# Patient Record
Sex: FEMALE | Race: BLACK OR AFRICAN AMERICAN | NOT HISPANIC OR LATINO | Employment: FULL TIME | URBAN - METROPOLITAN AREA
[De-identification: names, ages, dates, MRNs, and addresses within clinical notes are randomized per-mention and may not be internally consistent; named-entity substitution may affect disease eponyms.]

---

## 2020-03-25 ENCOUNTER — HOSPITAL ENCOUNTER (INPATIENT)
Facility: HOSPITAL | Age: 55
LOS: 27 days | Discharge: NURSING, REHAB OR LONG-TERM CARE | DRG: 870 | End: 2020-04-21
Attending: EMERGENCY MEDICINE | Admitting: INTERNAL MEDICINE
Payer: COMMERCIAL

## 2020-03-25 DIAGNOSIS — U07.1 PNEUMONIA DUE TO COVID-19 VIRUS: ICD-10-CM

## 2020-03-25 DIAGNOSIS — J12.82 PNEUMONIA DUE TO COVID-19 VIRUS: ICD-10-CM

## 2020-03-25 DIAGNOSIS — R78.81 BACTEREMIA: ICD-10-CM

## 2020-03-25 DIAGNOSIS — G93.40 ACUTE ENCEPHALOPATHY: ICD-10-CM

## 2020-03-25 DIAGNOSIS — U07.1 COVID-19 VIRUS DETECTED: ICD-10-CM

## 2020-03-25 DIAGNOSIS — N17.9 ACUTE RENAL FAILURE, UNSPECIFIED ACUTE RENAL FAILURE TYPE: ICD-10-CM

## 2020-03-25 DIAGNOSIS — R57.9 SHOCK: ICD-10-CM

## 2020-03-25 DIAGNOSIS — U07.1 COVID-19 VIRUS INFECTION: Primary | ICD-10-CM

## 2020-03-25 DIAGNOSIS — Z78.9 INTUBATION OF AIRWAY PERFORMED WITHOUT DIFFICULTY: ICD-10-CM

## 2020-03-25 DIAGNOSIS — E66.01 MORBID OBESITY: ICD-10-CM

## 2020-03-25 DIAGNOSIS — B49 FUNGEMIA: ICD-10-CM

## 2020-03-25 DIAGNOSIS — J96.00 ACUTE RESPIRATORY FAILURE: ICD-10-CM

## 2020-03-25 DIAGNOSIS — J96.01 ACUTE RESPIRATORY FAILURE WITH HYPOXIA: ICD-10-CM

## 2020-03-25 DIAGNOSIS — J80 ARDS (ADULT RESPIRATORY DISTRESS SYNDROME): ICD-10-CM

## 2020-03-25 DIAGNOSIS — R00.0 TACHYCARDIA: ICD-10-CM

## 2020-03-25 DIAGNOSIS — Z03.89 RULED OUT FOR MYOCARDIAL INFARCTION: ICD-10-CM

## 2020-03-25 DIAGNOSIS — H10.9 CONJUNCTIVITIS OF RIGHT EYE, UNSPECIFIED CONJUNCTIVITIS TYPE: ICD-10-CM

## 2020-03-25 DIAGNOSIS — Z91.89 AT RISK FOR LONG QT SYNDROME: ICD-10-CM

## 2020-03-25 DIAGNOSIS — Z46.59 ENCOUNTER FOR NASOGASTRIC (NG) TUBE PLACEMENT: ICD-10-CM

## 2020-03-25 DIAGNOSIS — E03.9 ACQUIRED HYPOTHYROIDISM: ICD-10-CM

## 2020-03-25 DIAGNOSIS — B37.7 CANDIDEMIA: ICD-10-CM

## 2020-03-25 LAB
ALBUMIN SERPL BCP-MCNC: 3.3 G/DL (ref 3.5–5.2)
ALLENS TEST: ABNORMAL
ALP SERPL-CCNC: 82 U/L (ref 55–135)
ALT SERPL W/O P-5'-P-CCNC: 87 U/L (ref 10–44)
ANION GAP SERPL CALC-SCNC: 10 MMOL/L (ref 8–16)
AST SERPL-CCNC: 102 U/L (ref 10–40)
BASOPHILS NFR BLD: 0 % (ref 0–1.9)
BILIRUB SERPL-MCNC: 0.3 MG/DL (ref 0.1–1)
BILIRUB UR QL STRIP: NEGATIVE
BNP SERPL-MCNC: <10 PG/ML (ref 0–99)
BUN SERPL-MCNC: 8 MG/DL (ref 6–20)
CALCIUM SERPL-MCNC: 8.7 MG/DL (ref 8.7–10.5)
CHLORIDE SERPL-SCNC: 101 MMOL/L (ref 95–110)
CK SERPL-CCNC: 247 U/L (ref 20–180)
CLARITY UR: CLEAR
CO2 SERPL-SCNC: 28 MMOL/L (ref 23–29)
COLOR UR: YELLOW
CREAT SERPL-MCNC: 0.7 MG/DL (ref 0.5–1.4)
CRP SERPL-MCNC: 119 MG/L (ref 0–8.2)
D DIMER PPP IA.FEU-MCNC: 1.05 MG/L FEU
DELSYS: ABNORMAL
DIFFERENTIAL METHOD: ABNORMAL
EOSINOPHIL NFR BLD: 0 % (ref 0–8)
ERYTHROCYTE [DISTWIDTH] IN BLOOD BY AUTOMATED COUNT: 16.9 % (ref 11.5–14.5)
ERYTHROCYTE [SEDIMENTATION RATE] IN BLOOD BY WESTERGREN METHOD: 18 MM/H
ERYTHROCYTE [SEDIMENTATION RATE] IN BLOOD BY WESTERGREN METHOD: 78 MM/HR (ref 0–20)
EST. GFR  (AFRICAN AMERICAN): >60 ML/MIN/1.73 M^2
EST. GFR  (NON AFRICAN AMERICAN): >60 ML/MIN/1.73 M^2
FERRITIN SERPL-MCNC: 1752 NG/ML (ref 20–300)
FIO2: 60
GLUCOSE SERPL-MCNC: 94 MG/DL (ref 70–110)
GLUCOSE UR QL STRIP: NEGATIVE
HCO3 UR-SCNC: 26.7 MMOL/L (ref 24–28)
HCT VFR BLD AUTO: 38.8 % (ref 37–48.5)
HGB BLD-MCNC: 11.7 G/DL (ref 12–16)
HGB UR QL STRIP: ABNORMAL
HIV1+2 IGG SERPL QL IA.RAPID: NORMAL
IMM GRANULOCYTES # BLD AUTO: ABNORMAL K/UL
IMM GRANULOCYTES NFR BLD AUTO: ABNORMAL %
KETONES UR QL STRIP: ABNORMAL
LACTATE SERPL-SCNC: 0.8 MMOL/L (ref 0.5–2.2)
LACTATE SERPL-SCNC: 1.3 MMOL/L (ref 0.5–2.2)
LDH SERPL L TO P-CCNC: 571 U/L (ref 110–260)
LEUKOCYTE ESTERASE UR QL STRIP: NEGATIVE
LYMPHOCYTES NFR BLD: 16 % (ref 18–48)
MAGNESIUM SERPL-MCNC: 2.1 MG/DL (ref 1.6–2.6)
MCH RBC QN AUTO: 26.2 PG (ref 27–31)
MCHC RBC AUTO-ENTMCNC: 30.2 G/DL (ref 32–36)
MCV RBC AUTO: 87 FL (ref 82–98)
MIN VOL: 9.13
MODE: ABNORMAL
MONOCYTES NFR BLD: 1 % (ref 4–15)
NEUTROPHILS NFR BLD: 79 % (ref 38–73)
NEUTS BAND NFR BLD MANUAL: 4 %
NITRITE UR QL STRIP: NEGATIVE
NRBC BLD-RTO: 0 /100 WBC
PCO2 BLDA: 38.3 MMHG (ref 35–45)
PEEP: 8
PH SMN: 7.45 [PH] (ref 7.35–7.45)
PH UR STRIP: 7 [PH] (ref 5–8)
PIP: 37
PLATELET # BLD AUTO: 195 K/UL (ref 150–350)
PLATELET BLD QL SMEAR: ABNORMAL
PMV BLD AUTO: 10.6 FL (ref 9.2–12.9)
PO2 BLDA: 101 MMHG (ref 80–100)
POC BE: 3 MMOL/L
POC SATURATED O2: 98 % (ref 95–100)
POC TCO2: 28 MMOL/L (ref 23–27)
POTASSIUM SERPL-SCNC: 4 MMOL/L (ref 3.5–5.1)
PROCALCITONIN SERPL IA-MCNC: 0.12 NG/ML
PROT SERPL-MCNC: 8.1 G/DL (ref 6–8.4)
PROT UR QL STRIP: NEGATIVE
RBC # BLD AUTO: 4.46 M/UL (ref 4–5.4)
SAMPLE: ABNORMAL
SITE: ABNORMAL
SODIUM SERPL-SCNC: 139 MMOL/L (ref 136–145)
SP GR UR STRIP: 1.01 (ref 1–1.03)
SP02: 99
TROPONIN I SERPL DL<=0.01 NG/ML-MCNC: 0.02 NG/ML (ref 0–0.03)
TROPONIN I SERPL DL<=0.01 NG/ML-MCNC: <0.006 NG/ML (ref 0–0.03)
URN SPEC COLLECT METH UR: ABNORMAL
UROBILINOGEN UR STRIP-ACNC: NEGATIVE EU/DL
VT: 480
WBC # BLD AUTO: 4.46 K/UL (ref 3.9–12.7)

## 2020-03-25 PROCEDURE — 31500 INSERT EMERGENCY AIRWAY: CPT

## 2020-03-25 PROCEDURE — 63600175 PHARM REV CODE 636 W HCPCS: Performed by: EMERGENCY MEDICINE

## 2020-03-25 PROCEDURE — 25000003 PHARM REV CODE 250: Performed by: EMERGENCY MEDICINE

## 2020-03-25 PROCEDURE — 80074 ACUTE HEPATITIS PANEL: CPT

## 2020-03-25 PROCEDURE — 96361 HYDRATE IV INFUSION ADD-ON: CPT

## 2020-03-25 PROCEDURE — 84484 ASSAY OF TROPONIN QUANT: CPT | Mod: 91

## 2020-03-25 PROCEDURE — 82728 ASSAY OF FERRITIN: CPT

## 2020-03-25 PROCEDURE — 85027 COMPLETE CBC AUTOMATED: CPT

## 2020-03-25 PROCEDURE — 96360 HYDRATION IV INFUSION INIT: CPT

## 2020-03-25 PROCEDURE — 11000001 HC ACUTE MED/SURG PRIVATE ROOM

## 2020-03-25 PROCEDURE — 84484 ASSAY OF TROPONIN QUANT: CPT

## 2020-03-25 PROCEDURE — 36415 COLL VENOUS BLD VENIPUNCTURE: CPT

## 2020-03-25 PROCEDURE — 63600175 PHARM REV CODE 636 W HCPCS: Performed by: INTERNAL MEDICINE

## 2020-03-25 PROCEDURE — 86140 C-REACTIVE PROTEIN: CPT

## 2020-03-25 PROCEDURE — 85379 FIBRIN DEGRADATION QUANT: CPT

## 2020-03-25 PROCEDURE — 87070 CULTURE OTHR SPECIMN AEROBIC: CPT

## 2020-03-25 PROCEDURE — 25000003 PHARM REV CODE 250: Performed by: INTERNAL MEDICINE

## 2020-03-25 PROCEDURE — 36600 WITHDRAWAL OF ARTERIAL BLOOD: CPT

## 2020-03-25 PROCEDURE — 99900026 HC AIRWAY MAINTENANCE (STAT)

## 2020-03-25 PROCEDURE — 80053 COMPREHEN METABOLIC PANEL: CPT

## 2020-03-25 PROCEDURE — 25000003 PHARM REV CODE 250

## 2020-03-25 PROCEDURE — 86703 HIV-1/HIV-2 1 RESULT ANTBDY: CPT

## 2020-03-25 PROCEDURE — 94002 VENT MGMT INPAT INIT DAY: CPT

## 2020-03-25 PROCEDURE — 82960 TEST FOR G6PD ENZYME: CPT

## 2020-03-25 PROCEDURE — 85007 BL SMEAR W/DIFF WBC COUNT: CPT

## 2020-03-25 PROCEDURE — 85651 RBC SED RATE NONAUTOMATED: CPT

## 2020-03-25 PROCEDURE — 63600175 PHARM REV CODE 636 W HCPCS

## 2020-03-25 PROCEDURE — 99292 CRITICAL CARE ADDL 30 MIN: CPT

## 2020-03-25 PROCEDURE — 87040 BLOOD CULTURE FOR BACTERIA: CPT

## 2020-03-25 PROCEDURE — 84145 PROCALCITONIN (PCT): CPT

## 2020-03-25 PROCEDURE — 99291 CRITICAL CARE FIRST HOUR: CPT | Mod: 25

## 2020-03-25 PROCEDURE — 83615 LACTATE (LD) (LDH) ENZYME: CPT

## 2020-03-25 PROCEDURE — 83735 ASSAY OF MAGNESIUM: CPT

## 2020-03-25 PROCEDURE — 83880 ASSAY OF NATRIURETIC PEPTIDE: CPT

## 2020-03-25 PROCEDURE — 83605 ASSAY OF LACTIC ACID: CPT

## 2020-03-25 PROCEDURE — 20000000 HC ICU ROOM

## 2020-03-25 PROCEDURE — 87205 SMEAR GRAM STAIN: CPT

## 2020-03-25 PROCEDURE — 82550 ASSAY OF CK (CPK): CPT

## 2020-03-25 PROCEDURE — 99900035 HC TECH TIME PER 15 MIN (STAT)

## 2020-03-25 PROCEDURE — 82803 BLOOD GASES ANY COMBINATION: CPT

## 2020-03-25 PROCEDURE — 81003 URINALYSIS AUTO W/O SCOPE: CPT

## 2020-03-25 RX ORDER — ENOXAPARIN SODIUM 100 MG/ML
40 INJECTION SUBCUTANEOUS EVERY 12 HOURS
Status: DISCONTINUED | OUTPATIENT
Start: 2020-03-25 | End: 2020-04-07

## 2020-03-25 RX ORDER — SUCCINYLCHOLINE CHLORIDE 20 MG/ML
120 INJECTION INTRAMUSCULAR; INTRAVENOUS
Status: COMPLETED | OUTPATIENT
Start: 2020-03-25 | End: 2020-03-25

## 2020-03-25 RX ORDER — SODIUM CHLORIDE 9 MG/ML
INJECTION, SOLUTION INTRAVENOUS CONTINUOUS
Status: DISCONTINUED | OUTPATIENT
Start: 2020-03-25 | End: 2020-03-26

## 2020-03-25 RX ORDER — SODIUM CHLORIDE 0.9 % (FLUSH) 0.9 %
10 SYRINGE (ML) INJECTION
Status: DISCONTINUED | OUTPATIENT
Start: 2020-03-25 | End: 2020-04-12

## 2020-03-25 RX ORDER — PROPOFOL 10 MG/ML
20 INJECTION, EMULSION INTRAVENOUS
Status: COMPLETED | OUTPATIENT
Start: 2020-03-25 | End: 2020-03-25

## 2020-03-25 RX ORDER — PROPOFOL 10 MG/ML
INJECTION, EMULSION INTRAVENOUS
Status: COMPLETED
Start: 2020-03-25 | End: 2020-03-25

## 2020-03-25 RX ORDER — HYDROXYCHLOROQUINE SULFATE 200 MG/1
400 TABLET, FILM COATED ORAL 2 TIMES DAILY
Status: COMPLETED | OUTPATIENT
Start: 2020-03-25 | End: 2020-03-26

## 2020-03-25 RX ORDER — ACETAMINOPHEN 325 MG/1
650 TABLET ORAL
Status: COMPLETED | OUTPATIENT
Start: 2020-03-25 | End: 2020-03-25

## 2020-03-25 RX ORDER — HYDROXYCHLOROQUINE SULFATE 200 MG/1
400 TABLET, FILM COATED ORAL DAILY
Status: DISCONTINUED | OUTPATIENT
Start: 2020-03-27 | End: 2020-03-27

## 2020-03-25 RX ORDER — SODIUM CHLORIDE 9 MG/ML
1000 INJECTION, SOLUTION INTRAVENOUS
Status: COMPLETED | OUTPATIENT
Start: 2020-03-25 | End: 2020-03-25

## 2020-03-25 RX ORDER — ROCURONIUM BROMIDE 10 MG/ML
INJECTION, SOLUTION INTRAVENOUS
Status: COMPLETED
Start: 2020-03-25 | End: 2020-03-25

## 2020-03-25 RX ORDER — PROPOFOL 10 MG/ML
5 INJECTION, EMULSION INTRAVENOUS
Status: DISCONTINUED | OUTPATIENT
Start: 2020-03-26 | End: 2020-04-16

## 2020-03-25 RX ORDER — LEVOTHYROXINE SODIUM 100 UG/1
100 TABLET ORAL
Status: DISCONTINUED | OUTPATIENT
Start: 2020-03-26 | End: 2020-04-21 | Stop reason: HOSPADM

## 2020-03-25 RX ORDER — ETOMIDATE 2 MG/ML
15 INJECTION INTRAVENOUS
Status: COMPLETED | OUTPATIENT
Start: 2020-03-25 | End: 2020-03-25

## 2020-03-25 RX ORDER — ROCURONIUM BROMIDE 10 MG/ML
100 INJECTION, SOLUTION INTRAVENOUS ONCE
Status: DISCONTINUED | OUTPATIENT
Start: 2020-03-25 | End: 2020-04-09

## 2020-03-25 RX ORDER — PANTOPRAZOLE SODIUM 40 MG/1
40 TABLET, DELAYED RELEASE ORAL DAILY
Status: DISCONTINUED | OUTPATIENT
Start: 2020-03-26 | End: 2020-03-26

## 2020-03-25 RX ADMIN — ENOXAPARIN SODIUM 40 MG: 100 INJECTION SUBCUTANEOUS at 11:03

## 2020-03-25 RX ADMIN — AZITHROMYCIN MONOHYDRATE 500 MG: 500 INJECTION, POWDER, LYOPHILIZED, FOR SOLUTION INTRAVENOUS at 11:03

## 2020-03-25 RX ADMIN — SODIUM CHLORIDE: 0.9 INJECTION, SOLUTION INTRAVENOUS at 11:03

## 2020-03-25 RX ADMIN — SUCCINYLCHOLINE CHLORIDE 120 MG: 20 INJECTION, SOLUTION INTRAMUSCULAR; INTRAVENOUS at 08:03

## 2020-03-25 RX ADMIN — SODIUM CHLORIDE 1000 ML: 0.9 INJECTION, SOLUTION INTRAVENOUS at 06:03

## 2020-03-25 RX ADMIN — PROPOFOL 50 MCG/KG/MIN: 10 INJECTION, EMULSION INTRAVENOUS at 11:03

## 2020-03-25 RX ADMIN — HYDROXYCHLOROQUINE SULFATE 400 MG: 200 TABLET ORAL at 11:03

## 2020-03-25 RX ADMIN — SODIUM CHLORIDE 1000 ML: 0.9 INJECTION, SOLUTION INTRAVENOUS at 01:03

## 2020-03-25 RX ADMIN — CEFTRIAXONE 1 G: 1 INJECTION, SOLUTION INTRAVENOUS at 11:03

## 2020-03-25 RX ADMIN — ROCURONIUM BROMIDE 100 MG: 10 INJECTION, SOLUTION INTRAVENOUS at 09:03

## 2020-03-25 RX ADMIN — ACETAMINOPHEN 650 MG: 325 TABLET ORAL at 01:03

## 2020-03-25 RX ADMIN — PROPOFOL 20 MCG/KG/MIN: 10 INJECTION, EMULSION INTRAVENOUS at 08:03

## 2020-03-25 RX ADMIN — ETOMIDATE 15 MG: 2 INJECTION, SOLUTION INTRAVENOUS at 08:03

## 2020-03-25 NOTE — ASSESSMENT & PLAN NOTE
Supplemental O2 via nasal canula; titrate O2 saturation to >92%.   Pulmonary consultation.   Continue beta 2 agonist bronchodilator treatments.   Continue IV antibiotics - ceftriaxone and azithromycin.  Start Plaquenil 400 mg twice daily for 1 day followed by 400 mg daily for 5 days as per COVID protocol.  Check sputum GS and Cx.   Continue routine medications as before.   Follow airborne/droplet precautions.

## 2020-03-25 NOTE — H&P
[facility] Medicine  History & Physical    Patient Name: Maria Victoria Hernandez  MRN: [id number]  Admission Date: 3/25/2020  Attending Physician: Ferny Porter MD   Primary Care Provider: Candiec Deluna MD         Patient information was obtained from patient, daughter and ER records.     Subjective:     Principal Problem:<principal problem not specified>    Chief Complaint:   Chief Complaint   Patient presents with    Shortness of Breath     positive covid        HPI: Patient is a 54 years old  female with morbid obesity in past medical history significant for hypothyroidism is being admitted to intensive care unit under inpatient status from [facility] Emergency room with worsening shortness of breath.  Three days ago patient was tested positive for COVID - 19.  Patient works at [employer].  Patient has been experiencing subjective fever, generalized body aches and pains and nonproductive cough for few days.  Patient is getting increasingly short of breath especially for the past 2 days.  Upon arrival to the emergency room patient was noted to be hypoxic around 89%.  Up on 3 L patient's oxygen sats are around 96%.  Patient denies any chest pain, leg swelling or calf tenderness.      History reviewed. No pertinent past medical history.    Past Surgical History:   Procedure Laterality Date    TUBAL LIGATION         Review of patient's allergies indicates:  No Known Allergies    No current facility-administered medications on file prior to encounter.      Current Outpatient Medications on File Prior to Encounter   Medication Sig    clotrimazole-betamethasone 1-0.05% (LOTRISONE) cream Apply topically 2 (two) times daily.    ferrous sulfate 325 mg (65 mg iron) Tab tablet Take 1 tablet (325 mg total) by mouth daily with breakfast.    levocetirizine (XYZAL) 5 MG tablet Take 1 tablet (5 mg total) by mouth every evening.     levothyroxine (SYNTHROID) 100 MCG tablet Take 1 tablet (100 mcg total) by mouth once daily.     Family History     Problem Relation (Age of Onset)    Heart disease Mother, Maternal Grandmother, Maternal Grandfather    Mental illness Paternal Grandmother        Tobacco Use    Smoking status: Never Smoker    Smokeless tobacco: Never Used   Substance and Sexual Activity    Alcohol use: No    Drug use: Not on file    Sexual activity: Not on file     Review of Systems   Constitutional: Positive for fatigue and fever.   Respiratory: Positive for cough and shortness of breath.    Musculoskeletal: Positive for arthralgias and myalgias.   Neurological: Positive for weakness.   All other systems reviewed and are negative.    Objective:     Vital Signs (Most Recent):  Temp: (!) 100.5 °F (38.1 °C) (03/25/20 1239)  Pulse: 103 (03/25/20 1531)  Resp: (!) 40 (03/25/20 1239)  BP: (!) 152/75 (03/25/20 1531)  SpO2: 95 % (03/25/20 1531) Vital Signs (24h Range):  Temp:  [100.5 °F (38.1 °C)] 100.5 °F (38.1 °C)  Pulse:  [103-120] 103  Resp:  [40] 40  SpO2:  [89 %-97 %] 95 %  BP: (136-174)/(73-96) 152/75     Weight: 105.7 kg (233 lb)  Body mass index is 45.5 kg/m².    Physical Exam   Constitutional: She is oriented to person, place, and time. She appears well-developed.   Anxious and ill-appearing, morbidly obese   HENT:   Head: Normocephalic and atraumatic.   Eyes: Pupils are equal, round, and reactive to light. Conjunctivae are normal.   Neck: Neck supple. No JVD present. No thyromegaly present.   Cardiovascular: Normal rate and regular rhythm. Exam reveals no gallop and no friction rub.   No murmur heard.  Pulmonary/Chest: Breath sounds normal.   Increased respiratory rate, decreased breath sounds at lung bases.   Abdominal: Soft. Bowel sounds are normal. She exhibits no distension and no mass. There is no tenderness.   Musculoskeletal: Normal range of motion. She exhibits no edema.   Neurological: She is oriented to person, place,  and time. No cranial nerve deficit.   Skin: Skin is warm and dry.   Psychiatric: Her behavior is normal.         CRANIAL NERVES     CN III, IV, VI   Pupils are equal, round, and reactive to light.       Significant Labs:   CBC:   Recent Labs   Lab 03/25/20  1410   WBC 4.46   HGB 11.7*   HCT 38.8        CMP:   Recent Labs   Lab 03/25/20  1410      K 4.0      CO2 28   GLU 94   BUN 8   CREATININE 0.7   CALCIUM 8.7   PROT 8.1   ALBUMIN 3.3*   BILITOT 0.3   ALKPHOS 82   *   ALT 87*   ANIONGAP 10   EGFRNONAA >60   LDH: 571      Lactate: 0.8    HIV negative  Procalcitonin negative    Significant Imaging:   CXR: New bilateral infiltrates    Assessment/Plan:     Acute respiratory failure  Supplemental O2 via nasal canula; titrate O2 saturation to >92%.   Pulmonary consultation.   Continue beta 2 agonist bronchodilator treatments.   Continue IV antibiotics - ceftriaxone and azithromycin.  Start Plaquenil 400 mg twice daily for 1 day followed by 400 mg daily for 5 days as per COVID protocol.  Check sputum GS and Cx.   Continue routine medications as before.   Follow airborne/droplet precautions.            Covid-19 Virus Detected  Start Plaquenil 400 mg twice daily for 1 day followed by 400 mg daily for 5 days as per COVID protocol.  Check sputum GS and Cx.   Continue routine medications as before.   Follow airborne/droplet precautions.      Hypothyroid  Check TSH, Chronic problem. Will continue chronic medications and monitor for any changes, adjusting as needed.          Morbid obesity  Body mass index is 45.5 kg/m². Morbid obesity complicates all aspects of disease management from diagnostic modalities to treatment. Weight loss encouraged and health benefits explained to patient.         Discussed with patient's daughter who is a senior registered nurse in our facility, answered all questions.    DVT prophylaxis:  Use sequential compression stockings and [place] hose.  Lovenox 40 mg subcu  daily.    Ferny Porter MD  Department of Hospital Medicine   [facility]

## 2020-03-25 NOTE — ED NOTES
Pt sitting up in bed, resp effort has improved. RR 40. Nadn. O2 via NC @ 3L. Safety maintained. Will cont to brain.

## 2020-03-25 NOTE — ED PROVIDER NOTES
Encounter Date: 3/25/2020    SCRIBE #1 NOTE: I, Awa Pierre, am scribing for, and in the presence of, Sam Jones MD.       History     Chief Complaint   Patient presents with    Shortness of Breath     positive covid       Time seen by provider: 12:58 PM on 03/25/2020    Maria Victoria Hernandez is a 54 y.o. female with PMHx of thyroid disease who presents to the ED with an onset of SOB x 2 days and worsening this morning. Pt c/o fever, generalized body aches, nonproductive cough, as well as, N/V, unable to keep down food or liquid. She had a confirmed positive Covid-19 test 4 days ago. The patient denies diarrhea or any other symptoms at this time. No pertinent PMHx or PSHx.    The history is provided by the patient.     Review of patient's allergies indicates:  No Known Allergies  History reviewed. No pertinent past medical history.  Past Surgical History:   Procedure Laterality Date    TUBAL LIGATION       Family History   Problem Relation Age of Onset    Heart disease Mother     Heart disease Maternal Grandmother     Heart disease Maternal Grandfather     Mental illness Paternal Grandmother      Social History     Tobacco Use    Smoking status: Never Smoker    Smokeless tobacco: Never Used   Substance Use Topics    Alcohol use: No    Drug use: Not on file     Review of Systems   Constitutional: Positive for fever.   HENT: Negative for sore throat.    Respiratory: Positive for cough and shortness of breath.    Cardiovascular: Negative for chest pain.   Gastrointestinal: Positive for nausea and vomiting. Negative for diarrhea.   Genitourinary: Negative for dysuria.   Musculoskeletal: Positive for myalgias (generalized). Negative for back pain.   Skin: Negative for rash.   Neurological: Negative for weakness.   Hematological: Does not bruise/bleed easily.       Physical Exam     Initial Vitals [03/25/20 1239]   BP Pulse Resp Temp SpO2   136/87 109 (!) 40 (!) 100.5 °F (38.1 °C) (!) 89 %      MAP        --         Physical Exam    Nursing note and vitals reviewed.  Constitutional: She appears well-developed and well-nourished. She is not diaphoretic. No distress.   HENT:   Head: Normocephalic and atraumatic.   Mouth/Throat: Mucous membranes are dry.   Eyes: EOM are normal. Pupils are equal, round, and reactive to light.   Neck: Normal range of motion. Neck supple.   Cardiovascular: Regular rhythm, normal heart sounds and intact distal pulses. Tachycardia present.  Exam reveals no gallop and no friction rub.    No murmur heard.  Pulmonary/Chest: Tachypnea noted. No respiratory distress. She has decreased breath sounds. She has no wheezes. She has no rhonchi. She has no rales.   Diminished lung sounds bilaterally.   Abdominal: Soft. Bowel sounds are normal. There is no tenderness. There is no rebound and no guarding.   Musculoskeletal: Normal range of motion.   Neurological: She is alert and oriented to person, place, and time.   Skin: Skin is warm and dry.   Psychiatric: She has a normal mood and affect. Her behavior is normal. Judgment and thought content normal.         ED Course   Critical Care  Performed by: Sam Jones MD  Authorized by: Sam Jones MD   Direct patient critical care time: 18 minutes  Additional history critical care time: 13 minutes  Ordering / reviewing critical care time: 14 minutes  Documentation critical care time: 12 minutes  Consulting other physicians critical care time: 8 minutes  Consult with family critical care time: 10 minutes  Total critical care time (exclusive of procedural time) : 75 minutes  Critical care was time spent personally by me on the following activities: re-evaluation of patient's condition, ordering and review of radiographic studies, ordering and performing treatments and interventions, examination of patient, development of treatment plan with patient or surrogate, evaluation of patient's response to treatment, obtaining history from patient or  surrogate and ordering and review of laboratory studies.        Labs Reviewed   CBC W/ AUTO DIFFERENTIAL - Abnormal; Notable for the following components:       Result Value    Hemoglobin 11.7 (*)     Mean Corpuscular Hemoglobin 26.2 (*)     Mean Corpuscular Hemoglobin Conc 30.2 (*)     RDW 16.9 (*)     Gran% 79.0 (*)     Lymph% 16.0 (*)     Mono% 1.0 (*)     All other components within normal limits   COMPREHENSIVE METABOLIC PANEL - Abnormal; Notable for the following components:    Albumin 3.3 (*)      (*)     ALT 87 (*)     All other components within normal limits   URINALYSIS, REFLEX TO URINE CULTURE - Abnormal; Notable for the following components:    Ketones, UA 1+ (*)     Occult Blood UA Trace (*)     All other components within normal limits    Narrative:     Preferred Collection Type->Urine, Clean Catch   C-REACTIVE PROTEIN - Abnormal; Notable for the following components:    .0 (*)     All other components within normal limits   FERRITIN - Abnormal; Notable for the following components:    Ferritin 1,752 (*)     All other components within normal limits   LACTATE DEHYDROGENASE - Abnormal; Notable for the following components:     (*)     All other components within normal limits   CK - Abnormal; Notable for the following components:     (*)     All other components within normal limits   CULTURE, BLOOD   CULTURE, RESPIRATORY   LACTIC ACID, PLASMA   MAGNESIUM   PROCALCITONIN   TROPONIN I   B-TYPE NATRIURETIC PEPTIDE   RAPID HIV   TROPONIN I   LACTIC ACID, PLASMA   D DIMER, QUANTITATIVE   GLUCOSE 6 PHOSPHATE DEHYDROGENASE   SEDIMENTATION RATE   HEPATITIS PANEL, ACUTE          Imaging Results          X-Ray Chest AP Portable (Final result)  Result time 03/25/20 13:58:20    Final result by Aletha Arrington MD (03/25/20 13:58:20)                 Impression:      New bilateral infiltrates      Electronically signed by: Aletha Arrington MD  Date:    03/25/2020  Time:    13:58              Narrative:    EXAMINATION:  XR CHEST AP PORTABLE    CLINICAL HISTORY:  Sepsis;    TECHNIQUE:  Single frontal view of the chest was performed.    COMPARISON:  09/09/2010    FINDINGS:  The heart is not enlarged and the cardiomediastinal silhouette is stable    There is interval development of patchy bilateral consolidation perihilar and basilar more so basilar.    No pleural effusion.                                 Medical Decision Making:   History:   Old Medical Records: I decided to obtain old medical records.  Initial Assessment:   54-year-old female presented with shortness of breath.  Differential Diagnosis:   My differential diagnosis includes sepsis, PNA, and viral illness.  Clinical Tests:   Lab Tests: Ordered and Reviewed  Radiological Study: Ordered and Reviewed  ED Management:  The patient was emergently evaluated in the emergency department, her evaluation was significant for a middle-age female who is noted to be tachypneic and have increased respiratory effort on initial examination.  The patient's chest x-ray was concerning for bilateral infiltrates per Radiology.  The patient's labs were significant for mildly elevated LFTs as well as lymphopenia.  The patient was treated with p.o. Tylenol, IV fluids, and oxygen here in the emergency department.  The patient has significant improvement in her respiratory effort after treatment, and reports that she is no longer short of breath when she does not move around.  The patient's diagnosis is pneumonia secondary to her COVID infection.  I did discuss the possibility of intubation with both the patient and her daughter, who is a nurse at our facility.  The patient's O2 saturation did improve to 96-97% on 2.5 L of oxygen by nasal cannula.  I do not believe the patient needs emergent intubation at this time, however she will be admitted to the intensive care unit.  The case was discussed with the hospitalist on call, Dr. Porter.  He has accepted the patient  for admission.  Other:   I have discussed this case with another health care provider.            Scribe Attestation:   Scribe #1: I performed the above scribed service and the documentation accurately describes the services I performed. I attest to the accuracy of the note.    Attending Attestation:             Attending ED Notes:   2:20 PM  Spoke with Dr. Porter about patient. He will come and evaluate patient now.         I, Dr. Sam Jones, personally performed the services described in this documentation. All medical record entries made by the scribe were at my direction and in my presence.  I have reviewed the chart and agree that the record reflects my personal performance and is accurate and complete. Sam Jones MD.  5:08 PM 03/25/2020                  Clinical Impression:       ICD-10-CM ICD-9-CM   1. Covid-19 Virus Infection J22     B97.29    2. Acute respiratory failure J96.00 518.81         Disposition:   Disposition: Admitted     ED Disposition Condition    Admit                           Sam Jones MD  03/25/20 1707

## 2020-03-25 NOTE — ED NOTES
Patient placed on continuous cardiac monitor, automatic blood pressure cuff, and continuous pulse oximeter. Pt placed on 3L NC. MD aware. Sats @  95%.

## 2020-03-25 NOTE — ASSESSMENT & PLAN NOTE
Check TSH, Chronic problem. Will continue chronic medications and monitor for any changes, adjusting as needed.

## 2020-03-25 NOTE — ASSESSMENT & PLAN NOTE
Body mass index is 45.5 kg/m². Morbid obesity complicates all aspects of disease management from diagnostic modalities to treatment. Weight loss encouraged and health benefits explained to patient.

## 2020-03-25 NOTE — ASSESSMENT & PLAN NOTE
Start Plaquenil 400 mg twice daily for 1 day followed by 400 mg daily for 5 days as per COVID protocol.  Check sputum GS and Cx.   Continue routine medications as before.   Follow airborne/droplet precautions.

## 2020-03-25 NOTE — SUBJECTIVE & OBJECTIVE
History reviewed. No pertinent past medical history.    Past Surgical History:   Procedure Laterality Date    TUBAL LIGATION         Review of patient's allergies indicates:  No Known Allergies    No current facility-administered medications on file prior to encounter.      Current Outpatient Medications on File Prior to Encounter   Medication Sig    clotrimazole-betamethasone 1-0.05% (LOTRISONE) cream Apply topically 2 (two) times daily.    ferrous sulfate 325 mg (65 mg iron) Tab tablet Take 1 tablet (325 mg total) by mouth daily with breakfast.    levocetirizine (XYZAL) 5 MG tablet Take 1 tablet (5 mg total) by mouth every evening.    levothyroxine (SYNTHROID) 100 MCG tablet Take 1 tablet (100 mcg total) by mouth once daily.     Family History     Problem Relation (Age of Onset)    Heart disease Mother, Maternal Grandmother, Maternal Grandfather    Mental illness Paternal Grandmother        Tobacco Use    Smoking status: Never Smoker    Smokeless tobacco: Never Used   Substance and Sexual Activity    Alcohol use: No    Drug use: Not on file    Sexual activity: Not on file     Review of Systems   Constitutional: Positive for fatigue and fever.   Respiratory: Positive for cough and shortness of breath.    Musculoskeletal: Positive for arthralgias and myalgias.   Neurological: Positive for weakness.   All other systems reviewed and are negative.    Objective:     Vital Signs (Most Recent):  Temp: (!) 100.5 °F (38.1 °C) (03/25/20 1239)  Pulse: 103 (03/25/20 1531)  Resp: (!) 40 (03/25/20 1239)  BP: (!) 152/75 (03/25/20 1531)  SpO2: 95 % (03/25/20 1531) Vital Signs (24h Range):  Temp:  [100.5 °F (38.1 °C)] 100.5 °F (38.1 °C)  Pulse:  [103-120] 103  Resp:  [40] 40  SpO2:  [89 %-97 %] 95 %  BP: (136-174)/(73-96) 152/75     Weight: 105.7 kg (233 lb)  Body mass index is 45.5 kg/m².    Physical Exam   Constitutional: She is oriented to person, place, and time. She appears well-developed.   Anxious and  ill-appearing, morbidly obese   HENT:   Head: Normocephalic and atraumatic.   Eyes: Pupils are equal, round, and reactive to light. Conjunctivae are normal.   Neck: Neck supple. No JVD present. No thyromegaly present.   Cardiovascular: Normal rate and regular rhythm. Exam reveals no gallop and no friction rub.   No murmur heard.  Pulmonary/Chest: Breath sounds normal.   Increased respiratory rate, decreased breath sounds at lung bases.   Abdominal: Soft. Bowel sounds are normal. She exhibits no distension and no mass. There is no tenderness.   Musculoskeletal: Normal range of motion. She exhibits no edema.   Neurological: She is oriented to person, place, and time. No cranial nerve deficit.   Skin: Skin is warm and dry.   Psychiatric: Her behavior is normal.         CRANIAL NERVES     CN III, IV, VI   Pupils are equal, round, and reactive to light.       Significant Labs:   CBC:   Recent Labs   Lab 03/25/20  1410   WBC 4.46   HGB 11.7*   HCT 38.8        CMP:   Recent Labs   Lab 03/25/20  1410      K 4.0      CO2 28   GLU 94   BUN 8   CREATININE 0.7   CALCIUM 8.7   PROT 8.1   ALBUMIN 3.3*   BILITOT 0.3   ALKPHOS 82   *   ALT 87*   ANIONGAP 10   EGFRNONAA >60   LDH: 571      Lactate: 0.8    HIV negative  Procalcitonin negative    Significant Imaging:   CXR: New bilateral infiltrates

## 2020-03-25 NOTE — HPI
Patient is a 54 years old  female with morbid obesity in past medical history significant for hypothyroidism is being admitted to intensive care unit under inpatient status from [facility] Emergency room with worsening shortness of breath.  Three days ago patient was tested positive for COVID - 19.  Patient works at [employer].  Patient has been experiencing subjective fever, generalized body aches and pains and nonproductive cough for few days.  Patient is getting increasingly short of breath especially for the past 2 days.  Upon arrival to the emergency room patient was noted to be hypoxic around 89%.  Up on 3 L patient's oxygen sats are around 96%.  Patient denies any chest pain, leg swelling or calf tenderness.

## 2020-03-26 PROBLEM — J80 ARDS (ADULT RESPIRATORY DISTRESS SYNDROME): Status: ACTIVE | Noted: 2020-03-26

## 2020-03-26 PROBLEM — J12.82 PNEUMONIA DUE TO COVID-19 VIRUS: Status: ACTIVE | Noted: 2020-03-26

## 2020-03-26 PROBLEM — U07.1 COVID-19 VIRUS INFECTION: Status: ACTIVE | Noted: 2020-03-26

## 2020-03-26 LAB
ALBUMIN SERPL BCP-MCNC: 2.8 G/DL (ref 3.5–5.2)
ALLENS TEST: ABNORMAL
ALP SERPL-CCNC: 79 U/L (ref 55–135)
ALT SERPL W/O P-5'-P-CCNC: 81 U/L (ref 10–44)
ANION GAP SERPL CALC-SCNC: 11 MMOL/L (ref 8–16)
ANION GAP SERPL CALC-SCNC: 9 MMOL/L (ref 8–16)
AST SERPL-CCNC: 105 U/L (ref 10–40)
BASOPHILS # BLD AUTO: 0.01 K/UL (ref 0–0.2)
BASOPHILS NFR BLD: 0.2 % (ref 0–1.9)
BILIRUB SERPL-MCNC: 0.5 MG/DL (ref 0.1–1)
BUN SERPL-MCNC: 7 MG/DL (ref 6–20)
BUN SERPL-MCNC: 7 MG/DL (ref 6–20)
CALCIUM SERPL-MCNC: 7.8 MG/DL (ref 8.7–10.5)
CALCIUM SERPL-MCNC: 7.8 MG/DL (ref 8.7–10.5)
CHLORIDE SERPL-SCNC: 100 MMOL/L (ref 95–110)
CHLORIDE SERPL-SCNC: 101 MMOL/L (ref 95–110)
CO2 SERPL-SCNC: 27 MMOL/L (ref 23–29)
CO2 SERPL-SCNC: 27 MMOL/L (ref 23–29)
CREAT SERPL-MCNC: 1 MG/DL (ref 0.5–1.4)
CREAT SERPL-MCNC: 1 MG/DL (ref 0.5–1.4)
DELSYS: ABNORMAL
DIFFERENTIAL METHOD: ABNORMAL
EOSINOPHIL # BLD AUTO: 0 K/UL (ref 0–0.5)
EOSINOPHIL NFR BLD: 0 % (ref 0–8)
ERYTHROCYTE [DISTWIDTH] IN BLOOD BY AUTOMATED COUNT: 17.2 % (ref 11.5–14.5)
ERYTHROCYTE [SEDIMENTATION RATE] IN BLOOD BY WESTERGREN METHOD: 18 MM/H
ERYTHROCYTE [SEDIMENTATION RATE] IN BLOOD BY WESTERGREN METHOD: 30 MM/H
ERYTHROCYTE [SEDIMENTATION RATE] IN BLOOD BY WESTERGREN METHOD: 35 MM/H
EST. GFR  (AFRICAN AMERICAN): >60 ML/MIN/1.73 M^2
EST. GFR  (AFRICAN AMERICAN): >60 ML/MIN/1.73 M^2
EST. GFR  (NON AFRICAN AMERICAN): >60 ML/MIN/1.73 M^2
EST. GFR  (NON AFRICAN AMERICAN): >60 ML/MIN/1.73 M^2
ETCO2: 37
ETCO2: 45
ETCO2: 49
FIO2: 50
GLUCOSE SERPL-MCNC: 96 MG/DL (ref 70–110)
GLUCOSE SERPL-MCNC: 97 MG/DL (ref 70–110)
GLUCOSE-6-PHOSPHATE DEHYDROGENASE QUAL: NORMAL
HAV IGM SERPL QL IA: NEGATIVE
HBV CORE IGM SERPL QL IA: NEGATIVE
HBV SURFACE AG SERPL QL IA: NEGATIVE
HCO3 UR-SCNC: 27.8 MMOL/L (ref 24–28)
HCO3 UR-SCNC: 27.9 MMOL/L (ref 24–28)
HCO3 UR-SCNC: 28.1 MMOL/L (ref 24–28)
HCT VFR BLD AUTO: 35.6 % (ref 37–48.5)
HCV AB SERPL QL IA: NEGATIVE
HGB BLD-MCNC: 10.7 G/DL (ref 12–16)
IMM GRANULOCYTES # BLD AUTO: 0.08 K/UL (ref 0–0.04)
IMM GRANULOCYTES NFR BLD AUTO: 1.4 % (ref 0–0.5)
LYMPHOCYTES # BLD AUTO: 0.9 K/UL (ref 1–4.8)
LYMPHOCYTES NFR BLD: 16 % (ref 18–48)
MAGNESIUM SERPL-MCNC: 1.8 MG/DL (ref 1.6–2.6)
MCH RBC QN AUTO: 26.1 PG (ref 27–31)
MCHC RBC AUTO-ENTMCNC: 30.1 G/DL (ref 32–36)
MCV RBC AUTO: 87 FL (ref 82–98)
MIN VOL: 10.6
MIN VOL: 8.8
MIN VOL: 9.06
MODE: ABNORMAL
MONOCYTES # BLD AUTO: 0.3 K/UL (ref 0.3–1)
MONOCYTES NFR BLD: 4.4 % (ref 4–15)
NEUTROPHILS # BLD AUTO: 4.4 K/UL (ref 1.8–7.7)
NEUTROPHILS NFR BLD: 78 % (ref 38–73)
NRBC BLD-RTO: 0 /100 WBC
PCO2 BLDA: 34.4 MMHG (ref 35–45)
PCO2 BLDA: 44.1 MMHG (ref 35–45)
PCO2 BLDA: 52.5 MMHG (ref 35–45)
PEEP: 14
PEEP: 14
PEEP: 8
PH SMN: 7.33 [PH] (ref 7.35–7.45)
PH SMN: 7.41 [PH] (ref 7.35–7.45)
PH SMN: 7.52 [PH] (ref 7.35–7.45)
PHOSPHATE SERPL-MCNC: 2.1 MG/DL (ref 2.7–4.5)
PIP: 28
PIP: 33
PIP: 35
PLATELET # BLD AUTO: 202 K/UL (ref 150–350)
PMV BLD AUTO: 11.1 FL (ref 9.2–12.9)
PO2 BLDA: 69 MMHG (ref 80–100)
PO2 BLDA: 74 MMHG (ref 80–100)
PO2 BLDA: 78 MMHG (ref 80–100)
POC BE: 2 MMOL/L
POC BE: 3 MMOL/L
POC BE: 5 MMOL/L
POC SATURATED O2: 92 % (ref 95–100)
POC SATURATED O2: 95 % (ref 95–100)
POC SATURATED O2: 96 % (ref 95–100)
POC TCO2: 29 MMOL/L (ref 23–27)
POTASSIUM SERPL-SCNC: 3.6 MMOL/L (ref 3.5–5.1)
POTASSIUM SERPL-SCNC: 3.6 MMOL/L (ref 3.5–5.1)
PROT SERPL-MCNC: 7.3 G/DL (ref 6–8.4)
RBC # BLD AUTO: 4.1 M/UL (ref 4–5.4)
SAMPLE: ABNORMAL
SITE: ABNORMAL
SODIUM SERPL-SCNC: 137 MMOL/L (ref 136–145)
SODIUM SERPL-SCNC: 138 MMOL/L (ref 136–145)
SP02: 92
SP02: 94
SP02: 95
VT: 290
VT: 290
VT: 480
WBC # BLD AUTO: 5.62 K/UL (ref 3.9–12.7)

## 2020-03-26 PROCEDURE — 20000000 HC ICU ROOM

## 2020-03-26 PROCEDURE — 11000001 HC ACUTE MED/SURG PRIVATE ROOM

## 2020-03-26 PROCEDURE — 99900035 HC TECH TIME PER 15 MIN (STAT)

## 2020-03-26 PROCEDURE — 83735 ASSAY OF MAGNESIUM: CPT

## 2020-03-26 PROCEDURE — 87040 BLOOD CULTURE FOR BACTERIA: CPT

## 2020-03-26 PROCEDURE — 99291 CRITICAL CARE FIRST HOUR: CPT | Mod: ,,, | Performed by: INTERNAL MEDICINE

## 2020-03-26 PROCEDURE — 85025 COMPLETE CBC W/AUTO DIFF WBC: CPT

## 2020-03-26 PROCEDURE — 25000003 PHARM REV CODE 250: Performed by: NURSE PRACTITIONER

## 2020-03-26 PROCEDURE — 27000221 HC OXYGEN, UP TO 24 HOURS

## 2020-03-26 PROCEDURE — C9113 INJ PANTOPRAZOLE SODIUM, VIA: HCPCS | Performed by: INTERNAL MEDICINE

## 2020-03-26 PROCEDURE — 63600175 PHARM REV CODE 636 W HCPCS: Performed by: INTERNAL MEDICINE

## 2020-03-26 PROCEDURE — 25000003 PHARM REV CODE 250: Performed by: INTERNAL MEDICINE

## 2020-03-26 PROCEDURE — 82803 BLOOD GASES ANY COMBINATION: CPT

## 2020-03-26 PROCEDURE — C1751 CATH, INF, PER/CENT/MIDLINE: HCPCS

## 2020-03-26 PROCEDURE — 99291 PR CRITICAL CARE, E/M 30-74 MINUTES: ICD-10-PCS | Mod: ,,, | Performed by: INTERNAL MEDICINE

## 2020-03-26 PROCEDURE — 36556 INSERT NON-TUNNEL CV CATH: CPT

## 2020-03-26 PROCEDURE — 80048 BASIC METABOLIC PNL TOTAL CA: CPT

## 2020-03-26 PROCEDURE — 63600175 PHARM REV CODE 636 W HCPCS: Performed by: NURSE PRACTITIONER

## 2020-03-26 PROCEDURE — 84100 ASSAY OF PHOSPHORUS: CPT

## 2020-03-26 PROCEDURE — 36600 WITHDRAWAL OF ARTERIAL BLOOD: CPT

## 2020-03-26 PROCEDURE — 94003 VENT MGMT INPAT SUBQ DAY: CPT

## 2020-03-26 PROCEDURE — 36415 COLL VENOUS BLD VENIPUNCTURE: CPT

## 2020-03-26 PROCEDURE — 27200188 HC TRANSDUCER, ART ADULT/PEDS

## 2020-03-26 PROCEDURE — 94770 HC EXHALED C02 TEST: CPT

## 2020-03-26 PROCEDURE — 99900026 HC AIRWAY MAINTENANCE (STAT)

## 2020-03-26 PROCEDURE — 36620 INSERTION CATHETER ARTERY: CPT

## 2020-03-26 PROCEDURE — 80053 COMPREHEN METABOLIC PANEL: CPT

## 2020-03-26 PROCEDURE — 94761 N-INVAS EAR/PLS OXIMETRY MLT: CPT

## 2020-03-26 PROCEDURE — 80048 BASIC METABOLIC PNL TOTAL CA: CPT | Mod: 91

## 2020-03-26 RX ORDER — DEXMEDETOMIDINE HYDROCHLORIDE 4 UG/ML
INJECTION, SOLUTION INTRAVENOUS
Status: DISPENSED
Start: 2020-03-26 | End: 2020-03-26

## 2020-03-26 RX ORDER — NOREPINEPHRINE BITARTRATE/D5W 8 MG/250ML
0.02 PLASTIC BAG, INJECTION (ML) INTRAVENOUS CONTINUOUS
Status: DISCONTINUED | OUTPATIENT
Start: 2020-03-26 | End: 2020-03-29

## 2020-03-26 RX ORDER — ACETAMINOPHEN 325 MG/1
650 TABLET ORAL ONCE
Status: COMPLETED | OUTPATIENT
Start: 2020-03-26 | End: 2020-03-26

## 2020-03-26 RX ORDER — MELOXICAM 7.5 MG/1
7.5 TABLET ORAL DAILY
Status: ON HOLD | COMMUNITY
End: 2020-04-17 | Stop reason: HOSPADM

## 2020-03-26 RX ORDER — PANTOPRAZOLE SODIUM 40 MG/10ML
40 INJECTION, POWDER, LYOPHILIZED, FOR SOLUTION INTRAVENOUS DAILY
Status: DISCONTINUED | OUTPATIENT
Start: 2020-03-26 | End: 2020-04-05

## 2020-03-26 RX ORDER — ACETAMINOPHEN 325 MG/1
650 TABLET ORAL EVERY 6 HOURS PRN
Status: DISCONTINUED | OUTPATIENT
Start: 2020-03-26 | End: 2020-04-21 | Stop reason: HOSPADM

## 2020-03-26 RX ORDER — FLUTICASONE PROPIONATE 50 MCG
1 SPRAY, SUSPENSION (ML) NASAL DAILY
Status: ON HOLD | COMMUNITY
End: 2020-04-17 | Stop reason: HOSPADM

## 2020-03-26 RX ADMIN — FUROSEMIDE 2.5 MG/HR: 10 INJECTION, SOLUTION INTRAMUSCULAR; INTRAVENOUS at 07:03

## 2020-03-26 RX ADMIN — LEVOTHYROXINE SODIUM 100 MCG: 100 TABLET ORAL at 05:03

## 2020-03-26 RX ADMIN — PROPOFOL 50 MCG/KG/MIN: 10 INJECTION, EMULSION INTRAVENOUS at 09:03

## 2020-03-26 RX ADMIN — ENOXAPARIN SODIUM 40 MG: 100 INJECTION SUBCUTANEOUS at 08:03

## 2020-03-26 RX ADMIN — Medication 0.1 MCG/KG/MIN: at 05:03

## 2020-03-26 RX ADMIN — PROPOFOL 50 MCG/KG/MIN: 10 INJECTION, EMULSION INTRAVENOUS at 05:03

## 2020-03-26 RX ADMIN — Medication 0.02 MCG/KG/MIN: at 04:03

## 2020-03-26 RX ADMIN — ACETAMINOPHEN 650 MG: 325 TABLET ORAL at 12:03

## 2020-03-26 RX ADMIN — PROPOFOL 50 MCG/KG/MIN: 10 INJECTION, EMULSION INTRAVENOUS at 02:03

## 2020-03-26 RX ADMIN — ENOXAPARIN SODIUM 40 MG: 100 INJECTION SUBCUTANEOUS at 09:03

## 2020-03-26 RX ADMIN — FENTANYL CITRATE: 50 INJECTION INTRAVENOUS at 12:03

## 2020-03-26 RX ADMIN — FENTANYL CITRATE 25 MCG/HR: 50 INJECTION INTRAVENOUS at 12:03

## 2020-03-26 RX ADMIN — ACETAMINOPHEN 650 MG: 325 TABLET ORAL at 09:03

## 2020-03-26 RX ADMIN — PANTOPRAZOLE SODIUM 40 MG: 40 INJECTION, POWDER, LYOPHILIZED, FOR SOLUTION INTRAVENOUS at 09:03

## 2020-03-26 RX ADMIN — CEFTRIAXONE 1 G: 1 INJECTION, SOLUTION INTRAVENOUS at 11:03

## 2020-03-26 RX ADMIN — PROPOFOL 50 MCG/KG/MIN: 10 INJECTION, EMULSION INTRAVENOUS at 11:03

## 2020-03-26 RX ADMIN — ACETAMINOPHEN 650 MG: 325 TABLET ORAL at 04:03

## 2020-03-26 RX ADMIN — PROPOFOL 50 MCG/KG/MIN: 10 INJECTION, EMULSION INTRAVENOUS at 08:03

## 2020-03-26 RX ADMIN — HYDROXYCHLOROQUINE SULFATE 400 MG: 200 TABLET ORAL at 08:03

## 2020-03-26 NOTE — ED PROVIDER NOTES
[facility]  Endotracheal Intubation  Procedure Note     SUMMARY      Date of Procedure: 3/26/2020     Procedure:  Indirect video laryngoscopy for endotracheal intubation     Provider: Florentin Fam MD     Assisting Provider: Candido Anne RN     Indication: respiratory failure.     Pre-Procedure Diagnosis:  Acute respiratory failure with hypoxia     Post-Procedure Diagnosis:  Acute respiratory failure with hypoxia     Technical Procedures Used:  Video laryngoscopy     Description of the Findings of the Procedure:      Sedation: etomidate.  Paralytic: succinylcholine.  Lidocaine: no.  Atropine: no.  Equipment: Video S3 laryngoscope blade.  Cricoid Pressure: no.  Number of attempts: 1.  ETT location confirmed by by auscultation and by CXR.     Significant Surgical Tasks Conducted by the Assistant(s), if Applicable:  Pushing medications     Complications: Patient tolerated the procedure well but was difficult to sedate even with modest push doses/infusion of propofol and single dosing etomidate. Rocuronium administered prior to transfer to ICU.     Estimated Blood Loss (EBL): None           Implants:  None     Specimens:  None       Condition: Critical        Disposition: ICU - intubated and critically ill.     Attestation: I performed the procedure.     CENTRAL VENOUS LINE INSERTION:    Indications: vascular access and med administration    Antisepsis: sterile gloves, mask, gown, and drape.  Skin prepped with chlorhexidine.  Catheter: 12 Fr x 4 lumen x 15 cm via left internal jugular.  Insertion directed by ultrasound.  Heme positive aspiration all ports.  Secured with sutures at 12 cm at skin.  Dressing: dry sterile gauze and bio occlusive dressing.  Complications: none.        Florentin Fam MD  03/26/20 0207       Florentin Fam MD  03/26/20 0442

## 2020-03-26 NOTE — PLAN OF CARE
Urine output 175 and dark tea colored.  Reported to Dr Gomez.  States that he will start her on a lasix drip.  Will start after ordered.

## 2020-03-26 NOTE — RESPIRATORY THERAPY
Results for WOLF SINGER (MRN [id number]) as of 3/25/2020 22:33   Ref. Range 3/25/2020 22:18   POC PH Latest Ref Range: 7.35 - 7.45  7.452 (H)   POC PCO2 Latest Ref Range: 35 - 45 mmHg 38.3   POC PO2 Latest Ref Range: 80 - 100 mmHg 101 (H)   POC BE Latest Ref Range: -2 to 2 mmol/L 3   POC HCO3 Latest Ref Range: 24 - 28 mmol/L 26.7   POC SATURATED O2 Latest Ref Range: 95 - 100 % 98   POC TCO2 Latest Ref Range: 23 - 27 mmol/L 28 (H)   FiO2 Unknown 60   Vt Unknown 480   PiP Unknown 37   PEEP Unknown 8.0   Sample Unknown ARTERIAL   DelSys Unknown Adult Vent   Allens Test Unknown Pass   Site Unknown LR   Mode Unknown AC/PRVC   Rate Unknown 18   Min Vol Unknown 9.13   Sp02 Unknown 99

## 2020-03-26 NOTE — CARE UPDATE
03/26/20 0733   Preset Conventional Ventilator Settings   Vent Type    Ventilation Type VC   Vent Mode A/C   Humidity HME   Set Rate 14 BPM   Vt Set 480 mL   PEEP/CPAP 8 cmH20   Pressure Support 0 cmH20   Waveform RAMP   Peak Flow 60 L/min   Set Inspiratory Pressure 0 cmH20

## 2020-03-26 NOTE — CARE UPDATE
03/26/20 0455   PRE-TX-O2   Oxygen Concentration (%) 50   SpO2 97 %   Pulse 89   Resp 14   Vent Select   Charged w/in last 24h YES   Preset Conventional Ventilator Settings   Ventilation Type VC   Vent Mode A/C   Set Rate 14 BPM   Vt Set 480 mL   PEEP/CPAP 8 cmH20   Pressure Support 0 cmH20   Waveform RAMP   Peak Flow 60 L/min   Set Inspiratory Pressure 0 cmH20   Insp Time 0 Sec(s)   Plateau Set/Insp. Hold (sec) 0   Insp Rise Time  0 %   Trigger Sensitivity Flow/I-Trigger 3 L/min   P High 0 cm H2O   P Low 0 cm H2O   T High 0 sec   T Low 0 sec   Patient Ventilator Parameters   Resp Rate Total 14 br/min   Peak Airway Pressure 30 cmH2O   Mean Airway Pressure 13 cmH20   Plateau Pressure 25 cmH20   Exhaled Vt 493 mL   Total Ve 6.9 mL   Spont Ve 0 L   I:E Ratio Measured 1:3.90   Conventional Ventilator Alarms   Ve High Alarm 40 L/min   Resp Rate High Alarm 50 br/min   Press High Alarm 60 cmH2O   Apnea Rate 10   Apnea Volume (mL) 730 mL   Apnea Oxygen Concentration  100   Apnea Flow Rate (L/min) 87   T Apnea 20 sec(s)   Ready to Wean/Extubation Screen   FIO2<=50 (chart decimal) 0.5   MV<16L (chart vol.) 6.9   PEEP <=8 (chart #) 8   Ready to Wean Parameters   F/VT Ratio<105 (RSBI) (!) 28.4   Labs   $ Was an ABG obtained? Arterial Puncture;ISTAT - Blood gas   $ Labs Tech Time 15 min   Critical Value Communication   Date Result Received 03/26/20   Time Result Received 4837   Physician Directive rr decrease to 14

## 2020-03-26 NOTE — PLAN OF CARE
Cm completed the assessment with pt's daughter Danya. Pt was independent in care prior to admission.   PCP is Dr. Deluna. Insurance verified as tu.nr.  Daughter denies diabetes, dialysis and coumadin.  Disposition:  Pt will discharge to home with family.  PT/OT to evaluate.       03/26/20 1125   Discharge Assessment   Assessment Type Discharge Planning Assessment   Confirmed/corrected address and phone number on facesheet? Yes   Assessment information obtained from? Caregiver  (daughter- Cedrick)   Communicated expected length of stay with patient/caregiver no   Prior to hospitilization cognitive status: Alert/Oriented   Prior to hospitalization functional status: Independent   Current cognitive status: Coma/Sedated/Intubated   Current Functional Status: Completely Dependent   Facility Arrived From: home   Lives With spouse   Able to Return to Prior Arrangements yes   Is patient able to care for self after discharge? Yes  (assistance from family)   Patient's perception of discharge disposition home or selfcare   Readmission Within the Last 30 Days no previous admission in last 30 days   Patient currently being followed by outpatient case management? Yes   If yes, name of outpatient case management following: insurance company assigned oupatient case management   Patient currently receives any other outside agency services? No   Equipment Currently Used at Home none   Do you have any problems affording any of your prescribed medications? No   Is the patient taking medications as prescribed? yes   Does the patient have transportation home? Yes   Transportation Anticipated family or friend will provide   Does the patient receive services at the [facility]? No   Discharge Plan A Home with family;Home Health   DME Needed Upon Discharge  none   Patient/Family in Agreement with Plan yes

## 2020-03-26 NOTE — EICU
Bilateral soft wrist restrains ordered for patient's safety to prevent from pulling on lines/tubes. Pt was examined on video prior to the order placement.

## 2020-03-26 NOTE — EICU
Eicu brief admission review note.  Pt was examined on video, notes, images, labs  reviewed.   55 y/o with bilateral pneumonia, respiratory failure required intubation, COVID 19 positive.  Plan: Continue ARDS net high peep strategy,propofol for sedation, Ceftriaxone/Zithro/Plaquenil.   Conservative fluid management, d/c maintenance IVF.  Continue synthroid  Lovenox for DVT proph

## 2020-03-26 NOTE — CARE UPDATE
03/26/20 0945   Preset Conventional Ventilator Settings   Vent Type    Ventilation Type VC   Vent Mode A/C   Humidity HME   Set Rate 30 BPM   Vt Set 290 mL   PEEP/CPAP 10 cmH20   Pressure Support 0 cmH20   Waveform RAMP   Peak Flow 60 L/min   Set Inspiratory Pressure 0 cmH20   changes per dr. carpio

## 2020-03-26 NOTE — CONSULTS
"  03/26/2020      Admit Date: 3/25/2020  Maria Victoria Hernandez  New Patient Consult    Chief Complaint   Patient presents with    Shortness of Breath     positive covid       History of Present Illness:  Pt good health working at [employer]  in [facility] afflicted with covid. Pt developed fever, aches, np cough and was tested pos  covid  3 days pta, pt developed sob  And presented to er with sat 89%, initally did fine on 3lpm but worsened  Evening of presentation  Requited intubation.       PFSH:  Past Medical History:   Diagnosis Date    Colon polyp     Diverticulosis large intestine w/o perforation or abscess w/bleeding     Iron deficiency anemia     Prediabetes     Thyroid disease     Vitamin B 12 deficiency     Vitamin D deficiency      Past Surgical History:   Procedure Laterality Date    TUBAL LIGATION       Social History     Tobacco Use    Smoking status: Never Smoker    Smokeless tobacco: Never Used   Substance Use Topics    Alcohol use: No    Drug use: Not on file     Family History   Problem Relation Age of Onset    Heart disease Mother     Heart disease Maternal Grandmother     Heart disease Maternal Grandfather     Mental illness Paternal Grandmother      Review of patient's allergies indicates:  No Known Allergies    Performance Status:Performance Status:The patient's activity level is functions out of house.    Review of Systems:  due to neurologic status/impairments a Review of Systems could not be obtained       Exam:Comprehensive exam done. /60   Pulse 92   Temp (!) 102.5 °F (39.2 °C) (Axillary) Comment: reported to sheng Pace.  no meds ordered  Resp 14   Ht 5' 1" (1.549 m)   Wt 104.8 kg (231 lb 0.7 oz)   SpO2 96%   Breastfeeding? No   BMI 43.65 kg/m²   Exam included Vitals as listed, and patient's appearance and affect and alertness and mood, oral exam for yeast and hygiene and pharynx lesions and Mallapatti (M) score, neck with inspection for jvd and masses and " thyroid abnormalities and lymph nodes (supraclavicular and infraclavicular nodes also examined and noted if abn), chest exam included symmetry and effort and fremitus and percussion and auscultation, cardiac exam included rhythm and gallops and murmur and rubs and jvd and edema, abdominal exam for mass and hepatosplenomegaly and tenderness and hernias and bowel sounds, Musculoskeletal exam with muscle tone and posture and mobility/gait and  strenght, and skin for rashes and cyanosis and pallor and turgor, extremity for clubbing.  Findings were normal except as listed below:  Intubated, sedated,  Not arousing.  Min rhonchi,  chest is symmetric, no distress, normal percussion, normal fremitus and distent cor, no clubbing,        Radiographs reviewed: view by direct vision - mod ggo cxr 3/25    No results found for this or any previous visit.]    Labs     Recent Labs   Lab 03/25/20  1410 03/26/20  0433   WBC 4.46 5.62   HGB 11.7* 10.7*   HCT 38.8 35.6*    202   BAND 4.0  --      Recent Labs   Lab 03/25/20  1410 03/25/20  1411 03/25/20  1656 03/25/20  1657 03/26/20  0433     --   --   --  138  137   K 4.0  --   --   --  3.6  3.6     --   --   --  100  101   CO2 28  --   --   --  27  27   BUN 8  --   --   --  7  7   CREATININE 0.7  --   --   --  1.0  1.0   GLU 94  --   --   --  97  96   CALCIUM 8.7  --   --   --  7.8*  7.8*   MG 2.1  --   --   --  1.8   PHOS  --   --   --   --  2.1*   *  --   --   --  105*   ALT 87*  --   --   --  81*   ALKPHOS 82  --   --   --  79   BILITOT 0.3  --   --   --  0.5   PROT 8.1  --   --   --  7.3   ALBUMIN 3.3*  --   --   --  2.8*   CRP  --  119.0*  --   --   --    SEDRATE  --   --   --  78*  --    PROCAL 0.12  --   --   --   --    LACTATE 0.8  --  1.3  --   --    TROPONINI 0.017 <0.006  --   --   --    CPK  --  247*  --   --   --    BNP <10  --   --   --   --      Recent Labs   Lab 03/26/20  0444   PH 7.516*   PCO2 34.4*   PO2 74*   HCO3 27.8      Microbiology Results (last 7 days)     Procedure Component Value Units Date/Time    Blood culture [[id number]] Collected:  03/26/20 0432    Order Status:  Sent Specimen:  Blood Updated:  03/26/20 0943    Blood culture [[id number]] Collected:  03/26/20 0612    Order Status:  Sent Specimen:  Blood Updated:  03/26/20 0943    Culture, Respiratory with Gram Stain [[id number]] Collected:  03/25/20 2150    Order Status:  Completed Specimen:  Tracheal Aspirate Updated:  03/26/20 0215     Gram Stain (Respiratory) <10 epithelial cells per low power field.     Gram Stain (Respiratory) Rare WBC's     Gram Stain (Respiratory) Rare Gram positive cocci    Culture, Respiratory with Gram Stain [[id number]] Collected:  03/25/20 2152    Order Status:  Canceled Specimen:  Respiratory from Sputum     Blood culture x two cultures. Draw prior to antibiotics. [[id number]] Collected:  03/25/20 1411    Order Status:  Sent Specimen:  Blood from Antecubital, Right  Arm Updated:  03/25/20 2145    Influenza A & B by Molecular [[id number]]     Order Status:  Canceled Specimen:  Nasopharyngeal Swab     Blood culture x two cultures. Draw prior to antibiotics. [[id number]]     Order Status:  Canceled Specimen:  Blood           Impression:  Active Hospital Problems    Diagnosis  POA    Acute respiratory failure [J96.00]  Yes    Morbid obesity [E66.01]  Yes    Hypothyroid [E03.9]  Yes    Covid-19 Virus Detected [J22, B97.29]  Yes      Resolved Hospital Problems   No resolved problems to display.               Plan: 3/26- wbc 5.6,  plt  202,  D dimer up 1, ast/alt sl up 105/81, procal 0.12, covid pos.  Troponin neg.  Ratio 74/0.5 on peep 8-   390 is 6 cc/kg ;discussed with Danya, daughter.      The following were evaluated and adjusted as needed: mechanical ventilator settings and weaning status, intravenous fluids and nutritional status, sedation and neurologic status, antibiotics, hemodynamics, support tubes and access lines and invasive  monitoring, acid base balance and oxygenation needs and input and output and renal status       Critical Care  - THE PATIENT HAS A HIGH PROBABILITY OF IMMINENT OR LIFE THREATENING DETERIORATION.  Over 50%time of encounter was in direct care at bedside.  Time was 30 to 74 minutes required for patient care.  Time needed for all of the above totaled 34 minutes.

## 2020-03-26 NOTE — ED NOTES
At 1900 assessed pt. sats at 94% on 3 L via NC waiting ICU placement    At 1930 ICU bed changed pt to remain in ER until bed cleaned.    1945 noted sats 90% resp 40. Dr Altman was available. Made aware and advised to increase O2 to 6L via NC     2000 pt crying stating extreme HA. sats at 96% resp at 40    MD villela aware

## 2020-03-26 NOTE — PLAN OF CARE
Temperature 102.5 at this time.  Call placed to Dr Porter to report.  New orders received for tylenol elixer.  Dr Steinberg, pulmonology  also notified and wanted to hold tylenol.  Dr Steinberg spoke with Dr Gomez and he agrees with his Dr Steinberg, pulmonology.

## 2020-03-26 NOTE — PLAN OF CARE
Patient remains on the vent and tolerating well.  Continues to receive propofol, fentanyl, and levofed drips.  Patient continues to be febrile with tylenol ordered prn.  NG tube remains in place to left nares.  Left IJ quad central line remains in place.  Cuellar cath remains in place and draining dark tea colored urine.

## 2020-03-26 NOTE — PROGRESS NOTES
Copy of COVID-19 test positive test results dated 3/17/2020 obtained and has been scanned into the EMR.

## 2020-03-26 NOTE — ED NOTES
Dr mcbride, resp therapy, don and myself in room for intubation , code cart at room, pt on monitor     sats 98% on 6 L NC + resp distress     Etomidate in @ 2042  succ in @ 2043    100% on bag , 122bpm    7.5 Tube in @ 2044, sats 99% lungs sounds good, 24 at the teeth.     2048 propofol started.     Etomidate 15 mg given

## 2020-03-26 NOTE — CARE UPDATE
03/25/20 2200   Patient Assessment/Suction   Suction Method tracheal   $ Suction Charges Sputum Collection   Secretions Amount moderate   Secretions Color yellow   Secretions Characteristics thick   Sputum Collection sample obtained per suctioning   $ Swab or suction? Suction   Aspirate Toleration BREANA (no adverse reactions)   Sent to the lab? Yes

## 2020-03-26 NOTE — CARE UPDATE
03/26/20 1005   Preset Conventional Ventilator Settings   PEEP/CPAP 14 cmH20   vent changes per dr. Laird

## 2020-03-27 ENCOUNTER — OUTSIDE PLACE OF SERVICE (OUTPATIENT)
Dept: PULMONOLOGY | Facility: CLINIC | Age: 55
End: 2020-03-27

## 2020-03-27 DIAGNOSIS — J80 ARDS (ADULT RESPIRATORY DISTRESS SYNDROME): ICD-10-CM

## 2020-03-27 DIAGNOSIS — U07.1 COVID-19 VIRUS INFECTION: ICD-10-CM

## 2020-03-27 PROBLEM — N17.9 ACUTE RENAL FAILURE: Status: ACTIVE | Noted: 2020-03-27

## 2020-03-27 LAB
ALBUMIN SERPL BCP-MCNC: 2.6 G/DL (ref 3.5–5.2)
ALP SERPL-CCNC: 85 U/L (ref 55–135)
ALT SERPL W/O P-5'-P-CCNC: 68 U/L (ref 10–44)
ANION GAP SERPL CALC-SCNC: 14 MMOL/L (ref 8–16)
ANION GAP SERPL CALC-SCNC: 15 MMOL/L (ref 8–16)
AORTIC ROOT ANNULUS: 2.73 CM
AORTIC VALVE CUSP SEPERATION: 2.17 CM
ASCENDING AORTA: 2.83 CM
AST SERPL-CCNC: 82 U/L (ref 10–40)
AV INDEX (PROSTH): 0.71
AV MEAN GRADIENT: 3 MMHG
AV PEAK GRADIENT: 5 MMHG
AV VALVE AREA: 2.22 CM2
AV VELOCITY RATIO: 0.7
BASOPHILS # BLD AUTO: 0.02 K/UL (ref 0–0.2)
BASOPHILS NFR BLD: 0.2 % (ref 0–1.9)
BILIRUB SERPL-MCNC: 1.6 MG/DL (ref 0.1–1)
BNP SERPL-MCNC: <10 PG/ML (ref 0–99)
BSA FOR ECHO PROCEDURE: 2.15 M2
BUN SERPL-MCNC: 14 MG/DL (ref 6–20)
BUN SERPL-MCNC: 14 MG/DL (ref 6–20)
C3 SERPL-MCNC: 150 MG/DL (ref 50–180)
C4 SERPL-MCNC: 24 MG/DL (ref 11–44)
CALCIUM SERPL-MCNC: 7.8 MG/DL (ref 8.7–10.5)
CALCIUM SERPL-MCNC: 7.8 MG/DL (ref 8.7–10.5)
CHLORIDE SERPL-SCNC: 97 MMOL/L (ref 95–110)
CHLORIDE SERPL-SCNC: 97 MMOL/L (ref 95–110)
CO2 SERPL-SCNC: 22 MMOL/L (ref 23–29)
CO2 SERPL-SCNC: 23 MMOL/L (ref 23–29)
CREAT SERPL-MCNC: 2.6 MG/DL (ref 0.5–1.4)
CREAT SERPL-MCNC: 3 MG/DL (ref 0.5–1.4)
CV ECHO LV RWT: 0.39 CM
DIFFERENTIAL METHOD: ABNORMAL
DOP CALC AO PEAK VEL: 1.15 M/S
DOP CALC AO VTI: 24.73 CM
DOP CALC LVOT AREA: 3.1 CM2
DOP CALC LVOT DIAMETER: 2 CM
DOP CALC LVOT PEAK VEL: 0.81 M/S
DOP CALC LVOT STROKE VOLUME: 55.01 CM3
DOP CALCLVOT PEAK VEL VTI: 17.52 CM
E WAVE DECELERATION TIME: 190.38 MSEC
E/A RATIO: 1.11
E/E' RATIO: 5.08 M/S
ECHO LV POSTERIOR WALL: 0.99 CM (ref 0.6–1.1)
EOSINOPHIL # BLD AUTO: 0 K/UL (ref 0–0.5)
EOSINOPHIL NFR BLD: 0.1 % (ref 0–8)
ERYTHROCYTE [DISTWIDTH] IN BLOOD BY AUTOMATED COUNT: 17.2 % (ref 11.5–14.5)
EST. GFR  (AFRICAN AMERICAN): 20 ML/MIN/1.73 M^2
EST. GFR  (AFRICAN AMERICAN): 23 ML/MIN/1.73 M^2
EST. GFR  (NON AFRICAN AMERICAN): 17 ML/MIN/1.73 M^2
EST. GFR  (NON AFRICAN AMERICAN): 20 ML/MIN/1.73 M^2
FRACTIONAL SHORTENING: 19 % (ref 28–44)
GLUCOSE SERPL-MCNC: 125 MG/DL (ref 70–110)
GLUCOSE SERPL-MCNC: 134 MG/DL (ref 70–110)
HCT VFR BLD AUTO: 34.3 % (ref 37–48.5)
HGB BLD-MCNC: 10.3 G/DL (ref 12–16)
IMM GRANULOCYTES # BLD AUTO: 0.19 K/UL (ref 0–0.04)
IMM GRANULOCYTES NFR BLD AUTO: 1.9 % (ref 0–0.5)
INTERVENTRICULAR SEPTUM: 0.87 CM (ref 0.6–1.1)
IVRT: 137.01 MSEC
LA MAJOR: 5.01 CM
LA MINOR: 4.66 CM
LA WIDTH: 4.53 CM
LEFT ATRIUM SIZE: 3.68 CM
LEFT ATRIUM VOLUME INDEX: 33.7 ML/M2
LEFT ATRIUM VOLUME: 68.42 CM3
LEFT INTERNAL DIMENSION IN SYSTOLE: 4.05 CM (ref 2.1–4)
LEFT VENTRICLE DIASTOLIC VOLUME INDEX: 59.16 ML/M2
LEFT VENTRICLE DIASTOLIC VOLUME: 120.11 ML
LEFT VENTRICLE MASS INDEX: 82 G/M2
LEFT VENTRICLE SYSTOLIC VOLUME INDEX: 35.6 ML/M2
LEFT VENTRICLE SYSTOLIC VOLUME: 72.19 ML
LEFT VENTRICULAR INTERNAL DIMENSION IN DIASTOLE: 5.03 CM (ref 3.5–6)
LEFT VENTRICULAR MASS: 166.85 G
LV LATERAL E/E' RATIO: 4.36 M/S
LV SEPTAL E/E' RATIO: 6.1 M/S
LYMPHOCYTES # BLD AUTO: 1.2 K/UL (ref 1–4.8)
LYMPHOCYTES NFR BLD: 12.1 % (ref 18–48)
MAGNESIUM SERPL-MCNC: 1.8 MG/DL (ref 1.6–2.6)
MCH RBC QN AUTO: 26.8 PG (ref 27–31)
MCHC RBC AUTO-ENTMCNC: 30 G/DL (ref 32–36)
MCV RBC AUTO: 89 FL (ref 82–98)
MONOCYTES # BLD AUTO: 0.6 K/UL (ref 0.3–1)
MONOCYTES NFR BLD: 6.3 % (ref 4–15)
MV A" WAVE DURATION": 117 MSEC
MV MEAN GRADIENT: 1 MMHG
MV PEAK A VEL: 0.55 M/S
MV PEAK E VEL: 0.61 M/S
MV STENOSIS PRESSURE HALF TIME: 54 MS
MV VALVE AREA P 1/2 METHOD: 4.07 CM2
NEUTROPHILS # BLD AUTO: 8 K/UL (ref 1.8–7.7)
NEUTROPHILS NFR BLD: 79.4 % (ref 38–73)
NRBC BLD-RTO: 0 /100 WBC
PHOSPHATE SERPL-MCNC: 3.5 MG/DL (ref 2.7–4.5)
PISA TR MAX VEL: 2.21 M/S
PLATELET # BLD AUTO: 234 K/UL (ref 150–350)
PMV BLD AUTO: 11.3 FL (ref 9.2–12.9)
POTASSIUM SERPL-SCNC: 3.3 MMOL/L (ref 3.5–5.1)
POTASSIUM SERPL-SCNC: 3.5 MMOL/L (ref 3.5–5.1)
PROT SERPL-MCNC: 7.5 G/DL (ref 6–8.4)
PULM VEIN A" WAVE DURATION": 114 MSEC
PV PEAK VELOCITY: 0.76 CM/S
RA MAJOR: 3.99 CM
RA WIDTH: 2.92 CM
RBC # BLD AUTO: 3.84 M/UL (ref 4–5.4)
RIGHT VENTRICULAR END-DIASTOLIC DIMENSION: 3.41 CM
SODIUM SERPL-SCNC: 134 MMOL/L (ref 136–145)
SODIUM SERPL-SCNC: 134 MMOL/L (ref 136–145)
STJ: 2.61 CM
TDI LATERAL: 0.14 M/S
TDI SEPTAL: 0.1 M/S
TDI: 0.12 M/S
TR MAX PG: 20 MMHG
TRICUSPID ANNULAR PLANE SYSTOLIC EXCURSION: 2.13 CM
WBC # BLD AUTO: 10.09 K/UL (ref 3.9–12.7)

## 2020-03-27 PROCEDURE — 99900026 HC AIRWAY MAINTENANCE (STAT)

## 2020-03-27 PROCEDURE — 99291 PR CRITICAL CARE, E/M 30-74 MINUTES: ICD-10-PCS | Mod: ,,, | Performed by: INTERNAL MEDICINE

## 2020-03-27 PROCEDURE — 85025 COMPLETE CBC W/AUTO DIFF WBC: CPT

## 2020-03-27 PROCEDURE — 63600175 PHARM REV CODE 636 W HCPCS: Performed by: INTERNAL MEDICINE

## 2020-03-27 PROCEDURE — 25000003 PHARM REV CODE 250: Performed by: NURSE PRACTITIONER

## 2020-03-27 PROCEDURE — 36415 COLL VENOUS BLD VENIPUNCTURE: CPT

## 2020-03-27 PROCEDURE — 25000003 PHARM REV CODE 250: Performed by: INTERNAL MEDICINE

## 2020-03-27 PROCEDURE — 83880 ASSAY OF NATRIURETIC PEPTIDE: CPT

## 2020-03-27 PROCEDURE — C9113 INJ PANTOPRAZOLE SODIUM, VIA: HCPCS | Performed by: INTERNAL MEDICINE

## 2020-03-27 PROCEDURE — 86255 FLUORESCENT ANTIBODY SCREEN: CPT

## 2020-03-27 PROCEDURE — 63600175 PHARM REV CODE 636 W HCPCS

## 2020-03-27 PROCEDURE — 63600175 PHARM REV CODE 636 W HCPCS: Performed by: NURSE PRACTITIONER

## 2020-03-27 PROCEDURE — 99900035 HC TECH TIME PER 15 MIN (STAT)

## 2020-03-27 PROCEDURE — 36600 WITHDRAWAL OF ARTERIAL BLOOD: CPT

## 2020-03-27 PROCEDURE — 20000000 HC ICU ROOM

## 2020-03-27 PROCEDURE — 94003 VENT MGMT INPAT SUBQ DAY: CPT

## 2020-03-27 PROCEDURE — 99291 CRITICAL CARE FIRST HOUR: CPT | Mod: ,,, | Performed by: INTERNAL MEDICINE

## 2020-03-27 PROCEDURE — 86160 COMPLEMENT ANTIGEN: CPT

## 2020-03-27 PROCEDURE — 11000001 HC ACUTE MED/SURG PRIVATE ROOM

## 2020-03-27 PROCEDURE — 27000221 HC OXYGEN, UP TO 24 HOURS

## 2020-03-27 PROCEDURE — 83735 ASSAY OF MAGNESIUM: CPT

## 2020-03-27 PROCEDURE — 84100 ASSAY OF PHOSPHORUS: CPT

## 2020-03-27 PROCEDURE — 80053 COMPREHEN METABOLIC PANEL: CPT

## 2020-03-27 PROCEDURE — 86038 ANTINUCLEAR ANTIBODIES: CPT

## 2020-03-27 PROCEDURE — 83520 IMMUNOASSAY QUANT NOS NONAB: CPT

## 2020-03-27 PROCEDURE — 94761 N-INVAS EAR/PLS OXIMETRY MLT: CPT

## 2020-03-27 PROCEDURE — 82803 BLOOD GASES ANY COMBINATION: CPT

## 2020-03-27 PROCEDURE — 63700000 PHARM REV CODE 250 ALT 637 W/O HCPCS: Performed by: INTERNAL MEDICINE

## 2020-03-27 PROCEDURE — 86160 COMPLEMENT ANTIGEN: CPT | Mod: 59

## 2020-03-27 PROCEDURE — 94770 HC EXHALED C02 TEST: CPT

## 2020-03-27 RX ORDER — POTASSIUM CHLORIDE 7.45 MG/ML
10 INJECTION INTRAVENOUS ONCE
Status: COMPLETED | OUTPATIENT
Start: 2020-03-27 | End: 2020-03-27

## 2020-03-27 RX ORDER — SODIUM CHLORIDE 9 MG/ML
INJECTION, SOLUTION INTRAVENOUS CONTINUOUS
Status: DISCONTINUED | OUTPATIENT
Start: 2020-03-27 | End: 2020-03-30

## 2020-03-27 RX ORDER — HYDROXYCHLOROQUINE SULFATE 200 MG/1
400 TABLET, FILM COATED ORAL DAILY
Status: COMPLETED | OUTPATIENT
Start: 2020-03-28 | End: 2020-04-04

## 2020-03-27 RX ORDER — HEPARIN SODIUM 1000 [USP'U]/ML
INJECTION, SOLUTION INTRAVENOUS; SUBCUTANEOUS
Status: COMPLETED
Start: 2020-03-27 | End: 2020-03-27

## 2020-03-27 RX ORDER — AZITHROMYCIN 250 MG/1
250 TABLET, FILM COATED ORAL DAILY
Status: DISCONTINUED | OUTPATIENT
Start: 2020-03-27 | End: 2020-04-02

## 2020-03-27 RX ORDER — SODIUM CHLORIDE, SODIUM LACTATE, POTASSIUM CHLORIDE, CALCIUM CHLORIDE 600; 310; 30; 20 MG/100ML; MG/100ML; MG/100ML; MG/100ML
INJECTION, SOLUTION INTRAVENOUS CONTINUOUS
Status: DISCONTINUED | OUTPATIENT
Start: 2020-03-27 | End: 2020-03-27

## 2020-03-27 RX ADMIN — SODIUM CHLORIDE, SODIUM LACTATE, POTASSIUM CHLORIDE, AND CALCIUM CHLORIDE: .6; .31; .03; .02 INJECTION, SOLUTION INTRAVENOUS at 01:03

## 2020-03-27 RX ADMIN — PROPOFOL 50 MCG/KG/MIN: 10 INJECTION, EMULSION INTRAVENOUS at 03:03

## 2020-03-27 RX ADMIN — PROPOFOL 50 MCG/KG/MIN: 10 INJECTION, EMULSION INTRAVENOUS at 08:03

## 2020-03-27 RX ADMIN — SODIUM CHLORIDE: 0.9 INJECTION, SOLUTION INTRAVENOUS at 06:03

## 2020-03-27 RX ADMIN — LEVOTHYROXINE SODIUM 100 MCG: 100 TABLET ORAL at 05:03

## 2020-03-27 RX ADMIN — PROPOFOL 50 MCG/KG/MIN: 10 INJECTION, EMULSION INTRAVENOUS at 05:03

## 2020-03-27 RX ADMIN — PROPOFOL 50 MCG/KG/MIN: 10 INJECTION, EMULSION INTRAVENOUS at 10:03

## 2020-03-27 RX ADMIN — PROPOFOL 50 MCG/KG/MIN: 10 INJECTION, EMULSION INTRAVENOUS at 12:03

## 2020-03-27 RX ADMIN — FENTANYL CITRATE 200 MCG/HR: 50 INJECTION INTRAVENOUS at 12:03

## 2020-03-27 RX ADMIN — HEPARIN SODIUM 10000 UNITS: 1000 INJECTION, SOLUTION INTRAVENOUS; SUBCUTANEOUS at 04:03

## 2020-03-27 RX ADMIN — PROPOFOL 50 MCG/KG/MIN: 10 INJECTION, EMULSION INTRAVENOUS at 09:03

## 2020-03-27 RX ADMIN — ENOXAPARIN SODIUM 40 MG: 100 INJECTION SUBCUTANEOUS at 09:03

## 2020-03-27 RX ADMIN — HYDROXYCHLOROQUINE SULFATE 400 MG: 200 TABLET ORAL at 08:03

## 2020-03-27 RX ADMIN — PROPOFOL 50 MCG/KG/MIN: 10 INJECTION, EMULSION INTRAVENOUS at 06:03

## 2020-03-27 RX ADMIN — POTASSIUM CHLORIDE 10 MEQ: 10 INJECTION, SOLUTION INTRAVENOUS at 01:03

## 2020-03-27 RX ADMIN — PANTOPRAZOLE SODIUM 40 MG: 40 INJECTION, POWDER, LYOPHILIZED, FOR SOLUTION INTRAVENOUS at 09:03

## 2020-03-27 RX ADMIN — PROPOFOL 50 MCG/KG/MIN: 10 INJECTION, EMULSION INTRAVENOUS at 02:03

## 2020-03-27 RX ADMIN — Medication 0.1 MCG/KG/MIN: at 06:03

## 2020-03-27 RX ADMIN — PROPOFOL 50 MCG/KG/MIN: 10 INJECTION, EMULSION INTRAVENOUS at 01:03

## 2020-03-27 RX ADMIN — SODIUM CHLORIDE: 0.9 INJECTION, SOLUTION INTRAVENOUS at 11:03

## 2020-03-27 RX ADMIN — AZITHROMYCIN MONOHYDRATE 250 MG: 250 TABLET ORAL at 02:03

## 2020-03-27 RX ADMIN — ENOXAPARIN SODIUM 40 MG: 100 INJECTION SUBCUTANEOUS at 08:03

## 2020-03-27 NOTE — ASSESSMENT & PLAN NOTE
Patient with Hypoxic Respiratory failure which is Acute.  she is not on home oxygen. Supplemental ventilation was provided and noted- Vent Mode: A/C  Oxygen Concentration (%):  [50] 50  Resp Rate Total:  [35 br/min] 35 br/min  Vt Set:  [290 mL] 290 mL  PEEP/CPAP:  [14 cmH20] 14 cmH20  Pressure Support:  [0 cmH20] 0 cmH20  Mean Airway Pressure:  [20 slP01-47 cmH20] 20 cmH20 and oxygen saturations . Differential diagnosis includes - Pneumonia Viral Labs and images were reviewed. Patient Has recent ABG- No components found for: ABG. Will treat underlying causes and adjust management of respiratory failure as follows-     Temp:  [98.6 °F (37 °C)-98.7 °F (37.1 °C)]   Pulse:  [64-82]   Resp:  [35]   BP: ()/(54-73)   SpO2:  [92 %-100 %]      Pulmonary consultation.   Continue beta 2 agonist bronchodilator treatments.   Continue IV antibiotics - ceftriaxone and azithromycin.   D dimer up 1, ast/alt sl up 105/81, procal 0.12, covid pos.  Troponin neg.  Ratio 74/0.5 on peep 8-   390 is 6 cc/kg ;  Will continue Plaquenil 400 mg daily  as per COVID protocol.  Microbiology Results (last 7 days)     Procedure Component Value Units Date/Time    Culture, Respiratory with Gram Stain [[id number]] Collected:  03/25/20 2150    Order Status:  Completed Specimen:  Tracheal Aspirate Updated:  03/27/20 1032     Respiratory Culture Normal respiratory anamaria     Gram Stain (Respiratory) <10 epithelial cells per low power field.     Gram Stain (Respiratory) Rare WBC's     Gram Stain (Respiratory) Rare Gram positive cocci    Blood culture [[id number]] Collected:  03/26/20 0432    Order Status:  Completed Specimen:  Blood Updated:  03/27/20 1012     Blood Culture, Routine No Growth to date      No Growth to date    Blood culture [[id number]] Collected:  03/26/20 0612    Order Status:  Completed Specimen:  Blood Updated:  03/27/20 1012     Blood Culture, Routine No Growth to date      No Growth to date    Blood culture x two cultures. Draw  prior to antibiotics. [[id number]] Collected:  03/25/20 1411    Order Status:  Completed Specimen:  Blood from Antecubital, Right  Arm Updated:  03/26/20 2212     Blood Culture, Routine No Growth to date      No Growth to date    Narrative:       Aerobic and anaerobic    Culture, Respiratory with Gram Stain [[id number]] Collected:  03/25/20 2152    Order Status:  Canceled Specimen:  Respiratory from Sputum     Influenza A & B by Molecular [[id number]]     Order Status:  Canceled Specimen:  Nasopharyngeal Swab     Blood culture x two cultures. Draw prior to antibiotics. [[id number]]     Order Status:  Canceled Specimen:  Blood           Continue routine medications as before.   Follow airborne/droplet precautions.

## 2020-03-27 NOTE — ASSESSMENT & PLAN NOTE
Continue Plaquenil   Plaquenil 400 mg twice daily on Day 1 followed by 400 mg daily for 5 days as per COVID protocol.   Continue routine medications as before.   Follow airborne/droplet precautions.

## 2020-03-27 NOTE — CARE UPDATE
I spent 30 minutes of face to face discussion regarding advance directives and end of life planning with the daughter Danya. Patient/Family understands the seriousness of their condition and would like to make their end of life decisions known as follows- the family would like to keep the patient full code for now.  But did express that her mother did not be on a ventilator for a long time but was okay with having it for a short time if it helps improve her condition

## 2020-03-27 NOTE — ASSESSMENT & PLAN NOTE
--will cont ARDSnet Protocol.  --Target 6-8ml/kg Ideal Body Weight. Decrease TV as tolerated.  --Plateau pressure goal <30cm H2O.  --Oxygenation goal PaO2 55-80 or SpO2 88-95%.  --pH goal 7.30-7.45.  --Wean to PSV as tolerated.

## 2020-03-27 NOTE — PLAN OF CARE
Temp of 102.1.  Blood cultures done, but antibiotics had been started previously.  Intubated, Covid +  Maxed on propofol at 50.  Added fentanyl gtt as pt still awake.  Central line placed per ER physician for Norepinephrine gtt.  200ml urine output.  Updated pt's daughter on plan of care.

## 2020-03-27 NOTE — PLAN OF CARE
03/27/20 1844   Patient Assessment/Suction   Level of Consciousness (AVPU) responds to voice   Respiratory Effort Normal;Unlabored   Expansion/Accessory Muscles/Retractions no use of accessory muscles;no retractions   All Lung Fields Breath Sounds Anterior:;Lateral:   MICHAEL Breath Sounds coarse   LLL Breath Sounds diminished   RUL Breath Sounds coarse   RML Breath Sounds coarse   RLL Breath Sounds diminished   Rhythm/Pattern, Respiratory assisted mechanically   Cough Frequency with stimulation   $ Suction Charges Inline Suction Procedure Stat Charge   Secretions Amount scant   Secretions Color white   Secretions Characteristics thin   PRE-TX-O2   O2 Device (Oxygen Therapy) ventilator   $ Is the patient on Low Flow Oxygen? Yes   Oxygen Concentration (%) 50   SpO2 100 %   Pulse Oximetry Type Continuous   $ Pulse Oximetry - Multiple Charge Pulse Oximetry - Multiple   Pulse 67   Resp (!) 36   ETCO2   $ ETCO2 Charge Exhaled CO2 Monitoring   $ ETCO2 Usage Currently wearing   ETCO2 (mmHg) 41 mmHg   ETCO2 Device Type Portable Bedside Monitor   Wound Care   $ Wound Care Tech Time 15 min   Area of Concern Upper lip;Lower lip   Skin Color/Characteristics without discoloration   Skin Temperature warm   Was wound care notified? No        Airway - Non-Surgical 03/25/20 2145 Endotracheal Tube   Placement Date/Time: 03/25/20 2145   Present Prior to Hospital Arrival?: No  Inserted by: MD  Airway Device: Endotracheal Tube  Airway Device Size: 7.5  Style: Cuffed  Cuff Inflated With: Air  Placement Verified By: Auscultation;Chest X-ray;Colorimetr...   Secured at 24 cm   Measured At Lips   Secured Location Center   Secured by Commercial tube owusu   Bite Block none   Site Condition Cool;Dry   Status Intact;Secured;Patent   Site Assessment Clean;Dry   Cuff Pressure 26 cm H2O   Vent Select   Charged w/in last 24h YES   Preset Conventional Ventilator Settings   Vent Type    Ventilation Type VC   Vent Mode A/C   Humidity HME   Set  Rate 35 BPM   Vt Set 290 mL   PEEP/CPAP 14 cmH20   Pressure Support 0 cmH20   Waveform RAMP   Peak Flow 60 L/min   Set Inspiratory Pressure 0 cmH20   Insp Time 0 Sec(s)   Plateau Set/Insp. Hold (sec) 0   Insp Rise Time  0 %   Trigger Sensitivity Flow/I-Trigger 3 L/min   P High 0 cm H2O   P Low 0 cm H2O   T High 0 sec   T Low 0 sec   Patient Ventilator Parameters   Resp Rate Total 35 br/min   Peak Airway Pressure 30 cmH2O   Mean Airway Pressure 20 cmH20   Plateau Pressure 27 cmH20   Exhaled Vt 285 mL   Total Ve 10.7 mL   Spont Ve 0 L   I:E Ratio Measured 1:2.30   Conventional Ventilator Alarms   Alarms On Y   Ve High Alarm 40 L/min   Resp Rate High Alarm 50 br/min   Press High Alarm 60 cmH2O   Apnea Rate 10   Apnea Volume (mL) 450 mL   Apnea Oxygen Concentration  100   Apnea Flow Rate (L/min) 75   T Apnea 20 sec(s)   Ready to Wean/Extubation Screen   FIO2<=50 (chart decimal) 0.5   MV<16L (chart vol.) 10.7   PEEP <=8 (chart #) (!) 14   Ready to Wean Parameters   F/VT Ratio<105 (RSBI) 126.32   Airway Safety   Ambu bag with the patient? Yes, Adult Ambu   Is mask with the patient? Yes, Adult Mask

## 2020-03-27 NOTE — ASSESSMENT & PLAN NOTE
Will follow-up with the renal recommendations  Will continue to hydrate for now as needed monitor for response  Lasix is was started yesterday but stopped this morning  Urine output improved this morning.  But in the last 24 hr urine output was less than 100 cc

## 2020-03-27 NOTE — PROGRESS NOTES
Progress Note  PULMONARY    Admit Date: 3/25/2020   03/27/2020      SUBJECTIVE:     3/27- remains intubated, on vent. Was on Lasix drip overnight and now w/ IVF for UOP. On minimal Levophed       PFSH and Allergies reviewed.    OBJECTIVE:     Vitals (Most recent):  Vitals:    03/27/20 1000   BP: 123/64   Pulse: 74   Resp: (!) 35   Temp:        Vitals (24 hour range):  Temp:  [98.6 °F (37 °C)-102.5 °F (39.2 °C)]   Pulse:  []   Resp:  [30-36]   BP: ()/(50-72)   SpO2:  [91 %-100 %]       Intake/Output Summary (Last 24 hours) at 3/27/2020 1026  Last data filed at 3/27/2020 0600  Gross per 24 hour   Intake 1973.18 ml   Output 174 ml   Net 1799.18 ml          Physical Exam:  The patient's neuro status (alertness,orientation,cognitive function,motor skills,), pharyngeal exam (oral lesions, hygiene, abn dentition,), Neck (jvd,mass,thyroid,nodes in neck and above/below clavicle),RESPIRATORY(symmetry,effort,fremitus,percussion,auscultation),  Cor(rhythm,heart tones including gallops,perfusion,edema)ABD(distention,hepatic&splenomegaly,tenderness,masses), Skin(rash,cyanosis),Psyc(affect,judgement,).  Exam negative except for these pertinent findings:    Sedated, intubated  Lungs clear  Abdomen soft nondistended  Nonpitting edema of bilateral hands    Radiographs reviewed:  CXR 3/26- increased consolidation RLL    Labs     Recent Labs   Lab 03/27/20  0302   WBC 10.09   HGB 10.3*   HCT 34.3*        Recent Labs   Lab 03/27/20  0302   *   K 3.5   CL 97   CO2 22*   BUN 14   CREATININE 3.0*   *   CALCIUM 7.8*   MG 1.8   PHOS 3.5   AST 82*   ALT 68*   ALKPHOS 85   BILITOT 1.6*   PROT 7.5   ALBUMIN 2.6*     Recent Labs   Lab 03/26/20  2345   PH 7.412   PCO2 44.1   PO2 78*   HCO3 28.1*     Microbiology Results (last 7 days)     Procedure Component Value Units Date/Time    Blood culture [[id number]] Collected:  03/26/20 0432    Order Status:  Completed Specimen:  Blood Updated:  03/27/20 1012     Blood  Culture, Routine No Growth to date      No Growth to date    Blood culture [[id number]] Collected:  03/26/20 0612    Order Status:  Completed Specimen:  Blood Updated:  03/27/20 1012     Blood Culture, Routine No Growth to date      No Growth to date    Blood culture x two cultures. Draw prior to antibiotics. [[id number]] Collected:  03/25/20 1411    Order Status:  Completed Specimen:  Blood from Antecubital, Right  Arm Updated:  03/26/20 2212     Blood Culture, Routine No Growth to date      No Growth to date    Narrative:       Aerobic and anaerobic    Culture, Respiratory with Gram Stain [[id number]] Collected:  03/25/20 2150    Order Status:  Completed Specimen:  Tracheal Aspirate Updated:  03/26/20 0215     Gram Stain (Respiratory) <10 epithelial cells per low power field.     Gram Stain (Respiratory) Rare WBC's     Gram Stain (Respiratory) Rare Gram positive cocci    Culture, Respiratory with Gram Stain [[id number]] Collected:  03/25/20 2152    Order Status:  Canceled Specimen:  Respiratory from Sputum     Influenza A & B by Molecular [[id number]]     Order Status:  Canceled Specimen:  Nasopharyngeal Swab     Blood culture x two cultures. Draw prior to antibiotics. [[id number]]     Order Status:  Canceled Specimen:  Blood           Impression:  Active Hospital Problems    Diagnosis  POA    ARDS (adult respiratory distress syndrome) [J80]  Yes    Covid-19 Virus Infection [J22, B97.29]  Yes    Acute respiratory failure [J96.00]  Yes    Morbid obesity [E66.01]  Yes    Hypothyroid [E03.9]  Yes    Covid-19 Virus Detected [J22, B97.29]  Yes      Resolved Hospital Problems   No resolved problems to display.               Plan:   Acute hypoxemic respiratory failure, stable to improving O2 reqt  COVID-19 pneumonia  Acute renal failure, worsening  Shock   Morbid obesity    - continue current vent settings  - titrate peep and FiO2 by ARDS net protocol  - start tube feeds  - nephrology consult pending  - obtain echo  -  monitor renal function  - continue hydroxychloroquine 400 mg daily  - continue Rocephin and azithromycin  - continue Levophed to keep map greater than 65  - d/w hospitalist    The following were evaluated and adjusted as needed: mechanical ventilator settings and weaning status, vasopressors, intravenous fluids and nutritional status, antibiotics, acid base balance and oxygenation needs and input and output and renal status       Critical Care  - THE PATIENT HAS A HIGH PROBABILITY OF IMMINENT OR LIFE THREATENING DETERIORATION.  Over 50%time of encounter was in direct care at bedside.  Time was 30 to 74 minutes required for patient care.  Time needed for all of the above totaled 45 minutes.

## 2020-03-27 NOTE — PROGRESS NOTES
[facility] Hospital Medicine  Progress Note    Patient Name: Maria Victoria Hernandez  MRN: [id number]  Patient Class: IP- Inpatient   Admission Date: 3/25/2020  Length of Stay: 1 days  Attending Physician: Kady Gomez MD  Primary Care Provider: Candice Deluna MD        Subjective:     Principal Problem:<principal problem not specified>        HPI:  Patient is a 54 years old  female with morbid obesity in past medical history significant for hypothyroidism is being admitted to intensive care unit under inpatient status from [facility] Emergency room with worsening shortness of breath.  Three days ago patient was tested positive for COVID - 19.  Patient works at [employer].  Patient has been experiencing subjective fever, generalized body aches and pains and nonproductive cough for few days.  Patient is getting increasingly short of breath especially for the past 2 days.  Upon arrival to the emergency room patient was noted to be hypoxic around 89%.  Up on 3 L patient's oxygen sats are around 96%.  Patient denies any chest pain, leg swelling or calf tenderness.      Overview/Hospital Course:  No notes on file    Interval History: Patient was intubated last night. Chest xray appears much worse.     Review of Systems   Unable to perform ROS: Intubated     Objective:     Vital Signs (Most Recent):  Temp: (!) 101.7 °F (38.7 °C) (03/26/20 1923)  Pulse: 98 (03/26/20 2100)  Resp: (!) 35 (03/26/20 2100)  BP: 116/72 (03/26/20 2045)  SpO2: (!) 91 % (03/26/20 2100) Vital Signs (24h Range):  Temp:  [101.7 °F (38.7 °C)-102.5 °F (39.2 °C)] 101.7 °F (38.7 °C)  Pulse:  [] 98  Resp:  [14-35] 35  SpO2:  [91 %-100 %] 91 %  BP: ()/(48-83) 116/72     Weight: 104.8 kg (231 lb 0.7 oz)  Body mass index is 43.65 kg/m².    Intake/Output Summary (Last 24 hours) at 3/26/2020 2310  Last data filed at 3/26/2020 0808  Gross per 24 hour   Intake  360.07 ml   Output 225 ml   Net 135.07 ml      Physical Exam   Nursing note and vitals reviewed.  PATIENT WAS SEEN AS A VIDEO VISIT WITH NURSE IN PATIENT ROOM WITH PATIENT TO ASSIST DURING THE VISIT. PHYSICAL EXAM FINDINGS ARE AS VIEWED BY MYSELF VIA VIDEO OR AS REPORTED BY NURSE IF SPECIFIED AS SUCH, EXAM NOT DONE PERSONALLY BY MYSELF AT BEDSIDE.      Significant Labs:   BMP:   Recent Labs   Lab 03/26/20  0433   GLU 97  96     137   K 3.6  3.6     101   CO2 27  27   BUN 7  7   CREATININE 1.0  1.0   CALCIUM 7.8*  7.8*   MG 1.8     CBC:   Recent Labs   Lab 03/25/20  1410 03/26/20  0433   WBC 4.46 5.62   HGB 11.7* 10.7*   HCT 38.8 35.6*    202       Significant Imaging: I have reviewed all pertinent imaging results/findings within the past 24 hours.      Assessment/Plan:      Covid-19 Virus Infection  Will continue Plaquenil      ARDS (adult respiratory distress syndrome)    --will cont ARDSnet Protocol.  --Target 6-8ml/kg Ideal Body Weight. Decrease TV as tolerated.  --Plateau pressure goal <30cm H2O.  --Oxygenation goal PaO2 55-80 or SpO2 88-95%.  --pH goal 7.30-7.45.  --Wean to PSV as tolerated.        Covid-19 Virus Detected  Continue Plaquenil   Plaquenil 400 mg twice daily on Day 1 followed by 400 mg daily for 5 days as per COVID protocol.   Continue routine medications as before.   Follow airborne/droplet precautions.      Hypothyroid  Check TSH, Chronic problem. Will continue chronic medications and monitor for any changes, adjusting as needed.          Morbid obesity  Body mass index is 43.65 kg/m². Morbid obesity complicates all aspects of disease management from diagnostic modalities to treatment. Weight loss encouraged and health benefits explained to patient.          Acute respiratory failure  Patient with Hypoxic Respiratory failure which is Acute.  she is not on home oxygen. Supplemental ventilation was provided and noted- Vent Mode: A/C  Oxygen Concentration (%):  [50-60]  50  Resp Rate Total:  [14 br/min-41 br/min] 35 br/min  Vt Set:  [290 mL-480 mL] 290 mL  PEEP/CPAP:  [8 srD26-92 cmH20] 14 cmH20  Pressure Support:  [0 cmH20] 0 cmH20  Mean Airway Pressure:  [13 cnL09-36 cmH20] 21 cmH20 and oxygen saturations . Differential diagnosis includes - Pneumonia Viral Labs and images were reviewed. Patient Has recent ABG- No components found for: ABG. Will treat underlying causes and adjust management of respiratory failure as follows-     Temp:  [101.7 °F (38.7 °C)-102.5 °F (39.2 °C)]   Pulse:  []   Resp:  [35]   BP: ()/(51-72)   SpO2:  [91 %-95 %]      Pulmonary consultation.   Continue beta 2 agonist bronchodilator treatments.   Continue IV antibiotics - ceftriaxone and azithromycin.   D dimer up 1, ast/alt sl up 105/81, procal 0.12, covid pos.  Troponin neg.  Ratio 74/0.5 on peep 8-   390 is 6 cc/kg ;  Start Plaquenil 400 mg twice daily for 1 day followed by 400 mg daily for 5 days as per COVID protocol.  Microbiology Results (last 7 days)     Procedure Component Value Units Date/Time    Blood culture x two cultures. Draw prior to antibiotics. [[id number]] Collected:  03/25/20 1411    Order Status:  Completed Specimen:  Blood from Antecubital, Right  Arm Updated:  03/26/20 2212     Blood Culture, Routine No Growth to date      No Growth to date    Narrative:       Aerobic and anaerobic    Blood culture [[id number]] Collected:  03/26/20 0432    Order Status:  Completed Specimen:  Blood Updated:  03/26/20 1715     Blood Culture, Routine No Growth to date    Blood culture [[id number]] Collected:  03/26/20 0612    Order Status:  Completed Specimen:  Blood Updated:  03/26/20 1715     Blood Culture, Routine No Growth to date    Culture, Respiratory with Gram Stain [[id number]] Collected:  03/25/20 2150    Order Status:  Completed Specimen:  Tracheal Aspirate Updated:  03/26/20 0215     Gram Stain (Respiratory) <10 epithelial cells per low power field.     Gram Stain (Respiratory) Rare  WBC's     Gram Stain (Respiratory) Rare Gram positive cocci    Culture, Respiratory with Gram Stain [[id number]] Collected:  03/25/20 2152    Order Status:  Canceled Specimen:  Respiratory from Sputum     Influenza A & B by Molecular [[id number]]     Order Status:  Canceled Specimen:  Nasopharyngeal Swab     Blood culture x two cultures. Draw prior to antibiotics. [[id number]]     Order Status:  Canceled Specimen:  Blood           Continue routine medications as before.   Follow airborne/droplet precautions.              VTE Risk Mitigation (From admission, onward)         Ordered     enoxaparin injection 40 mg  Every 12 hours      03/25/20 2313     IP VTE HIGH RISK PATIENT  Once      03/25/20 2313                Critical care time spent on the evaluation and treatment of severe organ dysfunction, review of pertinent labs and imaging studies, discussions with consulting providers and discussions with patient/family: 60 minutes.      Kady Gomez MD  Department of Hospital Medicine   [facility]

## 2020-03-27 NOTE — ASSESSMENT & PLAN NOTE
Body mass index is 44.78 kg/m². Morbid obesity complicates all aspects of disease management from diagnostic modalities to treatment. Weight loss encouraged and health benefits explained to patient.

## 2020-03-27 NOTE — PROGRESS NOTES
[facility] Hospital Medicine  Progress Note    Patient Name: Maria Victoria Hernandez  MRN: [id number]  Patient Class: IP- Inpatient   Admission Date: 3/25/2020  Length of Stay: 2 days  Attending Physician: Kady Gomez MD  Primary Care Provider: Candice Deluna MD        Subjective:     Principal Problem:  Acute respiratory failure secondary to COVID    HPI:  Patient is a 54 years old  female with morbid obesity in past medical history significant for hypothyroidism is being admitted to intensive care unit under inpatient status from [facility] Emergency room with worsening shortness of breath.  Three days ago patient was tested positive for COVID - 19.  Patient works at [employer].  Patient has been experiencing subjective fever, generalized body aches and pains and nonproductive cough for few days.  Patient is getting increasingly short of breath especially for the past 2 days.  Upon arrival to the emergency room patient was noted to be hypoxic around 89%.  Up on 3 L patient's oxygen sats are around 96%.  Patient denies any chest pain, leg swelling or calf tenderness.      Overview/Hospital Course:  No notes on file    Interval History:  Patient continues to be on high PEEP and high oxygen saturation.  Chest x-ray appears to be similar to yesterday.  Currently on 1 pressor.   urine output improved   No other acute events overnight    Review of Systems   Unable to perform ROS: Intubated     Objective:     Vital Signs (Most Recent):  Temp: 98.7 °F (37.1 °C) (03/27/20 1200)  Pulse: 67 (03/27/20 1530)  Resp: (!) 35 (03/27/20 1530)  BP: 110/62 (03/27/20 1515)  SpO2: 100 % (03/27/20 1530) Vital Signs (24h Range):  Temp:  [98.6 °F (37 °C)-102.5 °F (39.2 °C)] 98.7 °F (37.1 °C)  Pulse:  [] 67  Resp:  [35-36] 35  SpO2:  [91 %-100 %] 100 %  BP: ()/(50-73) 110/62     Weight: 107.5 kg (237 lb)  Body mass index is 44.78  kg/m².    Intake/Output Summary (Last 24 hours) at 3/27/2020 1551  Last data filed at 3/27/2020 0800  Gross per 24 hour   Intake 1973.18 ml   Output 244 ml   Net 1729.18 ml      Physical Exam   Nursing note and vitals reviewed.  PATIENT WAS SEEN AS A VIDEO VISIT WITH NURSE IN PATIENT ROOM WITH PATIENT TO ASSIST DURING THE VISIT. PHYSICAL EXAM FINDINGS ARE AS VIEWED BY MYSELF VIA VIDEO OR AS REPORTED BY NURSE IF SPECIFIED AS SUCH, EXAM NOT DONE PERSONALLY BY MYSELF AT BEDSIDE.      Significant Labs:   BMP:   Recent Labs   Lab 03/27/20  0302   *   *   K 3.5   CL 97   CO2 22*   BUN 14   CREATININE 3.0*   CALCIUM 7.8*   MG 1.8     CBC:   Recent Labs   Lab 03/26/20  0433 03/27/20  0302   WBC 5.62 10.09   HGB 10.7* 10.3*   HCT 35.6* 34.3*    234       Significant Imaging: I have reviewed all pertinent imaging results/findings within the past 24 hours.      Assessment/Plan:      Acute renal failure  Will follow-up with the renal recommendations  Will continue to hydrate for now as needed monitor for response  Lasix is was started yesterday but stopped this morning  Urine output improved this morning.  But in the last 24 hr urine output was less than 100 cc        COVID-19 virus infection  Will continue Plaquenil      ARDS (adult respiratory distress syndrome)    --will cont ARDSnet Protocol.  --Target 6-8ml/kg Ideal Body Weight. Decrease TV as tolerated.  --Plateau pressure goal <30cm H2O.  --Oxygenation goal PaO2 55-80 or SpO2 88-95%.  --pH goal 7.30-7.45.  --Wean to PSV as tolerated.        COVID-19 virus detected  Continue Plaquenil   Plaquenil 400 mg twice daily on Day 1 followed by 400 mg daily for 5 days as per COVID protocol.   Continue routine medications as before.   Follow airborne/droplet precautions.      Hypothyroid  Check TSH, Chronic problem. Will continue chronic medications and monitor for any changes, adjusting as needed.          Morbid obesity  Body mass index is 44.78 kg/m². Morbid  obesity complicates all aspects of disease management from diagnostic modalities to treatment. Weight loss encouraged and health benefits explained to patient.          Acute respiratory failure  Patient with Hypoxic Respiratory failure which is Acute.  she is not on home oxygen. Supplemental ventilation was provided and noted- Vent Mode: A/C  Oxygen Concentration (%):  [50] 50  Resp Rate Total:  [35 br/min] 35 br/min  Vt Set:  [290 mL] 290 mL  PEEP/CPAP:  [14 cmH20] 14 cmH20  Pressure Support:  [0 cmH20] 0 cmH20  Mean Airway Pressure:  [20 nmA96-27 cmH20] 20 cmH20 and oxygen saturations . Differential diagnosis includes - Pneumonia Viral Labs and images were reviewed. Patient Has recent ABG- No components found for: ABG. Will treat underlying causes and adjust management of respiratory failure as follows-     Temp:  [98.6 °F (37 °C)-98.7 °F (37.1 °C)]   Pulse:  [64-82]   Resp:  [35]   BP: ()/(54-73)   SpO2:  [92 %-100 %]      Pulmonary consultation.   Continue beta 2 agonist bronchodilator treatments.   Continue IV antibiotics - ceftriaxone and azithromycin.   D dimer up 1, ast/alt sl up 105/81, procal 0.12, covid pos.  Troponin neg.  Ratio 74/0.5 on peep 8-   390 is 6 cc/kg ;  Will continue Plaquenil 400 mg daily  as per COVID protocol.  Microbiology Results (last 7 days)     Procedure Component Value Units Date/Time    Culture, Respiratory with Gram Stain [[id number]] Collected:  03/25/20 2150    Order Status:  Completed Specimen:  Tracheal Aspirate Updated:  03/27/20 1032     Respiratory Culture Normal respiratory anamaria     Gram Stain (Respiratory) <10 epithelial cells per low power field.     Gram Stain (Respiratory) Rare WBC's     Gram Stain (Respiratory) Rare Gram positive cocci    Blood culture [[id number]] Collected:  03/26/20 0432    Order Status:  Completed Specimen:  Blood Updated:  03/27/20 1012     Blood Culture, Routine No Growth to date      No Growth to date    Blood culture [[id number]]  Collected:  03/26/20 0612    Order Status:  Completed Specimen:  Blood Updated:  03/27/20 1012     Blood Culture, Routine No Growth to date      No Growth to date    Blood culture x two cultures. Draw prior to antibiotics. [[id number]] Collected:  03/25/20 1411    Order Status:  Completed Specimen:  Blood from Antecubital, Right  Arm Updated:  03/26/20 2212     Blood Culture, Routine No Growth to date      No Growth to date    Narrative:       Aerobic and anaerobic    Culture, Respiratory with Gram Stain [[id number]] Collected:  03/25/20 2152    Order Status:  Canceled Specimen:  Respiratory from Sputum     Influenza A & B by Molecular [[id number]]     Order Status:  Canceled Specimen:  Nasopharyngeal Swab     Blood culture x two cultures. Draw prior to antibiotics. [[id number]]     Order Status:  Canceled Specimen:  Blood           Continue routine medications as before.   Follow airborne/droplet precautions.              VTE Risk Mitigation (From admission, onward)         Ordered     enoxaparin injection 40 mg  Every 12 hours      03/25/20 2313     IP VTE HIGH RISK PATIENT  Once      03/25/20 2313                Critical care time spent on the evaluation and treatment of severe organ dysfunction, review of pertinent labs and imaging studies, discussions with consulting providers and discussions with patient/family: 60 minutes.      Kady Gomez MD  Department of Hospital Medicine   [facility]

## 2020-03-27 NOTE — SUBJECTIVE & OBJECTIVE
Interval History: Patient was intubated last night. Chest xray appears much worse.     Review of Systems   Unable to perform ROS: Intubated     Objective:     Vital Signs (Most Recent):  Temp: (!) 101.7 °F (38.7 °C) (03/26/20 1923)  Pulse: 98 (03/26/20 2100)  Resp: (!) 35 (03/26/20 2100)  BP: 116/72 (03/26/20 2045)  SpO2: (!) 91 % (03/26/20 2100) Vital Signs (24h Range):  Temp:  [101.7 °F (38.7 °C)-102.5 °F (39.2 °C)] 101.7 °F (38.7 °C)  Pulse:  [] 98  Resp:  [14-35] 35  SpO2:  [91 %-100 %] 91 %  BP: ()/(48-83) 116/72     Weight: 104.8 kg (231 lb 0.7 oz)  Body mass index is 43.65 kg/m².    Intake/Output Summary (Last 24 hours) at 3/26/2020 2310  Last data filed at 3/26/2020 2104  Gross per 24 hour   Intake 360.07 ml   Output 225 ml   Net 135.07 ml      Physical Exam   Nursing note and vitals reviewed.  PATIENT WAS SEEN AS A VIDEO VISIT WITH NURSE IN PATIENT ROOM WITH PATIENT TO ASSIST DURING THE VISIT. PHYSICAL EXAM FINDINGS ARE AS VIEWED BY MYSELF VIA VIDEO OR AS REPORTED BY NURSE IF SPECIFIED AS SUCH, EXAM NOT DONE PERSONALLY BY MYSELF AT BEDSIDE.      Significant Labs:   BMP:   Recent Labs   Lab 03/26/20  0433   GLU 97  96     137   K 3.6  3.6     101   CO2 27  27   BUN 7  7   CREATININE 1.0  1.0   CALCIUM 7.8*  7.8*   MG 1.8     CBC:   Recent Labs   Lab 03/25/20  1410 03/26/20  0433   WBC 4.46 5.62   HGB 11.7* 10.7*   HCT 38.8 35.6*    202       Significant Imaging: I have reviewed all pertinent imaging results/findings within the past 24 hours.

## 2020-03-27 NOTE — PLAN OF CARE
Patient remains on  on ac rate 35, vt 290, Peep +14 and Fi02 .50.  Patient intubated via 7.5 et tube and securedn at 23cm@ lips with 66pvn33 cuff psi. ET c02 44mmhg.  Ambu bag and mask at bedside with alarms on and functioning properly at this time.

## 2020-03-27 NOTE — SUBJECTIVE & OBJECTIVE
Interval History:  Patient continues to be on high PEEP and high oxygen saturation.  Chest x-ray appears to be similar to yesterday.  Currently on 1 pressor.   urine output improved   No other acute events overnight    Review of Systems   Unable to perform ROS: Intubated     Objective:     Vital Signs (Most Recent):  Temp: 98.7 °F (37.1 °C) (03/27/20 1200)  Pulse: 67 (03/27/20 1530)  Resp: (!) 35 (03/27/20 1530)  BP: 110/62 (03/27/20 1515)  SpO2: 100 % (03/27/20 1530) Vital Signs (24h Range):  Temp:  [98.6 °F (37 °C)-102.5 °F (39.2 °C)] 98.7 °F (37.1 °C)  Pulse:  [] 67  Resp:  [35-36] 35  SpO2:  [91 %-100 %] 100 %  BP: ()/(50-73) 110/62     Weight: 107.5 kg (237 lb)  Body mass index is 44.78 kg/m².    Intake/Output Summary (Last 24 hours) at 3/27/2020 1551  Last data filed at 3/27/2020 0800  Gross per 24 hour   Intake 1973.18 ml   Output 244 ml   Net 1729.18 ml      Physical Exam   Nursing note and vitals reviewed.  PATIENT WAS SEEN AS A VIDEO VISIT WITH NURSE IN PATIENT ROOM WITH PATIENT TO ASSIST DURING THE VISIT. PHYSICAL EXAM FINDINGS ARE AS VIEWED BY MYSELF VIA VIDEO OR AS REPORTED BY NURSE IF SPECIFIED AS SUCH, EXAM NOT DONE PERSONALLY BY MYSELF AT BEDSIDE.      Significant Labs:   BMP:   Recent Labs   Lab 03/27/20  0302   *   *   K 3.5   CL 97   CO2 22*   BUN 14   CREATININE 3.0*   CALCIUM 7.8*   MG 1.8     CBC:   Recent Labs   Lab 03/26/20  0433 03/27/20  0302   WBC 5.62 10.09   HGB 10.7* 10.3*   HCT 35.6* 34.3*    234       Significant Imaging: I have reviewed all pertinent imaging results/findings within the past 24 hours.

## 2020-03-27 NOTE — PROCEDURES
"Maria Victoria Hernandez is a 54 y.o. female patient.    Temp: 98.7 °F (37.1 °C) (03/27/20 1200)  Pulse: 69 (03/27/20 1615)  Resp: (!) 35 (03/27/20 1615)  BP: 110/62 (03/27/20 1515)  SpO2: 98 % (03/27/20 1615)  Weight: 107.5 kg (237 lb) (03/27/20 1530)  Height: 5' 1" (154.9 cm) (03/27/20 1530)       Central Line  Date/Time: 3/27/2020 4:33 PM  Location procedure was performed: [facility] PULMONARY MEDICINE  Performed by: Leonel Steinberg MD  Pre-operative Diagnosis: acute renal failure  Post-operative diagnosis: acute renal failure  Consent Done: Yes  Time out: Immediately prior to procedure a "time out" was called to verify the correct patient, procedure, equipment, support staff and site/side marked as required.  Indications: hemodialysis  Anesthesia: see MAR for details  Preparation: skin prepped with ChloraPrep  Skin prep agent dried: skin prep agent completely dried prior to procedure  Sterile barriers: all five maximum sterile barriers used - cap, mask, sterile gown, sterile gloves, and large sterile sheet  Hand hygiene: hand hygiene performed prior to central venous catheter insertion  Location details: right internal jugular  Catheter type: trialysis  Catheter Length: 16cm    Ultrasound guidance: yes  Vessel Caliber: patent, compressibility normal  Vascular Doppler: not done  Needle advanced into vessel with real time Ultrasound guidance.  Guidewire confirmed in vessel.  Sterile sheath used.  Manometry: No   Number of attempts: 1  Assessment: placement verified by x-ray,  no pneumothorax on x-ray,  tip termination and successful placement  Complications: none  Estimated blood loss (mL): 0  Specimens: No  Implants: No  Post-procedure: line sutured,  chlorhexidine patch,  sterile dressing applied and blood return through all ports  Complications: No        Leonel Steinberg  3/27/2020  "

## 2020-03-27 NOTE — EICU
Notified about decreased UO < 30 cc dark looking urine over last 4 hours. Pt is on Lasix qtt @ 2.5 mg/h which was started at 9 pm for decreased UO. Pt has no h/o CHF, BNP< 10, creatinine 1.0, no edema. BP stable on minimal Levophed dose. It is hard to estimate pts volume status, suspect she may be some intravascular volume depleted.  Check BMP, abg, continue same dose Lasix qtt for now.   D/w REZA

## 2020-03-27 NOTE — PLAN OF CARE
Urine output of only 30 ml by 11 pm last night despite lasix gtt.  BMP and ABG's done per EICU order.  IVF started at 75 ml/hr.  Temp max of 101.7 overnight.  Tylenol given.  Last temp 99.2  Potassium IV 10meq given overnight.  Propofol at 50, fentanyl at 200, levo at 0.1  Total urine output overnight of 74 ml.

## 2020-03-27 NOTE — ASSESSMENT & PLAN NOTE
Body mass index is 43.65 kg/m². Morbid obesity complicates all aspects of disease management from diagnostic modalities to treatment. Weight loss encouraged and health benefits explained to patient.

## 2020-03-27 NOTE — EICU
Rounding (Video Assessment):  No    Intervention Initiated From:  Bedside    Mando Communicated with Bedside Nurse regarding:  Other  Doctor Notified:  Yes    Comments: Nurse called regarding decreased urinary output despite furosemide gtt. Dr. Juares notified.

## 2020-03-27 NOTE — PROGRESS NOTES
Lasix gtt started this evening at 19:00.  Pt has had 30ml urine output only since start of gtt.  Last BUN and Creat are 7 and 1.0.  Last BNP <10.  Pt has had fever all day today.  Also concerning is a potassium level of 3.6 before start of lasix gtt.  There are no orders to replace potassium.  EICU called with concerns.

## 2020-03-27 NOTE — PLAN OF CARE
Plan of care reviewed. Pt remains intubated and sedated. Nephrology consulted.Trialysis catheter in place. Echo completed. Restraints in place. Safety maintained.

## 2020-03-27 NOTE — CONSULTS
INPATIENT NEPHROLOGY CONSULT   [facility] NEPHROLOGY    Patient Name: Maria Victoria Hernandez  Date: 03/27/2020    Reason for consultation: MAIKOL    Chief Complaint:   Chief Complaint   Patient presents with    Shortness of Breath     positive covid       History of Present Illness:  History obtained from chart due to MV.  Patient is a 54 years old  female with morbid obesity and hypothyroidism who presents to [facility] Emergency room with worsening shortness of breath.  Three days ago patient was tested positive for COVID - 19.  Patient works at [employer].  Patient has been experiencing subjective fever, generalized body aches and pains and nonproductive cough for few days.  Patient is getting increasingly short of breath especially for the past 2 days.  Upon arrival to the emergency room patient was noted to be febrile and hypoxic around 89%. CXR showed bilateral infiltrates. She was initially managed with O2 NC but during the evening of presentation, she developed HHRF requiring intubation. She was placed on levophed post extubation for septic shock with BP as low as 70/40s. During that evening, her UOP decreased. She was started on a lasix gtt. She reportedly had tea colored urine but remained oliguric. Critical care stopped lasix gtt early this AM and started NS 75cc/hr. Patient remained febrile the entire night. She also remained hypotensive. BNP < 10. Neg trop. UA + ketones, trace blood, no protein. Blood cx neg. Admit Cr 0.7. Went to Cr 1 --> 2.6 on 3/26 and now 3 on 3/27, prompting renal consult.    Plan of Care:    1. Septic shock 2/2 COVID PNA with HHRF requiring MV  - She is on levophed.  - I started NS 150cc/hr.  - She is on CFTX, azithromycin and plaquenil.  - She is intubated/sedated.    2. MAIKOL- suspect multifactorial ATN in setting of shock/hypotension/intravascular volume depletion + diuresis; trace hematuria noted  - I think she needs a  good fluid challenge.  - Keep off diuretics- don't think she is volume overloaded.  - Trace hematuria is noted- sent off acute GN workup.  - Hold off renal imaging right now.  - no nsaids or IV contrast (meloxicam noted on home med list)   - Continue mueller.  - Dose meds for CrCl < 20.  - I discussed the risks and benefits of hemodialysis with the patient's daughter.  All of her questions were answered.  Risks include low blood pressure, stroke, heart attack, seizure, infection, nausea, delirium, and death. The daughter agreed to dialysis if needed and signed the consent form. Will have better sense tomorrow of her trajectory. She has a resulted hepatitis panel already.    3. Hypovolemic hyponatremia  - Will trend serum Na with volume resuscitation.    4. Hypokalemia  - Ordered repletion.    5. HypoMg  - Ordered repletion.    6. Anemia  - Trend Hb.  - She is on PO iron at home.    Thank you for allowing us to participate in this patient's care. We will continue to follow.    Vital Signs:  Temp Readings from Last 3 Encounters:   03/27/20 98.6 °F (37 °C) (Axillary)       Pulse Readings from Last 3 Encounters:   03/27/20 71   01/15/15 84   12/17/13 80       BP Readings from Last 3 Encounters:   03/27/20 120/61   01/15/15 124/82   12/17/13 102/68       Weight:  Wt Readings from Last 3 Encounters:   03/27/20 107.7 kg (237 lb 7 oz)   01/15/15 105.8 kg (233 lb 4.8 oz)   12/17/13 101.2 kg (223 lb)       Past Medical & Surgical History:  Past Medical History:   Diagnosis Date    Colon polyp     Diverticulosis large intestine w/o perforation or abscess w/bleeding     Iron deficiency anemia     Prediabetes     Thyroid disease     Vitamin B 12 deficiency     Vitamin D deficiency        Past Surgical History:   Procedure Laterality Date    TUBAL LIGATION         Past Social History:  Social History     Socioeconomic History    Marital status:      Spouse name: Not on file    Number of children: Not on file     Years of education: Not on file    Highest education level: Not on file   Occupational History    Not on file   Social Needs    Financial resource strain: Not on file    Food insecurity:     Worry: Not on file     Inability: Not on file    Transportation needs:     Medical: Not on file     Non-medical: Not on file   Tobacco Use    Smoking status: Never Smoker    Smokeless tobacco: Never Used   Substance and Sexual Activity    Alcohol use: No    Drug use: Not on file    Sexual activity: Not on file   Lifestyle    Physical activity:     Days per week: Not on file     Minutes per session: Not on file    Stress: Not on file   Relationships    Social connections:     Talks on phone: Not on file     Gets together: Not on file     Attends Evangelical service: Not on file     Active member of club or organization: Not on file     Attends meetings of clubs or organizations: Not on file     Relationship status: Not on file   Other Topics Concern    Not on file   Social History Narrative    Not on file       Medications:  Scheduled Meds:   azithromycin  250 mg Per NG tube Daily    cefTRIAXone (ROCEPHIN) IVPB  1 g Intravenous Q24H    enoxparin  40 mg Subcutaneous Q12H    [START ON 3/28/2020] hydroxychloroquine  400 mg Oral Daily    levothyroxine  100 mcg Oral Before breakfast    pantoprazole  40 mg Intravenous Daily    rocuronium  100 mg Intravenous Once     Continuous Infusions:   sodium chloride 0.9% 150 mL/hr at 03/27/20 1132    fentanyl 200 mcg/hr (03/27/20 1213)    norepinephrine bitartrate-D5W 0.06 mcg/kg/min (03/27/20 1159)     PRN Meds:.acetaminophen, propofoL, sodium chloride 0.9%  No current facility-administered medications on file prior to encounter.      Current Outpatient Medications on File Prior to Encounter   Medication Sig Dispense Refill    ferrous sulfate 325 mg (65 mg iron) Tab tablet Take 1 tablet (325 mg total) by mouth daily with breakfast. (Patient taking differently: Take 325 mg  "by mouth daily with breakfast. Take 2 tablets daily) 90 tablet 3    fluticasone propionate (FLONASE) 50 mcg/actuation nasal spray 1 spray by Each Nostril route once daily.      levothyroxine (SYNTHROID) 100 MCG tablet Take 1 tablet (100 mcg total) by mouth once daily. (Patient taking differently: Take 150 mcg by mouth once daily. ) 90 tablet 3    meloxicam (MOBIC) 7.5 MG tablet Take 7.5 mg by mouth once daily.      clotrimazole-betamethasone 1-0.05% (LOTRISONE) cream Apply topically 2 (two) times daily. 45 g 1    levocetirizine (XYZAL) 5 MG tablet Take 1 tablet (5 mg total) by mouth every evening. 30 tablet 6       Allergies:  Patient has no known allergies.    Past Family History:  Reviewed; refer to Hospitalist Admission Note    Review of Systems:  Unable to obtain due to MV    Physical Exam:  /61   Pulse 71   Temp 98.6 °F (37 °C) (Axillary)   Resp (!) 35   Ht 5' 1" (1.549 m)   Wt 107.7 kg (237 lb 7 oz)   SpO2 99%   Breastfeeding? No   BMI 44.86 kg/m²     INS/OUTS:  I/O last 3 completed shifts:  In: 2333.3 [I.V.:2233.3; IV Piggyback:100]  Out: 374 [Urine:274; Drains:100]  I/O this shift:  In: -   Out: 70 [Urine:70]    General Appearance:    Intubated, sedated, acutely ill, appears stated age   Head:    Normocephalic, atraumatic   Eyes:    Eyes closed       Mouth:   Moist mucus membranes   Lungs:     Coarse   Heart:    Regular rate and rhythm, S1 and S2 normal, no murmur, rub   or gallop   Abdomen:     Soft, non-tender, non-distended, bowel sounds active all four   quadrants, no RT or guarding, no masses, no organomegaly   Extremities:   Warm and well perfused, distal pulses intact, no cyanosis or    peripheral edema   MSK:   No joint or muscle swelling, tenderness or deformity   Skin:   Skin color, texture, turgor normal, no rashes or lesions   Neurologic/Psychiatric:   Unable to assess     Results:  Lab Results   Component Value Date     (L) 03/27/2020    K 3.5 03/27/2020    CL 97 " 03/27/2020    CO2 22 (L) 03/27/2020    BUN 14 03/27/2020    CREATININE 3.0 (H) 03/27/2020    CALCIUM 7.8 (L) 03/27/2020    ANIONGAP 15 03/27/2020    ESTGFRAFRICA 20 (A) 03/27/2020    EGFRNONAA 17 (A) 03/27/2020       Lab Results   Component Value Date    CALCIUM 7.8 (L) 03/27/2020    PHOS 3.5 03/27/2020       Recent Labs   Lab 03/27/20  0302   WBC 10.09   RBC 3.84*   HGB 10.3*   HCT 34.3*      MCV 89   MCH 26.8*   MCHC 30.0*       I have personally reviewed pertinent radiological imaging and reports.    I have spent > 70 minutes providing care for this patient for the above diagnoses. These services have included chart/data/imaging review, evaluation, exam, formulation of plan, , note preparation, and discussions with staff involved in this patient's care.    Carolyn Moeller MD MPH  [facility] [address] [place] [address]  [phone] (p)  [phone] (f)

## 2020-03-27 NOTE — RESPIRATORY THERAPY
03/26/20 1923   Patient Assessment/Suction   Level of Consciousness (AVPU) responds to voice   Respiratory Effort Unlabored   Expansion/Accessory Muscles/Retractions expansion symmetric;no retractions;no use of accessory muscles   All Lung Fields Breath Sounds coarse   Suction Method oral;tracheal   $ Suction Charges Inline Suction Procedure Stat Charge   Secretions Amount moderate   Secretions Color red-streaked;tan   Secretions Characteristics thick   PRE-TX-O2   O2 Device (Oxygen Therapy) ventilator   Oxygen Concentration (%) 50   SpO2 (!) 92 %   Pulse Oximetry Type Continuous   Pulse 98   Resp (!) 35   Temp (!) 101.7 °F (38.7 °C)   Vent Select   Conventional Vent Y   Charged w/in last 24h YES   Preset Conventional Ventilator Settings   Vent Type    Ventilation Type VC   Vent Mode A/C   Humidity HME   Set Rate 35 BPM   Vt Set 290 mL   PEEP/CPAP 14 cmH20   Pressure Support 0 cmH20   Waveform RAMP   Peak Flow 60 L/min   Set Inspiratory Pressure 0 cmH20   Insp Time 0 Sec(s)   Plateau Set/Insp. Hold (sec) 0   Insp Rise Time  0 %   Trigger Sensitivity Flow/I-Trigger 3 L/min   P High 0 cm H2O   P Low 0 cm H2O   T High 0 sec   T Low 0 sec   Patient Ventilator Parameters   Resp Rate Total 35 br/min   Peak Airway Pressure 35 cmH2O   Mean Airway Pressure 21 cmH20   Plateau Pressure 24 cmH20   Exhaled Vt 303 mL   Total Ve 10.6 mL   Spont Ve 0 L   I:E Ratio Measured 1:2.30   Conventional Ventilator Alarms   Ve High Alarm 40 L/min   Resp Rate High Alarm 50 br/min   Press High Alarm 60 cmH2O   Apnea Rate 10   Apnea Volume (mL) 450 mL   Apnea Oxygen Concentration  100   Apnea Flow Rate (L/min) 75   T Apnea 20 sec(s)   Ready to Wean/Extubation Screen   FIO2<=50 (chart decimal) 0.5   MV<16L (chart vol.) 10.6   PEEP <=8 (chart #) (!) 14   Ready to Wean Parameters   F/VT Ratio<105 (RSBI) 115.51

## 2020-03-27 NOTE — RESPIRATORY THERAPY
Results for WOLF SINGER (MRN [id number]) as of 3/26/2020 23:59   Ref. Range 3/26/2020 23:45   POC PH Latest Ref Range: 7.35 - 7.45  7.412   POC PCO2 Latest Ref Range: 35 - 45 mmHg 44.1   POC PO2 Latest Ref Range: 80 - 100 mmHg 78 (L)   POC BE Latest Ref Range: -2 to 2 mmol/L 3   POC HCO3 Latest Ref Range: 24 - 28 mmol/L 28.1 (H)   POC SATURATED O2 Latest Ref Range: 95 - 100 % 95   POC TCO2 Latest Ref Range: 23 - 27 mmol/L 29 (H)   FiO2 Unknown 50   Vt Unknown 290   PiP Unknown 33   PEEP Unknown 14   Sample Unknown ARTERIAL   DelSys Unknown Adult Vent   Allens Test Unknown Pass   Site Unknown RR   Mode Unknown AC/PRVC   Rate Unknown 35

## 2020-03-27 NOTE — ASSESSMENT & PLAN NOTE
Patient with Hypoxic Respiratory failure which is Acute.  she is not on home oxygen. Supplemental ventilation was provided and noted- Vent Mode: A/C  Oxygen Concentration (%):  [50-60] 50  Resp Rate Total:  [14 br/min-41 br/min] 35 br/min  Vt Set:  [290 mL-480 mL] 290 mL  PEEP/CPAP:  [8 arW84-76 cmH20] 14 cmH20  Pressure Support:  [0 cmH20] 0 cmH20  Mean Airway Pressure:  [13 evL78-06 cmH20] 21 cmH20 and oxygen saturations . Differential diagnosis includes - Pneumonia Viral Labs and images were reviewed. Patient Has recent ABG- No components found for: ABG. Will treat underlying causes and adjust management of respiratory failure as follows-     Temp:  [101.7 °F (38.7 °C)-102.5 °F (39.2 °C)]   Pulse:  []   Resp:  [35]   BP: ()/(51-72)   SpO2:  [91 %-95 %]      Pulmonary consultation.   Continue beta 2 agonist bronchodilator treatments.   Continue IV antibiotics - ceftriaxone and azithromycin.   D dimer up 1, ast/alt sl up 105/81, procal 0.12, covid pos.  Troponin neg.  Ratio 74/0.5 on peep 8-   390 is 6 cc/kg ;  Start Plaquenil 400 mg twice daily for 1 day followed by 400 mg daily for 5 days as per COVID protocol.  Microbiology Results (last 7 days)     Procedure Component Value Units Date/Time    Blood culture x two cultures. Draw prior to antibiotics. [[id number]] Collected:  03/25/20 1411    Order Status:  Completed Specimen:  Blood from Antecubital, Right  Arm Updated:  03/26/20 2212     Blood Culture, Routine No Growth to date      No Growth to date    Narrative:       Aerobic and anaerobic    Blood culture [[id number]] Collected:  03/26/20 0432    Order Status:  Completed Specimen:  Blood Updated:  03/26/20 1715     Blood Culture, Routine No Growth to date    Blood culture [[id number]] Collected:  03/26/20 0612    Order Status:  Completed Specimen:  Blood Updated:  03/26/20 1715     Blood Culture, Routine No Growth to date    Culture, Respiratory with Gram Stain [[id number]] Collected:  03/25/20 2150     Order Status:  Completed Specimen:  Tracheal Aspirate Updated:  03/26/20 0215     Gram Stain (Respiratory) <10 epithelial cells per low power field.     Gram Stain (Respiratory) Rare WBC's     Gram Stain (Respiratory) Rare Gram positive cocci    Culture, Respiratory with Gram Stain [[id number]] Collected:  03/25/20 2152    Order Status:  Canceled Specimen:  Respiratory from Sputum     Influenza A & B by Molecular [[id number]]     Order Status:  Canceled Specimen:  Nasopharyngeal Swab     Blood culture x two cultures. Draw prior to antibiotics. [[id number]]     Order Status:  Canceled Specimen:  Blood           Continue routine medications as before.   Follow airborne/droplet precautions.

## 2020-03-28 LAB
ALBUMIN SERPL BCP-MCNC: 2.2 G/DL (ref 3.5–5.2)
ALLENS TEST: ABNORMAL
ALP SERPL-CCNC: 79 U/L (ref 55–135)
ALT SERPL W/O P-5'-P-CCNC: 73 U/L (ref 10–44)
ANION GAP SERPL CALC-SCNC: 16 MMOL/L (ref 8–16)
AST SERPL-CCNC: 92 U/L (ref 10–40)
BACTERIA SPEC AEROBE CULT: NORMAL
BACTERIA SPEC AEROBE CULT: NORMAL
BASOPHILS # BLD AUTO: 0.03 K/UL (ref 0–0.2)
BASOPHILS NFR BLD: 0.3 % (ref 0–1.9)
BILIRUB SERPL-MCNC: 1.2 MG/DL (ref 0.1–1)
BUN SERPL-MCNC: 17 MG/DL (ref 6–20)
CALCIUM SERPL-MCNC: 7.2 MG/DL (ref 8.7–10.5)
CHLORIDE SERPL-SCNC: 98 MMOL/L (ref 95–110)
CO2 SERPL-SCNC: 19 MMOL/L (ref 23–29)
CREAT SERPL-MCNC: 4.7 MG/DL (ref 0.5–1.4)
CRP SERPL-MCNC: 290 MG/L (ref 0–8.2)
DELSYS: ABNORMAL
DIFFERENTIAL METHOD: ABNORMAL
EOSINOPHIL # BLD AUTO: 0.1 K/UL (ref 0–0.5)
EOSINOPHIL NFR BLD: 0.6 % (ref 0–8)
ERYTHROCYTE [DISTWIDTH] IN BLOOD BY AUTOMATED COUNT: 17.3 % (ref 11.5–14.5)
ERYTHROCYTE [SEDIMENTATION RATE] IN BLOOD BY WESTERGREN METHOD: 35 MM/H
EST. GFR  (AFRICAN AMERICAN): 11 ML/MIN/1.73 M^2
EST. GFR  (NON AFRICAN AMERICAN): 10 ML/MIN/1.73 M^2
ETCO2: 41
FIO2: 50
GLUCOSE SERPL-MCNC: 105 MG/DL (ref 70–110)
GRAM STN SPEC: NORMAL
HCO3 UR-SCNC: 22.5 MMOL/L (ref 24–28)
HCT VFR BLD AUTO: 31.4 % (ref 37–48.5)
HGB BLD-MCNC: 9.6 G/DL (ref 12–16)
IMM GRANULOCYTES # BLD AUTO: 0.48 K/UL (ref 0–0.04)
IMM GRANULOCYTES NFR BLD AUTO: 5 % (ref 0–0.5)
LYMPHOCYTES # BLD AUTO: 0.9 K/UL (ref 1–4.8)
LYMPHOCYTES NFR BLD: 9.5 % (ref 18–48)
MAGNESIUM SERPL-MCNC: 1.7 MG/DL (ref 1.6–2.6)
MCH RBC QN AUTO: 26.9 PG (ref 27–31)
MCHC RBC AUTO-ENTMCNC: 30.6 G/DL (ref 32–36)
MCV RBC AUTO: 88 FL (ref 82–98)
MODE: ABNORMAL
MONOCYTES # BLD AUTO: 0.7 K/UL (ref 0.3–1)
MONOCYTES NFR BLD: 6.7 % (ref 4–15)
NEUTROPHILS # BLD AUTO: 7.5 K/UL (ref 1.8–7.7)
NEUTROPHILS NFR BLD: 77.9 % (ref 38–73)
NRBC BLD-RTO: 0 /100 WBC
PCO2 BLDA: 53.4 MMHG (ref 35–45)
PEEP: 14
PH SMN: 7.23 [PH] (ref 7.35–7.45)
PHOSPHATE SERPL-MCNC: 6.5 MG/DL (ref 2.7–4.5)
PLATELET # BLD AUTO: 201 K/UL (ref 150–350)
PMV BLD AUTO: 10.1 FL (ref 9.2–12.9)
PO2 BLDA: 153 MMHG (ref 80–100)
POC BE: -5 MMOL/L
POC SATURATED O2: 99 % (ref 95–100)
POC TCO2: 24 MMOL/L (ref 23–27)
POTASSIUM SERPL-SCNC: 3.7 MMOL/L (ref 3.5–5.1)
PROT SERPL-MCNC: 6.9 G/DL (ref 6–8.4)
RBC # BLD AUTO: 3.57 M/UL (ref 4–5.4)
SAMPLE: ABNORMAL
SITE: ABNORMAL
SODIUM SERPL-SCNC: 133 MMOL/L (ref 136–145)
SP02: 100
VT: 290
WBC # BLD AUTO: 9.68 K/UL (ref 3.9–12.7)

## 2020-03-28 PROCEDURE — 83735 ASSAY OF MAGNESIUM: CPT

## 2020-03-28 PROCEDURE — 63600175 PHARM REV CODE 636 W HCPCS: Performed by: INTERNAL MEDICINE

## 2020-03-28 PROCEDURE — 94761 N-INVAS EAR/PLS OXIMETRY MLT: CPT

## 2020-03-28 PROCEDURE — C9113 INJ PANTOPRAZOLE SODIUM, VIA: HCPCS | Performed by: INTERNAL MEDICINE

## 2020-03-28 PROCEDURE — 27000221 HC OXYGEN, UP TO 24 HOURS

## 2020-03-28 PROCEDURE — 25000003 PHARM REV CODE 250: Performed by: INTERNAL MEDICINE

## 2020-03-28 PROCEDURE — 36620 INSERTION CATHETER ARTERY: CPT

## 2020-03-28 PROCEDURE — 80053 COMPREHEN METABOLIC PANEL: CPT

## 2020-03-28 PROCEDURE — 25000003 PHARM REV CODE 250: Performed by: NURSE PRACTITIONER

## 2020-03-28 PROCEDURE — 94770 HC EXHALED C02 TEST: CPT

## 2020-03-28 PROCEDURE — 63700000 PHARM REV CODE 250 ALT 637 W/O HCPCS: Performed by: INTERNAL MEDICINE

## 2020-03-28 PROCEDURE — 20000000 HC ICU ROOM

## 2020-03-28 PROCEDURE — 99900026 HC AIRWAY MAINTENANCE (STAT)

## 2020-03-28 PROCEDURE — 86704 HEP B CORE ANTIBODY TOTAL: CPT

## 2020-03-28 PROCEDURE — 63600175 PHARM REV CODE 636 W HCPCS: Performed by: NURSE PRACTITIONER

## 2020-03-28 PROCEDURE — 36415 COLL VENOUS BLD VENIPUNCTURE: CPT

## 2020-03-28 PROCEDURE — 94003 VENT MGMT INPAT SUBQ DAY: CPT

## 2020-03-28 PROCEDURE — 11000001 HC ACUTE MED/SURG PRIVATE ROOM

## 2020-03-28 PROCEDURE — 99291 PR CRITICAL CARE, E/M 30-74 MINUTES: ICD-10-PCS | Mod: ,,, | Performed by: INTERNAL MEDICINE

## 2020-03-28 PROCEDURE — 99900035 HC TECH TIME PER 15 MIN (STAT)

## 2020-03-28 PROCEDURE — 99291 CRITICAL CARE FIRST HOUR: CPT | Mod: ,,, | Performed by: INTERNAL MEDICINE

## 2020-03-28 PROCEDURE — 84100 ASSAY OF PHOSPHORUS: CPT

## 2020-03-28 PROCEDURE — 82803 BLOOD GASES ANY COMBINATION: CPT

## 2020-03-28 PROCEDURE — 86140 C-REACTIVE PROTEIN: CPT

## 2020-03-28 PROCEDURE — 36600 WITHDRAWAL OF ARTERIAL BLOOD: CPT

## 2020-03-28 PROCEDURE — 85025 COMPLETE CBC W/AUTO DIFF WBC: CPT

## 2020-03-28 RX ADMIN — CEFTRIAXONE 1 G: 1 INJECTION, SOLUTION INTRAVENOUS at 12:03

## 2020-03-28 RX ADMIN — Medication 0.06 MCG/KG/MIN: at 02:03

## 2020-03-28 RX ADMIN — PROPOFOL 50 MCG/KG/MIN: 10 INJECTION, EMULSION INTRAVENOUS at 03:03

## 2020-03-28 RX ADMIN — AZITHROMYCIN MONOHYDRATE 250 MG: 250 TABLET ORAL at 09:03

## 2020-03-28 RX ADMIN — ENOXAPARIN SODIUM 40 MG: 100 INJECTION SUBCUTANEOUS at 09:03

## 2020-03-28 RX ADMIN — FENTANYL CITRATE: 50 INJECTION INTRAVENOUS at 02:03

## 2020-03-28 RX ADMIN — PROPOFOL 40 MCG/KG/MIN: 10 INJECTION, EMULSION INTRAVENOUS at 08:03

## 2020-03-28 RX ADMIN — PROPOFOL 40 MCG/KG/MIN: 10 INJECTION, EMULSION INTRAVENOUS at 09:03

## 2020-03-28 RX ADMIN — PROPOFOL 50 MCG/KG/MIN: 10 INJECTION, EMULSION INTRAVENOUS at 12:03

## 2020-03-28 RX ADMIN — PANTOPRAZOLE SODIUM 40 MG: 40 INJECTION, POWDER, LYOPHILIZED, FOR SOLUTION INTRAVENOUS at 09:03

## 2020-03-28 RX ADMIN — LEVOTHYROXINE SODIUM 100 MCG: 100 TABLET ORAL at 06:03

## 2020-03-28 RX ADMIN — HYDROXYCHLOROQUINE SULFATE 400 MG: 200 TABLET, FILM COATED ORAL at 09:03

## 2020-03-28 RX ADMIN — PROPOFOL 50 MCG/KG/MIN: 10 INJECTION, EMULSION INTRAVENOUS at 06:03

## 2020-03-28 RX ADMIN — PROPOFOL 40 MCG/KG/MIN: 10 INJECTION, EMULSION INTRAVENOUS at 05:03

## 2020-03-28 RX ADMIN — PROPOFOL 40 MCG/KG/MIN: 10 INJECTION, EMULSION INTRAVENOUS at 01:03

## 2020-03-28 NOTE — PLAN OF CARE
Recommendations    Recommendation:   1. As medically able, initiate enteral nutrition: Peptamen Intense VHP @10mL/hr increasing by 10mL/hr q6 to goal rate of 30mL/day + 4 packets beneprotein daily (with 665 kimmy propofol, provides 1485cal, 102g pro, 605mL/hr)  Flushes per MD/NP  2. RD to f/u  Goals: Meet >85% EEN/EPN by RD f/u  Nutrition Goal Status: new  Communication of RD Recs: (POC, sticky note)

## 2020-03-28 NOTE — CARE UPDATE
Advance Care Planning     [facility]  I engaged the family in a conversation about advance care planning and we specifically addressed what the goals of care would be moving forward, in light of the patient's change in clinical status, .  We did specifically address the patient's likely prognosis, which is poor.  We explored the patient's values and preferences for future care.  The family endorses that what is most important right now is to focus on extending life as long as possible, even it it means sacrificing quality    Accordingly, we have decided that the best plan to meet the patient's goals includes continuing with treatment    I did not explain the role for hospice care at this stage of the patient's illness, including its ability to help the patient live with the best quality of life possible.  We will not be making a hospice referral.    I spent a total of 60 minutes engaging the patient in this advance care planning discussion.

## 2020-03-28 NOTE — PLAN OF CARE
Unable to discuss goals with patient due to level of consciousness. Patient sedated and intact to vent, settings as ordered. Soft wrist restraints for safety maintained per policy. VSS, no needs assesssed

## 2020-03-28 NOTE — PROGRESS NOTES
INPATIENT NEPHROLOGY PROGRESS  [facility] NEPHROLOGY    Patient Name: Maria Victoria Hernandez  Date: 03/28/2020    Reason for consultation: MAIKOL    Chief Complaint:   Chief Complaint   Patient presents with    Shortness of Breath     positive covid       History of Present Illness:  History obtained from chart due to MV.  Patient is a 54 years old  female with morbid obesity and hypothyroidism who presents to [facility] Emergency room with worsening shortness of breath.  Three days ago patient was tested positive for COVID - 19.  Patient works [facility] [employer].  Patient has been experiencing subjective fever, generalized body aches and pains and nonproductive cough for few days.  Patient is getting increasingly short of breath especially for the past 2 days.  Upon arrival to the emergency room patient was noted to be febrile and hypoxic around 89%. CXR showed bilateral infiltrates. She was initially managed with O2 NC but during the evening of presentation, she developed HHRF requiring intubation. She was placed on levophed post extubation for septic shock with BP as low as 70/40s. During that evening, her UOP decreased. She was started on a lasix gtt. She reportedly had tea colored urine but remained oliguric. Critical care stopped lasix gtt early this AM and started NS 75cc/hr. Patient remained febrile the entire night. She also remained hypotensive. BNP < 10. Neg trop. UA + ketones, trace blood, no protein. Blood cx neg. Admit Cr 0.7. Went to Cr 1 --> 2.6 on 3/26 and now 3 on 3/27, prompting renal consult.     3/28  Scr worse today.  Only one shift recorded for output, 520cc.  Still hyponatremic, vent setting adjusted per pulmonology note for acidosis.  K+ at goal after repletion.  Has siri, may need HD initiated.    Plan of Care:    1. Septic shock 2/2 COVID PNA with HHRF requiring MV  - She is on levophed.  -on NS 150cc/hr.  - She is on CFTX,  azithromycin and plaquenil.  - She is intubated/sedated.    2. MAIKOL- suspect multifactorial ATN in setting of shock/hypotension/intravascular volume depletion + diuresis; trace hematuria noted  - I think she needs a good fluid challenge.  - Keep off diuretics- don't think she is volume overloaded.  - Trace hematuria is noted- sent off acute GN workup.  - Hold off renal imaging right now.  - no nsaids or IV contrast (meloxicam noted on home med list)   - Continue mueller.  - Dose meds for CrCl < 20.  - Dr Moeller discussed the risks and benefits of hemodialysis with the patient's daughter.  All of her questions were answered.  Risks include low blood pressure, stroke, heart attack, seizure, infection, nausea, delirium, and death. The daughter agreed to dialysis if needed and signed the consent form. Hold dialysis today and reassess for dialytic need in am    3. Hypovolemic hyponatremia  - Will trend serum Na with volume resuscitation.    4. Hypokalemia  - better    5. HypoMg  - better    6. Anemia  - Trend Hb.  - She is on PO iron at home.    Thank you for allowing us to participate in this patient's care. We will continue to follow.    Vital Signs:  Temp Readings from Last 3 Encounters:   03/28/20 97.6 °F (36.4 °C) (Oral)       Pulse Readings from Last 3 Encounters:   03/28/20 68   01/15/15 84   12/17/13 80       BP Readings from Last 3 Encounters:   03/28/20 117/66   01/15/15 124/82   12/17/13 102/68       Weight:  Wt Readings from Last 3 Encounters:   03/27/20 107.5 kg (237 lb)   01/15/15 105.8 kg (233 lb 4.8 oz)   12/17/13 101.2 kg (223 lb)       Past Medical & Surgical History:  Past Medical History:   Diagnosis Date    Colon polyp     Diverticulosis large intestine w/o perforation or abscess w/bleeding     Iron deficiency anemia     Prediabetes     Thyroid disease     Vitamin B 12 deficiency     Vitamin D deficiency        Past Surgical History:   Procedure Laterality Date    TUBAL LIGATION         Past  Social History:  Social History     Socioeconomic History    Marital status:      Spouse name: Not on file    Number of children: Not on file    Years of education: Not on file    Highest education level: Not on file   Occupational History    Not on file   Social Needs    Financial resource strain: Not on file    Food insecurity:     Worry: Not on file     Inability: Not on file    Transportation needs:     Medical: Not on file     Non-medical: Not on file   Tobacco Use    Smoking status: Never Smoker    Smokeless tobacco: Never Used   Substance and Sexual Activity    Alcohol use: No    Drug use: Not on file    Sexual activity: Not on file   Lifestyle    Physical activity:     Days per week: Not on file     Minutes per session: Not on file    Stress: Not on file   Relationships    Social connections:     Talks on phone: Not on file     Gets together: Not on file     Attends [employer] service: Not on file     Active member of club or organization: Not on file     Attends meetings of clubs or organizations: Not on file     Relationship status: Not on file   Other Topics Concern    Not on file   Social History Narrative    Not on file       Medications:  Scheduled Meds:   azithromycin  250 mg Per NG tube Daily    cefTRIAXone (ROCEPHIN) IVPB  1 g Intravenous Q24H    enoxparin  40 mg Subcutaneous Q12H    hydroxychloroquine  400 mg Per NG tube Daily    levothyroxine  100 mcg Oral Before breakfast    pantoprazole  40 mg Intravenous Daily    rocuronium  100 mg Intravenous Once     Continuous Infusions:   sodium chloride 0.9% 150 mL/hr at 03/27/20 1800    fentanyl 150 mcg/hr (03/27/20 1755)    norepinephrine bitartrate-D5W 0.06 mcg/kg/min (03/28/20 0253)     PRN Meds:.acetaminophen, propofoL, sodium chloride 0.9%  No current facility-administered medications on file prior to encounter.      Current Outpatient Medications on File Prior to Encounter   Medication Sig Dispense Refill     "ferrous sulfate 325 mg (65 mg iron) Tab tablet Take 1 tablet (325 mg total) by mouth daily with breakfast. (Patient taking differently: Take 325 mg by mouth daily with breakfast. Take 2 tablets daily) 90 tablet 3    fluticasone propionate (FLONASE) 50 mcg/actuation nasal spray 1 spray by Each Nostril route once daily.      levothyroxine (SYNTHROID) 100 MCG tablet Take 1 tablet (100 mcg total) by mouth once daily. (Patient taking differently: Take 150 mcg by mouth once daily. ) 90 tablet 3    meloxicam (MOBIC) 7.5 MG tablet Take 7.5 mg by mouth once daily.      clotrimazole-betamethasone 1-0.05% (LOTRISONE) cream Apply topically 2 (two) times daily. 45 g 1    levocetirizine (XYZAL) 5 MG tablet Take 1 tablet (5 mg total) by mouth every evening. 30 tablet 6       Allergies:  Patient has no known allergies.    Past Family History:  Reviewed; refer to Hospitalist Admission Note    Review of Systems:  Unable to obtain due to MV    Physical Exam:  /66   Pulse 68   Temp 97.6 °F (36.4 °C) (Oral)   Resp (!) 35   Ht 5' 1" (1.549 m)   Wt 107.5 kg (237 lb)   SpO2 100%   Breastfeeding? No   BMI 44.78 kg/m²     INS/OUTS:  I/O last 3 completed shifts:  In: 4038.2 [I.V.:3888.2; IV Piggyback:150]  Out: 694 [Urine:594; Drains:100]  No intake/output data recorded.    General Appearance:    Intubated, sedated, acutely ill, appears stated age   Head:    Normocephalic, atraumatic   Eyes:    Eyes closed       Mouth:   Moist mucus membranes   Lungs:     Coarse   Heart:    Regular rate and rhythm, S1 and S2 normal, no murmur, rub   or gallop   Abdomen:     Soft, non-tender, non-distended, bowel sounds active all four   quadrants, no RT or guarding, no masses, no organomegaly   Extremities:   Warm and well perfused, distal pulses intact, no cyanosis or    peripheral edema   MSK:   No joint or muscle swelling, tenderness or deformity   Skin:   Skin color, texture, turgor normal, no rashes or lesions   Neurologic/Psychiatric: "   Unable to assess     Results:  Lab Results   Component Value Date     (L) 03/28/2020    K 3.7 03/28/2020    CL 98 03/28/2020    CO2 19 (L) 03/28/2020    BUN 17 03/28/2020    CREATININE 4.7 (H) 03/28/2020    CALCIUM 7.2 (L) 03/28/2020    ANIONGAP 16 03/28/2020    ESTGFRAFRICA 11 (A) 03/28/2020    EGFRNONAA 10 (A) 03/28/2020       Lab Results   Component Value Date    CALCIUM 7.2 (L) 03/28/2020    PHOS 6.5 (H) 03/28/2020       Recent Labs   Lab 03/28/20  0356   WBC 9.68   RBC 3.57*   HGB 9.6*   HCT 31.4*      MCV 88   MCH 26.9*   MCHC 30.6*       I have personally reviewed pertinent radiological imaging and reports.     .    [facility] [place]  [facility] [address] [place] [id number]  [phone] (p)  [phone] (f)

## 2020-03-28 NOTE — CONSULTS
"  [facility]  Adult Nutrition  Consult Note    SUMMARY     Recommendations    Recommendation:   1. As medically able, initiate enteral nutrition: Peptamen Intense VHP @10mL/hr increasing by 10mL/hr q6 to goal rate of 30mL/day + 4 packets beneprotein daily (with 665 kimmy propofol, provides 1485cal, 102g pro, 605mL/hr)  Flushes per MD/NP  2. RD to f/u  Goals: Meet >85% EEN/EPN by RD f/u  Nutrition Goal Status: new  Communication of RD Recs: (POC, sticky note)    Reason for Assessment    Reason For Assessment: consult  Diagnosis: (COVID-19)  Relevant Medical History: HTN, thyroid disease, prediabetes  General Information Comments: Pt is intubated in ICU. NFPE not performed,patient is noted as being positive for COVID-19. Per epic records, no evidence of weight loss.  Nutrition Discharge Planning: pending medical course    Nutrition Risk Screen    Nutrition Risk Screen: no indicators present    Nutrition/Diet History    Spiritual, Cultural Beliefs, Yazidism Practices, Values that Affect Care: no    Anthropometrics    Temp: 97.7 °F (36.5 °C)  Height Method: Estimated  Height: 5' 1" (154.9 cm)  Height (inches): 61 in  Weight Method: Bed Scale  Weight: 107.5 kg (237 lb)  Weight (lb): 237 lb  Ideal Body Weight (IBW), Female: 105 lb  % Ideal Body Weight, Female (lb): 225.71 %  BMI (Calculated): 44.8  BMI Grade: greater than 40 - morbid obesity       Lab/Procedures/Meds    Pertinent Labs Reviewed: reviewed  BMP  Lab Results   Component Value Date     (L) 03/28/2020    K 3.7 03/28/2020    CL 98 03/28/2020    CO2 19 (L) 03/28/2020    BUN 17 03/28/2020    CREATININE 4.7 (H) 03/28/2020    CALCIUM 7.2 (L) 03/28/2020    ANIONGAP 16 03/28/2020    ESTGFRAFRICA 11 (A) 03/28/2020    EGFRNONAA 10 (A) 03/28/2020     Lab Results   Component Value Date    CALCIUM 7.2 (L) 03/28/2020    PHOS 6.5 (H) 03/28/2020     Lab Results   Component Value Date    ALBUMIN 2.2 (L) 03/28/2020     Pertinent Medications Reviewed: " reviewed    Estimated/Assessed Needs    Weight Used For Calorie Calculations: 107.5 kg (236 lb 15.9 oz)  Energy Calorie Requirements (kcal): 1182- 1505/day  Energy Need Method: Kcal/kg(11-14 actual weigh per obese ICU vent)  Protein Requirements: 119g(2.5g/kg IBW per ICU, BMI >40)/day  Weight Used For Protein Calculations: 47.6 kg (105 lb)(ideal body weight)        RDA Method (mL): 1182  CHO Requirement: 147g      Nutrition Prescription Ordered    Current Diet Order: No current diet order    Evaluation of Received Nutrient/Fluid Intake    Other Calories (kcal): 665(25.2mL/hr propofol)  % Intake of Estimated Energy Needs: 25 - 50 %  % Meal Intake: Other: No current diet order    Nutrition Risk    2x week    Assessment and Plan    Nutrition Problem  Inadequate energy intake    Related to (etiology):   No diet order    Signs and Symptoms (as evidenced by):   Intake of <85% EEN/EPN    Interventions/Recommendations (treatment strategy):  Collaboration with other providers  Enteral nutrition    Nutrition Diagnosis Status:   New     Monitor and Evaluation    Food and Nutrient Intake: energy intake  Food and Nutrient Adminstration: enteral and parenteral nutrition administration  Anthropometric Measurements: weight  Biochemical Data, Medical Tests and Procedures: electrolyte and renal panel, glucose/endocrine profile     Nutrition Follow-Up    RD Follow-up?: Yes

## 2020-03-28 NOTE — PROGRESS NOTES
Progress Note  PULMONARY    Admit Date: 3/25/2020   03/28/2020      SUBJECTIVE:     3/27- remains intubated, on vent. Was on Lasix drip overnight and now w/ IVF for UOP. On minimal Levophed   3/28- remains intubated, on PEEP 14/FiO2 50%. Levophed 0.06 mcg/kg/min      PFSH and Allergies reviewed.    OBJECTIVE:     Vitals (Most recent):  Vitals:    03/28/20 1315   BP:    Pulse: 72   Resp: (!) 35   Temp:        Vitals (24 hour range):  Temp:  [97.6 °F (36.4 °C)-99 °F (37.2 °C)]   Pulse:  [64-90]   Resp:  [34-36]   BP: (107-136)/(57-75)   SpO2:  [95 %-100 %]   Arterial Line BP: (104-282)/()       Intake/Output Summary (Last 24 hours) at 3/28/2020 1406  Last data filed at 3/28/2020 0600  Gross per 24 hour   Intake 2668.98 ml   Output 450 ml   Net 2218.98 ml          Physical Exam:  The patient's neuro status (alertness,orientation,cognitive function,motor skills,), pharyngeal exam (oral lesions, hygiene, abn dentition,), Neck (jvd,mass,thyroid,nodes in neck and above/below clavicle),RESPIRATORY(symmetry,effort,fremitus,percussion,auscultation),  Cor(rhythm,heart tones including gallops,perfusion,edema)ABD(distention,hepatic&splenomegaly,tenderness,masses), Skin(rash,cyanosis),Psyc(affect,judgement,).  Exam negative except for these pertinent findings:    Sedated, intubated  Lungs clear  Abdomen soft nondistended  Nonpitting edema of bilateral hands    Radiographs reviewed:  CXR 3/27- bilateral mid and lower lobe fluffy airspace disease  CXR 3/26- increased consolidation RLL    TTE 3/27  · Mild left ventricular enlargement.  · Mildly decreased left ventricular systolic function. The estimated ejection fraction is 45%.  · Grade I (mild) left ventricular diastolic dysfunction consistent with impaired relaxation.  · Normal right ventricular systolic function.  · Mild left atrial enlargement.  · Moderate mitral regurgitation.  · Mild tricuspid regurgitation.  · Mild pulmonary hypertension present. PASP 40 mm of  Hg.    Labs     Recent Labs   Lab 03/28/20  0356   WBC 9.68   HGB 9.6*   HCT 31.4*        Recent Labs   Lab 03/27/20  1433 03/28/20  0356   NA  --  133*   K  --  3.7   CL  --  98   CO2  --  19*   BUN  --  17   CREATININE  --  4.7*   GLU  --  105   CALCIUM  --  7.2*   MG  --  1.7   PHOS  --  6.5*   AST  --  92*   ALT  --  73*   ALKPHOS  --  79   BILITOT  --  1.2*   PROT  --  6.9   ALBUMIN  --  2.2*   CRP  --  290.0*   BNP <10  --      Recent Labs   Lab 03/28/20  0331   PH 7.232*   PCO2 53.4*   PO2 153*   HCO3 22.5*     Microbiology Results (last 7 days)     Procedure Component Value Units Date/Time    Blood culture [[id number]] Collected:  03/26/20 0432    Order Status:  Completed Specimen:  Blood Updated:  03/28/20 1012     Blood Culture, Routine No Growth to date      No Growth to date      No Growth to date    Blood culture [[id number]] Collected:  03/26/20 0612    Order Status:  Completed Specimen:  Blood Updated:  03/28/20 1012     Blood Culture, Routine No Growth to date      No Growth to date      No Growth to date    Culture, Respiratory with Gram Stain [[id number]] Collected:  03/25/20 2150    Order Status:  Completed Specimen:  Tracheal Aspirate Updated:  03/28/20 0927     Respiratory Culture Normal respiratory anamaria      No S aureus or Pseudomonas isolated.     Gram Stain (Respiratory) <10 epithelial cells per low power field.     Gram Stain (Respiratory) Rare WBC's     Gram Stain (Respiratory) Rare Gram positive cocci    Blood culture x two cultures. Draw prior to antibiotics. [[id number]] Collected:  03/25/20 1411    Order Status:  Completed Specimen:  Blood from Antecubital, Right  Arm Updated:  03/27/20 2212     Blood Culture, Routine No Growth to date      No Growth to date      No Growth to date    Narrative:       Aerobic and anaerobic    Culture, Respiratory with Gram Stain [[id number]] Collected:  03/25/20 2152    Order Status:  Canceled Specimen:  Respiratory from Sputum     Influenza A & B by  Molecular [[id number]]     Order Status:  Canceled Specimen:  Nasopharyngeal Swab     Blood culture x two cultures. Draw prior to antibiotics. [[id number]]     Order Status:  Canceled Specimen:  Blood           Impression:  Active Hospital Problems    Diagnosis  POA    Acute renal failure [N17.9]  No    ARDS (adult respiratory distress syndrome) [J80]  Yes    COVID-19 virus infection [J22, B97.29]  Yes    Acute respiratory failure [J96.00]  Yes    Morbid obesity [E66.01]  Yes    Hypothyroid [E03.9]  Yes    COVID-19 virus detected [J22, B97.29]  Yes      Resolved Hospital Problems   No resolved problems to display.               Plan:   Acute hypoxemic respiratory failure, stable to improving O2 reqt  COVID-19 pneumonia  Acute renal failure, worsening  Shock   Combined heart failure, EF 45%- myocardial involvement?  Morbid obesity    - change Vt to 350 for worsened acidosis  - titrate peep and FiO2 by ARDS net protocol  - appreciate Nephrology recommendations- may need dialysis tomorrow  - monitor renal function and urine output  - continue hydroxychloroquine 400 mg daily  - continue Rocephin and azithromycin  - continue Levophed to keep map greater than 65    The following were evaluated and adjusted as needed: mechanical ventilator settings and weaning status, vasopressors, intravenous fluids and nutritional status, antibiotics, acid base balance and oxygenation needs and input and output and renal status       Critical Care  - THE PATIENT HAS A HIGH PROBABILITY OF IMMINENT OR LIFE THREATENING DETERIORATION.  Over 50%time of encounter was in direct care at bedside.  Time was 30 to 74 minutes required for patient care.  Time needed for all of the above totaled 45 minutes.    Milvia Mcguire MD  Pulmonary & Critical Care Medicine

## 2020-03-29 LAB
ALBUMIN SERPL BCP-MCNC: 1.9 G/DL (ref 3.5–5.2)
ALLENS TEST: ABNORMAL
ALLENS TEST: ABNORMAL
ALP SERPL-CCNC: 81 U/L (ref 55–135)
ALT SERPL W/O P-5'-P-CCNC: 57 U/L (ref 10–44)
ANION GAP SERPL CALC-SCNC: 15 MMOL/L (ref 8–16)
AST SERPL-CCNC: 64 U/L (ref 10–40)
BASOPHILS # BLD AUTO: ABNORMAL K/UL (ref 0–0.2)
BASOPHILS NFR BLD: 0 % (ref 0–1.9)
BILIRUB SERPL-MCNC: 1.1 MG/DL (ref 0.1–1)
BUN SERPL-MCNC: 19 MG/DL (ref 6–20)
CA-I BLDV-SCNC: 0.8 MMOL/L (ref 1.06–1.42)
CALCIUM SERPL-MCNC: 6.7 MG/DL (ref 8.7–10.5)
CHLORIDE SERPL-SCNC: 103 MMOL/L (ref 95–110)
CO2 SERPL-SCNC: 15 MMOL/L (ref 23–29)
CREAT SERPL-MCNC: 5.1 MG/DL (ref 0.5–1.4)
DELSYS: ABNORMAL
DELSYS: ABNORMAL
DIFFERENTIAL METHOD: ABNORMAL
EOSINOPHIL # BLD AUTO: ABNORMAL K/UL (ref 0–0.5)
EOSINOPHIL NFR BLD: 1 % (ref 0–8)
ERYTHROCYTE [DISTWIDTH] IN BLOOD BY AUTOMATED COUNT: 17.4 % (ref 11.5–14.5)
ERYTHROCYTE [SEDIMENTATION RATE] IN BLOOD BY WESTERGREN METHOD: 35 MM/H
EST. GFR  (AFRICAN AMERICAN): 10 ML/MIN/1.73 M^2
EST. GFR  (NON AFRICAN AMERICAN): 9 ML/MIN/1.73 M^2
FIO2: 40
FIO2: 40
GLUCOSE SERPL-MCNC: 105 MG/DL (ref 70–110)
HCO3 UR-SCNC: 17.2 MMOL/L (ref 24–28)
HCO3 UR-SCNC: 17.5 MMOL/L (ref 24–28)
HCT VFR BLD AUTO: 28 % (ref 37–48.5)
HGB BLD-MCNC: 8.6 G/DL (ref 12–16)
IMM GRANULOCYTES # BLD AUTO: ABNORMAL K/UL (ref 0–0.04)
IMM GRANULOCYTES NFR BLD AUTO: ABNORMAL % (ref 0–0.5)
LYMPHOCYTES # BLD AUTO: ABNORMAL K/UL (ref 1–4.8)
LYMPHOCYTES NFR BLD: 6 % (ref 18–48)
MAGNESIUM SERPL-MCNC: 1.4 MG/DL (ref 1.6–2.6)
MCH RBC QN AUTO: 26.5 PG (ref 27–31)
MCHC RBC AUTO-ENTMCNC: 30.7 G/DL (ref 32–36)
MCV RBC AUTO: 86 FL (ref 82–98)
METAMYELOCYTES NFR BLD MANUAL: 2 %
MIN VOL: 12.7
MIN VOL: 8.9
MODE: ABNORMAL
MODE: ABNORMAL
MONOCYTES # BLD AUTO: ABNORMAL K/UL (ref 0.3–1)
MONOCYTES NFR BLD: 5 % (ref 4–15)
MYELOCYTES NFR BLD MANUAL: 1 %
NEUTROPHILS NFR BLD: 80 % (ref 38–73)
NEUTS BAND NFR BLD MANUAL: 5 %
NRBC BLD-RTO: 0 /100 WBC
PCO2 BLDA: 32.8 MMHG (ref 35–45)
PCO2 BLDA: 46.5 MMHG (ref 35–45)
PEEP: 12
PEEP: 8
PH SMN: 7.18 [PH] (ref 7.35–7.45)
PH SMN: 7.33 [PH] (ref 7.35–7.45)
PHOSPHATE SERPL-MCNC: 4.8 MG/DL (ref 2.7–4.5)
PIP: 35
PLATELET # BLD AUTO: 212 K/UL (ref 150–350)
PLATELET BLD QL SMEAR: ABNORMAL
PMV BLD AUTO: 10.6 FL (ref 9.2–12.9)
PO2 BLDA: 111 MMHG (ref 80–100)
PO2 BLDA: 88 MMHG (ref 80–100)
POC BE: -11 MMOL/L
POC BE: -9 MMOL/L
POC SATURATED O2: 94 % (ref 95–100)
POC SATURATED O2: 98 % (ref 95–100)
POC TCO2: 18 MMOL/L (ref 23–27)
POC TCO2: 19 MMOL/L (ref 23–27)
POCT GLUCOSE: 109 MG/DL (ref 70–110)
POTASSIUM SERPL-SCNC: 3.5 MMOL/L (ref 3.5–5.1)
PROT SERPL-MCNC: 6.2 G/DL (ref 6–8.4)
PS: 10
RBC # BLD AUTO: 3.25 M/UL (ref 4–5.4)
SAMPLE: ABNORMAL
SAMPLE: ABNORMAL
SITE: ABNORMAL
SITE: ABNORMAL
SODIUM SERPL-SCNC: 133 MMOL/L (ref 136–145)
SP02: 95
SP02: 99
SPONT RATE: 29
VT: 381
WBC # BLD AUTO: 8.8 K/UL (ref 3.9–12.7)

## 2020-03-29 PROCEDURE — 27000221 HC OXYGEN, UP TO 24 HOURS

## 2020-03-29 PROCEDURE — C9113 INJ PANTOPRAZOLE SODIUM, VIA: HCPCS | Performed by: INTERNAL MEDICINE

## 2020-03-29 PROCEDURE — 63600175 PHARM REV CODE 636 W HCPCS: Performed by: INTERNAL MEDICINE

## 2020-03-29 PROCEDURE — 36592 COLLECT BLOOD FROM PICC: CPT

## 2020-03-29 PROCEDURE — 36415 COLL VENOUS BLD VENIPUNCTURE: CPT

## 2020-03-29 PROCEDURE — 85027 COMPLETE CBC AUTOMATED: CPT

## 2020-03-29 PROCEDURE — 20000000 HC ICU ROOM

## 2020-03-29 PROCEDURE — 63600175 PHARM REV CODE 636 W HCPCS: Performed by: NURSE PRACTITIONER

## 2020-03-29 PROCEDURE — 11000001 HC ACUTE MED/SURG PRIVATE ROOM

## 2020-03-29 PROCEDURE — 84100 ASSAY OF PHOSPHORUS: CPT

## 2020-03-29 PROCEDURE — 25000003 PHARM REV CODE 250: Performed by: INTERNAL MEDICINE

## 2020-03-29 PROCEDURE — 82330 ASSAY OF CALCIUM: CPT

## 2020-03-29 PROCEDURE — 94003 VENT MGMT INPAT SUBQ DAY: CPT

## 2020-03-29 PROCEDURE — 87340 HEPATITIS B SURFACE AG IA: CPT

## 2020-03-29 PROCEDURE — 63700000 PHARM REV CODE 250 ALT 637 W/O HCPCS: Performed by: INTERNAL MEDICINE

## 2020-03-29 PROCEDURE — 82803 BLOOD GASES ANY COMBINATION: CPT

## 2020-03-29 PROCEDURE — 31720 CLEARANCE OF AIRWAYS: CPT

## 2020-03-29 PROCEDURE — 94761 N-INVAS EAR/PLS OXIMETRY MLT: CPT

## 2020-03-29 PROCEDURE — 99291 CRITICAL CARE FIRST HOUR: CPT | Mod: ,,, | Performed by: INTERNAL MEDICINE

## 2020-03-29 PROCEDURE — 94770 HC EXHALED C02 TEST: CPT

## 2020-03-29 PROCEDURE — 37799 UNLISTED PX VASCULAR SURGERY: CPT

## 2020-03-29 PROCEDURE — 86706 HEP B SURFACE ANTIBODY: CPT

## 2020-03-29 PROCEDURE — 80100014 HC HEMODIALYSIS 1:1

## 2020-03-29 PROCEDURE — 83735 ASSAY OF MAGNESIUM: CPT

## 2020-03-29 PROCEDURE — 99900026 HC AIRWAY MAINTENANCE (STAT)

## 2020-03-29 PROCEDURE — 99900035 HC TECH TIME PER 15 MIN (STAT)

## 2020-03-29 PROCEDURE — 80053 COMPREHEN METABOLIC PANEL: CPT

## 2020-03-29 PROCEDURE — 85007 BL SMEAR W/DIFF WBC COUNT: CPT

## 2020-03-29 PROCEDURE — 99291 PR CRITICAL CARE, E/M 30-74 MINUTES: ICD-10-PCS | Mod: ,,, | Performed by: INTERNAL MEDICINE

## 2020-03-29 RX ORDER — HEPARIN SODIUM 1000 [USP'U]/ML
4000 INJECTION, SOLUTION INTRAVENOUS; SUBCUTANEOUS
Status: DISCONTINUED | OUTPATIENT
Start: 2020-03-29 | End: 2020-04-21 | Stop reason: HOSPADM

## 2020-03-29 RX ORDER — SODIUM CHLORIDE 9 MG/ML
INJECTION, SOLUTION INTRAVENOUS
Status: CANCELLED | OUTPATIENT
Start: 2020-03-29

## 2020-03-29 RX ORDER — SODIUM CHLORIDE 9 MG/ML
INJECTION, SOLUTION INTRAVENOUS ONCE
Status: CANCELLED | OUTPATIENT
Start: 2020-03-29 | End: 2020-03-29

## 2020-03-29 RX ORDER — MUPIROCIN 20 MG/G
OINTMENT TOPICAL 2 TIMES DAILY
Status: COMPLETED | OUTPATIENT
Start: 2020-03-29 | End: 2020-04-03

## 2020-03-29 RX ORDER — HEPARIN SODIUM 5000 [USP'U]/ML
5000 INJECTION, SOLUTION INTRAVENOUS; SUBCUTANEOUS
Status: CANCELLED | OUTPATIENT
Start: 2020-03-29

## 2020-03-29 RX ADMIN — FENTANYL CITRATE 150 MCG/HR: 50 INJECTION INTRAVENOUS at 08:03

## 2020-03-29 RX ADMIN — LEVOTHYROXINE SODIUM 100 MCG: 100 TABLET ORAL at 06:03

## 2020-03-29 RX ADMIN — HYDROXYCHLOROQUINE SULFATE 400 MG: 200 TABLET, FILM COATED ORAL at 11:03

## 2020-03-29 RX ADMIN — PROPOFOL 25 MCG/KG/MIN: 10 INJECTION, EMULSION INTRAVENOUS at 03:03

## 2020-03-29 RX ADMIN — PROPOFOL 30 MCG/KG/MIN: 10 INJECTION, EMULSION INTRAVENOUS at 09:03

## 2020-03-29 RX ADMIN — PROPOFOL 50 MCG/KG/MIN: 10 INJECTION, EMULSION INTRAVENOUS at 05:03

## 2020-03-29 RX ADMIN — PANTOPRAZOLE SODIUM 40 MG: 40 INJECTION, POWDER, LYOPHILIZED, FOR SOLUTION INTRAVENOUS at 08:03

## 2020-03-29 RX ADMIN — ENOXAPARIN SODIUM 40 MG: 100 INJECTION SUBCUTANEOUS at 08:03

## 2020-03-29 RX ADMIN — MUPIROCIN: 20 OINTMENT TOPICAL at 08:03

## 2020-03-29 RX ADMIN — NOREPINEPHRINE BITARTRATE 0.04 MCG/KG/MIN: 1 INJECTION, SOLUTION, CONCENTRATE INTRAVENOUS at 08:03

## 2020-03-29 RX ADMIN — CEFTRIAXONE 1 G: 1 INJECTION, SOLUTION INTRAVENOUS at 12:03

## 2020-03-29 RX ADMIN — AZITHROMYCIN MONOHYDRATE 250 MG: 250 TABLET ORAL at 08:03

## 2020-03-29 RX ADMIN — PROPOFOL 40 MCG/KG/MIN: 10 INJECTION, EMULSION INTRAVENOUS at 08:03

## 2020-03-29 RX ADMIN — PROPOFOL 40 MCG/KG/MIN: 10 INJECTION, EMULSION INTRAVENOUS at 12:03

## 2020-03-29 NOTE — PROGRESS NOTES
[facility] Hospital Medicine  Progress Note    Patient Name: Maria Victoria Hernandez  MRN: [id number]  Patient Class: IP- Inpatient   Admission Date: 3/25/2020  Length of Stay: 4 days  Attending Physician: Kady Gomez MD  Primary Care Provider: Candice Deluna MD        Subjective:     Principal Problem:  Acute respiratory failure secondary to ARDS from COVID      HPI:  Patient is a 54 years old  female with morbid obesity in past medical history significant for hypothyroidism is being admitted to intensive care unit under inpatient status from [facility] Emergency room with worsening shortness of breath.  Three days ago patient was tested positive for COVID - 19.  Patient works at [employer].  Patient has been experiencing subjective fever, generalized body aches and pains and nonproductive cough for few days.  Patient is getting increasingly short of breath especially for the past 2 days.  Upon arrival to the emergency room patient was noted to be hypoxic around 89%.  Up on 3 L patient's oxygen sats are around 96%.  Patient denies any chest pain, leg swelling or calf tenderness.      Overview/Hospital Course:  No notes on file    Interval History:  Patient's oxygenation requirement has improved.  Kidney function worsening patient has worsening acidosis.  Patient is COVID positive    Review of Systems   Unable to perform ROS: Intubated     Objective:     Vital Signs (Most Recent):  Temp: 97.9 °F (36.6 °C) (03/29/20 0400)  Pulse: 101 (03/29/20 0600)  Resp: (!) 35 (03/29/20 0600)  BP: 133/72 (03/29/20 0600)  SpO2: (!) 93 % (03/29/20 0600) Vital Signs (24h Range):  Temp:  [97.9 °F (36.6 °C)-98.4 °F (36.9 °C)] 97.9 °F (36.6 °C)  Pulse:  [] 101  Resp:  [35] 35  SpO2:  [93 %-100 %] 93 %  BP: (108-136)/(57-75) 133/72  Arterial Line BP: ()/() 90/83     Weight: 107.5 kg (237 lb)  Body mass index is 44.78  kg/m².    Intake/Output Summary (Last 24 hours) at 3/29/2020 0811  Last data filed at 3/29/2020 0600  Gross per 24 hour   Intake 6697.35 ml   Output 1095 ml   Net 5602.35 ml      Physical Exam   Nursing note and vitals reviewed.  PATIENT WAS SEEN AS A VIDEO VISIT WITH NURSE IN PATIENT ROOM WITH PATIENT TO ASSIST DURING THE VISIT. PHYSICAL EXAM FINDINGS ARE AS VIEWED BY MYSELF VIA VIDEO OR AS REPORTED BY NURSE IF SPECIFIED AS SUCH, EXAM NOT DONE PERSONALLY BY MYSELF AT BEDSIDE.      Significant Labs:   BMP:   Recent Labs   Lab 03/28/20  0356 03/29/20  0417    105   * 133*   K 3.7 3.5   CL 98 103   CO2 19* 15*   BUN 17 19   CREATININE 4.7* 5.1*   CALCIUM 7.2* 6.7*   MG 1.7  --      CBC:   Recent Labs   Lab 03/28/20  0356 03/29/20 0417   WBC 9.68 8.80   HGB 9.6* 8.6*   HCT 31.4* 28.0*    212       Significant Imaging: I have reviewed all pertinent imaging results/findings within the past 24 hours.      Assessment/Plan:      Acute renal failure  Will follow-up with the renal recommendations  Will continue to hydrate for now as needed monitor for response  Lasix is was started yesterday but stopped this morning  Urine output improved this morning.  But in the last 24 hr urine output was less than 100 cc        COVID-19 virus infection  Will continue Plaquenil    Covid-19 Virus Infection  - Infection Control notified    - Isolation:   - Airborne and Droplet Precautions  - N95 masks must be fit tested, wear eye protection  - 20 second hand hygiene   - Limit visitors per hospital policy   - Consolidating lab draws, nursing care, and interventions    - Diagnostics: (rising CRP, persistent lymphopenia, hyponatremia, hyperferritinemia, elevated troponin, elevated d-dimer, age, and comorbidities are significant predictors of poor clinical outcome)   - CBC:   trend Q48hrs  - CMP:        trend Q48hrs  - Procalcitonin:  - D-dimer:  trend Q48hrs  - Ferritin:  repeat prior to discharge  - CRP:        trend  Q48hrs  - LDH:  - BNP:  - Troponin:    - ECG:   - rapid Flu:   - RIP only if BMT/solid transplant:   - Legionella antigen:   - Blood culture x2:   - Sputum culture:   - CXR:   - UA and culture:      - Management:   - Bundle care as able to minimize in/out of room   - Supplemental O2 to maintain SpO2 >92%,   if requiring 6L NC or higher, place on nonrebreather and discuss case with MICU   - Telemetry & continuous Pulse Ox   - albuterol INHALER PRN 4puff Q6hr approximates a nebulizer (avoid nebulization of secretions)   - apap PRN fever   - Avoiding NIPPV to prevent aerosolization   (including home CPAP/BiPAP unless on a case-by-case basis and only in negative pressure room)   - Cautious use of NSAIDS for fever per WHO recommendations (3/16/2020)   - No new ACEi/ARB start or discontinuation of chronic med unless hypotensive (Esler et al. Journal of Hypertension 2020, 38:000-000)   - Careful use of steroids in the absence of other indications   - unless septic shock due to increased viral replication   - Fluid sparing resuscitation   - Empiric antibiotics per likely source & patient allergies    - CAP: x 5 day course  Ceftriaxone 1g IV Q24hrs            Azithromycin 500mg IV day #1, then 250mg PO daily x4 days                 If MRSA risk factors, add Vancomycin IV (PharmD consult)   - If patient meets criteria per Hospital Protocol    - start statin (if CPK WNL)    - start HCQ 400mg PO BID x1 day, then 400mg PO daily x 4 days (check G6PD, ECG, and start Qshift POCT glucose)    Goals of care, counseling/discussion  - Reviewed the typical clinical course of COVID19 with the daughter Danya (patient name or relationship to patient), including the potential for acute decompensation requiring intubation and mechanical ventilation  - Discussed again as part of routine daily evaluation, patient/POA maintains code status of Full code    VTE High Risk Prophylaxis: enoxaparin 40mg sq QHS @ 2100 (bundled care) if GFR  >30    Patient's chronic/stable medical conditions noted in the problem list above will be managed with the patient's home medications as tolerated.           ARDS (adult respiratory distress syndrome)    --will cont ARDSnet Protocol.  --Target 6-8ml/kg Ideal Body Weight. Decrease TV as tolerated.  --Plateau pressure goal <30cm H2O.  --Oxygenation goal PaO2 55-80 or SpO2 88-95%.  --pH goal 7.30-7.45.  --Wean to PSV as tolerated.        COVID-19 virus detected  Continue Plaquenil   Plaquenil 400 mg twice daily on Day 1 followed by 400 mg daily for 5 days as per COVID protocol.   Continue routine medications as before.   Follow airborne/droplet precautions.      Hypothyroid  Check TSH, Chronic problem. Will continue chronic medications and monitor for any changes, adjusting as needed.          Morbid obesity  Body mass index is 44.78 kg/m². Morbid obesity complicates all aspects of disease management from diagnostic modalities to treatment. Weight loss encouraged and health benefits explained to patient.          Acute respiratory failure  Patient with Hypoxic Respiratory failure which is Acute.  she is not on home oxygen. Supplemental ventilation was provided and noted- Vent Mode: A/C  Oxygen Concentration (%):  [40] 40  Resp Rate Total:  [35 br/min] 35 br/min  Vt Set:  [290 mL-350 mL] 350 mL  PEEP/CPAP:  [12 cmH20] 12 cmH20  Pressure Support:  [0 cmH20] 0 cmH20  Mean Airway Pressure:  [18 lpL69-99 cmH20] 20 cmH20 and oxygen saturations . Differential diagnosis includes - Pneumonia Viral Labs and images were reviewed. Patient Has recent ABG- No components found for: ABG. Will treat underlying causes and adjust management of respiratory failure as follows-     Temp:  [97.9 °F (36.6 °C)]   Pulse:  []   Resp:  [35]   BP: (108-133)/(58-73)   SpO2:  [93 %-99 %]   Arterial Line BP: ()/()      Pulmonary consultation.   Continue beta 2 agonist bronchodilator treatments.   Continue IV antibiotics -  ceftriaxone and azithromycin.   D dimer up 1, ast/alt sl up 105/81, procal 0.12, covid pos.  Troponin neg.  Ratio 74/0.5 on peep 8-   390 is 6 cc/kg ;  Will continue Plaquenil 400 mg daily  as per COVID protocol.  Microbiology Results (last 7 days)     Procedure Component Value Units Date/Time    Blood culture x two cultures. Draw prior to antibiotics. [[id number]] Collected:  03/25/20 1411    Order Status:  Completed Specimen:  Blood from Antecubital, Right  Arm Updated:  03/28/20 2212     Blood Culture, Routine No Growth to date      No Growth to date      No Growth to date      No Growth to date    Narrative:       Aerobic and anaerobic    Blood culture [[id number]] Collected:  03/26/20 0432    Order Status:  Completed Specimen:  Blood Updated:  03/28/20 1012     Blood Culture, Routine No Growth to date      No Growth to date      No Growth to date    Blood culture [[id number]] Collected:  03/26/20 0612    Order Status:  Completed Specimen:  Blood Updated:  03/28/20 1012     Blood Culture, Routine No Growth to date      No Growth to date      No Growth to date    Culture, Respiratory with Gram Stain [[id number]] Collected:  03/25/20 2150    Order Status:  Completed Specimen:  Tracheal Aspirate Updated:  03/28/20 0927     Respiratory Culture Normal respiratory anamaria      No S aureus or Pseudomonas isolated.     Gram Stain (Respiratory) <10 epithelial cells per low power field.     Gram Stain (Respiratory) Rare WBC's     Gram Stain (Respiratory) Rare Gram positive cocci    Culture, Respiratory with Gram Stain [[id number]] Collected:  03/25/20 2152    Order Status:  Canceled Specimen:  Respiratory from Sputum     Influenza A & B by Molecular [[id number]]     Order Status:  Canceled Specimen:  Nasopharyngeal Swab     Blood culture x two cultures. Draw prior to antibiotics. [[id number]]     Order Status:  Canceled Specimen:  Blood           Continue routine medications as before.   Follow airborne/droplet  precautions.              VTE Risk Mitigation (From admission, onward)         Ordered     enoxaparin injection 40 mg  Every 12 hours      03/25/20 2313     IP VTE HIGH RISK PATIENT  Once      03/25/20 2313                Critical care time spent on the evaluation and treatment of severe organ dysfunction, review of pertinent labs and imaging studies, discussions with consulting providers and discussions with patient/family: 60 minutes.      Kady Gomez MD  Department of Hospital Medicine   [facility]

## 2020-03-29 NOTE — RESPIRATORY THERAPY
Results for WOLF SINGER (MRN [id number]) as of 3/29/2020 13:45   Ref. Range 3/29/2020 13:39   POC PH Latest Ref Range: 7.35 - 7.45  7.183 (LL)   POC PCO2 Latest Ref Range: 35 - 45 mmHg 46.5 (H)   POC PO2 Latest Ref Range: 80 - 100 mmHg 88   POC BE Latest Ref Range: -2 to 2 mmol/L -11   POC HCO3 Latest Ref Range: 24 - 28 mmol/L 17.5 (L)   POC SATURATED O2 Latest Ref Range: 95 - 100 % 94 (L)   POC TCO2 Latest Ref Range: 23 - 27 mmol/L 19 (L)   FiO2 Unknown 40   PEEP Unknown 8   Sample Unknown ARTERIAL   DelSys Unknown Adult Vent   Allens Test Unknown N/A   Site Unknown [place]/UAC   Mode Unknown CPAP   Reported to Dr. Mcguire

## 2020-03-29 NOTE — PLAN OF CARE
Shift goals discussed with patients' family with time given for questions and answers. Remains sedated, intubated with settings as ordered. Restraints as per policy. Creatinine not improving

## 2020-03-29 NOTE — SUBJECTIVE & OBJECTIVE
Interval History:  Patient's oxygenation requirement has improved.  Kidney function worsening patient has worsening acidosis.  Patient is COVID positive    Review of Systems   Unable to perform ROS: Intubated     Objective:     Vital Signs (Most Recent):  Temp: 97.9 °F (36.6 °C) (03/29/20 0400)  Pulse: 101 (03/29/20 0600)  Resp: (!) 35 (03/29/20 0600)  BP: 133/72 (03/29/20 0600)  SpO2: (!) 93 % (03/29/20 0600) Vital Signs (24h Range):  Temp:  [97.9 °F (36.6 °C)-98.4 °F (36.9 °C)] 97.9 °F (36.6 °C)  Pulse:  [] 101  Resp:  [35] 35  SpO2:  [93 %-100 %] 93 %  BP: (108-136)/(57-75) 133/72  Arterial Line BP: ()/() 90/83     Weight: 107.5 kg (237 lb)  Body mass index is 44.78 kg/m².    Intake/Output Summary (Last 24 hours) at 3/29/2020 0811  Last data filed at 3/29/2020 0600  Gross per 24 hour   Intake 6697.35 ml   Output 1095 ml   Net 5602.35 ml      Physical Exam   Nursing note and vitals reviewed.  PATIENT WAS SEEN AS A VIDEO VISIT WITH NURSE IN PATIENT ROOM WITH PATIENT TO ASSIST DURING THE VISIT. PHYSICAL EXAM FINDINGS ARE AS VIEWED BY MYSELF VIA VIDEO OR AS REPORTED BY NURSE IF SPECIFIED AS SUCH, EXAM NOT DONE PERSONALLY BY MYSELF AT BEDSIDE.      Significant Labs:   BMP:   Recent Labs   Lab 03/28/20  0356 03/29/20  0417    105   * 133*   K 3.7 3.5   CL 98 103   CO2 19* 15*   BUN 17 19   CREATININE 4.7* 5.1*   CALCIUM 7.2* 6.7*   MG 1.7  --      CBC:   Recent Labs   Lab 03/28/20  0356 03/29/20  0417   WBC 9.68 8.80   HGB 9.6* 8.6*   HCT 31.4* 28.0*    212       Significant Imaging: I have reviewed all pertinent imaging results/findings within the past 24 hours.

## 2020-03-29 NOTE — ASSESSMENT & PLAN NOTE
Patient with Hypoxic Respiratory failure which is Acute.  she is not on home oxygen. Supplemental ventilation was provided and noted- Vent Mode: A/C  Oxygen Concentration (%):  [40] 40  Resp Rate Total:  [35 br/min] 35 br/min  Vt Set:  [290 mL-350 mL] 350 mL  PEEP/CPAP:  [12 cmH20] 12 cmH20  Pressure Support:  [0 cmH20] 0 cmH20  Mean Airway Pressure:  [18 fzC07-32 cmH20] 20 cmH20 and oxygen saturations . Differential diagnosis includes - Pneumonia Viral Labs and images were reviewed. Patient Has recent ABG- No components found for: ABG. Will treat underlying causes and adjust management of respiratory failure as follows-     Temp:  [97.9 °F (36.6 °C)]   Pulse:  []   Resp:  [35]   BP: (108-133)/(58-73)   SpO2:  [93 %-99 %]   Arterial Line BP: ()/()      Pulmonary consultation.   Continue beta 2 agonist bronchodilator treatments.   Continue IV antibiotics - ceftriaxone and azithromycin.   D dimer up 1, ast/alt sl up 105/81, procal 0.12, covid pos.  Troponin neg.  Ratio 74/0.5 on peep 8-   390 is 6 cc/kg ;  Will continue Plaquenil 400 mg daily  as per COVID protocol.  Microbiology Results (last 7 days)     Procedure Component Value Units Date/Time    Blood culture x two cultures. Draw prior to antibiotics. [[id number]] Collected:  03/25/20 1411    Order Status:  Completed Specimen:  Blood from Antecubital, Right  Arm Updated:  03/28/20 2212     Blood Culture, Routine No Growth to date      No Growth to date      No Growth to date      No Growth to date    Narrative:       Aerobic and anaerobic    Blood culture [[id number]] Collected:  03/26/20 0432    Order Status:  Completed Specimen:  Blood Updated:  03/28/20 1012     Blood Culture, Routine No Growth to date      No Growth to date      No Growth to date    Blood culture [[id number]] Collected:  03/26/20 0612    Order Status:  Completed Specimen:  Blood Updated:  03/28/20 1012     Blood Culture, Routine No Growth to date      No Growth to date      No  Growth to date    Culture, Respiratory with Gram Stain [[id number]] Collected:  03/25/20 2150    Order Status:  Completed Specimen:  Tracheal Aspirate Updated:  03/28/20 0927     Respiratory Culture Normal respiratory anamaria      No S aureus or Pseudomonas isolated.     Gram Stain (Respiratory) <10 epithelial cells per low power field.     Gram Stain (Respiratory) Rare WBC's     Gram Stain (Respiratory) Rare Gram positive cocci    Culture, Respiratory with Gram Stain [[id number]] Collected:  03/25/20 2152    Order Status:  Canceled Specimen:  Respiratory from Sputum     Influenza A & B by Molecular [[id number]]     Order Status:  Canceled Specimen:  Nasopharyngeal Swab     Blood culture x two cultures. Draw prior to antibiotics. [[id number]]     Order Status:  Canceled Specimen:  Blood           Continue routine medications as before.   Follow airborne/droplet precautions.

## 2020-03-29 NOTE — PLAN OF CARE
Plan of care reviewed. Afebrile, vitals stable, able to decrease levophed. Started on tube feedings. Safety maintained

## 2020-03-29 NOTE — ASSESSMENT & PLAN NOTE
Will continue Plaquenil    Covid-19 Virus Infection  - Infection Control notified    - Isolation:   - Airborne and Droplet Precautions  - N95 masks must be fit tested, wear eye protection  - 20 second hand hygiene   - Limit visitors per hospital policy   - Consolidating lab draws, nursing care, and interventions    - Diagnostics: (rising CRP, persistent lymphopenia, hyponatremia, hyperferritinemia, elevated troponin, elevated d-dimer, age, and comorbidities are significant predictors of poor clinical outcome)   - CBC:   trend Q48hrs  - CMP:        trend Q48hrs  - Procalcitonin:  - D-dimer:  trend Q48hrs  - Ferritin:  repeat prior to discharge  - CRP:        trend Q48hrs  - LDH:  - BNP:  - Troponin:    - ECG:   - rapid Flu:   - RIP only if BMT/solid transplant:   - Legionella antigen:   - Blood culture x2:   - Sputum culture:   - CXR:   - UA and culture:      - Management:   - Bundle care as able to minimize in/out of room   - Supplemental O2 to maintain SpO2 >92%,   if requiring 6L NC or higher, place on nonrebreather and discuss case with MICU   - Telemetry & continuous Pulse Ox   - albuterol INHALER PRN 4puff Q6hr approximates a nebulizer (avoid nebulization of secretions)   - apap PRN fever   - Avoiding NIPPV to prevent aerosolization   (including home CPAP/BiPAP unless on a case-by-case basis and only in negative pressure room)   - Cautious use of NSAIDS for fever per WHO recommendations (3/16/2020)   - No new ACEi/ARB start or discontinuation of chronic med unless hypotensive (Esler et al. Journal of Hypertension 2020, 38:000-000)   - Careful use of steroids in the absence of other indications   - unless septic shock due to increased viral replication   - Fluid sparing resuscitation   - Empiric antibiotics per likely source & patient allergies    - CAP: x 5 day course  Ceftriaxone 1g IV Q24hrs            Azithromycin 500mg IV day #1, then 250mg PO daily x4 days                 If MRSA risk factors, add  Vancomycin IV (PharmD consult)   - If patient meets criteria per Hospital Protocol    - start statin (if CPK WNL)    - start HCQ 400mg PO BID x1 day, then 400mg PO daily x 4 days (check G6PD, ECG, and start Qshift POCT glucose)    Goals of care, counseling/discussion  - Reviewed the typical clinical course of COVID19 with the daughter Danya (patient name or relationship to patient), including the potential for acute decompensation requiring intubation and mechanical ventilation  - Discussed again as part of routine daily evaluation, patient/POA maintains code status of Full code    VTE High Risk Prophylaxis: enoxaparin 40mg sq QHS @ 2100 (bundled care) if GFR >30    Patient's chronic/stable medical conditions noted in the problem list above will be managed with the patient's home medications as tolerated.

## 2020-03-29 NOTE — PROGRESS NOTES
INPATIENT NEPHROLOGY PROGRESS  [facility] NEPHROLOGY    Patient Name: Maria Victoria Hernandez  Date: 03/29/2020    Reason for consultation: MAIKOL    Chief Complaint:   Chief Complaint   Patient presents with    Shortness of Breath     positive covid       History of Present Illness:  History obtained from chart due to MV.  Patient is a 54 years old  female with morbid obesity and hypothyroidism who presents to [facility] Emergency room with worsening shortness of breath.  Three days ago patient was tested positive for COVID - 19.  Patient works at [employer].  Patient has been experiencing subjective fever, generalized body aches and pains and nonproductive cough for few days.  Patient is getting increasingly short of breath especially for the past 2 days.  Upon arrival to the emergency room patient was noted to be febrile and hypoxic around 89%. CXR showed bilateral infiltrates. She was initially managed with O2 NC but during the evening of presentation, she developed HHRF requiring intubation. She was placed on levophed post extubation for septic shock with BP as low as 70/40s. During that evening, her UOP decreased. She was started on a lasix gtt. She reportedly had tea colored urine but remained oliguric. Critical care stopped lasix gtt early this AM and started NS 75cc/hr. Patient remained febrile the entire night. She also remained hypotensive. BNP < 10. Neg trop. UA + ketones, trace blood, no protein. Blood cx neg. Admit Cr 0.7. Went to Cr 1 --> 2.6 on 3/26 and now 3 on 3/27, prompting renal consult.     3/28  Scr worse today.  Only one shift recorded for output, 520cc.  Still hyponatremic, vent setting adjusted per pulmonology note for acidosis.  K+ at goal after repletion.  Has siri, may need HD initiated.  3/29  Non oliguric.  In no distress    Plan of Care:    1. Septic shock 2/2 COVID PNA with HHRF requiring MV  - She is off levophed.  -cut  fluid back to 75cc/hour  - She is on CFTX, azithromycin and plaquenil.  - She is intubated/sedated.    2. MAIKOL- suspect multifactorial ATN in setting of shock/hypotension/intravascular volume depletion + diuresis; trace hematuria noted     - Keep off diuretics- don't think she is volume overloaded.  - Trace hematuria is noted- sent off acute GN workup.  - Hold off renal imaging right now.  - no nsaids or IV contrast (meloxicam noted on home med list)   - Continue mueller.  - Dose meds for CrCl < 20.  - Dr Moeller discussed the risks and benefits of hemodialysis with the patient's daughter.  All of her questions were answered.  Risks include low blood pressure, stroke, heart attack, seizure, infection, nausea, delirium, and death. The daughter agreed to dialysis if needed and signed the consent form. Given significant metabolic acidosis I will initiate hemodialysis today      3. Hypovolemic hyponatremia  - Will trend serum Na with volume resuscitation.    4. Hypokalemia  - better    5. HypoMg  - better    6. Anemia  - Trend Hb.  - She is on PO iron at home.    Thank you for allowing us to participate in this patient's care. We will continue to follow.    Vital Signs:  Temp Readings from Last 3 Encounters:   03/29/20 97.9 °F (36.6 °C) (Skin)       Pulse Readings from Last 3 Encounters:   03/29/20 72   01/15/15 84   12/17/13 80       BP Readings from Last 3 Encounters:   03/29/20 133/72   01/15/15 124/82   12/17/13 102/68       Weight:  Wt Readings from Last 3 Encounters:   03/27/20 107.5 kg (237 lb)   01/15/15 105.8 kg (233 lb 4.8 oz)   12/17/13 101.2 kg (223 lb)       Past Medical & Surgical History:  Past Medical History:   Diagnosis Date    Colon polyp     Diverticulosis large intestine w/o perforation or abscess w/bleeding     Iron deficiency anemia     Prediabetes     Thyroid disease     Vitamin B 12 deficiency     Vitamin D deficiency        Past Surgical History:   Procedure Laterality Date    TUBAL LIGATION          Past Social History:  Social History     Socioeconomic History    Marital status:      Spouse name: Not on file    Number of children: Not on file    Years of education: Not on file    Highest education level: Not on file   Occupational History    Not on file   Social Needs    Financial resource strain: Not on file    Food insecurity:     Worry: Not on file     Inability: Not on file    Transportation needs:     Medical: Not on file     Non-medical: Not on file   Tobacco Use    Smoking status: Never Smoker    Smokeless tobacco: Never Used   Substance and Sexual Activity    Alcohol use: No    Drug use: Not on file    Sexual activity: Not on file   Lifestyle    Physical activity:     Days per week: Not on file     Minutes per session: Not on file    Stress: Not on file   Relationships    Social connections:     Talks on phone: Not on file     Gets together: Not on file     Attends Anabaptist service: Not on file     Active member of club or organization: Not on file     Attends meetings of clubs or organizations: Not on file     Relationship status: Not on file   Other Topics Concern    Not on file   Social History Narrative    Not on file       Medications:  Scheduled Meds:   azithromycin  250 mg Per NG tube Daily    cefTRIAXone (ROCEPHIN) IVPB  1 g Intravenous Q24H    enoxparin  40 mg Subcutaneous Q12H    hydroxychloroquine  400 mg Per NG tube Daily    levothyroxine  100 mcg Oral Before breakfast    pantoprazole  40 mg Intravenous Daily    rocuronium  100 mg Intravenous Once     Continuous Infusions:   sodium chloride 0.9% 150 mL/hr at 03/27/20 1800    fentanyl 150 mcg/hr (03/29/20 0853)    norepinephrine bitartrate-D5W Stopped (03/29/20 0918)     PRN Meds:.acetaminophen, propofoL, sodium chloride 0.9%  No current facility-administered medications on file prior to encounter.      Current Outpatient Medications on File Prior to Encounter   Medication Sig Dispense Refill     "ferrous sulfate 325 mg (65 mg iron) Tab tablet Take 1 tablet (325 mg total) by mouth daily with breakfast. (Patient taking differently: Take 325 mg by mouth daily with breakfast. Take 2 tablets daily) 90 tablet 3    fluticasone propionate (FLONASE) 50 mcg/actuation nasal spray 1 spray by Each Nostril route once daily.      levothyroxine (SYNTHROID) 100 MCG tablet Take 1 tablet (100 mcg total) by mouth once daily. (Patient taking differently: Take 150 mcg by mouth once daily. ) 90 tablet 3    meloxicam (MOBIC) 7.5 MG tablet Take 7.5 mg by mouth once daily.      clotrimazole-betamethasone 1-0.05% (LOTRISONE) cream Apply topically 2 (two) times daily. 45 g 1    levocetirizine (XYZAL) 5 MG tablet Take 1 tablet (5 mg total) by mouth every evening. 30 tablet 6       Allergies:  Patient has no known allergies.    Past Family History:  Reviewed; refer to Hospitalist Admission Note    Review of Systems:  Unable to obtain due to MV    Physical Exam:  /72   Pulse 72   Temp 97.9 °F (36.6 °C) (Skin)   Resp (!) 35   Ht 5' 1" (1.549 m)   Wt 107.5 kg (237 lb)   SpO2 99%   Breastfeeding? No   BMI 44.78 kg/m²     INS/OUTS:  I/O last 3 completed shifts:  In: 7387.6 [I.V.:7167.6; NG/GT:120; IV Piggyback:100]  Out: 1595 [Urine:1495; Drains:100]  I/O this shift:  In: -   Out: 150 [Urine:150]    General Appearance:    Intubated, sedated, acutely ill, appears stated age   Head:    Normocephalic, atraumatic   Eyes:    Eyes closed       Mouth:   Moist mucus membranes   Lungs:     Coarse   Heart:    Regular rate and rhythm, S1 and S2 normal, no murmur, rub   or gallop   Abdomen:     Soft, non-tender, non-distended, bowel sounds active all four   quadrants, no RT or guarding, no masses, no organomegaly   Extremities:   Warm and well perfused, distal pulses intact, no cyanosis or    peripheral edema   MSK:   No joint or muscle swelling, tenderness or deformity   Skin:   Skin color, texture, turgor normal, no rashes or lesions "   Neurologic/Psychiatric:   Unable to assess     Results:  Lab Results   Component Value Date     (L) 03/29/2020    K 3.5 03/29/2020     03/29/2020    CO2 15 (L) 03/29/2020    BUN 19 03/29/2020    CREATININE 5.1 (H) 03/29/2020    CALCIUM 6.7 (LL) 03/29/2020    ANIONGAP 15 03/29/2020    ESTGFRAFRICA 10 (A) 03/29/2020    EGFRNONAA 9 (A) 03/29/2020       Lab Results   Component Value Date    CALCIUM 6.7 (LL) 03/29/2020    PHOS 4.8 (H) 03/29/2020       Recent Labs   Lab 03/29/20  0417   WBC 8.80   RBC 3.25*   HGB 8.6*   HCT 28.0*      MCV 86   MCH 26.5*   MCHC 30.7*       I have personally reviewed pertinent radiological imaging and reports.     .    [facility]  [address] [place] [address]  MARGARET Evans [address]  [phone] (p)  [phone] (f)

## 2020-03-29 NOTE — RESPIRATORY THERAPY
03/29/20 0423   Patient Assessment/Suction   Level of Consciousness (AVPU) responds to voice   Respiratory Effort Normal;Unlabored   Expansion/Accessory Muscles/Retractions no use of accessory muscles;expansion symmetric   All Lung Fields Breath Sounds Anterior:;clear;diminished   Rhythm/Pattern, Respiratory assisted mechanically   Cough Frequency with stimulation   $ Suction Charges Inline Suction Procedure Stat Charge   Secretions Amount small   Secretions Color red-streaked;tan   Secretions Characteristics thick   PRE-TX-O2   O2 Device (Oxygen Therapy) ventilator   $ Is the patient on Low Flow Oxygen? Yes   Oxygen Concentration (%) 40   SpO2 99 %   Pulse Oximetry Type Continuous   Probe Placed On (Pulse Ox) Right:;finger   Oximetry Probe Site Intact   Pulse 67   Resp (!) 35   ETCO2   $ ETCO2 Charge Exhaled CO2 Monitoring   $ ETCO2 Usage Currently wearing   ETCO2 (mmHg) 28 mmHg   ETCO2 Device Type Artificial Airway;Ventilator        Airway - Non-Surgical 03/25/20 2145 Endotracheal Tube   Placement Date/Time: 03/25/20 2145   Present Prior to Hospital Arrival?: No  Inserted by: MD  Airway Device: Endotracheal Tube  Airway Device Size: 7.5  Style: Cuffed  Cuff Inflated With: Air  Placement Verified By: Auscultation;Chest X-ray;Colorimetr...   Secured at 24 cm   Measured At Lips   Secured Location Right   Secured by Commercial tube owusu   Bite Block none   Site Condition Cool;Dry   Status Intact;Secured;Patent   Site Assessment Clean;Dry   Cuff Pressure 26 cm H2O   Vent Select   Conventional Vent Y   $ Ventilator Subsequent 1   Charged w/in last 24h YES   IHI Ventilator Associated Pneumonia Bundle   Daily Awakening Trials Performed No   Daily Assessment of Readiness to Extubate Yes   Head of Bed Elevated  HOB 30   Oral Care Lip moisturizer applied;Mouth swabbed;Mouth moisturizer;Mouth suctioned   Additional VAP Prevention Documentation Clean equipment maintained   Preset Conventional Ventilator Settings   Vent  Type    Ventilation Type VC   Vent Mode A/C   Humidity HME   Set Rate 35 BPM   Vt Set 350 mL   PEEP/CPAP 12 cmH20   Pressure Support 0 cmH20   Waveform RAMP   Peak Flow 60 L/min   Set Inspiratory Pressure 0 cmH20   Insp Time 0 Sec(s)   Plateau Set/Insp. Hold (sec) 28   Insp Rise Time  0 %   Trigger Sensitivity Flow/I-Trigger 2 L/min   P High 0 cm H2O   P Low 0 cm H2O   T High 0 sec   T Low 0 sec   Patient Ventilator Parameters   Resp Rate Total 35 br/min   Peak Airway Pressure 34 cmH2O   Mean Airway Pressure 20 cmH20   Plateau Pressure 27 cmH20   Exhaled Vt 362 mL   Total Ve 12.5 mL   Spont Ve 0 L   I:E Ratio Measured 1:1.70   Tubing ID (mm) 7.5 mm   Tube Type ET   Conventional Ventilator Alarms   Alarms On Y   Ve High Alarm 40 L/min   Ve Low Alarm 3 L/min   Vt Low Alarm 200 mL   Resp Rate High Alarm 50 br/min   Press High Alarm 60 cmH2O   Apnea Rate 10   Apnea Volume (mL) 450 mL   Apnea Oxygen Concentration  100   Apnea Flow Rate (L/min) 75   T Apnea 20 sec(s)   Ready to Wean/Extubation Screen   FIO2<=50 (chart decimal) 0.4   MV<16L (chart vol.) 12.5   PEEP <=8 (chart #) (!) 12   Ready to Wean Parameters   F/VT Ratio<105 (RSBI) (!) 96.69   Labs   $ Was an ABG obtained? A Line;ISTAT - Blood gas   $ Labs Tech Time 15 min   Patient starting to open eyes, cough reflex has improved  Peep of 12 on 40% Fi02  AM blood gas:   PH 7.32  C02 32.8  HC03 17.2  P02 111  BE -9  Sat 98

## 2020-03-29 NOTE — RESPIRATORY THERAPY
03/29/20 0700   Patient Assessment/Suction   Level of Consciousness (AVPU) responds to voice   Respiratory Effort Normal   Expansion/Accessory Muscles/Retractions no use of accessory muscles   All Lung Fields Breath Sounds coarse   Rhythm/Pattern, Respiratory assisted mechanically   Cough Frequency with stimulation   Cough Type productive   Suction Method oral;tracheal   $ Suction Charges Inline Suction Procedure Stat Charge   Secretions Amount moderate   Secretions Color white;red-streaked   Secretions Characteristics thick   PRE-TX-O2   O2 Device (Oxygen Therapy) ventilator   $ Is the patient on Low Flow Oxygen? Yes   Oxygen Concentration (%) 40   SpO2 99 %   Pulse Oximetry Type Continuous   $ Pulse Oximetry - Multiple Charge Pulse Oximetry - Multiple   Pulse 72   Resp (!) 35   ETCO2   $ ETCO2 Charge Exhaled CO2 Monitoring   $ ETCO2 Usage Currently wearing   ETCO2 Device Type Ventilator;Artificial Airway   Vent Select   $ Ventilator Subsequent 1   Charged w/in last 24h YES   Preset Conventional Ventilator Settings   Vent Type    Ventilation Type VC   Vent Mode A/C   Humidity HME   Set Rate 35 BPM   Vt Set 350 mL   PEEP/CPAP 12 cmH20   Pressure Support 0 cmH20   Waveform RAMP   Peak Flow 60 L/min   Set Inspiratory Pressure 0 cmH20   Insp Time 0 Sec(s)   Plateau Set/Insp. Hold (sec) 0   Insp Rise Time  0 %   Trigger Sensitivity Flow/I-Trigger 2 L/min   P High 0 cm H2O   P Low 0 cm H2O   T High 0 sec   T Low 0 sec   Patient Ventilator Parameters   Resp Rate Total 35 br/min   Peak Airway Pressure 34 cmH2O   Mean Airway Pressure 20 cmH20   Plateau Pressure 28 cmH20   Exhaled Vt 398 mL   Total Ve 12.9 mL   Spont Ve 0 L   I:E Ratio Measured 1:1.70   Tubing ID (mm) 7.5 mm  (@ 24)   Tube Type ET   Conventional Ventilator Alarms   Alarms On Y   Ve High Alarm 40 L/min   Resp Rate High Alarm 50 br/min   Press High Alarm 60 cmH2O   Apnea Rate 10   Apnea Volume (mL) 450 mL   Apnea Oxygen Concentration  100   Apnea Flow  Rate (L/min) 75   T Apnea 20 sec(s)   Ready to Wean/Extubation Screen   FIO2<=50 (chart decimal) 0.4   MV<16L (chart vol.) 12.9   PEEP <=8 (chart #) (!) 12   Ready to Wean Parameters   F/VT Ratio<105 (RSBI) (!) 87.94   Remains on vent w/ no changes noted ETT 7.5 @ 24

## 2020-03-29 NOTE — PROGRESS NOTES
Progress Note  PULMONARY    Admit Date: 3/25/2020   03/29/2020      SUBJECTIVE:     3/27- remains intubated, on vent. Was on Lasix drip overnight and now w/ IVF for UOP. On minimal Levophed   3/28- remains intubated, on PEEP 14/FiO2 50%. Levophed 0.06 mcg/kg/min  3/29- remains intubated, PEEP 12, FiO2 40%. Levophed off    PFSH and Allergies reviewed.    OBJECTIVE:     Vitals (Most recent):  Vitals:    03/29/20 0600   BP: 133/72   Pulse: 101   Resp: (!) 35   Temp:        Vitals (24 hour range):  Temp:  [97.9 °F (36.6 °C)-98.4 °F (36.9 °C)]   Pulse:  []   Resp:  [35]   BP: (108-133)/(57-75)   SpO2:  [93 %-100 %]   Arterial Line BP: ()/()       Intake/Output Summary (Last 24 hours) at 3/29/2020 0942  Last data filed at 3/29/2020 0600  Gross per 24 hour   Intake 6697.35 ml   Output 1095 ml   Net 5602.35 ml          Physical Exam:  The patient's neuro status (alertness,orientation,cognitive function,motor skills,), pharyngeal exam (oral lesions, hygiene, abn dentition,), Neck (jvd,mass,thyroid,nodes in neck and above/below clavicle),RESPIRATORY(symmetry,effort,fremitus,percussion,auscultation),  Cor(rhythm,heart tones including gallops,perfusion,edema)ABD(distention,hepatic&splenomegaly,tenderness,masses), Skin(rash,cyanosis),Psyc(affect,judgement,).  Exam negative except for these pertinent findings:    Sedated, intubated  Lungs clear  Abdomen soft nondistended  Nonpitting edema of bilateral hands    Radiographs reviewed:  CXR 3/29- unchanged  CXR 3/27- bilateral mid and lower lobe fluffy airspace disease  CXR 3/26- increased consolidation RLL    TTE 3/27  · Mild left ventricular enlargement.  · Mildly decreased left ventricular systolic function. The estimated ejection fraction is 45%.  · Grade I (mild) left ventricular diastolic dysfunction consistent with impaired relaxation.  · Normal right ventricular systolic function.  · Mild left atrial enlargement.  · Moderate mitral regurgitation.  · Mild  tricuspid regurgitation.  · Mild pulmonary hypertension present. PASP 40 mm of Hg.    Labs     Recent Labs   Lab 03/29/20 0417   WBC 8.80   HGB 8.6*   HCT 28.0*      BAND 5.0   METAMYELOCYT 2.0   MYELOPCT 1.0     Recent Labs   Lab 03/29/20 0417   *   K 3.5      CO2 15*   BUN 19   CREATININE 5.1*      CALCIUM 6.7*   PHOS 4.8*   AST 64*   ALT 57*   ALKPHOS 81   BILITOT 1.1*   PROT 6.2   ALBUMIN 1.9*     Recent Labs   Lab 03/29/20 0419   PH 7.328*   PCO2 32.8*   PO2 111*   HCO3 17.2*     Microbiology Results (last 7 days)     Procedure Component Value Units Date/Time    Blood culture x two cultures. Draw prior to antibiotics. [[id number]] Collected:  03/25/20 1411    Order Status:  Completed Specimen:  Blood from Antecubital, Right  Arm Updated:  03/28/20 2212     Blood Culture, Routine No Growth to date      No Growth to date      No Growth to date      No Growth to date    Narrative:       Aerobic and anaerobic    Blood culture [[id number]] Collected:  03/26/20 0432    Order Status:  Completed Specimen:  Blood Updated:  03/28/20 1012     Blood Culture, Routine No Growth to date      No Growth to date      No Growth to date    Blood culture [[id number]] Collected:  03/26/20 0612    Order Status:  Completed Specimen:  Blood Updated:  03/28/20 1012     Blood Culture, Routine No Growth to date      No Growth to date      No Growth to date    Culture, Respiratory with Gram Stain [[id number]] Collected:  03/25/20 2150    Order Status:  Completed Specimen:  Tracheal Aspirate Updated:  03/28/20 0927     Respiratory Culture Normal respiratory anamaria      No S aureus or Pseudomonas isolated.     Gram Stain (Respiratory) <10 epithelial cells per low power field.     Gram Stain (Respiratory) Rare WBC's     Gram Stain (Respiratory) Rare Gram positive cocci    Culture, Respiratory with Gram Stain [[id number]] Collected:  03/25/20 2152    Order Status:  Canceled Specimen:  Respiratory from Sputum      Influenza A & B by Molecular [[id number]]     Order Status:  Canceled Specimen:  Nasopharyngeal Swab     Blood culture x two cultures. Draw prior to antibiotics. [[id number]]     Order Status:  Canceled Specimen:  Blood           Impression:  Active Hospital Problems    Diagnosis  POA    Acute renal failure [N17.9]  No    ARDS (adult respiratory distress syndrome) [J80]  Yes    COVID-19 virus infection [J22, B97.29]  Yes    Acute respiratory failure [J96.00]  Yes    Morbid obesity [E66.01]  Yes    Hypothyroid [E03.9]  Yes    COVID-19 virus detected [J22, B97.29]  Yes      Resolved Hospital Problems   No resolved problems to display.               Plan:   Acute hypoxemic respiratory failure, stable to improving O2 reqt  COVID-19 pneumonia  Acute renal failure, worsening  Metabolic acidosis due to renal failure  Shock   Combined heart failure, EF 45%- myocardial involvement?  Morbid obesity    - attempted SBT today and failed due to worsened acidemia  - titrate peep and FiO2 by ARDS net protocol  - appreciate Nephrology recommendations- since a care message to Dr. Collins about starting HD for acidosis?  - monitor renal function and urine output  - continue hydroxychloroquine 400 mg daily  - continue Rocephin and azithromycin  - continue Levophed to keep map greater than 65    The following were evaluated and adjusted as needed: mechanical ventilator settings and weaning status, vasopressors, intravenous fluids and nutritional status, antibiotics, acid base balance and oxygenation needs and input and output and renal status       Critical Care  - THE PATIENT HAS A HIGH PROBABILITY OF IMMINENT OR LIFE THREATENING DETERIORATION.  Over 50%time of encounter was in direct care at bedside.  Time was 30 to 74 minutes required for patient care.  Time needed for all of the above totaled 45 minutes.    Milvia Mcguire MD  Pulmonary & Critical Care Medicine

## 2020-03-29 NOTE — PLAN OF CARE
Plan of care reviewed, vital signs stable. Levophed weaned off.  Unable to tolerate weaning trial today. Dialysis today. Bilateral soft wrist restraints in place. Safety maintained this shift.

## 2020-03-30 PROBLEM — K63.5 COLON POLYP: Status: ACTIVE | Noted: 2018-07-03

## 2020-03-30 PROBLEM — E66.01 MORBID OBESITY WITH BMI OF 45.0-49.9, ADULT: Status: ACTIVE | Noted: 2017-02-08

## 2020-03-30 PROBLEM — R73.03 PREDIABETES: Status: ACTIVE | Noted: 2019-03-26

## 2020-03-30 PROBLEM — K57.30 DIVERTICULOSIS OF LARGE INTESTINE WITHOUT HEMORRHAGE: Status: ACTIVE | Noted: 2018-07-03

## 2020-03-30 LAB
ALBUMIN SERPL BCP-MCNC: 1.8 G/DL (ref 3.5–5.2)
ALLENS TEST: ABNORMAL
ALLENS TEST: ABNORMAL
ALP SERPL-CCNC: 92 U/L (ref 55–135)
ALT SERPL W/O P-5'-P-CCNC: 75 U/L (ref 10–44)
ANA SER QL IF: NORMAL
ANION GAP SERPL CALC-SCNC: 10 MMOL/L (ref 8–16)
AST SERPL-CCNC: 92 U/L (ref 10–40)
BACTERIA BLD CULT: NORMAL
BASOPHILS # BLD AUTO: ABNORMAL K/UL (ref 0–0.2)
BASOPHILS NFR BLD: 0 % (ref 0–1.9)
BILIRUB SERPL-MCNC: 0.9 MG/DL (ref 0.1–1)
BM IGG SER-ACNC: <0.2 U
BNP SERPL-MCNC: 167 PG/ML (ref 0–99)
BUN SERPL-MCNC: 13 MG/DL (ref 6–20)
CALCIUM SERPL-MCNC: 7.1 MG/DL (ref 8.7–10.5)
CHLORIDE SERPL-SCNC: 101 MMOL/L (ref 95–110)
CO2 SERPL-SCNC: 24 MMOL/L (ref 23–29)
CREAT SERPL-MCNC: 3.9 MG/DL (ref 0.5–1.4)
DELSYS: ABNORMAL
DELSYS: ABNORMAL
DIFFERENTIAL METHOD: ABNORMAL
EOSINOPHIL # BLD AUTO: ABNORMAL K/UL (ref 0–0.5)
EOSINOPHIL NFR BLD: 1 % (ref 0–8)
ERYTHROCYTE [DISTWIDTH] IN BLOOD BY AUTOMATED COUNT: 17.5 % (ref 11.5–14.5)
ERYTHROCYTE [SEDIMENTATION RATE] IN BLOOD BY WESTERGREN METHOD: 35 MM/H
EST. GFR  (AFRICAN AMERICAN): 14 ML/MIN/1.73 M^2
EST. GFR  (NON AFRICAN AMERICAN): 12 ML/MIN/1.73 M^2
ETCO2: 35
FIO2: 40
FIO2: 40
GLUCOSE SERPL-MCNC: 97 MG/DL (ref 70–110)
HBV CORE AB SERPL QL IA: NEGATIVE
HBV SURFACE AB SER-ACNC: POSITIVE M[IU]/ML
HBV SURFACE AG SERPL QL IA: NEGATIVE
HCO3 UR-SCNC: 20.8 MMOL/L (ref 24–28)
HCO3 UR-SCNC: 25.1 MMOL/L (ref 24–28)
HCT VFR BLD AUTO: 25.3 % (ref 37–48.5)
HGB BLD-MCNC: 8.2 G/DL (ref 12–16)
IMM GRANULOCYTES # BLD AUTO: ABNORMAL K/UL (ref 0–0.04)
IMM GRANULOCYTES NFR BLD AUTO: ABNORMAL % (ref 0–0.5)
LYMPHOCYTES # BLD AUTO: ABNORMAL K/UL (ref 1–4.8)
LYMPHOCYTES NFR BLD: 2 % (ref 18–48)
MCH RBC QN AUTO: 26.6 PG (ref 27–31)
MCHC RBC AUTO-ENTMCNC: 32.4 G/DL (ref 32–36)
MCV RBC AUTO: 82 FL (ref 82–98)
METAMYELOCYTES NFR BLD MANUAL: 2 %
MIN VOL: 10.7
MIN VOL: 12.8
MODE: ABNORMAL
MODE: ABNORMAL
MONOCYTES # BLD AUTO: ABNORMAL K/UL (ref 0.3–1)
MONOCYTES NFR BLD: 7 % (ref 4–15)
NEUTROPHILS NFR BLD: 86 % (ref 38–73)
NEUTS BAND NFR BLD MANUAL: 2 %
NRBC BLD-RTO: 0 /100 WBC
PCO2 BLDA: 43.5 MMHG (ref 35–45)
PCO2 BLDA: 43.6 MMHG (ref 35–45)
PEEP: 8
PEEP: 8
PH SMN: 7.29 [PH] (ref 7.35–7.45)
PH SMN: 7.37 [PH] (ref 7.35–7.45)
PIP: 28
PLATELET # BLD AUTO: 226 K/UL (ref 150–350)
PLATELET BLD QL SMEAR: ABNORMAL
PMV BLD AUTO: 10.4 FL (ref 9.2–12.9)
PO2 BLDA: 70 MMHG (ref 80–100)
PO2 BLDA: 72 MMHG (ref 80–100)
POC BE: -6 MMOL/L
POC BE: 0 MMOL/L
POC SATURATED O2: 92 % (ref 95–100)
POC SATURATED O2: 94 % (ref 95–100)
POC TCO2: 22 MMOL/L (ref 23–27)
POC TCO2: 26 MMOL/L (ref 23–27)
POTASSIUM SERPL-SCNC: 3.4 MMOL/L (ref 3.5–5.1)
PROT SERPL-MCNC: 6.1 G/DL (ref 6–8.4)
PS: 15
RBC # BLD AUTO: 3.08 M/UL (ref 4–5.4)
SAMPLE: ABNORMAL
SAMPLE: ABNORMAL
SITE: ABNORMAL
SITE: ABNORMAL
SODIUM SERPL-SCNC: 135 MMOL/L (ref 136–145)
SP02: 92
SP02: 96
SPONT RATE: 27
VT: 350
WBC # BLD AUTO: 8.39 K/UL (ref 3.9–12.7)

## 2020-03-30 PROCEDURE — 94761 N-INVAS EAR/PLS OXIMETRY MLT: CPT

## 2020-03-30 PROCEDURE — 99900026 HC AIRWAY MAINTENANCE (STAT)

## 2020-03-30 PROCEDURE — B4185 PARENTERAL SOL 10 GM LIPIDS: HCPCS | Performed by: INTERNAL MEDICINE

## 2020-03-30 PROCEDURE — 63700000 PHARM REV CODE 250 ALT 637 W/O HCPCS: Performed by: INTERNAL MEDICINE

## 2020-03-30 PROCEDURE — 63600175 PHARM REV CODE 636 W HCPCS: Performed by: INTERNAL MEDICINE

## 2020-03-30 PROCEDURE — 82803 BLOOD GASES ANY COMBINATION: CPT

## 2020-03-30 PROCEDURE — 99291 PR CRITICAL CARE, E/M 30-74 MINUTES: ICD-10-PCS | Mod: ,,, | Performed by: INTERNAL MEDICINE

## 2020-03-30 PROCEDURE — 25000003 PHARM REV CODE 250: Performed by: INTERNAL MEDICINE

## 2020-03-30 PROCEDURE — 37799 UNLISTED PX VASCULAR SURGERY: CPT

## 2020-03-30 PROCEDURE — 63600175 PHARM REV CODE 636 W HCPCS: Performed by: NURSE PRACTITIONER

## 2020-03-30 PROCEDURE — 94770 HC EXHALED C02 TEST: CPT

## 2020-03-30 PROCEDURE — C9113 INJ PANTOPRAZOLE SODIUM, VIA: HCPCS | Performed by: INTERNAL MEDICINE

## 2020-03-30 PROCEDURE — 80100014 HC HEMODIALYSIS 1:1

## 2020-03-30 PROCEDURE — 85007 BL SMEAR W/DIFF WBC COUNT: CPT

## 2020-03-30 PROCEDURE — 85027 COMPLETE CBC AUTOMATED: CPT

## 2020-03-30 PROCEDURE — 99291 CRITICAL CARE FIRST HOUR: CPT | Mod: ,,, | Performed by: INTERNAL MEDICINE

## 2020-03-30 PROCEDURE — 11000001 HC ACUTE MED/SURG PRIVATE ROOM

## 2020-03-30 PROCEDURE — 94003 VENT MGMT INPAT SUBQ DAY: CPT

## 2020-03-30 PROCEDURE — 99900035 HC TECH TIME PER 15 MIN (STAT)

## 2020-03-30 PROCEDURE — 80053 COMPREHEN METABOLIC PANEL: CPT

## 2020-03-30 PROCEDURE — 20000000 HC ICU ROOM

## 2020-03-30 PROCEDURE — 27000221 HC OXYGEN, UP TO 24 HOURS

## 2020-03-30 PROCEDURE — 83880 ASSAY OF NATRIURETIC PEPTIDE: CPT

## 2020-03-30 RX ORDER — PROPOFOL 10 MG/ML
5 INJECTION, EMULSION INTRAVENOUS CONTINUOUS
Status: DISCONTINUED | OUTPATIENT
Start: 2020-03-30 | End: 2020-04-03

## 2020-03-30 RX ADMIN — PROPOFOL 30 MCG/KG/MIN: 10 INJECTION, EMULSION INTRAVENOUS at 12:03

## 2020-03-30 RX ADMIN — NOREPINEPHRINE BITARTRATE 0.05 MCG/KG/MIN: 1 INJECTION, SOLUTION, CONCENTRATE INTRAVENOUS at 09:03

## 2020-03-30 RX ADMIN — CEFTRIAXONE 1 G: 1 INJECTION, SOLUTION INTRAVENOUS at 12:03

## 2020-03-30 RX ADMIN — PANTOPRAZOLE SODIUM 40 MG: 40 INJECTION, POWDER, LYOPHILIZED, FOR SOLUTION INTRAVENOUS at 09:03

## 2020-03-30 RX ADMIN — ENOXAPARIN SODIUM 40 MG: 100 INJECTION SUBCUTANEOUS at 09:03

## 2020-03-30 RX ADMIN — HEPARIN SODIUM 4000 UNITS: 1000 INJECTION, SOLUTION INTRAVENOUS; SUBCUTANEOUS at 11:03

## 2020-03-30 RX ADMIN — PROPOFOL 35 MCG/KG/MIN: 10 INJECTION, EMULSION INTRAVENOUS at 05:03

## 2020-03-30 RX ADMIN — I.V. FAT EMULSION 250 ML: 20 EMULSION INTRAVENOUS at 09:03

## 2020-03-30 RX ADMIN — CEFTRIAXONE 1 G: 1 INJECTION, SOLUTION INTRAVENOUS at 10:03

## 2020-03-30 RX ADMIN — LEUCINE, PHENYLALANINE, LYSINE, METHIONINE, ISOLEUCINE, VALINE, HISTIDINE, THREONINE, TRYPTOPHAN, ALANINE, GLYCINE, ARGININE, PROLINE, SERINE, TYROSINE, DEXTROSE: 311; 238; 247; 170; 255; 247; 204; 179; 77; 880; 438; 489; 289; 213; 17; 10 INJECTION INTRAVENOUS at 08:03

## 2020-03-30 RX ADMIN — MUPIROCIN: 20 OINTMENT TOPICAL at 10:03

## 2020-03-30 RX ADMIN — HYDROXYCHLOROQUINE SULFATE 400 MG: 200 TABLET, FILM COATED ORAL at 09:03

## 2020-03-30 RX ADMIN — PROPOFOL 35 MCG/KG/MIN: 10 INJECTION, EMULSION INTRAVENOUS at 11:03

## 2020-03-30 RX ADMIN — PROPOFOL 35 MCG/KG/MIN: 10 INJECTION, EMULSION INTRAVENOUS at 09:03

## 2020-03-30 RX ADMIN — PROPOFOL 5 MCG/KG/MIN: 10 INJECTION, EMULSION INTRAVENOUS at 09:03

## 2020-03-30 RX ADMIN — AZITHROMYCIN MONOHYDRATE 250 MG: 250 TABLET ORAL at 09:03

## 2020-03-30 RX ADMIN — LEVOTHYROXINE SODIUM 100 MCG: 100 TABLET ORAL at 06:03

## 2020-03-30 RX ADMIN — MUPIROCIN: 20 OINTMENT TOPICAL at 08:03

## 2020-03-30 RX ADMIN — FENTANYL CITRATE 150 MCG/HR: 50 INJECTION INTRAVENOUS at 03:03

## 2020-03-30 NOTE — ASSESSMENT & PLAN NOTE
Patient with Hypoxic Respiratory failure which is Acute.  she is not on home oxygen. Supplemental ventilation was provided and noted- Vent Mode: A/C  Oxygen Concentration (%):  [40] 40  Resp Rate Total:  [26 br/min-106 br/min] 35 br/min  Vt Set:  [350 mL] 350 mL  PEEP/CPAP:  [8 cmH20] 8 cmH20  Pressure Support:  [0 cmH20-10 cmH20] 0 cmH20  Mean Airway Pressure:  [12 bjQ53-93 cmH20] 16 cmH20 and oxygen saturations . Differential diagnosis includes - Pneumonia Viral Labs and images were reviewed. Patient Has recent ABG- No components found for: ABG. Will treat underlying causes and adjust management of respiratory failure as follows-     Temp:  [98.3 °F (36.8 °C)-98.9 °F (37.2 °C)]   Pulse:  [77-95]   Resp:  [35-38]   BP: ()/(50-63)   SpO2:  [94 %-99 %]   Arterial Line BP: (108-160)/(48-69)      Pulmonary consultation.   Continue beta 2 agonist bronchodilator treatments.   Continue IV antibiotics - ceftriaxone and azithromycin.   D dimer up 1, ast/alt sl up 105/81, procal 0.12, covid pos.  Troponin neg.  Ratio 74/0.5 on peep 8-   390 is 6 cc/kg ;  Will continue Plaquenil 400 mg daily  as per COVID protocol.  Microbiology Results (last 7 days)     Procedure Component Value Units Date/Time    Blood culture [[id number]] Collected:  03/26/20 0432    Order Status:  Completed Specimen:  Blood Updated:  03/30/20 1214     Blood Culture, Routine No Growth after 4 days.     Blood culture [[id number]] Collected:  03/26/20 0612    Order Status:  Completed Specimen:  Blood Updated:  03/30/20 1214     Blood Culture, Routine No Growth after 4 days.     Blood culture x two cultures. Draw prior to antibiotics. [[id number]] Collected:  03/25/20 1411    Order Status:  Completed Specimen:  Blood from Antecubital, Right  Arm Updated:  03/30/20 1212     Blood Culture, Routine No Growth after 4 days.     Narrative:       Aerobic and anaerobic    Culture, Respiratory with Gram Stain [[id number]] Collected:  03/25/20 2150    Order Status:   Completed Specimen:  Tracheal Aspirate Updated:  03/28/20 0927     Respiratory Culture Normal respiratory anamaria      No S aureus or Pseudomonas isolated.     Gram Stain (Respiratory) <10 epithelial cells per low power field.     Gram Stain (Respiratory) Rare WBC's     Gram Stain (Respiratory) Rare Gram positive cocci    Culture, Respiratory with Gram Stain [[id number]] Collected:  03/25/20 2152    Order Status:  Canceled Specimen:  Respiratory from Sputum     Influenza A & B by Molecular [[id number]]     Order Status:  Canceled Specimen:  Nasopharyngeal Swab     Blood culture x two cultures. Draw prior to antibiotics. [[id number]]     Order Status:  Canceled Specimen:  Blood           Continue routine medications as before.   Follow airborne/droplet precautions.

## 2020-03-30 NOTE — PROGRESS NOTES
Progress Note  PULMONARY    Admit Date: 3/25/2020   03/30/2020      SUBJECTIVE:     3/27- remains intubated, on vent. Was on Lasix drip overnight and now w/ IVF for UOP. On minimal Levophed   3/28- remains intubated, on PEEP 14/FiO2 50%. Levophed 0.06 mcg/kg/min  3/29- remains intubated, PEEP 12, FiO2 40%. Levophed off  3/30- remains intubated, PEEP 8, FiO2 40%. Levophed off. Started HD yesterday and having second session today. Daughter came to visit (works on 3rd floor), and updated this am    PFSH and Allergies reviewed.    OBJECTIVE:     Vitals (Most recent):  Vitals:    03/30/20 1100   BP: 95/61   Pulse: 86   Resp: (!) 35   Temp:        Vitals (24 hour range):  Temp:  [97.4 °F (36.3 °C)-98.9 °F (37.2 °C)]   Pulse:  [71-97]   Resp:  [23-39]   BP: ()/(49-78)   SpO2:  [89 %-100 %]   Arterial Line BP: ()/(47-73)       Intake/Output Summary (Last 24 hours) at 3/30/2020 1122  Last data filed at 3/30/2020 0543  Gross per 24 hour   Intake 3738.34 ml   Output 2900 ml   Net 838.34 ml          Physical Exam:  Given the COVID-19 pandemic I did not enter the room but performed limited physical exam by viewing patient through the window.    Sedated, intubated  OG in place w/ tube feeds going  Currently on HD  No acute distress    Radiographs reviewed:  CXR 3/29- unchanged  CXR 3/27- bilateral mid and lower lobe fluffy airspace disease  CXR 3/26- increased consolidation RLL    TTE 3/27  · Mild left ventricular enlargement.  · Mildly decreased left ventricular systolic function. The estimated ejection fraction is 45%.  · Grade I (mild) left ventricular diastolic dysfunction consistent with impaired relaxation.  · Normal right ventricular systolic function.  · Mild left atrial enlargement.  · Moderate mitral regurgitation.  · Mild tricuspid regurgitation.  · Mild pulmonary hypertension present. PASP 40 mm of Hg.    Labs     Recent Labs   Lab 03/30/20  0301   WBC 8.39   HGB 8.2*   HCT 25.3*      BAND 2.0    METAMYELOCYT 2.0     Recent Labs   Lab 03/30/20  0301   *   K 3.4*      CO2 24   BUN 13   CREATININE 3.9*   GLU 97   CALCIUM 7.1*   AST 92*   ALT 75*   ALKPHOS 92   BILITOT 0.9   PROT 6.1   ALBUMIN 1.8*   *     Recent Labs   Lab 03/30/20  0357   PH 7.368   PCO2 43.6   PO2 72*   HCO3 25.1     Microbiology Results (last 7 days)     Procedure Component Value Units Date/Time    Blood culture [[id number]] Collected:  03/26/20 0432    Order Status:  Completed Specimen:  Blood Updated:  03/30/20 1012     Blood Culture, Routine No Growth to date      No Growth to date      No Growth to date      No Growth to date      No Growth to date    Blood culture [[id number]] Collected:  03/26/20 0612    Order Status:  Completed Specimen:  Blood Updated:  03/30/20 1012     Blood Culture, Routine No Growth to date      No Growth to date      No Growth to date      No Growth to date      No Growth to date    Blood culture x two cultures. Draw prior to antibiotics. [[id number]] Collected:  03/25/20 1411    Order Status:  Completed Specimen:  Blood from Antecubital, Right  Arm Updated:  03/29/20 2212     Blood Culture, Routine No Growth to date      No Growth to date      No Growth to date      No Growth to date      No Growth to date    Narrative:       Aerobic and anaerobic    Culture, Respiratory with Gram Stain [[id number]] Collected:  03/25/20 2150    Order Status:  Completed Specimen:  Tracheal Aspirate Updated:  03/28/20 0927     Respiratory Culture Normal respiratory anamaria      No S aureus or Pseudomonas isolated.     Gram Stain (Respiratory) <10 epithelial cells per low power field.     Gram Stain (Respiratory) Rare WBC's     Gram Stain (Respiratory) Rare Gram positive cocci    Culture, Respiratory with Gram Stain [[id number]] Collected:  03/25/20 2152    Order Status:  Canceled Specimen:  Respiratory from Sputum     Influenza A & B by Molecular [[id number]]     Order Status:  Canceled Specimen:  Nasopharyngeal  Swab     Blood culture x two cultures. Draw prior to antibiotics. [[id number]]     Order Status:  Canceled Specimen:  Blood           Impression:  Active Hospital Problems    Diagnosis  POA    *Acute respiratory failure [J96.00]  Yes    Acute renal failure [N17.9]  No    ARDS (adult respiratory distress syndrome) [J80]  Yes    COVID-19 virus infection [J22, B97.29]  Yes    Morbid obesity [E66.01]  Yes    Hypothyroid [E03.9]  Yes    COVID-19 virus detected [J22, B97.29]  Yes      Resolved Hospital Problems   No resolved problems to display.               Plan:   Acute hypoxemic respiratory failure, stable to improving O2 reqt  COVID-19 pneumonia/ARDS  Acute renal failure, on HD  Metabolic acidosis due to renal failure  Shock, resolved   Combined heart failure, EF 45%- myocardial involvement?  Morbid obesity    - repeat SBT and try to extubate today after HD  - titrate peep and FiO2 by ARDS net protocol  - continue HD per nephro  - discontinue NS continuous fluid  - continue hydroxychloroquine 400 mg daily  - continue Rocephin and azithromycin  - daughter came to visit and was updated today    The following were evaluated and adjusted as needed: mechanical ventilator settings and weaning status, intravenous fluids and nutritional status, acid base balance and oxygenation needs and input and output and renal status       Critical Care  - THE PATIENT HAS A HIGH PROBABILITY OF IMMINENT OR LIFE THREATENING DETERIORATION.  Over 50%time of encounter was in direct care at bedside.  Time was 30 to 74 minutes required for patient care.  Time needed for all of the above totaled 35 minutes.    Milvia Mcguire MD  Pulmonary & Critical Care Medicine

## 2020-03-30 NOTE — PROGRESS NOTES
[facility] Hospital Medicine  Progress Note    Patient Name: Maria Victoria Hernandez  MRN: [id number]  Patient Class: IP- Inpatient   Admission Date: 3/25/2020  Length of Stay: 4 days  Attending Physician: Kady Gomez MD  Primary Care Provider: Candice Deluna MD        Subjective:     Principal Problem:Acute respiratory failure        HPI:  Patient is a 54 years old  female with morbid obesity in past medical history significant for hypothyroidism is being admitted to intensive care unit under inpatient status from [facility] Emergency room with worsening shortness of breath.  Three days ago patient was tested positive for COVID - 19.  Patient works at [employer].  Patient has been experiencing subjective fever, generalized body aches and pains and nonproductive cough for few days.  Patient is getting increasingly short of breath especially for the past 2 days.  Upon arrival to the emergency room patient was noted to be hypoxic around 89%.  Up on 3 L patient's oxygen sats are around 96%.  Patient denies any chest pain, leg swelling or calf tenderness.      Overview/Hospital Course:  No notes on file    Interval History:   Review of Systems   Unable to perform ROS: Intubated     Objective:     Vital Signs (Most Recent):  Temp: 98.6 °F (37 °C) (03/29/20 2110)  Pulse: 86 (03/29/20 2300)  Resp: (!) 35 (03/29/20 2300)  BP: (!) 93/55 (03/29/20 2300)  SpO2: 96 % (03/29/20 2300) Vital Signs (24h Range):  Temp:  [97.4 °F (36.3 °C)-98.6 °F (37 °C)] 98.6 °F (37 °C)  Pulse:  [] 86  Resp:  [23-40] 35  SpO2:  [93 %-100 %] 96 %  BP: ()/(49-78) 93/55  Arterial Line BP: ()/() 136/61     Weight: 107.5 kg (237 lb)  Body mass index is 44.78 kg/m².    Intake/Output Summary (Last 24 hours) at 3/29/2020 2355  Last data filed at 3/29/2020 2100  Gross per 24 hour   Intake 4863.11 ml   Output 3340 ml   Net 1523.11 ml      Physical  Exam   Nursing note and vitals reviewed.    PATIENT WAS SEEN AS A VIDEO VISIT WITH NURSE IN PATIENT ROOM WITH PATIENT TO ASSIST DURING THE VISIT. PHYSICAL EXAM FINDINGS ARE AS VIEWED BY MYSELF VIA VIDEO OR AS REPORTED BY NURSE IF SPECIFIED AS SUCH, EXAM NOT DONE PERSONALLY BY MYSELF AT BEDSIDE.    Significant Labs:   BMP:   Recent Labs   Lab 03/29/20  0417 03/29/20  0942     --    *  --    K 3.5  --      --    CO2 15*  --    BUN 19  --    CREATININE 5.1*  --    CALCIUM 6.7*  --    MG  --  1.4*     CBC:   Recent Labs   Lab 03/28/20  0356 03/29/20  0417   WBC 9.68 8.80   HGB 9.6* 8.6*   HCT 31.4* 28.0*    212       Significant Imaging: I have reviewed all pertinent imaging results/findings within the past 24 hours.      Assessment/Plan:      * Acute respiratory failure  Patient with Hypoxic Respiratory failure which is Acute.  she is not on home oxygen. Supplemental ventilation was provided and noted- Vent Mode: A/C  Oxygen Concentration (%):  [40] 40  Resp Rate Total:  [26 br/min-106 br/min] 35 br/min  Vt Set:  [350 mL] 350 mL  PEEP/CPAP:  [8 grG73-62 cmH20] 8 cmH20  Pressure Support:  [0 cmH20-10 cmH20] 0 cmH20  Mean Airway Pressure:  [12 oyJ35-86 cmH20] 18 cmH20 and oxygen saturations . Differential diagnosis includes - Pneumonia Viral Labs and images were reviewed. Patient Has recent ABG- No components found for: ABG. Will treat underlying causes and adjust management of respiratory failure as follows-     Temp:  [97.4 °F (36.3 °C)-98.6 °F (37 °C)]   Pulse:  [71-97]   Resp:  [23-39]   BP: ()/(49-78)   SpO2:  [93 %-100 %]   Arterial Line BP: ()/(51-73)      Pulmonary consultation.   Continue beta 2 agonist bronchodilator treatments.   Continue IV antibiotics - ceftriaxone and azithromycin.   D dimer up 1, ast/alt sl up 105/81, procal 0.12, covid pos.  Troponin neg.  Ratio 74/0.5 on peep 8-   390 is 6 cc/kg ;  Will continue Plaquenil 400 mg daily  as per COVID  protocol.  Microbiology Results (last 7 days)     Procedure Component Value Units Date/Time    Blood culture x two cultures. Draw prior to antibiotics. [[id number]] Collected:  03/25/20 1411    Order Status:  Completed Specimen:  Blood from Antecubital, Right  Arm Updated:  03/29/20 2212     Blood Culture, Routine No Growth to date      No Growth to date      No Growth to date      No Growth to date      No Growth to date    Narrative:       Aerobic and anaerobic    Blood culture [[id number]] Collected:  03/26/20 0612    Order Status:  Completed Specimen:  Blood Updated:  03/29/20 1012     Blood Culture, Routine No Growth to date      No Growth to date      No Growth to date      No Growth to date    Blood culture [[id number]] Collected:  03/26/20 0432    Order Status:  Completed Specimen:  Blood Updated:  03/29/20 1012     Blood Culture, Routine No Growth to date      No Growth to date      No Growth to date      No Growth to date    Culture, Respiratory with Gram Stain [[id number]] Collected:  03/25/20 2150    Order Status:  Completed Specimen:  Tracheal Aspirate Updated:  03/28/20 0927     Respiratory Culture Normal respiratory anamaria      No S aureus or Pseudomonas isolated.     Gram Stain (Respiratory) <10 epithelial cells per low power field.     Gram Stain (Respiratory) Rare WBC's     Gram Stain (Respiratory) Rare Gram positive cocci    Culture, Respiratory with Gram Stain [[id number]] Collected:  03/25/20 2152    Order Status:  Canceled Specimen:  Respiratory from Sputum     Influenza A & B by Molecular [[id number]]     Order Status:  Canceled Specimen:  Nasopharyngeal Swab     Blood culture x two cultures. Draw prior to antibiotics. [[id number]]     Order Status:  Canceled Specimen:  Blood           Continue routine medications as before.   Follow airborne/droplet precautions.            Acute renal failure  Will follow-up with the renal recommendations  Will continue to hydrate for now as needed monitor for  response  Lasix is was started yesterday but stopped this morning  Urine output improved this morning.  But in the last 24 hr urine output was less than 100 cc        COVID-19 virus infection  Will continue Plaquenil    Covid-19 Virus Infection  - Infection Control notified    - Isolation:   - Airborne and Droplet Precautions  - N95 masks must be fit tested, wear eye protection  - 20 second hand hygiene   - Limit visitors per hospital policy   - Consolidating lab draws, nursing care, and interventions    - Diagnostics: (rising CRP, persistent lymphopenia, hyponatremia, hyperferritinemia, elevated troponin, elevated d-dimer, age, and comorbidities are significant predictors of poor clinical outcome)   - CBC:   trend Q48hrs  - CMP:        trend Q48hrs  - Procalcitonin:  - D-dimer:  trend Q48hrs  - Ferritin:  repeat prior to discharge  - CRP:        trend Q48hrs  - LDH:  - BNP:  - Troponin:    - ECG:   - rapid Flu:   - RIP only if BMT/solid transplant:   - Legionella antigen:   - Blood culture x2:   - Sputum culture:   - CXR:   - UA and culture:      - Management:   - Bundle care as able to minimize in/out of room   - Supplemental O2 to maintain SpO2 >92%,   if requiring 6L NC or higher, place on nonrebreather and discuss case with MICU   - Telemetry & continuous Pulse Ox   - albuterol INHALER PRN 4puff Q6hr approximates a nebulizer (avoid nebulization of secretions)   - apap PRN fever   - Avoiding NIPPV to prevent aerosolization   (including home CPAP/BiPAP unless on a case-by-case basis and only in negative pressure room)   - Cautious use of NSAIDS for fever per WHO recommendations (3/16/2020)   - No new ACEi/ARB start or discontinuation of chronic med unless hypotensive (Esler et al. Journal of Hypertension 2020, 38:000-000)   - Careful use of steroids in the absence of other indications   - unless septic shock due to increased viral replication   - Fluid sparing resuscitation   - Empiric antibiotics per likely  source & patient allergies    - CAP: x 5 day course  Ceftriaxone 1g IV Q24hrs            Azithromycin 500mg IV day #1, then 250mg PO daily x4 days                 If MRSA risk factors, add Vancomycin IV (PharmD consult)   - If patient meets criteria per Hospital Protocol    - start statin (if CPK WNL)    - start HCQ 400mg PO BID x1 day, then 400mg PO daily x 4 days (check G6PD, ECG, and start Qshift POCT glucose)    Goals of care, counseling/discussion  - Reviewed the typical clinical course of COVID19 with the daughter Danya (patient name or relationship to patient), including the potential for acute decompensation requiring intubation and mechanical ventilation  - Discussed again as part of routine daily evaluation, patient/POA maintains code status of Full code    VTE High Risk Prophylaxis: enoxaparin 40mg sq QHS @ 2100 (bundled care) if GFR >30    Patient's chronic/stable medical conditions noted in the problem list above will be managed with the patient's home medications as tolerated.           ARDS (adult respiratory distress syndrome)    --will cont ARDSnet Protocol.  --Target 6-8ml/kg Ideal Body Weight. Decrease TV as tolerated.  --Plateau pressure goal <30cm H2O.  --Oxygenation goal PaO2 55-80 or SpO2 88-95%.  --pH goal 7.30-7.45.  --Wean to PSV as tolerated.        COVID-19 virus detected  Continue Plaquenil   Plaquenil 400 mg twice daily on Day 1 followed by 400 mg daily for 5 days as per COVID protocol.   Continue routine medications as before.   Follow airborne/droplet precautions.      Hypothyroid  Check TSH, Chronic problem. Will continue chronic medications and monitor for any changes, adjusting as needed.          Morbid obesity  Body mass index is 44.78 kg/m². Morbid obesity complicates all aspects of disease management from diagnostic modalities to treatment. Weight loss encouraged and health benefits explained to patient.            VTE Risk Mitigation (From admission, onward)         Ordered      heparin (porcine) injection 4,000 Units  As needed (PRN)      03/29/20 2001     enoxaparin injection 40 mg  Every 12 hours      03/25/20 2313     IP VTE HIGH RISK PATIENT  Once      03/25/20 2313                Critical care time spent on the evaluation and treatment of severe organ dysfunction, review of pertinent labs and imaging studies, discussions with consulting providers and discussions with patient/family: 60 minutes.      Kady Gomez MD  Department of Hospital Medicine   [facility]

## 2020-03-30 NOTE — ASSESSMENT & PLAN NOTE
Body mass index is 48.03 kg/m². Morbid obesity complicates all aspects of disease management from diagnostic modalities to treatment. Weight loss encouraged and health benefits explained to patient.

## 2020-03-30 NOTE — PROGRESS NOTES
INPATIENT NEPHROLOGY PROGRESS  [facility] NEPHROLOGY    Patient Name: Maria Victoria Hernandez  Date: 03/30/2020    Reason for consultation: MAIKOL    History of Present Illness:  54AAF nurse with morbid obesity, hypothyroidism presents PNA, intubated, positive for COVID19. Admit Cr 0.7. Went 5.1 and HD started on 3/29.     3/28  Scr worse today.  Only one shift recorded for output, 520cc.  Still hyponatremic, vent setting adjusted per pulmonology note for acidosis.  K+ at goal after repletion.  Has siri, may need HD initiated.  3/29  Non oliguric.  In no distress  3/30 VSS, seen and examined on HD, tolerating well. Plan another HD in AM, discussed with daughter who is a nurse here too.    Plan of Care:    1. Septic shock 2/2 COVID PNA with HHRF requiring MV  - On abx per ID/Pulm.  - She is intubated/sedated.    2. MAIKOL- suspect multifactorial ATN in setting of shock/hypotension/intravascular volume depletion + diuresis; trace hematuria noted  - no nsaids or IV contrast (meloxicam noted on home med list)   - Continue mueller. UO is not great but is encouraging.  - Dose meds for CrCl < 20.  - HD again tomorrow.    3. Hypovolemic hyponatremia  - Will trend serum Na with volume resuscitation.    4. Hypokalemia  - better    5. HypoMg  - better    6. Anemia  - Trend Hgb.  - She is on PO iron at home.    Thank you for allowing us to participate in this patient's care. We will continue to follow.    Vital Signs:  Temp Readings from Last 3 Encounters:   03/30/20 98.9 °F (37.2 °C) (Axillary)       Pulse Readings from Last 3 Encounters:   03/30/20 81   01/15/15 84   12/17/13 80       BP Readings from Last 3 Encounters:   03/30/20 (!) 93/56   01/15/15 124/82   12/17/13 102/68       Weight:  Wt Readings from Last 3 Encounters:   03/30/20 115.3 kg (254 lb 3.1 oz)   01/15/15 105.8 kg (233 lb 4.8 oz)   12/17/13 101.2 kg (223 lb)       Past Medical & Surgical History:  Past Medical History:   Diagnosis Date    Colon polyp      Diverticulosis large intestine w/o perforation or abscess w/bleeding     Iron deficiency anemia     Prediabetes     Thyroid disease     Vitamin B 12 deficiency     Vitamin D deficiency        Past Surgical History:   Procedure Laterality Date    TUBAL LIGATION         Past Social History:  Social History     Socioeconomic History    Marital status:      Spouse name: Not on file    Number of children: Not on file    Years of education: Not on file    Highest education level: Not on file   Occupational History    Not on file   Social Needs    Financial resource strain: Not on file    Food insecurity:     Worry: Not on file     Inability: Not on file    Transportation needs:     Medical: Not on file     Non-medical: Not on file   Tobacco Use    Smoking status: Never Smoker    Smokeless tobacco: Never Used   Substance and Sexual Activity    Alcohol use: No    Drug use: Not on file    Sexual activity: Not on file   Lifestyle    Physical activity:     Days per week: Not on file     Minutes per session: Not on file    Stress: Not on file   Relationships    Social connections:     Talks on phone: Not on file     Gets together: Not on file     Attends [employer] service: Not on file     Active member of club or organization: Not on file     Attends meetings of clubs or organizations: Not on file     Relationship status: Not on file   Other Topics Concern    Not on file   Social History Narrative    Not on file       Medications:  Scheduled Meds:   azithromycin  250 mg Per NG tube Daily    cefTRIAXone (ROCEPHIN) IVPB  1 g Intravenous Q24H    enoxparin  40 mg Subcutaneous Q12H    hydroxychloroquine  400 mg Per NG tube Daily    levothyroxine  100 mcg Oral Before breakfast    mupirocin   Nasal BID    pantoprazole  40 mg Intravenous Daily    rocuronium  100 mg Intravenous Once     Continuous Infusions:   sodium chloride 0.9% 75 mL/hr at 03/29/20 1155    fentanyl 150 mcg/hr (03/30/20 6719)  "   norepinephrine bitartrate-D5W Stopped (03/29/20 0918)     PRN Meds:.acetaminophen, heparin (porcine), propofoL, sodium chloride 0.9%  No current facility-administered medications on file prior to encounter.      Current Outpatient Medications on File Prior to Encounter   Medication Sig Dispense Refill    ferrous sulfate 325 mg (65 mg iron) Tab tablet Take 1 tablet (325 mg total) by mouth daily with breakfast. (Patient taking differently: Take 325 mg by mouth daily with breakfast. Take 2 tablets daily) 90 tablet 3    fluticasone propionate (FLONASE) 50 mcg/actuation nasal spray 1 spray by Each Nostril route once daily.      levothyroxine (SYNTHROID) 100 MCG tablet Take 1 tablet (100 mcg total) by mouth once daily. (Patient taking differently: Take 150 mcg by mouth once daily. ) 90 tablet 3    meloxicam (MOBIC) 7.5 MG tablet Take 7.5 mg by mouth once daily.      clotrimazole-betamethasone 1-0.05% (LOTRISONE) cream Apply topically 2 (two) times daily. 45 g 1    levocetirizine (XYZAL) 5 MG tablet Take 1 tablet (5 mg total) by mouth every evening. 30 tablet 6       Allergies:  Patient has no known allergies.    Past Family History:  Reviewed; refer to Hospitalist Admission Note    Review of Systems:  Unable to obtain due to MV    Physical Exam:  BP (!) 93/56   Pulse 81   Temp 98.9 °F (37.2 °C) (Axillary)   Resp (!) 35   Ht 5' 1" (1.549 m)   Wt 115.3 kg (254 lb 3.1 oz)   SpO2 97%   Breastfeeding? No   BMI 48.03 kg/m²     INS/OUTS:  I/O last 3 completed shifts:  In: 6213.4 [I.V.:5243.4; Other:500; NG/GT:420; IV Piggyback:50]  Out: 3495 [Urine:995; Other:2500]  No intake/output data recorded.    General Appearance:    Intubated, sedated, acutely ill, appears stated age   Head:    Normocephalic, atraumatic   Eyes:    Eyes closed       Mouth:   Moist mucus membranes   Lungs:     Coarse   Heart:    Regular rate and rhythm, S1 and S2 normal, no murmur, rub   or gallop   Abdomen:     Soft, non-tender, " non-distended, bowel sounds active all four   quadrants, no RT or guarding, no masses, no organomegaly   Extremities:   Warm and well perfused, distal pulses intact, no cyanosis or    peripheral edema   MSK:   No joint or muscle swelling, tenderness or deformity   Skin:   Skin color, texture, turgor normal, no rashes or lesions   Neurologic/Psychiatric:   Unable to assess     Results:  Lab Results   Component Value Date     (L) 03/30/2020    K 3.4 (L) 03/30/2020     03/30/2020    CO2 24 03/30/2020    BUN 13 03/30/2020    CREATININE 3.9 (H) 03/30/2020    CALCIUM 7.1 (L) 03/30/2020    ANIONGAP 10 03/30/2020    ESTGFRAFRICA 14 (A) 03/30/2020    EGFRNONAA 12 (A) 03/30/2020       Lab Results   Component Value Date    CALCIUM 7.1 (L) 03/30/2020    PHOS 4.8 (H) 03/29/2020       Recent Labs   Lab 03/30/20  0301   WBC 8.39   RBC 3.08*   HGB 8.2*   HCT 25.3*      MCV 82   MCH 26.6*   MCHC 32.4     I have personally reviewed pertinent radiological imaging and reports.    [facility] [place]  [address] [address]  [phone] (p)  [phone] (f)

## 2020-03-30 NOTE — RESPIRATORY THERAPY
03/29/20 8627   Patient Assessment/Suction   Level of Consciousness (AVPU) responds to voice   Respiratory Effort Normal;Unlabored   All Lung Fields Breath Sounds clear   Rhythm/Pattern, Respiratory assisted mechanically   PRE-TX-O2   O2 Device (Oxygen Therapy) ventilator   Oxygen Concentration (%) 40   SpO2 95 %   Pulse Oximetry Type Continuous   $ Pulse Oximetry - Multiple Charge Pulse Oximetry - Multiple   Pulse 81   Resp (!) 35   ETCO2   $ ETCO2 Charge Exhaled CO2 Monitoring   $ ETCO2 Usage Currently wearing   ETCO2 (mmHg) 36 mmHg   Vent Select   Charged w/in last 24h YES   Preset Conventional Ventilator Settings   Vent Type    Ventilation Type VC   Vent Mode A/C   Set Rate 35 BPM   Vt Set 350 mL   PEEP/CPAP 8 cmH20   Pressure Support 0 cmH20   Waveform RAMP   Peak Flow 60 L/min   Set Inspiratory Pressure 0 cmH20   Insp Time 0 Sec(s)   Plateau Set/Insp. Hold (sec) 0   Insp Rise Time  0 %   Trigger Sensitivity Flow/I-Trigger 2 L/min   P High 0 cm H2O   P Low 0 cm H2O   T High 0 sec   T Low 0 sec   Patient Ventilator Parameters   Resp Rate Total 31 br/min   Peak Airway Pressure 31 cmH2O   Mean Airway Pressure 18 cmH20   Plateau Pressure 26 cmH20   Exhaled Vt 354 mL   Total Ve 11.1 mL   Spont Ve 0 L   I:E Ratio Measured 1:1.70   Conventional Ventilator Alarms   Ve High Alarm 40 L/min   Resp Rate High Alarm 50 br/min   Press High Alarm 60 cmH2O   Apnea Rate 10   Apnea Volume (mL) 450 mL   Apnea Oxygen Concentration  100   Apnea Flow Rate (L/min) 75   T Apnea 20 sec(s)   Ready to Wean/Extubation Screen   FIO2<=50 (chart decimal) 0.4   MV<16L (chart vol.) 11.1   PEEP <=8 (chart #) 8   Ready to Wean Parameters   F/VT Ratio<105 (RSBI) (!) 98.87

## 2020-03-30 NOTE — ASSESSMENT & PLAN NOTE
Patient's anemia is currently controlled. Has not received any PRBCs to date.. Etiology likely d/t Anemia of chronic disease  Current CBC reviewed-   Lab Results   Component Value Date    HGB 8.2 (L) 03/30/2020    HCT 25.3 (L) 03/30/2020     Monitor serial CBC and transfuse if patient becomes hemodynamically unstable, symptomatic or H/H drops below 8/24

## 2020-03-30 NOTE — ASSESSMENT & PLAN NOTE
Patient with Hypoxic Respiratory failure which is Acute.  she is not on home oxygen. Supplemental ventilation was provided and noted- Vent Mode: A/C  Oxygen Concentration (%):  [40] 40  Resp Rate Total:  [26 br/min-106 br/min] 35 br/min  Vt Set:  [350 mL] 350 mL  PEEP/CPAP:  [8 lfO69-31 cmH20] 8 cmH20  Pressure Support:  [0 cmH20-10 cmH20] 0 cmH20  Mean Airway Pressure:  [12 mjS25-21 cmH20] 18 cmH20 and oxygen saturations . Differential diagnosis includes - Pneumonia Viral Labs and images were reviewed. Patient Has recent ABG- No components found for: ABG. Will treat underlying causes and adjust management of respiratory failure as follows-     Temp:  [97.4 °F (36.3 °C)-98.6 °F (37 °C)]   Pulse:  [71-97]   Resp:  [23-39]   BP: ()/(49-78)   SpO2:  [93 %-100 %]   Arterial Line BP: ()/(51-73)      Pulmonary consultation.   Continue beta 2 agonist bronchodilator treatments.   Continue IV antibiotics - ceftriaxone and azithromycin.   D dimer up 1, ast/alt sl up 105/81, procal 0.12, covid pos.  Troponin neg.  Ratio 74/0.5 on peep 8-   390 is 6 cc/kg ;  Will continue Plaquenil 400 mg daily  as per COVID protocol.  Microbiology Results (last 7 days)     Procedure Component Value Units Date/Time    Blood culture x two cultures. Draw prior to antibiotics. [[id number]] Collected:  03/25/20 1411    Order Status:  Completed Specimen:  Blood from Antecubital, Right  Arm Updated:  03/29/20 2212     Blood Culture, Routine No Growth to date      No Growth to date      No Growth to date      No Growth to date      No Growth to date    Narrative:       Aerobic and anaerobic    Blood culture [[id number]] Collected:  03/26/20 0612    Order Status:  Completed Specimen:  Blood Updated:  03/29/20 1012     Blood Culture, Routine No Growth to date      No Growth to date      No Growth to date      No Growth to date    Blood culture [[id number]] Collected:  03/26/20 0432    Order Status:  Completed Specimen:  Blood Updated:  03/29/20  1012     Blood Culture, Routine No Growth to date      No Growth to date      No Growth to date      No Growth to date    Culture, Respiratory with Gram Stain [[id number]] Collected:  03/25/20 2150    Order Status:  Completed Specimen:  Tracheal Aspirate Updated:  03/28/20 0927     Respiratory Culture Normal respiratory anamaria      No S aureus or Pseudomonas isolated.     Gram Stain (Respiratory) <10 epithelial cells per low power field.     Gram Stain (Respiratory) Rare WBC's     Gram Stain (Respiratory) Rare Gram positive cocci    Culture, Respiratory with Gram Stain [[id number]] Collected:  03/25/20 2152    Order Status:  Canceled Specimen:  Respiratory from Sputum     Influenza A & B by Molecular [[id number]]     Order Status:  Canceled Specimen:  Nasopharyngeal Swab     Blood culture x two cultures. Draw prior to antibiotics. [[id number]]     Order Status:  Canceled Specimen:  Blood           Continue routine medications as before.   Follow airborne/droplet precautions.

## 2020-03-30 NOTE — PLAN OF CARE
VSS. Dialysis completed today; 3L off.  Pt on spontaneous mode since 1345 today.  Pt vomited less than 30 minutes after sedation turned off.  Suctioned pt orally and in line, partial bath given, linen changed, and central line dressings changed.  MD notified; TORB to hold TF and will start TPN this evening.  ABG completed by RT. Dr. Mcguire messaged to inform her; awaiting further orders.  POC discussed with her daughter.  Safety maintained entire shift.

## 2020-03-30 NOTE — SUBJECTIVE & OBJECTIVE
Interval History: Patient's oxygen requirement improved. Patient off pressor. Blood pressure stable.     Review of Systems   Unable to perform ROS: Intubated     Objective:     Vital Signs (Most Recent):  Temp: 98.9 °F (37.2 °C) (03/30/20 1135)  Pulse: 88 (03/30/20 1135)  Resp: (!) 35 (03/30/20 1135)  BP: (!) 87/52 (03/30/20 1130)  SpO2: 97 % (03/30/20 1135) Vital Signs (24h Range):  Temp:  [97.4 °F (36.3 °C)-98.9 °F (37.2 °C)] 98.9 °F (37.2 °C)  Pulse:  [71-97] 88  Resp:  [23-39] 35  SpO2:  [89 %-100 %] 97 %  BP: ()/(49-78) 87/52  Arterial Line BP: ()/(47-73) 122/57     Weight: 115.3 kg (254 lb 3.1 oz)  Body mass index is 48.03 kg/m².    Intake/Output Summary (Last 24 hours) at 3/30/2020 1213  Last data filed at 3/30/2020 1130  Gross per 24 hour   Intake 4238.34 ml   Output 6400 ml   Net -2161.66 ml      Physical Exam   Nursing note and vitals reviewed.  PATIENT WAS SEEN AS A VIDEO VISIT WITH NURSE IN PATIENT ROOM WITH PATIENT TO ASSIST DURING THE VISIT. PHYSICAL EXAM FINDINGS ARE AS VIEWED BY MYSELF VIA VIDEO OR AS REPORTED BY NURSE IF SPECIFIED AS SUCH, EXAM NOT DONE PERSONALLY BY MYSELF AT BEDSIDE.    Significant Labs:   Blood Culture: No results for input(s): LABBLOO in the last 48 hours.  BMP:   Recent Labs   Lab 03/29/20  0942 03/30/20  0301   GLU  --  97   NA  --  135*   K  --  3.4*   CL  --  101   CO2  --  24   BUN  --  13   CREATININE  --  3.9*   CALCIUM  --  7.1*   MG 1.4*  --      CBC:   Recent Labs   Lab 03/29/20  0417 03/30/20  0301   WBC 8.80 8.39   HGB 8.6* 8.2*   HCT 28.0* 25.3*    226       Significant Imaging: I have reviewed all pertinent imaging results/findings within the past 24 hours.

## 2020-03-30 NOTE — SUBJECTIVE & OBJECTIVE
Interval History:   Review of Systems   Unable to perform ROS: Intubated     Objective:     Vital Signs (Most Recent):  Temp: 98.6 °F (37 °C) (03/29/20 2110)  Pulse: 86 (03/29/20 2300)  Resp: (!) 35 (03/29/20 2300)  BP: (!) 93/55 (03/29/20 2300)  SpO2: 96 % (03/29/20 2300) Vital Signs (24h Range):  Temp:  [97.4 °F (36.3 °C)-98.6 °F (37 °C)] 98.6 °F (37 °C)  Pulse:  [] 86  Resp:  [23-40] 35  SpO2:  [93 %-100 %] 96 %  BP: ()/(49-78) 93/55  Arterial Line BP: ()/() 136/61     Weight: 107.5 kg (237 lb)  Body mass index is 44.78 kg/m².    Intake/Output Summary (Last 24 hours) at 3/29/2020 2355  Last data filed at 3/29/2020 2100  Gross per 24 hour   Intake 4863.11 ml   Output 3340 ml   Net 1523.11 ml      Physical Exam   Nursing note and vitals reviewed.    PATIENT WAS SEEN AS A VIDEO VISIT WITH NURSE IN PATIENT ROOM WITH PATIENT TO ASSIST DURING THE VISIT. PHYSICAL EXAM FINDINGS ARE AS VIEWED BY MYSELF VIA VIDEO OR AS REPORTED BY NURSE IF SPECIFIED AS SUCH, EXAM NOT DONE PERSONALLY BY MYSELF AT BEDSIDE.    Significant Labs:   BMP:   Recent Labs   Lab 03/29/20 0417 03/29/20  0942     --    *  --    K 3.5  --      --    CO2 15*  --    BUN 19  --    CREATININE 5.1*  --    CALCIUM 6.7*  --    MG  --  1.4*     CBC:   Recent Labs   Lab 03/28/20  0356 03/29/20 0417   WBC 9.68 8.80   HGB 9.6* 8.6*   HCT 31.4* 28.0*    212       Significant Imaging: I have reviewed all pertinent imaging results/findings within the past 24 hours.

## 2020-03-30 NOTE — RESPIRATORY THERAPY
Results for WOLF SINGER (MRN [id number]) as of 3/30/2020 16:28   Ref. Range 3/30/2020 15:05   POC PH Latest Ref Range: 7.35 - 7.45  7.288 (LL)   POC PCO2 Latest Ref Range: 35 - 45 mmHg 43.5   POC PO2 Latest Ref Range: 80 - 100 mmHg 70 (L)   POC BE Latest Ref Range: -2 to 2 mmol/L -6   POC HCO3 Latest Ref Range: 24 - 28 mmol/L 20.8 (L)   POC SATURATED O2 Latest Ref Range: 95 - 100 % 92 (L)   POC TCO2 Latest Ref Range: 23 - 27 mmol/L 22 (L)   FiO2 Unknown 40   PEEP Unknown 8   Sample Unknown ARTERIAL   DelSys Unknown Adult Vent   Allens Test Unknown N/A   Site Unknown Chelly/UAC   Mode Unknown CPAP   Reported to Diana Hollingsworth, RN, PS 15

## 2020-03-30 NOTE — PLAN OF CARE
Pt remained free of falls or injuries this shift.  Pt maintained BP without need for Levophed gtt that was stopped on days.  Pt remained relaxed and comfortable through the night.  Bite block placed this AM while washing Pt and kept Pt from clamping down on ET tube.  No residual noted from NG but copious tan secretions noted from mouth this AM when oral care given.  UO was low, but since we are immediately post dialysis no interventions carried out at this time.  Pt resting comfortably at this time, family updated of status overnight.  Will continue to monitor.

## 2020-03-30 NOTE — PROGRESS NOTES
[facility] Hospital Medicine  Progress Note    Patient Name: Maria Victoria Hernandez  MRN: [id number]  Patient Class: IP- Inpatient   Admission Date: 3/25/2020  Length of Stay: 5 days  Attending Physician: Kady Gomez MD  Primary Care Provider: Candice Deluna MD        Subjective:     Principal Problem:Acute respiratory failure        HPI:  Patient is a 54 years old  female with morbid obesity in past medical history significant for hypothyroidism is being admitted to intensive care unit under inpatient status from [facility] Emergency room with worsening shortness of breath.  Three days ago patient was tested positive for COVID - 19.  Patient works at [employer].  Patient has been experiencing subjective fever, generalized body aches and pains and nonproductive cough for few days.  Patient is getting increasingly short of breath especially for the past 2 days.  Upon arrival to the emergency room patient was noted to be hypoxic around 89%.  Up on 3 L patient's oxygen sats are around 96%.  Patient denies any chest pain, leg swelling or calf tenderness.      Overview/Hospital Course:  No notes on file    Interval History: Patient's oxygen requirement improved. Patient off pressor. Blood pressure stable.     Review of Systems   Unable to perform ROS: Intubated     Objective:     Vital Signs (Most Recent):  Temp: 98.9 °F (37.2 °C) (03/30/20 1135)  Pulse: 88 (03/30/20 1135)  Resp: (!) 35 (03/30/20 1135)  BP: (!) 87/52 (03/30/20 1130)  SpO2: 97 % (03/30/20 1135) Vital Signs (24h Range):  Temp:  [97.4 °F (36.3 °C)-98.9 °F (37.2 °C)] 98.9 °F (37.2 °C)  Pulse:  [71-97] 88  Resp:  [23-39] 35  SpO2:  [89 %-100 %] 97 %  BP: ()/(49-78) 87/52  Arterial Line BP: ()/(47-73) 122/57     Weight: 115.3 kg (254 lb 3.1 oz)  Body mass index is 48.03 kg/m².    Intake/Output Summary (Last 24 hours) at 3/30/2020 1213  Last data filed at  3/30/2020 1130  Gross per 24 hour   Intake 4238.34 ml   Output 6400 ml   Net -2161.66 ml      Physical Exam   Nursing note and vitals reviewed.  PATIENT WAS SEEN AS A VIDEO VISIT WITH NURSE IN PATIENT ROOM WITH PATIENT TO ASSIST DURING THE VISIT. PHYSICAL EXAM FINDINGS ARE AS VIEWED BY MYSELF VIA VIDEO OR AS REPORTED BY NURSE IF SPECIFIED AS SUCH, EXAM NOT DONE PERSONALLY BY MYSELF AT BEDSIDE.    Significant Labs:   Blood Culture: No results for input(s): LABBLOO in the last 48 hours.  BMP:   Recent Labs   Lab 03/29/20  0942 03/30/20  0301   GLU  --  97   NA  --  135*   K  --  3.4*   CL  --  101   CO2  --  24   BUN  --  13   CREATININE  --  3.9*   CALCIUM  --  7.1*   MG 1.4*  --      CBC:   Recent Labs   Lab 03/29/20  0417 03/30/20  0301   WBC 8.80 8.39   HGB 8.6* 8.2*   HCT 28.0* 25.3*    226       Significant Imaging: I have reviewed all pertinent imaging results/findings within the past 24 hours.      Assessment/Plan:      * Acute respiratory failure  Patient with Hypoxic Respiratory failure which is Acute.  she is not on home oxygen. Supplemental ventilation was provided and noted- Vent Mode: A/C  Oxygen Concentration (%):  [40] 40  Resp Rate Total:  [26 br/min-106 br/min] 35 br/min  Vt Set:  [350 mL] 350 mL  PEEP/CPAP:  [8 cmH20] 8 cmH20  Pressure Support:  [0 cmH20-10 cmH20] 0 cmH20  Mean Airway Pressure:  [12 osA53-45 cmH20] 16 cmH20 and oxygen saturations . Differential diagnosis includes - Pneumonia Viral Labs and images were reviewed. Patient Has recent ABG- No components found for: ABG. Will treat underlying causes and adjust management of respiratory failure as follows-     Temp:  [98.3 °F (36.8 °C)-98.9 °F (37.2 °C)]   Pulse:  [77-95]   Resp:  [35-38]   BP: ()/(50-63)   SpO2:  [94 %-99 %]   Arterial Line BP: (108-160)/(48-69)      Pulmonary consultation.   Continue beta 2 agonist bronchodilator treatments.   Continue IV antibiotics - ceftriaxone and azithromycin.   D dimer up 1, ast/alt sl  up 105/81, procal 0.12, covid pos.  Troponin neg.  Ratio 74/0.5 on peep 8-   390 is 6 cc/kg ;  Will continue Plaquenil 400 mg daily  as per COVID protocol.  Microbiology Results (last 7 days)     Procedure Component Value Units Date/Time    Blood culture [[id number]] Collected:  03/26/20 0432    Order Status:  Completed Specimen:  Blood Updated:  03/30/20 1214     Blood Culture, Routine No Growth after 4 days.     Blood culture [[id number]] Collected:  03/26/20 0612    Order Status:  Completed Specimen:  Blood Updated:  03/30/20 1214     Blood Culture, Routine No Growth after 4 days.     Blood culture x two cultures. Draw prior to antibiotics. [[id number]] Collected:  03/25/20 1411    Order Status:  Completed Specimen:  Blood from Antecubital, Right  Arm Updated:  03/30/20 1212     Blood Culture, Routine No Growth after 4 days.     Narrative:       Aerobic and anaerobic    Culture, Respiratory with Gram Stain [[id number]] Collected:  03/25/20 2150    Order Status:  Completed Specimen:  Tracheal Aspirate Updated:  03/28/20 0927     Respiratory Culture Normal respiratory anamaria      No S aureus or Pseudomonas isolated.     Gram Stain (Respiratory) <10 epithelial cells per low power field.     Gram Stain (Respiratory) Rare WBC's     Gram Stain (Respiratory) Rare Gram positive cocci    Culture, Respiratory with Gram Stain [[id number]] Collected:  03/25/20 2152    Order Status:  Canceled Specimen:  Respiratory from Sputum     Influenza A & B by Molecular [[id number]]     Order Status:  Canceled Specimen:  Nasopharyngeal Swab     Blood culture x two cultures. Draw prior to antibiotics. [[id number]]     Order Status:  Canceled Specimen:  Blood           Continue routine medications as before.   Follow airborne/droplet precautions.            Iron deficiency anemia, unspecified  Patient's anemia is currently controlled. Has not received any PRBCs to date.. Etiology likely d/t Anemia of chronic disease  Current CBC reviewed-   Lab  Results   Component Value Date    HGB 8.2 (L) 03/30/2020    HCT 25.3 (L) 03/30/2020     Monitor serial CBC and transfuse if patient becomes hemodynamically unstable, symptomatic or H/H drops below 8/24        Diverticulosis of large intestine without hemorrhage  Will monitor of any blood in stools  HnH trending down      Acute renal failure  Will follow-up with the renal recommendations  Currently on HD   Receiving her second cycle of HD today  removed 3000uf net          COVID-19 virus infection  Will continue Plaquenil    Covid-19 Virus Infection  - Infection Control notified    - Isolation:   - Airborne and Droplet Precautions  - N95 masks must be fit tested, wear eye protection  - 20 second hand hygiene   - Limit visitors per hospital policy   - Consolidating lab draws, nursing care, and interventions    - Diagnostics: (rising CRP, persistent lymphopenia, hyponatremia, hyperferritinemia, elevated troponin, elevated d-dimer, age, and comorbidities are significant predictors of poor clinical outcome)   - CBC:   trend Q48hrs  - CMP:        trend Q48hrs  - Procalcitonin:  - D-dimer:  trend Q48hrs  - Ferritin:  repeat prior to discharge  - CRP:        trend Q48hrs  - LDH:  - BNP:  - Troponin:    - ECG:   - rapid Flu:   - RIP only if BMT/solid transplant:   - Legionella antigen:   - Blood culture x2:   - Sputum culture:   - CXR:   - UA and culture:      - Management:   - Bundle care as able to minimize in/out of room   - Supplemental O2 to maintain SpO2 >92%,   if requiring 6L NC or higher, place on nonrebreather and discuss case with MICU   - Telemetry & continuous Pulse Ox   - albuterol INHALER PRN 4puff Q6hr approximates a nebulizer (avoid nebulization of secretions)   - apap PRN fever   - Avoiding NIPPV to prevent aerosolization   (including home CPAP/BiPAP unless on a case-by-case basis and only in negative pressure room)   - Cautious use of NSAIDS for fever per WHO recommendations (3/16/2020)   - No new ACEi/ARB  start or discontinuation of chronic med unless hypotensive (Ed et al. Journal of Hypertension 2020, 38:000-000)   - Careful use of steroids in the absence of other indications   - unless septic shock due to increased viral replication   - Fluid sparing resuscitation   - Empiric antibiotics per likely source & patient allergies    - CAP: x 5 day course  Ceftriaxone 1g IV Q24hrs            Azithromycin 500mg IV day #1, then 250mg PO daily x4 days                 If MRSA risk factors, add Vancomycin IV (PharmD consult)   - If patient meets criteria per Hospital Protocol    - start statin (if CPK WNL)    - start HCQ 400mg PO BID x1 day, then 400mg PO daily x 4 days (check G6PD, ECG, and start Qshift POCT glucose)    Goals of care, counseling/discussion  - Reviewed the typical clinical course of COVID19 with the daughter Danya (patient name or relationship to patient), including the potential for acute decompensation requiring intubation and mechanical ventilation  - Discussed again as part of routine daily evaluation, patient/POA maintains code status of Full code    VTE High Risk Prophylaxis: enoxaparin 40mg sq QHS @ 2100 (bundled care) if GFR >30    Patient's chronic/stable medical conditions noted in the problem list above will be managed with the patient's home medications as tolerated.           ARDS (adult respiratory distress syndrome)    --will cont ARDSnet Protocol.  --Target 6-8ml/kg Ideal Body Weight. Decrease TV as tolerated.  --Plateau pressure goal <30cm H2O.  --Oxygenation goal PaO2 55-80 or SpO2 88-95%.  --pH goal 7.30-7.45.  --Wean to PSV as tolerated.        COVID-19 virus detected  Continue Plaquenil   Plaquenil 400 mg twice daily on Day 1 followed by 400 mg daily for 5 days as per COVID protocol.   Continue routine medications as before.   Follow airborne/droplet precautions.      Hypothyroid  Check TSH, Chronic problem. Will continue chronic medications and monitor for any changes, adjusting as  needed.          Morbid obesity  Body mass index is 48.03 kg/m². Morbid obesity complicates all aspects of disease management from diagnostic modalities to treatment. Weight loss encouraged and health benefits explained to patient.            VTE Risk Mitigation (From admission, onward)         Ordered     heparin (porcine) injection 4,000 Units  As needed (PRN)      03/29/20 2001     enoxaparin injection 40 mg  Every 12 hours      03/25/20 2313     IP VTE HIGH RISK PATIENT  Once      03/25/20 2313                Critical care time spent on the evaluation and treatment of severe organ dysfunction, review of pertinent labs and imaging studies, discussions with consulting providers and discussions with patient/family: 60 minutes.      Kady Gomez MD  Department of Hospital Medicine   [facility]

## 2020-03-30 NOTE — ASSESSMENT & PLAN NOTE
Will follow-up with the renal recommendations  Currently on HD   Receiving her second cycle of HD today  removed 3000uf net

## 2020-03-31 PROBLEM — G93.40 ACUTE ENCEPHALOPATHY: Status: ACTIVE | Noted: 2020-03-31

## 2020-03-31 LAB
ALBUMIN SERPL BCP-MCNC: 1.9 G/DL (ref 3.5–5.2)
ALLENS TEST: ABNORMAL
ALP SERPL-CCNC: 94 U/L (ref 55–135)
ALT SERPL W/O P-5'-P-CCNC: 70 U/L (ref 10–44)
AMMONIA PLAS-SCNC: 41 UMOL/L (ref 10–50)
AMMONIA PLAS-SCNC: 42 UMOL/L (ref 10–50)
ANION GAP SERPL CALC-SCNC: 11 MMOL/L (ref 8–16)
APTT BLDCRRT: 32.9 SEC (ref 21–32)
AST SERPL-CCNC: 64 U/L (ref 10–40)
BASOPHILS # BLD AUTO: ABNORMAL K/UL (ref 0–0.2)
BASOPHILS NFR BLD: 0 % (ref 0–1.9)
BILIRUB SERPL-MCNC: 0.6 MG/DL (ref 0.1–1)
BUN SERPL-MCNC: 17 MG/DL (ref 6–20)
CALCIUM SERPL-MCNC: 7.7 MG/DL (ref 8.7–10.5)
CHLORIDE SERPL-SCNC: 103 MMOL/L (ref 95–110)
CO2 SERPL-SCNC: 19 MMOL/L (ref 23–29)
CREAT SERPL-MCNC: 3.6 MG/DL (ref 0.5–1.4)
DELSYS: ABNORMAL
DIFFERENTIAL METHOD: ABNORMAL
EOSINOPHIL # BLD AUTO: ABNORMAL K/UL (ref 0–0.5)
EOSINOPHIL NFR BLD: 0 % (ref 0–8)
ERYTHROCYTE [DISTWIDTH] IN BLOOD BY AUTOMATED COUNT: 17.9 % (ref 11.5–14.5)
ERYTHROCYTE [SEDIMENTATION RATE] IN BLOOD BY WESTERGREN METHOD: 35 MM/H
EST. GFR  (AFRICAN AMERICAN): 16 ML/MIN/1.73 M^2
EST. GFR  (NON AFRICAN AMERICAN): 14 ML/MIN/1.73 M^2
ETCO2: 34
FERRITIN SERPL-MCNC: 1538 NG/ML (ref 20–300)
FIO2: 40
FOLATE SERPL-MCNC: 4.2 NG/ML (ref 4–24)
GLUCOSE SERPL-MCNC: 113 MG/DL (ref 70–110)
HCO3 UR-SCNC: 20.9 MMOL/L (ref 24–28)
HCT VFR BLD AUTO: 26 % (ref 37–48.5)
HGB BLD-MCNC: 8.2 G/DL (ref 12–16)
HYPOCHROMIA BLD QL SMEAR: ABNORMAL
IMM GRANULOCYTES # BLD AUTO: ABNORMAL K/UL (ref 0–0.04)
IMM GRANULOCYTES NFR BLD AUTO: ABNORMAL % (ref 0–0.5)
INR PPP: 1 (ref 0.8–1.2)
LYMPHOCYTES # BLD AUTO: ABNORMAL K/UL (ref 1–4.8)
LYMPHOCYTES NFR BLD: 4 % (ref 18–48)
MCH RBC QN AUTO: 26.8 PG (ref 27–31)
MCHC RBC AUTO-ENTMCNC: 31.5 G/DL (ref 32–36)
MCV RBC AUTO: 85 FL (ref 82–98)
METAMYELOCYTES NFR BLD MANUAL: 1 %
MIN VOL: 12.6
MODE: ABNORMAL
MONOCYTES # BLD AUTO: ABNORMAL K/UL (ref 0.3–1)
MONOCYTES NFR BLD: 5 % (ref 4–15)
MYELOCYTES NFR BLD MANUAL: 3 %
NEUTROPHILS NFR BLD: 81 % (ref 38–73)
NEUTS BAND NFR BLD MANUAL: 6 %
NRBC BLD-RTO: 0 /100 WBC
PCO2 BLDA: 37.1 MMHG (ref 35–45)
PEEP: 8
PH SMN: 7.36 [PH] (ref 7.35–7.45)
PIP: 28
PLATELET # BLD AUTO: 245 K/UL (ref 150–350)
PLATELET BLD QL SMEAR: ABNORMAL
PMV BLD AUTO: 10.8 FL (ref 9.2–12.9)
PO2 BLDA: 103 MMHG (ref 80–100)
POC BE: -5 MMOL/L
POC SATURATED O2: 98 % (ref 95–100)
POC TCO2: 22 MMOL/L (ref 23–27)
POTASSIUM SERPL-SCNC: 4 MMOL/L (ref 3.5–5.1)
PROT SERPL-MCNC: 7 G/DL (ref 6–8.4)
PROTHROMBIN TIME: 9.9 SEC (ref 9–12.5)
RBC # BLD AUTO: 3.06 M/UL (ref 4–5.4)
SAMPLE: ABNORMAL
SITE: ABNORMAL
SODIUM SERPL-SCNC: 133 MMOL/L (ref 136–145)
SP02: 97
TRIGL SERPL-MCNC: 351 MG/DL (ref 30–150)
TSH SERPL DL<=0.005 MIU/L-ACNC: 2.08 UIU/ML (ref 0.4–4)
VIT B12 SERPL-MCNC: >2000 PG/ML (ref 210–950)
VT: 350
WBC # BLD AUTO: 9.77 K/UL (ref 3.9–12.7)

## 2020-03-31 PROCEDURE — 80053 COMPREHEN METABOLIC PANEL: CPT

## 2020-03-31 PROCEDURE — 99291 CRITICAL CARE FIRST HOUR: CPT | Mod: ,,, | Performed by: INTERNAL MEDICINE

## 2020-03-31 PROCEDURE — 85730 THROMBOPLASTIN TIME PARTIAL: CPT

## 2020-03-31 PROCEDURE — 82140 ASSAY OF AMMONIA: CPT | Mod: 91

## 2020-03-31 PROCEDURE — 99900035 HC TECH TIME PER 15 MIN (STAT)

## 2020-03-31 PROCEDURE — 84478 ASSAY OF TRIGLYCERIDES: CPT

## 2020-03-31 PROCEDURE — 25000003 PHARM REV CODE 250: Performed by: INTERNAL MEDICINE

## 2020-03-31 PROCEDURE — 82140 ASSAY OF AMMONIA: CPT

## 2020-03-31 PROCEDURE — 85610 PROTHROMBIN TIME: CPT

## 2020-03-31 PROCEDURE — 99291 PR CRITICAL CARE, E/M 30-74 MINUTES: ICD-10-PCS | Mod: ,,, | Performed by: INTERNAL MEDICINE

## 2020-03-31 PROCEDURE — 82746 ASSAY OF FOLIC ACID SERUM: CPT

## 2020-03-31 PROCEDURE — 27000221 HC OXYGEN, UP TO 24 HOURS

## 2020-03-31 PROCEDURE — 86592 SYPHILIS TEST NON-TREP QUAL: CPT

## 2020-03-31 PROCEDURE — 95816 PR EEG,W/AWAKE & DROWSY RECORD: ICD-10-PCS | Mod: 26,,, | Performed by: PSYCHIATRY & NEUROLOGY

## 2020-03-31 PROCEDURE — 94770 HC EXHALED C02 TEST: CPT

## 2020-03-31 PROCEDURE — C9113 INJ PANTOPRAZOLE SODIUM, VIA: HCPCS | Performed by: INTERNAL MEDICINE

## 2020-03-31 PROCEDURE — 80100014 HC HEMODIALYSIS 1:1

## 2020-03-31 PROCEDURE — 82728 ASSAY OF FERRITIN: CPT

## 2020-03-31 PROCEDURE — 63600175 PHARM REV CODE 636 W HCPCS: Performed by: INTERNAL MEDICINE

## 2020-03-31 PROCEDURE — 20000000 HC ICU ROOM

## 2020-03-31 PROCEDURE — 94003 VENT MGMT INPAT SUBQ DAY: CPT

## 2020-03-31 PROCEDURE — 82607 VITAMIN B-12: CPT

## 2020-03-31 PROCEDURE — 95819 EEG AWAKE AND ASLEEP: CPT

## 2020-03-31 PROCEDURE — 85007 BL SMEAR W/DIFF WBC COUNT: CPT

## 2020-03-31 PROCEDURE — 99900026 HC AIRWAY MAINTENANCE (STAT)

## 2020-03-31 PROCEDURE — 37799 UNLISTED PX VASCULAR SURGERY: CPT

## 2020-03-31 PROCEDURE — 95816 EEG AWAKE AND DROWSY: CPT | Mod: 26,,, | Performed by: PSYCHIATRY & NEUROLOGY

## 2020-03-31 PROCEDURE — 84207 ASSAY OF VITAMIN B-6: CPT

## 2020-03-31 PROCEDURE — 11000001 HC ACUTE MED/SURG PRIVATE ROOM

## 2020-03-31 PROCEDURE — 63700000 PHARM REV CODE 250 ALT 637 W/O HCPCS: Performed by: INTERNAL MEDICINE

## 2020-03-31 PROCEDURE — 84443 ASSAY THYROID STIM HORMONE: CPT

## 2020-03-31 PROCEDURE — 82803 BLOOD GASES ANY COMBINATION: CPT

## 2020-03-31 PROCEDURE — 36415 COLL VENOUS BLD VENIPUNCTURE: CPT

## 2020-03-31 PROCEDURE — 85027 COMPLETE CBC AUTOMATED: CPT

## 2020-03-31 PROCEDURE — 94761 N-INVAS EAR/PLS OXIMETRY MLT: CPT

## 2020-03-31 RX ADMIN — HEPARIN SODIUM 4000 UNITS: 1000 INJECTION, SOLUTION INTRAVENOUS; SUBCUTANEOUS at 10:03

## 2020-03-31 RX ADMIN — ENOXAPARIN SODIUM 40 MG: 100 INJECTION SUBCUTANEOUS at 09:03

## 2020-03-31 RX ADMIN — MUPIROCIN: 20 OINTMENT TOPICAL at 09:03

## 2020-03-31 RX ADMIN — PROPOFOL 35 MCG/KG/MIN: 10 INJECTION, EMULSION INTRAVENOUS at 11:03

## 2020-03-31 RX ADMIN — AZITHROMYCIN MONOHYDRATE 250 MG: 250 TABLET ORAL at 09:03

## 2020-03-31 RX ADMIN — HYDROXYCHLOROQUINE SULFATE 400 MG: 200 TABLET, FILM COATED ORAL at 09:03

## 2020-03-31 RX ADMIN — LEVOTHYROXINE SODIUM 100 MCG: 100 TABLET ORAL at 05:03

## 2020-03-31 RX ADMIN — PANTOPRAZOLE SODIUM 40 MG: 40 INJECTION, POWDER, LYOPHILIZED, FOR SOLUTION INTRAVENOUS at 09:03

## 2020-03-31 RX ADMIN — FENTANYL CITRATE 100 MCG/HR: 50 INJECTION INTRAVENOUS at 04:03

## 2020-03-31 RX ADMIN — PROPOFOL 35 MCG/KG/MIN: 10 INJECTION, EMULSION INTRAVENOUS at 03:03

## 2020-03-31 RX ADMIN — PROPOFOL 35 MCG/KG/MIN: 10 INJECTION, EMULSION INTRAVENOUS at 07:03

## 2020-03-31 RX ADMIN — CEFTRIAXONE 1 G: 1 INJECTION, SOLUTION INTRAVENOUS at 10:03

## 2020-03-31 RX ADMIN — ALTEPLASE 2 MG: 2.2 INJECTION, POWDER, LYOPHILIZED, FOR SOLUTION INTRAVENOUS at 12:03

## 2020-03-31 RX ADMIN — PROPOFOL 35 MCG/KG/MIN: 10 INJECTION, EMULSION INTRAVENOUS at 02:03

## 2020-03-31 RX ADMIN — PROPOFOL 35 MCG/KG/MIN: 10 INJECTION, EMULSION INTRAVENOUS at 10:03

## 2020-03-31 NOTE — PROGRESS NOTES
INPATIENT NEPHROLOGY PROGRESS  [facility] NEPHROLOGY    Patient Name: Maria Victoria Hernandez  Date: 03/31/2020    Reason for consultation: MAIKOL    History of Present Illness:  54AAF nurse with morbid obesity, hypothyroidism presents PNA, intubated, positive for COVID19. Admit Cr 0.7. Went 5.1 and HD started on 3/29.     3/28  Scr worse today.  Only one shift recorded for output, 520cc.  Still hyponatremic, vent setting adjusted per pulmonology note for acidosis.  K+ at goal after repletion.  Has siri, may need HD initiated.  3/29  Non oliguric.  In no distress  3/30 VSS, seen and examined on HD, tolerating well. Plan another HD in AM, discussed with daughter who is a nurse here too.  3/31 VSS, intubated still. UO is not great but she is dialyzed daily. Seen and examined on HD, tolerating well. Plan another HD in AM.    Plan of Care:    1. Septic shock 2/2 COVID PNA with HHRF requiring MV  - On abx per ID/Pulm.  - She is intubated/sedated.    2. MAIKOL- suspect multifactorial ATN in setting of shock/hypotension/intravascular volume depletion + diuresis; trace hematuria noted  - No nsaids or IV contrast (meloxicam noted on home med list)   - Continue mueller. UO is not great but is encouraging.  - Dose meds for CrCl < 20.  - HD again tomorrow.    3. Hypovolemic hyponatremia  - Will trend serum Na with volume resuscitation.    4. Hypokalemia  - better    5. HypoMg  - better    6. Anemia  - Trend Hgb.  - She is on PO iron at home.    Thank you for allowing us to participate in this patient's care. We will continue to follow.    Vital Signs:  Temp Readings from Last 3 Encounters:   03/31/20 98.1 °F (36.7 °C) (Axillary)       Pulse Readings from Last 3 Encounters:   03/31/20 87   01/15/15 84   12/17/13 80       BP Readings from Last 3 Encounters:   03/31/20 (!) 91/57   01/15/15 124/82   12/17/13 102/68       Weight:  Wt Readings from Last 3 Encounters:   03/30/20 115.3 kg (254 lb 3.1 oz)   01/15/15 105.8 kg (233 lb 4.8  oz)   12/17/13 101.2 kg (223 lb)       Past Medical & Surgical History:  Past Medical History:   Diagnosis Date    Colon polyp     Diverticulosis large intestine w/o perforation or abscess w/bleeding     Iron deficiency anemia     Prediabetes     Thyroid disease     Vitamin B 12 deficiency     Vitamin D deficiency        Past Surgical History:   Procedure Laterality Date    TUBAL LIGATION         Past Social History:  Social History     Socioeconomic History    Marital status:      Spouse name: Not on file    Number of children: Not on file    Years of education: Not on file    Highest education level: Not on file   Occupational History    Not on file   Social Needs    Financial resource strain: Not on file    Food insecurity:     Worry: Not on file     Inability: Not on file    Transportation needs:     Medical: Not on file     Non-medical: Not on file   Tobacco Use    Smoking status: Never Smoker    Smokeless tobacco: Never Used   Substance and Sexual Activity    Alcohol use: No    Drug use: Not on file    Sexual activity: Not on file   Lifestyle    Physical activity:     Days per week: Not on file     Minutes per session: Not on file    Stress: Not on file   Relationships    Social connections:     Talks on phone: Not on file     Gets together: Not on file     Attends Jewish service: Not on file     Active member of club or organization: Not on file     Attends meetings of clubs or organizations: Not on file     Relationship status: Not on file   Other Topics Concern    Not on file   Social History Narrative    Not on file       Medications:  Scheduled Meds:   azithromycin  250 mg Per NG tube Daily    cefTRIAXone (ROCEPHIN) IVPB  1 g Intravenous Q24H    enoxparin  40 mg Subcutaneous Q12H    hydroxychloroquine  400 mg Per NG tube Daily    levothyroxine  100 mcg Oral Before breakfast    mupirocin   Nasal BID    pantoprazole  40 mg Intravenous Daily    rocuronium  100 mg  "Intravenous Once     Continuous Infusions:   Amino acid 4.25% - dextrose 10% (CLINIMIX) solution with additives (1L  provides 340 kcal/L dextrose, with 42.5 gm AA, 100 gm CHO, and no electrolytes except acetate 37 mEq/Cl- 17 mEq) 75 mL/hr at 03/30/20 2036    fentanyl 100 mcg/hr (03/31/20 0432)    norepinephrine bitartrate-D5W Stopped (03/31/20 0541)    propofoL 35 mcg/kg/min (03/31/20 0700)     PRN Meds:.acetaminophen, heparin (porcine), propofoL, sodium chloride 0.9%  No current facility-administered medications on file prior to encounter.      Current Outpatient Medications on File Prior to Encounter   Medication Sig Dispense Refill    ferrous sulfate 325 mg (65 mg iron) Tab tablet Take 1 tablet (325 mg total) by mouth daily with breakfast. (Patient taking differently: Take 325 mg by mouth daily with breakfast. Take 2 tablets daily) 90 tablet 3    fluticasone propionate (FLONASE) 50 mcg/actuation nasal spray 1 spray by Each Nostril route once daily.      levothyroxine (SYNTHROID) 100 MCG tablet Take 1 tablet (100 mcg total) by mouth once daily. (Patient taking differently: Take 150 mcg by mouth once daily. ) 90 tablet 3    meloxicam (MOBIC) 7.5 MG tablet Take 7.5 mg by mouth once daily.      clotrimazole-betamethasone 1-0.05% (LOTRISONE) cream Apply topically 2 (two) times daily. 45 g 1    levocetirizine (XYZAL) 5 MG tablet Take 1 tablet (5 mg total) by mouth every evening. 30 tablet 6       Allergies:  Patient has no known allergies.    Past Family History:  Reviewed; refer to Hospitalist Admission Note    Review of Systems:  Unable to obtain due to MV    Physical Exam:  BP (!) 91/57   Pulse 87   Temp 98.1 °F (36.7 °C) (Axillary)   Resp (!) 47   Ht 5' 1" (1.549 m)   Wt 115.3 kg (254 lb 3.1 oz)   SpO2 (!) 93%   Breastfeeding? No   BMI 48.03 kg/m²     INS/OUTS:  I/O last 3 completed shifts:  In: 3565.9 [I.V.:1579.3; Other:1000; NG/GT:120; IV Piggyback:50]  Out: 6440 [Urine:340; Drains:100; " Other:6000]  No intake/output data recorded.    General Appearance:    Intubated, sedated, acutely ill, appears stated age   Head:    Normocephalic, atraumatic   Eyes:    Eyes closed       Mouth:   Moist mucus membranes   Lungs:     Coarse   Heart:    Regular rate and rhythm, S1 and S2 normal, no murmur, rub   or gallop   Abdomen:     Soft, non-tender, non-distended, bowel sounds active all four   quadrants, no RT or guarding, no masses, no organomegaly   Extremities:   Warm and well perfused, distal pulses intact, no cyanosis or    peripheral edema   MSK:   No joint or muscle swelling, tenderness or deformity   Skin:   Skin color, texture, turgor normal, no rashes or lesions   Neurologic/Psychiatric:   Unable to assess     Results:  Lab Results   Component Value Date     (L) 03/31/2020    K 4.0 03/31/2020     03/31/2020    CO2 19 (L) 03/31/2020    BUN 17 03/31/2020    CREATININE 3.6 (H) 03/31/2020    CALCIUM 7.7 (L) 03/31/2020    ANIONGAP 11 03/31/2020    ESTGFRAFRICA 16 (A) 03/31/2020    EGFRNONAA 14 (A) 03/31/2020       Lab Results   Component Value Date    CALCIUM 7.7 (L) 03/31/2020    PHOS 4.8 (H) 03/29/2020       Recent Labs   Lab 03/31/20  0249   WBC 9.77   RBC 3.06*   HGB 8.2*   HCT 26.0*      MCV 85   MCH 26.8*   MCHC 31.5*     I have personally reviewed pertinent radiological imaging and reports.    [facility]  [address] [place] [address]  MARGARET Evans [address]  [phone] (p)  [phone] (f)

## 2020-03-31 NOTE — ASSESSMENT & PLAN NOTE
Patient with Hypoxic Respiratory failure which is Acute.  she is not on home oxygen. Supplemental ventilation was provided and noted- Vent Mode: Spont  Oxygen Concentration (%):  [40] 40  Resp Rate Total:  [24 br/min-41 br/min] 28 br/min  Vt Set:  [350 mL] 350 mL  PEEP/CPAP:  [6 cmH20-8 cmH20] 8 cmH20  Pressure Support:  [0 cmH20-15 cmH20] 15 cmH20  Mean Airway Pressure:  [12 pvU18-23 cmH20] 13 cmH20 and oxygen saturations . Differential diagnosis includes - Pneumonia Viral Labs and images were reviewed. Patient Has recent ABG- No components found for: ABG. Will treat underlying causes and adjust management of respiratory failure as follows-     Temp:  [98.1 °F (36.7 °C)-99.1 °F (37.3 °C)]   Pulse:  []   Resp:  [22-47]   BP: ()/(55-72)   SpO2:  [92 %-98 %]   Arterial Line BP: (104-154)/(48-65)      Pulmonary consultation.   Continue beta 2 agonist bronchodilator treatments.   Continue IV antibiotics - ceftriaxone and azithromycin.   D dimer up 1, ast/alt sl up 105/81, procal 0.12, covid pos.  Troponin neg.  Ratio 74/0.5 on peep 8-   390 is 6 cc/kg ;  Will continue Plaquenil 400 mg daily  as per COVID protocol.  Microbiology Results (last 7 days)     Procedure Component Value Units Date/Time    Blood culture [[id number]] Collected:  03/26/20 0432    Order Status:  Completed Specimen:  Blood Updated:  03/30/20 1214     Blood Culture, Routine No Growth after 4 days.     Blood culture [[id number]] Collected:  03/26/20 0612    Order Status:  Completed Specimen:  Blood Updated:  03/30/20 1214     Blood Culture, Routine No Growth after 4 days.     Blood culture x two cultures. Draw prior to antibiotics. [[id number]] Collected:  03/25/20 1411    Order Status:  Completed Specimen:  Blood from Antecubital, Right  Arm Updated:  03/30/20 1212     Blood Culture, Routine No Growth after 4 days.     Narrative:       Aerobic and anaerobic    Culture, Respiratory with Gram Stain [[id number]] Collected:  03/25/20 2150     Order Status:  Completed Specimen:  Tracheal Aspirate Updated:  03/28/20 0927     Respiratory Culture Normal respiratory anamaria      No S aureus or Pseudomonas isolated.     Gram Stain (Respiratory) <10 epithelial cells per low power field.     Gram Stain (Respiratory) Rare WBC's     Gram Stain (Respiratory) Rare Gram positive cocci    Culture, Respiratory with Gram Stain [[id number]] Collected:  03/25/20 2152    Order Status:  Canceled Specimen:  Respiratory from Sputum     Influenza A & B by Molecular [[id number]]     Order Status:  Canceled Specimen:  Nasopharyngeal Swab     Blood culture x two cultures. Draw prior to antibiotics. [[id number]]     Order Status:  Canceled Specimen:  Blood           Continue routine medications as before.   Follow airborne/droplet precautions.

## 2020-03-31 NOTE — CONSULTS
[facility]  Neurology  Consult Note    Patient Name: Maria Victoria Hernandez  MRN: [id number]  Admission Date: 3/25/2020  Hospital Length of Stay: 6 days  Code Status: Full Code   Attending Provider: Kady Gomez MD   Consulting NP: Debi Pink NP   Consulting Provider: Dr. Justice Monge MD  Primary Care Physician: Candice Deluna MD  Principal Problem:Acute respiratory failure    Inpatient consult to Neurology  Consult performed by: Debi Pink NP  Consult ordered by: Milvia Mcguire MD        Subjective:     Chief Complaint:    Chief Complaint   Patient presents with    Shortness of Breath     positive covid         HPI: Patient is a 54 years old  female with morbid obesity in past medical history significant for hypothyroidism is being admitted to intensive care unit under inpatient status from [facility] Emergency room with worsening shortness of breath.  Three days ago patient was tested positive for COVID - 19.  Patient works at [employer].  Patient has been experiencing subjective fever, generalized body aches and pains and nonproductive cough for few days.  Patient is getting increasingly short of breath especially for the past 2 days.  Upon arrival to the emergency room patient was noted to be hypoxic around 89%.  Up on 3 L patient's oxygen sats are around 96%.  Patient denies any chest pain, leg swelling or calf tenderness.        Overview/Hospital Course:  No notes on file     Interval History: Patient has been encephalopathic off sedation limited response to verbal or painful stimuli as per RN assessment. Off pressor. Not tolerating TF currently on TPN    Neurological Interval Note: Patient seen and limited exam noted. Discussed plan of care with Dr. Justice Monge. Patient is a 54 years old  female with morbid obesity PMHx: Hypothyroidism. The patient works at [employer] in [place] and  prior to admission she reportedly had experience a few days of  fever, generalized body aches and pains and nonproductive cough and increased shortness of breathe that worsened overtime. The patient noted to have tested positive for COVID-19. Limited exam noted. Patient unresponsive  When off sedation, not following commands. Will order CT of head w/o contrast, and EEG 30 min, and encephalopathy labs. Will continue to monitor.     Past Medical History:   Diagnosis Date    Colon polyp     Diverticulosis large intestine w/o perforation or abscess w/bleeding     Iron deficiency anemia     Prediabetes     Thyroid disease     Vitamin B 12 deficiency     Vitamin D deficiency        Past Surgical History:   Procedure Laterality Date    TUBAL LIGATION         Review of patient's allergies indicates:  No Known Allergies    Current Neurological Medications:   No current facility-administered medications on file prior to encounter.      Current Outpatient Medications on File Prior to Encounter   Medication Sig Dispense Refill    ferrous sulfate 325 mg (65 mg iron) Tab tablet Take 1 tablet (325 mg total) by mouth daily with breakfast. (Patient taking differently: Take 325 mg by mouth daily with breakfast. Take 2 tablets daily) 90 tablet 3    fluticasone propionate (FLONASE) 50 mcg/actuation nasal spray 1 spray by Each Nostril route once daily.      levothyroxine (SYNTHROID) 100 MCG tablet Take 1 tablet (100 mcg total) by mouth once daily. (Patient taking differently: Take 150 mcg by mouth once daily. ) 90 tablet 3    meloxicam (MOBIC) 7.5 MG tablet Take 7.5 mg by mouth once daily.      clotrimazole-betamethasone 1-0.05% (LOTRISONE) cream Apply topically 2 (two) times daily. 45 g 1    levocetirizine (XYZAL) 5 MG tablet Take 1 tablet (5 mg total) by mouth every evening. 30 tablet 6         No current facility-administered medications on file prior to encounter.      Current Outpatient Medications on File Prior to  Encounter   Medication Sig    ferrous sulfate 325 mg (65 mg iron) Tab tablet Take 1 tablet (325 mg total) by mouth daily with breakfast. (Patient taking differently: Take 325 mg by mouth daily with breakfast. Take 2 tablets daily)    fluticasone propionate (FLONASE) 50 mcg/actuation nasal spray 1 spray by Each Nostril route once daily.    levothyroxine (SYNTHROID) 100 MCG tablet Take 1 tablet (100 mcg total) by mouth once daily. (Patient taking differently: Take 150 mcg by mouth once daily. )    meloxicam (MOBIC) 7.5 MG tablet Take 7.5 mg by mouth once daily.    clotrimazole-betamethasone 1-0.05% (LOTRISONE) cream Apply topically 2 (two) times daily.    levocetirizine (XYZAL) 5 MG tablet Take 1 tablet (5 mg total) by mouth every evening.      Family History     Problem Relation (Age of Onset)    Heart disease Mother, Maternal Grandmother, Maternal Grandfather    Mental illness Paternal Grandmother        Tobacco Use    Smoking status: Never Smoker    Smokeless tobacco: Never Used   Substance and Sexual Activity    Alcohol use: No    Drug use: Not on file    Sexual activity: Not on file     Review of Systems   Unable to perform ROS: Intubated     Objective:     Vital Signs (Most Recent):  Temp: 98.5 °F (36.9 °C) (03/31/20 1200)  Pulse: 100 (03/31/20 1245)  Resp: (!) 22 (03/31/20 1245)  BP: 116/69 (03/31/20 1200)  SpO2: (!) 94 % (03/31/20 1245) Vital Signs (24h Range):  Temp:  [98.1 °F (36.7 °C)-99.1 °F (37.3 °C)] 98.5 °F (36.9 °C)  Pulse:  [] 100  Resp:  [14-47] 22  SpO2:  [91 %-100 %] 94 %  BP: ()/(55-83) 116/69  Arterial Line BP: ()/(48-82) 137/57     Weight: 115.3 kg (254 lb 3.1 oz)  Body mass index is 48.03 kg/m².    Physical Exam   Constitutional: She appears well-developed and well-nourished.   HENT:   Head: Normocephalic and atraumatic.   Eyes: Pupils are equal, round, and reactive to light.   Neck: Neck supple.   Cardiovascular: Normal rate.   Pulmonary/Chest: Effort normal.    Intubated and Ventilator assisted    Abdominal: Soft. Bowel sounds are normal.   Obese    Musculoskeletal:   JOSEFINA falls against gravity    Neurological: She is unresponsive. GCS eye subscore is 1. GCS verbal subscore is 1. GCS motor subscore is 1.   Unresponsive JOSEFINA    Skin: Skin is warm and dry. Capillary refill takes 2 to 3 seconds.   Psychiatric: She is withdrawn. Cognition and memory are impaired. She is noncommunicative.   Withdrawn  She is inattentive.       NEUROLOGICAL EXAMINATION:     MENTAL STATUS        JOSEFINA      CRANIAL NERVES     CN III, IV, VI   Pupils are equal, round, and reactive to light.    GAIT AND COORDINATION        JOSEFINA        Significant Labs:   BMP  Lab Results   Component Value Date     (L) 03/31/2020    K 4.0 03/31/2020     03/31/2020    CO2 19 (L) 03/31/2020    BUN 17 03/31/2020    CREATININE 3.6 (H) 03/31/2020    CALCIUM 7.7 (L) 03/31/2020    ANIONGAP 11 03/31/2020    ESTGFRAFRICA 16 (A) 03/31/2020    EGFRNONAA 14 (A) 03/31/2020     Lab Results   Component Value Date    TSH 2.082 03/31/2020         Significant Imaging:     CT of Head w/o contrast pending  EEG 30 min pending     Assessment and Plan:    54 year old female with impression    1. Acute Metabolic Encephalaopathy vs Septic Encephalopathy related to COVID-19 virus    -CT of Head w/o contrast  -EEG 30 min awake and drowsy  -Encephalopathy Labs ordered    3.COVID-19 virus infection  -Will continue Plaquenil  - Managed per IM    3. History of Hypothyroidism  IM to manage     Will continue to follow      Active Diagnoses:    Diagnosis Date Noted POA    PRINCIPAL PROBLEM:  Acute respiratory failure [J96.00] 03/25/2020 Yes    Acute renal failure [N17.9] 03/27/2020 No    ARDS (adult respiratory distress syndrome) [J80] 03/26/2020 Yes    COVID-19 virus infection [J22, B97.29] 03/26/2020 Yes    Morbid obesity [E66.01] 03/25/2020 Yes    Hypothyroid [E03.9] 03/25/2020 Yes    COVID-19 virus detected [J22, B97.29]  03/25/2020 Yes    Diverticulosis of large intestine without hemorrhage [K57.30] 07/03/2018 Yes    Iron deficiency anemia, unspecified [D50.9] 12/30/2015 Yes      Problems Resolved During this Admission:       VTE Risk Mitigation (From admission, onward)         Ordered     heparin (porcine) injection 4,000 Units  As needed (PRN)      03/29/20 2001     enoxaparin injection 40 mg  Every 12 hours      03/25/20 2313     IP VTE HIGH RISK PATIENT  Once      03/25/20 2313                Thank you for your consult. I will follow-up with patient. Please contact us if you have any additional questions.    Debi Pink, DIEGO  Neurology  [facility]    I, Dr. Justice Monge, have personally seen and examined the patient with my advanced provider and agree with above. I personally did a focused exam, and reviewed all necessary clinical information. I discussed my management plan with my NP and agree with above. EEG please.

## 2020-03-31 NOTE — PROGRESS NOTES
[facility] Hospital Medicine  Progress Note    Patient Name: Maria Victoria Hernandez  MRN: [id number]  Patient Class: IP- Inpatient   Admission Date: 3/25/2020  Length of Stay: 6 days  Attending Physician: Kady Gomez MD  Primary Care Provider: Candice Deluna MD        Subjective:     Principal Problem:Acute respiratory failure        HPI:  Patient is a 54 years old  female with morbid obesity in past medical history significant for hypothyroidism is being admitted to intensive care unit under inpatient status from [facility] Emergency room with worsening shortness of breath.  Three days ago patient was tested positive for COVID - 19.  Patient works at [employer].  Patient has been experiencing subjective fever, generalized body aches and pains and nonproductive cough for few days.  Patient is getting increasingly short of breath especially for the past 2 days.  Upon arrival to the emergency room patient was noted to be hypoxic around 89%.  Up on 3 L patient's oxygen sats are around 96%.  Patient denies any chest pain, leg swelling or calf tenderness.      Overview/Hospital Course:  No notes on file    Interval History: Patient has been encephalopathic off sedation limited response to verbal or painful stimuli as per RN assessment. Off pressor. Not tolerating TF currently on TPN    Review of Systems   Unable to perform ROS: Intubated     Objective:     Vital Signs (Most Recent):  Temp: 98.5 °F (36.9 °C) (03/31/20 1200)  Pulse: 100 (03/31/20 1245)  Resp: (!) 22 (03/31/20 1245)  BP: 116/69 (03/31/20 1200)  SpO2: (!) 94 % (03/31/20 1245) Vital Signs (24h Range):  Temp:  [98.1 °F (36.7 °C)-99.1 °F (37.3 °C)] 98.5 °F (36.9 °C)  Pulse:  [] 100  Resp:  [14-47] 22  SpO2:  [89 %-100 %] 94 %  BP: ()/(55-84) 116/69  Arterial Line BP: ()/(48-82) 137/57     Weight: 115.3 kg (254 lb 3.1 oz)  Body mass index is 48.03  kg/m².    Intake/Output Summary (Last 24 hours) at 3/31/2020 1420  Last data filed at 3/31/2020 1221  Gross per 24 hour   Intake 1715.67 ml   Output 2945 ml   Net -1229.33 ml      Physical Exam   Nursing note and vitals reviewed.  PATIENT WAS SEEN AS A VIDEO VISIT WITH NURSE IN PATIENT ROOM WITH PATIENT TO ASSIST DURING THE VISIT. PHYSICAL EXAM FINDINGS ARE AS VIEWED BY MYSELF VIA VIDEO OR AS REPORTED BY NURSE IF SPECIFIED AS SUCH, EXAM NOT DONE PERSONALLY BY MYSELF AT BEDSIDE.      Significant Labs:   BMP:   Recent Labs   Lab 03/31/20  0249   *   *   K 4.0      CO2 19*   BUN 17   CREATININE 3.6*   CALCIUM 7.7*     CBC:   Recent Labs   Lab 03/30/20  0301 03/31/20  0249   WBC 8.39 9.77   HGB 8.2* 8.2*   HCT 25.3* 26.0*    245       Significant Imaging: I have reviewed all pertinent imaging results/findings within the past 24 hours.      Assessment/Plan:      * Acute respiratory failure  Patient with Hypoxic Respiratory failure which is Acute.  she is not on home oxygen. Supplemental ventilation was provided and noted- Vent Mode: Spont  Oxygen Concentration (%):  [40] 40  Resp Rate Total:  [24 br/min-41 br/min] 28 br/min  Vt Set:  [350 mL] 350 mL  PEEP/CPAP:  [6 cmH20-8 cmH20] 8 cmH20  Pressure Support:  [0 cmH20-15 cmH20] 15 cmH20  Mean Airway Pressure:  [12 wgD20-72 cmH20] 13 cmH20 and oxygen saturations . Differential diagnosis includes - Pneumonia Viral Labs and images were reviewed. Patient Has recent ABG- No components found for: ABG. Will treat underlying causes and adjust management of respiratory failure as follows-     Temp:  [98.1 °F (36.7 °C)-99.1 °F (37.3 °C)]   Pulse:  []   Resp:  [22-47]   BP: ()/(55-72)   SpO2:  [92 %-98 %]   Arterial Line BP: (104-154)/(48-65)      Pulmonary consultation.   Continue beta 2 agonist bronchodilator treatments.   Continue IV antibiotics - ceftriaxone and azithromycin.   D dimer up 1, ast/alt sl up 105/81, procal 0.12, covid pos.   Troponin neg.  Ratio 74/0.5 on peep 8-   390 is 6 cc/kg ;  Will continue Plaquenil 400 mg daily  as per COVID protocol.  Microbiology Results (last 7 days)     Procedure Component Value Units Date/Time    Blood culture [[id number]] Collected:  03/26/20 0432    Order Status:  Completed Specimen:  Blood Updated:  03/30/20 1214     Blood Culture, Routine No Growth after 4 days.     Blood culture [[id number]] Collected:  03/26/20 0612    Order Status:  Completed Specimen:  Blood Updated:  03/30/20 1214     Blood Culture, Routine No Growth after 4 days.     Blood culture x two cultures. Draw prior to antibiotics. [[id number]] Collected:  03/25/20 1411    Order Status:  Completed Specimen:  Blood from Antecubital, Right  Arm Updated:  03/30/20 1212     Blood Culture, Routine No Growth after 4 days.     Narrative:       Aerobic and anaerobic    Culture, Respiratory with Gram Stain [[id number]] Collected:  03/25/20 2150    Order Status:  Completed Specimen:  Tracheal Aspirate Updated:  03/28/20 0927     Respiratory Culture Normal respiratory anamaria      No S aureus or Pseudomonas isolated.     Gram Stain (Respiratory) <10 epithelial cells per low power field.     Gram Stain (Respiratory) Rare WBC's     Gram Stain (Respiratory) Rare Gram positive cocci    Culture, Respiratory with Gram Stain [[id number]] Collected:  03/25/20 2152    Order Status:  Canceled Specimen:  Respiratory from Sputum     Influenza A & B by Molecular [[id number]]     Order Status:  Canceled Specimen:  Nasopharyngeal Swab     Blood culture x two cultures. Draw prior to antibiotics. [[id number]]     Order Status:  Canceled Specimen:  Blood           Continue routine medications as before.   Follow airborne/droplet precautions.            Iron deficiency anemia, unspecified  Patient's anemia is currently controlled. Has not received any PRBCs to date.. Etiology likely d/t Anemia of chronic disease  Current CBC reviewed-   Lab Results   Component Value Date    HGB  8.2 (L) 03/31/2020    HCT 26.0 (L) 03/31/2020     Monitor serial CBC and transfuse if patient becomes hemodynamically unstable, symptomatic or H/H drops below 8/24        Diverticulosis of large intestine without hemorrhage  Will monitor of any blood in stools  HnH trending down      Acute renal failure  Will follow-up with the renal recommendations  Currently on HD             COVID-19 virus infection  Will continue Plaquenil    Covid-19 Virus Infection  - Infection Control notified    - Isolation:   - Airborne and Droplet Precautions  - N95 masks must be fit tested, wear eye protection  - 20 second hand hygiene   - Limit visitors per hospital policy   - Consolidating lab draws, nursing care, and interventions    - Diagnostics: (rising CRP, persistent lymphopenia, hyponatremia, hyperferritinemia, elevated troponin, elevated d-dimer, age, and comorbidities are significant predictors of poor clinical outcome)   - CBC:   trend Q48hrs  - CMP:        trend Q48hrs  - Procalcitonin:  - D-dimer:  trend Q48hrs  - Ferritin:  repeat prior to discharge  - CRP:        trend Q48hrs  - LDH:  - BNP:  - Troponin:    - ECG:   - rapid Flu:   - RIP only if BMT/solid transplant:   - Legionella antigen:   - Blood culture x2:   - Sputum culture:   - CXR:   - UA and culture:      - Management:   - Bundle care as able to minimize in/out of room   - Supplemental O2 to maintain SpO2 >92%,   if requiring 6L NC or higher, place on nonrebreather and discuss case with MICU   - Telemetry & continuous Pulse Ox   - albuterol INHALER PRN 4puff Q6hr approximates a nebulizer (avoid nebulization of secretions)   - apap PRN fever   - Avoiding NIPPV to prevent aerosolization   (including home CPAP/BiPAP unless on a case-by-case basis and only in negative pressure room)   - Cautious use of NSAIDS for fever per WHO recommendations (3/16/2020)   - No new ACEi/ARB start or discontinuation of chronic med unless hypotensive (Ed et al. Journal of  Hypertension 2020, 38:000-000)   - Careful use of steroids in the absence of other indications   - unless septic shock due to increased viral replication   - Fluid sparing resuscitation   - Empiric antibiotics per likely source & patient allergies    - CAP: x 5 day course  Ceftriaxone 1g IV Q24hrs            Azithromycin 500mg IV day #1, then 250mg PO daily x4 days                 If MRSA risk factors, add Vancomycin IV (PharmD consult)   - If patient meets criteria per Hospital Protocol    - start statin (if CPK WNL)    - start HCQ 400mg PO BID x1 day, then 400mg PO daily x 4 days (check G6PD, ECG, and start Qshift POCT glucose)    Goals of care, counseling/discussion  - Reviewed the typical clinical course of COVID19 with the daughter Danya (patient name or relationship to patient), including the potential for acute decompensation requiring intubation and mechanical ventilation  - Discussed again as part of routine daily evaluation, patient/POA maintains code status of Full code    VTE High Risk Prophylaxis: enoxaparin 40mg sq QHS @ 2100 (bundled care) if GFR >30    Patient's chronic/stable medical conditions noted in the problem list above will be managed with the patient's home medications as tolerated.           ARDS (adult respiratory distress syndrome)    --will cont ARDSnet Protocol.  --Target 6-8ml/kg Ideal Body Weight. Decrease TV as tolerated.  --Plateau pressure goal <30cm H2O.  --Oxygenation goal PaO2 55-80 or SpO2 88-95%.  --pH goal 7.30-7.45.  --Wean to PSV as tolerated.        COVID-19 virus detected  Continue Plaquenil   Plaquenil 400 mg twice daily on Day 1 followed by 400 mg daily for 5 days as per COVID protocol.   Continue routine medications as before.   Follow airborne/droplet precautions.      Hypothyroid  Check TSH, Chronic problem. Will continue chronic medications and monitor for any changes, adjusting as needed.          Morbid obesity  Body mass index is 48.03 kg/m². Morbid obesity  complicates all aspects of disease management from diagnostic modalities to treatment. Weight loss encouraged and health benefits explained to patient.            VTE Risk Mitigation (From admission, onward)         Ordered     heparin (porcine) injection 4,000 Units  As needed (PRN)      03/29/20 2001     enoxaparin injection 40 mg  Every 12 hours      03/25/20 2313     IP VTE HIGH RISK PATIENT  Once      03/25/20 2313                Critical care time spent on the evaluation and treatment of severe organ dysfunction, review of pertinent labs and imaging studies, discussions with consulting providers and discussions with patient/family: 60 minutes.      Kady Gomez MD  Department of Hospital Medicine   [facility]

## 2020-03-31 NOTE — RESPIRATORY THERAPY
03/30/20 1900   Patient Assessment/Suction   Level of Consciousness (AVPU) responds to pain   Respiratory Effort Normal;Unlabored   Expansion/Accessory Muscles/Retractions no use of accessory muscles   All Lung Fields Breath Sounds coarse;rhonchi   Rhythm/Pattern, Respiratory assisted mechanically   Cough Frequency infrequent   Cough Type fair   Suction Method tracheal   $ Suction Charges Inline Suction Procedure Stat Charge   Secretions Amount moderate   Secretions Color tan   Secretions Characteristics thick   PRE-TX-O2   O2 Device (Oxygen Therapy) ventilator   Oxygen Concentration (%) 40   SpO2 (!) 94 %   Pulse Oximetry Type Continuous   $ Pulse Oximetry - Multiple Charge Pulse Oximetry - Multiple   Pulse 103   Resp (!) 28   /67   Vent Select   Conventional Vent Y   Charged w/in last 24h YES   Preset Conventional Ventilator Settings   Ventilation Type VC   Vent Mode Spont   Set Rate 0 BPM   Vt Set 350 mL   PEEP/CPAP 8 cmH20   Pressure Support 15 cmH20   Waveform RAMP   Peak Flow 60 L/min   Set Inspiratory Pressure 0 cmH20   Insp Time 0 Sec(s)   Plateau Set/Insp. Hold (sec) 0   Insp Rise Time  50 %   Trigger Sensitivity Flow/I-Trigger 1 L/min   P High 0 cm H2O   P Low 0 cm H2O   T High 0 sec   T Low 0 sec   Patient Ventilator Parameters   Resp Rate Total 29 br/min   Peak Airway Pressure 24 cmH2O   Mean Airway Pressure 13 cmH20   Plateau Pressure 26 cmH20   Exhaled Vt 400 mL   Total Ve 11.8 mL   Spont Ve 11.8 L   I:E Ratio Measured 1:1.90   Conventional Ventilator Alarms   Ve High Alarm 22 L/min   Resp Rate High Alarm 50 br/min   Press High Alarm 60 cmH2O   Apnea Rate 10   Apnea Volume (mL) 450 mL   Apnea Oxygen Concentration  100   Apnea Flow Rate (L/min) 75   T Apnea 20 sec(s)   Ready to Wean/Extubation Screen   FIO2<=50 (chart decimal) 0.4   MV<16L (chart vol.) 11.8   PEEP <=8 (chart #) 8   Ready to Wean Parameters   F/VT Ratio<105 (RSBI) (!) 70   Changed out HME

## 2020-03-31 NOTE — SUBJECTIVE & OBJECTIVE
Interval History: Patient has been encephalopathic off sedation limited response to verbal or painful stimuli as per RN assessment. Off pressor. Not tolerating TF currently on TPN    Review of Systems   Unable to perform ROS: Intubated     Objective:     Vital Signs (Most Recent):  Temp: 98.5 °F (36.9 °C) (03/31/20 1200)  Pulse: 100 (03/31/20 1245)  Resp: (!) 22 (03/31/20 1245)  BP: 116/69 (03/31/20 1200)  SpO2: (!) 94 % (03/31/20 1245) Vital Signs (24h Range):  Temp:  [98.1 °F (36.7 °C)-99.1 °F (37.3 °C)] 98.5 °F (36.9 °C)  Pulse:  [] 100  Resp:  [14-47] 22  SpO2:  [89 %-100 %] 94 %  BP: ()/(55-84) 116/69  Arterial Line BP: ()/(48-82) 137/57     Weight: 115.3 kg (254 lb 3.1 oz)  Body mass index is 48.03 kg/m².    Intake/Output Summary (Last 24 hours) at 3/31/2020 1420  Last data filed at 3/31/2020 1221  Gross per 24 hour   Intake 1715.67 ml   Output 2945 ml   Net -1229.33 ml      Physical Exam   Nursing note and vitals reviewed.  PATIENT WAS SEEN AS A VIDEO VISIT WITH NURSE IN PATIENT ROOM WITH PATIENT TO ASSIST DURING THE VISIT. PHYSICAL EXAM FINDINGS ARE AS VIEWED BY MYSELF VIA VIDEO OR AS REPORTED BY NURSE IF SPECIFIED AS SUCH, EXAM NOT DONE PERSONALLY BY MYSELF AT BEDSIDE.      Significant Labs:   BMP:   Recent Labs   Lab 03/31/20  0249   *   *   K 4.0      CO2 19*   BUN 17   CREATININE 3.6*   CALCIUM 7.7*     CBC:   Recent Labs   Lab 03/30/20  0301 03/31/20  0249   WBC 8.39 9.77   HGB 8.2* 8.2*   HCT 25.3* 26.0*    245       Significant Imaging: I have reviewed all pertinent imaging results/findings within the past 24 hours.

## 2020-03-31 NOTE — PROGRESS NOTES
[facility]  Adult Nutrition  Consult Note    SUMMARY   Intervention: enteral/parenteral nutrition therapy    Recommendations    Recommendation:   1. Discontinue 20% lipids daily due to elevated trigs and >600 kcals from propofol.   TPN D 15 AA 5 @ 85 ml/hr no lipids ( provides 1445 kcal + propofol ( 100% EEN), and 102 g protein ( 85%EPN)  2. Weigh pt s/p HD    3. When able to start enteral nutrition trickle feeds ( Peptamen Intense VHP back ordered) rec.   Nutren 1.5 @ 20 ml/hr advancing to goal of 45 ml/ hr + Promod 30 ml TID + 130 ml flush q 4 hr   ( provides 1620 kcal ( 79% EEN), 73 g protein + promod (86% EPN), and 820 ml free water)    Goals: 1.) Meet >85% EEN/EPN by RD f/u   Nutrition Goal Status: 2.) progressing toward goal   Communication of RD Recs: (POC, sticky note, second sign, reviewed with MD)    1. Covid-19 Virus Infection    2. Acute respiratory failure    3. Intubation of airway performed without difficulty    4. Encounter for nasogastric (NG) tube placement    5. Ruled out for myocardial infarction    6. At risk for long QT syndrome    7. Acute respiratory failure with hypoxia    8. ARDS (adult respiratory distress syndrome)    9. Morbid obesity    10. Pneumonia due to Covid-19 Virus    11. Shock      Past Medical History:   Diagnosis Date    Colon polyp     Diverticulosis large intestine w/o perforation or abscess w/bleeding     Iron deficiency anemia     Prediabetes     Thyroid disease     Vitamin B 12 deficiency     Vitamin D deficiency          Reason for Assessment    Reason For Assessment: RD follow up  General Information Comments: Pt is intubated in ICU. NFPE not performed,patient is noted as being positive for COVID-19. Per epic records, no evidence of weight loss.  3/31: Not tolerating TF per RN. ngt in place. Dialysis today.   Nutrition Discharge Planning: pending medical course    Nutrition Risk Screen    Nutrition Risk Screen: no indicators  "present    Nutrition/Diet History    Spiritual, Cultural Beliefs, Mosque Practices, Values that Affect Care: no    Anthropometrics    Temp: 98.1 °F (36.7 °C)  Height Method: Estimated  Height: 5' 1"  Height (inches): 61 in  Weight Method: Bed Scale  Weight: 115.3 kg (254 lb 3.1 oz)  Weight (lb): 254.19 lb  Ideal Body Weight (IBW), Female: 105 lb  % Ideal Body Weight, Female (lb): 225.71 %  BMI (Calculated): 48.1  BMI Grade: greater than 40 - morbid obesity       Lab/Procedures/Meds    Pertinent Labs Reviewed: reviewed  BMP  Lab Results   Component Value Date     (L) 03/31/2020    K 4.0 03/31/2020     03/31/2020    CO2 19 (L) 03/31/2020    BUN 17 03/31/2020    CREATININE 3.6 (H) 03/31/2020    CALCIUM 7.7 (L) 03/31/2020    ANIONGAP 11 03/31/2020    ESTGFRAFRICA 16 (A) 03/31/2020    EGFRNONAA 14 (A) 03/31/2020     Lab Results   Component Value Date    CALCIUM 7.7 (L) 03/31/2020    PHOS 4.8 (H) 03/29/2020     Lab Results   Component Value Date    ALBUMIN 1.9 (L) 03/31/2020     Lab Results   Component Value Date    CHOL 202 (H) 01/15/2015    CHOL 191 12/17/2013    CHOL 188 12/18/2012     Lab Results   Component Value Date    HDL 52 01/15/2015    HDL 44 12/17/2013    HDL 47 12/18/2012     Lab Results   Component Value Date    LDLCALC 135.8 01/15/2015    LDLCALC 135.2 12/17/2013    LDLCALC 122.0 12/18/2012     Lab Results   Component Value Date    TRIG 351 (H) 03/31/2020    TRIG 71 01/15/2015    TRIG 59 12/17/2013     Lab Results   Component Value Date    CHOLHDL 25.7 01/15/2015    CHOLHDL 23.0 12/17/2013    CHOLHDL 25.0 12/18/2012       Pertinent Medications Reviewed: reviewed  Scheduled Meds:   azithromycin  250 mg Per NG tube Daily    cefTRIAXone (ROCEPHIN) IVPB  1 g Intravenous Q24H    enoxparin  40 mg Subcutaneous Q12H    hydroxychloroquine  400 mg Per NG tube Daily    levothyroxine  100 mcg Oral Before breakfast    mupirocin   Nasal BID    pantoprazole  40 mg Intravenous Daily    rocuronium  " 100 mg Intravenous Once     Continuous Infusions:   Amino acid 4.25% - dextrose 10% (CLINIMIX) solution with additives (1L  provides 340 kcal/L dextrose, with 42.5 gm AA, 100 gm CHO, and no electrolytes except acetate 37 mEq/Cl- 17 mEq) 75 mL/hr at 03/30/20 2036    fentanyl 100 mcg/hr (03/31/20 0432)    norepinephrine bitartrate-D5W Stopped (03/31/20 0541)    propofoL 35 mcg/kg/min (03/31/20 0700)       Estimated/Assessed Needs    Weight Used For Calorie Calculations: 107.5 kg (236 lb 15.9 oz)  Energy Calorie Requirements (kcal): 1425 kcals/day  Energy Need Method: Gurwinder State: 2037 kcals/day (underfeeds would be 60-70% of target energy needs)   Protein Requirements: 119g(2.5g/kg IBW per ICU, BMI >40)/day  Weight Used For Protein Calculations: 47.6 kg (105 lb)(ideal body weight)  RDA: 3276-9011 ml per MD  CHO Requirement: 147g      Nutrition Prescription Ordered    Current Diet Order: NPO  Peptamen VHP @ 10 mls with goal of 30 mls/hr continuous. + 4 packets of beneprotein.     Clinimix 4.25%/D10 (renal starter bag, no electrolytes) @ 75 mls/hr. + 20% lipids daily (250 mls):   Provides: 1418 kcals/day, 77 g protein/day, and 1800 mls fluid/day.    Propofol: 24.2 mls/hr= 638 kcals/day    Evaluation of Received Nutrient/Fluid Intake    Other Calories (kcal): 665(25.2mL/hr propofol)  % Intake of Estimated Energy Needs: 75%-100%  % Meal Intake: npo    Nutrition Risk    2x week    Assessment and Plan    Nutrition Problem  Inadequate energy intake    Related to (etiology):   No diet order    Signs and Symptoms (as evidenced by):   Intake of <85% EEN/EPN    Interventions/Recommendations (treatment strategy):  Collaboration with other providers  Enteral nutrition    Nutrition Diagnosis Status:   Continues      Monitor and Evaluation    Food and Nutrient Intake: energy intake  Food and Nutrient Adminstration: enteral and parenteral nutrition administration  Anthropometric Measurements: weight  Biochemical Data, Medical  Tests and Procedures: electrolyte and renal panel, glucose/endocrine profile     Nutrition Follow-Up    RD Follow-up?: Yes

## 2020-03-31 NOTE — PLAN OF CARE
Pt placed back on rate last pm due to resp rate 40's heart rate 115-120's. Placed back on Fentanyl drip for sedation at max with Propofol added and titrated till pt sedated and calm. Temp max 99.2 ax. Did not tolerate NG feeds with NGT to LIWS maintained. Pt awakens to stimuli. Bilateral wrist restraints maintained for safety. Received dialysis yesterday. Bath and linen change done. Received call from daughter updated on pt condition. Safety measures in place.

## 2020-03-31 NOTE — PROGRESS NOTES
Progress Note  PULMONARY    Admit Date: 3/25/2020   03/31/2020      SUBJECTIVE:     3/27- remains intubated, on vent. Was on Lasix drip overnight and now w/ IVF for UOP. On minimal Levophed   3/28- remains intubated, on PEEP 14/FiO2 50%. Levophed 0.06 mcg/kg/min  3/29- remains intubated, PEEP 12, FiO2 40%. Levophed off  3/30- remains intubated, PEEP 8, FiO2 40%. Levophed off. Started HD yesterday and having second session today. Daughter came to visit (works on 3rd floor), and updated this am  3/31- remains intubated, PEEP 8/FiO2 40%. HD yesterday w/ removal of 3.5L. With SBT yesterday not following commands and vomited stomach contents    PFSH and Allergies reviewed.    OBJECTIVE:     Vitals (Most recent):  Vitals:    03/31/20 0915   BP:    Pulse: 85   Resp: (!) 28   Temp:        Vitals (24 hour range):  Temp:  [98.1 °F (36.7 °C)-99.1 °F (37.3 °C)]   Pulse:  []   Resp:  [14-44]   BP: ()/(52-84)   SpO2:  [89 %-100 %]   Arterial Line BP: ()/(48-82)       Intake/Output Summary (Last 24 hours) at 3/31/2020 0925  Last data filed at 3/31/2020 0514  Gross per 24 hour   Intake 1715.67 ml   Output 3840 ml   Net -2124.33 ml          Physical Exam:  Given the COVID-19 pandemic I did not enter the room but performed limited physical exam by viewing patient through the window.    Sedated, intubated  OG in place w/ tube feeds going  Currently on HD  No acute distress    Radiographs reviewed:  CXR 3/31- improving bibasilar opacities  CXR 3/29- unchanged  CXR 3/27- bilateral mid and lower lobe fluffy airspace disease  CXR 3/26- increased consolidation RLL    TTE 3/27  · Mild left ventricular enlargement.  · Mildly decreased left ventricular systolic function. The estimated ejection fraction is 45%.  · Grade I (mild) left ventricular diastolic dysfunction consistent with impaired relaxation.  · Normal right ventricular systolic function.  · Mild left atrial enlargement.  · Moderate mitral regurgitation.  · Mild  tricuspid regurgitation.  · Mild pulmonary hypertension present. PASP 40 mm of Hg.    Labs     Recent Labs   Lab 03/31/20  0249   WBC 9.77   HGB 8.2*   HCT 26.0*      BAND 6.0   METAMYELOCYT 1.0   MYELOPCT 3.0     Recent Labs   Lab 03/31/20  0249   *   K 4.0      CO2 19*   BUN 17   CREATININE 3.6*   *   CALCIUM 7.7*   AST 64*   ALT 70*   ALKPHOS 94   BILITOT 0.6   PROT 7.0   ALBUMIN 1.9*     Recent Labs   Lab 03/31/20  0442   PH 7.359   PCO2 37.1   PO2 103*   HCO3 20.9*     Microbiology Results (last 7 days)     Procedure Component Value Units Date/Time    Blood culture [[id number]] Collected:  03/26/20 0432    Order Status:  Completed Specimen:  Blood Updated:  03/30/20 1214     Blood Culture, Routine No Growth after 4 days.     Blood culture [[id number]] Collected:  03/26/20 0612    Order Status:  Completed Specimen:  Blood Updated:  03/30/20 1214     Blood Culture, Routine No Growth after 4 days.     Blood culture x two cultures. Draw prior to antibiotics. [[id number]] Collected:  03/25/20 1411    Order Status:  Completed Specimen:  Blood from Antecubital, Right  Arm Updated:  03/30/20 1212     Blood Culture, Routine No Growth after 4 days.     Narrative:       Aerobic and anaerobic    Culture, Respiratory with Gram Stain [[id number]] Collected:  03/25/20 2150    Order Status:  Completed Specimen:  Tracheal Aspirate Updated:  03/28/20 0927     Respiratory Culture Normal respiratory anamaria      No S aureus or Pseudomonas isolated.     Gram Stain (Respiratory) <10 epithelial cells per low power field.     Gram Stain (Respiratory) Rare WBC's     Gram Stain (Respiratory) Rare Gram positive cocci    Culture, Respiratory with Gram Stain [[id number]] Collected:  03/25/20 2152    Order Status:  Canceled Specimen:  Respiratory from Sputum     Influenza A & B by Molecular [[id number]]     Order Status:  Canceled Specimen:  Nasopharyngeal Swab     Blood culture x two cultures. Draw prior to antibiotics.  [[id number]]     Order Status:  Canceled Specimen:  Blood           Impression:  Active Hospital Problems    Diagnosis  POA    *Acute respiratory failure [J96.00]  Yes    Acute renal failure [N17.9]  No    ARDS (adult respiratory distress syndrome) [J80]  Yes    COVID-19 virus infection [J22, B97.29]  Yes    Morbid obesity [E66.01]  Yes    Hypothyroid [E03.9]  Yes    COVID-19 virus detected [J22, B97.29]  Yes    Diverticulosis of large intestine without hemorrhage [K57.30]  Yes    Iron deficiency anemia, unspecified [D50.9]  Yes      Resolved Hospital Problems   No resolved problems to display.               Plan:   Acute hypoxemic respiratory failure, stable to improving O2 reqt  COVID-19 pneumonia/ARDS  Acute renal failure, on HD  Metabolic acidosis due to renal failure  Shock, resolved   Combined heart failure, EF 45%- myocardial involvement?  Morbid obesity  Emesis, poor tolerance of tube feeds  Acute encephalopathy- delirium vs encephalitis or other structural cause?    - repeat SBT today-- failed w/ tachypnea and mental status  - titrate peep and FiO2 by ARDS net protocol  - continue HD per nephro  - CT head  - neuro consult  - consider EEG or LP? No clinical seizures  - check ammonia, coags. TSH was normal   - continue hydroxychloroquine 400 mg daily  - continue Rocephin and azithromycin  - KUB nonobstructive, attempt to advance tube feeds as tolerated  - on TPN  - discussed with hospitalist    The following were evaluated and adjusted as needed: mechanical ventilator settings and weaning status, intravenous fluids and nutritional status, acid base balance and oxygenation needs and input and output and renal status       Critical Care  - THE PATIENT HAS A HIGH PROBABILITY OF IMMINENT OR LIFE THREATENING DETERIORATION.  Over 50%time of encounter was in direct care directly on the unit discussing with nurses/RTs outside patient's room.  Time was 30 to 74 minutes required for patient care.  Time needed  for all of the above totaled 36 minutes.    Milvia Mcguire MD  Pulmonary & Critical Care Medicine

## 2020-03-31 NOTE — EICU
ELERT:  Asking for propofol sedation.   VS stable.  On fentanyl.  Covid 19+.  Restraints expiring at 11 AM    Discussed with bed side RN.    Plan:  - Propofol infusion/titrating ordered for RASS -1.  - follow Triglyceride level in AM, gets high in Covid patients.  - soft bilateral wrist non violent restraints renewed to prevent self injury and harm.

## 2020-03-31 NOTE — PLAN OF CARE
Intervention: enteral/parenteral nutrition therapy     Recommendations     Recommendation:   1. Discontinue 20% lipids daily due to elevated trigs and >600 kcals from propofol.   TPN D 15 AA 5 @ 85 ml/hr no lipids ( provides 1445 kcal + propofol ( 100% EEN), and 102 g protein ( 85%EPN)  2. Weigh pt s/p HD     3. When able to start enteral nutrition trickle feeds ( Peptamen Intense VHP back ordered) rec.   Nutren 1.5 @ 20 ml/hr advancing to goal of 45 ml/ hr + Promod 30 ml TID + 130 ml flush q 4 hr   ( provides 1620 kcal ( 79% EEN), 73 g protein + promod (86% EPN), and 820 ml free water)     Goals: 1.) Meet >85% EEN/EPN by RD f/u   Nutrition Goal Status: 2.) progressing toward goal   Communication of RD Recs: (POC, sticky note, second sign, reviewed with MD))

## 2020-03-31 NOTE — ASSESSMENT & PLAN NOTE
Patient's anemia is currently controlled. Has not received any PRBCs to date.. Etiology likely d/t Anemia of chronic disease  Current CBC reviewed-   Lab Results   Component Value Date    HGB 8.2 (L) 03/31/2020    HCT 26.0 (L) 03/31/2020     Monitor serial CBC and transfuse if patient becomes hemodynamically unstable, symptomatic or H/H drops below 8/24

## 2020-03-31 NOTE — PROGRESS NOTES
HD:  3 hour tx complete, lines reinfused, catheter capped and clamped, dressing CDI.  Pt tolerated tx well.    NET UF:  2000 mL

## 2020-04-01 LAB
ALBUMIN SERPL BCP-MCNC: 2.1 G/DL (ref 3.5–5.2)
ALLENS TEST: NORMAL
ALP SERPL-CCNC: 104 U/L (ref 55–135)
ALT SERPL W/O P-5'-P-CCNC: 54 U/L (ref 10–44)
ANCA AB TITR SER IF: NORMAL TITER
ANION GAP SERPL CALC-SCNC: 11 MMOL/L (ref 8–16)
AST SERPL-CCNC: 45 U/L (ref 10–40)
BASOPHILS # BLD AUTO: ABNORMAL K/UL (ref 0–0.2)
BASOPHILS NFR BLD: 0 % (ref 0–1.9)
BILIRUB SERPL-MCNC: 0.5 MG/DL (ref 0.1–1)
BUN SERPL-MCNC: 20 MG/DL (ref 6–20)
CALCIUM SERPL-MCNC: 8.5 MG/DL (ref 8.7–10.5)
CHLORIDE SERPL-SCNC: 99 MMOL/L (ref 95–110)
CO2 SERPL-SCNC: 22 MMOL/L (ref 23–29)
CREAT SERPL-MCNC: 3.2 MG/DL (ref 0.5–1.4)
DELSYS: NORMAL
DIFFERENTIAL METHOD: ABNORMAL
EOSINOPHIL # BLD AUTO: ABNORMAL K/UL (ref 0–0.5)
EOSINOPHIL NFR BLD: 2 % (ref 0–8)
ERYTHROCYTE [DISTWIDTH] IN BLOOD BY AUTOMATED COUNT: 17.8 % (ref 11.5–14.5)
ERYTHROCYTE [SEDIMENTATION RATE] IN BLOOD BY WESTERGREN METHOD: 35 MM/H
EST. GFR  (AFRICAN AMERICAN): 18 ML/MIN/1.73 M^2
EST. GFR  (NON AFRICAN AMERICAN): 16 ML/MIN/1.73 M^2
ETCO2: 38
FIO2: 40
GLUCOSE SERPL-MCNC: 73 MG/DL (ref 70–110)
HCO3 UR-SCNC: 24.4 MMOL/L (ref 24–28)
HCT VFR BLD AUTO: 25.8 % (ref 37–48.5)
HGB BLD-MCNC: 8.1 G/DL (ref 12–16)
HYPOCHROMIA BLD QL SMEAR: ABNORMAL
IMM GRANULOCYTES # BLD AUTO: ABNORMAL K/UL (ref 0–0.04)
IMM GRANULOCYTES NFR BLD AUTO: ABNORMAL % (ref 0–0.5)
LYMPHOCYTES # BLD AUTO: ABNORMAL K/UL (ref 1–4.8)
LYMPHOCYTES NFR BLD: 19 % (ref 18–48)
MCH RBC QN AUTO: 26.6 PG (ref 27–31)
MCHC RBC AUTO-ENTMCNC: 31.4 G/DL (ref 32–36)
MCV RBC AUTO: 85 FL (ref 82–98)
METAMYELOCYTES NFR BLD MANUAL: 5 %
MIN VOL: 11
MODE: NORMAL
MONOCYTES # BLD AUTO: ABNORMAL K/UL (ref 0.3–1)
MONOCYTES NFR BLD: 14 % (ref 4–15)
MYELOCYTES NFR BLD MANUAL: 2 %
NEUTROPHILS NFR BLD: 55 % (ref 38–73)
NEUTS BAND NFR BLD MANUAL: 3 %
NRBC BLD-RTO: 0 /100 WBC
P-ANCA TITR SER IF: NORMAL TITER
PCO2 BLDA: 42.3 MMHG (ref 35–45)
PEEP: 8
PH SMN: 7.37 [PH] (ref 7.35–7.45)
PIP: 37
PLATELET # BLD AUTO: 263 K/UL (ref 150–350)
PLATELET BLD QL SMEAR: ABNORMAL
PMV BLD AUTO: 10.5 FL (ref 9.2–12.9)
PO2 BLDA: 89 MMHG (ref 80–100)
POC BE: -1 MMOL/L
POC SATURATED O2: 97 % (ref 95–100)
POC TCO2: 26 MMOL/L (ref 23–27)
POTASSIUM SERPL-SCNC: 3.8 MMOL/L (ref 3.5–5.1)
PROT SERPL-MCNC: 7.3 G/DL (ref 6–8.4)
RBC # BLD AUTO: 3.04 M/UL (ref 4–5.4)
SAMPLE: NORMAL
SITE: NORMAL
SODIUM SERPL-SCNC: 132 MMOL/L (ref 136–145)
SP02: 94
VT: 350
WBC # BLD AUTO: 9.94 K/UL (ref 3.9–12.7)

## 2020-04-01 PROCEDURE — 99291 CRITICAL CARE FIRST HOUR: CPT | Mod: ,,, | Performed by: INTERNAL MEDICINE

## 2020-04-01 PROCEDURE — 63600175 PHARM REV CODE 636 W HCPCS: Performed by: INTERNAL MEDICINE

## 2020-04-01 PROCEDURE — 25000003 PHARM REV CODE 250: Performed by: INTERNAL MEDICINE

## 2020-04-01 PROCEDURE — 63700000 PHARM REV CODE 250 ALT 637 W/O HCPCS: Performed by: INTERNAL MEDICINE

## 2020-04-01 PROCEDURE — 20000000 HC ICU ROOM

## 2020-04-01 PROCEDURE — 37799 UNLISTED PX VASCULAR SURGERY: CPT

## 2020-04-01 PROCEDURE — 36415 COLL VENOUS BLD VENIPUNCTURE: CPT

## 2020-04-01 PROCEDURE — 99900026 HC AIRWAY MAINTENANCE (STAT)

## 2020-04-01 PROCEDURE — 94003 VENT MGMT INPAT SUBQ DAY: CPT

## 2020-04-01 PROCEDURE — 85007 BL SMEAR W/DIFF WBC COUNT: CPT

## 2020-04-01 PROCEDURE — 94761 N-INVAS EAR/PLS OXIMETRY MLT: CPT

## 2020-04-01 PROCEDURE — 94770 HC EXHALED C02 TEST: CPT

## 2020-04-01 PROCEDURE — 80100014 HC HEMODIALYSIS 1:1

## 2020-04-01 PROCEDURE — 27000221 HC OXYGEN, UP TO 24 HOURS

## 2020-04-01 PROCEDURE — 99291 PR CRITICAL CARE, E/M 30-74 MINUTES: ICD-10-PCS | Mod: ,,, | Performed by: INTERNAL MEDICINE

## 2020-04-01 PROCEDURE — C9113 INJ PANTOPRAZOLE SODIUM, VIA: HCPCS | Performed by: INTERNAL MEDICINE

## 2020-04-01 PROCEDURE — 82803 BLOOD GASES ANY COMBINATION: CPT

## 2020-04-01 PROCEDURE — 84425 ASSAY OF VITAMIN B-1: CPT

## 2020-04-01 PROCEDURE — 99900035 HC TECH TIME PER 15 MIN (STAT)

## 2020-04-01 PROCEDURE — 85027 COMPLETE CBC AUTOMATED: CPT

## 2020-04-01 PROCEDURE — 63600175 PHARM REV CODE 636 W HCPCS: Performed by: NURSE PRACTITIONER

## 2020-04-01 PROCEDURE — 11000001 HC ACUTE MED/SURG PRIVATE ROOM

## 2020-04-01 PROCEDURE — 80053 COMPREHEN METABOLIC PANEL: CPT

## 2020-04-01 RX ADMIN — CEFTRIAXONE 1 G: 1 INJECTION, SOLUTION INTRAVENOUS at 10:04

## 2020-04-01 RX ADMIN — ENOXAPARIN SODIUM 40 MG: 100 INJECTION SUBCUTANEOUS at 10:04

## 2020-04-01 RX ADMIN — LEVOTHYROXINE SODIUM 100 MCG: 100 TABLET ORAL at 05:04

## 2020-04-01 RX ADMIN — PROPOFOL 45 MCG/KG/MIN: 10 INJECTION, EMULSION INTRAVENOUS at 10:04

## 2020-04-01 RX ADMIN — PROPOFOL 45 MCG/KG/MIN: 10 INJECTION, EMULSION INTRAVENOUS at 07:04

## 2020-04-01 RX ADMIN — PROPOFOL 45 MCG/KG/MIN: 10 INJECTION, EMULSION INTRAVENOUS at 11:04

## 2020-04-01 RX ADMIN — MUPIROCIN: 20 OINTMENT TOPICAL at 08:04

## 2020-04-01 RX ADMIN — MUPIROCIN: 20 OINTMENT TOPICAL at 10:04

## 2020-04-01 RX ADMIN — AZITHROMYCIN MONOHYDRATE 250 MG: 250 TABLET ORAL at 08:04

## 2020-04-01 RX ADMIN — FENTANYL CITRATE 100 MCG/HR: 50 INJECTION INTRAVENOUS at 02:04

## 2020-04-01 RX ADMIN — PROPOFOL 45 MCG/KG/MIN: 10 INJECTION, EMULSION INTRAVENOUS at 05:04

## 2020-04-01 RX ADMIN — PROPOFOL 45 MCG/KG/MIN: 10 INJECTION, EMULSION INTRAVENOUS at 03:04

## 2020-04-01 RX ADMIN — PROPOFOL 40 MCG/KG/MIN: 10 INJECTION, EMULSION INTRAVENOUS at 10:04

## 2020-04-01 RX ADMIN — PROPOFOL 35 MCG/KG/MIN: 10 INJECTION, EMULSION INTRAVENOUS at 02:04

## 2020-04-01 RX ADMIN — HYDROXYCHLOROQUINE SULFATE 400 MG: 200 TABLET, FILM COATED ORAL at 08:04

## 2020-04-01 RX ADMIN — HEPARIN SODIUM 4000 UNITS: 1000 INJECTION, SOLUTION INTRAVENOUS; SUBCUTANEOUS at 10:04

## 2020-04-01 RX ADMIN — ENOXAPARIN SODIUM 40 MG: 100 INJECTION SUBCUTANEOUS at 08:04

## 2020-04-01 RX ADMIN — PANTOPRAZOLE SODIUM 40 MG: 40 INJECTION, POWDER, LYOPHILIZED, FOR SOLUTION INTRAVENOUS at 08:04

## 2020-04-01 NOTE — ASSESSMENT & PLAN NOTE
Patient's anemia is currently controlled. Has not received any PRBCs to date.. Etiology likely d/t Anemia of chronic disease  Current CBC reviewed-   Lab Results   Component Value Date    HGB 8.1 (L) 04/01/2020    HCT 25.8 (L) 04/01/2020     Monitor serial CBC and transfuse if patient becomes hemodynamically unstable, symptomatic or H/H drops below 8/24

## 2020-04-01 NOTE — PLAN OF CARE
Pt remains in ICU on ventilator. Unable to take pt to MRI today due to instability - plan to attempt tomorrow if pt can tolerate. Levophed titrated on and off throughout day. Propofol and fentanyl gtt per orders. Sedation turned off approximately an hour and a half today, pt's BP elevated and pt was heavily breathing, stacking breaths. Turned sedation back on for comfort and pt responded well. Cuellar remains in place. A-line with appropriate waveform. Isolation precautions remain in place. Safety maintained. Family updated this shift.

## 2020-04-01 NOTE — PROGRESS NOTES
Net uf 3000 mls     04/01/20 1225   Post-Hemodialysis Assessment   Rinseback Volume (mL) 250 mL   Blood Volume Processed (Liters) 39.2 L   Dialyzer Clearance Heavily streaked   Duration of Treatment (minutes) 175 minutes   Hemodialysis Intake (mL) 500 mL   Total UF (mL) 3500 mL   Net Fluid Removal 3000   Patient Response to Treatment tolerated well   Post-Treatment Weight 112.3 kg (247 lb 9.2 oz)   Treatment Weight Change -3   Post-Hemodialysis Comments treatment complete, pt stable. lines reinfused, catheter capped and clamped. dsg c/d/i

## 2020-04-01 NOTE — SUBJECTIVE & OBJECTIVE
Interval History: Patient continues to be encephalopathic off sedation. CT scan does not show any acute process. No acute events overnight    Review of Systems   Unable to perform ROS: Intubated     Objective:     Vital Signs (Most Recent):  Temp: 98.6 °F (37 °C) (04/01/20 1230)  Pulse: 87 (04/01/20 1345)  Resp: (!) 35 (04/01/20 1345)  BP: 122/69 (04/01/20 1330)  SpO2: 99 % (04/01/20 1345) Vital Signs (24h Range):  Temp:  [98.2 °F (36.8 °C)-99.1 °F (37.3 °C)] 98.6 °F (37 °C)  Pulse:  [] 87  Resp:  [30-42] 35  SpO2:  [93 %-100 %] 99 %  BP: ()/(50-80) 122/69  Arterial Line BP: ()/(48-91) 102/49     Weight: 115.3 kg (254 lb 3.1 oz)  Body mass index is 48.03 kg/m².    Intake/Output Summary (Last 24 hours) at 4/1/2020 1418  Last data filed at 4/1/2020 1225  Gross per 24 hour   Intake 1360 ml   Output 3630 ml   Net -2270 ml      Physical Exam   Nursing note and vitals reviewed.  PATIENT WAS SEEN AS A VIDEO VISIT WITH NURSE IN PATIENT ROOM WITH PATIENT TO ASSIST DURING THE VISIT. PHYSICAL EXAM FINDINGS ARE AS VIEWED BY MYSELF VIA VIDEO OR AS REPORTED BY NURSE IF SPECIFIED AS SUCH, EXAM NOT DONE PERSONALLY BY MYSELF AT BEDSIDE.      Significant Labs:   BMP:   Recent Labs   Lab 04/01/20  0253   GLU 73   *   K 3.8   CL 99   CO2 22*   BUN 20   CREATININE 3.2*   CALCIUM 8.5*     CBC:   Recent Labs   Lab 03/31/20  0249 04/01/20  0253   WBC 9.77 9.94   HGB 8.2* 8.1*   HCT 26.0* 25.8*    263       Significant Imaging: I have reviewed all pertinent imaging results/findings within the past 24 hours.

## 2020-04-01 NOTE — PROCEDURES
ROUTINE ELECTROENCEPHALOGRAM REPORT      Maria Victoria Hernandez  [id number]  1965    DATE OF SERVICE: 3/31/2020    REQUESTING PROVIDER: Kady Gomez MD    METHODOLOGY   Electroencephalographic (EEG) recording is with electrodes placed according to the International 10-20 placement system.  Thirty two (32) channels of digital signal (sampling rate of 512/sec) including T1 and T2 was simultaneously recorded from the scalp and may include  EKG, EMG, and/or eye monitors.  Recording band pass was 0.1 to 512 hz.  Digital video recording of the patient is simultaneously recorded with the EEG.  The patient is instructed report clinical symptoms which may occur during the recording session.  EEG and video recording is stored and archived in digital format. Activation procedures which include photic stimulation, hyperventilation and instructing patients to perform simple task are done in selected patients.    The EEG is displayed on a monitor screen and can be reviewed using different montages.  Computer assisted analysis is employed to detect spike and electrographic seizure activity.   The entire record is submitted for computer analysis.  The entire recording is visually reviewed and the times identified by computer analysis as being spikes or seizures are reviewed again.  Compresses spectral analysis (CSA) is also performed on the activity recorded from each individual channel.  This is displayed as a power display of frequencies from 0 to 30 Hz over time.   The CSA is reviewed looking for asymmetries in power between homologous areas of the scalp and then compared with the original EEG recording.     [employer] software was also utilized in the review of this study.  This software suite analyzes the EEG recording in multiple domains.  Coherence and rhythmicity is computed to identify EEG sections which may contain organized seizures.  Each channel undergoes analysis to detect presence of spike and sharp waves  which have special and morphological characteristic of epileptic activity.  The routine EEG recording is converted from spacial into frequency domain.  This is then displayed comparing homologous areas to identify areas of significant asymmetry.  Algorithm to identify non-cortically generated artifact is used to separate eye movement, EMG and other artifact from the EEG    EEG FINDINGS  Background activity:   The background rhythm was characterized by delta > theta (2-5 Hz) activity   No posterior dominant rhythm seen.   Symmetry and continuity: The background was continuous and symmetric    Sleep:   Not seen.    Activation procedures:   Photic stimulation was performed with no abnormalities seen  Hyperventilation was not performed     Abnormal activity:   No epileptiform discharges, periodic discharges, lateralized rhythmic delta activity or electrographic seizures were seen.    EKG:   Regular rhythm seen    IMPRESSION:   This routine EEG done under sedation (Propofol 35mKm) is abnormal due to the moderate to severe generalized slowing seen, suggestive of severe diffuse or multifocal cerebral dysfunction.  No epileptiform activity or electrographic seizures seen.    CLINICAL CORRELATION IS RECOMMENDED.    Alicia Padron MD, DALTON(), JONATHON, HELENA.  Neurology-Epilepsy.  [facility].

## 2020-04-01 NOTE — PROGRESS NOTES
INPATIENT NEPHROLOGY PROGRESS  [facility] NEPHROLOGY    Patient Name: Maria Victoria Hernandez  Date: 04/01/2020    Reason for consultation: MAIKOL    History of Present Illness:  54AAF nurse with morbid obesity, hypothyroidism presents PNA, intubated, positive for COVID19. Admit Cr 0.7. Went 5.1 and HD started on 3/29.     3/28  Scr worse today.  Only one shift recorded for output, 520cc.  Still hyponatremic, vent setting adjusted per pulmonology note for acidosis.  K+ at goal after repletion.  Has siri, may need HD initiated.  3/29  Non oliguric.  In no distress  3/30 VSS, seen and examined on HD, tolerating well. Plan another HD in AM, discussed with daughter who is a nurse here too.  3/31 VSS, intubated still. UO is not great but she is dialyzed daily. Seen and examined on HD, tolerating well. Plan another HD in AM.  4/1 VSS, intubated still. UO is not great but she is dialyzed daily. Seen and examined on HD, tolerating well. Plan another HD PRN.    Plan of Care:    1. Septic shock 2/2 COVID PNA with HHRF requiring MV  - On abx per ID/Pulm.  - She is intubated/sedated.    2. MAIKOL- suspect multifactorial ATN in setting of shock/hypotension/intravascular volume depletion + diuresis; trace hematuria noted  - No nsaids or IV contrast (meloxicam noted on home med list)   - Continue mueller. UO is not great but is encouraging.  - Dose meds for CrCl < 20.  - HD again only as needed.    3. Hypovolemic hyponatremia  - Will trend serum Na with volume resuscitation.    4. Hypokalemia  - better    5. HypoMg  - better    6. Anemia  - Trend Hgb.  - She is on PO iron at home.    Thank you for allowing us to participate in this patient's care. We will continue to follow.    Vital Signs:  Temp Readings from Last 3 Encounters:   04/01/20 98.4 °F (36.9 °C) (Axillary)       Pulse Readings from Last 3 Encounters:   04/01/20 100   01/15/15 84   12/17/13 80       BP Readings from Last 3 Encounters:   04/01/20 (!) 102/54   01/15/15  124/82   12/17/13 102/68       Weight:  Wt Readings from Last 3 Encounters:   03/30/20 115.3 kg (254 lb 3.1 oz)   01/15/15 105.8 kg (233 lb 4.8 oz)   12/17/13 101.2 kg (223 lb)       Past Medical & Surgical History:  Past Medical History:   Diagnosis Date    Colon polyp     Diverticulosis large intestine w/o perforation or abscess w/bleeding     Iron deficiency anemia     Prediabetes     Thyroid disease     Vitamin B 12 deficiency     Vitamin D deficiency        Past Surgical History:   Procedure Laterality Date    TUBAL LIGATION         Past Social History:  Social History     Socioeconomic History    Marital status:      Spouse name: Not on file    Number of children: Not on file    Years of education: Not on file    Highest education level: Not on file   Occupational History    Not on file   Social Needs    Financial resource strain: Not on file    Food insecurity:     Worry: Not on file     Inability: Not on file    Transportation needs:     Medical: Not on file     Non-medical: Not on file   Tobacco Use    Smoking status: Never Smoker    Smokeless tobacco: Never Used   Substance and Sexual Activity    Alcohol use: No    Drug use: Not on file    Sexual activity: Not on file   Lifestyle    Physical activity:     Days per week: Not on file     Minutes per session: Not on file    Stress: Not on file   Relationships    Social connections:     Talks on phone: Not on file     Gets together: Not on file     Attends Nondenominational service: Not on file     Active member of club or organization: Not on file     Attends meetings of clubs or organizations: Not on file     Relationship status: Not on file   Other Topics Concern    Not on file   Social History Narrative    Not on file       Medications:  Scheduled Meds:   azithromycin  250 mg Per NG tube Daily    cefTRIAXone (ROCEPHIN) IVPB  1 g Intravenous Q24H    enoxparin  40 mg Subcutaneous Q12H    hydroxychloroquine  400 mg Per NG tube  "Daily    levothyroxine  100 mcg Oral Before breakfast    mupirocin   Nasal BID    pantoprazole  40 mg Intravenous Daily    rocuronium  100 mg Intravenous Once     Continuous Infusions:   Amino acid 5% - dextrose 15% (CLINIMIX-E) solution with additives.  CENTRAL LINE ONLY (1395 mOsm/L). 1L provides 50 gm AA, 150 gm CHO (510 kcal/L dextrose), Na 35, K 30, Mg 5, Ca 4.5, Acetate 80, Cl 39, Phos 15.      fentanyl 100 mcg/hr (04/01/20 0206)    norepinephrine bitartrate-D5W Stopped (04/01/20 1000)    propofoL 45 mcg/kg/min (04/01/20 1030)     PRN Meds:.acetaminophen, heparin (porcine), propofoL, sodium chloride 0.9%  No current facility-administered medications on file prior to encounter.      Current Outpatient Medications on File Prior to Encounter   Medication Sig Dispense Refill    ferrous sulfate 325 mg (65 mg iron) Tab tablet Take 1 tablet (325 mg total) by mouth daily with breakfast. (Patient taking differently: Take 325 mg by mouth daily with breakfast. Take 2 tablets daily) 90 tablet 3    fluticasone propionate (FLONASE) 50 mcg/actuation nasal spray 1 spray by Each Nostril route once daily.      levothyroxine (SYNTHROID) 100 MCG tablet Take 1 tablet (100 mcg total) by mouth once daily. (Patient taking differently: Take 150 mcg by mouth once daily. ) 90 tablet 3    meloxicam (MOBIC) 7.5 MG tablet Take 7.5 mg by mouth once daily.      clotrimazole-betamethasone 1-0.05% (LOTRISONE) cream Apply topically 2 (two) times daily. 45 g 1    levocetirizine (XYZAL) 5 MG tablet Take 1 tablet (5 mg total) by mouth every evening. 30 tablet 6       Allergies:  Patient has no known allergies.    Past Family History:  Reviewed; refer to Hospitalist Admission Note    Review of Systems:  Unable to obtain due to MV    Physical Exam:  BP (!) 102/54   Pulse 100   Temp 98.4 °F (36.9 °C) (Axillary)   Resp (!) 35   Ht 5' 1" (1.549 m)   Wt 115.3 kg (254 lb 3.1 oz)   SpO2 (!) 93%   Breastfeeding? No   BMI 48.03 kg/m² "     INS/OUTS:  I/O last 3 completed shifts:  In: 2515.7 [I.V.:829; Other:500; NG/GT:270; IV Piggyback:100]  Out: 2840 [Urine:240; Drains:100; Other:2500]  I/O this shift:  In: 60 [NG/GT:60]  Out: 75 [Urine:75]    General Appearance:    Intubated, sedated, acutely ill, appears stated age   Head:    Normocephalic, atraumatic   Eyes:    Eyes closed       Mouth:   Moist mucus membranes   Lungs:     Coarse   Heart:    Regular rate and rhythm, S1 and S2 normal, no murmur, rub   or gallop   Abdomen:     Soft, non-tender, non-distended, bowel sounds active all four   quadrants, no RT or guarding, no masses, no organomegaly   Extremities:   Warm and well perfused, distal pulses intact, no cyanosis or    peripheral edema   MSK:   No joint or muscle swelling, tenderness or deformity   Skin:   Skin color, texture, turgor normal, no rashes or lesions   Neurologic/Psychiatric:   Unable to assess     Results:  Lab Results   Component Value Date     (L) 04/01/2020    K 3.8 04/01/2020    CL 99 04/01/2020    CO2 22 (L) 04/01/2020    BUN 20 04/01/2020    CREATININE 3.2 (H) 04/01/2020    CALCIUM 8.5 (L) 04/01/2020    ANIONGAP 11 04/01/2020    ESTGFRAFRICA 18 (A) 04/01/2020    EGFRNONAA 16 (A) 04/01/2020       Lab Results   Component Value Date    CALCIUM 8.5 (L) 04/01/2020    PHOS 4.8 (H) 03/29/2020       Recent Labs   Lab 04/01/20  0253   WBC 9.94   RBC 3.04*   HGB 8.1*   HCT 25.8*      MCV 85   MCH 26.6*   MCHC 31.4*     I have personally reviewed pertinent radiological imaging and reports.    [facility]  [address] [address]  [phone] (p)  [phone] (f)

## 2020-04-01 NOTE — NURSING
Verified with Dr. Gomez the need for clinimix, as patient is at goal on tube feeding and is tolerating without much residual. Per MD, no need for clinimix at this time as long as TF is at goal. Updated pharmacy and order discontinued.

## 2020-04-01 NOTE — ASSESSMENT & PLAN NOTE
Patient with Hypoxic Respiratory failure which is Acute.  she is not on home oxygen. Supplemental ventilation was provided and noted- Vent Mode: A/C  Oxygen Concentration (%):  [40-80] 40  Resp Rate Total:  [35 br/min-41 br/min] 35 br/min  Vt Set:  [350 mL] 350 mL  PEEP/CPAP:  [8 cmH20] 8 cmH20  Pressure Support:  [0 cmH20] 0 cmH20  Mean Airway Pressure:  [16 uzB02-67 cmH20] 16 cmH20 and oxygen saturations . Differential diagnosis includes - Pneumonia Viral Labs and images were reviewed. Patient Has recent ABG- No components found for: ABG. Will treat underlying causes and adjust management of respiratory failure as follows-     Temp:  [98.4 °F (36.9 °C)-98.7 °F (37.1 °C)]   Pulse:  []   Resp:  [35-40]   BP: ()/(50-80)   SpO2:  [93 %-100 %]   Arterial Line BP: ()/(49-91)      Pulmonary consultation.   Continue beta 2 agonist bronchodilator treatments.   Continue IV antibiotics - ceftriaxone and azithromycin.   D dimer up 1, ast/alt sl up 105/81, procal 0.12, covid pos.  Troponin neg.  Ratio 74/0.5 on peep 8-   390 is 6 cc/kg ;  Will continue Plaquenil 400 mg daily  as per COVID protocol.  Microbiology Results (last 7 days)     Procedure Component Value Units Date/Time    Blood culture [[id number]] Collected:  03/26/20 0432    Order Status:  Completed Specimen:  Blood Updated:  03/30/20 1214     Blood Culture, Routine No Growth after 4 days.     Blood culture [[id number]] Collected:  03/26/20 0612    Order Status:  Completed Specimen:  Blood Updated:  03/30/20 1214     Blood Culture, Routine No Growth after 4 days.     Blood culture x two cultures. Draw prior to antibiotics. [[id number]] Collected:  03/25/20 1411    Order Status:  Completed Specimen:  Blood from Antecubital, Right  Arm Updated:  03/30/20 1212     Blood Culture, Routine No Growth after 4 days.     Narrative:       Aerobic and anaerobic    Culture, Respiratory with Gram Stain [[id number]] Collected:  03/25/20 2150    Order Status:   Completed Specimen:  Tracheal Aspirate Updated:  03/28/20 0927     Respiratory Culture Normal respiratory anamaria      No S aureus or Pseudomonas isolated.     Gram Stain (Respiratory) <10 epithelial cells per low power field.     Gram Stain (Respiratory) Rare WBC's     Gram Stain (Respiratory) Rare Gram positive cocci    Culture, Respiratory with Gram Stain [[id number]] Collected:  03/25/20 2152    Order Status:  Canceled Specimen:  Respiratory from Sputum     Influenza A & B by Molecular [[id number]]     Order Status:  Canceled Specimen:  Nasopharyngeal Swab           Continue routine medications as before.   Follow airborne/droplet precautions.

## 2020-04-01 NOTE — ASSESSMENT & PLAN NOTE
AMS:: Hypoxia vs. Infection vs. TIA/stroke vs. Metabolic/Toxic.       Lab Results   Component Value Date    WBC 9.94 04/01/2020     will get MRI today to rule out stroke   focal findings on physical exam  No early signs of stroke on Head CT, no hemorrhage or acute findings.  Awaiting EEG results  Will follow up with neuro recs

## 2020-04-01 NOTE — PLAN OF CARE
03/31/20 1907   Patient Assessment/Suction   Respiratory Effort Unlabored   Expansion/Accessory Muscles/Retractions no use of accessory muscles   All Lung Fields Breath Sounds coarse;diminished   Rhythm/Pattern, Respiratory assisted mechanically;unlabored   Suction Method tracheal   $ Suction Charges Inline Suction Procedure Stat Charge   Secretions Amount scant   Secretions Color red-streaked;tan   Secretions Characteristics thick   PRE-TX-O2   O2 Device (Oxygen Therapy) ventilator   $ Is the patient on Low Flow Oxygen? Yes   Oxygen Concentration (%) 40   SpO2 98 %   Pulse Oximetry Type Continuous   Pulse 85   Resp (!) 34   /70   ETCO2   $ ETCO2 Charge Exhaled CO2 Monitoring   $ ETCO2 Usage Currently wearing   ETCO2 (mmHg) 35 mmHg   ETCO2 Device Type Portable Bedside Monitor;Artificial Airway        Airway - Non-Surgical 03/25/20 2145 Endotracheal Tube   Placement Date/Time: 03/25/20 2145   Present Prior to Hospital Arrival?: No  Inserted by: MD  Airway Device: Endotracheal Tube  Airway Device Size: 7.5  Style: Cuffed  Cuff Inflated With: Air  Placement Verified By: Auscultation;Chest X-ray;Colorimetr...   Secured at 24 cm   Measured At Lips   Secured Location Left   Secured by Commercial tube owusu   Bite Block left   Site Condition Dry   Status Intact;Secured;Patent   Site Assessment Dry   Vent Select   Conventional Vent Y   Charged w/in last 24h YES   Preset Conventional Ventilator Settings   Vent Type    Ventilation Type VC   Vent Mode A/C   Humidity HME   Set Rate 35 BPM   Vt Set 350 mL   PEEP/CPAP 8 cmH20   Pressure Support 0 cmH20   Waveform RAMP   Peak Flow 60 L/min   Set Inspiratory Pressure 0 cmH20   Insp Time 0 Sec(s)   Plateau Set/Insp. Hold (sec) 0   Insp Rise Time  0 %   Trigger Sensitivity Flow/I-Trigger 1 L/min   P High 0 cm H2O   P Low 0 cm H2O   T High 0 sec   T Low 0 sec   Patient Ventilator Parameters   Resp Rate Total 35 br/min   Peak Airway Pressure 28 cmH2O   Mean Airway  Pressure 16 cmH20   Plateau Pressure 0 cmH20   Exhaled Vt 345 mL   Total Ve 12.8 mL   Spont Ve 0 L   I:E Ratio Measured 1:1.70   Conventional Ventilator Alarms   Alarms On Y   Ve High Alarm 22 L/min   Resp Rate High Alarm 50 br/min   Press High Alarm 60 cmH2O   Apnea Rate 10   Apnea Volume (mL) 450 mL   Apnea Oxygen Concentration  100   Apnea Flow Rate (L/min) 75   T Apnea 20 sec(s)   Pt stable on current vent settings. Vent to red outlet with alarms on and functioning.

## 2020-04-01 NOTE — PROGRESS NOTES
[facility] Hospital Medicine  Progress Note    Patient Name: Maria Victoria Hernandez  MRN: [id number]  Patient Class: IP- Inpatient   Admission Date: 3/25/2020  Length of Stay: 7 days  Attending Physician: Kady Gomez MD  Primary Care Provider: Candice Deluna MD        Subjective:     Principal Problem:Acute respiratory failure        HPI:  Patient is a 54 years old  female with morbid obesity in past medical history significant for hypothyroidism is being admitted to intensive care unit under inpatient status from [facility] Emergency room with worsening shortness of breath.  Three days ago patient was tested positive for COVID - 19.  Patient works at [employer].  Patient has been experiencing subjective fever, generalized body aches and pains and nonproductive cough for few days.  Patient is getting increasingly short of breath especially for the past 2 days.  Upon arrival to the emergency room patient was noted to be hypoxic around 89%.  Up on 3 L patient's oxygen sats are around 96%.  Patient denies any chest pain, leg swelling or calf tenderness.      Overview/Hospital Course:  No notes on file    Interval History: Patient continues to be encephalopathic off sedation. CT scan does not show any acute process. No acute events overnight    Review of Systems   Unable to perform ROS: Intubated     Objective:     Vital Signs (Most Recent):  Temp: 98.6 °F (37 °C) (04/01/20 1230)  Pulse: 87 (04/01/20 1345)  Resp: (!) 35 (04/01/20 1345)  BP: 122/69 (04/01/20 1330)  SpO2: 99 % (04/01/20 1345) Vital Signs (24h Range):  Temp:  [98.2 °F (36.8 °C)-99.1 °F (37.3 °C)] 98.6 °F (37 °C)  Pulse:  [] 87  Resp:  [30-42] 35  SpO2:  [93 %-100 %] 99 %  BP: ()/(50-80) 122/69  Arterial Line BP: ()/(48-91) 102/49     Weight: 115.3 kg (254 lb 3.1 oz)  Body mass index is 48.03 kg/m².    Intake/Output Summary (Last 24 hours) at 4/1/2020  1418  Last data filed at 4/1/2020 1225  Gross per 24 hour   Intake 1360 ml   Output 3630 ml   Net -2270 ml      Physical Exam   Nursing note and vitals reviewed.  PATIENT WAS SEEN AS A VIDEO VISIT WITH NURSE IN PATIENT ROOM WITH PATIENT TO ASSIST DURING THE VISIT. PHYSICAL EXAM FINDINGS ARE AS VIEWED BY MYSELF VIA VIDEO OR AS REPORTED BY NURSE IF SPECIFIED AS SUCH, EXAM NOT DONE PERSONALLY BY MYSELF AT BEDSIDE.      Significant Labs:   BMP:   Recent Labs   Lab 04/01/20  0253   GLU 73   *   K 3.8   CL 99   CO2 22*   BUN 20   CREATININE 3.2*   CALCIUM 8.5*     CBC:   Recent Labs   Lab 03/31/20  0249 04/01/20  0253   WBC 9.77 9.94   HGB 8.2* 8.1*   HCT 26.0* 25.8*    263       Significant Imaging: I have reviewed all pertinent imaging results/findings within the past 24 hours.      Assessment/Plan:      * Acute respiratory failure  Patient with Hypoxic Respiratory failure which is Acute.  she is not on home oxygen. Supplemental ventilation was provided and noted- Vent Mode: A/C  Oxygen Concentration (%):  [40-80] 40  Resp Rate Total:  [35 br/min-41 br/min] 35 br/min  Vt Set:  [350 mL] 350 mL  PEEP/CPAP:  [8 cmH20] 8 cmH20  Pressure Support:  [0 cmH20] 0 cmH20  Mean Airway Pressure:  [16 zwS93-24 cmH20] 16 cmH20 and oxygen saturations . Differential diagnosis includes - Pneumonia Viral Labs and images were reviewed. Patient Has recent ABG- No components found for: ABG. Will treat underlying causes and adjust management of respiratory failure as follows-     Temp:  [98.4 °F (36.9 °C)-98.7 °F (37.1 °C)]   Pulse:  []   Resp:  [35-40]   BP: ()/(50-80)   SpO2:  [93 %-100 %]   Arterial Line BP: ()/(49-91)      Pulmonary consultation.   Continue beta 2 agonist bronchodilator treatments.   Continue IV antibiotics - ceftriaxone and azithromycin.   D dimer up 1, ast/alt sl up 105/81, procal 0.12, covid pos.  Troponin neg.  Ratio 74/0.5 on peep 8-   390 is 6 cc/kg ;  Will continue Plaquenil 400 mg  daily  as per COVID protocol.  Microbiology Results (last 7 days)     Procedure Component Value Units Date/Time    Blood culture [[id number]] Collected:  03/26/20 0432    Order Status:  Completed Specimen:  Blood Updated:  03/30/20 1214     Blood Culture, Routine No Growth after 4 days.     Blood culture [[id number]] Collected:  03/26/20 0612    Order Status:  Completed Specimen:  Blood Updated:  03/30/20 1214     Blood Culture, Routine No Growth after 4 days.     Blood culture x two cultures. Draw prior to antibiotics. [[id number]] Collected:  03/25/20 1411    Order Status:  Completed Specimen:  Blood from Antecubital, Right  Arm Updated:  03/30/20 1212     Blood Culture, Routine No Growth after 4 days.     Narrative:       Aerobic and anaerobic    Culture, Respiratory with Gram Stain [[id number]] Collected:  03/25/20 2150    Order Status:  Completed Specimen:  Tracheal Aspirate Updated:  03/28/20 0927     Respiratory Culture Normal respiratory anamaria      No S aureus or Pseudomonas isolated.     Gram Stain (Respiratory) <10 epithelial cells per low power field.     Gram Stain (Respiratory) Rare WBC's     Gram Stain (Respiratory) Rare Gram positive cocci    Culture, Respiratory with Gram Stain [[id number]] Collected:  03/25/20 2152    Order Status:  Canceled Specimen:  Respiratory from Sputum     Influenza A & B by Molecular [[id number]]     Order Status:  Canceled Specimen:  Nasopharyngeal Swab           Continue routine medications as before.   Follow airborne/droplet precautions.            Acute encephalopathy  AMS:: Hypoxia vs. Infection vs. TIA/stroke vs. Metabolic/Toxic.       Lab Results   Component Value Date    WBC 9.94 04/01/2020     will get MRI today to rule out stroke   focal findings on physical exam  No early signs of stroke on Head CT, no hemorrhage or acute findings.  Awaiting EEG results  Will follow up with neuro recs      Iron deficiency anemia, unspecified  Patient's anemia is currently controlled.  Has not received any PRBCs to date.. Etiology likely d/t Anemia of chronic disease  Current CBC reviewed-   Lab Results   Component Value Date    HGB 8.1 (L) 04/01/2020    HCT 25.8 (L) 04/01/2020     Monitor serial CBC and transfuse if patient becomes hemodynamically unstable, symptomatic or H/H drops below 8/24        Diverticulosis of large intestine without hemorrhage  Will monitor of any blood in stools  HnH trending down      Acute renal failure  Will follow-up with the renal recommendations  Currently on HD             COVID-19 virus infection  Will continue Plaquenil    Covid-19 Virus Infection  - Infection Control notified    - Isolation:   - Airborne and Droplet Precautions  - N95 masks must be fit tested, wear eye protection  - 20 second hand hygiene   - Limit visitors per hospital policy   - Consolidating lab draws, nursing care, and interventions    - Diagnostics: (rising CRP, persistent lymphopenia, hyponatremia, hyperferritinemia, elevated troponin, elevated d-dimer, age, and comorbidities are significant predictors of poor clinical outcome)   - CBC:   trend Q48hrs  - CMP:        trend Q48hrs  - Procalcitonin:  - D-dimer:  trend Q48hrs  - Ferritin:  repeat prior to discharge  - CRP:        trend Q48hrs  - LDH:  - BNP:  - Troponin:    - ECG:   - rapid Flu:   - RIP only if BMT/solid transplant:   - Legionella antigen:   - Blood culture x2:   - Sputum culture:   - CXR:   - UA and culture:      - Management:   - Bundle care as able to minimize in/out of room   - Supplemental O2 to maintain SpO2 >92%,   if requiring 6L NC or higher, place on nonrebreather and discuss case with MICU   - Telemetry & continuous Pulse Ox   - albuterol INHALER PRN 4puff Q6hr approximates a nebulizer (avoid nebulization of secretions)   - apap PRN fever   - Avoiding NIPPV to prevent aerosolization   (including home CPAP/BiPAP unless on a case-by-case basis and only in negative pressure room)   - Cautious use of NSAIDS for fever  per WHO recommendations (3/16/2020)   - No new ACEi/ARB start or discontinuation of chronic med unless hypotensive (Esler et al. Journal of Hypertension 2020, 38:000-000)   - Careful use of steroids in the absence of other indications   - unless septic shock due to increased viral replication   - Fluid sparing resuscitation   - Empiric antibiotics per likely source & patient allergies    - CAP: x 5 day course  Ceftriaxone 1g IV Q24hrs            Azithromycin 500mg IV day #1, then 250mg PO daily x4 days                 If MRSA risk factors, add Vancomycin IV (PharmD consult)   - If patient meets criteria per Hospital Protocol    - start statin (if CPK WNL)    - start HCQ 400mg PO BID x1 day, then 400mg PO daily x 4 days (check G6PD, ECG, and start Qshift POCT glucose)    Goals of care, counseling/discussion  - Reviewed the typical clinical course of COVID19 with the daughter Danya (patient name or relationship to patient), including the potential for acute decompensation requiring intubation and mechanical ventilation  - Discussed again as part of routine daily evaluation, patient/POA maintains code status of Full code    VTE High Risk Prophylaxis: enoxaparin 40mg sq QHS @ 2100 (bundled care) if GFR >30    Patient's chronic/stable medical conditions noted in the problem list above will be managed with the patient's home medications as tolerated.           ARDS (adult respiratory distress syndrome)    --will cont ARDSnet Protocol.  --Target 6-8ml/kg Ideal Body Weight. Decrease TV as tolerated.  --Plateau pressure goal <30cm H2O.  --Oxygenation goal PaO2 55-80 or SpO2 88-95%.  --pH goal 7.30-7.45.  --Wean to PSV as tolerated.        COVID-19 virus detected  Continue Plaquenil   Plaquenil 400 mg twice daily on Day 1 followed by 400 mg daily for 5 days as per COVID protocol.   Continue routine medications as before.   Follow airborne/droplet precautions.      Hypothyroid  Check TSH, Chronic problem. Will continue  chronic medications and monitor for any changes, adjusting as needed.          Morbid obesity  Body mass index is 48.03 kg/m². Morbid obesity complicates all aspects of disease management from diagnostic modalities to treatment. Weight loss encouraged and health benefits explained to patient.            VTE Risk Mitigation (From admission, onward)         Ordered     heparin (porcine) injection 4,000 Units  As needed (PRN)      03/29/20 2001     enoxaparin injection 40 mg  Every 12 hours      03/25/20 2313     IP VTE HIGH RISK PATIENT  Once      03/25/20 2313                Critical care time spent on the evaluation and treatment of severe organ dysfunction, review of pertinent labs and imaging studies, discussions with consulting providers and discussions with patient/family: 60 minutes.      Kady Gomez MD  Department of Hospital Medicine   [facility]

## 2020-04-01 NOTE — PLAN OF CARE
Remains intubated and sedated on vent. Titrating sedation for increase resp rate. With stimulation pt resp rate up to 40's and heart rate 120's temp max 99.2 ax. Tube feeds started last pm and increasing q 6 hours to goal. 150 cc urine output. Bath and linen change done. Safety  measures in place. COVID pos.

## 2020-04-01 NOTE — PROGRESS NOTES
[facility]  Neurology  Progress Note    Patient Name: Maria Victoria Hernandez  MRN: [id number]  Admission Date: 3/25/2020  Hospital Length of Stay: 7 days  Code Status: Full Code   Attending Provider: Kady Gomez MD  Primary Care Physician: Candice Deluna MD   Principal Problem:Acute respiratory failure    Subjective:         HPI: Patient is a 54 years old  female with morbid obesity in past medical history significant for hypothyroidism is being admitted to intensive care unit under inpatient status from [facility] Emergency room with worsening shortness of breath.  Three days ago patient was tested positive for COVID - 19.  Patient works at [employer].  Patient has been experiencing subjective fever, generalized body aches and pains and nonproductive cough for few days.  Patient is getting increasingly short of breath especially for the past 2 days.  Upon arrival to the emergency room patient was noted to be hypoxic around 89%.  Up on 3 L patient's oxygen sats are around 96%.  Patient denies any chest pain, leg swelling or calf tenderness.        Overview/Hospital Course:  No notes on file     Interval History: Patient has been encephalopathic off sedation limited response to verbal or painful stimuli as per RN assessment. Off pressor. Not tolerating TF currently on TPN     Neurological Interval Note: Patient seen and limited exam noted. Discussed plan of care with Dr. Justice Monge. Patient on ventilator sedation continues. Patient tested positive for COVID-19. Limited exam noted. Patient unresponsive  When off sedation, not following commands. CT of head w/o contrast negative for acute finding. EEG 30 min results pending and encephalopathy labs pending. Will continue to monitor.      Current Neurological Medications:   .  No current facility-administered medications on file prior to encounter.      Current Outpatient Medications  on File Prior to Encounter   Medication Sig Dispense Refill    ferrous sulfate 325 mg (65 mg iron) Tab tablet Take 1 tablet (325 mg total) by mouth daily with breakfast. (Patient taking differently: Take 325 mg by mouth daily with breakfast. Take 2 tablets daily) 90 tablet 3    fluticasone propionate (FLONASE) 50 mcg/actuation nasal spray 1 spray by Each Nostril route once daily.      levothyroxine (SYNTHROID) 100 MCG tablet Take 1 tablet (100 mcg total) by mouth once daily. (Patient taking differently: Take 150 mcg by mouth once daily. ) 90 tablet 3    meloxicam (MOBIC) 7.5 MG tablet Take 7.5 mg by mouth once daily.      clotrimazole-betamethasone 1-0.05% (LOTRISONE) cream Apply topically 2 (two) times daily. 45 g 1    levocetirizine (XYZAL) 5 MG tablet Take 1 tablet (5 mg total) by mouth every evening. 30 tablet 6         Current Facility-Administered Medications   Medication Dose Route Frequency Provider Last Rate Last Dose    acetaminophen tablet 650 mg  650 mg Oral Q6H PRN Kady Gomez MD   650 mg at 03/26/20 2100    Amino acid 5% - dextrose 15% (CLINIMIX-E) solution with additives (1L  provides 510 kcal/L dextrose, with 50 gm AA, 150 gm CHO, Na 35, K 30, Mg 5, Ca 4.5, Acetate 80, Cl 39, Phos 15)   Intravenous Continuous Kady Gomez MD        azithromycin tablet 250 mg  250 mg Per NG tube Daily Milvia Mcguire MD   250 mg at 03/31/20 0934    cefTRIAXone (ROCEPHIN) 1 g in dextrose 5 % 50 mL IVPB  1 g Intravenous Q24H Ferny Porter MD   1 g at 03/31/20 2220    enoxaparin injection 40 mg  40 mg Subcutaneous Q12H Ferny Porter MD   40 mg at 03/31/20 2146    fentaNYL (SUBLIMAZE) 2,500 mcg in dextrose 5 % 250 mL infusion   Intravenous Continuous Ferny Porter MD 10 mL/hr at 04/01/20 0206 100 mcg/hr at 04/01/20 0206    heparin (porcine) injection 4,000 Units  4,000 Units Intravenous PRN Don Collins MD   4,000 Units at 03/31/20 1053    hydroxychloroquine tablet 400 mg  400 mg Per NG tube  Daily Kady Gomez MD   400 mg at 03/31/20 0934    levothyroxine tablet 100 mcg  100 mcg Oral Before breakfast Ferny Porter MD   100 mcg at 04/01/20 0515    mupirocin 2 % ointment   Nasal BID Don Collins MD        norepinephrine 8 mg in dextrose 5 % 250 mL infusion  0.02 mcg/kg/min Intravenous Continuous Kady Gomez MD   Stopped at 03/31/20 0541    pantoprazole injection 40 mg  40 mg Intravenous Daily Ferny Porter MD   40 mg at 03/31/20 0934    propofol (DIPRIVAN) 10 mg/mL infusion  5 mcg/kg/min Intravenous PRN NEELIMA MastP   Stopped at 03/30/20 1346    propofol (DIPRIVAN) 10 mg/mL infusion  5 mcg/kg/min Intravenous Continuous Madi Ellis MD 31.1 mL/hr at 04/01/20 0546 45 mcg/kg/min at 04/01/20 0546    rocuronium injection 100 mg  100 mg Intravenous Once Florentin Fam MD        sodium chloride 0.9% flush 10 mL  10 mL Intravenous PRN Ferny Porter MD           Review of Systems   Unable to perform ROS: Intubated     Objective:     Vital Signs (Most Recent):  Temp: 98.7 °F (37.1 °C) (04/01/20 0310)  Pulse: 85 (04/01/20 0700)  Resp: (!) 35 (04/01/20 0700)  BP: 112/62 (04/01/20 0630)  SpO2: 99 % (04/01/20 0700) Vital Signs (24h Range):  Temp:  [98.1 °F (36.7 °C)-99.1 °F (37.3 °C)] 98.7 °F (37.1 °C)  Pulse:  [] 85  Resp:  [22-47] 35  SpO2:  [92 %-100 %] 99 %  BP: ()/(50-79) 112/62  Arterial Line BP: (103-209)/(48-91) 132/54     Weight: 115.3 kg (254 lb 3.1 oz)  Body mass index is 48.03 kg/m².    Physical Exam   Constitutional: She appears well-developed and well-nourished.   HENT:   Head: Normocephalic and atraumatic.   Eyes: Pupils are equal, round, and reactive to light.   Neck: Neck supple.   Cardiovascular: Normal rate.   Pulmonary/Chest: Effort normal.   Intubated and Ventilator assisted    Abdominal: Soft. Bowel sounds are normal.   Obese    Musculoskeletal:   JOSEFINA falls against gravity    Neurological: She is unresponsive. GCS eye subscore is 1. GCS verbal  subscore is 1. GCS motor subscore is 1.   Unresponsive JOSEFINA    Skin: Skin is warm and dry. Capillary refill takes 2 to 3 seconds.   Psychiatric: She is withdrawn. Cognition and memory are impaired. She is noncommunicative.   Withdrawn  She is inattentive.       NEUROLOGICAL EXAMINATION:     CRANIAL NERVES     CN III, IV, VI   Pupils are equal, round, and reactive to light.    BMP        Lab Results   Component Value Date      (L) 03/31/2020     K 4.0 03/31/2020      03/31/2020     CO2 19 (L) 03/31/2020     BUN 17 03/31/2020     CREATININE 3.6 (H) 03/31/2020     CALCIUM 7.7 (L) 03/31/2020     ANIONGAP 11 03/31/2020     ESTGFRAFRICA 16 (A) 03/31/2020     EGFRNONAA 14 (A) 03/31/2020            Lab Results   Component Value Date     TSH 2.082 03/31/2020         Lab Results   Component Value Date    YVABPQMK57 >2000 (H) 03/31/2020      Ref Range & Units 1d ago  (3/31/20) 1d ago  (3/31/20)   Ammonia 10 - 50 umol/L 42       RPR pending       Significant Imaging:      CT of Head w/o contrast     Impression       1.  There are slight limitations related to moderate patient tilted in the scanner and mild patient motion but there is no obvious acute abnormality.  There is no hemorrhage, mass, mass effect or obvious acute edema or ischemia.  It should be noted that MRI is more sensitive in the detection of subtle or acute nonhemorrhagic ischemic disease.    2.  Support tubing is seen in the left nares.  Bilateral mastoid and middle ear effusions are likely related to support tubing and/or intubation.  Correlate clinically.  The same is true for changes throughout the paranasal sinuses.         EEG 30 min pending      Assessment and Plan:     54 year old female with impression     1. Acute Metabolic Encephalaopathy vs Septic Encephalopathy related to COVID-19 virus     -CT of Head w/o contrast noted   -EEG 30 min awake and drowsy results pending   -Encephalopathy Labs pending      3.COVID-19 virus infection  -Will  continue Plaquenil  - Managed per IM     3. History of Hypothyroidism  IM to manage      Will continue to follow        Active Diagnoses:    Diagnosis Date Noted POA    PRINCIPAL PROBLEM:  Acute respiratory failure [J96.00] 03/25/2020 Yes    Acute encephalopathy [G93.40] 03/31/2020 No    Acute renal failure [N17.9] 03/27/2020 No    ARDS (adult respiratory distress syndrome) [J80] 03/26/2020 Yes    COVID-19 virus infection [U07.1] 03/26/2020 Yes    Morbid obesity [E66.01] 03/25/2020 Yes    Hypothyroid [E03.9] 03/25/2020 Yes    COVID-19 virus detected [U07.1] 03/25/2020 Yes    Diverticulosis of large intestine without hemorrhage [K57.30] 07/03/2018 Yes    Iron deficiency anemia, unspecified [D50.9] 12/30/2015 Yes      Problems Resolved During this Admission:       VTE Risk Mitigation (From admission, onward)         Ordered     heparin (porcine) injection 4,000 Units  As needed (PRN)      03/29/20 2001     enoxaparin injection 40 mg  Every 12 hours      03/25/20 2313     IP VTE HIGH RISK PATIENT  Once      03/25/20 2313                Debi Pink, DIEGO  Neurology  [facility]    I, Dr. Justice Monge, have personally seen and examined the patient with my advanced provider and agree with above. I personally did a focused exam, and reviewed all necessary clinical information. I discussed my management plan with my NP and agree with above. LSN few days ago. MRI and EEG when stable.

## 2020-04-01 NOTE — PROGRESS NOTES
"Progress Note  PULMONARY    Admit Date: 3/25/2020   04/01/2020      SUBJECTIVE:     3/27- remains intubated, on vent. Was on Lasix drip overnight and now w/ IVF for UOP. On minimal Levophed   3/28- remains intubated, on PEEP 14/FiO2 50%. Levophed 0.06 mcg/kg/min  3/29- remains intubated, PEEP 12, FiO2 40%. Levophed off  3/30- remains intubated, PEEP 8, FiO2 40%. Levophed off. Started HD yesterday and having second session today. Daughter came to visit (works on 3rd floor), and updated this am  3/31- remains intubated, PEEP 8/FiO2 40%. HD yesterday w/ removal of 3.5L. With SBT yesterday not following commands and vomited stomach contents  4/1- remains intubated, PEEP 8/FiO2 40%. Not waking up or following commands off sedation- even w/ daughter at bedside, failed SBT due to tachypnea. Levophed at 0.02 mcg/kg/min. Had HD yesterday w/ removal of 2.5L. Had head CT. EEG pending    [facility] and Allergies reviewed.    OBJECTIVE:     Vitals (Most recent):  Vitals:    04/01/20 0845   BP:    Pulse: 79   Resp: (!) 35   Temp:        Vitals (24 hour range):  Temp:  [98.1 °F (36.7 °C)-99.1 °F (37.3 °C)]   Pulse:  []   Resp:  [22-47]   BP: ()/(50-79)   SpO2:  [92 %-100 %]   Arterial Line BP: (103-209)/(48-91)       Intake/Output Summary (Last 24 hours) at 4/1/2020 0924  Last data filed at 4/1/2020 0800  Gross per 24 hour   Intake 1360 ml   Output 2735 ml   Net -1375 ml          Physical Exam:  Exam:Comprehensive exam done. BP (!) 97/54   Pulse 91   Temp 98.4 °F (36.9 °C) (Axillary)   Resp (!) 35   Ht 5' 1" (1.549 m)   Wt 115.3 kg (254 lb 3.1 oz)   SpO2 96%   Breastfeeding? No   BMI 48.03 kg/m²   Exam included Vitals as listed, and patient's appearance and affect and alertness and mood, oral exam for yeast and hygiene and pharynx lesions and Mallapatti (M) score, neck with inspection for jvd and masses and thyroid abnormalities and lymph nodes (supraclavicular and infraclavicular nodes and axillary also examined and " noted if abn), chest exam included symmetry and effort and fremitus and percussion and auscultation, cardiac exam included rhythm and gallops and murmur and rubs and jvd and edema, abdominal exam for mass and hepatosplenomegaly and tenderness and hernias and bowel sounds, Musculoskeletal exam with muscle tone and posture and mobility/gait and  strength, and skin for rashes and cyanosis and pallor and turgor, extremity for clubbing.  Findings were normal except for pertinent findings listed below:  Sedated, intubated  Opens eyes, moves R hand but does not follow commands  OG in place w/ tube feeds going  Currently on HD  Nonpitting edema of bilateral hands    Radiographs reviewed:  CT head 3/31  1.  There are slight limitations related to moderate patient tilted in the scanner and mild patient motion but there is no obvious acute abnormality.  There is no hemorrhage, mass, mass effect or obvious acute edema or ischemia.  It should be noted that MRI is more sensitive in the detection of subtle or acute nonhemorrhagic ischemic disease.    2.  Support tubing is seen in the left nares.  Bilateral mastoid and middle ear effusions are likely related to support tubing and/or intubation.  Correlate clinically.  The same is true for changes throughout the paranasal sinuses.    CXR 3/31- improving bibasilar opacities  CXR 3/29- unchanged  CXR 3/27- bilateral mid and lower lobe fluffy airspace disease  CXR 3/26- increased consolidation RLL    TTE 3/27  · Mild left ventricular enlargement.  · Mildly decreased left ventricular systolic function. The estimated ejection fraction is 45%.  · Grade I (mild) left ventricular diastolic dysfunction consistent with impaired relaxation.  · Normal right ventricular systolic function.  · Mild left atrial enlargement.  · Moderate mitral regurgitation.  · Mild tricuspid regurgitation.  · Mild pulmonary hypertension present. PASP 40 mm of Hg.    Labs     Recent Labs   Lab 04/01/20  9643    WBC 9.94   HGB 8.1*   HCT 25.8*      BAND 3.0   METAMYELOCYT 5.0   MYELOPCT 2.0     Recent Labs   Lab 03/31/20  1407 04/01/20  0253   NA  --  132*   K  --  3.8   CL  --  99   CO2  --  22*   BUN  --  20   CREATININE  --  3.2*   GLU  --  73   CALCIUM  --  8.5*   AST  --  45*   ALT  --  54*   ALKPHOS  --  104   BILITOT  --  0.5   PROT  --  7.3   ALBUMIN  --  2.1*   INR 1.0  --      Recent Labs   Lab 04/01/20  0339   PH 7.368   PCO2 42.3   PO2 89   HCO3 24.4     Microbiology Results (last 7 days)     Procedure Component Value Units Date/Time    Blood culture [[id number]] Collected:  03/26/20 0432    Order Status:  Completed Specimen:  Blood Updated:  03/30/20 1214     Blood Culture, Routine No Growth after 4 days.     Blood culture [[id number]] Collected:  03/26/20 0612    Order Status:  Completed Specimen:  Blood Updated:  03/30/20 1214     Blood Culture, Routine No Growth after 4 days.     Blood culture x two cultures. Draw prior to antibiotics. [[id number]] Collected:  03/25/20 1411    Order Status:  Completed Specimen:  Blood from Antecubital, Right  Arm Updated:  03/30/20 1212     Blood Culture, Routine No Growth after 4 days.     Narrative:       Aerobic and anaerobic    Culture, Respiratory with Gram Stain [[id number]] Collected:  03/25/20 2150    Order Status:  Completed Specimen:  Tracheal Aspirate Updated:  03/28/20 0927     Respiratory Culture Normal respiratory anamaria      No S aureus or Pseudomonas isolated.     Gram Stain (Respiratory) <10 epithelial cells per low power field.     Gram Stain (Respiratory) Rare WBC's     Gram Stain (Respiratory) Rare Gram positive cocci    Culture, Respiratory with Gram Stain [[id number]] Collected:  03/25/20 2152    Order Status:  Canceled Specimen:  Respiratory from Sputum     Influenza A & B by Molecular [[id number]]     Order Status:  Canceled Specimen:  Nasopharyngeal Swab     Blood culture x two cultures. Draw prior to antibiotics. [[id number]]     Order Status:   Canceled Specimen:  Blood         Results for WOLF SINGER (MRN [id number]) as of 4/1/2020 09:28   Ref. Range 4/1/2020 02:53   ALT Latest Ref Range: 10 - 44 U/L 54 (H)     Impression:  Active Hospital Problems    Diagnosis  POA    *Acute respiratory failure [J96.00]  Yes    Acute encephalopathy [G93.40]  No    Acute renal failure [N17.9]  No    ARDS (adult respiratory distress syndrome) [J80]  Yes    COVID-19 virus infection [U07.1]  Yes    Morbid obesity [E66.01]  Yes    Hypothyroid [E03.9]  Yes    COVID-19 virus detected [U07.1]  Yes    Diverticulosis of large intestine without hemorrhage [K57.30]  Yes    Iron deficiency anemia, unspecified [D50.9]  Yes      Resolved Hospital Problems   No resolved problems to display.               Plan:   Acute hypoxemic respiratory failure, stable to improving O2 reqt  COVID-19 pneumonia/ARDS  Acute renal failure, on HD  Metabolic acidosis due to renal failure  Shock, resolved   Combined heart failure, EF 45%- myocardial involvement?  Morbid obesity  Emesis, poor tolerance of tube feeds  Acute encephalopathy- delirium vs encephalitis or other structural cause?    - repeat SBT today after MRI and HD completed  - titrate peep and FiO2 by ARDS net protocol  - CXR in am  - continue HD per nephro  - EEG pending  - MRI pending  - neuro recommendations reviewed  - continue hydroxychloroquine 400 mg daily  - continue Rocephin and azithromycin  - KUB nonobstructive, attempt to advance tube feeds as tolerated  - on TPN  - discussed with hospitalist  - recommend DNR code status w/ continued full treatment    The following were evaluated and adjusted as needed: mechanical ventilator settings and weaning status, intravenous fluids and nutritional status, acid base balance and oxygenation needs and input and output and renal status       Critical Care  - THE PATIENT HAS A HIGH PROBABILITY OF IMMINENT OR LIFE THREATENING DETERIORATION.  Over 50%time of encounter was in  direct care directly on the unit discussing with nurses/RTs outside patient's room.  Time was 30 to 74 minutes required for patient care.  Time needed for all of the above totaled 40 minutes.    Milvia Mcguire MD  Pulmonary & Critical Care Medicine

## 2020-04-02 LAB
ABO + RH BLD: NORMAL
ALBUMIN SERPL BCP-MCNC: 2.1 G/DL (ref 3.5–5.2)
ALLENS TEST: ABNORMAL
ALP SERPL-CCNC: 102 U/L (ref 55–135)
ALT SERPL W/O P-5'-P-CCNC: 37 U/L (ref 10–44)
ANION GAP SERPL CALC-SCNC: 13 MMOL/L (ref 8–16)
AST SERPL-CCNC: 23 U/L (ref 10–40)
BASOPHILS # BLD AUTO: ABNORMAL K/UL (ref 0–0.2)
BASOPHILS NFR BLD: 0 % (ref 0–1.9)
BILIRUB SERPL-MCNC: 0.5 MG/DL (ref 0.1–1)
BLD GP AB SCN CELLS X3 SERPL QL: NORMAL
BUN SERPL-MCNC: 31 MG/DL (ref 6–20)
CALCIUM SERPL-MCNC: 8.9 MG/DL (ref 8.7–10.5)
CHLORIDE SERPL-SCNC: 99 MMOL/L (ref 95–110)
CO2 SERPL-SCNC: 20 MMOL/L (ref 23–29)
CREAT SERPL-MCNC: 3.6 MG/DL (ref 0.5–1.4)
DELSYS: ABNORMAL
DIFFERENTIAL METHOD: ABNORMAL
EOSINOPHIL # BLD AUTO: ABNORMAL K/UL (ref 0–0.5)
EOSINOPHIL NFR BLD: 2 % (ref 0–8)
ERYTHROCYTE [DISTWIDTH] IN BLOOD BY AUTOMATED COUNT: 17.8 % (ref 11.5–14.5)
ERYTHROCYTE [SEDIMENTATION RATE] IN BLOOD BY WESTERGREN METHOD: 35 MM/H
EST. GFR  (AFRICAN AMERICAN): 16 ML/MIN/1.73 M^2
EST. GFR  (NON AFRICAN AMERICAN): 14 ML/MIN/1.73 M^2
FIO2: 40
GLUCOSE SERPL-MCNC: 88 MG/DL (ref 70–110)
HCO3 UR-SCNC: 21.5 MMOL/L (ref 24–28)
HCT VFR BLD AUTO: 25.4 % (ref 37–48.5)
HGB BLD-MCNC: 7.9 G/DL (ref 12–16)
HYPOCHROMIA BLD QL SMEAR: ABNORMAL
IMM GRANULOCYTES # BLD AUTO: ABNORMAL K/UL (ref 0–0.04)
IMM GRANULOCYTES NFR BLD AUTO: ABNORMAL % (ref 0–0.5)
LYMPHOCYTES # BLD AUTO: ABNORMAL K/UL (ref 1–4.8)
LYMPHOCYTES NFR BLD: 12 % (ref 18–48)
MCH RBC QN AUTO: 26.6 PG (ref 27–31)
MCHC RBC AUTO-ENTMCNC: 31.1 G/DL (ref 32–36)
MCV RBC AUTO: 86 FL (ref 82–98)
METAMYELOCYTES NFR BLD MANUAL: 4 %
MODE: ABNORMAL
MONOCYTES # BLD AUTO: ABNORMAL K/UL (ref 0.3–1)
MONOCYTES NFR BLD: 12 % (ref 4–15)
NEUTROPHILS NFR BLD: 67 % (ref 38–73)
NEUTS BAND NFR BLD MANUAL: 3 %
NRBC BLD-RTO: 0 /100 WBC
PCO2 BLDA: 33 MMHG (ref 35–45)
PEEP: 6
PH SMN: 7.42 [PH] (ref 7.35–7.45)
PLATELET # BLD AUTO: 267 K/UL (ref 150–350)
PLATELET BLD QL SMEAR: ABNORMAL
PMV BLD AUTO: 10.8 FL (ref 9.2–12.9)
PO2 BLDA: 97 MMHG (ref 80–100)
POC BE: -3 MMOL/L
POC SATURATED O2: 98 % (ref 95–100)
POC TCO2: 22 MMOL/L (ref 23–27)
POTASSIUM SERPL-SCNC: 3.5 MMOL/L (ref 3.5–5.1)
PROT SERPL-MCNC: 7.3 G/DL (ref 6–8.4)
RBC # BLD AUTO: 2.97 M/UL (ref 4–5.4)
SAMPLE: ABNORMAL
SITE: ABNORMAL
SODIUM SERPL-SCNC: 132 MMOL/L (ref 136–145)
SP02: 100
VT: 350
WBC # BLD AUTO: 9.48 K/UL (ref 3.9–12.7)

## 2020-04-02 PROCEDURE — 37799 UNLISTED PX VASCULAR SURGERY: CPT

## 2020-04-02 PROCEDURE — 85027 COMPLETE CBC AUTOMATED: CPT

## 2020-04-02 PROCEDURE — 63600175 PHARM REV CODE 636 W HCPCS: Performed by: INTERNAL MEDICINE

## 2020-04-02 PROCEDURE — 94761 N-INVAS EAR/PLS OXIMETRY MLT: CPT

## 2020-04-02 PROCEDURE — 36415 COLL VENOUS BLD VENIPUNCTURE: CPT

## 2020-04-02 PROCEDURE — 25000003 PHARM REV CODE 250: Performed by: INTERNAL MEDICINE

## 2020-04-02 PROCEDURE — 63700000 PHARM REV CODE 250 ALT 637 W/O HCPCS: Performed by: INTERNAL MEDICINE

## 2020-04-02 PROCEDURE — 80053 COMPREHEN METABOLIC PANEL: CPT

## 2020-04-02 PROCEDURE — 20000000 HC ICU ROOM

## 2020-04-02 PROCEDURE — 99900026 HC AIRWAY MAINTENANCE (STAT)

## 2020-04-02 PROCEDURE — C9113 INJ PANTOPRAZOLE SODIUM, VIA: HCPCS | Performed by: INTERNAL MEDICINE

## 2020-04-02 PROCEDURE — 86920 COMPATIBILITY TEST SPIN: CPT

## 2020-04-02 PROCEDURE — 85007 BL SMEAR W/DIFF WBC COUNT: CPT

## 2020-04-02 PROCEDURE — 86850 RBC ANTIBODY SCREEN: CPT

## 2020-04-02 PROCEDURE — 99291 PR CRITICAL CARE, E/M 30-74 MINUTES: ICD-10-PCS | Mod: ,,, | Performed by: INTERNAL MEDICINE

## 2020-04-02 PROCEDURE — 94770 HC EXHALED C02 TEST: CPT

## 2020-04-02 PROCEDURE — 99291 CRITICAL CARE FIRST HOUR: CPT | Mod: ,,, | Performed by: INTERNAL MEDICINE

## 2020-04-02 PROCEDURE — 11000001 HC ACUTE MED/SURG PRIVATE ROOM

## 2020-04-02 PROCEDURE — 27000221 HC OXYGEN, UP TO 24 HOURS

## 2020-04-02 PROCEDURE — 94003 VENT MGMT INPAT SUBQ DAY: CPT

## 2020-04-02 PROCEDURE — 99900035 HC TECH TIME PER 15 MIN (STAT)

## 2020-04-02 PROCEDURE — 82803 BLOOD GASES ANY COMBINATION: CPT

## 2020-04-02 RX ORDER — HYDROCODONE BITARTRATE AND ACETAMINOPHEN 500; 5 MG/1; MG/1
TABLET ORAL
Status: DISCONTINUED | OUTPATIENT
Start: 2020-04-02 | End: 2020-04-12

## 2020-04-02 RX ORDER — POLYETHYLENE GLYCOL 3350 17 G/17G
17 POWDER, FOR SOLUTION ORAL DAILY
Status: DISCONTINUED | OUTPATIENT
Start: 2020-04-02 | End: 2020-04-09

## 2020-04-02 RX ORDER — GENTAMICIN SULFATE 3 MG/ML
1 SOLUTION/ DROPS OPHTHALMIC EVERY 4 HOURS
Status: DISCONTINUED | OUTPATIENT
Start: 2020-04-02 | End: 2020-04-18

## 2020-04-02 RX ADMIN — PROPOFOL 40 MCG/KG/MIN: 10 INJECTION, EMULSION INTRAVENOUS at 07:04

## 2020-04-02 RX ADMIN — PROPOFOL 40 MCG/KG/MIN: 10 INJECTION, EMULSION INTRAVENOUS at 11:04

## 2020-04-02 RX ADMIN — PANTOPRAZOLE SODIUM 40 MG: 40 INJECTION, POWDER, LYOPHILIZED, FOR SOLUTION INTRAVENOUS at 08:04

## 2020-04-02 RX ADMIN — PROPOFOL 40 MCG/KG/MIN: 10 INJECTION, EMULSION INTRAVENOUS at 10:04

## 2020-04-02 RX ADMIN — POLYETHYLENE GLYCOL 3350 17 G: 17 POWDER, FOR SOLUTION ORAL at 11:04

## 2020-04-02 RX ADMIN — FENTANYL CITRATE 10 MCG/HR: 50 INJECTION INTRAVENOUS at 01:04

## 2020-04-02 RX ADMIN — ENOXAPARIN SODIUM 40 MG: 100 INJECTION SUBCUTANEOUS at 08:04

## 2020-04-02 RX ADMIN — LEVOTHYROXINE SODIUM 100 MCG: 100 TABLET ORAL at 05:04

## 2020-04-02 RX ADMIN — AZITHROMYCIN MONOHYDRATE 250 MG: 250 TABLET ORAL at 08:04

## 2020-04-02 RX ADMIN — HYDROXYCHLOROQUINE SULFATE 400 MG: 200 TABLET, FILM COATED ORAL at 08:04

## 2020-04-02 RX ADMIN — PROPOFOL 40 MCG/KG/MIN: 10 INJECTION, EMULSION INTRAVENOUS at 03:04

## 2020-04-02 RX ADMIN — PROPOFOL 40 MCG/KG/MIN: 10 INJECTION, EMULSION INTRAVENOUS at 01:04

## 2020-04-02 RX ADMIN — MUPIROCIN: 20 OINTMENT TOPICAL at 09:04

## 2020-04-02 RX ADMIN — MUPIROCIN: 20 OINTMENT TOPICAL at 08:04

## 2020-04-02 RX ADMIN — PROPOFOL 40 MCG/KG/MIN: 10 INJECTION, EMULSION INTRAVENOUS at 04:04

## 2020-04-02 RX ADMIN — ENOXAPARIN SODIUM 40 MG: 100 INJECTION SUBCUTANEOUS at 09:04

## 2020-04-02 NOTE — ASSESSMENT & PLAN NOTE
Patient with Hypoxic Respiratory failure which is Acute.  she is not on home oxygen. Supplemental ventilation was provided and noted- Vent Mode: A/C  Oxygen Concentration (%):  [40] 40  Resp Rate Total:  [35 br/min-41 br/min] 35 br/min  Vt Set:  [350 mL] 350 mL  PEEP/CPAP:  [6 cmH20-8 cmH20] 6 cmH20  Pressure Support:  [0 cmH20] 0 cmH20  Mean Airway Pressure:  [14 qtF81-21 cmH20] 14 cmH20 and oxygen saturations . Differential diagnosis includes - Pneumonia Viral Labs and images were reviewed. Patient Has recent ABG- No components found for: ABG. Will treat underlying causes and adjust management of respiratory failure as follows-     Temp:  [98.5 °F (36.9 °C)]   Pulse:  []   Resp:  [34-36]   BP: ()/(54-94)   SpO2:  [96 %-100 %]   Arterial Line BP: ()/(44-83)      Pulmonary consultation.   Continue beta 2 agonist bronchodilator treatments.   Continue IV antibiotics - ceftriaxone and azithromycin.   D dimer up 1, ast/alt sl up 105/81, procal 0.12, covid pos.  Troponin neg.  Ratio 74/0.5 on peep 8-   390 is 6 cc/kg ;  Will continue Plaquenil 400 mg daily  as per COVID protocol.  Microbiology Results (last 7 days)     Procedure Component Value Units Date/Time    Blood culture [[id number]] Collected:  03/26/20 0432    Order Status:  Completed Specimen:  Blood Updated:  03/30/20 1214     Blood Culture, Routine No Growth after 4 days.     Blood culture [[id number]] Collected:  03/26/20 0612    Order Status:  Completed Specimen:  Blood Updated:  03/30/20 1214     Blood Culture, Routine No Growth after 4 days.     Blood culture x two cultures. Draw prior to antibiotics. [[id number]] Collected:  03/25/20 1411    Order Status:  Completed Specimen:  Blood from Antecubital, Right  Arm Updated:  03/30/20 1212     Blood Culture, Routine No Growth after 4 days.     Narrative:       Aerobic and anaerobic    Culture, Respiratory with Gram Stain [[id number]] Collected:  03/25/20 2150    Order Status:  Completed Specimen:   Tracheal Aspirate Updated:  03/28/20 0927     Respiratory Culture Normal respiratory anamaria      No S aureus or Pseudomonas isolated.     Gram Stain (Respiratory) <10 epithelial cells per low power field.     Gram Stain (Respiratory) Rare WBC's     Gram Stain (Respiratory) Rare Gram positive cocci          Continue routine medications as before.   Follow airborne/droplet precautions.

## 2020-04-02 NOTE — ASSESSMENT & PLAN NOTE
AMS:: Hypoxia vs. Infection vs. TIA/stroke vs. Metabolic/Toxic.   Much more responsive compared to yesterday    Lab Results   Component Value Date    WBC 9.48 04/02/2020     MRI pending   focal findings on physical exam  No early signs of stroke on Head CT, no hemorrhage or acute findings.  Awaiting EEG results  Will follow up with neuro recs

## 2020-04-02 NOTE — CARE UPDATE
Code Status  In light of the patients advanced and life limiting illness,I engaged the the family in a conversation about the patient's preferences for care  at the very end of life.   Along those lines, the patient  Like to keep him full code for now or other invasive treatments performed when his heart and/or breathing stops. The daughter has discussed about the patient's condition code status with the rest of the  Family and they want some more time to process the information and make a decision .  I spent a total of 60 minutes engaging the patient in this advance care planning discussion.

## 2020-04-02 NOTE — SUBJECTIVE & OBJECTIVE
Interval History: H/H trending down. Getting MRI today. Decreased sedation and more responsive.     Review of Systems   Unable to perform ROS: Intubated     Objective:     Vital Signs (Most Recent):  Temp: 98.5 °F (36.9 °C) (04/01/20 2345)  Pulse: 79 (04/02/20 0707)  Resp: (!) 35 (04/02/20 0707)  BP: (!) 96/54 (04/02/20 0707)  SpO2: 98 % (04/02/20 0707) Vital Signs (24h Range):  Temp:  [98.4 °F (36.9 °C)-99.1 °F (37.3 °C)] 98.5 °F (36.9 °C)  Pulse:  [] 79  Resp:  [34-37] 35  SpO2:  [93 %-100 %] 98 %  BP: ()/(50-94) 96/54  Arterial Line BP: ()/(44-83) 102/46     Weight: 115.3 kg (254 lb 3.1 oz)  Body mass index is 48.03 kg/m².    Intake/Output Summary (Last 24 hours) at 4/2/2020 0924  Last data filed at 4/2/2020 0800  Gross per 24 hour   Intake 2121.3 ml   Output 3875 ml   Net -1753.7 ml      Physical Exam   Nursing note and vitals reviewed.  PATIENT WAS SEEN AS A VIDEO VISIT WITH NURSE IN PATIENT ROOM WITH PATIENT TO ASSIST DURING THE VISIT. PHYSICAL EXAM FINDINGS ARE AS VIEWED BY MYSELF VIA VIDEO OR AS REPORTED BY NURSE IF SPECIFIED AS SUCH, EXAM NOT DONE PERSONALLY BY MYSELF AT BEDSIDE.      Significant Labs:   BMP:   Recent Labs   Lab 04/02/20  0327   GLU 88   *   K 3.5   CL 99   CO2 20*   BUN 31*   CREATININE 3.6*   CALCIUM 8.9     CBC:   Recent Labs   Lab 04/01/20  0253 04/02/20  0327   WBC 9.94 9.48   HGB 8.1* 7.9*   HCT 25.8* 25.4*    267       Significant Imaging: I have reviewed all pertinent imaging results/findings within the past 24 hours.

## 2020-04-02 NOTE — PROGRESS NOTES
[facility] Hospital Medicine  Progress Note    Patient Name: Maria Victoria Hernandez  MRN: [id number]  Patient Class: IP- Inpatient   Admission Date: 3/25/2020  Length of Stay: 8 days  Attending Physician: Kady Gomez MD  Primary Care Provider: Candice Deluna MD        Subjective:     Principal Problem:Acute respiratory failure        HPI:  Patient is a 54 years old  female with morbid obesity in past medical history significant for hypothyroidism is being admitted to intensive care unit under inpatient status from [facility] Emergency room with worsening shortness of breath.  Three days ago patient was tested positive for COVID - 19.  Patient works [employer].  Patient has been experiencing subjective fever, generalized body aches and pains and nonproductive cough for few days.  Patient is getting increasingly short of breath especially for the past 2 days.  Upon arrival to the emergency room patient was noted to be hypoxic around 89%.  Up on 3 L patient's oxygen sats are around 96%.  Patient denies any chest pain, leg swelling or calf tenderness.      Overview/Hospital Course:  No notes on file    Interval History: H/H trending down. Getting MRI today. Decreased sedation and more responsive.     Review of Systems   Unable to perform ROS: Intubated     Objective:     Vital Signs (Most Recent):  Temp: 98.5 °F (36.9 °C) (04/01/20 2345)  Pulse: 79 (04/02/20 0707)  Resp: (!) 35 (04/02/20 0707)  BP: (!) 96/54 (04/02/20 0707)  SpO2: 98 % (04/02/20 0707) Vital Signs (24h Range):  Temp:  [98.4 °F (36.9 °C)-99.1 °F (37.3 °C)] 98.5 °F (36.9 °C)  Pulse:  [] 79  Resp:  [34-37] 35  SpO2:  [93 %-100 %] 98 %  BP: ()/(50-94) 96/54  Arterial Line BP: ()/(44-83) 102/46     Weight: 115.3 kg (254 lb 3.1 oz)  Body mass index is 48.03 kg/m².    Intake/Output Summary (Last 24 hours) at 4/2/2020 0924  Last data filed at 4/2/2020  0800  Gross per 24 hour   Intake 2121.3 ml   Output 3875 ml   Net -1753.7 ml      Physical Exam   Nursing note and vitals reviewed.  PATIENT WAS SEEN AS A VIDEO VISIT WITH NURSE IN PATIENT ROOM WITH PATIENT TO ASSIST DURING THE VISIT. PHYSICAL EXAM FINDINGS ARE AS VIEWED BY MYSELF VIA VIDEO OR AS REPORTED BY NURSE IF SPECIFIED AS SUCH, EXAM NOT DONE PERSONALLY BY MYSELF AT BEDSIDE.      Significant Labs:   BMP:   Recent Labs   Lab 04/02/20  0327   GLU 88   *   K 3.5   CL 99   CO2 20*   BUN 31*   CREATININE 3.6*   CALCIUM 8.9     CBC:   Recent Labs   Lab 04/01/20  0253 04/02/20  0327   WBC 9.94 9.48   HGB 8.1* 7.9*   HCT 25.8* 25.4*    267       Significant Imaging: I have reviewed all pertinent imaging results/findings within the past 24 hours.      Assessment/Plan:      * Acute respiratory failure  Patient with Hypoxic Respiratory failure which is Acute.  she is not on home oxygen. Supplemental ventilation was provided and noted- Vent Mode: A/C  Oxygen Concentration (%):  [40] 40  Resp Rate Total:  [35 br/min-41 br/min] 35 br/min  Vt Set:  [350 mL] 350 mL  PEEP/CPAP:  [6 cmH20-8 cmH20] 6 cmH20  Pressure Support:  [0 cmH20] 0 cmH20  Mean Airway Pressure:  [14 ycH77-50 cmH20] 14 cmH20 and oxygen saturations . Differential diagnosis includes - Pneumonia Viral Labs and images were reviewed. Patient Has recent ABG- No components found for: ABG. Will treat underlying causes and adjust management of respiratory failure as follows-     Temp:  [98.5 °F (36.9 °C)]   Pulse:  []   Resp:  [34-36]   BP: ()/(54-94)   SpO2:  [96 %-100 %]   Arterial Line BP: ()/(44-83)      Pulmonary consultation.   Continue beta 2 agonist bronchodilator treatments.   Continue IV antibiotics - ceftriaxone and azithromycin.   D dimer up 1, ast/alt sl up 105/81, procal 0.12, covid pos.  Troponin neg.  Ratio 74/0.5 on peep 8-   390 is 6 cc/kg ;  Will continue Plaquenil 400 mg daily  as per COVID protocol.  Microbiology  Results (last 7 days)     Procedure Component Value Units Date/Time    Blood culture [[id number]] Collected:  03/26/20 0432    Order Status:  Completed Specimen:  Blood Updated:  03/30/20 1214     Blood Culture, Routine No Growth after 4 days.     Blood culture [[id number]] Collected:  03/26/20 0612    Order Status:  Completed Specimen:  Blood Updated:  03/30/20 1214     Blood Culture, Routine No Growth after 4 days.     Blood culture x two cultures. Draw prior to antibiotics. [[id number]] Collected:  03/25/20 1411    Order Status:  Completed Specimen:  Blood from Antecubital, Right  Arm Updated:  03/30/20 1212     Blood Culture, Routine No Growth after 4 days.     Narrative:       Aerobic and anaerobic    Culture, Respiratory with Gram Stain [[id number]] Collected:  03/25/20 2150    Order Status:  Completed Specimen:  Tracheal Aspirate Updated:  03/28/20 0927     Respiratory Culture Normal respiratory anamaria      No S aureus or Pseudomonas isolated.     Gram Stain (Respiratory) <10 epithelial cells per low power field.     Gram Stain (Respiratory) Rare WBC's     Gram Stain (Respiratory) Rare Gram positive cocci          Continue routine medications as before.   Follow airborne/droplet precautions.            Acute encephalopathy  AMS:: Hypoxia vs. Infection vs. TIA/stroke vs. Metabolic/Toxic.   Much more responsive compared to yesterday    Lab Results   Component Value Date    WBC 9.48 04/02/2020     MRI pending   focal findings on physical exam  No early signs of stroke on Head CT, no hemorrhage or acute findings.  Awaiting EEG results  Will follow up with neuro recs      Iron deficiency anemia, unspecified  Patient's anemia is currently uncontrolled. Trending down  Will send for stool [facility]    Has not received any PRBCs to date..   Will transfuse  Unit as pt is hypotensive and ESRD   Etiology likely d/t Anemia of chronic disease  Current CBC reviewed-   Lab Results   Component Value Date    HGB 7.9 (L) 04/02/2020     HCT 25.4 (L) 04/02/2020     Monitor serial CBC and transfuse if patient becomes hemodynamically unstable, symptomatic or H/H drops below 8/24        Diverticulosis of large intestine without hemorrhage  Will monitor of any blood in stools  HnH trending down      Acute renal failure  Will follow-up with the renal recommendations  Currently on HD             COVID-19 virus infection  Will continue Plaquenil    Covid-19 Virus Infection  - Infection Control notified    - Isolation:   - Airborne and Droplet Precautions  - N95 masks must be fit tested, wear eye protection  - 20 second hand hygiene   - Limit visitors per hospital policy   - Consolidating lab draws, nursing care, and interventions    - Diagnostics: (rising CRP, persistent lymphopenia, hyponatremia, hyperferritinemia, elevated troponin, elevated d-dimer, age, and comorbidities are significant predictors of poor clinical outcome)   - CBC:   trend Q48hrs  - CMP:        trend Q48hrs  - Procalcitonin:  - D-dimer:  trend Q48hrs  - Ferritin:  repeat prior to discharge  - CRP:        trend Q48hrs  - LDH:  - BNP:  - Troponin:    - ECG:   - rapid Flu:   - RIP only if BMT/solid transplant:   - Legionella antigen:   - Blood culture x2:   - Sputum culture:   - CXR:   - UA and culture:      - Management:   - Bundle care as able to minimize in/out of room   - Supplemental O2 to maintain SpO2 >92%,   if requiring 6L NC or higher, place on nonrebreather and discuss case with MICU   - Telemetry & continuous Pulse Ox   - albuterol INHALER PRN 4puff Q6hr approximates a nebulizer (avoid nebulization of secretions)   - apap PRN fever   - Avoiding NIPPV to prevent aerosolization   (including home CPAP/BiPAP unless on a case-by-case basis and only in negative pressure room)   - Cautious use of NSAIDS for fever per WHO recommendations (3/16/2020)   - No new ACEi/ARB start or discontinuation of chronic med unless hypotensive (Esler et al. Journal of Hypertension 2020,  38:000-000)   - Careful use of steroids in the absence of other indications   - unless septic shock due to increased viral replication   - Fluid sparing resuscitation   - Empiric antibiotics per likely source & patient allergies    - CAP: x 5 day course  Ceftriaxone 1g IV Q24hrs            Azithromycin 500mg IV day #1, then 250mg PO daily x4 days                 If MRSA risk factors, add Vancomycin IV (PharmD consult)   - If patient meets criteria per Hospital Protocol    - start statin (if CPK WNL)    - start HCQ 400mg PO BID x1 day, then 400mg PO daily x 4 days (check G6PD, ECG, and start Qshift POCT glucose)    Goals of care, counseling/discussion  - Reviewed the typical clinical course of COVID19 with the daughter Danya (patient name or relationship to patient), including the potential for acute decompensation requiring intubation and mechanical ventilation  - Discussed again as part of routine daily evaluation, patient/POA maintains code status of Full code    VTE High Risk Prophylaxis: enoxaparin 40mg sq QHS @ 2100 (bundled care) if GFR >30    Patient's chronic/stable medical conditions noted in the problem list above will be managed with the patient's home medications as tolerated.           ARDS (adult respiratory distress syndrome)    --will cont ARDSnet Protocol.  --Target 6-8ml/kg Ideal Body Weight. Decrease TV as tolerated.  --Plateau pressure goal <30cm H2O.  --Oxygenation goal PaO2 55-80 or SpO2 88-95%.  --pH goal 7.30-7.45.  --Wean to PSV as tolerated.        COVID-19 virus detected  Continue Plaquenil   Plaquenil 400 mg twice daily on Day 1 followed by 400 mg daily for 5 days as per COVID protocol.   Continue routine medications as before.   Follow airborne/droplet precautions.      Hypothyroid  Check TSH, Chronic problem. Will continue chronic medications and monitor for any changes, adjusting as needed.          Morbid obesity  Body mass index is 48.03 kg/m². Morbid obesity complicates all aspects  of disease management from diagnostic modalities to treatment. Weight loss encouraged and health benefits explained to patient.            VTE Risk Mitigation (From admission, onward)         Ordered     heparin (porcine) injection 4,000 Units  As needed (PRN)      03/29/20 2001     enoxaparin injection 40 mg  Every 12 hours      03/25/20 2313     IP VTE HIGH RISK PATIENT  Once      03/25/20 2313                Critical care time spent on the evaluation and treatment of severe organ dysfunction, review of pertinent labs and imaging studies, discussions with consulting providers and discussions with patient/family: 60 minutes.      Kady Gomez MD  Department of Hospital Medicine   [facility]

## 2020-04-02 NOTE — CARE UPDATE
04/02/20 0707   Preset Conventional Ventilator Settings   Vent Type    Ventilation Type VC   Vent Mode A/C   Humidity HME   Set Rate 35 BPM   Vt Set 350 mL   PEEP/CPAP 6 cmH20   Pressure Support 0 cmH20   Waveform RAMP   Peak Flow 60 L/min   Set Inspiratory Pressure 0 cmH20   FIO2 40%,alarms on and functioning

## 2020-04-02 NOTE — PLAN OF CARE
04/01/20 1924   Patient Assessment/Suction   Respiratory Effort Unlabored   Expansion/Accessory Muscles/Retractions no use of accessory muscles   All Lung Fields Breath Sounds diminished   Rhythm/Pattern, Respiratory assisted mechanically;pattern regular;unlabored   PRE-TX-O2   O2 Device (Oxygen Therapy) ventilator   $ Is the patient on Low Flow Oxygen? Yes   Oxygen Concentration (%) 40   SpO2 100 %   Pulse Oximetry Type Continuous   Pulse 71   Resp (!) 35   ETCO2   $ ETCO2 Charge Exhaled CO2 Monitoring   $ ETCO2 Usage Currently wearing   ETCO2 (mmHg) 30 mmHg   ETCO2 Device Type Portable Bedside Monitor;Artificial Airway   Vent Select   Conventional Vent Y   Charged w/in last 24h YES   Preset Conventional Ventilator Settings   Vent Type    Ventilation Type VC   Vent Mode A/C   Humidity HME   Set Rate 35 BPM   Vt Set 350 mL   PEEP/CPAP 8 cmH20   Pressure Support 0 cmH20   Waveform RAMP   Peak Flow 60 L/min   Set Inspiratory Pressure 0 cmH20   Insp Time 0 Sec(s)   Plateau Set/Insp. Hold (sec) 0   Insp Rise Time  0 %   Trigger Sensitivity Flow/I-Trigger 1 L/min   P High 0 cm H2O   P Low 0 cm H2O   T High 0 sec   T Low 0 sec   Patient Ventilator Parameters   Resp Rate Total 35 br/min   Peak Airway Pressure 25 cmH2O   Mean Airway Pressure 15 cmH20   Plateau Pressure 22 cmH20   Exhaled Vt 364 mL   Total Ve 12.7 mL   Spont Ve 0 L   I:E Ratio Measured 1:1.70   Conventional Ventilator Alarms   Alarms On Y   Ve High Alarm 22 L/min   Resp Rate High Alarm 50 br/min   Press High Alarm 60 cmH2O   Apnea Rate 10   Apnea Volume (mL) 450 mL   Apnea Oxygen Concentration  100   Apnea Flow Rate (L/min) 75   T Apnea 20 sec(s)   Pt stable on current vent settings. Vent to red outlet with alarms on and functioning.

## 2020-04-02 NOTE — PROGRESS NOTES
[facility]  Neurology  Progress Note    Patient Name: Maria Victoria Hernandez  MRN: [id number]  Admission Date: 3/25/2020  Hospital Length of Stay: 8 days  Code Status: Full Code   Attending Provider: Kady Gomez MD  Primary Care Physician: Candice Deluna MD   Principal Problem:Acute respiratory failure    Subjective:        HPI: Patient is a 54 years old  female with morbid obesity in past medical history significant for hypothyroidism is being admitted to intensive care unit under inpatient status from [facility] Emergency room with worsening shortness of breath.  Three days ago patient was tested positive for COVID - 19.  Patient works at [employer].  Patient has been experiencing subjective fever, generalized body aches and pains and nonproductive cough for few days.  Patient is getting increasingly short of breath especially for the past 2 days.  Upon arrival to the emergency room patient was noted to be hypoxic around 89%.  Up on 3 L patient's oxygen sats are around 96%.  Patient denies any chest pain, leg swelling or calf tenderness.        Overview/Hospital Course:  No notes on file     Interval History: Patient has been encephalopathic off sedation limited response to verbal or painful stimuli as per RN assessment. Off pressor. Not tolerating TF currently on TPN     Neurological Interval Note: Patient seen and limited exam noted. Discussed plan of care with Dr. Justice Monge. Patient on ventilator sedation continues. Patient tested positive for COVID-19. Limited exam noted. Patient unresponsive. No signifcant change noted.  CT of head w/o contrast negative for acute finding. EEG 30 min showed No epileptiform discharges, periodic discharges, lateralized  rhythmic delta activity or electrographic seizures were seen. and encephalopathy labs pending. Will continue to monitor.        Current Neurological Medications:     Current  Facility-Administered Medications   Medication Dose Route Frequency Provider Last Rate Last Dose    acetaminophen tablet 650 mg  650 mg Oral Q6H PRN Kady Gomez MD   650 mg at 03/26/20 2100    azithromycin tablet 250 mg  250 mg Per NG tube Daily Milvia Mcguire MD   250 mg at 04/02/20 0804    cefTRIAXone (ROCEPHIN) 1 g in dextrose 5 % 50 mL IVPB  1 g Intravenous Q24H Ferny Porter MD   1 g at 04/01/20 2222    enoxaparin injection 40 mg  40 mg Subcutaneous Q12H Ferny Porter MD   40 mg at 04/02/20 0806    fentaNYL (SUBLIMAZE) 2,500 mcg in dextrose 5 % 250 mL infusion   Intravenous Continuous Ferny Porter MD 1 mL/hr at 04/02/20 0114 10 mcg/hr at 04/02/20 0114    heparin (porcine) injection 4,000 Units  4,000 Units Intravenous PRN Don Collins MD   4,000 Units at 04/01/20 1046    hydroxychloroquine tablet 400 mg  400 mg Per NG tube Daily Kady Gomez MD   400 mg at 04/02/20 0804    levothyroxine tablet 100 mcg  100 mcg Oral Before breakfast Ferny Porter MD   100 mcg at 04/02/20 0501    mupirocin 2 % ointment   Nasal BID Don Collins MD        norepinephrine 8 mg in dextrose 5 % 250 mL infusion  0.02 mcg/kg/min Intravenous Continuous Kady Gomez MD   Stopped at 04/02/20 0047    pantoprazole injection 40 mg  40 mg Intravenous Daily Ferny Porter MD   40 mg at 04/02/20 0804    propofol (DIPRIVAN) 10 mg/mL infusion  5 mcg/kg/min Intravenous PRN CASTILLO Mast 28.3 mL/hr at 04/01/20 1534 45 mcg/kg/min at 04/01/20 1534    propofol (DIPRIVAN) 10 mg/mL infusion  5 mcg/kg/min Intravenous Continuous Madi Ellis MD 27.7 mL/hr at 04/02/20 0754 40 mcg/kg/min at 04/02/20 0754    rocuronium injection 100 mg  100 mg Intravenous Once Folrentin Fam MD        sodium chloride 0.9% flush 10 mL  10 mL Intravenous PRN Ferny Porter MD           Review of Systems   Unable to perform ROS: Intubated     Objective:     Vital Signs (Most Recent):  Temp: 98.5 °F (36.9 °C) (04/01/20  2345)  Pulse: 79 (04/02/20 0707)  Resp: (!) 35 (04/02/20 0707)  BP: (!) 96/54 (04/02/20 0707)  SpO2: 98 % (04/02/20 0707) Vital Signs (24h Range):  Temp:  [98.4 °F (36.9 °C)-99.1 °F (37.3 °C)] 98.5 °F (36.9 °C)  Pulse:  [] 79  Resp:  [34-37] 35  SpO2:  [93 %-100 %] 98 %  BP: ()/(50-94) 96/54  Arterial Line BP: ()/(44-83) 102/46     Weight: 115.3 kg (254 lb 3.1 oz)  Body mass index is 48.03 kg/m².    Physical Exam   Constitutional: She appears well-developed and well-nourished.   HENT:   Head: Normocephalic and atraumatic.   Eyes: Pupils are equal, round, and reactive to light.   Neck: Neck supple.   Cardiovascular: Normal rate.   Pulmonary/Chest: Effort normal.   Intubated and Ventilator assisted    Abdominal: Soft. Bowel sounds are normal.   Obese    Musculoskeletal:   JOSEFINA falls against gravity    Neurological: She is unresponsive. GCS eye subscore is 1. GCS verbal subscore is 1. GCS motor subscore is 1.   Unresponsive JOSEFINA    Skin: Skin is warm and dry. Capillary refill takes 2 to 3 seconds.   Psychiatric: She is withdrawn. Cognition and memory are impaired. She is noncommunicative.   Withdrawn  She is inattentive.       NEUROLOGICAL EXAMINATION:     CRANIAL NERVES     CN III, IV, VI   Pupils are equal, round, and reactive to light.      Significant Labs:  BMP  Lab Results   Component Value Date     (L) 04/02/2020    K 3.5 04/02/2020    CL 99 04/02/2020    CO2 20 (L) 04/02/2020    BUN 31 (H) 04/02/2020    CREATININE 3.6 (H) 04/02/2020    CALCIUM 8.9 04/02/2020    ANIONGAP 13 04/02/2020    ESTGFRAFRICA 16 (A) 04/02/2020    EGFRNONAA 14 (A) 04/02/2020     Lab Results   Component Value Date    TSH 2.082 03/31/2020     Lab Results   Component Value Date    PTJPHQMI73 >2000 (H) 03/31/2020           Lab Results   Component Value Date       Ref Range & Units 1d ago  (3/31/20) 1d ago  (3/31/20)   Ammonia 10 - 50 umol/L 42                Significant Imaging:      CT of Head w/o contrast            Impression         1.  There are slight limitations related to moderate patient tilted in the scanner and mild patient motion but there is no obvious acute abnormality.  There is no hemorrhage, mass, mass effect or obvious acute edema or ischemia.  It should be noted that MRI is more sensitive in the detection of subtle or acute nonhemorrhagic ischemic disease.    2.  Support tubing is seen in the left nares.  Bilateral mastoid and middle ear effusions are likely related to support tubing and/or intubation.  Correlate clinically.  The same is true for changes throughout the paranasal sinuses.            EEG 30 min    No epileptiform discharges, periodic discharges, lateralized   rhythmic delta activity or electrographic seizures were seen.  Assessment and Plan:     54 year old female with impression     1. Acute Metabolic Encephalaopathy vs Septic Encephalopathy related to COVID-19 virus     -CT of Head w/o contrast noted   -EEG 30 min awake and drowsy results noted no seizures   -Encephalopathy Labs pending      3.COVID-19 virus infection  -Will continue Plaquenil  - Managed per IM     3. History of Hypothyroidism  IM to manage      Will continue to follow         Active Diagnoses:    Diagnosis Date Noted POA    PRINCIPAL PROBLEM:  Acute respiratory failure [J96.00] 03/25/2020 Yes    Acute encephalopathy [G93.40] 03/31/2020 No    Acute renal failure [N17.9] 03/27/2020 No    ARDS (adult respiratory distress syndrome) [J80] 03/26/2020 Yes    COVID-19 virus infection [U07.1] 03/26/2020 Yes    Morbid obesity [E66.01] 03/25/2020 Yes    Hypothyroid [E03.9] 03/25/2020 Yes    COVID-19 virus detected [U07.1] 03/25/2020 Yes    Diverticulosis of large intestine without hemorrhage [K57.30] 07/03/2018 Yes    Iron deficiency anemia, unspecified [D50.9] 12/30/2015 Yes      Problems Resolved During this Admission:       VTE Risk Mitigation (From admission, onward)         Ordered     heparin (porcine) injection 4,000  Units  As needed (PRN)      03/29/20 2001     enoxaparin injection 40 mg  Every 12 hours      03/25/20 2313     IP VTE HIGH RISK PATIENT  Once      03/25/20 2313                Debi Pink NP  Neurology  [facility]    I, Dr. Justice Monge, have personally seen and examined the patient with my advanced provider and agree with above. I personally did a focused exam, and reviewed all necessary clinical information. I discussed my management plan with my NP and agree with above. MRI when stable.

## 2020-04-02 NOTE — PROGRESS NOTES
"Progress Note  PULMONARY    Admit Date: 3/25/2020   04/02/2020      SUBJECTIVE:     3/27- remains intubated, on vent. Was on Lasix drip overnight and now w/ IVF for UOP. On minimal Levophed   3/28- remains intubated, on PEEP 14/FiO2 50%. Levophed 0.06 mcg/kg/min  3/29- remains intubated, PEEP 12, FiO2 40%. Levophed off  3/30- remains intubated, PEEP 8, FiO2 40%. Levophed off. Started HD yesterday and having second session today. Daughter came to visit (works on 3rd floor), and updated this am  3/31- remains intubated, PEEP 8/FiO2 40%. HD yesterday w/ removal of 3.5L. With SBT yesterday not following commands and vomited stomach contents  4/1- remains intubated, PEEP 8/FiO2 40%. Not waking up or following commands off sedation- even w/ daughter at bedside, failed SBT due to tachypnea. Levophed at 0.02 mcg/kg/min. Had HD yesterday w/ removal of 2.5L. Had head CT. EEG pending  4/2- remains intubated, PEEP 6, FiO2 40%. w/ hemodynamic instability yesterday-- hypertensive then very hypotensive when tried to move her, which delayed weaning and MRI. Now off Levophed. HD yesterday w/ removal of 3.5L    PFSH and Allergies reviewed.    OBJECTIVE:     Vitals (Most recent):  Vitals:    04/02/20 0707   BP: (!) 96/54   Pulse: 79   Resp: (!) 35   Temp:        Vitals (24 hour range):  Temp:  [98.4 °F (36.9 °C)-99.1 °F (37.3 °C)]   Pulse:  []   Resp:  [34-37]   BP: ()/(50-94)   SpO2:  [93 %-100 %]   Arterial Line BP: ()/(44-83)       Intake/Output Summary (Last 24 hours) at 4/2/2020 0905  Last data filed at 4/2/2020 0800  Gross per 24 hour   Intake 2121.3 ml   Output 3875 ml   Net -1753.7 ml          Physical Exam:  Exam:  Given the COVID-19 pandemic I did not enter the room but performed limited physical exam by viewing patient, vitals, vent via telemedicine camera. I rounded with the nurse outside the room.     BP (!) 96/54   Pulse 79   Temp 98.5 °F (36.9 °C) (Oral)   Resp (!) 35   Ht 5' 1" (1.549 m)   Wt " 115.3 kg (254 lb 3.1 oz)   SpO2 98%   Breastfeeding? No   BMI 48.03 kg/m²     Intubated, sedated  NG in place w/ tube feeds going  VS stable on monitor, sat 99%  Work of breathing looks ok      Radiographs reviewed:  CXR 4/2- bibasilar fluffy opacities and pulmonary edema, similar appearance  CT head 3/31  1.  There are slight limitations related to moderate patient tilted in the scanner and mild patient motion but there is no obvious acute abnormality.  There is no hemorrhage, mass, mass effect or obvious acute edema or ischemia.  It should be noted that MRI is more sensitive in the detection of subtle or acute nonhemorrhagic ischemic disease.    2.  Support tubing is seen in the left nares.  Bilateral mastoid and middle ear effusions are likely related to support tubing and/or intubation.  Correlate clinically.  The same is true for changes throughout the paranasal sinuses.    CXR 3/31- improving bibasilar opacities  CXR 3/29- unchanged  CXR 3/27- bilateral mid and lower lobe fluffy airspace disease  CXR 3/26- increased consolidation RLL    TTE 3/27  · Mild left ventricular enlargement.  · Mildly decreased left ventricular systolic function. The estimated ejection fraction is 45%.  · Grade I (mild) left ventricular diastolic dysfunction consistent with impaired relaxation.  · Normal right ventricular systolic function.  · Mild left atrial enlargement.  · Moderate mitral regurgitation.  · Mild tricuspid regurgitation.  · Mild pulmonary hypertension present. PASP 40 mm of Hg.    Labs     Recent Labs   Lab 04/02/20  0327   WBC 9.48   HGB 7.9*   HCT 25.4*      BAND 3.0   METAMYELOCYT 4.0     Recent Labs   Lab 04/02/20  0327   *   K 3.5   CL 99   CO2 20*   BUN 31*   CREATININE 3.6*   GLU 88   CALCIUM 8.9   AST 23   ALT 37   ALKPHOS 102   BILITOT 0.5   PROT 7.3   ALBUMIN 2.1*     Recent Labs   Lab 04/02/20  0442   PH 7.421   PCO2 33.0*   PO2 97   HCO3 21.5*     Microbiology Results (last 7 days)      Procedure Component Value Units Date/Time    Blood culture [[id number]] Collected:  03/26/20 0432    Order Status:  Completed Specimen:  Blood Updated:  03/30/20 1214     Blood Culture, Routine No Growth after 4 days.     Blood culture [[id number]] Collected:  03/26/20 0612    Order Status:  Completed Specimen:  Blood Updated:  03/30/20 1214     Blood Culture, Routine No Growth after 4 days.     Blood culture x two cultures. Draw prior to antibiotics. [[id number]] Collected:  03/25/20 1411    Order Status:  Completed Specimen:  Blood from Antecubital, Right  Arm Updated:  03/30/20 1212     Blood Culture, Routine No Growth after 4 days.     Narrative:       Aerobic and anaerobic    Culture, Respiratory with Gram Stain [[id number]] Collected:  03/25/20 2150    Order Status:  Completed Specimen:  Tracheal Aspirate Updated:  03/28/20 0927     Respiratory Culture Normal respiratory anamaria      No S aureus or Pseudomonas isolated.     Gram Stain (Respiratory) <10 epithelial cells per low power field.     Gram Stain (Respiratory) Rare WBC's     Gram Stain (Respiratory) Rare Gram positive cocci        Results for WOLF SINGER (MRN [id number]) as of 4/1/2020 09:28   Ref. Range 4/1/2020 02:53   ALT Latest Ref Range: 10 - 44 U/L 54 (H)     Impression:  Active Hospital Problems    Diagnosis  POA    *Acute respiratory failure [J96.00]  Yes    Acute encephalopathy [G93.40]  No    Acute renal failure [N17.9]  No    ARDS (adult respiratory distress syndrome) [J80]  Yes    COVID-19 virus infection [U07.1]  Yes    Morbid obesity [E66.01]  Yes    Hypothyroid [E03.9]  Yes    COVID-19 virus detected [U07.1]  Yes    Diverticulosis of large intestine without hemorrhage [K57.30]  Yes    Iron deficiency anemia, unspecified [D50.9]  Yes      Resolved Hospital Problems   No resolved problems to display.               Plan:   Acute hypoxemic respiratory failure, stable to improving O2 reqt  COVID-19 pneumonia/ARDS  Acute renal  failure, on HD  Metabolic acidosis due to renal failure  Shock, resolved   Combined heart failure, EF 45%- myocardial involvement?  Morbid obesity  Emesis, poor tolerance of tube feeds  Acute encephalopathy- delirium vs encephalitis or other structural cause?    - repeat SBT today after MRI and HD completed  - since MRI only has 2 pumps will hold fentanyl so she can have propofol drip and Levophed running  - titrate peep and FiO2 by ARDS net protocol  - continue HD per nephro  - EEG pending  - MRI pending  - neuro recommendations reviewed  - continue hydroxychloroquine 400 mg daily-- consider dc if further episodes of emesis  - has had adequate course (7d) of azithromycin and ceftriaxone, dc now  - advance tube feeds as tolerated  - discussed with hospitalist  - recommend DNR code status w/ continued full treatment    The following were evaluated and adjusted as needed: mechanical ventilator settings and weaning status, intravenous fluids and nutritional status, acid base balance and oxygenation needs and input and output and renal status       Critical Care  - THE PATIENT HAS A HIGH PROBABILITY OF IMMINENT OR LIFE THREATENING DETERIORATION.  Over 50%time of encounter was in direct care on the unit discussing with nurses/RTs and reviewing vent settings and medications outside patient's room. Due to the COVID-19 pandemic I did not enter the room today. Time was 30 to 74 minutes required for patient care.  Time needed for all of the above totaled 45 minutes.    Milvia Mcguire MD  Pulmonary & Critical Care Medicine

## 2020-04-02 NOTE — NURSING
1000: fentanyl gtt paused in an attempt to get pt to MRI. (there are only two MRI compatible chambers on pump - pt currently on three gtts). Propofol continuing to run at this time.    1100: levophed restarted due to map being consistently below 60 - see MAR.    1135: restarted fentanyl. Pt furrowing brow; uncomfortable. Propofol remains on.    Will continue to monitor.

## 2020-04-02 NOTE — PROGRESS NOTES
INPATIENT NEPHROLOGY PROGRESS  [facility] NEPHROLOGY    Patient Name: Maria Victoria Hernandez  Date: 04/02/2020    Reason for consultation: MAIKOL    History of Present Illness:  54AAF nurse with morbid obesity, hypothyroidism presents PNA, intubated, positive for COVID19. Admit Cr 0.7 went 5.1 and HD started on 3/29.     3/28  Scr worse today.  Only one shift recorded for output, 520cc.  Still hyponatremic, vent setting adjusted per pulmonology note for acidosis.  K+ at goal after repletion.  Has siri, may need HD initiated.  3/29  Non oliguric.  In no distress  3/30 VSS, seen and examined on HD, tolerating well. Plan another HD in AM, discussed with daughter who is a nurse here too.  3/31 VSS, intubated still. UO is not great but she is dialyzed daily. Seen and examined on HD, tolerating well. Plan another HD in AM.  4/1 VSS, intubated still. UO is not great but she is dialyzed daily. Seen and examined on HD, tolerating well. Plan another HD PRN.  4/2 VSS, intubated still. UO is not great. Plan another HD in AM.    Plan of Care:    1. Septic shock 2/2 COVID PNA with HHRF requiring MV  - On abx per ID/Pulm.  - She is intubated/sedated.    2. MAIKOL, oligo/anuric - suspect multifactorial ATN in setting of shock/COVID/intravascular volume depletion  - No nsaids or IV contrast (meloxicam noted on home med list)   - Continue mueller. UO is not great.  - Dose meds for CrCl < 20.  - on HD since 3/29.  - HD MWF for now after 4-5 days back to back HD.    3. Hypervolemic hyponatremia  - Will trend serum Na and monitor.    6. Anemia  - Trend Hgb, transfuse per primary team.  - She is on PO iron at home.    Thank you for allowing us to participate in this patient's care. We will continue to follow.    Vital Signs:  Temp Readings from Last 3 Encounters:   04/02/20 99.4 °F (37.4 °C) (Axillary)       Pulse Readings from Last 3 Encounters:   04/02/20 68   01/15/15 84   12/17/13 80       BP Readings from Last 3 Encounters:   04/02/20  120/71   01/15/15 124/82   12/17/13 102/68       Weight:  Wt Readings from Last 3 Encounters:   03/30/20 115.3 kg (254 lb 3.1 oz)   01/15/15 105.8 kg (233 lb 4.8 oz)   12/17/13 101.2 kg (223 lb)       Past Medical & Surgical History:  Past Medical History:   Diagnosis Date    Colon polyp     Diverticulosis large intestine w/o perforation or abscess w/bleeding     Iron deficiency anemia     Prediabetes     Thyroid disease     Vitamin B 12 deficiency     Vitamin D deficiency        Past Surgical History:   Procedure Laterality Date    TUBAL LIGATION         Past Social History:  Social History     Socioeconomic History    Marital status:      Spouse name: Not on file    Number of children: Not on file    Years of education: Not on file    Highest education level: Not on file   Occupational History    Not on file   Social Needs    Financial resource strain: Not on file    Food insecurity:     Worry: Not on file     Inability: Not on file    Transportation needs:     Medical: Not on file     Non-medical: Not on file   Tobacco Use    Smoking status: Never Smoker    Smokeless tobacco: Never Used   Substance and Sexual Activity    Alcohol use: No    Drug use: Not on file    Sexual activity: Not on file   Lifestyle    Physical activity:     Days per week: Not on file     Minutes per session: Not on file    Stress: Not on file   Relationships    Social connections:     Talks on phone: Not on file     Gets together: Not on file     Attends Alevism service: Not on file     Active member of club or organization: Not on file     Attends meetings of clubs or organizations: Not on file     Relationship status: Not on file   Other Topics Concern    Not on file   Social History Narrative    Not on file       Medications:  Scheduled Meds:   enoxparin  40 mg Subcutaneous Q12H    hydroxychloroquine  400 mg Per NG tube Daily    levothyroxine  100 mcg Oral Before breakfast    mupirocin   Nasal BID  "   pantoprazole  40 mg Intravenous Daily    polyethylene glycol  17 g Oral Daily    rocuronium  100 mg Intravenous Once     Continuous Infusions:   fentanyl 0 mcg/hr (04/02/20 1000)    norepinephrine bitartrate-D5W 0.01 mcg/kg/min (04/02/20 1100)    propofoL 5 mcg/kg/min (04/02/20 1523)     PRN Meds:.sodium chloride, acetaminophen, heparin (porcine), propofoL, sodium chloride 0.9%  No current facility-administered medications on file prior to encounter.      Current Outpatient Medications on File Prior to Encounter   Medication Sig Dispense Refill    ferrous sulfate 325 mg (65 mg iron) Tab tablet Take 1 tablet (325 mg total) by mouth daily with breakfast. (Patient taking differently: Take 325 mg by mouth daily with breakfast. Take 2 tablets daily) 90 tablet 3    fluticasone propionate (FLONASE) 50 mcg/actuation nasal spray 1 spray by Each Nostril route once daily.      levothyroxine (SYNTHROID) 100 MCG tablet Take 1 tablet (100 mcg total) by mouth once daily. (Patient taking differently: Take 150 mcg by mouth once daily. ) 90 tablet 3    meloxicam (MOBIC) 7.5 MG tablet Take 7.5 mg by mouth once daily.      clotrimazole-betamethasone 1-0.05% (LOTRISONE) cream Apply topically 2 (two) times daily. 45 g 1    levocetirizine (XYZAL) 5 MG tablet Take 1 tablet (5 mg total) by mouth every evening. 30 tablet 6       Allergies:  Patient has no known allergies.    Past Family History:  Reviewed; refer to Hospitalist Admission Note    Review of Systems:  Unable to obtain due to MV    Physical Exam:  /71   Pulse 68   Temp 99.4 °F (37.4 °C) (Axillary)   Resp (!) 34   Ht 5' 1" (1.549 m)   Wt 115.3 kg (254 lb 3.1 oz)   SpO2 100%   Breastfeeding? No   BMI 48.03 kg/m²     INS/OUTS:  I/O last 3 completed shifts:  In: 2981.3 [I.V.:1451.3; Other:500; NG/GT:980; IV Piggyback:50]  Out: 4025 [Urine:525; Other:3500]  I/O this shift:  In: 60 [NG/GT:60]  Out: 75 [Urine:75]    General Appearance:    Intubated, sedated, " acutely ill, appears stated age   Head:    Normocephalic, atraumatic   Eyes:    Eyes closed       Mouth:   Moist mucus membranes   Lungs:     Coarse   Heart:    Regular rate and rhythm, S1 and S2 normal, no murmur, rub   or gallop   Abdomen:     Soft, non-tender, non-distended, bowel sounds active all four   quadrants, no RT or guarding, no masses, no organomegaly   Extremities:   Warm and well perfused, distal pulses intact, no cyanosis or    peripheral edema   MSK:   No joint or muscle swelling, tenderness or deformity   Skin:   Skin color, texture, turgor normal, no rashes or lesions   Neurologic/Psychiatric:   Unable to assess     Results:  Lab Results   Component Value Date     (L) 04/02/2020    K 3.5 04/02/2020    CL 99 04/02/2020    CO2 20 (L) 04/02/2020    BUN 31 (H) 04/02/2020    CREATININE 3.6 (H) 04/02/2020    CALCIUM 8.9 04/02/2020    ANIONGAP 13 04/02/2020    ESTGFRAFRICA 16 (A) 04/02/2020    EGFRNONAA 14 (A) 04/02/2020       Lab Results   Component Value Date    CALCIUM 8.9 04/02/2020    PHOS 4.8 (H) 03/29/2020       Recent Labs   Lab 04/02/20  0327   WBC 9.48   RBC 2.97*   HGB 7.9*   HCT 25.4*      MCV 86   MCH 26.6*   MCHC 31.1*     I have personally reviewed pertinent radiological imaging and reports.    [facility]  [address] [place] [address]  MARGARET Evans [address]  [phone] (p)  [phone] (f)

## 2020-04-02 NOTE — PLAN OF CARE
Patient remains in ICU on ventilator sedated - see MAR. Isolation precautions maintained. Unable to obtain MRI patient too unstable. Safety maintained. Daughter updated. Lateral rotation. Plans for dialysis tomorrow.

## 2020-04-02 NOTE — ASSESSMENT & PLAN NOTE
Patient's anemia is currently uncontrolled. Trending down  Will send for stool guaic    Has not received any PRBCs to date..   Will transfuse  Unit as pt is hypotensive and ESRD   Etiology likely d/t Anemia of chronic disease  Current CBC reviewed-   Lab Results   Component Value Date    HGB 7.9 (L) 04/02/2020    HCT 25.4 (L) 04/02/2020     Monitor serial CBC and transfuse if patient becomes hemodynamically unstable, symptomatic or H/H drops below 8/24

## 2020-04-03 LAB
25(OH)D3+25(OH)D2 SERPL-MCNC: 20 NG/ML (ref 30–96)
ALBUMIN SERPL BCP-MCNC: 2.2 G/DL (ref 3.5–5.2)
ALLENS TEST: ABNORMAL
ALP SERPL-CCNC: 117 U/L (ref 55–135)
ALT SERPL W/O P-5'-P-CCNC: 29 U/L (ref 10–44)
ANION GAP SERPL CALC-SCNC: 15 MMOL/L (ref 8–16)
ANISOCYTOSIS BLD QL SMEAR: SLIGHT
AST SERPL-CCNC: 21 U/L (ref 10–40)
BASOPHILS NFR BLD: 0 % (ref 0–1.9)
BILIRUB SERPL-MCNC: 0.6 MG/DL (ref 0.1–1)
BLD PROD TYP BPU: NORMAL
BLOOD UNIT EXPIRATION DATE: NORMAL
BLOOD UNIT TYPE CODE: 8400
BLOOD UNIT TYPE: NORMAL
BUN SERPL-MCNC: 47 MG/DL (ref 6–20)
CALCIUM SERPL-MCNC: 9.2 MG/DL (ref 8.7–10.5)
CHLORIDE SERPL-SCNC: 98 MMOL/L (ref 95–110)
CO2 SERPL-SCNC: 17 MMOL/L (ref 23–29)
CODING SYSTEM: NORMAL
CREAT SERPL-MCNC: 4.6 MG/DL (ref 0.5–1.4)
DELSYS: ABNORMAL
DIFFERENTIAL METHOD: ABNORMAL
DISPENSE STATUS: NORMAL
EOSINOPHIL NFR BLD: 0 % (ref 0–8)
ERYTHROCYTE [DISTWIDTH] IN BLOOD BY AUTOMATED COUNT: 17.6 % (ref 11.5–14.5)
ERYTHROCYTE [SEDIMENTATION RATE] IN BLOOD BY WESTERGREN METHOD: 35 MM/H
EST. GFR  (AFRICAN AMERICAN): 12 ML/MIN/1.73 M^2
EST. GFR  (NON AFRICAN AMERICAN): 10 ML/MIN/1.73 M^2
FIO2: 35
GLUCOSE SERPL-MCNC: 83 MG/DL (ref 70–110)
HCO3 UR-SCNC: 19.3 MMOL/L (ref 24–28)
HCT VFR BLD AUTO: 24.3 % (ref 37–48.5)
HGB BLD-MCNC: 7.7 G/DL (ref 12–16)
HYPOCHROMIA BLD QL SMEAR: ABNORMAL
IMM GRANULOCYTES # BLD AUTO: ABNORMAL K/UL
IMM GRANULOCYTES NFR BLD AUTO: ABNORMAL %
LYMPHOCYTES NFR BLD: 20 % (ref 18–48)
MCH RBC QN AUTO: 26.8 PG (ref 27–31)
MCHC RBC AUTO-ENTMCNC: 31.7 G/DL (ref 32–36)
MCV RBC AUTO: 85 FL (ref 82–98)
MODE: ABNORMAL
MONOCYTES NFR BLD: 10 % (ref 4–15)
NEUTROPHILS NFR BLD: 65 % (ref 38–73)
NEUTS BAND NFR BLD MANUAL: 1 %
NRBC BLD-RTO: 0 /100 WBC
NUM UNITS TRANS PACKED RBC: NORMAL
PCO2 BLDA: 32.7 MMHG (ref 35–45)
PEEP: 5
PH SMN: 7.38 [PH] (ref 7.35–7.45)
PLATELET # BLD AUTO: 281 K/UL (ref 150–350)
PLATELET BLD QL SMEAR: ABNORMAL
PMV BLD AUTO: 10.5 FL (ref 9.2–12.9)
PO2 BLDA: 106 MMHG (ref 80–100)
POC BE: -6 MMOL/L
POC SATURATED O2: 98 % (ref 95–100)
POC TCO2: 20 MMOL/L (ref 23–27)
POTASSIUM SERPL-SCNC: 3.7 MMOL/L (ref 3.5–5.1)
PROT SERPL-MCNC: 7.4 G/DL (ref 6–8.4)
RBC # BLD AUTO: 2.87 M/UL (ref 4–5.4)
RETICS/RBC NFR AUTO: 1.7 % (ref 0.5–2.5)
RPR SER QL: NORMAL
SAMPLE: ABNORMAL
SITE: ABNORMAL
SODIUM SERPL-SCNC: 130 MMOL/L (ref 136–145)
SP02: 99
VIT B1 BLD-MCNC: 39 UG/L (ref 38–122)
VT: 350
WBC # BLD AUTO: 8.71 K/UL (ref 3.9–12.7)

## 2020-04-03 PROCEDURE — 99900035 HC TECH TIME PER 15 MIN (STAT)

## 2020-04-03 PROCEDURE — 94761 N-INVAS EAR/PLS OXIMETRY MLT: CPT

## 2020-04-03 PROCEDURE — 25000003 PHARM REV CODE 250: Performed by: NURSE PRACTITIONER

## 2020-04-03 PROCEDURE — 25000003 PHARM REV CODE 250: Performed by: INTERNAL MEDICINE

## 2020-04-03 PROCEDURE — 36415 COLL VENOUS BLD VENIPUNCTURE: CPT

## 2020-04-03 PROCEDURE — 85045 AUTOMATED RETICULOCYTE COUNT: CPT

## 2020-04-03 PROCEDURE — 99291 CRITICAL CARE FIRST HOUR: CPT | Mod: ,,, | Performed by: INTERNAL MEDICINE

## 2020-04-03 PROCEDURE — 63600175 PHARM REV CODE 636 W HCPCS: Performed by: INTERNAL MEDICINE

## 2020-04-03 PROCEDURE — 20000000 HC ICU ROOM

## 2020-04-03 PROCEDURE — P9016 RBC LEUKOCYTES REDUCED: HCPCS

## 2020-04-03 PROCEDURE — 82306 VITAMIN D 25 HYDROXY: CPT

## 2020-04-03 PROCEDURE — 27000221 HC OXYGEN, UP TO 24 HOURS

## 2020-04-03 PROCEDURE — C9113 INJ PANTOPRAZOLE SODIUM, VIA: HCPCS | Performed by: INTERNAL MEDICINE

## 2020-04-03 PROCEDURE — 99900026 HC AIRWAY MAINTENANCE (STAT)

## 2020-04-03 PROCEDURE — 85007 BL SMEAR W/DIFF WBC COUNT: CPT

## 2020-04-03 PROCEDURE — 80053 COMPREHEN METABOLIC PANEL: CPT

## 2020-04-03 PROCEDURE — 80100014 HC HEMODIALYSIS 1:1

## 2020-04-03 PROCEDURE — 94003 VENT MGMT INPAT SUBQ DAY: CPT

## 2020-04-03 PROCEDURE — 94770 HC EXHALED C02 TEST: CPT

## 2020-04-03 PROCEDURE — 11000001 HC ACUTE MED/SURG PRIVATE ROOM

## 2020-04-03 PROCEDURE — 99291 PR CRITICAL CARE, E/M 30-74 MINUTES: ICD-10-PCS | Mod: ,,, | Performed by: INTERNAL MEDICINE

## 2020-04-03 PROCEDURE — 97803 MED NUTRITION INDIV SUBSEQ: CPT

## 2020-04-03 PROCEDURE — 36430 TRANSFUSION BLD/BLD COMPNT: CPT

## 2020-04-03 PROCEDURE — 82803 BLOOD GASES ANY COMBINATION: CPT

## 2020-04-03 PROCEDURE — 37799 UNLISTED PX VASCULAR SURGERY: CPT

## 2020-04-03 PROCEDURE — 85027 COMPLETE CBC AUTOMATED: CPT

## 2020-04-03 RX ORDER — NICARDIPINE HYDROCHLORIDE 0.2 MG/ML
1 INJECTION INTRAVENOUS CONTINUOUS
Status: DISCONTINUED | OUTPATIENT
Start: 2020-04-03 | End: 2020-04-10

## 2020-04-03 RX ORDER — CALCITRIOL 0.25 UG/1
0.25 CAPSULE ORAL DAILY
Status: DISCONTINUED | OUTPATIENT
Start: 2020-04-03 | End: 2020-04-21 | Stop reason: HOSPADM

## 2020-04-03 RX ORDER — ZINC SULFATE 50(220)MG
220 CAPSULE ORAL DAILY
Status: DISCONTINUED | OUTPATIENT
Start: 2020-04-03 | End: 2020-04-21 | Stop reason: HOSPADM

## 2020-04-03 RX ORDER — METOPROLOL TARTRATE 1 MG/ML
5 INJECTION, SOLUTION INTRAVENOUS
Status: DISCONTINUED | OUTPATIENT
Start: 2020-04-03 | End: 2020-04-10

## 2020-04-03 RX ORDER — ASCORBIC ACID 500 MG
500 TABLET ORAL 4 TIMES DAILY
Status: DISCONTINUED | OUTPATIENT
Start: 2020-04-03 | End: 2020-04-09

## 2020-04-03 RX ORDER — PROPOFOL 10 MG/ML
5 INJECTION, EMULSION INTRAVENOUS CONTINUOUS
Status: DISCONTINUED | OUTPATIENT
Start: 2020-04-04 | End: 2020-04-09

## 2020-04-03 RX ADMIN — ENOXAPARIN SODIUM 40 MG: 100 INJECTION SUBCUTANEOUS at 09:04

## 2020-04-03 RX ADMIN — PROPOFOL 40 MCG/KG/MIN: 10 INJECTION, EMULSION INTRAVENOUS at 02:04

## 2020-04-03 RX ADMIN — GENTAMICIN SULFATE 1 DROP: 3 SOLUTION OPHTHALMIC at 05:04

## 2020-04-03 RX ADMIN — ENOXAPARIN SODIUM 40 MG: 100 INJECTION SUBCUTANEOUS at 08:04

## 2020-04-03 RX ADMIN — GENTAMICIN SULFATE 1 DROP: 3 SOLUTION OPHTHALMIC at 09:04

## 2020-04-03 RX ADMIN — GENTAMICIN SULFATE 1 DROP: 3 SOLUTION OPHTHALMIC at 02:04

## 2020-04-03 RX ADMIN — FENTANYL CITRATE 100 MCG/HR: 50 INJECTION INTRAVENOUS at 03:04

## 2020-04-03 RX ADMIN — OXYCODONE HYDROCHLORIDE AND ACETAMINOPHEN 500 MG: 500 TABLET ORAL at 05:04

## 2020-04-03 RX ADMIN — PANTOPRAZOLE SODIUM 40 MG: 40 INJECTION, POWDER, LYOPHILIZED, FOR SOLUTION INTRAVENOUS at 08:04

## 2020-04-03 RX ADMIN — GENTAMICIN SULFATE 1 DROP: 3 SOLUTION OPHTHALMIC at 12:04

## 2020-04-03 RX ADMIN — PROPOFOL 40 MCG/KG/MIN: 10 INJECTION, EMULSION INTRAVENOUS at 10:04

## 2020-04-03 RX ADMIN — OXYCODONE HYDROCHLORIDE AND ACETAMINOPHEN 500 MG: 500 TABLET ORAL at 09:04

## 2020-04-03 RX ADMIN — GENTAMICIN SULFATE 1 DROP: 3 SOLUTION OPHTHALMIC at 10:04

## 2020-04-03 RX ADMIN — HYDROXYCHLOROQUINE SULFATE 400 MG: 200 TABLET, FILM COATED ORAL at 08:04

## 2020-04-03 RX ADMIN — ZINC SULFATE 220 MG (50 MG) CAPSULE 220 MG: CAPSULE at 05:04

## 2020-04-03 RX ADMIN — PROPOFOL 30 MCG/KG/MIN: 10 INJECTION, EMULSION INTRAVENOUS at 11:04

## 2020-04-03 RX ADMIN — GENTAMICIN SULFATE 1 DROP: 3 SOLUTION OPHTHALMIC at 06:04

## 2020-04-03 RX ADMIN — PROPOFOL 40 MCG/KG/MIN: 10 INJECTION, EMULSION INTRAVENOUS at 05:04

## 2020-04-03 RX ADMIN — LEVOTHYROXINE SODIUM 100 MCG: 100 TABLET ORAL at 05:04

## 2020-04-03 RX ADMIN — MUPIROCIN: 20 OINTMENT TOPICAL at 08:04

## 2020-04-03 RX ADMIN — POLYETHYLENE GLYCOL 3350 17 G: 17 POWDER, FOR SOLUTION ORAL at 08:04

## 2020-04-03 NOTE — PROGRESS NOTES
[facility] Hospital Medicine  Progress Note    Patient Name: Maria Victoria Hernandez  MRN: [id number]  Patient Class: IP- Inpatient   Admission Date: 3/25/2020  Length of Stay: 9 days  Attending Physician: Kady Gomez MD  Primary Care Provider: Candice Deluna MD        Subjective:     Principal Problem:Acute respiratory failure        HPI:  Patient is a 54 years old  female with morbid obesity in past medical history significant for hypothyroidism is being admitted to intensive care unit under inpatient status from [facility] Emergency room with worsening shortness of breath.  Three days ago patient was tested positive for COVID - 19.  Patient works at [employer].  Patient has been experiencing subjective fever, generalized body aches and pains and nonproductive cough for few days.  Patient is getting increasingly short of breath especially for the past 2 days.  Upon arrival to the emergency room patient was noted to be hypoxic around 89%.  Up on 3 L patient's oxygen sats are around 96%.  Patient denies any chest pain, leg swelling or calf tenderness.      Overview/Hospital Course:  No notes on file    Interval History: Currently oxygenation improved . Currently on A/C 35/350/35%/6. Cuellar to gravity intact. NG tube to left nixon. Feedings to NG tube. Peptomin Intense with goal of 35 ml/hr. More responsive than yesterday    Review of Systems   Unable to perform ROS: Intubated     Objective:     Vital Signs (Most Recent):  Temp: 98.4 °F (36.9 °C) (04/03/20 0330)  Pulse: 74 (04/03/20 0640)  Resp: (!) 35 (04/03/20 0640)  BP: 127/67 (04/03/20 0630)  SpO2: 98 % (04/03/20 0640) Vital Signs (24h Range):  Temp:  [98.4 °F (36.9 °C)-99.4 °F (37.4 °C)] 98.4 °F (36.9 °C)  Pulse:  [] 74  Resp:  [34-36] 35  SpO2:  [97 %-100 %] 98 %  BP: ()/() 127/67  Arterial Line BP: (125-204)/(52-80) 153/62     Weight: 115.3 kg (254 lb 3.1  oz)  Body mass index is 48.03 kg/m².    Intake/Output Summary (Last 24 hours) at 4/3/2020 0844  Last data filed at 4/3/2020 0626  Gross per 24 hour   Intake 1884.11 ml   Output 650 ml   Net 1234.11 ml      Physical Exam   Nursing note and vitals reviewed.  PATIENT WAS SEEN AS A VIDEO VISIT WITH NURSE IN PATIENT ROOM WITH PATIENT TO ASSIST DURING THE VISIT. PHYSICAL EXAM FINDINGS ARE AS VIEWED BY MYSELF VIA VIDEO OR AS REPORTED BY NURSE IF SPECIFIED AS SUCH, EXAM NOT DONE PERSONALLY BY MYSELF AT BEDSIDE.    Significant Labs:   Blood Culture: No results for input(s): LABBLOO in the last 48 hours.  BMP:   Recent Labs   Lab 04/03/20  0338   GLU 83   *   K 3.7   CL 98   CO2 17*   BUN 47*   CREATININE 4.6*   CALCIUM 9.2     CBC:   Recent Labs   Lab 04/02/20  0327 04/03/20  0338   WBC 9.48 8.71   HGB 7.9* 7.7*   HCT 25.4* 24.3*    281     Lactic Acid: No results for input(s): LACTATE in the last 48 hours.    Significant Imaging: I have reviewed all pertinent imaging results/findings within the past 24 hours.      Assessment/Plan:      * Acute respiratory failure  Patient with Hypoxic Respiratory failure which is Acute.  she is not on home oxygen. Supplemental ventilation was provided and noted- Vent Mode: A/C  Oxygen Concentration (%):  [35-40] 35  Resp Rate Total:  [35 br/min-39 br/min] 35 br/min  Vt Set:  [350 mL] 350 mL  PEEP/CPAP:  [5 cmH20-6 cmH20] 5 cmH20  Pressure Support:  [0 cmH20] 0 cmH20  Mean Airway Pressure:  [13 cmH20-15 cmH20] 14 cmH20 and oxygen saturations . Differential diagnosis includes - Pneumonia Viral Labs and images were reviewed. Patient Has recent ABG- No components found for: ABG. Will treat underlying causes and adjust management of respiratory failure as follows-     Temp:  [98.4 °F (36.9 °C)-98.8 °F (37.1 °C)]   Pulse:  []   Resp:  [35-36]   BP: (112-168)/()   SpO2:  [97 %-100 %]   Arterial Line BP: (128-204)/(52-80)      Pulmonary consultation.   Continue beta 2  agonist bronchodilator treatments.   Continue IV antibiotics - ceftriaxone and azithromycin.   D dimer up 1, ast/alt sl up 105/81, procal 0.12, covid pos.  Troponin neg.  Ratio 74/0.5 on peep 8-   390 is 6 cc/kg ;  Will continue Plaquenil 400 mg daily  as per COVID protocol.  Microbiology Results (last 7 days)     Procedure Component Value Units Date/Time    Blood culture [[id number]] Collected:  03/26/20 0432    Order Status:  Completed Specimen:  Blood Updated:  03/30/20 1214     Blood Culture, Routine No Growth after 4 days.     Blood culture [[id number]] Collected:  03/26/20 0612    Order Status:  Completed Specimen:  Blood Updated:  03/30/20 1214     Blood Culture, Routine No Growth after 4 days.     Blood culture x two cultures. Draw prior to antibiotics. [[id number]] Collected:  03/25/20 1411    Order Status:  Completed Specimen:  Blood from Antecubital, Right  Arm Updated:  03/30/20 1212     Blood Culture, Routine No Growth after 4 days.     Narrative:       Aerobic and anaerobic    Culture, Respiratory with Gram Stain [[id number]] Collected:  03/25/20 2150    Order Status:  Completed Specimen:  Tracheal Aspirate Updated:  03/28/20 0927     Respiratory Culture Normal respiratory anamaria      No S aureus or Pseudomonas isolated.     Gram Stain (Respiratory) <10 epithelial cells per low power field.     Gram Stain (Respiratory) Rare WBC's     Gram Stain (Respiratory) Rare Gram positive cocci          Continue routine medications as before.   Follow airborne/droplet precautions.            Acute encephalopathy  AMS:: Hypoxia vs. Infection vs. TIA/stroke vs. Metabolic/Toxic.   Much more responsive compared to yesterday    Lab Results   Component Value Date    WBC 8.71 04/03/2020     MRI pending   focal findings on physical exam  No early signs of stroke on Head CT, no hemorrhage or acute findings.  Awaiting EEG results  Will follow up with neuro recs      Iron deficiency anemia, unspecified  Patient's anemia is  currently uncontrolled. Trending down  Will send for stool guaic    Has not received any PRBCs to date..   Will transfuse  Unit as pt is hypotensive and ESRD   Etiology likely d/t Anemia of chronic disease  Current CBC reviewed-   Lab Results   Component Value Date    HGB 7.7 (L) 04/03/2020    HCT 24.3 (L) 04/03/2020     Monitor serial CBC and transfuse if patient becomes hemodynamically unstable, symptomatic or H/H drops below 8/24        Diverticulosis of large intestine without hemorrhage  Will monitor of any blood in stools  HnH trending down      Acute renal failure  Will follow-up with the renal recommendations  Currently on HD             COVID-19 virus infection  Will continue Plaquenil    Covid-19 Virus Infection  - Infection Control notified    - Isolation:   - Airborne and Droplet Precautions  - N95 masks must be fit tested, wear eye protection  - 20 second hand hygiene   - Limit visitors per hospital policy   - Consolidating lab draws, nursing care, and interventions    - Diagnostics: (rising CRP, persistent lymphopenia, hyponatremia, hyperferritinemia, elevated troponin, elevated d-dimer, age, and comorbidities are significant predictors of poor clinical outcome)   - CBC:   trend Q48hrs  - CMP:        trend Q48hrs  - Procalcitonin:  - D-dimer:  trend Q48hrs  - Ferritin:  repeat prior to discharge  - CRP:        trend Q48hrs  - LDH:  - BNP:  - Troponin:    - ECG:   - rapid Flu:   - RIP only if BMT/solid transplant:   - Legionella antigen:   - Blood culture x2:   - Sputum culture:   - CXR:   - UA and culture:      - Management:   - Bundle care as able to minimize in/out of room   - Supplemental O2 to maintain SpO2 >92%,   if requiring 6L NC or higher, place on nonrebreather and discuss case with MICU   - Telemetry & continuous Pulse Ox   - albuterol INHALER PRN 4puff Q6hr approximates a nebulizer (avoid nebulization of secretions)   - apap PRN fever   - Avoiding NIPPV to prevent aerosolization    (including home CPAP/BiPAP unless on a case-by-case basis and only in negative pressure room)   - Cautious use of NSAIDS for fever per WHO recommendations (3/16/2020)   - No new ACEi/ARB start or discontinuation of chronic med unless hypotensive (Esler et al. Journal of Hypertension 2020, 38:000-000)   - Careful use of steroids in the absence of other indications   - unless septic shock due to increased viral replication   - Fluid sparing resuscitation   - Empiric antibiotics per likely source & patient allergies    - CAP: x 5 day course  Ceftriaxone 1g IV Q24hrs            Azithromycin 500mg IV day #1, then 250mg PO daily x4 days                 If MRSA risk factors, add Vancomycin IV (PharmD consult)   - If patient meets criteria per Hospital Protocol    - start statin (if CPK WNL)    - start HCQ 400mg PO BID x1 day, then 400mg PO daily x 4 days (check G6PD, ECG, and start Qshift POCT glucose)    Goals of care, counseling/discussion  - Reviewed the typical clinical course of COVID19 with the daughter Danya (patient name or relationship to patient), including the potential for acute decompensation requiring intubation and mechanical ventilation  - Discussed again as part of routine daily evaluation, patient/POA maintains code status of Full code    VTE High Risk Prophylaxis: enoxaparin 40mg sq QHS @ 2100 (bundled care) if GFR >30    Patient's chronic/stable medical conditions noted in the problem list above will be managed with the patient's home medications as tolerated.           ARDS (adult respiratory distress syndrome)    --will cont ARDSnet Protocol.  --Target 6-8ml/kg Ideal Body Weight. Decrease TV as tolerated.  --Plateau pressure goal <30cm H2O.  --Oxygenation goal PaO2 55-80 or SpO2 88-95%.  --pH goal 7.30-7.45.  --Wean to PSV as tolerated.        COVID-19 virus detected  Continue Plaquenil   Plaquenil 400 mg twice daily on Day 1 followed by 400 mg daily for 5 days as per COVID protocol.   Continue  routine medications as before.   Follow airborne/droplet precautions.      Hypothyroid  Chronic problem.  Lab Results   Component Value Date    TSH 2.082 03/31/2020     Not on any medication            Morbid obesity  Body mass index is 48.03 kg/m². Morbid obesity complicates all aspects of disease management from diagnostic modalities to treatment. Weight loss encouraged and health benefits explained to patient.            VTE Risk Mitigation (From admission, onward)         Ordered     heparin (porcine) injection 4,000 Units  As needed (PRN)      03/29/20 2001     enoxaparin injection 40 mg  Every 12 hours      03/25/20 2313     IP VTE HIGH RISK PATIENT  Once      03/25/20 2313                Critical care time spent on the evaluation and treatment of severe organ dysfunction, review of pertinent labs and imaging studies, discussions with consulting providers and discussions with patient/family: 60 minutes.      Kady Gomez MD  Department of Hospital Medicine   [facility]

## 2020-04-03 NOTE — RESPIRATORY THERAPY
04/03/20 0640   Patient Assessment/Suction   Level of Consciousness (AVPU) responds to pain   All Lung Fields Breath Sounds coarse;diminished   Suction Method oral;tracheal   Secretions Amount moderate   Secretions Color tan   Secretions Characteristics thick   PRE-TX-O2   O2 Device (Oxygen Therapy) ventilator   $ Is the patient on Low Flow Oxygen? Yes   Oxygen Concentration (%) 35   SpO2 98 %   Pulse Oximetry Type Continuous   $ Pulse Oximetry - Multiple Charge Pulse Oximetry - Multiple   Pulse 74   Resp (!) 35   ETCO2   $ ETCO2 Charge Exhaled CO2 Monitoring   $ ETCO2 Usage Currently wearing   Wound Care   $ Wound Care Tech Time 15 min   Area of Concern Upper lip;Lower lip;Corner lip   Skin Color/Characteristics without discoloration   Skin Temperature warm        Airway - Non-Surgical 03/25/20 2145 Endotracheal Tube   Placement Date/Time: 03/25/20 2145   Present Prior to Hospital Arrival?: No  Inserted by: MD  Airway Device: Endotracheal Tube  Airway Device Size: 7.5  Style: Cuffed  Cuff Inflated With: Air  Placement Verified By: Auscultation;Chest X-ray;Colorimetr...   Measured At Lips   Secured Location Right   Secured by Commercial tube owusu   Bite Block right   Site Condition Dry   Status Intact;Secured;Patent   Site Assessment Clean;Dry   Vent Select   Conventional Vent Y   Ventilator Initiated No   $ Ventilator Subsequent 1   Charged w/in last 24h YES   IHI Ventilator Associated Pneumonia Bundle   Head of Bed Elevated  HOB 30   Preset Conventional Ventilator Settings   Vent Type    Ventilation Type VC   Vent Mode A/C   Humidity HME   Set Rate 35 BPM   Vt Set 350 mL   PEEP/CPAP 5 cmH20   Pressure Support 0 cmH20   Waveform RAMP   Peak Flow 60 L/min   Set Inspiratory Pressure 0 cmH20   Insp Time 0 Sec(s)   Plateau Set/Insp. Hold (sec) 0   Insp Rise Time  0 %   Trigger Sensitivity Flow/I-Trigger 1 L/min   P High 0 cm H2O   P Low 0 cm H2O   T High 0 sec   T Low 0 sec   Patient Ventilator Parameters    Resp Rate Total 35 br/min   Peak Airway Pressure 30 cmH2O   Mean Airway Pressure 14 cmH20   Plateau Pressure 24 cmH20   Exhaled Vt 337 mL   Total Ve 12.7 mL   Spont Ve 0 L   I:E Ratio Measured 1:1.70   Conventional Ventilator Alarms   Ve High Alarm 22 L/min   Resp Rate High Alarm 50 br/min   Press High Alarm 60 cmH2O   Apnea Rate 10   Apnea Volume (mL) 450 mL   Apnea Oxygen Concentration  100   Apnea Flow Rate (L/min) 75   T Apnea 20 sec(s)   Ready to Wean/Extubation Screen   FIO2<=50 (chart decimal) 0.35   MV<16L (chart vol.) 12.7   PEEP <=8 (chart #) 5   Ready to Wean Parameters   F/VT Ratio<105 (RSBI) (!) 103.86

## 2020-04-03 NOTE — SUBJECTIVE & OBJECTIVE
Interval History: Currently oxygenation improved . Currently on A/C 35/350/35%/6. Cuellar to gravity intact. NG tube to left nixon. Feedings to NG tube. Peptomin Intense with goal of 35 ml/hr. More responsive than yesterday    Review of Systems   Unable to perform ROS: Intubated     Objective:     Vital Signs (Most Recent):  Temp: 98.4 °F (36.9 °C) (04/03/20 0330)  Pulse: 74 (04/03/20 0640)  Resp: (!) 35 (04/03/20 0640)  BP: 127/67 (04/03/20 0630)  SpO2: 98 % (04/03/20 0640) Vital Signs (24h Range):  Temp:  [98.4 °F (36.9 °C)-99.4 °F (37.4 °C)] 98.4 °F (36.9 °C)  Pulse:  [] 74  Resp:  [34-36] 35  SpO2:  [97 %-100 %] 98 %  BP: ()/() 127/67  Arterial Line BP: (125-204)/(52-80) 153/62     Weight: 115.3 kg (254 lb 3.1 oz)  Body mass index is 48.03 kg/m².    Intake/Output Summary (Last 24 hours) at 4/3/2020 0844  Last data filed at 4/3/2020 0626  Gross per 24 hour   Intake 1884.11 ml   Output 650 ml   Net 1234.11 ml      Physical Exam   Nursing note and vitals reviewed.  PATIENT WAS SEEN AS A VIDEO VISIT WITH NURSE IN PATIENT ROOM WITH PATIENT TO ASSIST DURING THE VISIT. PHYSICAL EXAM FINDINGS ARE AS VIEWED BY MYSELF VIA VIDEO OR AS REPORTED BY NURSE IF SPECIFIED AS SUCH, EXAM NOT DONE PERSONALLY BY MYSELF AT BEDSIDE.    Significant Labs:   Blood Culture: No results for input(s): LABBLOO in the last 48 hours.  BMP:   Recent Labs   Lab 04/03/20  0338   GLU 83   *   K 3.7   CL 98   CO2 17*   BUN 47*   CREATININE 4.6*   CALCIUM 9.2     CBC:   Recent Labs   Lab 04/02/20  0327 04/03/20  0338   WBC 9.48 8.71   HGB 7.9* 7.7*   HCT 25.4* 24.3*    281     Lactic Acid: No results for input(s): LACTATE in the last 48 hours.    Significant Imaging: I have reviewed all pertinent imaging results/findings within the past 24 hours.

## 2020-04-03 NOTE — ASSESSMENT & PLAN NOTE
Patient with Hypoxic Respiratory failure which is Acute.  she is not on home oxygen. Supplemental ventilation was provided and noted- Vent Mode: A/C  Oxygen Concentration (%):  [35-40] 35  Resp Rate Total:  [35 br/min-39 br/min] 35 br/min  Vt Set:  [350 mL] 350 mL  PEEP/CPAP:  [5 cmH20-6 cmH20] 5 cmH20  Pressure Support:  [0 cmH20] 0 cmH20  Mean Airway Pressure:  [13 cmH20-15 cmH20] 14 cmH20 and oxygen saturations . Differential diagnosis includes - Pneumonia Viral Labs and images were reviewed. Patient Has recent ABG- No components found for: ABG. Will treat underlying causes and adjust management of respiratory failure as follows-     Temp:  [98.4 °F (36.9 °C)-98.8 °F (37.1 °C)]   Pulse:  []   Resp:  [35-36]   BP: (112-168)/()   SpO2:  [97 %-100 %]   Arterial Line BP: (128-204)/(52-80)      Pulmonary consultation.   Continue beta 2 agonist bronchodilator treatments.   Continue IV antibiotics - ceftriaxone and azithromycin.   D dimer up 1, ast/alt sl up 105/81, procal 0.12, covid pos.  Troponin neg.  Ratio 74/0.5 on peep 8-   390 is 6 cc/kg ;  Will continue Plaquenil 400 mg daily  as per COVID protocol.  Microbiology Results (last 7 days)     Procedure Component Value Units Date/Time    Blood culture [[id number]] Collected:  03/26/20 0432    Order Status:  Completed Specimen:  Blood Updated:  03/30/20 1214     Blood Culture, Routine No Growth after 4 days.     Blood culture [[id number]] Collected:  03/26/20 0612    Order Status:  Completed Specimen:  Blood Updated:  03/30/20 1214     Blood Culture, Routine No Growth after 4 days.     Blood culture x two cultures. Draw prior to antibiotics. [[id number]] Collected:  03/25/20 1411    Order Status:  Completed Specimen:  Blood from Antecubital, Right  Arm Updated:  03/30/20 1212     Blood Culture, Routine No Growth after 4 days.     Narrative:       Aerobic and anaerobic    Culture, Respiratory with Gram Stain [[id number]] Collected:  03/25/20 2150    Order  Status:  Completed Specimen:  Tracheal Aspirate Updated:  03/28/20 0927     Respiratory Culture Normal respiratory anamaria      No S aureus or Pseudomonas isolated.     Gram Stain (Respiratory) <10 epithelial cells per low power field.     Gram Stain (Respiratory) Rare WBC's     Gram Stain (Respiratory) Rare Gram positive cocci          Continue routine medications as before.   Follow airborne/droplet precautions.

## 2020-04-03 NOTE — ASSESSMENT & PLAN NOTE
Patient's anemia is currently uncontrolled. Trending down  Will send for stool guaic    Has not received any PRBCs to date..   Will transfuse  Unit as pt is hypotensive and ESRD   Etiology likely d/t Anemia of chronic disease  Current CBC reviewed-   Lab Results   Component Value Date    HGB 7.7 (L) 04/03/2020    HCT 24.3 (L) 04/03/2020     Monitor serial CBC and transfuse if patient becomes hemodynamically unstable, symptomatic or H/H drops below 8/24

## 2020-04-03 NOTE — PLAN OF CARE
Pt remains in ICU on ventilator and critical gtts - see MAR. Attempting to wean sedation and fentanyl per pulmonology. Pt too unstable to obtain MRI today - neurology aware. Dialysis today with 2L off. Lateral rotation. Safety maintained. Bilateral soft wrist restraints remain in place for safety.

## 2020-04-03 NOTE — PROGRESS NOTES
[facility]  Adult Nutrition  Progress Note    SUMMARY     Intervention: enteral nutrition therapy   current intake:  Nutren 1.5 @ 35 ml/hr ( advance when propofol weaning to 45 ml/ hr) + Promod 30 ml TID + 130 ml flush q 4 hr  (provides 1260+ 731 kcal propofol ( 97% EEN), 87 g protein ( 73%EPN), and 638 ml free water)    Recommendations     1. d/c TPN: ( Peptamen Intense VHP back ordered) rec.   Nutren 1.5 @ 35 ml/hr ( advance when propofol weaning to 45 ml/ hr) + Promod 30 ml TID + 130 ml flush q 4 hr   ( provides 1620 kcal ( 79% EEN), 73 g protein + promod (86% EPN), and 820 ml free water, Phos 1296 mg)  -If necessary can switch to Novasource renal @ 30 ml/hr + promod 30 ml TID    2. Weigh pt s/p HD  3. Once extubated advance PO diet to goal of cardiac     Goals: 1.) Meet >85% EEN/EPN by RD f/u   Nutrition Goal Status: 2.) meeting-continues  Communication of RD Recs: (POC, sticky note, second sign, reviewed with MD)     1. Covid-19 Virus Infection    2. Acute respiratory failure    3. Intubation of airway performed without difficulty    4. Encounter for nasogastric (NG) tube placement    5. Ruled out for myocardial infarction    6. At risk for long QT syndrome    7. Acute respiratory failure with hypoxia    8. ARDS (adult respiratory distress syndrome)    9. Morbid obesity    10. Pneumonia due to Covid-19 Virus    11. Shock            Past Medical History:   Diagnosis Date    Colon polyp      Diverticulosis large intestine w/o perforation or abscess w/bleeding      Iron deficiency anemia      Prediabetes      Thyroid disease      Vitamin B 12 deficiency      Vitamin D deficiency           Reason for Assessment     Reason For Assessment: RD follow up  Rounds: did not attend( did not enter pt room)    General Information Comments: Pt is intubated in ICU. NFPE not performed,patient is noted as being positive for COVID-19. Per epic records, no evidence of weight loss.  3/31: Not tolerating  "TF per RN. ngt in place. Dialysis today.   4/3/20 Pt was tolerating TF at goal, propofol high, decreased rate to 35 ml/hr yesterday. Tolerating today. Getting HD. + vent.     Nutrition Discharge Planning: pending medical course- cardiac     Nutrition Risk Screen     Nutrition Risk Screen: no indicators present     Nutrition/Diet History     Spiritual, Cultural Beliefs, Nondenominational Practices, Values that Affect Care: no   factors affecting PO intakes: NPO, vent    Anthropometrics  Height Method: Estimated  Height: 5' 1"  Height (inches): 61 in  Weight Method: Bed Scale  Weight: 115.3 kg (3/30/20)  Weight (lb): 254.19 lb  Ideal Body Weight (IBW), Female: 105 lb  % Ideal Body Weight, Female (lb): 225.71 %  BMI (Calculated): 48.1 admission  BMI Grade: greater than 40 - morbid obesity     Lab/Procedures/Meds     Pertinent Labs Reviewed: reviewed  BMP  Lab Results   Component Value Date     (L) 04/03/2020    K 3.7 04/03/2020    CL 98 04/03/2020    CO2 17 (L) 04/03/2020    BUN 47 (H) 04/03/2020    CREATININE 4.6 (H) 04/03/2020    CALCIUM 9.2 04/03/2020    ANIONGAP 15 04/03/2020    ESTGFRAFRICA 12 (A) 04/03/2020    EGFRNONAA 10 (A) 04/03/2020     Lab Results   Component Value Date    CALCIUM 9.2 04/03/2020    PHOS 4.8 (H) 03/29/2020     Lab Results   Component Value Date    ALBUMIN 2.2 (L) 04/03/2020       Pertinent Medications Reviewed: reviewed  Propofol 27.7 ml/hr, vitamin C, calcitriol, zinc, polyethylene glycol    Estimated/Assessed Needs   modified  Weight Used For Calorie Calculations: 107.5 kg (236 lb 15.9 oz)  Energy Need Method: [place]: 2037 kcals/day (underfeeds would be 60-70% of target energy needs)   Protein Requirements: 95 g ( 2.0 g protein/kg HD vs BMI > 48)  Weight Used For Protein Calculations: 47.6 kg (105 lb)(ideal body weight)  RDA: 4730-0534 ml per MD  CHO Requirement: 147g        Nutrition Prescription Ordered  NPO + TF above     Evaluation of Received Nutrient/Fluid Intake   energy need: " meeting  Protein needs: not meeting- improving  Fluid needs: meeting  % Intake of Estimated Energy Needs: 75%-100%  % Meal Intake: npo + TF     Nutrition Risk     2x week     Assessment and Plan     Nutrition Problem  Inadequate energy intake     Related to (etiology):   No diet order     Signs and Symptoms (as evidenced by):   Intake of <85% EEN/EPN     Interventions/Recommendations (treatment strategy):  Collaboration with other providers  Enteral nutrition     Nutrition Diagnosis Status:   Continues       Monitor and Evaluation     Food and Nutrient Intake: energy intake  Food and Nutrient Adminstration: enteral and parenteral nutrition administration  Anthropometric Measurements: weight  Biochemical Data, Medical Tests and Procedures: electrolyte and renal panel, glucose/endocrine profile      Nutrition Follow-Up     RD Follow-up?: Yes      Revision History

## 2020-04-03 NOTE — ASSESSMENT & PLAN NOTE
Chronic problem.  Lab Results   Component Value Date    TSH 2.082 03/31/2020     Not on any medication

## 2020-04-03 NOTE — CARE UPDATE
Code Status  In light of the patients advanced and life limiting illness,I engaged the the family in a conversation about the patient's preferences for care  at the very end of life.   Along those lines, the patient  Like to keep him full code for now or other invasive treatments performed when his heart and/or breathing stops. The daughter has discussed about the patient's condition code status with the rest of the  Family and they want full code for her.  I spent a total of 60 minutes engaging the patient in this advance care planning discussion.

## 2020-04-03 NOTE — PLAN OF CARE
Continues in ICU. Intubated A/C 35/350/35%/6. Cuellar to gravity intact. NG tube to left nixon. Feedings to NG tube. Peptomin Intense with goal of 35 ml/hr. Increasing per patient tolerance. To receive HD today. Blood with HD ordered. Precautions maintained. Cuellar intact to gravity. SR to monitor. Eyedrops provided. Noted redness and swelling to patient right eye.

## 2020-04-03 NOTE — PROGRESS NOTES
[facility]  Neurology  Progress Note    Patient Name: Maria Victoria Hernandez  MRN: [id number]  Admission Date: 3/25/2020  Hospital Length of Stay: 9 days  Code Status: Full Code   Attending Provider: Kady Gomez MD  Primary Care Physician: Candice Deluna MD   Principal Problem:Acute respiratory failure    Subjective:        HPI: Patient is a 54 years old  female with morbid obesity in past medical history significant for hypothyroidism is being admitted to intensive care unit under inpatient status from [facility] Emergency room with worsening shortness of breath.  Three days ago patient was tested positive for COVID - 19.  Patient works [employer].  Patient has been experiencing subjective fever, generalized body aches and pains and nonproductive cough for few days.  Patient is getting increasingly short of breath especially for the past 2 days.  Upon arrival to the emergency room patient was noted to be hypoxic around 89%.  Up on 3 L patient's oxygen sats are around 96%.  Patient denies any chest pain, leg swelling or calf tenderness.        Overview/Hospital Course:  No notes on file     Interval History: Patient has been encephalopathic off sedation limited response to verbal or painful stimuli as per RN assessment. Off pressor. Not tolerating TF currently on TPN     Neurological Interval Note: Patient seen and limited exam noted. Discussed plan of care with Dr. Justice Monge. Patient on ventilator sedation continues. Patient tested positive for COVID-19. Limited exam noted. Patient moves head, and eyes track. However patient doesn't follow commands. Will obtain MRI of brain without contrast when patient is safe to obtain. Will continue to monitor.        Current Neurological Medications:     Current Facility-Administered Medications   Medication Dose Route Frequency Provider Last Rate Last Dose    0.9%  NaCl infusion (for  blood administration)   Intravenous Q24H PRN Kady Gomez MD        acetaminophen tablet 650 mg  650 mg Oral Q6H PRN Kady Gomez MD   650 mg at 03/26/20 2100    enoxaparin injection 40 mg  40 mg Subcutaneous Q12H Ferny Porter MD   40 mg at 04/02/20 2103    fentaNYL (SUBLIMAZE) 2,500 mcg in dextrose 5 % 250 mL infusion   Intravenous Continuous Ferny Porter MD 10 mL/hr at 04/03/20 0343 100 mcg/hr at 04/03/20 0343    gentamicin 0.3 % ophthalmic solution 1 drop  1 drop Right Eye Q4H Ayaan Rose NP   1 drop at 04/03/20 0538    heparin (porcine) injection 4,000 Units  4,000 Units Intravenous PRN Don Collins MD   4,000 Units at 04/01/20 1046    hydroxychloroquine tablet 400 mg  400 mg Per NG tube Daily Kady Gomez MD   400 mg at 04/02/20 0804    levothyroxine tablet 100 mcg  100 mcg Oral Before breakfast Ferny Porter MD   100 mcg at 04/03/20 0536    mupirocin 2 % ointment   Nasal BID Don Collins MD        norepinephrine 8 mg in dextrose 5 % 250 mL infusion  0.02 mcg/kg/min Intravenous Continuous Kady Gomez MD   Stopped at 04/03/20 0731    pantoprazole injection 40 mg  40 mg Intravenous Daily Ferny Porter MD   40 mg at 04/02/20 0804    polyethylene glycol packet 17 g  17 g Oral Daily Kady Gomez MD   17 g at 04/02/20 1157    propofol (DIPRIVAN) 10 mg/mL infusion  5 mcg/kg/min Intravenous PRN CASTILLO Mast 28.3 mL/hr at 04/01/20 1534 45 mcg/kg/min at 04/01/20 1534    propofol (DIPRIVAN) 10 mg/mL infusion  5 mcg/kg/min Intravenous Continuous Madi Ellis MD 27.7 mL/hr at 04/03/20 0538 40 mcg/kg/min at 04/03/20 0538    rocuronium injection 100 mg  100 mg Intravenous Once Florentin Fam MD        sodium chloride 0.9% flush 10 mL  10 mL Intravenous PRN Ferny Porter MD           Review of Systems   Unable to perform ROS: Intubated     Objective:     Vital Signs (Most Recent):  Temp: 98.4 °F (36.9 °C) (04/03/20 0330)  Pulse: 74 (04/03/20  0640)  Resp: (!) 35 (04/03/20 0640)  BP: 127/67 (04/03/20 0630)  SpO2: 98 % (04/03/20 0640) Vital Signs (24h Range):  Temp:  [98.4 °F (36.9 °C)-99.4 °F (37.4 °C)] 98.4 °F (36.9 °C)  Pulse:  [] 74  Resp:  [34-36] 35  SpO2:  [97 %-100 %] 98 %  BP: ()/() 127/67  Arterial Line BP: (125-204)/(52-80) 153/62     Weight: 115.3 kg (254 lb 3.1 oz)  Body mass index is 48.03 kg/m².    Physical Exam   Constitutional: She appears well-developed and well-nourished.   HENT:   Head: Normocephalic and atraumatic.   Eyes: Pupils are equal, round, and reactive to light.   Neck: Neck supple.   Cardiovascular: Normal rate.   Pulmonary/Chest: Effort normal.   Intubated and Ventilator assisted    Abdominal: Soft. Bowel sounds are normal.   Obese    Musculoskeletal:   JOSEFINA , Patient moves neck freely, does not follow commands    Neurological: She is unresponsive. GCS eye subscore is 1. GCS verbal subscore is 1. GCS motor subscore is 1.   Unresponsive JOSEFINA    Skin: Skin is warm and dry. Capillary refill takes 2 to 3 seconds.   Psychiatric: She is withdrawn. Cognition and memory are impaired. She is noncommunicative.   Withdrawn  She is inattentive.       NEUROLOGICAL EXAMINATION:     CRANIAL NERVES     CN III, IV, VI   Pupils are equal, round, and reactive to light.      Significant Labs:  BMP  Lab Results   Component Value Date     (L) 04/03/2020    K 3.7 04/03/2020    CL 98 04/03/2020    CO2 17 (L) 04/03/2020    BUN 47 (H) 04/03/2020    CREATININE 4.6 (H) 04/03/2020    CALCIUM 9.2 04/03/2020    ANIONGAP 15 04/03/2020    ESTGFRAFRICA 12 (A) 04/03/2020    EGFRNONAA 10 (A) 04/03/2020     Lab Results   Component Value Date    TSH 2.082 03/31/2020     Lab Results   Component Value Date    CWBWUCUK81 >2000 (H) 03/31/2020           Lab Results   Component Value Date       Ref Range & Units 1d ago  (3/31/20) 1d ago  (3/31/20)   Ammonia 10 - 50 umol/L 42                Significant Imaging:      CT of Head w/o contrast            Impression         1.  There are slight limitations related to moderate patient tilted in the scanner and mild patient motion but there is no obvious acute abnormality.  There is no hemorrhage, mass, mass effect or obvious acute edema or ischemia.  It should be noted that MRI is more sensitive in the detection of subtle or acute nonhemorrhagic ischemic disease.    2.  Support tubing is seen in the left nares.  Bilateral mastoid and middle ear effusions are likely related to support tubing and/or intubation.  Correlate clinically.  The same is true for changes throughout the paranasal sinuses.            EEG 30 min    No epileptiform discharges, periodic discharges, lateralized   rhythmic delta activity or electrographic seizures were seen.  Assessment and Plan:     54 year old female with impression     1. Acute Metabolic Encephalaopathy vs Septic Encephalopathy related to COVID-19 virus?     -CT of Head w/o contrast noted   -EEG 30 min awake and drowsy results noted no seizures   -Encephalopathy Labs pending      3.COVID-19 virus infection  -Will continue Plaquenil  - Managed per IM     3. History of Hypothyroidism  IM to manage      Will continue to follow         Active Diagnoses:    Diagnosis Date Noted POA    PRINCIPAL PROBLEM:  Acute respiratory failure [J96.00] 03/25/2020 Yes    Acute encephalopathy [G93.40] 03/31/2020 No    Acute renal failure [N17.9] 03/27/2020 No    ARDS (adult respiratory distress syndrome) [J80] 03/26/2020 Yes    COVID-19 virus infection [U07.1] 03/26/2020 Yes    Morbid obesity [E66.01] 03/25/2020 Yes    Hypothyroid [E03.9] 03/25/2020 Yes    COVID-19 virus detected [U07.1] 03/25/2020 Yes    Diverticulosis of large intestine without hemorrhage [K57.30] 07/03/2018 Yes    Iron deficiency anemia, unspecified [D50.9] 12/30/2015 Yes      Problems Resolved During this Admission:       VTE Risk Mitigation (From admission, onward)         Ordered     heparin (porcine) injection 4,000  Units  As needed (PRN)      03/29/20 2001     enoxaparin injection 40 mg  Every 12 hours      03/25/20 2313     IP VTE HIGH RISK PATIENT  Once      03/25/20 2313                Debi Pink NP  Neurology  [facility]    I, Dr. Justice Monge, discussed care with my advanced practitioner and agree with above. I have reviewed patient clinical presentation, work up, impression and plan. F/u MRI.

## 2020-04-03 NOTE — PROGRESS NOTES
Progress Note  PULMONARY    Admit Date: 3/25/2020   04/03/2020      SUBJECTIVE:     3/27- remains intubated, on vent. Was on Lasix drip overnight and now w/ IVF for UOP. On minimal Levophed   3/28- remains intubated, on PEEP 14/FiO2 50%. Levophed 0.06 mcg/kg/min  3/29- remains intubated, PEEP 12, FiO2 40%. Levophed off  3/30- remains intubated, PEEP 8, FiO2 40%. Levophed off. Started HD yesterday and having second session today. Daughter came to visit (works on 3rd floor), and updated this am  3/31- remains intubated, PEEP 8/FiO2 40%. HD yesterday w/ removal of 3.5L. With SBT yesterday not following commands and vomited stomach contents  4/1- remains intubated, PEEP 8/FiO2 40%. Not waking up or following commands off sedation- even w/ daughter at bedside, failed SBT due to tachypnea. Levophed at 0.02 mcg/kg/min. Had HD yesterday w/ removal of 2.5L. Had head CT. EEG pending  4/2- remains intubated, PEEP 6, FiO2 40%. w/ hemodynamic instability yesterday-- hypertensive then very hypotensive when tried to move her, which delayed weaning and MRI. Now off Levophed. HD yesterday w/ removal of 3.5L  4/3not arousing,        PFSH and Allergies reviewed.    OBJECTIVE:     Vitals (Most recent):  Vitals:    04/03/20 0630   BP: 127/67   Pulse: 75   Resp: (!) 35   Temp:        Vitals (24 hour range):  Temp:  [98.4 °F (36.9 °C)-99.4 °F (37.4 °C)]   Pulse:  []   Resp:  [34-36]   BP: ()/()   SpO2:  [97 %-100 %]   Arterial Line BP: (125-204)/(52-80)       Intake/Output Summary (Last 24 hours) at 4/3/2020 0711  Last data filed at 4/3/2020 0626  Gross per 24 hour   Intake 1944.11 ml   Output 725 ml   Net 1219.11 ml        Vitals (24 hour range):  Temp:  [98.4 °F (36.9 °C)-99.4 °F (37.4 °C)]   Pulse:  []   Resp:  [34-36]   BP: ()/()   SpO2:  [96 %-100 %]   Arterial Line BP: (125-204)/(52-80)       Intake/Output Summary (Last 24 hours) at 4/3/2020 1048  Last data filed at 4/3/2020 1015  Gross per 24  hour   Intake 2354.11 ml   Output 775 ml   Net 1579.11 ml          Physical Exam:  The patient's neuro status (alertness,orientation,cognitive function,motor skills,), pharyngeal exam (oral lesions, hygiene, abn dentition,), Neck (jvd,mass,thyroid,nodes in neck and above/below clavicle),RESPIRATORY(symmetry,effort,fremitus,percussion,auscultation),  Cor(rhythm,heart tones including gallops,perfusion,edema)ABD(distention,hepatic&splenomegaly,tenderness,masses), Skin(rash,cyanosis),Psyc(affect,judgement,).  Exam negative except for these pertinent findings:    Not arousing ,pupils wall eyed, chest is symmetric, no distress, normal percussion, normal fremitus and good   breath sounds       Radiographs reviewed:  CXR 4/2- bibasilar fluffy opacities and pulmonary edema, similar appearance  CT head 3/31  1.  There are slight limitations related to moderate patient tilted in the scanner and mild patient motion but there is no obvious acute abnormality.  There is no hemorrhage, mass, mass effect or obvious acute edema or ischemia.  It should be noted that MRI is more sensitive in the detection of subtle or acute nonhemorrhagic ischemic disease.    2.  Support tubing is seen in the left nares.  Bilateral mastoid and middle ear effusions are likely related to support tubing and/or intubation.  Correlate clinically.  The same is true for changes throughout the paranasal sinuses.    CXR 3/31- improving bibasilar opacities  CXR 3/29- unchanged  CXR 3/27- bilateral mid and lower lobe fluffy airspace disease  CXR 3/26- increased consolidation RLL    TTE 3/27  · Mild left ventricular enlargement.  · Mildly decreased left ventricular systolic function. The estimated ejection fraction is 45%.  · Grade I (mild) left ventricular diastolic dysfunction consistent with impaired relaxation.  · Normal right ventricular systolic function.  · Mild left atrial enlargement.  · Moderate mitral regurgitation.  · Mild tricuspid  regurgitation.  · Mild pulmonary hypertension present. PASP 40 mm of Hg.    Labs     Recent Labs   Lab 04/03/20 0338   WBC 8.71   HGB 7.7*   HCT 24.3*      BAND 1.0     Recent Labs   Lab 04/03/20 0338   *   K 3.7   CL 98   CO2 17*   BUN 47*   CREATININE 4.6*   GLU 83   CALCIUM 9.2   AST 21   ALT 29   ALKPHOS 117   BILITOT 0.6   PROT 7.4   ALBUMIN 2.2*     Recent Labs   Lab 04/03/20 0328   PH 7.380   PCO2 32.7*   PO2 106*   HCO3 19.3*     Microbiology Results (last 7 days)     Procedure Component Value Units Date/Time    Blood culture [[id number]] Collected:  03/26/20 0432    Order Status:  Completed Specimen:  Blood Updated:  03/30/20 1214     Blood Culture, Routine No Growth after 4 days.     Blood culture [[id number]] Collected:  03/26/20 0612    Order Status:  Completed Specimen:  Blood Updated:  03/30/20 1214     Blood Culture, Routine No Growth after 4 days.     Blood culture x two cultures. Draw prior to antibiotics. [[id number]] Collected:  03/25/20 1411    Order Status:  Completed Specimen:  Blood from Antecubital, Right  Arm Updated:  03/30/20 1212     Blood Culture, Routine No Growth after 4 days.     Narrative:       Aerobic and anaerobic    Culture, Respiratory with Gram Stain [[id number]] Collected:  03/25/20 2150    Order Status:  Completed Specimen:  Tracheal Aspirate Updated:  03/28/20 0927     Respiratory Culture Normal respiratory anamaria      No S aureus or Pseudomonas isolated.     Gram Stain (Respiratory) <10 epithelial cells per low power field.     Gram Stain (Respiratory) Rare WBC's     Gram Stain (Respiratory) Rare Gram positive cocci        Results for WOLF SINGER (MRN [id number]) as of 4/1/2020 09:28   Ref. Range 4/1/2020 02:53   ALT Latest Ref Range: 10 - 44 U/L 54 (H)     Impression:  Active Hospital Problems    Diagnosis  POA    *Acute respiratory failure [J96.00]  Yes    Acute encephalopathy [G93.40]  No    Acute renal failure [N17.9]  No    ARDS (adult  respiratory distress syndrome) [J80]  Yes    COVID-19 virus infection [U07.1]  Yes    Morbid obesity [E66.01]  Yes    Hypothyroid [E03.9]  Yes    COVID-19 virus detected [U07.1]  Yes    Diverticulosis of large intestine without hemorrhage [K57.30]  Yes    Iron deficiency anemia, unspecified [D50.9]  Yes      Resolved Hospital Problems   No resolved problems to display.               Plan:   Acute hypoxemic respiratory failure, stable to improving O2 reqt  COVID-19 pneumonia/ARDS  Acute renal failure, on HD  Metabolic acidosis due to renal failure  Shock, resolved   Combined heart failure, EF 45%- myocardial involvement?  Morbid obesity  Emesis, poor tolerance of tube feeds  Acute encephalopathy- delirium vs encephalitis or other structural cause?    - repeat SBT today after MRI and HD completed  - since MRI only has 2 pumps will hold fentanyl so she can have propofol drip and Levophed running  - titrate peep and FiO2 by ARDS net protocol  - continue HD per nephro  - EEG pending  - MRI pending  - neuro recommendations reviewed  - continue hydroxychloroquine 400 mg daily-- consider dc if further episodes of emesis  - has had adequate course (7d) of azithromycin and ceftriaxone, dc now  - advance tube feeds as tolerated  - discussed with hospitalist  - recommend DNR code status w/ continued full treatment  Above from Dr Mcguire and below from Dr Laird:  4/3 peep 5 106/0.35,  Be -6, creat 4.6,   I/o 1944/725- tube feeds, fentanyl 100, norepi 0.01, propofol 40, hydroxychloroquine,  cxr mid and lower lung ards.  Off sedation bp up- will order cardene and lopressor-  Wean sedation.      The following were evaluated and adjusted as needed: mechanical ventilator settings and weaning status, intravenous fluids and nutritional status, sedation and neurologic status, antibiotics, hemodynamics, support tubes and access lines and invasive monitoring, acid base balance and oxygenation needs, input and output and renal status  and CODE STATUS/OUTLOOK DISCUSSED WITH AVAILABLE NEXT OF  KIN       Critical Care  - THE PATIENT HAS A HIGH PROBABILITY OF IMMINENT OR LIFE THREATENING DETERIORATION.  Over 50%time of encounter was in direct care at bedside.  Time was 30 to 74 minutes required for patient care.  Time needed for all of the above totaled 31 minutes.

## 2020-04-03 NOTE — PROGRESS NOTES
INPATIENT NEPHROLOGY PROGRESS  [facility] NEPHROLOGY    Patient Name: Maria Victoria Hernandez  Date: 04/03/2020    Reason for consultation: MAIKOL    History of Present Illness:  54AAF nurse with morbid obesity, hypothyroidism presents PNA, intubated, positive for COVID19. Admit Cr 0.7 went 5.1 and HD started on 3/29.     3/28  Scr worse today.  Only one shift recorded for output, 520cc.  Still hyponatremic, vent setting adjusted per pulmonology note for acidosis.  K+ at goal after repletion.  Has siri, may need HD initiated.  3/29  Non oliguric.  In no distress  3/30 VSS, seen and examined on HD, tolerating well. Plan another HD in AM, discussed with daughter who is a nurse here too.  3/31 VSS, intubated still. UO is not great but she is dialyzed daily. Seen and examined on HD, tolerating well. Plan another HD in AM.  4/1 VSS, intubated still. UO is not great but she is dialyzed daily. Seen and examined on HD, tolerating well. Plan another HD PRN.  4/2 VSS, intubated still. UO is not great. Plan another HD in AM.  4/3 VSS, intubated still. UO is not great but she is dialyzed daily recently. Seen and examined on HD, tolerating well. Plan another HD on Mon or PRN.    Plan of Care:    1. Septic shock 2/2 COVID PNA with HHRF requiring MV  - On abx per ID/Pulm.  - She is intubated/sedated.    2. MAIKOL, oligo/anuric - suspect multifactorial ATN in setting of shock/COVID/intravascular volume depletion  - No nsaids or IV contrast (meloxicam noted on home med list)   - Continue mueller. UO is not great.  - Dose meds for CrCl < 20.  - on HD since 3/29.  - HD MWF for now after 4-5 days back to back HD.    3. Hypervolemic hyponatremia  - Will trend serum Na and monitor.    6. Anemia  - Trend Hgb, transfuse per primary team.  - She is on PO iron at home.    Thank you for allowing us to participate in this patient's care. We will continue to follow.    Vital Signs:  Temp Readings from Last 3 Encounters:   04/03/20 98.8 °F (37.1  °C) (Axillary)       Pulse Readings from Last 3 Encounters:   04/03/20 90   01/15/15 84   12/17/13 80       BP Readings from Last 3 Encounters:   04/03/20 114/68   01/15/15 124/82   12/17/13 102/68       Weight:  Wt Readings from Last 3 Encounters:   03/30/20 115.3 kg (254 lb 3.1 oz)   01/15/15 105.8 kg (233 lb 4.8 oz)   12/17/13 101.2 kg (223 lb)       Past Medical & Surgical History:  Past Medical History:   Diagnosis Date    Colon polyp     Diverticulosis large intestine w/o perforation or abscess w/bleeding     Iron deficiency anemia     Prediabetes     Thyroid disease     Vitamin B 12 deficiency     Vitamin D deficiency        Past Surgical History:   Procedure Laterality Date    TUBAL LIGATION         Past Social History:  Social History     Socioeconomic History    Marital status:      Spouse name: Not on file    Number of children: Not on file    Years of education: Not on file    Highest education level: Not on file   Occupational History    Not on file   Social Needs    Financial resource strain: Not on file    Food insecurity:     Worry: Not on file     Inability: Not on file    Transportation needs:     Medical: Not on file     Non-medical: Not on file   Tobacco Use    Smoking status: Never Smoker    Smokeless tobacco: Never Used   Substance and Sexual Activity    Alcohol use: No    Drug use: Not on file    Sexual activity: Not on file   Lifestyle    Physical activity:     Days per week: Not on file     Minutes per session: Not on file    Stress: Not on file   Relationships    Social connections:     Talks on phone: Not on file     Gets together: Not on file     Attends [employer] service: Not on file     Active member of club or organization: Not on file     Attends meetings of clubs or organizations: Not on file     Relationship status: Not on file   Other Topics Concern    Not on file   Social History Narrative    Not on file       Medications:  Scheduled Meds:    "ascorbic acid (vitamin C)  500 mg Oral QID    calcitRIOL  0.25 mcg Oral Daily    enoxparin  40 mg Subcutaneous Q12H    gentamicin  1 drop Right Eye Q4H    hydroxychloroquine  400 mg Per NG tube Daily    levothyroxine  100 mcg Oral Before breakfast    pantoprazole  40 mg Intravenous Daily    polyethylene glycol  17 g Oral Daily    rocuronium  100 mg Intravenous Once    zinc sulfate  220 mg Oral Daily     Continuous Infusions:   fentanyl 100 mcg/hr (04/03/20 0343)    niCARdipine Stopped (04/03/20 1245)    norepinephrine bitartrate-D5W Stopped (04/03/20 0731)    propofoL 40 mcg/kg/min (04/03/20 1018)     PRN Meds:.sodium chloride, acetaminophen, heparin (porcine), metoprolol, propofoL, sodium chloride 0.9%  No current facility-administered medications on file prior to encounter.      Current Outpatient Medications on File Prior to Encounter   Medication Sig Dispense Refill    ferrous sulfate 325 mg (65 mg iron) Tab tablet Take 1 tablet (325 mg total) by mouth daily with breakfast. (Patient taking differently: Take 325 mg by mouth daily with breakfast. Take 2 tablets daily) 90 tablet 3    fluticasone propionate (FLONASE) 50 mcg/actuation nasal spray 1 spray by Each Nostril route once daily.      levothyroxine (SYNTHROID) 100 MCG tablet Take 1 tablet (100 mcg total) by mouth once daily. (Patient taking differently: Take 150 mcg by mouth once daily. ) 90 tablet 3    meloxicam (MOBIC) 7.5 MG tablet Take 7.5 mg by mouth once daily.      clotrimazole-betamethasone 1-0.05% (LOTRISONE) cream Apply topically 2 (two) times daily. 45 g 1    levocetirizine (XYZAL) 5 MG tablet Take 1 tablet (5 mg total) by mouth every evening. 30 tablet 6       Allergies:  Patient has no known allergies.    Past Family History:  Reviewed; refer to Hospitalist Admission Note    Review of Systems:  Unable to obtain due to MV    Physical Exam:  /68   Pulse 90   Temp 98.8 °F (37.1 °C) (Axillary)   Resp (!) 35   Ht 5' 1" " (1.549 m)   Wt 115.3 kg (254 lb 3.1 oz)   SpO2 98%   Breastfeeding? No   BMI 48.03 kg/m²     INS/OUTS:  I/O last 3 completed shifts:  In: 2891.3 [I.V.:1563.3; NG/GT:1328]  Out: 825 [Urine:825]  I/O this shift:  In: 470 [Blood:350; NG/GT:120]  Out: 2725 [Urine:125; Other:2600]    General Appearance:    Intubated, sedated, acutely ill, appears stated age   Head:    Normocephalic, atraumatic   Eyes:    Eyes closed       Mouth:   Moist mucus membranes   Lungs:     Coarse   Heart:    Regular rate and rhythm, S1 and S2 normal, no murmur, rub   or gallop   Abdomen:     Soft, non-tender, non-distended, bowel sounds active all four   quadrants, no RT or guarding, no masses, no organomegaly   Extremities:   Warm and well perfused, distal pulses intact, no cyanosis or    peripheral edema   MSK:   No joint or muscle swelling, tenderness or deformity   Skin:   Skin color, texture, turgor normal, no rashes or lesions   Neurologic/Psychiatric:   Unable to assess     Results:  Lab Results   Component Value Date     (L) 04/03/2020    K 3.7 04/03/2020    CL 98 04/03/2020    CO2 17 (L) 04/03/2020    BUN 47 (H) 04/03/2020    CREATININE 4.6 (H) 04/03/2020    CALCIUM 9.2 04/03/2020    ANIONGAP 15 04/03/2020    ESTGFRAFRICA 12 (A) 04/03/2020    EGFRNONAA 10 (A) 04/03/2020       Lab Results   Component Value Date    CALCIUM 9.2 04/03/2020    PHOS 4.8 (H) 03/29/2020       Recent Labs   Lab 04/03/20  0338   WBC 8.71   RBC 2.87*   HGB 7.7*   HCT 24.3*      MCV 85   MCH 26.8*   MCHC 31.7*     I have personally reviewed pertinent radiological imaging and reports.    [facility]  [address] [address]  [phone] (p)  [phone] (f)

## 2020-04-03 NOTE — ASSESSMENT & PLAN NOTE
AMS:: Hypoxia vs. Infection vs. TIA/stroke vs. Metabolic/Toxic.   Much more responsive compared to yesterday    Lab Results   Component Value Date    WBC 8.71 04/03/2020     MRI pending   focal findings on physical exam  No early signs of stroke on Head CT, no hemorrhage or acute findings.  Awaiting EEG results  Will follow up with neuro recs

## 2020-04-04 LAB
ALBUMIN SERPL BCP-MCNC: 2.4 G/DL (ref 3.5–5.2)
ALLENS TEST: ABNORMAL
ALP SERPL-CCNC: 160 U/L (ref 55–135)
ALT SERPL W/O P-5'-P-CCNC: 28 U/L (ref 10–44)
ANION GAP SERPL CALC-SCNC: 13 MMOL/L (ref 8–16)
AST SERPL-CCNC: 25 U/L (ref 10–40)
BACTERIA #/AREA URNS HPF: ABNORMAL /HPF
BASOPHILS NFR BLD: 0 % (ref 0–1.9)
BILIRUB SERPL-MCNC: 0.7 MG/DL (ref 0.1–1)
BILIRUB UR QL STRIP: NEGATIVE
BUN SERPL-MCNC: 34 MG/DL (ref 6–20)
CALCIUM SERPL-MCNC: 9.2 MG/DL (ref 8.7–10.5)
CHLORIDE SERPL-SCNC: 98 MMOL/L (ref 95–110)
CLARITY UR: CLEAR
CO2 SERPL-SCNC: 21 MMOL/L (ref 23–29)
COLOR UR: YELLOW
CREAT SERPL-MCNC: 3.9 MG/DL (ref 0.5–1.4)
DELSYS: ABNORMAL
DIFFERENTIAL METHOD: ABNORMAL
EOSINOPHIL NFR BLD: 3 % (ref 0–8)
ERYTHROCYTE [DISTWIDTH] IN BLOOD BY AUTOMATED COUNT: 17.2 % (ref 11.5–14.5)
ERYTHROCYTE [SEDIMENTATION RATE] IN BLOOD BY WESTERGREN METHOD: 35 MM/H
EST. GFR  (AFRICAN AMERICAN): 14 ML/MIN/1.73 M^2
EST. GFR  (NON AFRICAN AMERICAN): 12 ML/MIN/1.73 M^2
ETCO2: 34
FIO2: 35
GLUCOSE SERPL-MCNC: 102 MG/DL (ref 70–110)
GLUCOSE UR QL STRIP: NEGATIVE
HCO3 UR-SCNC: 22.5 MMOL/L (ref 24–28)
HCT VFR BLD AUTO: 28.9 % (ref 37–48.5)
HGB BLD-MCNC: 9.1 G/DL (ref 12–16)
HGB UR QL STRIP: ABNORMAL
IMM GRANULOCYTES # BLD AUTO: ABNORMAL K/UL
IMM GRANULOCYTES NFR BLD AUTO: ABNORMAL %
KETONES UR QL STRIP: NEGATIVE
LEUKOCYTE ESTERASE UR QL STRIP: ABNORMAL
LYMPHOCYTES NFR BLD: 9 % (ref 18–48)
MCH RBC QN AUTO: 25.9 PG (ref 27–31)
MCHC RBC AUTO-ENTMCNC: 31.5 G/DL (ref 32–36)
MCV RBC AUTO: 82 FL (ref 82–98)
METAMYELOCYTES NFR BLD MANUAL: 3 %
MICROSCOPIC COMMENT: ABNORMAL
MIN VOL: 13.8
MODE: ABNORMAL
MONOCYTES NFR BLD: 5 % (ref 4–15)
NEUTROPHILS NFR BLD: 80 % (ref 38–73)
NITRITE UR QL STRIP: NEGATIVE
NRBC BLD-RTO: 0 /100 WBC
PCO2 BLDA: 36.9 MMHG (ref 35–45)
PEEP: 5
PH SMN: 7.39 [PH] (ref 7.35–7.45)
PH UR STRIP: 6 [PH] (ref 5–8)
PIP: 22
PLATELET # BLD AUTO: 309 K/UL (ref 150–350)
PLATELET BLD QL SMEAR: ABNORMAL
PMV BLD AUTO: 10.9 FL (ref 9.2–12.9)
PO2 BLDA: 82 MMHG (ref 80–100)
POC BE: -2 MMOL/L
POC SATURATED O2: 96 % (ref 95–100)
POC TCO2: 24 MMOL/L (ref 23–27)
POTASSIUM SERPL-SCNC: 4 MMOL/L (ref 3.5–5.1)
PROT SERPL-MCNC: 8 G/DL (ref 6–8.4)
PROT UR QL STRIP: ABNORMAL
RBC # BLD AUTO: 3.52 M/UL (ref 4–5.4)
RBC #/AREA URNS HPF: 35 /HPF (ref 0–4)
SAMPLE: ABNORMAL
SITE: ABNORMAL
SODIUM SERPL-SCNC: 132 MMOL/L (ref 136–145)
SP GR UR STRIP: 1.01 (ref 1–1.03)
SP02: 99
URN SPEC COLLECT METH UR: ABNORMAL
UROBILINOGEN UR STRIP-ACNC: NEGATIVE EU/DL
VT: 350
WBC # BLD AUTO: 9.74 K/UL (ref 3.9–12.7)
WBC #/AREA URNS HPF: 5 /HPF (ref 0–5)

## 2020-04-04 PROCEDURE — 63600175 PHARM REV CODE 636 W HCPCS: Performed by: NURSE PRACTITIONER

## 2020-04-04 PROCEDURE — 99900035 HC TECH TIME PER 15 MIN (STAT)

## 2020-04-04 PROCEDURE — 63600175 PHARM REV CODE 636 W HCPCS: Performed by: CLINIC/CENTER

## 2020-04-04 PROCEDURE — 81000 URINALYSIS NONAUTO W/SCOPE: CPT

## 2020-04-04 PROCEDURE — 25000003 PHARM REV CODE 250: Performed by: CLINIC/CENTER

## 2020-04-04 PROCEDURE — C9113 INJ PANTOPRAZOLE SODIUM, VIA: HCPCS | Performed by: INTERNAL MEDICINE

## 2020-04-04 PROCEDURE — 25000003 PHARM REV CODE 250: Performed by: INTERNAL MEDICINE

## 2020-04-04 PROCEDURE — 99291 CRITICAL CARE FIRST HOUR: CPT | Mod: ,,, | Performed by: INTERNAL MEDICINE

## 2020-04-04 PROCEDURE — 99291 PR CRITICAL CARE, E/M 30-74 MINUTES: ICD-10-PCS | Mod: ,,, | Performed by: INTERNAL MEDICINE

## 2020-04-04 PROCEDURE — 94761 N-INVAS EAR/PLS OXIMETRY MLT: CPT

## 2020-04-04 PROCEDURE — 37799 UNLISTED PX VASCULAR SURGERY: CPT

## 2020-04-04 PROCEDURE — 85027 COMPLETE CBC AUTOMATED: CPT

## 2020-04-04 PROCEDURE — 63600175 PHARM REV CODE 636 W HCPCS: Performed by: INTERNAL MEDICINE

## 2020-04-04 PROCEDURE — 85347 COAGULATION TIME ACTIVATED: CPT

## 2020-04-04 PROCEDURE — 94770 HC EXHALED C02 TEST: CPT

## 2020-04-04 PROCEDURE — 27000221 HC OXYGEN, UP TO 24 HOURS

## 2020-04-04 PROCEDURE — 82803 BLOOD GASES ANY COMBINATION: CPT

## 2020-04-04 PROCEDURE — 11000001 HC ACUTE MED/SURG PRIVATE ROOM

## 2020-04-04 PROCEDURE — 87106 FUNGI IDENTIFICATION YEAST: CPT

## 2020-04-04 PROCEDURE — 36415 COLL VENOUS BLD VENIPUNCTURE: CPT

## 2020-04-04 PROCEDURE — 80053 COMPREHEN METABOLIC PANEL: CPT

## 2020-04-04 PROCEDURE — 94003 VENT MGMT INPAT SUBQ DAY: CPT

## 2020-04-04 PROCEDURE — 87040 BLOOD CULTURE FOR BACTERIA: CPT

## 2020-04-04 PROCEDURE — 20000000 HC ICU ROOM

## 2020-04-04 PROCEDURE — 99900026 HC AIRWAY MAINTENANCE (STAT)

## 2020-04-04 PROCEDURE — 85007 BL SMEAR W/DIFF WBC COUNT: CPT

## 2020-04-04 RX ADMIN — POLYETHYLENE GLYCOL 3350 17 G: 17 POWDER, FOR SOLUTION ORAL at 09:04

## 2020-04-04 RX ADMIN — ENOXAPARIN SODIUM 40 MG: 100 INJECTION SUBCUTANEOUS at 08:04

## 2020-04-04 RX ADMIN — GENTAMICIN SULFATE 1 DROP: 3 SOLUTION OPHTHALMIC at 06:04

## 2020-04-04 RX ADMIN — LEVOTHYROXINE SODIUM 100 MCG: 100 TABLET ORAL at 05:04

## 2020-04-04 RX ADMIN — PROPOFOL 25 MCG/KG/MIN: 10 INJECTION, EMULSION INTRAVENOUS at 10:04

## 2020-04-04 RX ADMIN — OXYCODONE HYDROCHLORIDE AND ACETAMINOPHEN 500 MG: 500 TABLET ORAL at 06:04

## 2020-04-04 RX ADMIN — GENTAMICIN SULFATE 1 DROP: 3 SOLUTION OPHTHALMIC at 09:04

## 2020-04-04 RX ADMIN — PROPOFOL 25 MCG/KG/MIN: 10 INJECTION, EMULSION INTRAVENOUS at 06:04

## 2020-04-04 RX ADMIN — GENTAMICIN SULFATE 1 DROP: 3 SOLUTION OPHTHALMIC at 02:04

## 2020-04-04 RX ADMIN — LEUCINE, PHENYLALANINE, LYSINE, METHIONINE, ISOLEUCINE, VALINE, HISTIDINE, THREONINE, TRYPTOPHAN, ALANINE, GLYCINE, ARGININE, PROLINE, SERINE, TYROSINE, DEXTROSE: 311; 238; 247; 170; 255; 247; 204; 179; 77; 880; 438; 489; 289; 213; 17; 10 INJECTION INTRAVENOUS at 01:04

## 2020-04-04 RX ADMIN — HYDROXYCHLOROQUINE SULFATE 400 MG: 200 TABLET, FILM COATED ORAL at 09:04

## 2020-04-04 RX ADMIN — PROPOFOL 30 MCG/KG/MIN: 10 INJECTION, EMULSION INTRAVENOUS at 12:04

## 2020-04-04 RX ADMIN — OXYCODONE HYDROCHLORIDE AND ACETAMINOPHEN 500 MG: 500 TABLET ORAL at 09:04

## 2020-04-04 RX ADMIN — ZINC SULFATE 220 MG (50 MG) CAPSULE 220 MG: CAPSULE at 09:04

## 2020-04-04 RX ADMIN — ACETAMINOPHEN 650 MG: 325 TABLET ORAL at 09:04

## 2020-04-04 RX ADMIN — Medication 125 MCG/HR: at 05:04

## 2020-04-04 RX ADMIN — PROPOFOL 15 MCG/KG/MIN: 10 INJECTION, EMULSION INTRAVENOUS at 05:04

## 2020-04-04 RX ADMIN — ENOXAPARIN SODIUM 40 MG: 100 INJECTION SUBCUTANEOUS at 09:04

## 2020-04-04 RX ADMIN — PANTOPRAZOLE SODIUM 40 MG: 40 INJECTION, POWDER, LYOPHILIZED, FOR SOLUTION INTRAVENOUS at 09:04

## 2020-04-04 RX ADMIN — MIDAZOLAM HYDROCHLORIDE 1 MG/HR: 5 INJECTION, SOLUTION INTRAMUSCULAR; INTRAVENOUS at 02:04

## 2020-04-04 RX ADMIN — OXYCODONE HYDROCHLORIDE AND ACETAMINOPHEN 500 MG: 500 TABLET ORAL at 08:04

## 2020-04-04 RX ADMIN — CALCITRIOL CAPSULES 0.25 MCG 0.25 MCG: 0.25 CAPSULE ORAL at 09:04

## 2020-04-04 RX ADMIN — Medication 175 MCG/HR: at 10:04

## 2020-04-04 RX ADMIN — Medication 100 MCG/HR: at 12:04

## 2020-04-04 NOTE — PLAN OF CARE
Pt remains in icu.  Eyes open spontaneously and to sound of voice.  Right eye still red and with scleraedema.  Low grade temp thru out the shift.  Vomited again this morning. Tube feedings stopped.  TPN started.  Versed started.  Urine and blood cultures sent.  Restless at times and HR increases to 120s, BP rises.  Still with excess saliva.  Daughter Danya updated on status and plan of care.  Safety maintained.

## 2020-04-04 NOTE — PROGRESS NOTES
Progress Note  PULMONARY    Admit Date: 3/25/2020   04/04/2020      SUBJECTIVE:     3/27- remains intubated, on vent. Was on Lasix drip overnight and now w/ IVF for UOP. On minimal Levophed   3/28- remains intubated, on PEEP 14/FiO2 50%. Levophed 0.06 mcg/kg/min  3/29- remains intubated, PEEP 12, FiO2 40%. Levophed off  3/30- remains intubated, PEEP 8, FiO2 40%. Levophed off. Started HD yesterday and having second session today. Daughter came to visit (works on 3rd floor), and updated this am  3/31- remains intubated, PEEP 8/FiO2 40%. HD yesterday w/ removal of 3.5L. With SBT yesterday not following commands and vomited stomach contents  4/1- remains intubated, PEEP 8/FiO2 40%. Not waking up or following commands off sedation- even w/ daughter at bedside, failed SBT due to tachypnea. Levophed at 0.02 mcg/kg/min. Had HD yesterday w/ removal of 2.5L. Had head CT. EEG pending  4/2- remains intubated, PEEP 6, FiO2 40%. w/ hemodynamic instability yesterday-- hypertensive then very hypotensive when tried to move her, which delayed weaning and MRI. Now off Levophed. HD yesterday w/ removal of 3.5L  4/3not arousing,  4/4 arousing somewhat- looks air hungry,      PFSH and Allergies reviewed.    OBJECTIVE:     Vitals (Most recent):  Vitals:    04/04/20 0700   BP: (!) 153/76   Pulse: (!) 121   Resp: (!) 43   Temp:        Vitals (24 hour range):  Temp:  [98.1 °F (36.7 °C)-100.3 °F (37.9 °C)]   Pulse:  []   Resp:  [34-52]   BP: ()/(55-99)   SpO2:  [94 %-100 %]   Arterial Line BP: (126-200)/(59-93)       Intake/Output Summary (Last 24 hours) at 4/4/2020 0774  Last data filed at 4/4/2020 0611  Gross per 24 hour   Intake 2064.98 ml   Output 3500 ml   Net -1435.02 ml        Vitals (24 hour range):  Temp:  [98.4 °F (36.9 °C)-99.4 °F (37.4 °C)]   Pulse:  []   Resp:  [34-36]   BP: ()/()   SpO2:  [96 %-100 %]   Arterial Line BP: (125-204)/(52-80)       Intake/Output Summary (Last 24 hours) at 4/3/2020  1048  Last data filed at 4/3/2020 1015  Gross per 24 hour   Intake 2354.11 ml   Output 775 ml   Net 1579.11 ml          Physical Exam:  The patient's neuro status (alertness,orientation,cognitive function,motor skills,), pharyngeal exam (oral lesions, hygiene, abn dentition,), Neck (jvd,mass,thyroid,nodes in neck and above/below clavicle),RESPIRATORY(symmetry,effort,fremitus,percussion,auscultation),  Cor(rhythm,heart tones including gallops,perfusion,edema)ABD(distention,hepatic&splenomegaly,tenderness,masses), Skin(rash,cyanosis),Psyc(affect,judgement,).  Exam negative except for these pertinent findings:    Sl arousing ,not interacting, chest is symmetric, min distress, normal percussion, normal fremitus and good   breath sounds       Radiographs reviewed:  CXR 4/2- bibasilar fluffy opacities and pulmonary edema, similar appearance  CT head 3/31  1.  There are slight limitations related to moderate patient tilted in the scanner and mild patient motion but there is no obvious acute abnormality.  There is no hemorrhage, mass, mass effect or obvious acute edema or ischemia.  It should be noted that MRI is more sensitive in the detection of subtle or acute nonhemorrhagic ischemic disease.    2.  Support tubing is seen in the left nares.  Bilateral mastoid and middle ear effusions are likely related to support tubing and/or intubation.  Correlate clinically.  The same is true for changes throughout the paranasal sinuses.    CXR 3/31- improving bibasilar opacities  CXR 3/29- unchanged  CXR 3/27- bilateral mid and lower lobe fluffy airspace disease  CXR 3/26- increased consolidation RLL    TTE 3/27  · Mild left ventricular enlargement.  · Mildly decreased left ventricular systolic function. The estimated ejection fraction is 45%.  · Grade I (mild) left ventricular diastolic dysfunction consistent with impaired relaxation.  · Normal right ventricular systolic function.  · Mild left atrial enlargement.  · Moderate mitral  regurgitation.  · Mild tricuspid regurgitation.  · Mild pulmonary hypertension present. PASP 40 mm of Hg.    Labs     Recent Labs   Lab 04/04/20  0339   WBC 9.74   HGB 9.1*   HCT 28.9*      METAMYELOCYT 3.0     Recent Labs   Lab 04/04/20  0339   *   K 4.0   CL 98   CO2 21*   BUN 34*   CREATININE 3.9*      CALCIUM 9.2   AST 25   ALT 28   ALKPHOS 160*   BILITOT 0.7   PROT 8.0   ALBUMIN 2.4*     No results for input(s): PH, PCO2, PO2, HCO3 in the last 24 hours.  Microbiology Results (last 7 days)     Procedure Component Value Units Date/Time    Blood culture [[id number]] Collected:  03/26/20 0432    Order Status:  Completed Specimen:  Blood Updated:  03/30/20 1214     Blood Culture, Routine No Growth after 4 days.     Blood culture [[id number]] Collected:  03/26/20 0612    Order Status:  Completed Specimen:  Blood Updated:  03/30/20 1214     Blood Culture, Routine No Growth after 4 days.     Blood culture x two cultures. Draw prior to antibiotics. [[id number]] Collected:  03/25/20 1411    Order Status:  Completed Specimen:  Blood from Antecubital, Right  Arm Updated:  03/30/20 1212     Blood Culture, Routine No Growth after 4 days.     Narrative:       Aerobic and anaerobic    Culture, Respiratory with Gram Stain [[id number]] Collected:  03/25/20 2150    Order Status:  Completed Specimen:  Tracheal Aspirate Updated:  03/28/20 0927     Respiratory Culture Normal respiratory anamaria      No S aureus or Pseudomonas isolated.     Gram Stain (Respiratory) <10 epithelial cells per low power field.     Gram Stain (Respiratory) Rare WBC's     Gram Stain (Respiratory) Rare Gram positive cocci        Results for WOLF SINGER (MRN [id number]) as of 4/1/2020 09:28   Ref. Range 4/1/2020 02:53   ALT Latest Ref Range: 10 - 44 U/L 54 (H)     Impression:  Active Hospital Problems    Diagnosis  POA    *Acute respiratory failure [J96.00]  Yes    Acute encephalopathy [G93.40]  No    Acute renal failure [N17.9]   No    ARDS (adult respiratory distress syndrome) [J80]  Yes    COVID-19 virus infection [U07.1]  Yes    Morbid obesity [E66.01]  Yes    Hypothyroid [E03.9]  Yes    COVID-19 virus detected [U07.1]  Yes    Diverticulosis of large intestine without hemorrhage [K57.30]  Yes    Iron deficiency anemia, unspecified [D50.9]  Yes      Resolved Hospital Problems   No resolved problems to display.               Plan:   Acute hypoxemic respiratory failure, stable to improving O2 reqt  COVID-19 pneumonia/ARDS  Acute renal failure, on HD  Metabolic acidosis due to renal failure  Shock, resolved   Combined heart failure, EF 45%- myocardial involvement?  Morbid obesity  Emesis, poor tolerance of tube feeds  Acute encephalopathy- delirium vs encephalitis or other structural cause?    - repeat SBT today after MRI and HD completed  - since MRI only has 2 pumps will hold fentanyl so she can have propofol drip and Levophed running  - titrate peep and FiO2 by ARDS net protocol  - continue HD per nephro  - EEG pending  - MRI pending  - neuro recommendations reviewed  - continue hydroxychloroquine 400 mg daily-- consider dc if further episodes of emesis  - has had adequate course (7d) of azithromycin and ceftriaxone, dc now  - advance tube feeds as tolerated  - discussed with hospitalist  - recommend DNR code status w/ continued full treatment  Above from Dr Mcguire and below from Dr Laird:  4/3 peep 5 106/0.35,  Be -6, creat 4.6,   I/o 1944/725- tube feeds, fentanyl 100, norepi 0.01, propofol 40, hydroxychloroquine,  cxr mid and lower lung ards 4/2.  Off sedation bp up- will order cardene and lopressor-  Wean sedation.      4/4 - abg na, c02 lytes 21 from17, cpap trials reasonable- ordered 4/5.     I/o 2065/3500 - creat 3.9 from4.6  Fentanyl 125, propofol 15- no cardene nor lopresor, hydroxychloroquine  Tpn to start - has had emesis with tube feeds.  Air hunger on 35% with peep 5 and sat 95% - decreased with rr dropping from 45 to  36 on 50% with sat 98%.      With emesis consider weaning off fentanyl and using versed.  Not weanable with apparent air hunger. cxr 4/2 mod to severe ards - f/u am cxr.    Temp this am 100.3 - will eval urine , sputum and blood cultures- off abx now.  F/u cxr am.  Promethazine ordered.      The following were evaluated and adjusted as needed: mechanical ventilator settings and weaning status, intravenous fluids and nutritional status, sedation and neurologic status, antibiotics, hemodynamics, support tubes and access lines and invasive monitoring, acid base balance and oxygenation needs and input and output and renal status       Critical Care  - THE PATIENT HAS A HIGH PROBABILITY OF IMMINENT OR LIFE THREATENING DETERIORATION.  Over 50%time of encounter was in direct care at bedside.  Time was 30 to 74 minutes required for patient care.  Time needed for all of the above totaled 34 minutes.

## 2020-04-04 NOTE — ASSESSMENT & PLAN NOTE
Patient's anemia is currently controlled. Has recieved 1 units of PRBCs on 4/3.   Will need to Monitor for GI bleed  Lab Results   Component Value Date    HGB 9.1 (L) 04/04/2020    HCT 28.9 (L) 04/04/2020     Monitor serial CBC and transfuse if patient becomes hemodynamically unstable, symptomatic or H/H drops below 7/21.

## 2020-04-04 NOTE — PROGRESS NOTES
INPATIENT NEPHROLOGY PROGRESS NOTE  [facility] NEPHROLOGY    Patient Name: Maria Victoria Hernandez  Date: 04/04/2020    Reason for consultation: MAIKOL    History of Present Illness:  54AAF nurse with morbid obesity, hypothyroidism presents PNA, intubated, positive for COVID19. Admit Cr 0.7 went 5.1 and HD started on 3/29.     3/28  Scr worse today.  Only one shift recorded for output, 520cc.  Still hyponatremic, vent setting adjusted per pulmonology note for acidosis.  K+ at goal after repletion.  Has siri, may need HD initiated.  3/29  Non oliguric.  In no distress  3/30 VSS, seen and examined on HD, tolerating well. Plan another HD in AM, discussed with daughter who is a nurse here too.  3/31 VSS, intubated still. UO is not great but she is dialyzed daily. Seen and examined on HD, tolerating well. Plan another HD in AM.  4/1 VSS, intubated still. UO is not great but she is dialyzed daily. Seen and examined on HD, tolerating well. Plan another HD PRN.  4/2 VSS, intubated still. UO is not great. Plan another HD in AM.  4/3 VSS, intubated still. UO is not great but she is dialyzed daily recently. Seen and examined on HD, tolerating well. Plan another HD on Mon or PRN.  4/4 febrile, BP stable, FIO2 50%, intubated, sedated, on levophed, UOP 900cc, transfused    Plan of Care:    1. Septic shock 2/2 COVID PNA with HHRF requiring MV  - getting another infx workup today for fever  - on plaquenil  - remains intubated/sedated    2. MAIKOL - suspect multifactorial ATN in setting of shock/COVID/intravascular volume depletion- initiated HD 3/29  - she has no acute RRT needs this weekend  - UOP is picking up- may be sign of recovery  - no nsaids or IV contrast    3. Hypervolemic hyponatremia  - stable    4. Acidosis  - ABG reviewed, acceptable    5. Anemia  - transfused- Hb is better    Thank you for allowing us to participate in this patient's care. We will continue to follow.    Vital Signs:  Temp Readings from Last 3  Encounters:   04/04/20 100.1 °F (37.8 °C) (Axillary)       Pulse Readings from Last 3 Encounters:   04/04/20 105   01/15/15 84   12/17/13 80       BP Readings from Last 3 Encounters:   04/04/20 122/61   01/15/15 124/82   12/17/13 102/68       Weight:  Wt Readings from Last 3 Encounters:   03/30/20 115.3 kg (254 lb 3.1 oz)   01/15/15 105.8 kg (233 lb 4.8 oz)   12/17/13 101.2 kg (223 lb)     Medications:  Scheduled Meds:   ascorbic acid (vitamin C)  500 mg Oral QID    calcitRIOL  0.25 mcg Oral Daily    enoxparin  40 mg Subcutaneous Q12H    gentamicin  1 drop Right Eye Q4H    levothyroxine  100 mcg Oral Before breakfast    pantoprazole  40 mg Intravenous Daily    polyethylene glycol  17 g Oral Daily    rocuronium  100 mg Intravenous Once    zinc sulfate  220 mg Oral Daily     Continuous Infusions:   Amino acid 4.25% - dextrose 10% (CLINIMIX) solution with additives (1L  provides 340 kcal/L dextrose, with 42.5 gm AA, 100 gm CHO, and no electrolytes except acetate 37 mEq/Cl- 17 mEq) 50 mL/hr at 04/04/20 1357    fentanyl 150 mcg/hr (04/04/20 0948)    midazolam (VERSED) infusion (titrating)      niCARdipine Stopped (04/03/20 1245)    norepinephrine bitartrate-D5W Stopped (04/03/20 0731)    propofoL 15 mcg/kg/min (04/04/20 0658)     PRN Meds:.sodium chloride, acetaminophen, heparin (porcine), metoprolol, promethazine (PHENERGAN) IVPB, propofoL, sodium chloride 0.9%  No current facility-administered medications on file prior to encounter.      Current Outpatient Medications on File Prior to Encounter   Medication Sig Dispense Refill    ferrous sulfate 325 mg (65 mg iron) Tab tablet Take 1 tablet (325 mg total) by mouth daily with breakfast. (Patient taking differently: Take 325 mg by mouth daily with breakfast. Take 2 tablets daily) 90 tablet 3    fluticasone propionate (FLONASE) 50 mcg/actuation nasal spray 1 spray by Each Nostril route once daily.      levothyroxine (SYNTHROID) 100 MCG tablet Take 1  "tablet (100 mcg total) by mouth once daily. (Patient taking differently: Take 150 mcg by mouth once daily. ) 90 tablet 3    meloxicam (MOBIC) 7.5 MG tablet Take 7.5 mg by mouth once daily.      clotrimazole-betamethasone 1-0.05% (LOTRISONE) cream Apply topically 2 (two) times daily. 45 g 1    levocetirizine (XYZAL) 5 MG tablet Take 1 tablet (5 mg total) by mouth every evening. 30 tablet 6     Review of Systems:  Unable to obtain due to MV    Physical Exam:  /61   Pulse 105   Temp 100.1 °F (37.8 °C) (Axillary)   Resp (!) 35   Ht 5' 1" (1.549 m)   Wt 115.3 kg (254 lb 3.1 oz)   SpO2 98%   Breastfeeding? No   BMI 48.03 kg/m²     INS/OUTS:  I/O last 3 completed shifts:  In: 3349.8 [I.V.:1811.8; Blood:850; NG/GT:688]  Out: 3800 [Urine:1200; Other:2600]  I/O this shift:  In: -   Out: 160 [Urine:160]    Did exam via video monitoring  Did not go in room    Physical Exam:  Constitutional: acutely ill, appears stated age,   HEENT: Eyes closed, MMM  Heart: RRR on monitor, no edema  Lungs: intubated, no lb  Abdomen: nd  Skin: no rash  MSK: no deformity  CNS: sedated      Results:  Lab Results   Component Value Date     (L) 04/04/2020    K 4.0 04/04/2020    CL 98 04/04/2020    CO2 21 (L) 04/04/2020    BUN 34 (H) 04/04/2020    CREATININE 3.9 (H) 04/04/2020    CALCIUM 9.2 04/04/2020    ANIONGAP 13 04/04/2020    ESTGFRAFRICA 14 (A) 04/04/2020    EGFRNONAA 12 (A) 04/04/2020       Lab Results   Component Value Date    CALCIUM 9.2 04/04/2020    PHOS 4.8 (H) 03/29/2020       Recent Labs   Lab 04/04/20  0339   WBC 9.74   RBC 3.52*   HGB 9.1*   HCT 28.9*      MCV 82   MCH 25.9*   MCHC 31.5*     I have personally reviewed pertinent radiological imaging and reports.    Carolyn Moeller MD MPH  [facility]  [address] [place] [address]  [phone] (p)  [phone] (f)    "

## 2020-04-04 NOTE — SUBJECTIVE & OBJECTIVE
Interval History: Titrated fentanyl up and propofol down  Patient tolerating at this time. Cuellar intact. Patient awakens to voice. Does not follow commands or withdraw from pain at this time. Does not track or make eye contact at this time    Review of Systems   Unable to perform ROS: Intubated     Objective:     Vital Signs (Most Recent):  Temp: 100.3 °F (37.9 °C) (04/04/20 0305)  Pulse: (!) 121 (04/04/20 0700)  Resp: (!) 43 (04/04/20 0700)  BP: (!) 153/76 (04/04/20 0700)  SpO2: 96 % (04/04/20 0700) Vital Signs (24h Range):  Temp:  [98.1 °F (36.7 °C)-100.3 °F (37.9 °C)] 100.3 °F (37.9 °C)  Pulse:  [] 121  Resp:  [34-52] 43  SpO2:  [94 %-100 %] 96 %  BP: ()/(55-99) 153/76  Arterial Line BP: (126-200)/(59-93) 179/81     Weight: 115.3 kg (254 lb 3.1 oz)  Body mass index is 48.03 kg/m².    Intake/Output Summary (Last 24 hours) at 4/4/2020 0857  Last data filed at 4/4/2020 0611  Gross per 24 hour   Intake 1944.98 ml   Output 3375 ml   Net -1430.02 ml      Physical Exam   Nursing note and vitals reviewed.  PATIENT WAS SEEN AS A VIDEO VISIT WITH NURSE IN PATIENT ROOM WITH PATIENT TO ASSIST DURING THE VISIT. PHYSICAL EXAM FINDINGS ARE AS VIEWED BY MYSELF VIA VIDEO OR AS REPORTED BY NURSE IF SPECIFIED AS SUCH, EXAM NOT DONE PERSONALLY BY MYSELF AT BEDSIDE.      Significant Labs:   BMP:   Recent Labs   Lab 04/04/20 0339      *   K 4.0   CL 98   CO2 21*   BUN 34*   CREATININE 3.9*   CALCIUM 9.2     CBC:   Recent Labs   Lab 04/03/20 0338 04/04/20 0339   WBC 8.71 9.74   HGB 7.7* 9.1*   HCT 24.3* 28.9*    309       Significant Imaging: I have reviewed all pertinent imaging results/findings within the past 24 hours.

## 2020-04-04 NOTE — ASSESSMENT & PLAN NOTE
Patient's anemia is currently controlled. Trending down  Will send for stool [facility]    Has recieved 1 units of PRBCs on 4/3.   Will transfuse  Unit as pt is hypotensive and ESRD   Etiology likely d/t Anemia of chronic disease  Current CBC reviewed-   Lab Results   Component Value Date    HGB 9.1 (L) 04/04/2020    HCT 28.9 (L) 04/04/2020     Monitor serial CBC and transfuse if patient becomes hemodynamically unstable, symptomatic or H/H drops below 8/24

## 2020-04-04 NOTE — PROGRESS NOTES
[facility]  Neurology  Progress Note    Patient Name: Maria Victoria Hernandez  MRN: [id number]  Admission Date: 3/25/2020  Hospital Length of Stay: 10 days  Code Status: Full Code   Consulting Provider: Dr. Justice Monge  Consulting NP: Darian Pink NP   Attending Provider: Kady Gomez MD  Primary Care Physician: Candice Deluna MD   Principal Problem:Acute respiratory failure    Subjective:        HPI: Patient is a 54 years old  female with morbid obesity in past medical history significant for hypothyroidism is being admitted to intensive care unit under inpatient status from [facility] Emergency room with worsening shortness of breath.  Three days ago patient was tested positive for COVID - 19.  Patient works at [employer].  Patient has been experiencing subjective fever, generalized body aches and pains and nonproductive cough for few days.  Patient is getting increasingly short of breath especially for the past 2 days.  Upon arrival to the emergency room patient was noted to be hypoxic around 89%.  Up on 3 L patient's oxygen sats are around 96%.  Patient denies any chest pain, leg swelling or calf tenderness.        Overview/Hospital Course:  No notes on file     Interval History: Patient has been encephalopathic off sedation limited response to verbal or painful stimuli as per RN assessment. Off pressor. Not tolerating TF currently on TPN     Neurological Interval Note: Patient seen and limited exam noted. Discussed plan of care with Dr. Justice Monge. Patient on ventilator sedation continues and being tiratrated. Patient daughter at bedside, combing patients hair. Patient moves head. However patient doesn't follow commands. Will obtain MRI of brain without contrast when patient is safe to obtain r/t for COVID-19 positive status. Will continue to monitor.        Current Neurological Medications:     Current Facility-Administered  Medications   Medication Dose Route Frequency Provider Last Rate Last Dose    0.9%  NaCl infusion (for blood administration)   Intravenous Q24H PRN Kady Gomez MD        acetaminophen tablet 650 mg  650 mg Oral Q6H PRN Kady Gomez MD   650 mg at 03/26/20 2100    ascorbic acid (vitamin C) tablet 500 mg  500 mg Oral QID Kady Gomez MD   500 mg at 04/03/20 2118    calcitRIOL capsule 0.25 mcg  0.25 mcg Oral Daily Kady Gomez MD        enoxaparin injection 40 mg  40 mg Subcutaneous Q12H Ferny Porter MD   40 mg at 04/03/20 2118    fentaNYL (SUBLIMAZE) 2,500 mcg in dextrose 5 % 250 mL infusion   Intravenous Continuous Rylan Spicer MD 12.5 mL/hr at 04/04/20 0559 125 mcg/hr at 04/04/20 0559    gentamicin 0.3 % ophthalmic solution 1 drop  1 drop Right Eye Q4H Ayaan Rose NP   1 drop at 04/04/20 0606    heparin (porcine) injection 4,000 Units  4,000 Units Intravenous PRN Don Collins MD   4,000 Units at 04/01/20 1046    hydroxychloroquine tablet 400 mg  400 mg Per NG tube Daily Kady Gomez MD   400 mg at 04/03/20 0805    levothyroxine tablet 100 mcg  100 mcg Oral Before breakfast Ferny Porter MD   100 mcg at 04/04/20 0557    metoprolol injection 5 mg  5 mg Intravenous Q2H PRN David Laird MD        niCARdipine 40 mg/200 mL infusion  1 mg/hr Intravenous Continuous David Laird MD   Stopped at 04/03/20 1245    norepinephrine 8 mg in dextrose 5 % 250 mL infusion  0.02 mcg/kg/min Intravenous Continuous Kady Gomez MD   Stopped at 04/03/20 0731    pantoprazole injection 40 mg  40 mg Intravenous Daily Ferny Porter MD   40 mg at 04/03/20 0805    polyethylene glycol packet 17 g  17 g Oral Daily Kady Gomez MD   17 g at 04/03/20 0805    propofol (DIPRIVAN) 10 mg/mL infusion  5 mcg/kg/min Intravenous PRN CASTILLO Mast 28.3 mL/hr at 04/01/20 1534 45 mcg/kg/min at 04/01/20 1534    propofol (DIPRIVAN) 10 mg/mL infusion  5 mcg/kg/min  Intravenous Continuous Rylan Spicer MD 10.4 mL/hr at 04/04/20 0658 15 mcg/kg/min at 04/04/20 0658    rocuronium injection 100 mg  100 mg Intravenous Once Florentin Fam MD        sodium chloride 0.9% flush 10 mL  10 mL Intravenous PRN Ferny Porter MD        zinc sulfate capsule 220 mg  220 mg Oral Daily Kady Gomez MD   220 mg at 04/03/20 1755       Review of Systems   Unable to perform ROS: Intubated     Objective:     Vital Signs (Most Recent):  Temp: 100.3 °F (37.9 °C) (04/04/20 0305)  Pulse: (!) 121 (04/04/20 0700)  Resp: (!) 43 (04/04/20 0700)  BP: (!) 153/76 (04/04/20 0700)  SpO2: 96 % (04/04/20 0700) Vital Signs (24h Range):  Temp:  [98.1 °F (36.7 °C)-100.3 °F (37.9 °C)] 100.3 °F (37.9 °C)  Pulse:  [] 121  Resp:  [34-52] 43  SpO2:  [94 %-100 %] 96 %  BP: ()/(55-99) 153/76  Arterial Line BP: (126-200)/(59-93) 179/81     Weight: 115.3 kg (254 lb 3.1 oz)  Body mass index is 48.03 kg/m².    Physical Exam   Constitutional: She appears well-developed and well-nourished.   HENT:   Head: Normocephalic and atraumatic.   Eyes: Pupils are equal, round, and reactive to light.   Neck: Neck supple.   Cardiovascular: Normal rate.   Pulmonary/Chest: Effort normal.   Intubated and Ventilator assisted    Abdominal: Soft. Bowel sounds are normal.   Obese    Musculoskeletal:   JOSEFINA , Patient moves neck freely, does not follow commands    Neurological: She is unresponsive. GCS eye subscore is 1. GCS verbal subscore is 1. GCS motor subscore is 1.   Unresponsive JOSEFINA    Skin: Skin is warm and dry. Capillary refill takes 2 to 3 seconds.   Psychiatric: She is withdrawn. Cognition and memory are impaired. She is noncommunicative.   Withdrawn  She is inattentive.       NEUROLOGICAL EXAMINATION:     CRANIAL NERVES     CN III, IV, VI   Pupils are equal, round, and reactive to light.      Significant Labs:  BMP  Lab Results   Component Value Date     (L) 04/04/2020    K 4.0 04/04/2020    CL 98  04/04/2020    CO2 21 (L) 04/04/2020    BUN 34 (H) 04/04/2020    CREATININE 3.9 (H) 04/04/2020    CALCIUM 9.2 04/04/2020    ANIONGAP 13 04/04/2020    ESTGFRAFRICA 14 (A) 04/04/2020    EGFRNONAA 12 (A) 04/04/2020     Lab Results   Component Value Date    TSH 2.082 03/31/2020     Lab Results   Component Value Date    VBZONLBG74 >2000 (H) 03/31/2020           Lab Results   Component Value Date       Ref Range & Units 1d ago  (3/31/20) 1d ago  (3/31/20)   Ammonia 10 - 50 umol/L 42                Significant Imaging:      CT of Head w/o contrast           Impression         1.  There are slight limitations related to moderate patient tilted in the scanner and mild patient motion but there is no obvious acute abnormality.  There is no hemorrhage, mass, mass effect or obvious acute edema or ischemia.  It should be noted that MRI is more sensitive in the detection of subtle or acute nonhemorrhagic ischemic disease.    2.  Support tubing is seen in the left nares.  Bilateral mastoid and middle ear effusions are likely related to support tubing and/or intubation.  Correlate clinically.  The same is true for changes throughout the paranasal sinuses.            EEG 30 min    No epileptiform discharges, periodic discharges, lateralized   rhythmic delta activity or electrographic seizures were seen.  Assessment and Plan:     54 year old female with impression     1. Acute Metabolic Encephalaopathy vs Septic Encephalopathy related to COVID-19 virus     -CT of Head w/o contrast noted   -EEG 30 min awake and drowsy results noted no seizures   -Encephalopathy Labs pending      3.COVID-19 virus infection  -Will continue Plaquenil  - Managed per IM     3. History of Hypothyroidism  IM to manage      Will continue to follow         Active Diagnoses:    Diagnosis Date Noted POA    PRINCIPAL PROBLEM:  Acute respiratory failure [J96.00] 03/25/2020 Yes    Acute encephalopathy [G93.40] 03/31/2020 No    Acute renal failure [N17.9]  03/27/2020 No    ARDS (adult respiratory distress syndrome) [J80] 03/26/2020 Yes    COVID-19 virus infection [U07.1] 03/26/2020 Yes    Morbid obesity [E66.01] 03/25/2020 Yes    Hypothyroid [E03.9] 03/25/2020 Yes    COVID-19 virus detected [U07.1] 03/25/2020 Yes    Diverticulosis of large intestine without hemorrhage [K57.30] 07/03/2018 Yes    Iron deficiency anemia, unspecified [D50.9] 12/30/2015 Yes      Problems Resolved During this Admission:       VTE Risk Mitigation (From admission, onward)         Ordered     heparin (porcine) injection 4,000 Units  As needed (PRN)      03/29/20 2001     enoxaparin injection 40 mg  Every 12 hours      03/25/20 2313     IP VTE HIGH RISK PATIENT  Once      03/25/20 2313                Debi Pink NP  Neurology  [facility]    I, Dr. Justice Monge, have personally seen and examined the patient with my advanced provider and agree with above. I personally did a focused exam, and reviewed all necessary clinical information. I discussed my management plan with my NP and agree with above. Patient too unstable for further imaging as per ICU team. ID consult.

## 2020-04-04 NOTE — ASSESSMENT & PLAN NOTE
Completed Plaquenil    Covid-19 Virus Infection  - Infection Control notified    - Isolation:   - Airborne and Droplet Precautions  - N95 masks must be fit tested, wear eye protection  - 20 second hand hygiene   - Limit visitors per hospital policy   - Consolidating lab draws, nursing care, and interventions    - Diagnostics: (rising CRP, persistent lymphopenia, hyponatremia, hyperferritinemia, elevated troponin, elevated d-dimer, age, and comorbidities are significant predictors of poor clinical outcome)   - CBC:   trend Q48hrs  - CMP:        trend Q48hrs  - Procalcitonin:  - D-dimer:  trend Q48hrs  - Ferritin:  repeat prior to discharge  - CRP:        trend Q48hrs  - LDH:  - BNP:  - Troponin:    - ECG:   - rapid Flu:   - RIP only if BMT/solid transplant:   - Legionella antigen:   - Blood culture x2:   - Sputum culture:   - CXR:   - UA and culture:      - Management:   - Bundle care as able to minimize in/out of room   - Supplemental O2 to maintain SpO2 >92%,   if requiring 6L NC or higher, place on nonrebreather and discuss case with MICU   - Telemetry & continuous Pulse Ox   - albuterol INHALER PRN 4puff Q6hr approximates a nebulizer (avoid nebulization of secretions)   - apap PRN fever   - Avoiding NIPPV to prevent aerosolization   (including home CPAP/BiPAP unless on a case-by-case basis and only in negative pressure room)   - Cautious use of NSAIDS for fever per WHO recommendations (3/16/2020)   - No new ACEi/ARB start or discontinuation of chronic med unless hypotensive (Esler et al. Journal of Hypertension 2020, 38:000-000)   - Careful use of steroids in the absence of other indications   - unless septic shock due to increased viral replication   - Fluid sparing resuscitation   - Empiric antibiotics per likely source & patient allergies    - CAP: x 5 day course  Ceftriaxone 1g IV Q24hrs            Azithromycin 500mg IV day #1, then 250mg PO daily x4 days                 If MRSA risk factors, add Vancomycin  IV (PharmD consult)   - If patient meets criteria per Hospital Protocol    - start statin (if CPK WNL)    - start HCQ 400mg PO BID x1 day, then 400mg PO daily x 4 days (check G6PD, ECG, and start Qshift POCT glucose)    Goals of care, counseling/discussion  - Reviewed the typical clinical course of [id number] with the daughter Danya (patient name or relationship to patient), including the potential for acute decompensation requiring intubation and mechanical ventilation  - Discussed again as part of routine daily evaluation, patient/POA maintains code status of Full code    VTE High Risk Prophylaxis: enoxaparin 40mg sq QHS @ 2100 (bundled care) if GFR >30    Patient's chronic/stable medical conditions noted in the problem list above will be managed with the patient's home medications as tolerated.

## 2020-04-04 NOTE — ASSESSMENT & PLAN NOTE
AMS:: Hypoxia vs. Infection vs. TIA/stroke vs. Metabolic/Toxic.   Much more responsive compared to yesterday    Lab Results   Component Value Date    WBC 9.74 04/04/2020     MRI pending   focal findings on physical exam  No early signs of stroke on Head CT, no hemorrhage or acute findings.  EEG: EEG 30 min awake and drowsy results noted no seizures  Will follow up with neuro recs

## 2020-04-04 NOTE — ASSESSMENT & PLAN NOTE
Patient with Hypoxic Respiratory failure which is Acute.  she is not on home oxygen. Supplemental ventilation was provided and noted- Vent Mode: A/C  Oxygen Concentration (%):  [35] 35  Resp Rate Total:  [35 br/min-52 br/min] 42 br/min  Vt Set:  [350 mL] 350 mL  PEEP/CPAP:  [5 cmH20] 5 cmH20  Pressure Support:  [0 cmH20] 0 cmH20  Mean Airway Pressure:  [13 cmH20-15 cmH20] 13 cmH20 and oxygen saturations . Differential diagnosis includes - Pneumonia Viral Labs and images were reviewed. Patient Has recent ABG- No components found for: ABG. Will treat underlying causes and adjust management of respiratory failure as follows-     Temp:  [98.9 °F (37.2 °C)-100.3 °F (37.9 °C)]   Pulse:  [101-133]   Resp:  [34-52]   BP: (112-174)/(55-99)   SpO2:  [94 %-100 %]   Arterial Line BP: (128-200)/(60-93)      Pulmonary consultation.   Continue beta 2 agonist bronchodilator treatments.   Continue IV antibiotics - ceftriaxone and azithromycin.   D dimer up 1, ast/alt sl up 105/81, procal 0.12, covid pos.  Troponin neg.  Ratio 74/0.5 on peep 8-   390 is 6 cc/kg ;  Will continue Plaquenil 400 mg daily  as per COVID protocol.  Microbiology Results (last 7 days)     Procedure Component Value Units Date/Time    Culture, Respiratory with Gram Stain [[id number]]     Order Status:  No result Specimen:  Respiratory     Blood culture [[id number]]     Order Status:  Sent Specimen:  Blood     Blood culture [[id number]] Collected:  03/26/20 0432    Order Status:  Completed Specimen:  Blood Updated:  03/30/20 1214     Blood Culture, Routine No Growth after 4 days.     Blood culture [[id number]] Collected:  03/26/20 0612    Order Status:  Completed Specimen:  Blood Updated:  03/30/20 1214     Blood Culture, Routine No Growth after 4 days.     Blood culture x two cultures. Draw prior to antibiotics. [[id number]] Collected:  03/25/20 1411    Order Status:  Completed Specimen:  Blood from Antecubital, Right  Arm Updated:  03/30/20 1212     Blood Culture,  Routine No Growth after 4 days.     Narrative:       Aerobic and anaerobic    Culture, Respiratory with Gram Stain [[id number]] Collected:  03/25/20 2150    Order Status:  Completed Specimen:  Tracheal Aspirate Updated:  03/28/20 0927     Respiratory Culture Normal respiratory anamaria      No S aureus or Pseudomonas isolated.     Gram Stain (Respiratory) <10 epithelial cells per low power field.     Gram Stain (Respiratory) Rare WBC's     Gram Stain (Respiratory) Rare Gram positive cocci          Continue routine medications as before.   Follow airborne/droplet precautions.

## 2020-04-04 NOTE — PROGRESS NOTES
[facility] Hospital Medicine  Progress Note    Patient Name: Maria Victoria Hernandez  MRN: [id number]  Patient Class: IP- Inpatient   Admission Date: 3/25/2020  Length of Stay: 10 days  Attending Physician: Kady Gomez MD  Primary Care Provider: Candice Deluna MD        Subjective:     Principal Problem:Acute respiratory failure        HPI:  Patient is a 54 years old  female with morbid obesity in past medical history significant for hypothyroidism is being admitted to intensive care unit under inpatient status from [facility] Emergency room with worsening shortness of breath.  Three days ago patient was tested positive for COVID - 19.  Patient works at [employer].  Patient has been experiencing subjective fever, generalized body aches and pains and nonproductive cough for few days.  Patient is getting increasingly short of breath especially for the past 2 days.  Upon arrival to the emergency room patient was noted to be hypoxic around 89%.  Up on 3 L patient's oxygen sats are around 96%.  Patient denies any chest pain, leg swelling or calf tenderness.      Overview/Hospital Course:  No notes on file    Interval History: Titrated fentanyl up and propofol down  Patient tolerating at this time. Cuellar intact. Patient awakens to voice. Does not follow commands or withdraw from pain at this time. Does not track or make eye contact at this time    Review of Systems   Unable to perform ROS: Intubated     Objective:     Vital Signs (Most Recent):  Temp: 100.3 °F (37.9 °C) (04/04/20 0305)  Pulse: (!) 121 (04/04/20 0700)  Resp: (!) 43 (04/04/20 0700)  BP: (!) 153/76 (04/04/20 0700)  SpO2: 96 % (04/04/20 0700) Vital Signs (24h Range):  Temp:  [98.1 °F (36.7 °C)-100.3 °F (37.9 °C)] 100.3 °F (37.9 °C)  Pulse:  [] 121  Resp:  [34-52] 43  SpO2:  [94 %-100 %] 96 %  BP: ()/(55-99) 153/76  Arterial Line BP: (126-200)/(59-93) 179/81      Weight: 115.3 kg (254 lb 3.1 oz)  Body mass index is 48.03 kg/m².    Intake/Output Summary (Last 24 hours) at 4/4/2020 0857  Last data filed at 4/4/2020 0611  Gross per 24 hour   Intake 1944.98 ml   Output 3375 ml   Net -1430.02 ml      Physical Exam   Nursing note and vitals reviewed.  PATIENT WAS SEEN AS A VIDEO VISIT WITH NURSE IN PATIENT ROOM WITH PATIENT TO ASSIST DURING THE VISIT. PHYSICAL EXAM FINDINGS ARE AS VIEWED BY MYSELF VIA VIDEO OR AS REPORTED BY NURSE IF SPECIFIED AS SUCH, EXAM NOT DONE PERSONALLY BY MYSELF AT BEDSIDE.      Significant Labs:   BMP:   Recent Labs   Lab 04/04/20  0339      *   K 4.0   CL 98   CO2 21*   BUN 34*   CREATININE 3.9*   CALCIUM 9.2     CBC:   Recent Labs   Lab 04/03/20 0338 04/04/20 0339   WBC 8.71 9.74   HGB 7.7* 9.1*   HCT 24.3* 28.9*    309       Significant Imaging: I have reviewed all pertinent imaging results/findings within the past 24 hours.      Assessment/Plan:      * Acute respiratory failure  Patient with Hypoxic Respiratory failure which is Acute.  she is not on home oxygen. Supplemental ventilation was provided and noted- Vent Mode: A/C  Oxygen Concentration (%):  [35] 35  Resp Rate Total:  [35 br/min-52 br/min] 42 br/min  Vt Set:  [350 mL] 350 mL  PEEP/CPAP:  [5 cmH20] 5 cmH20  Pressure Support:  [0 cmH20] 0 cmH20  Mean Airway Pressure:  [13 cmH20-15 cmH20] 13 cmH20 and oxygen saturations . Differential diagnosis includes - Pneumonia Viral Labs and images were reviewed. Patient Has recent ABG- No components found for: ABG. Will treat underlying causes and adjust management of respiratory failure as follows-     Temp:  [98.9 °F (37.2 °C)-100.3 °F (37.9 °C)]   Pulse:  [101-133]   Resp:  [34-52]   BP: (112-174)/(55-99)   SpO2:  [94 %-100 %]   Arterial Line BP: (128-200)/(60-93)      Pulmonary consultation.   Continue beta 2 agonist bronchodilator treatments.   Continue IV antibiotics - ceftriaxone and azithromycin.   D dimer up 1, ast/alt  sl up 105/81, procal 0.12, covid pos.  Troponin neg.  Ratio 74/0.5 on peep 8-   390 is 6 cc/kg ;  Will continue Plaquenil 400 mg daily  as per COVID protocol.  Microbiology Results (last 7 days)     Procedure Component Value Units Date/Time    Culture, Respiratory with Gram Stain [[id number]]     Order Status:  No result Specimen:  Respiratory     Blood culture [[id number]]     Order Status:  Sent Specimen:  Blood     Blood culture [[id number]] Collected:  03/26/20 0432    Order Status:  Completed Specimen:  Blood Updated:  03/30/20 1214     Blood Culture, Routine No Growth after 4 days.     Blood culture [[id number]] Collected:  03/26/20 0612    Order Status:  Completed Specimen:  Blood Updated:  03/30/20 1214     Blood Culture, Routine No Growth after 4 days.     Blood culture x two cultures. Draw prior to antibiotics. [[id number]] Collected:  03/25/20 1411    Order Status:  Completed Specimen:  Blood from Antecubital, Right  Arm Updated:  03/30/20 1212     Blood Culture, Routine No Growth after 4 days.     Narrative:       Aerobic and anaerobic    Culture, Respiratory with Gram Stain [[id number]] Collected:  03/25/20 2150    Order Status:  Completed Specimen:  Tracheal Aspirate Updated:  03/28/20 0927     Respiratory Culture Normal respiratory anamaria      No S aureus or Pseudomonas isolated.     Gram Stain (Respiratory) <10 epithelial cells per low power field.     Gram Stain (Respiratory) Rare WBC's     Gram Stain (Respiratory) Rare Gram positive cocci          Continue routine medications as before.   Follow airborne/droplet precautions.            Acute encephalopathy  AMS:: Hypoxia vs. Infection vs. TIA/stroke vs. Metabolic/Toxic.   Much more responsive compared to yesterday    Lab Results   Component Value Date    WBC 9.74 04/04/2020     MRI pending   focal findings on physical exam  No early signs of stroke on Head CT, no hemorrhage or acute findings.  EEG: EEG 30 min awake and drowsy results noted no  seizures  Will follow up with neuro recs      Iron deficiency anemia, unspecified  Patient's anemia is currently controlled. Trending down  Will send for stool guaic    Has recieved 1 units of PRBCs on 4/3.   Will transfuse  Unit as pt is hypotensive and ESRD   Etiology likely d/t Anemia of chronic disease  Current CBC reviewed-   Lab Results   Component Value Date    HGB 9.1 (L) 04/04/2020    HCT 28.9 (L) 04/04/2020     Monitor serial CBC and transfuse if patient becomes hemodynamically unstable, symptomatic or H/H drops below 8/24        Diverticulosis of large intestine without hemorrhage  Patient's anemia is currently controlled. Has recieved 1 units of PRBCs on 4/3.   Will need to Monitor for GI bleed  Lab Results   Component Value Date    HGB 9.1 (L) 04/04/2020    HCT 28.9 (L) 04/04/2020     Monitor serial CBC and transfuse if patient becomes hemodynamically unstable, symptomatic or H/H drops below 7/21.         Acute renal failure  Will follow-up with the renal recommendations  Currently on HD             COVID-19 virus infection  Completed Plaquenil    Covid-19 Virus Infection  - Infection Control notified    - Isolation:   - Airborne and Droplet Precautions  - N95 masks must be fit tested, wear eye protection  - 20 second hand hygiene   - Limit visitors per hospital policy   - Consolidating lab draws, nursing care, and interventions    - Diagnostics: (rising CRP, persistent lymphopenia, hyponatremia, hyperferritinemia, elevated troponin, elevated d-dimer, age, and comorbidities are significant predictors of poor clinical outcome)   - CBC:   trend Q48hrs  - CMP:        trend Q48hrs  - Procalcitonin:  - D-dimer:  trend Q48hrs  - Ferritin:  repeat prior to discharge  - CRP:        trend Q48hrs  - LDH:  - BNP:  - Troponin:    - ECG:   - rapid Flu:   - RIP only if BMT/solid transplant:   - Legionella antigen:   - Blood culture x2:   - Sputum culture:   - CXR:   - UA and culture:      - Management:   - Bundle  care as able to minimize in/out of room   - Supplemental O2 to maintain SpO2 >92%,   if requiring 6L NC or higher, place on nonrebreather and discuss case with MICU   - Telemetry & continuous Pulse Ox   - albuterol INHALER PRN 4puff Q6hr approximates a nebulizer (avoid nebulization of secretions)   - apap PRN fever   - Avoiding NIPPV to prevent aerosolization   (including home CPAP/BiPAP unless on a case-by-case basis and only in negative pressure room)   - Cautious use of NSAIDS for fever per WHO recommendations (3/16/2020)   - No new ACEi/ARB start or discontinuation of chronic med unless hypotensive (Esler et al. Journal of Hypertension 2020, 38:000-000)   - Careful use of steroids in the absence of other indications   - unless septic shock due to increased viral replication   - Fluid sparing resuscitation   - Empiric antibiotics per likely source & patient allergies    - CAP: x 5 day course  Ceftriaxone 1g IV Q24hrs            Azithromycin 500mg IV day #1, then 250mg PO daily x4 days                 If MRSA risk factors, add Vancomycin IV (PharmD consult)   - If patient meets criteria per Hospital Protocol    - start statin (if CPK WNL)    - start HCQ 400mg PO BID x1 day, then 400mg PO daily x 4 days (check G6PD, ECG, and start Qshift POCT glucose)    Goals of care, counseling/discussion  - Reviewed the typical clinical course of [id number] with the daughter Danya (patient name or relationship to patient), including the potential for acute decompensation requiring intubation and mechanical ventilation  - Discussed again as part of routine daily evaluation, patient/POA maintains code status of Full code    VTE High Risk Prophylaxis: enoxaparin 40mg sq QHS @ 2100 (bundled care) if GFR >30    Patient's chronic/stable medical conditions noted in the problem list above will be managed with the patient's home medications as tolerated.           ARDS (adult respiratory distress syndrome)    --will cont ARDSnet  Protocol.  --Target 6-8ml/kg Ideal Body Weight. Decrease TV as tolerated.  --Plateau pressure goal <30cm H2O.  --Oxygenation goal PaO2 55-80 or SpO2 88-95%.  --pH goal 7.30-7.45.  --Wean to PSV as tolerated.        COVID-19 virus detected   Plaquenil course completed  Continue routine medications as before.   Follow airborne/droplet precautions.      Hypothyroid  Chronic problem.  Lab Results   Component Value Date    TSH 2.082 03/31/2020     Not on any medication            Morbid obesity  Body mass index is 48.03 kg/m². Morbid obesity complicates all aspects of disease management from diagnostic modalities to treatment. Weight loss encouraged and health benefits explained to patient.            VTE Risk Mitigation (From admission, onward)         Ordered     heparin (porcine) injection 4,000 Units  As needed (PRN)      03/29/20 2001     enoxaparin injection 40 mg  Every 12 hours      03/25/20 2313     IP VTE HIGH RISK PATIENT  Once      03/25/20 2313                Critical care time spent on the evaluation and treatment of severe organ dysfunction, review of pertinent labs and imaging studies, discussions with consulting providers and discussions with patient/family: 60 minutes.      Kady Gomez MD  Department of Hospital Medicine   [facility]

## 2020-04-04 NOTE — RESPIRATORY THERAPY
04/04/20 0656   Patient Assessment/Suction   Level of Consciousness (AVPU) responds to pain   All Lung Fields Breath Sounds coarse;diminished   Suction Method oral;tracheal   Secretions Amount moderate   Secretions Color tan   Secretions Characteristics thick   PRE-TX-O2   O2 Device (Oxygen Therapy) ventilator   $ Is the patient on Low Flow Oxygen? Yes   Oxygen Concentration (%) 35   SpO2 96 %   Pulse Oximetry Type Continuous   $ Pulse Oximetry - Multiple Charge Pulse Oximetry - Multiple   Pulse (!) 129   Resp (!) 52   Wound Care   $ Wound Care Tech Time 15 min   Area of Concern Upper lip;Lower lip;Corner lip   Skin Color/Characteristics without discoloration   Skin Temperature warm        Airway - Non-Surgical 03/25/20 2145 Endotracheal Tube   Placement Date/Time: 03/25/20 2145   Present Prior to Hospital Arrival?: No  Inserted by: MD  Airway Device: Endotracheal Tube  Airway Device Size: 7.5  Style: Cuffed  Cuff Inflated With: Air  Placement Verified By: Auscultation;Chest X-ray;Colorimetr...   Secured at 24 cm   Measured At Lips   Secured Location Right   Secured by Commercial tube owusu   Bite Block right   Site Condition Dry   Status Intact;Secured;Patent   Site Assessment Clean;Dry   Vent Select   Conventional Vent Y   Ventilator Initiated No   $ Ventilator Subsequent 1   Charged w/in last 24h YES   IHI Ventilator Associated Pneumonia Bundle   Head of Bed Elevated  HOB 30   Preset Conventional Ventilator Settings   Vent Type    Ventilation Type VC   Vent Mode A/C   Humidity HME   Set Rate 35 BPM   Vt Set 350 mL   PEEP/CPAP 5 cmH20   Pressure Support 0 cmH20   Waveform RAMP   Peak Flow 60 L/min   Set Inspiratory Pressure 0 cmH20   Insp Time 0 Sec(s)   Plateau Set/Insp. Hold (sec) 0   Insp Rise Time  0 %   Trigger Sensitivity Flow/I-Trigger 1 L/min   P High 0 cm H2O   P Low 0 cm H2O   T High 0 sec   T Low 0 sec   Patient Ventilator Parameters   Resp Rate Total 52 br/min   Peak Airway Pressure 31 cmH2O    Mean Airway Pressure 15 cmH20   Plateau Pressure 21 cmH20   Exhaled Vt 298 mL   Total Ve 12.7 mL   Spont Ve 0 L   I:E Ratio Measured 1:1.20   Conventional Ventilator Alarms   Ve High Alarm 22 L/min   Resp Rate High Alarm 55 br/min   Press High Alarm 60 cmH2O   Apnea Rate 10   Apnea Volume (mL) 450 mL   Apnea Oxygen Concentration  100   Apnea Flow Rate (L/min) 75   T Apnea 20 sec(s)   Ready to Wean/Extubation Screen   FIO2<=50 (chart decimal) 0.35   MV<16L (chart vol.) 12.7   PEEP <=8 (chart #) 5   Ready to Wean Parameters   F/VT Ratio<105 (RSBI) 174.5

## 2020-04-04 NOTE — NURSING
Tube feeding noted to be off upon start of shift. Residuals 0 ml up to this time. Note patient restless, eyes open but not responding to verbal command at this time. Does not withdraw from pain to extremities X4. Moving head repeatedly from left to right. No eye contact made nor tracking nurse at this time. Note patient gag reflex intact and pupils reactive to light. Note patient vomit X2 large amount of thick/tan secretions. Patient , Respirations 44, BP per cuff 153/89 (115), Per A line 180/86 (123).     1147 Contacted EICU in regards to patient elevated HR of 131. Remains restless at this time. Note to be at ordered max dose of Propofol 30 mcg/kg/min, Fentanyl 100 mcg/hr. Patient remains restless at this time. Moving head from left to right. Eyes open, Does not withdraw from pain at this time. , Resp: 45, BP per cuff 161/85 (114), Arterial line pressure 185/87 (123).

## 2020-04-04 NOTE — PLAN OF CARE
Continues in ICU. Titrated fentanyl up and propofol down for patient tolerance. Patient tolerating at this time. Cuellar intact. Patient awakens to voice. Does not follow commands or withdraw from pain at this time. Does not track or make eye contact at this time. Feedings restarted and increased at slow rate per patient tolerance. Patient at times become restless but then settles in bed and rests. Safety intact.

## 2020-04-05 PROBLEM — J95.851: Status: ACTIVE | Noted: 2020-04-05

## 2020-04-05 LAB
ALBUMIN SERPL BCP-MCNC: 2.3 G/DL (ref 3.5–5.2)
ALLENS TEST: ABNORMAL
ALP SERPL-CCNC: 188 U/L (ref 55–135)
ALT SERPL W/O P-5'-P-CCNC: 30 U/L (ref 10–44)
ANION GAP SERPL CALC-SCNC: 14 MMOL/L (ref 8–16)
ANISOCYTOSIS BLD QL SMEAR: SLIGHT
AST SERPL-CCNC: 28 U/L (ref 10–40)
BASOPHILS NFR BLD: 0 % (ref 0–1.9)
BASOPHILS NFR BLD: 0 % (ref 0–1.9)
BILIRUB SERPL-MCNC: 1 MG/DL (ref 0.1–1)
BUN SERPL-MCNC: 56 MG/DL (ref 6–20)
CALCIUM SERPL-MCNC: 8.8 MG/DL (ref 8.7–10.5)
CHLORIDE SERPL-SCNC: 98 MMOL/L (ref 95–110)
CO2 SERPL-SCNC: 18 MMOL/L (ref 23–29)
CREAT SERPL-MCNC: 4.8 MG/DL (ref 0.5–1.4)
DACRYOCYTES BLD QL SMEAR: ABNORMAL
DELSYS: ABNORMAL
DIFFERENTIAL METHOD: ABNORMAL
DIFFERENTIAL METHOD: ABNORMAL
EOSINOPHIL NFR BLD: 0 % (ref 0–8)
EOSINOPHIL NFR BLD: 0 % (ref 0–8)
ERYTHROCYTE [DISTWIDTH] IN BLOOD BY AUTOMATED COUNT: 17.6 % (ref 11.5–14.5)
ERYTHROCYTE [DISTWIDTH] IN BLOOD BY AUTOMATED COUNT: 18.1 % (ref 11.5–14.5)
ERYTHROCYTE [SEDIMENTATION RATE] IN BLOOD BY WESTERGREN METHOD: 35 MM/H
EST. GFR  (AFRICAN AMERICAN): 11 ML/MIN/1.73 M^2
EST. GFR  (NON AFRICAN AMERICAN): 10 ML/MIN/1.73 M^2
ETCO2: 37
FIO2: 50
FIO2: 65
FIO2: 99
GLUCOSE SERPL-MCNC: 107 MG/DL (ref 70–110)
HCO3 UR-SCNC: 19.2 MMOL/L (ref 24–28)
HCO3 UR-SCNC: 19.4 MMOL/L (ref 24–28)
HCO3 UR-SCNC: 21 MMOL/L (ref 24–28)
HCT VFR BLD AUTO: 27.6 % (ref 37–48.5)
HCT VFR BLD AUTO: 31.6 % (ref 37–48.5)
HGB BLD-MCNC: 8.9 G/DL (ref 12–16)
HGB BLD-MCNC: 9.9 G/DL (ref 12–16)
IMM GRANULOCYTES # BLD AUTO: ABNORMAL K/UL
IMM GRANULOCYTES # BLD AUTO: ABNORMAL K/UL
IMM GRANULOCYTES NFR BLD AUTO: ABNORMAL %
IMM GRANULOCYTES NFR BLD AUTO: ABNORMAL %
IP: 20
IP: 20
IT: 0.7
IT: 0.7
LYMPHOCYTES NFR BLD: 5 % (ref 18–48)
LYMPHOCYTES NFR BLD: 8 % (ref 18–48)
MCH RBC QN AUTO: 26.5 PG (ref 27–31)
MCH RBC QN AUTO: 26.7 PG (ref 27–31)
MCHC RBC AUTO-ENTMCNC: 31.3 G/DL (ref 32–36)
MCHC RBC AUTO-ENTMCNC: 32.2 G/DL (ref 32–36)
MCV RBC AUTO: 83 FL (ref 82–98)
MCV RBC AUTO: 85 FL (ref 82–98)
MIN VOL: 14.4
MODE: ABNORMAL
MONOCYTES NFR BLD: 1 % (ref 4–15)
MONOCYTES NFR BLD: 2 % (ref 4–15)
NEUTROPHILS NFR BLD: 64 % (ref 38–73)
NEUTROPHILS NFR BLD: 93 % (ref 38–73)
NEUTS BAND NFR BLD MANUAL: 1 %
NEUTS BAND NFR BLD MANUAL: 26 %
NRBC BLD-RTO: 0 /100 WBC
NRBC BLD-RTO: 0 /100 WBC
PCO2 BLDA: 33.5 MMHG (ref 35–45)
PCO2 BLDA: 36.1 MMHG (ref 35–45)
PCO2 BLDA: 41.1 MMHG (ref 35–45)
PEEP: 5
PH SMN: 7.32 [PH] (ref 7.35–7.45)
PH SMN: 7.34 [PH] (ref 7.35–7.45)
PH SMN: 7.37 [PH] (ref 7.35–7.45)
PIP: 29
PLATELET # BLD AUTO: 307 K/UL (ref 150–350)
PLATELET # BLD AUTO: 336 K/UL (ref 150–350)
PLATELET BLD QL SMEAR: ABNORMAL
PLATELET BLD QL SMEAR: ABNORMAL
PMV BLD AUTO: 11.1 FL (ref 9.2–12.9)
PMV BLD AUTO: 11.3 FL (ref 9.2–12.9)
PO2 BLDA: 177 MMHG (ref 80–100)
PO2 BLDA: 277 MMHG (ref 80–100)
PO2 BLDA: 86 MMHG (ref 80–100)
POC BE: -5 MMOL/L
POC BE: -6 MMOL/L
POC BE: -6 MMOL/L
POC SATURATED O2: 100 % (ref 95–100)
POC SATURATED O2: 100 % (ref 95–100)
POC SATURATED O2: 96 % (ref 95–100)
POC TCO2: 20 MMOL/L (ref 23–27)
POC TCO2: 21 MMOL/L (ref 23–27)
POC TCO2: 22 MMOL/L (ref 23–27)
POIKILOCYTOSIS BLD QL SMEAR: SLIGHT
POTASSIUM SERPL-SCNC: 4 MMOL/L (ref 3.5–5.1)
PROCALCITONIN SERPL IA-MCNC: 3.67 NG/ML
PROT SERPL-MCNC: 7.7 G/DL (ref 6–8.4)
RBC # BLD AUTO: 3.33 M/UL (ref 4–5.4)
RBC # BLD AUTO: 3.73 M/UL (ref 4–5.4)
SAMPLE: ABNORMAL
SITE: ABNORMAL
SODIUM SERPL-SCNC: 130 MMOL/L (ref 136–145)
SP02: 100
VT: 350
WBC # BLD AUTO: 12.02 K/UL (ref 3.9–12.7)
WBC # BLD AUTO: 17.89 K/UL (ref 3.9–12.7)

## 2020-04-05 PROCEDURE — 99900035 HC TECH TIME PER 15 MIN (STAT)

## 2020-04-05 PROCEDURE — 25000003 PHARM REV CODE 250: Performed by: HOSPITALIST

## 2020-04-05 PROCEDURE — 63600175 PHARM REV CODE 636 W HCPCS: Performed by: CLINIC/CENTER

## 2020-04-05 PROCEDURE — 27000221 HC OXYGEN, UP TO 24 HOURS

## 2020-04-05 PROCEDURE — 11000001 HC ACUTE MED/SURG PRIVATE ROOM

## 2020-04-05 PROCEDURE — 99900026 HC AIRWAY MAINTENANCE (STAT)

## 2020-04-05 PROCEDURE — 87186 SC STD MICRODIL/AGAR DIL: CPT | Mod: 59

## 2020-04-05 PROCEDURE — 36415 COLL VENOUS BLD VENIPUNCTURE: CPT

## 2020-04-05 PROCEDURE — 84145 PROCALCITONIN (PCT): CPT

## 2020-04-05 PROCEDURE — C9113 INJ PANTOPRAZOLE SODIUM, VIA: HCPCS | Performed by: INTERNAL MEDICINE

## 2020-04-05 PROCEDURE — 63600175 PHARM REV CODE 636 W HCPCS: Performed by: INTERNAL MEDICINE

## 2020-04-05 PROCEDURE — 80053 COMPREHEN METABOLIC PANEL: CPT

## 2020-04-05 PROCEDURE — 94770 HC EXHALED C02 TEST: CPT

## 2020-04-05 PROCEDURE — 99291 CRITICAL CARE FIRST HOUR: CPT | Mod: ,,, | Performed by: INTERNAL MEDICINE

## 2020-04-05 PROCEDURE — 87040 BLOOD CULTURE FOR BACTERIA: CPT | Mod: 59

## 2020-04-05 PROCEDURE — 94761 N-INVAS EAR/PLS OXIMETRY MLT: CPT

## 2020-04-05 PROCEDURE — 25000003 PHARM REV CODE 250: Performed by: INTERNAL MEDICINE

## 2020-04-05 PROCEDURE — 27200966 HC CLOSED SUCTION SYSTEM

## 2020-04-05 PROCEDURE — 94003 VENT MGMT INPAT SUBQ DAY: CPT

## 2020-04-05 PROCEDURE — 87077 CULTURE AEROBIC IDENTIFY: CPT | Mod: 59

## 2020-04-05 PROCEDURE — 87070 CULTURE OTHR SPECIMN AEROBIC: CPT

## 2020-04-05 PROCEDURE — 99291 PR CRITICAL CARE, E/M 30-74 MINUTES: ICD-10-PCS | Mod: ,,, | Performed by: INTERNAL MEDICINE

## 2020-04-05 PROCEDURE — 87106 FUNGI IDENTIFICATION YEAST: CPT

## 2020-04-05 PROCEDURE — 20000000 HC ICU ROOM

## 2020-04-05 PROCEDURE — 63600175 PHARM REV CODE 636 W HCPCS: Performed by: HOSPITALIST

## 2020-04-05 PROCEDURE — 85027 COMPLETE CBC AUTOMATED: CPT | Mod: 91

## 2020-04-05 PROCEDURE — 87205 SMEAR GRAM STAIN: CPT

## 2020-04-05 PROCEDURE — 85007 BL SMEAR W/DIFF WBC COUNT: CPT

## 2020-04-05 PROCEDURE — 87186 SC STD MICRODIL/AGAR DIL: CPT | Mod: 91

## 2020-04-05 PROCEDURE — 82803 BLOOD GASES ANY COMBINATION: CPT

## 2020-04-05 PROCEDURE — 37799 UNLISTED PX VASCULAR SURGERY: CPT

## 2020-04-05 RX ORDER — ACETAMINOPHEN 10 MG/ML
1000 INJECTION, SOLUTION INTRAVENOUS ONCE
Status: ACTIVE | OUTPATIENT
Start: 2020-04-05 | End: 2020-04-06

## 2020-04-05 RX ORDER — LABETALOL HYDROCHLORIDE 5 MG/ML
20 INJECTION, SOLUTION INTRAVENOUS ONCE
Status: DISCONTINUED | OUTPATIENT
Start: 2020-04-05 | End: 2020-04-05

## 2020-04-05 RX ORDER — PANTOPRAZOLE SODIUM 40 MG/10ML
40 INJECTION, POWDER, LYOPHILIZED, FOR SOLUTION INTRAVENOUS 2 TIMES DAILY
Status: DISCONTINUED | OUTPATIENT
Start: 2020-04-05 | End: 2020-04-09

## 2020-04-05 RX ORDER — MIDAZOLAM HYDROCHLORIDE 1 MG/ML
2 INJECTION INTRAMUSCULAR; INTRAVENOUS ONCE
Status: DISCONTINUED | OUTPATIENT
Start: 2020-04-05 | End: 2020-04-07

## 2020-04-05 RX ADMIN — GENTAMICIN SULFATE 1 DROP: 3 SOLUTION OPHTHALMIC at 05:04

## 2020-04-05 RX ADMIN — PANTOPRAZOLE SODIUM 40 MG: 40 INJECTION, POWDER, LYOPHILIZED, FOR SOLUTION INTRAVENOUS at 09:04

## 2020-04-05 RX ADMIN — POLYETHYLENE GLYCOL 3350 17 G: 17 POWDER, FOR SOLUTION ORAL at 09:04

## 2020-04-05 RX ADMIN — PIPERACILLIN AND TAZOBACTAM 4.5 G: 4; .5 INJECTION, POWDER, LYOPHILIZED, FOR SOLUTION INTRAVENOUS; PARENTERAL at 09:04

## 2020-04-05 RX ADMIN — PANTOPRAZOLE SODIUM 40 MG: 40 INJECTION, POWDER, LYOPHILIZED, FOR SOLUTION INTRAVENOUS at 08:04

## 2020-04-05 RX ADMIN — GENTAMICIN SULFATE 1 DROP: 3 SOLUTION OPHTHALMIC at 09:04

## 2020-04-05 RX ADMIN — OXYCODONE HYDROCHLORIDE AND ACETAMINOPHEN 500 MG: 500 TABLET ORAL at 09:04

## 2020-04-05 RX ADMIN — METOPROLOL TARTRATE 5 MG: 5 INJECTION, SOLUTION INTRAVENOUS at 08:04

## 2020-04-05 RX ADMIN — PROPOFOL 40 MCG/KG/MIN: 10 INJECTION, EMULSION INTRAVENOUS at 07:04

## 2020-04-05 RX ADMIN — CALCITRIOL CAPSULES 0.25 MCG 0.25 MCG: 0.25 CAPSULE ORAL at 09:04

## 2020-04-05 RX ADMIN — PROPOFOL 40 MCG/KG/MIN: 10 INJECTION, EMULSION INTRAVENOUS at 04:04

## 2020-04-05 RX ADMIN — LEUCINE, PHENYLALANINE, LYSINE, METHIONINE, ISOLEUCINE, VALINE, HISTIDINE, THREONINE, TRYPTOPHAN, ALANINE, GLYCINE, ARGININE, PROLINE, SERINE, TYROSINE, DEXTROSE: 311; 238; 247; 170; 255; 247; 204; 179; 77; 880; 438; 489; 289; 213; 17; 10 INJECTION INTRAVENOUS at 03:04

## 2020-04-05 RX ADMIN — PIPERACILLIN AND TAZOBACTAM 4.5 G: 4; .5 INJECTION, POWDER, LYOPHILIZED, FOR SOLUTION INTRAVENOUS; PARENTERAL at 08:04

## 2020-04-05 RX ADMIN — PROPOFOL 25 MCG/KG/MIN: 10 INJECTION, EMULSION INTRAVENOUS at 05:04

## 2020-04-05 RX ADMIN — GENTAMICIN SULFATE 1 DROP: 3 SOLUTION OPHTHALMIC at 01:04

## 2020-04-05 RX ADMIN — ACETAMINOPHEN 650 MG: 325 TABLET ORAL at 03:04

## 2020-04-05 RX ADMIN — PROPOFOL 50 MCG/KG/MIN: 10 INJECTION, EMULSION INTRAVENOUS at 10:04

## 2020-04-05 RX ADMIN — ZINC SULFATE 220 MG (50 MG) CAPSULE 220 MG: CAPSULE at 09:04

## 2020-04-05 RX ADMIN — ALTEPLASE 2 MG: 2.2 INJECTION, POWDER, LYOPHILIZED, FOR SOLUTION INTRAVENOUS at 10:04

## 2020-04-05 RX ADMIN — GENTAMICIN SULFATE 1 DROP: 3 SOLUTION OPHTHALMIC at 02:04

## 2020-04-05 RX ADMIN — PANTOPRAZOLE SODIUM 40 MG: 40 INJECTION, POWDER, LYOPHILIZED, FOR SOLUTION INTRAVENOUS at 12:04

## 2020-04-05 RX ADMIN — OXYCODONE HYDROCHLORIDE AND ACETAMINOPHEN 500 MG: 500 TABLET ORAL at 01:04

## 2020-04-05 RX ADMIN — PROPOFOL 30 MCG/KG/MIN: 10 INJECTION, EMULSION INTRAVENOUS at 11:04

## 2020-04-05 RX ADMIN — Medication 250 MCG/HR: at 12:04

## 2020-04-05 RX ADMIN — OXYCODONE HYDROCHLORIDE AND ACETAMINOPHEN 500 MG: 500 TABLET ORAL at 04:04

## 2020-04-05 RX ADMIN — MIDAZOLAM HYDROCHLORIDE 3 MG/HR: 5 INJECTION, SOLUTION INTRAMUSCULAR; INTRAVENOUS at 07:04

## 2020-04-05 RX ADMIN — LEVOTHYROXINE SODIUM 100 MCG: 100 TABLET ORAL at 05:04

## 2020-04-05 RX ADMIN — VANCOMYCIN HYDROCHLORIDE 1750 MG: 1 INJECTION, POWDER, LYOPHILIZED, FOR SOLUTION INTRAVENOUS at 12:04

## 2020-04-05 RX ADMIN — GENTAMICIN SULFATE 1 DROP: 3 SOLUTION OPHTHALMIC at 06:04

## 2020-04-05 RX ADMIN — METOPROLOL TARTRATE 5 MG: 5 INJECTION, SOLUTION INTRAVENOUS at 03:04

## 2020-04-05 RX ADMIN — OXYCODONE HYDROCHLORIDE AND ACETAMINOPHEN 500 MG: 500 TABLET ORAL at 08:04

## 2020-04-05 NOTE — ASSESSMENT & PLAN NOTE
Plaquenil course completed  Continue routine medications as before.   Follow airborne/droplet precautions.

## 2020-04-05 NOTE — PROGRESS NOTES
"Patient is on Zosyn 4.5 g IV Q6h over 30 minutes (intermittent infusion). Due to the patient's current diagnosis, zosyn via extended infusion provides better clinical outcomes regarding decreased mortality and decreased length of stay in comparison to intermittent infusion. Zosyn dose will be adjusted to Zosyn 4.5 g IV Q12h over 4 hours. Per pharmacy protocol, Zosyn can be adjusted automatically. Providers can override adjustment by re-ordering intermittent infusion with "dispense as written" in the administration instructions.     Sami Baugh  [phone]    "

## 2020-04-05 NOTE — ASSESSMENT & PLAN NOTE
Patient with Hypoxic Respiratory failure which is Acute.  she is not on home oxygen. Supplemental ventilation was provided and noted- Vent Mode: A/C  Oxygen Concentration (%):  [50-99] 99  Resp Rate Total:  [35 br/min-45 br/min] 35 br/min  Vt Set:  [0 mL-350 mL] 0 mL  PEEP/CPAP:  [5 cmH20] 5 cmH20  Pressure Support:  [0 cmH20] 0 cmH20  Mean Airway Pressure:  [12 npM81-09 cmH20] 13 cmH20 and oxygen saturations . Differential diagnosis includes - Pneumonia Viral Labs and images were reviewed. Patient Has recent ABG- No components found for: ABG. Will treat underlying causes and adjust management of respiratory failure as follows-     Temp:  [99.8 °F (37.7 °C)-103 °F (39.4 °C)]   Pulse:  [101-124]   Resp:  [25-44]   BP: (106-177)/(55-80)   SpO2:  [95 %-100 %]   Arterial Line BP: (120-224)/(53-90)      Pulmonary consultation.   Continue beta 2 agonist bronchodilator treatments.   Continue IV antibiotics - ceftriaxone and azithromycin.   D dimer up 1, ast/alt sl up 105/81, procal 0.12, covid pos.  Troponin neg.  Ratio 74/0.5 on peep 8-   390 is 6 cc/kg ;  Will continue Plaquenil 400 mg daily  as per COVID protocol.  Microbiology Results (last 7 days)     Procedure Component Value Units Date/Time    Culture, Respiratory with Gram Stain [[id number]] Collected:  04/05/20 0050    Order Status:  Sent Specimen:  Respiratory from Endotracheal Aspirate Updated:  04/05/20 1047    Blood culture [[id number]] Collected:  04/05/20 0813    Order Status:  Sent Specimen:  Blood from Line, Central Left  Neck Updated:  04/05/20 1045    Blood culture [[id number]] Collected:  04/05/20 0813    Order Status:  Sent Specimen:  Blood from Peripheral, Left  Hand Updated:  04/05/20 1045    Urine Culture High Risk [[id number]]     Order Status:  No result Specimen:  Urine, Catheterized     Culture, Respiratory with Gram Stain [[id number]]     Order Status:  No result Specimen:  Respiratory from Tracheal Aspirate     Blood culture [[id number]] Collected:   04/04/20 0912    Order Status:  Completed Specimen:  Blood from Line, Central Updated:  04/05/20 0745     Blood Culture, Routine No Growth to date    Blood culture [[id number]] Collected:  03/26/20 0432    Order Status:  Completed Specimen:  Blood Updated:  03/30/20 1214     Blood Culture, Routine No Growth after 4 days.     Blood culture [[id number]] Collected:  03/26/20 0612    Order Status:  Completed Specimen:  Blood Updated:  03/30/20 1214     Blood Culture, Routine No Growth after 4 days.     Blood culture x two cultures. Draw prior to antibiotics. [[id number]] Collected:  03/25/20 1411    Order Status:  Completed Specimen:  Blood from Antecubital, Right  Arm Updated:  03/30/20 1212     Blood Culture, Routine No Growth after 4 days.     Narrative:       Aerobic and anaerobic          Continue routine medications as before.   Follow airborne/droplet precautions.

## 2020-04-05 NOTE — ASSESSMENT & PLAN NOTE
Patient's anemia is currently controlled. Trending down  Will send for stool [facility]    Has recieved 1 units of PRBCs on 4/3.   Will transfuse  Unit as pt is hypotensive and ESRD   Etiology likely d/t Anemia of chronic disease  Current CBC reviewed-   Lab Results   Component Value Date    HGB 9.9 (L) 04/05/2020    HCT 31.6 (L) 04/05/2020     Monitor serial CBC and transfuse if patient becomes hemodynamically unstable, symptomatic or H/H drops below 8/24

## 2020-04-05 NOTE — PROGRESS NOTES
[facility] Hospital Medicine  Progress Note    Patient Name: Maria Victoria Hernandez  MRN: [id number]  Patient Class: IP- Inpatient   Admission Date: 3/25/2020  Length of Stay: 11 days  Attending Physician: Kady Gomez MD  Primary Care Provider: Candice Deluna MD        Subjective:     Principal Problem:Acute respiratory failure        HPI:  Patient is a 54 years old  female with morbid obesity in past medical history significant for hypothyroidism is being admitted to intensive care unit under inpatient status from [facility] Emergency room with worsening shortness of breath.  Three days ago patient was tested positive for COVID - 19.  Patient works at [employer].  Patient has been experiencing subjective fever, generalized body aches and pains and nonproductive cough for few days.  Patient is getting increasingly short of breath especially for the past 2 days.  Upon arrival to the emergency room patient was noted to be hypoxic around 89%.  Up on 3 L patient's oxygen sats are around 96%.  Patient denies any chest pain, leg swelling or calf tenderness.      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 Developed shock, was on Levophed for sometime for sometime. Also had Emesis, poor tolerance of tube feeds  On exam has been having Acute encephalopathy- delirium vs encephalitis or other structural cause. MRI could not be performed as she was very unstable.  At the time of admission Cr 0.7 worsened to 5.1, Renal was consulted. and HD started on 3/29. In addition had, Metabolic acidosis due to renal failure.   cxr on 4/5 severe ards pattern. Et tube looks to be in place. I/o 2070/935, tpn, fentanyl 175, versed 1, propofol 25, norepi off, tm 103-  wbc 12 form 9.7, Had temp spikes last night off abx,  Vanc/zosyn added 4/5 ,   this am pulse 120+ and bp very high. pcv used with much better synchrony-Switched to pressure control.  Had blood in NG tube      Interval History:  cxr 4/5 severe ards pattern. Et tube looks to be in place. I/o 2070/935, tpn, fentanyl 175, versed 1, propofol 25, norepi off, tm 103- wbc 12 form 9.7, Had temp spikes last night off abx,  Vanc/zosyn added 4/5 ,   this am pulse 120+ and bp very high. pcv used with much better synchrony-Switched to pressure control.          Review of Systems   Unable to perform ROS: Intubated     Objective:     Vital Signs (Most Recent):  Temp: 99.8 °F (37.7 °C) (04/05/20 0800)  Pulse: (!) 119 (04/05/20 1020)  Resp: (!) 36 (04/05/20 1020)  BP: (!) 143/65 (04/05/20 1020)  SpO2: 96 % (04/05/20 1020) Vital Signs (24h Range):  Temp:  [99.8 °F (37.7 °C)-103 °F (39.4 °C)] 99.8 °F (37.7 °C)  Pulse:  [101-128] 119  Resp:  [25-44] 36  SpO2:  [95 %-100 %] 96 %  BP: (106-177)/(55-80) 143/65  Arterial Line BP: (120-224)/(53-90) 195/74     Weight: 115.3 kg (254 lb 3.1 oz)  Body mass index is 48.03 kg/m².    Intake/Output Summary (Last 24 hours) at 4/5/2020 1120  Last data filed at 4/5/2020 0600  Gross per 24 hour   Intake 2070.55 ml   Output 775 ml   Net 1295.55 ml      Physical Exam   Nursing note and vitals reviewed.  PATIENT WAS SEEN AS A VIDEO VISIT WITH NURSE IN PATIENT ROOM WITH PATIENT TO ASSIST DURING THE VISIT. PHYSICAL EXAM FINDINGS ARE AS VIEWED BY MYSELF VIA VIDEO OR AS REPORTED BY NURSE IF SPECIFIED AS SUCH, EXAM NOT DONE PERSONALLY BY MYSELF AT BEDSIDE.      Significant Labs:   BMP:   Recent Labs   Lab 04/05/20 0249      *   K 4.0   CL 98   CO2 18*   BUN 56*   CREATININE 4.8*   CALCIUM 8.8     CBC:   Recent Labs   Lab 04/04/20  0339 04/05/20 0249 04/05/20  0951   WBC 9.74 12.02 17.89*   HGB 9.1* 8.9* 9.9*   HCT 28.9* 27.6* 31.6*    307 336       Significant Imaging: I have reviewed all  pertinent imaging results/findings within the past 24 hours.      Assessment/Plan:      * Acute respiratory failure  Patient with Hypoxic Respiratory failure which is Acute.  she is not on home oxygen. Supplemental ventilation was provided and noted- Vent Mode: A/C  Oxygen Concentration (%):  [50-99] 99  Resp Rate Total:  [35 br/min-45 br/min] 35 br/min  Vt Set:  [0 mL-350 mL] 0 mL  PEEP/CPAP:  [5 cmH20] 5 cmH20  Pressure Support:  [0 cmH20] 0 cmH20  Mean Airway Pressure:  [12 odC93-46 cmH20] 13 cmH20 and oxygen saturations . Differential diagnosis includes - Pneumonia Viral Labs and images were reviewed. Patient Has recent ABG- No components found for: ABG. Will treat underlying causes and adjust management of respiratory failure as follows-     Temp:  [99.8 °F (37.7 °C)-103 °F (39.4 °C)]   Pulse:  [101-124]   Resp:  [25-44]   BP: (106-177)/(55-80)   SpO2:  [95 %-100 %]   Arterial Line BP: (120-224)/(53-90)      Pulmonary consultation.   Continue beta 2 agonist bronchodilator treatments.   Continue IV antibiotics - ceftriaxone and azithromycin.   D dimer up 1, ast/alt sl up 105/81, procal 0.12, covid pos.  Troponin neg.  Ratio 74/0.5 on peep 8-   390 is 6 cc/kg ;  Will continue Plaquenil 400 mg daily  as per COVID protocol.  Microbiology Results (last 7 days)     Procedure Component Value Units Date/Time    Culture, Respiratory with Gram Stain [[id number]] Collected:  04/05/20 0050    Order Status:  Sent Specimen:  Respiratory from Endotracheal Aspirate Updated:  04/05/20 1047    Blood culture [[id number]] Collected:  04/05/20 0813    Order Status:  Sent Specimen:  Blood from Line, Central Left  Neck Updated:  04/05/20 1045    Blood culture [[id number]] Collected:  04/05/20 0813    Order Status:  Sent Specimen:  Blood from Peripheral, Left  Hand Updated:  04/05/20 1045    Urine Culture High Risk [[id number]]     Order Status:  No result Specimen:  Urine, Catheterized     Culture, Respiratory with Gram Stain [[id number]]      Order Status:  No result Specimen:  Respiratory from Tracheal Aspirate     Blood culture [[id number]] Collected:  04/04/20 0912    Order Status:  Completed Specimen:  Blood from Line, Central Updated:  04/05/20 0745     Blood Culture, Routine No Growth to date    Blood culture [[id number]] Collected:  03/26/20 0432    Order Status:  Completed Specimen:  Blood Updated:  03/30/20 1214     Blood Culture, Routine No Growth after 4 days.     Blood culture [[id number]] Collected:  03/26/20 0612    Order Status:  Completed Specimen:  Blood Updated:  03/30/20 1214     Blood Culture, Routine No Growth after 4 days.     Blood culture x two cultures. Draw prior to antibiotics. [[id number]] Collected:  03/25/20 1411    Order Status:  Completed Specimen:  Blood from Antecubital, Right  Arm Updated:  03/30/20 1212     Blood Culture, Routine No Growth after 4 days.     Narrative:       Aerobic and anaerobic          Continue routine medications as before.   Follow airborne/droplet precautions.            Acute encephalopathy  AMS:: Hypoxia vs. Infection vs. TIA/stroke vs. Metabolic/Toxic.   Much more responsive compared to yesterday  Possibly septic added vac zosyn    Lab Results   Component Value Date    WBC 17.89 (H) 04/05/2020     MRI pending   focal findings on physical exam  No early signs of stroke on Head CT, no hemorrhage or acute findings.  EEG: EEG 30 min awake and drowsy results noted no seizures  Will follow up with neuro recs      COVID-19 virus detected   Plaquenil course completed  Continue routine medications as before.   Follow airborne/droplet precautions.      Pneumonia, ventilator associated  Had temp spike last night  Added Vanc zosyn  Cultures sent    Iron deficiency anemia, unspecified  Patient's anemia is currently controlled. Trending down  Will send for stool angelica    Has recieved 1 units of PRBCs on 4/3.   Will transfuse  Unit as pt is hypotensive and ESRD   Etiology likely d/t Anemia of chronic  disease  Current CBC reviewed-   Lab Results   Component Value Date    HGB 9.9 (L) 04/05/2020    HCT 31.6 (L) 04/05/2020     Monitor serial CBC and transfuse if patient becomes hemodynamically unstable, symptomatic or H/H drops below 8/24        Diverticulosis of large intestine without hemorrhage  Patient's anemia is currently controlled. Has recieved 1 units of PRBCs on 4/3.   Will need to Monitor for GI bleed  Lab Results   Component Value Date    HGB 9.9 (L) 04/05/2020    HCT 31.6 (L) 04/05/2020     Monitor serial CBC and transfuse if patient becomes hemodynamically unstable, symptomatic or H/H drops below 7/21.   Will continue to monitor        Acute renal failure  Will follow-up with the renal recommendations  Currently on HD  As per renal, no acute RRT needs this weekend             COVID-19 virus infection  Completed Plaquenil    Covid-19 Virus Infection  - Infection Control notified    - Isolation:   - Airborne and Droplet Precautions  - N95 masks must be fit tested, wear eye protection  - 20 second hand hygiene   - Limit visitors per hospital policy   - Consolidating lab draws, nursing care, and interventions    - Diagnostics: (rising CRP, persistent lymphopenia, hyponatremia, hyperferritinemia, elevated troponin, elevated d-dimer, age, and comorbidities are significant predictors of poor clinical outcome)   - CBC:   trend Q48hrs  - CMP:        trend Q48hrs  - Procalcitonin:  - D-dimer:  trend Q48hrs  - Ferritin:  repeat prior to discharge  - CRP:        trend Q48hrs  - LDH:  - BNP:  - Troponin:    - ECG:   - rapid Flu:   - RIP only if BMT/solid transplant:   - Legionella antigen:   - Blood culture x2:   - Sputum culture:   - CXR:   - UA and culture:      - Management:   - Bundle care as able to minimize in/out of room   - Supplemental O2 to maintain SpO2 >92%,   if requiring 6L NC or higher, place on nonrebreather and discuss case with MICU   - Telemetry & continuous Pulse Ox   - albuterol INHALER PRN  4puff Q6hr approximates a nebulizer (avoid nebulization of secretions)   - apap PRN fever   - Avoiding NIPPV to prevent aerosolization   (including home CPAP/BiPAP unless on a case-by-case basis and only in negative pressure room)   - Cautious use of NSAIDS for fever per WHO recommendations (3/16/2020)   - No new ACEi/ARB start or discontinuation of chronic med unless hypotensive (Esler et al. Journal of Hypertension 2020, 38:000-000)   - Careful use of steroids in the absence of other indications   - unless septic shock due to increased viral replication   - Fluid sparing resuscitation   - Empiric antibiotics per likely source & patient allergies    - CAP: x 5 day course  Ceftriaxone 1g IV Q24hrs            Azithromycin 500mg IV day #1, then 250mg PO daily x4 days                 If MRSA risk factors, add Vancomycin IV (PharmD consult)   - If patient meets criteria per Hospital Protocol    - start statin (if CPK WNL)    - start HCQ 400mg PO BID x1 day, then 400mg PO daily x 4 days (check G6PD, ECG, and start Qshift POCT glucose)    Goals of care, counseling/discussion  - Reviewed the typical clinical course of [id number] with the daughter Danya (patient name or relationship to patient), including the potential for acute decompensation requiring intubation and mechanical ventilation  - Discussed again as part of routine daily evaluation, patient/POA maintains code status of Full code    VTE High Risk Prophylaxis: enoxaparin 40mg sq QHS @ 2100 (bundled care) if GFR >30    Patient's chronic/stable medical conditions noted in the problem list above will be managed with the patient's home medications as tolerated.           ARDS (adult respiratory distress syndrome)    --will cont ARDSnet Protocol.  --Target 6-8ml/kg Ideal Body Weight. Decrease TV as tolerated.  --Plateau pressure goal <30cm H2O.  --Oxygenation goal PaO2 55-80 or SpO2 88-95%.  --pH goal 7.30-7.45.  --Wean to PSV as tolerated.        Hypothyroid  Chronic  problem.  Lab Results   Component Value Date    TSH 2.082 03/31/2020     Not on any medication            Morbid obesity  Body mass index is 48.03 kg/m². Morbid obesity complicates all aspects of disease management from diagnostic modalities to treatment. Weight loss encouraged and health benefits explained to patient.            VTE Risk Mitigation (From admission, onward)         Ordered     heparin (porcine) injection 4,000 Units  As needed (PRN)      03/29/20 2001     enoxaparin injection 40 mg  Every 12 hours      03/25/20 2313     IP VTE HIGH RISK PATIENT  Once      03/25/20 2313                Critical care time spent on the evaluation and treatment of severe organ dysfunction, review of pertinent labs and imaging studies, discussions with consulting providers and discussions with patient/family: 60 minutes.      Kady Gomez MD  Department of Hospital Medicine   [facility]

## 2020-04-05 NOTE — ASSESSMENT & PLAN NOTE
Patient's anemia is currently controlled. Has recieved 1 units of PRBCs on 4/3.   Will need to Monitor for GI bleed  Lab Results   Component Value Date    HGB 9.9 (L) 04/05/2020    HCT 31.6 (L) 04/05/2020     Monitor serial CBC and transfuse if patient becomes hemodynamically unstable, symptomatic or H/H drops below 7/21.   Will continue to monitor

## 2020-04-05 NOTE — PROGRESS NOTES
Progress Note  PULMONARY    Admit Date: 3/25/2020   04/05/2020      SUBJECTIVE:     3/27- remains intubated, on vent. Was on Lasix drip overnight and now w/ IVF for UOP. On minimal Levophed   3/28- remains intubated, on PEEP 14/FiO2 50%. Levophed 0.06 mcg/kg/min  3/29- remains intubated, PEEP 12, FiO2 40%. Levophed off  3/30- remains intubated, PEEP 8, FiO2 40%. Levophed off. Started HD yesterday and having second session today. Daughter came to visit (works on 3rd floor), and updated this am  3/31- remains intubated, PEEP 8/FiO2 40%. HD yesterday w/ removal of 3.5L. With SBT yesterday not following commands and vomited stomach contents  4/1- remains intubated, PEEP 8/FiO2 40%. Not waking up or following commands off sedation- even w/ daughter at bedside, failed SBT due to tachypnea. Levophed at 0.02 mcg/kg/min. Had HD yesterday w/ removal of 2.5L. Had head CT. EEG pending  4/2- remains intubated, PEEP 6, FiO2 40%. w/ hemodynamic instability yesterday-- hypertensive then very hypotensive when tried to move her, which delayed weaning and MRI. Now off Levophed. HD yesterday w/ removal of 3.5L  4/3not arousing,  4/4 arousing somewhat- looks air hungry,  4/5- no c/o,sedated    PFSH and Allergies reviewed.    OBJECTIVE:     Vitals (Most recent):  Vitals:    04/05/20 0715   BP: (!) 169/73   Pulse: (!) 116   Resp: (!) 40   Temp:        Vitals (24 hour range):  Temp:  [100.1 °F (37.8 °C)-103 °F (39.4 °C)]   Pulse:  []   Resp:  [25-45]   BP: (106-177)/(55-82)   SpO2:  [95 %-100 %]   Arterial Line BP: (120-224)/(53-90)       Intake/Output Summary (Last 24 hours) at 4/5/2020 0750  Last data filed at 4/5/2020 0600  Gross per 24 hour   Intake 2070.55 ml   Output 935 ml   Net 1135.55 ml        Vitals (24 hour range):  Temp:  [98.4 °F (36.9 °C)-99.4 °F (37.4 °C)]   Pulse:  []   Resp:  [34-36]   BP: ()/()   SpO2:  [96 %-100 %]   Arterial Line BP: (125-204)/(52-80)       Intake/Output Summary (Last 24 hours)  at 4/3/2020 1048  Last data filed at 4/3/2020 1015  Gross per 24 hour   Intake 2354.11 ml   Output 775 ml   Net 1579.11 ml          Physical Exam:  The patient's neuro status (alertness,orientation,cognitive function,motor skills,), pharyngeal exam (oral lesions, hygiene, abn dentition,), Neck (jvd,mass,thyroid,nodes in neck and above/below clavicle),RESPIRATORY(symmetry,effort,fremitus,percussion,auscultation),  Cor(rhythm,heart tones including gallops,perfusion,edema)ABD(distention,hepatic&splenomegaly,tenderness,masses), Skin(rash,cyanosis),Psyc(affect,judgement,).  Exam negative except for these pertinent findings:    Sl arousing ,not interacting, chest is symmetric, min distress, normal percussion, normal fremitus and good   breath sounds       Radiographs reviewed:  cxr 4/5 severe ards pattern. Et tube ok  CXR 4/2- bibasilar fluffy opacities and pulmonary edema, similar appearance  CT head 3/31  1.  There are slight limitations related to moderate patient tilted in the scanner and mild patient motion but there is no obvious acute abnormality.  There is no hemorrhage, mass, mass effect or obvious acute edema or ischemia.  It should be noted that MRI is more sensitive in the detection of subtle or acute nonhemorrhagic ischemic disease.    2.  Support tubing is seen in the left nares.  Bilateral mastoid and middle ear effusions are likely related to support tubing and/or intubation.  Correlate clinically.  The same is true for changes throughout the paranasal sinuses.    CXR 3/31- improving bibasilar opacities  CXR 3/29- unchanged  CXR 3/27- bilateral mid and lower lobe fluffy airspace disease  CXR 3/26- increased consolidation RLL    TTE 3/27  · Mild left ventricular enlargement.  · Mildly decreased left ventricular systolic function. The estimated ejection fraction is 45%.  · Grade I (mild) left ventricular diastolic dysfunction consistent with impaired relaxation.  · Normal right ventricular systolic  function.  · Mild left atrial enlargement.  · Moderate mitral regurgitation.  · Mild tricuspid regurgitation.  · Mild pulmonary hypertension present. PASP 40 mm of Hg.    Labs     Recent Labs   Lab 04/05/20 0249   WBC 12.02   HGB 8.9*   HCT 27.6*      BAND 1.0     Recent Labs   Lab 04/05/20 0249   *   K 4.0   CL 98   CO2 18*   BUN 56*   CREATININE 4.8*      CALCIUM 8.8   AST 28   ALT 30   ALKPHOS 188*   BILITOT 1.0   PROT 7.7   ALBUMIN 2.3*     Recent Labs   Lab 04/05/20  0440   PH 7.367   PCO2 33.5*   PO2 86   HCO3 19.2*     Microbiology Results (last 7 days)     Procedure Component Value Units Date/Time    Urine Culture High Risk [[id number]]     Order Status:  No result Specimen:  Urine, Catheterized     Culture, Respiratory with Gram Stain [[id number]]     Order Status:  No result Specimen:  Respiratory from Tracheal Aspirate     Blood culture [[id number]]     Order Status:  Sent Specimen:  Blood     Blood culture [[id number]]     Order Status:  Sent Specimen:  Blood     Blood culture [[id number]] Collected:  04/04/20 0912    Order Status:  Completed Specimen:  Blood from Line, Central Updated:  04/05/20 0745     Blood Culture, Routine No Growth to date    Culture, Respiratory with Gram Stain [[id number]] Collected:  04/05/20 0050    Order Status:  Sent Specimen:  Respiratory from Endotracheal Aspirate Updated:  04/05/20 0138    Blood culture [[id number]] Collected:  03/26/20 0432    Order Status:  Completed Specimen:  Blood Updated:  03/30/20 1214     Blood Culture, Routine No Growth after 4 days.     Blood culture [[id number]] Collected:  03/26/20 0612    Order Status:  Completed Specimen:  Blood Updated:  03/30/20 1214     Blood Culture, Routine No Growth after 4 days.     Blood culture x two cultures. Draw prior to antibiotics. [[id number]] Collected:  03/25/20 1411    Order Status:  Completed Specimen:  Blood from Antecubital, Right  Arm Updated:  03/30/20 1212     Blood Culture, Routine No  Growth after 4 days.     Narrative:       Aerobic and anaerobic        Results for WOLF SINGER (MRN [id number]) as of 4/1/2020 09:28   Ref. Range 4/1/2020 02:53   ALT Latest Ref Range: 10 - 44 U/L 54 (H)     Impression:  Active Hospital Problems    Diagnosis  POA    *Acute respiratory failure [J96.00]  Yes    Pneumonia, ventilator associated [J95.851]  No    Acute encephalopathy [G93.40]  No    Acute renal failure [N17.9]  No    ARDS (adult respiratory distress syndrome) [J80]  Yes    COVID-19 virus infection [U07.1]  Yes    Morbid obesity [E66.01]  Yes    Hypothyroid [E03.9]  Yes    COVID-19 virus detected [U07.1]  Yes    Diverticulosis of large intestine without hemorrhage [K57.30]  Yes    Iron deficiency anemia, unspecified [D50.9]  Yes      Resolved Hospital Problems   No resolved problems to display.               Plan:   Acute hypoxemic respiratory failure, stable to improving O2 reqt  COVID-19 pneumonia/ARDS  Acute renal failure, on HD  Metabolic acidosis due to renal failure  Shock, resolved   Combined heart failure, EF 45%- myocardial involvement?  Morbid obesity  Emesis, poor tolerance of tube feeds  Acute encephalopathy- delirium vs encephalitis or other structural cause?    - repeat SBT today after MRI and HD completed  - since MRI only has 2 pumps will hold fentanyl so she can have propofol drip and Levophed running  - titrate peep and FiO2 by ARDS net protocol  - continue HD per nephro  - EEG pending  - MRI pending  - neuro recommendations reviewed  - continue hydroxychloroquine 400 mg daily-- consider dc if further episodes of emesis  - has had adequate course (7d) of azithromycin and ceftriaxone, dc now  - advance tube feeds as tolerated  - discussed with hospitalist  - recommend DNR code status w/ continued full treatment  Above from Dr Mcguire and below from Dr Laird:  4/3 peep 5 106/0.35,  Be -6, creat 4.6,   I/o 1944/725- tube feeds, fentanyl 100, norepi 0.01, propofol 40,  hydroxychloroquine,  cxr mid and lower lung ards 4/2.  Off sedation bp up- will order cardene and lopressor-  Wean sedation.      4/4 - abg na, c02 lytes 21 from17, cpap trials reasonable- ordered 4/5.     I/o 2065/3500 - creat 3.9 from4.6  Fentanyl 125, propofol 15- no cardene nor lopresor, hydroxychloroquine  Tpn to start - has had emesis with tube feeds.  Air hunger on 35% with peep 5 and sat 95% - decreased with rr dropping from 45 to 36 on 50% with sat 98%.      With emesis consider weaning off fentanyl and using versed.  Not weanable with apparent air hunger. cxr 4/2 mod to severe ards - f/u am cxr.    Temp this am 100.3 - will eval urine , sputum and blood cultures- off abx now.  F/u cxr am.  Promethazine ordered.        4/5 peep 5, 86/0.5--- 4/4 peep 5 82/0.35, cxr 4/5 severe ards pattern. Et tube ok  Creat 4.8 I/o 2070/935, tpn, fentanyl 175, versed 1, propofol 25, norepi off, tm 103- wbc 12 form 9.7, will check procal- off abx, check blood and urine and sputum - pt had been febrile 3/26, afebrile 7 days with increased fever yesterday- dose vanc/zosyn ,    Pt out of sync with airway pressures negative through cycle - pulse 120+ and bp very high. pcv used with much better synchrony- will try to support with pcv.     The following were evaluated and adjusted as needed: mechanical ventilator settings and weaning status, intravenous fluids and nutritional status, sedation and neurologic status, antibiotics, hemodynamics, support tubes and access lines and invasive monitoring, acid base balance and oxygenation needs and input and output and renal status       Critical Care  - THE PATIENT HAS A HIGH PROBABILITY OF IMMINENT OR LIFE THREATENING DETERIORATION.  Over 50%time of encounter was in direct care at bedside.  Time was 30 to 74 minutes required for patient care.  Time needed for all of the above totaled 38 minutes.

## 2020-04-05 NOTE — ASSESSMENT & PLAN NOTE
Will follow-up with the renal recommendations  Currently on HD  As per renal, no acute RRT needs this weekend

## 2020-04-05 NOTE — CARE UPDATE
I talked to the daughter over the phone and let her know that  has been spiking temperature and possibly has a bacterial infection possible Vent associated pneumonia.  She was informed about the possibility of having tracheostomy if she continues to be dependent on the ventilator or her respiratory status worsens.   Bedside nurse notified. Continuing full medical treatment for now. Full code for now  Critical care time spent on the discussions with patient/family 30 minutes

## 2020-04-05 NOTE — SUBJECTIVE & OBJECTIVE
Interval History:  cxr 4/5 severe ards pattern. Et tube looks to be in place. I/o 2070/935, tpn, fentanyl 175, versed 1, propofol 25, norepi off, tm 103- wbc 12 form 9.7, Had temp spikes last night off abx,  Vanc/zosyn added 4/5 ,   this am pulse 120+ and bp very high. pcv used with much better synchrony-Switched to pressure control.          Review of Systems   Unable to perform ROS: Intubated     Objective:     Vital Signs (Most Recent):  Temp: 99.8 °F (37.7 °C) (04/05/20 0800)  Pulse: (!) 119 (04/05/20 1020)  Resp: (!) 36 (04/05/20 1020)  BP: (!) 143/65 (04/05/20 1020)  SpO2: 96 % (04/05/20 1020) Vital Signs (24h Range):  Temp:  [99.8 °F (37.7 °C)-103 °F (39.4 °C)] 99.8 °F (37.7 °C)  Pulse:  [101-128] 119  Resp:  [25-44] 36  SpO2:  [95 %-100 %] 96 %  BP: (106-177)/(55-80) 143/65  Arterial Line BP: (120-224)/(53-90) 195/74     Weight: 115.3 kg (254 lb 3.1 oz)  Body mass index is 48.03 kg/m².    Intake/Output Summary (Last 24 hours) at 4/5/2020 1120  Last data filed at 4/5/2020 0600  Gross per 24 hour   Intake 2070.55 ml   Output 775 ml   Net 1295.55 ml      Physical Exam   Nursing note and vitals reviewed.  PATIENT WAS SEEN AS A VIDEO VISIT WITH NURSE IN PATIENT ROOM WITH PATIENT TO ASSIST DURING THE VISIT. PHYSICAL EXAM FINDINGS ARE AS VIEWED BY MYSELF VIA VIDEO OR AS REPORTED BY NURSE IF SPECIFIED AS SUCH, EXAM NOT DONE PERSONALLY BY MYSELF AT BEDSIDE.      Significant Labs:   BMP:   Recent Labs   Lab 04/05/20  0249      *   K 4.0   CL 98   CO2 18*   BUN 56*   CREATININE 4.8*   CALCIUM 8.8     CBC:   Recent Labs   Lab 04/04/20  0339 04/05/20  0249 04/05/20  0951   WBC 9.74 12.02 17.89*   HGB 9.1* 8.9* 9.9*   HCT 28.9* 27.6* 31.6*    307 336       Significant Imaging: I have reviewed all pertinent imaging results/findings within the past 24 hours.

## 2020-04-05 NOTE — PROGRESS NOTES
[facility]  Neurology  Progress Note    Patient Name: Maria Victoria Hernandez  MRN: [id number]  Admission Date: 3/25/2020  Hospital Length of Stay: 11 days  Code Status: Full Code   Consulting Provider: Dr. Justice Monge  Consulting NP: Darian Pink NP   Attending Provider: Kady Gomez MD  Primary Care Physician: Candice Deluna MD   Principal Problem:Acute respiratory failure    Subjective:        HPI: Patient is a 54 years old  female with morbid obesity in past medical history significant for hypothyroidism is being admitted to intensive care unit under inpatient status from [facility] Emergency room with worsening shortness of breath.  Three days ago patient was tested positive for COVID - 19.  Patient works at [employer].  Patient has been experiencing subjective fever, generalized body aches and pains and nonproductive cough for few days.  Patient is getting increasingly short of breath especially for the past 2 days.  Upon arrival to the emergency room patient was noted to be hypoxic around 89%.  Up on 3 L patient's oxygen sats are around 96%.  Patient denies any chest pain, leg swelling or calf tenderness.        Overview/Hospital Course:  No notes on file     Interval History: Patient has been encephalopathic off sedation limited response to verbal or painful stimuli as per RN assessment. Off pressor. Not tolerating TF currently on TPN     Neurological Interval Note: Patient seen and limited exam noted. Discussed plan of care with Dr. Justice Monge. Patient on ventilator sedation continues and being tiratrated. Patient daughter at bedside, combing patients hair. Patient moves head. However patient doesn't follow commands. Will obtain MRI of brain without contrast when patient is safe to obtain r/t for COVID-19 positive status. 4/5 No neurological improvement, MRI brain pending. Pt seen with Dr. Justice Monge.        Current  Neurological Medications:     Current Facility-Administered Medications   Medication Dose Route Frequency Provider Last Rate Last Dose    0.9%  NaCl infusion (for blood administration)   Intravenous Q24H PRN Kady Gomez MD        acetaminophen (10 mg/mL) injection 1,000 mg  1,000 mg Intravenous Once Kady Gomez MD        acetaminophen tablet 650 mg  650 mg Oral Q6H PRN Kady Gomez MD   650 mg at 04/05/20 0325    amino acid 4.25% - dex 10% (CLINIMIX) soln **Use as a renal starter bag, central infusion only (930 mOsm/L). 1L  provides 340 kcal/L dextrose, with 42.5 gm AA, 100 gm CHO, and no electrolytes except acetate 37 mEq/Cl- 17 mEq.    Intravenous Continuous Kady Gomez MD 50 mL/hr at 04/05/20 1537      ascorbic acid (vitamin C) tablet 500 mg  500 mg Oral QID Kady Gomez MD   500 mg at 04/05/20 1605    calcitRIOL capsule 0.25 mcg  0.25 mcg Oral Daily Kady Gomez MD   0.25 mcg at 04/05/20 0911    enoxaparin injection 40 mg  40 mg Subcutaneous Q12H Ferny Porter MD   Stopped at 04/05/20 0900    fentaNYL (SUBLIMAZE) 2,500 mcg in dextrose 5 % 250 mL infusion   Intravenous Continuous Kady Gomez MD 25 mL/hr at 04/05/20 1247 250 mcg/hr at 04/05/20 1247    gentamicin 0.3 % ophthalmic solution 1 drop  1 drop Right Eye Q4H Ayaan Rose NP   1 drop at 04/05/20 1400    heparin (porcine) injection 4,000 Units  4,000 Units Intravenous PRN Don Collins MD   4,000 Units at 04/01/20 1046    levothyroxine tablet 100 mcg  100 mcg Oral Before breakfast Ferny Porter MD   100 mcg at 04/05/20 0541    metoprolol injection 5 mg  5 mg Intravenous Q2H PRN David Laird MD   5 mg at 04/05/20 0859    midazolam (VERSED) 1 mg/mL in dextrose 5 % 100 mL infusion (titrating)  1 mg/hr Intravenous Continuous David Laird MD 1 mL/hr at 04/04/20 1401 1 mg/hr at 04/04/20 1401    midazolam (VERSED) 1 mg/mL injection 2 mg  2 mg Intravenous Once Kady Gomez MD         niCARdipine 40 mg/200 mL infusion  1 mg/hr Intravenous Continuous David Laird MD   Stopped at 04/03/20 1245    norepinephrine 8 mg in dextrose 5 % 250 mL infusion  0.02 mcg/kg/min Intravenous Continuous Kady Gomez MD   Stopped at 04/03/20 0731    pantoprazole injection 40 mg  40 mg Intravenous BID Kady Gomez MD   40 mg at 04/05/20 1201    piperacillin-tazobactam 4.5 g in dextrose 5 % 100 mL IVPB (ready to mix system)  4.5 g Intravenous Q12H David Laird MD 25 mL/hr at 04/05/20 0914 4.5 g at 04/05/20 0914    polyethylene glycol packet 17 g  17 g Oral Daily Kady Gomez MD   17 g at 04/05/20 0911    promethazine (PHENERGAN) 12.5 mg in dextrose 5 % 50 mL IVPB  12.5 mg Intravenous Q6H PRN David Laird MD        propofol (DIPRIVAN) 10 mg/mL infusion  5 mcg/kg/min Intravenous PRN NEELIMA MastP 15.7 mL/hr at 04/04/20 1808 25 mcg/kg/min at 04/04/20 1808    propofol (DIPRIVAN) 10 mg/mL infusion  5 mcg/kg/min Intravenous Continuous Rylan Spicer MD 34.6 mL/hr at 04/05/20 1044 50 mcg/kg/min at 04/05/20 1044    rocuronium injection 100 mg  100 mg Intravenous Once Florentin Fam MD        sodium chloride 0.9% flush 10 mL  10 mL Intravenous PRN Ferny Porter MD        vancomycin - pharmacy to dose   Intravenous pharmacy to manage frequency David Laird MD        zinc sulfate capsule 220 mg  220 mg Oral Daily Kady Gomez MD   220 mg at 04/05/20 0910       Review of Systems   Unable to perform ROS: Intubated     Objective:     Vital Signs (Most Recent):  Temp: 99.6 °F (37.6 °C) (04/05/20 1400)  Pulse: 97 (04/05/20 1659)  Resp: (!) 35 (04/05/20 1659)  BP: (!) 87/50 (04/05/20 1500)  SpO2: 100 % (04/05/20 1659) Vital Signs (24h Range):  Temp:  [97.9 °F (36.6 °C)-103 °F (39.4 °C)] 99.6 °F (37.6 °C)  Pulse:  [] 97  Resp:  [25-62] 35  SpO2:  [89 %-100 %] 100 %  BP: ()/(46-89) 87/50  Arterial Line BP: (105-236)/(43-90) 113/43     Weight: 115.3 kg (254  lb 3.1 oz)  Body mass index is 48.03 kg/m².    Physical Exam   Constitutional: She appears well-developed and well-nourished.   Limited exam due to COVID 19 status. Reviewed exam   with bedside nurse   HENT:   Head: Normocephalic and atraumatic.   Eyes: Pupils are equal, round, and reactive to light.   Neck: Neck supple.   Cardiovascular: Normal rate.   Pulmonary/Chest: Effort normal.   Intubated and Ventilator assisted    Abdominal: Soft. Bowel sounds are normal.   Obese    Musculoskeletal:   JOSEFINA , Patient moves neck freely, does not follow commands    Neurological: She is unresponsive. GCS eye subscore is 1. GCS verbal subscore is 1. GCS motor subscore is 1.   Unresponsive JOSEFINA    Skin: Skin is warm and dry. Capillary refill takes 2 to 3 seconds.   Psychiatric: She is withdrawn. Cognition and memory are impaired. She is noncommunicative.   Withdrawn  She is inattentive.       NEUROLOGICAL EXAMINATION:     CRANIAL NERVES     CN III, IV, VI   Pupils are equal, round, and reactive to light.      Significant Labs:  BMP  Lab Results   Component Value Date     (L) 04/05/2020    K 4.0 04/05/2020    CL 98 04/05/2020    CO2 18 (L) 04/05/2020    BUN 56 (H) 04/05/2020    CREATININE 4.8 (H) 04/05/2020    CALCIUM 8.8 04/05/2020    ANIONGAP 14 04/05/2020    ESTGFRAFRICA 11 (A) 04/05/2020    EGFRNONAA 10 (A) 04/05/2020     Lab Results   Component Value Date    TSH 2.082 03/31/2020     Lab Results   Component Value Date    SKPNFXKR76 >2000 (H) 03/31/2020           Lab Results   Component Value Date       Ref Range & Units 1d ago  (3/31/20) 1d ago  (3/31/20)   Ammonia 10 - 50 umol/L 42           ..  Lab Results   Component Value Date    WBC 17.89 (H) 04/05/2020        Significant Imaging:   MRI brain pending     CT of Head w/o contrast           Impression         1.  There are slight limitations related to moderate patient tilted in the scanner and mild patient motion but there is no obvious acute abnormality.  There is no  hemorrhage, mass, mass effect or obvious acute edema or ischemia.  It should be noted that MRI is more sensitive in the detection of subtle or acute nonhemorrhagic ischemic disease.    2.  Support tubing is seen in the left nares.  Bilateral mastoid and middle ear effusions are likely related to support tubing and/or intubation.  Correlate clinically.  The same is true for changes throughout the paranasal sinuses.            EEG 30 min    No epileptiform discharges, periodic discharges, lateralized   rhythmic delta activity or electrographic seizures were seen.  Assessment and Plan:     54 year old female with impression     1. Acute Metabolic Encephalaopathy vs Septic Encephalopathy related to COVID-19 virus     -CT of Head w/o contrast noted   -EEG 30 min awake and drowsy results noted no seizures   -Encephalopathy Labs pending      3.COVID-19 virus infection  -Will continue Plaquenil  - Managed per IM     3. History of Hypothyroidism  IM to manage      Will continue to follow         Active Diagnoses:    Diagnosis Date Noted POA    PRINCIPAL PROBLEM:  Acute respiratory failure [J96.00] 03/25/2020 Yes    Pneumonia, ventilator associated [J95.851] 04/05/2020 No    Acute encephalopathy [G93.40] 03/31/2020 No    Acute renal failure [N17.9] 03/27/2020 No    ARDS (adult respiratory distress syndrome) [J80] 03/26/2020 Yes    COVID-19 virus infection [U07.1] 03/26/2020 Yes    Morbid obesity [E66.01] 03/25/2020 Yes    Hypothyroid [E03.9] 03/25/2020 Yes    COVID-19 virus detected [U07.1] 03/25/2020 Yes    Diverticulosis of large intestine without hemorrhage [K57.30] 07/03/2018 Yes    Iron deficiency anemia, unspecified [D50.9] 12/30/2015 Yes      Problems Resolved During this Admission:       VTE Risk Mitigation (From admission, onward)         Ordered     heparin (porcine) injection 4,000 Units  As needed (PRN)      03/29/20 2001     enoxaparin injection 40 mg  Every 12 hours      03/25/20 2313     IP VTE HIGH  RISK PATIENT  Once      03/25/20 3101                Jeanne Bailey, DIEGO  Neurology  [facility]    I, Dr. Justice Monge, have personally seen and examined the patient with my advanced provider and agree with above. I personally did a focused exam, and reviewed all necessary clinical information. I discussed my management plan with my NP and agree with above. Consult ID.

## 2020-04-05 NOTE — PLAN OF CARE
Plan of care reviewed with daughter. Increased BP, tachycardia, tachypnea, and agitation with decreased sedation. Effective sedation achieved and bp and hr returned to normal limits. Afebrile. Cooling blanket in place. SR-ST. Tolerating vent well. +gag, +cough. Withdrawals from pain. TPN. MRI pending when stable. Safety maintained.

## 2020-04-05 NOTE — ASSESSMENT & PLAN NOTE
AMS:: Hypoxia vs. Infection vs. TIA/stroke vs. Metabolic/Toxic.   Much more responsive compared to yesterday  Possibly septic added vac zosyn    Lab Results   Component Value Date    WBC 17.89 (H) 04/05/2020     MRI pending   focal findings on physical exam  No early signs of stroke on Head CT, no hemorrhage or acute findings.  EEG: EEG 30 min awake and drowsy results noted no seizures  Will follow up with neuro recs

## 2020-04-05 NOTE — RESPIRATORY THERAPY
04/05/20 1020   PRE-TX-O2   O2 Device (Oxygen Therapy) ventilator   Oxygen Concentration (%) 99   SpO2 96 %   Pulse (!) 119   Resp (!) 36   BP (!) 143/65   Vent Select   Conventional Vent Y   Charged w/in last 24h YES   Preset Conventional Ventilator Settings   Vent Type    Ventilation Type PC   Vent Mode A/C   Humidity HME   Set Rate 35 BPM   Vt Set 0 mL   PEEP/CPAP 5 cmH20   Pressure Support 0 cmH20   Peak Flow 0 L/min   Set Inspiratory Pressure 20 cmH20   Insp Time 0.64 Sec(s)   Plateau Set/Insp. Hold (sec) 0   Insp Rise Time  50 %   Trigger Sensitivity Flow/I-Trigger 1 L/min   P High 0 cm H2O   P Low 0 cm H2O   T High 0 sec   T Low 0 sec   Patient Ventilator Parameters   Resp Rate Total 35 br/min   Peak Airway Pressure 25 cmH2O   Mean Airway Pressure 13 cmH20   Plateau Pressure 15 cmH20   Exhaled Vt 391 mL   Total Ve 14.3 mL   Spont Ve 0 L   I:E Ratio Measured 1:1.70   Conventional Ventilator Alarms   Ve High Alarm 22 L/min   Resp Rate High Alarm 60 br/min   Press High Alarm 60 cmH2O   Apnea Rate 10   Apnea Volume (mL) 450 mL   Apnea Oxygen Concentration  100   Apnea Flow Rate (L/min) 75   T Apnea 20 sec(s)   Ready to Wean/Extubation Screen   FIO2<=50 (chart decimal) (!) 0.99   MV<16L (chart vol.) 14.3   PEEP <=8 (chart #) 5   Ready to Wean Parameters   F/VT Ratio<105 (RSBI) (!) 92.07     Vent changes done per Dr. Laird

## 2020-04-05 NOTE — PROGRESS NOTES
INPATIENT NEPHROLOGY PROGRESS NOTE  [facility] NEPHROLOGY    Patient Name: Maria Victoria Hernandez  Date: 04/05/2020    Reason for consultation: MAIKOL    History of Present Illness:  54AAF nurse with morbid obesity, hypothyroidism presents PNA, intubated, positive for COVID19. Admit Cr 0.7 went 5.1 and HD started on 3/29.     3/28  Scr worse today.  Only one shift recorded for output, 520cc.  Still hyponatremic, vent setting adjusted per pulmonology note for acidosis.  K+ at goal after repletion.  Has siri, may need HD initiated.  3/29  Non oliguric.  In no distress  3/30 VSS, seen and examined on HD, tolerating well. Plan another HD in AM, discussed with daughter who is a nurse here too.  3/31 VSS, intubated still. UO is not great but she is dialyzed daily. Seen and examined on HD, tolerating well. Plan another HD in AM.  4/1 VSS, intubated still. UO is not great but she is dialyzed daily. Seen and examined on HD, tolerating well. Plan another HD PRN.  4/2 VSS, intubated still. UO is not great. Plan another HD in AM.  4/3 VSS, intubated still. UO is not great but she is dialyzed daily recently. Seen and examined on HD, tolerating well. Plan another HD on Mon or PRN.  4/4 febrile, BP stable, FIO2 50%, intubated, sedated, on levophed, UOP 900cc, transfused  4/5 febrile, tachy, SBP 100s, FIO2 65%, intubated, sedated, off levophed, UOP 935cc    Plan of Care:    1. Septic shock 2/2 COVID PNA with HHRF requiring MV  - WBC is up to 18K  - repeat cx pending  - CXR: no significant interval change in the bilateral airspace disease  - on plaquenil  - remains intubated/sedated    2. MAIKOL - suspect multifactorial ATN in setting of shock/COVID/intravascular volume depletion- initiated HD 3/29  - she is now nonoliguric but clearance parameters are worse  - ordered HD for Monday AM  - no nsaids or IV contrast    3. Hypervolemic hyponatremia  - ordered 140 Na bath, 2-3L UF tomorrow    4. Acidosis  - ABG reviewed- stable    5.  Anemia  - transfused last week- Hb is better    Thank you for allowing us to participate in this patient's care. We will continue to follow.    Vital Signs:  Temp Readings from Last 3 Encounters:   04/05/20 97.9 °F (36.6 °C) (Core Rectal)       Pulse Readings from Last 3 Encounters:   04/05/20 102   01/15/15 84   12/17/13 80       BP Readings from Last 3 Encounters:   04/05/20 (!) 106/53   01/15/15 124/82   12/17/13 102/68       Weight:  Wt Readings from Last 3 Encounters:   03/30/20 115.3 kg (254 lb 3.1 oz)   01/15/15 105.8 kg (233 lb 4.8 oz)   12/17/13 101.2 kg (223 lb)     Medications:  Scheduled Meds:   acetaminophen  1,000 mg Intravenous Once    ascorbic acid (vitamin C)  500 mg Oral QID    calcitRIOL  0.25 mcg Oral Daily    enoxparin  40 mg Subcutaneous Q12H    gentamicin  1 drop Right Eye Q4H    levothyroxine  100 mcg Oral Before breakfast    midazolam  2 mg Intravenous Once    pantoprazole  40 mg Intravenous BID    piperacillin-tazobactam (ZOSYN) IVPB  4.5 g Intravenous Q12H    polyethylene glycol  17 g Oral Daily    rocuronium  100 mg Intravenous Once    vancomycin (VANCOCIN) IVPB  15 mg/kg Intravenous Once    zinc sulfate  220 mg Oral Daily     Continuous Infusions:   Amino acid 4.25% - dextrose 10% (CLINIMIX) solution with additives (1L  provides 340 kcal/L dextrose, with 42.5 gm AA, 100 gm CHO, and no electrolytes except acetate 37 mEq/Cl- 17 mEq) 50 mL/hr at 04/04/20 1357    Amino acid 4.25% - dextrose 10% (CLINIMIX) solution with additives (1L  provides 340 kcal/L dextrose, with 42.5 gm AA, 100 gm CHO, and no electrolytes except acetate 37 mEq/Cl- 17 mEq)      fentanyl 250 mcg/hr (04/05/20 1247)    midazolam (VERSED) infusion (titrating) 1 mg/hr (04/04/20 1401)    niCARdipine Stopped (04/03/20 1245)    norepinephrine bitartrate-D5W Stopped (04/03/20 0731)    propofoL 50 mcg/kg/min (04/05/20 1044)     PRN Meds:.sodium chloride, acetaminophen, heparin (porcine), metoprolol,  "promethazine (PHENERGAN) IVPB, propofoL, sodium chloride 0.9%, Pharmacy to dose Vancomycin consult **AND** vancomycin - pharmacy to dose  No current facility-administered medications on file prior to encounter.      Current Outpatient Medications on File Prior to Encounter   Medication Sig Dispense Refill    ferrous sulfate 325 mg (65 mg iron) Tab tablet Take 1 tablet (325 mg total) by mouth daily with breakfast. (Patient taking differently: Take 325 mg by mouth daily with breakfast. Take 2 tablets daily) 90 tablet 3    fluticasone propionate (FLONASE) 50 mcg/actuation nasal spray 1 spray by Each Nostril route once daily.      levothyroxine (SYNTHROID) 100 MCG tablet Take 1 tablet (100 mcg total) by mouth once daily. (Patient taking differently: Take 150 mcg by mouth once daily. ) 90 tablet 3    meloxicam (MOBIC) 7.5 MG tablet Take 7.5 mg by mouth once daily.      clotrimazole-betamethasone 1-0.05% (LOTRISONE) cream Apply topically 2 (two) times daily. 45 g 1    levocetirizine (XYZAL) 5 MG tablet Take 1 tablet (5 mg total) by mouth every evening. 30 tablet 6     Review of Systems:  Unable to obtain due to MV    Physical Exam:  BP (!) 106/53   Pulse 102   Temp 97.9 °F (36.6 °C) (Core Rectal)   Resp (!) 36   Ht 5' 1" (1.549 m)   Wt 115.3 kg (254 lb 3.1 oz)   SpO2 98%   Breastfeeding? No   BMI 48.03 kg/m²     INS/OUTS:  I/O last 3 completed shifts:  In: 2700.4 [I.V.:1897.9]  Out: 1435 [Urine:1435]  No intake/output data recorded.    Did exam via video monitoring  Did not go in room    Physical Exam:  Constitutional: acutely ill, appears stated age,   HEENT: Eyes closed, MMM  Heart: RRR on monitor, no edema  Lungs: intubated, no lb  Abdomen: nd  Skin: no rash  MSK: no deformity  CNS: sedated      Results:  Lab Results   Component Value Date     (L) 04/05/2020    K 4.0 04/05/2020    CL 98 04/05/2020    CO2 18 (L) 04/05/2020    BUN 56 (H) 04/05/2020    CREATININE 4.8 (H) 04/05/2020    CALCIUM 8.8 " 04/05/2020    ANIONGAP 14 04/05/2020    ESTGFRAFRICA 11 (A) 04/05/2020    EGFRNONAA 10 (A) 04/05/2020       Lab Results   Component Value Date    CALCIUM 8.8 04/05/2020    PHOS 4.8 (H) 03/29/2020       Recent Labs   Lab 04/05/20  0951   WBC 17.89*   RBC 3.73*   HGB 9.9*   HCT 31.6*      MCV 85   MCH 26.5*   MCHC 31.3*     I have personally reviewed pertinent radiological imaging and reports.    Carolyn Moeller MD MPH  [facility]  [address] [place] [address] [place] [address]  [phone] (p)  [phone] (f)

## 2020-04-05 NOTE — PLAN OF CARE
04/04/20 1910   Patient Assessment/Suction   Respiratory Effort Unlabored   Expansion/Accessory Muscles/Retractions no use of accessory muscles   All Lung Fields Breath Sounds coarse;diminished   Rhythm/Pattern, Respiratory assisted mechanically;pattern regular;unlabored   $ Suction Charges Inline Suction Procedure Stat Charge   Secretions Amount small   Secretions Color tan   Secretions Characteristics thick   PRE-TX-O2   O2 Device (Oxygen Therapy) ventilator   $ Is the patient on Low Flow Oxygen? Yes   Oxygen Concentration (%) 50   SpO2 100 %   Pulse Oximetry Type Continuous   Pulse (!) 114   Resp (!) 35   ETCO2   $ ETCO2 Charge Exhaled CO2 Monitoring   $ ETCO2 Usage Currently wearing   ETCO2 (mmHg) 42 mmHg   ETCO2 Device Type Portable Bedside Monitor;Artificial Airway   Vent Select   Conventional Vent Y   Charged w/in last 24h YES   Preset Conventional Ventilator Settings   Vent Type    Ventilation Type VC   Vent Mode A/C   Humidity HME   Set Rate 35 BPM   Vt Set 350 mL   PEEP/CPAP 5 cmH20   Pressure Support 0 cmH20   Waveform RAMP   Peak Flow 60 L/min   Set Inspiratory Pressure 0 cmH20   Insp Time 0 Sec(s)   Plateau Set/Insp. Hold (sec) 0   Insp Rise Time  0 %   Trigger Sensitivity Flow/I-Trigger 1 L/min   P High 0 cm H2O   P Low 0 cm H2O   T High 0 sec   T Low 0 sec   Patient Ventilator Parameters   Resp Rate Total 35 br/min   Peak Airway Pressure 26 cmH2O   Mean Airway Pressure 14 cmH20   Plateau Pressure 21 cmH20   Exhaled Vt 372 mL   Total Ve 13.1 mL   Spont Ve 0 L   I:E Ratio Measured 1:1.70   Conventional Ventilator Alarms   Alarms On Y   Ve High Alarm 22 L/min   Resp Rate High Alarm 55 br/min   Press High Alarm 60 cmH2O   Apnea Rate 10   Apnea Volume (mL) 450 mL   Apnea Oxygen Concentration  100   Apnea Flow Rate (L/min) 75   T Apnea 20 sec(s)   Pt stable on current vent settings. Vent to red outlet with alarms on and functioning.

## 2020-04-05 NOTE — CONSULTS
03/26/18        Julieta Rodriguez [id number]      Dear Gabriel Callaway,    [facility] records indicate that you have outstanding lab work and or testing that was ordered for you and has not yet been completed:          Phosphorus [E]      [place] Pharmacokinetic Initial Assessment: IV Vancomycin    Assessment/Plan:    Initiate intravenous vancomycin with dose of 1750 mg once with subsequent doses when random concentrations are less than 20 mcg/mL  Desired empiric serum trough concentration is 15 to 20 mcg/mL  Draw vancomycin random level on 04/06/2020 with AM labs.  Pharmacy will continue to follow and monitor vancomycin.      Please contact pharmacy at extension [phone] with any questions regarding this assessment.     Thank you for the consult,   Sami Salcedouyen       Patient brief summary:  Maria Victoria Hernandez is a 54 y.o. female initiated on antimicrobial therapy with IV Vancomycin for treatment of suspected lower respiratory infection    Drug Allergies:   Review of patient's allergies indicates:  No Known Allergies    Actual Body Weight:   115.3kg    Renal Function:   Estimated Creatinine Clearance: 16 mL/min (A) (based on SCr of 4.8 mg/dL (H)).,     Dialysis Method (if applicable):  intermittent HD PRN    CBC (last 72 hours):  Recent Labs   Lab Result Units 04/03/20 0338 04/04/20 0339 04/05/20  0249   WBC K/uL 8.71 9.74 12.02   Hemoglobin g/dL 7.7* 9.1* 8.9*   Hematocrit % 24.3* 28.9* 27.6*   Platelets K/uL 281 309 307   Gran% % 65.0 80.0* 93.0*   Lymph% % 20.0 9.0* 5.0*   Mono% % 10.0 5.0 1.0*   Eosinophil% % 0.0 3.0 0.0   Basophil% % 0.0 0.0 0.0   Differential Method  Manual Manual Manual       Metabolic Panel (last 72 hours):  Recent Labs   Lab Result Units 04/03/20 0338 04/04/20 0339 04/04/20  0902 04/05/20  0249   Sodium mmol/L 130* 132*  --  130*   Potassium mmol/L 3.7 4.0  --  4.0   Chloride mmol/L 98 98  --  98   CO2 mmol/L 17* 21*  --  18*   Glucose mg/dL 83 102  --  107   Glucose, UA   --   --  Negative  --    BUN, Bld mg/dL 47* 34*  --  56*   Creatinine mg/dL 4.6* 3.9*  --  4.8*   Albumin g/dL 2.2* 2.4*  --  2.3*   Total Bilirubin mg/dL 0.6 0.7  --  1.0   Alkaline Phosphatase U/L 117 160*  --  188*   AST U/L 21 25  --  28   ALT U/L 29  28  --  30       Drug levels (last 3 results):  No results for input(s): VANCOMYCINRA, VANCOMYCINPE, VANCOMYCINTR in the last 72 hours.    Microbiologic Results:  Microbiology Results (last 7 days)       Procedure Component Value Units Date/Time    Urine Culture High Risk [[id number]]     Order Status:  No result Specimen:  Urine, Catheterized     Culture, Respiratory with Gram Stain [[id number]]     Order Status:  No result Specimen:  Respiratory from Tracheal Aspirate     Blood culture [[id number]]     Order Status:  Sent Specimen:  Blood     Blood culture [[id number]]     Order Status:  Sent Specimen:  Blood     Blood culture [[id number]] Collected:  04/04/20 0912    Order Status:  Completed Specimen:  Blood from Line, Central Updated:  04/05/20 0745     Blood Culture, Routine No Growth to date    Culture, Respiratory with Gram Stain [[id number]] Collected:  04/05/20 0050    Order Status:  Sent Specimen:  Respiratory from Endotracheal Aspirate Updated:  04/05/20 0138    Blood culture [[id number]] Collected:  03/26/20 0432    Order Status:  Completed Specimen:  Blood Updated:  03/30/20 1214     Blood Culture, Routine No Growth after 4 days.     Blood culture [[id number]] Collected:  03/26/20 0612    Order Status:  Completed Specimen:  Blood Updated:  03/30/20 1214     Blood Culture, Routine No Growth after 4 days.     Blood culture x two cultures. Draw prior to antibiotics. [[id number]] Collected:  03/25/20 1411    Order Status:  Completed Specimen:  Blood from Antecubital, Right  Arm Updated:  03/30/20 1212     Blood Culture, Routine No Growth after 4 days.     Narrative:       Aerobic and anaerobic

## 2020-04-05 NOTE — RESPIRATORY THERAPY
04/05/20 0648   Patient Assessment/Suction   Level of Consciousness (AVPU) responds to pain   All Lung Fields Breath Sounds coarse;diminished   Suction Method oral;tracheal   Secretions Amount small   Secretions Color tan   Secretions Characteristics thick   PRE-TX-O2   O2 Device (Oxygen Therapy) ventilator   $ Is the patient on Low Flow Oxygen? Yes   Oxygen Concentration (%) 50   SpO2 99 %   Pulse Oximetry Type Continuous   $ Pulse Oximetry - Multiple Charge Pulse Oximetry - Multiple   Pulse 102   Resp (!) 33   Wound Care   $ Wound Care Tech Time 15 min   Area of Concern Upper lip;Lower lip;Corner lip   Skin Color/Characteristics without discoloration   Skin Temperature warm        Airway - Non-Surgical 03/25/20 2145 Endotracheal Tube   Placement Date/Time: 03/25/20 2145   Present Prior to Hospital Arrival?: No  Inserted by: MD  Airway Device: Endotracheal Tube  Airway Device Size: 7.5  Style: Cuffed  Cuff Inflated With: Air  Placement Verified By: Auscultation;Chest X-ray;Colorimetr...   Secured at 24 cm   Measured At Lips   Secured Location Left   Secured by Commercial tube owusu   Bite Block left   Site Condition Dry   Status Intact;Secured;Patent   Site Assessment Clean;Dry   Vent Select   Conventional Vent Y   Ventilator Initiated No   Charged w/in last 24h YES   IHI Ventilator Associated Pneumonia Bundle   Head of Bed Elevated  HOB 30   Preset Conventional Ventilator Settings   Vent Type    Ventilation Type VC   Vent Mode A/C   Humidity HME   Set Rate 35 BPM   Vt Set 350 mL   PEEP/CPAP 5 cmH20   Pressure Support 0 cmH20   Waveform RAMP   Peak Flow 60 L/min   Set Inspiratory Pressure 0 cmH20   Insp Time 0 Sec(s)   Plateau Set/Insp. Hold (sec) 0   Insp Rise Time  0 %   Trigger Sensitivity Flow/I-Trigger 1 L/min   P High 0 cm H2O   P Low 0 cm H2O   T High 0 sec   T Low 0 sec   Patient Ventilator Parameters   Resp Rate Total 36 br/min   Peak Airway Pressure 28 cmH2O   Mean Airway Pressure 14 cmH20    Plateau Pressure 15 cmH20   Exhaled Vt 329 mL   Total Ve 11.9 mL   Spont Ve 0 L   I:E Ratio Measured 1:1.70   Conventional Ventilator Alarms   Ve High Alarm 22 L/min   Resp Rate High Alarm 55 br/min   Press High Alarm 60 cmH2O   Apnea Rate 10   Apnea Volume (mL) 450 mL   Apnea Oxygen Concentration  100   Apnea Flow Rate (L/min) 75   T Apnea 20 sec(s)   Ready to Wean/Extubation Screen   FIO2<=50 (chart decimal) 0.5   MV<16L (chart vol.) 11.9   PEEP <=8 (chart #) 5   Ready to Wean Parameters   F/VT Ratio<105 (RSBI) (!) 100.3

## 2020-04-05 NOTE — PLAN OF CARE
Pt. Continues in ICU. Fever tonight highest 103 axillary. T. [place] NP notified. No new orders at this time. No signs or symptoms of pain or discomfort at his time. Cuellar to gravity. Right eye remains red with swelling noted. Eyedrops applied per MD order. Continues on TPN. Continues not responsive to verbal command nor withdraws from pain. Cooling blanket to patient to manage temperature. Safety intact.

## 2020-04-05 NOTE — HOSPITAL COURSE
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 And treated per protocol for Covid and ARDS with ceftriaxone/zithromax and HC x 5 days. And ARDS protocol on vent. Pulmonary consulted and followed. WEaned from vent slowly -to nasal cannula by 4/10 and remained stable on oxygen but very weak. PT/OT consulted.   Tachycardica /hypertension developed -cardene gtt adjusted to labetolol then transitioned to po metoprolol Echo -noted mild systolic/Gr 1 diastolic dsyfunction. cxr on 4/5 severe ards pattern.-Vancomycin/zosyn added. 4/6 blood cultures pos for yeast so ID consulted and micafungin added. HD  Catheter and  TLC line removed and cultured and cultures negative. Sputum + yeast also  .-followed recs from ID; echo neg -await repeat echo. Repeat Blood cultures  were negative for yeast. 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 Acute encephalopathy- delirium vs encephalitis or other structural cause. MRI could not be performed as she was very unstable.   At the time of admission Cr 0.7 worsened to 5.1, Renal was consulted. and HD started on 3/29. In addition had, Metabolic acidosis due to renal failure. Feedings given via ngtube with diabetisource and accucks/ISS given with close monitoring and good control. On 04/09/2020 Patient was extubated. Continued to need Cardene drip for BP. HAd loose stool-- flexiseal was placed. On 04/11/2020 mental status improved was  more awake and slighty stronger. She was tachycardic-- Cardene was switched to labetalol   04/13/2020 patient was tolerating vancomycin and micafungin. She continued to have diarrhea.  C diff antigen was positive.  On 04/15/2020.  Patient's voice was louder, she is quite interactive and was moving her arms.  on 04/16/2020  patient was doing well.  She was afebrile.  Her oxygen requirement improved. Was titrated down to 2 L nasal cannula. Patient  transferred out of ICU to regular Med surge room. crp improved.  ferr 1200. Nephrology continued to follow patient. Her MAIKOL was suspected to be multifactorial ATN in setting of shock/COVID/intravascular volume depletion- initiated on  HD 3/29.  her Cr trending down  improving- she is nonoliguric-plan to do HD Friday and then nephrology wants to hold HD on the weekend to assess for recovery. Stable for transfer to LTAC

## 2020-04-06 ENCOUNTER — OUTSIDE PLACE OF SERVICE (OUTPATIENT)
Dept: INFECTIOUS DISEASES | Facility: CLINIC | Age: 55
End: 2020-04-06
Payer: COMMERCIAL

## 2020-04-06 PROBLEM — B49 FUNGEMIA: Status: ACTIVE | Noted: 2020-04-06

## 2020-04-06 PROBLEM — R78.81 BACTEREMIA: Status: ACTIVE | Noted: 2020-04-06

## 2020-04-06 LAB
ABO + RH BLD: NORMAL
ALBUMIN SERPL BCP-MCNC: 2.1 G/DL (ref 3.5–5.2)
ALLENS TEST: ABNORMAL
ALLENS TEST: NORMAL
ALP SERPL-CCNC: 197 U/L (ref 55–135)
ALT SERPL W/O P-5'-P-CCNC: 33 U/L (ref 10–44)
ANION GAP SERPL CALC-SCNC: 17 MMOL/L (ref 8–16)
AST SERPL-CCNC: 35 U/L (ref 10–40)
BASOPHILS NFR BLD: 0 % (ref 0–1.9)
BILIRUB SERPL-MCNC: 2.2 MG/DL (ref 0.1–1)
BLD GP AB SCN CELLS X3 SERPL QL: NORMAL
BUN SERPL-MCNC: 68 MG/DL (ref 6–20)
CALCIUM SERPL-MCNC: 8.3 MG/DL (ref 8.7–10.5)
CHLORIDE SERPL-SCNC: 94 MMOL/L (ref 95–110)
CO2 SERPL-SCNC: 15 MMOL/L (ref 23–29)
CREAT SERPL-MCNC: 5.2 MG/DL (ref 0.5–1.4)
DELSYS: ABNORMAL
DELSYS: NORMAL
DIFFERENTIAL METHOD: ABNORMAL
EOSINOPHIL NFR BLD: 0 % (ref 0–8)
ERYTHROCYTE [DISTWIDTH] IN BLOOD BY AUTOMATED COUNT: 17.6 % (ref 11.5–14.5)
EST. GFR  (AFRICAN AMERICAN): 10 ML/MIN/1.73 M^2
EST. GFR  (NON AFRICAN AMERICAN): 9 ML/MIN/1.73 M^2
FIO2: 50
FIO2: 50
GLUCOSE SERPL-MCNC: 98 MG/DL (ref 70–110)
HCO3 UR-SCNC: 18 MMOL/L (ref 24–28)
HCO3 UR-SCNC: 25.5 MMOL/L (ref 24–28)
HCT VFR BLD AUTO: 26.1 % (ref 37–48.5)
HGB BLD-MCNC: 8.3 G/DL (ref 12–16)
IMM GRANULOCYTES # BLD AUTO: ABNORMAL K/UL
IMM GRANULOCYTES NFR BLD AUTO: ABNORMAL %
LYMPHOCYTES NFR BLD: 5 % (ref 18–48)
MCH RBC QN AUTO: 26.6 PG (ref 27–31)
MCHC RBC AUTO-ENTMCNC: 31.8 G/DL (ref 32–36)
MCV RBC AUTO: 84 FL (ref 82–98)
MIN VOL: 10.5
MIN VOL: 12.1
MODE: ABNORMAL
MODE: NORMAL
MONOCYTES NFR BLD: 4 % (ref 4–15)
NEUTROPHILS NFR BLD: 87 % (ref 38–73)
NEUTS BAND NFR BLD MANUAL: 4 %
NRBC BLD-RTO: 0 /100 WBC
PCO2 BLDA: 36.2 MMHG (ref 35–45)
PCO2 BLDA: 40.6 MMHG (ref 35–45)
PEEP: 5
PEEP: 5
PH SMN: 7.3 [PH] (ref 7.35–7.45)
PH SMN: 7.41 [PH] (ref 7.35–7.45)
PLATELET # BLD AUTO: 270 K/UL (ref 150–350)
PLATELET BLD QL SMEAR: ABNORMAL
PMV BLD AUTO: 11.9 FL (ref 9.2–12.9)
PO2 BLDA: 82 MMHG (ref 80–100)
PO2 BLDA: 90 MMHG (ref 80–100)
POC BE: -8 MMOL/L
POC BE: 1 MMOL/L
POC SATURATED O2: 95 % (ref 95–100)
POC SATURATED O2: 97 % (ref 95–100)
POC TCO2: 19 MMOL/L (ref 23–27)
POC TCO2: 27 MMOL/L (ref 23–27)
POTASSIUM SERPL-SCNC: 4.1 MMOL/L (ref 3.5–5.1)
PROT SERPL-MCNC: 7.4 G/DL (ref 6–8.4)
PS: 10
PS: 5
RBC # BLD AUTO: 3.12 M/UL (ref 4–5.4)
SAMPLE: ABNORMAL
SAMPLE: NORMAL
SITE: ABNORMAL
SITE: NORMAL
SODIUM SERPL-SCNC: 126 MMOL/L (ref 136–145)
SP02: 100
SP02: 100
SPONT RATE: 25
SPONT RATE: 27
VANCOMYCIN SERPL-MCNC: 30.2 UG/ML
WBC # BLD AUTO: 12.75 K/UL (ref 3.9–12.7)

## 2020-04-06 PROCEDURE — 63600175 PHARM REV CODE 636 W HCPCS: Performed by: INTERNAL MEDICINE

## 2020-04-06 PROCEDURE — 20000000 HC ICU ROOM

## 2020-04-06 PROCEDURE — 85007 BL SMEAR W/DIFF WBC COUNT: CPT

## 2020-04-06 PROCEDURE — 27000221 HC OXYGEN, UP TO 24 HOURS

## 2020-04-06 PROCEDURE — 80100014 HC HEMODIALYSIS 1:1

## 2020-04-06 PROCEDURE — 99900035 HC TECH TIME PER 15 MIN (STAT)

## 2020-04-06 PROCEDURE — 25000003 PHARM REV CODE 250: Performed by: INTERNAL MEDICINE

## 2020-04-06 PROCEDURE — 94003 VENT MGMT INPAT SUBQ DAY: CPT

## 2020-04-06 PROCEDURE — 80202 ASSAY OF VANCOMYCIN: CPT

## 2020-04-06 PROCEDURE — C1752 CATH,HEMODIALYSIS,SHORT-TERM: HCPCS

## 2020-04-06 PROCEDURE — 94770 HC EXHALED C02 TEST: CPT

## 2020-04-06 PROCEDURE — 85027 COMPLETE CBC AUTOMATED: CPT

## 2020-04-06 PROCEDURE — 99291 PR CRITICAL CARE, E/M 30-74 MINUTES: ICD-10-PCS | Mod: ,,, | Performed by: INTERNAL MEDICINE

## 2020-04-06 PROCEDURE — 25000003 PHARM REV CODE 250: Performed by: HOSPITALIST

## 2020-04-06 PROCEDURE — C9113 INJ PANTOPRAZOLE SODIUM, VIA: HCPCS | Performed by: INTERNAL MEDICINE

## 2020-04-06 PROCEDURE — 86920 COMPATIBILITY TEST SPIN: CPT

## 2020-04-06 PROCEDURE — 87040 BLOOD CULTURE FOR BACTERIA: CPT | Mod: 59

## 2020-04-06 PROCEDURE — 99291 CRITICAL CARE FIRST HOUR: CPT | Mod: ,,, | Performed by: INTERNAL MEDICINE

## 2020-04-06 PROCEDURE — 11000001 HC ACUTE MED/SURG PRIVATE ROOM

## 2020-04-06 PROCEDURE — 36556 INSERT NON-TUNNEL CV CATH: CPT

## 2020-04-06 PROCEDURE — 87070 CULTURE OTHR SPECIMN AEROBIC: CPT

## 2020-04-06 PROCEDURE — 82803 BLOOD GASES ANY COMBINATION: CPT

## 2020-04-06 PROCEDURE — 99291 CRITICAL CARE FIRST HOUR: CPT | Mod: S$GLB,,, | Performed by: INTERNAL MEDICINE

## 2020-04-06 PROCEDURE — 99900026 HC AIRWAY MAINTENANCE (STAT)

## 2020-04-06 PROCEDURE — C1751 CATH, INF, PER/CENT/MIDLINE: HCPCS

## 2020-04-06 PROCEDURE — 99291 PR CRITICAL CARE, E/M 30-74 MINUTES: ICD-10-PCS | Mod: S$GLB,,, | Performed by: INTERNAL MEDICINE

## 2020-04-06 PROCEDURE — 37799 UNLISTED PX VASCULAR SURGERY: CPT

## 2020-04-06 PROCEDURE — 36415 COLL VENOUS BLD VENIPUNCTURE: CPT

## 2020-04-06 PROCEDURE — 94761 N-INVAS EAR/PLS OXIMETRY MLT: CPT

## 2020-04-06 PROCEDURE — 87106 FUNGI IDENTIFICATION YEAST: CPT

## 2020-04-06 PROCEDURE — 87077 CULTURE AEROBIC IDENTIFY: CPT

## 2020-04-06 PROCEDURE — 86850 RBC ANTIBODY SCREEN: CPT

## 2020-04-06 PROCEDURE — 80053 COMPREHEN METABOLIC PANEL: CPT

## 2020-04-06 PROCEDURE — 63600175 PHARM REV CODE 636 W HCPCS: Performed by: HOSPITALIST

## 2020-04-06 RX ORDER — HYDROCODONE BITARTRATE AND ACETAMINOPHEN 500; 5 MG/1; MG/1
TABLET ORAL
Status: DISCONTINUED | OUTPATIENT
Start: 2020-04-06 | End: 2020-04-12

## 2020-04-06 RX ORDER — SODIUM CHLORIDE 0.9 % (FLUSH) 0.9 %
10 SYRINGE (ML) INJECTION
Status: DISCONTINUED | OUTPATIENT
Start: 2020-04-06 | End: 2020-04-12

## 2020-04-06 RX ORDER — HEPARIN SODIUM 1000 [USP'U]/ML
INJECTION, SOLUTION INTRAVENOUS; SUBCUTANEOUS
Status: DISPENSED
Start: 2020-04-06 | End: 2020-04-06

## 2020-04-06 RX ADMIN — ZINC SULFATE 220 MG (50 MG) CAPSULE 220 MG: CAPSULE at 08:04

## 2020-04-06 RX ADMIN — HEPARIN SODIUM 4000 UNITS: 1000 INJECTION, SOLUTION INTRAVENOUS; SUBCUTANEOUS at 11:04

## 2020-04-06 RX ADMIN — CALCITRIOL CAPSULES 0.25 MCG 0.25 MCG: 0.25 CAPSULE ORAL at 08:04

## 2020-04-06 RX ADMIN — OXYCODONE HYDROCHLORIDE AND ACETAMINOPHEN 500 MG: 500 TABLET ORAL at 09:04

## 2020-04-06 RX ADMIN — OXYCODONE HYDROCHLORIDE AND ACETAMINOPHEN 500 MG: 500 TABLET ORAL at 12:04

## 2020-04-06 RX ADMIN — PIPERACILLIN AND TAZOBACTAM 4.5 G: 4; .5 INJECTION, POWDER, LYOPHILIZED, FOR SOLUTION INTRAVENOUS; PARENTERAL at 08:04

## 2020-04-06 RX ADMIN — MINERAL OIL AND PETROLATUM: 150; 830 OINTMENT OPHTHALMIC at 05:04

## 2020-04-06 RX ADMIN — GENTAMICIN SULFATE 1 DROP: 3 SOLUTION OPHTHALMIC at 06:04

## 2020-04-06 RX ADMIN — GENTAMICIN SULFATE 1 DROP: 3 SOLUTION OPHTHALMIC at 09:04

## 2020-04-06 RX ADMIN — EPOETIN ALFA-EPBX 10000 UNITS: 10000 INJECTION, SOLUTION INTRAVENOUS; SUBCUTANEOUS at 11:04

## 2020-04-06 RX ADMIN — NICARDIPINE HYDROCHLORIDE 5 MG/HR: 0.2 INJECTION, SOLUTION INTRAVENOUS at 09:04

## 2020-04-06 RX ADMIN — PANTOPRAZOLE SODIUM 40 MG: 40 INJECTION, POWDER, LYOPHILIZED, FOR SOLUTION INTRAVENOUS at 09:04

## 2020-04-06 RX ADMIN — GENTAMICIN SULFATE 1 DROP: 3 SOLUTION OPHTHALMIC at 05:04

## 2020-04-06 RX ADMIN — Medication 175 MCG/HR: at 09:04

## 2020-04-06 RX ADMIN — NICARDIPINE HYDROCHLORIDE 5 MG/HR: 0.2 INJECTION, SOLUTION INTRAVENOUS at 01:04

## 2020-04-06 RX ADMIN — LEVOTHYROXINE SODIUM 100 MCG: 100 TABLET ORAL at 06:04

## 2020-04-06 RX ADMIN — POLYETHYLENE GLYCOL 3350 17 G: 17 POWDER, FOR SOLUTION ORAL at 08:04

## 2020-04-06 RX ADMIN — GENTAMICIN SULFATE 1 DROP: 3 SOLUTION OPHTHALMIC at 08:04

## 2020-04-06 RX ADMIN — GENTAMICIN SULFATE 1 DROP: 3 SOLUTION OPHTHALMIC at 04:04

## 2020-04-06 RX ADMIN — GENTAMICIN SULFATE 1 DROP: 3 SOLUTION OPHTHALMIC at 01:04

## 2020-04-06 RX ADMIN — PANTOPRAZOLE SODIUM 40 MG: 40 INJECTION, POWDER, LYOPHILIZED, FOR SOLUTION INTRAVENOUS at 08:04

## 2020-04-06 RX ADMIN — GENTAMICIN SULFATE 1 DROP: 3 SOLUTION OPHTHALMIC at 11:04

## 2020-04-06 RX ADMIN — NICARDIPINE HYDROCHLORIDE 1 MG/HR: 0.2 INJECTION, SOLUTION INTRAVENOUS at 04:04

## 2020-04-06 RX ADMIN — DAPTOMYCIN 1000 MG: 350 INJECTION, POWDER, LYOPHILIZED, FOR SOLUTION INTRAVENOUS at 12:04

## 2020-04-06 RX ADMIN — Medication 200 MCG/HR: at 12:04

## 2020-04-06 RX ADMIN — MINERAL OIL AND PETROLATUM: 150; 830 OINTMENT OPHTHALMIC at 09:04

## 2020-04-06 RX ADMIN — MICAFUNGIN SODIUM 100 MG: 20 INJECTION, POWDER, LYOPHILIZED, FOR SOLUTION INTRAVENOUS at 11:04

## 2020-04-06 RX ADMIN — GENTAMICIN SULFATE 1 DROP: 3 SOLUTION OPHTHALMIC at 02:04

## 2020-04-06 RX ADMIN — PIPERACILLIN AND TAZOBACTAM 4.5 G: 4; .5 INJECTION, POWDER, LYOPHILIZED, FOR SOLUTION INTRAVENOUS; PARENTERAL at 09:04

## 2020-04-06 RX ADMIN — OXYCODONE HYDROCHLORIDE AND ACETAMINOPHEN 500 MG: 500 TABLET ORAL at 08:04

## 2020-04-06 RX ADMIN — OXYCODONE HYDROCHLORIDE AND ACETAMINOPHEN 500 MG: 500 TABLET ORAL at 04:04

## 2020-04-06 NOTE — RESPIRATORY THERAPY
04/06/20 1740   Patient Assessment/Suction   Level of Consciousness (AVPU) responds to pain   Secretions Amount moderate   Secretions Color pale;yellow   Secretions Characteristics thick   PRE-TX-O2   O2 Device (Oxygen Therapy) ventilator   Oxygen Concentration (%) 50   SpO2 100 %   Pulse Oximetry Type Continuous   Pulse (!) 132   Resp (!) 42   Vent Select   Conventional Vent Y   Charged w/in last 24h YES   IHI Ventilator Associated Pneumonia Bundle   Head of Bed Elevated  HOB 30   Preset Conventional Ventilator Settings   Vent Type    Ventilation Type PC   Vent Mode A/C   Humidity HME   Set Rate 35 BPM   Vt Set 0 mL   PEEP/CPAP 5 cmH20   Pressure Support 0 cmH20   Peak Flow 0 L/min   Set Inspiratory Pressure 25 cmH20   Insp Time 0.7 Sec(s)   Plateau Set/Insp. Hold (sec) 0   Insp Rise Time  50 %   Trigger Sensitivity Flow/I-Trigger 1 L/min   P High 0 cm H2O   P Low 0 cm H2O   T High 0 sec   T Low 0 sec   Patient Ventilator Parameters   Resp Rate Total 42 br/min   Peak Airway Pressure 31 cmH2O   Mean Airway Pressure 16 cmH20   Plateau Pressure 26 cmH20   Exhaled Vt 551 mL   Total Ve 20.7 mL   Spont Ve 0 L   I:E Ratio Measured 1:1.30   Conventional Ventilator Alarms   Ve High Alarm 25 L/min   Resp Rate High Alarm 60 br/min   Press High Alarm 60 cmH2O   Apnea Rate 10   Apnea Volume (mL) 450 mL   Apnea Oxygen Concentration  100   Apnea Flow Rate (L/min) 75   T Apnea 20 sec(s)   Ready to Wean/Extubation Screen   FIO2<=50 (chart decimal) 0.5   MV<16L (chart vol.) (!) 20.7   PEEP <=8 (chart #) 5   Ready to Wean Parameters   F/VT Ratio<105 (RSBI) (!) 76.23     Upon entering room to extubate pt RR 42 with increased HR. Pt will not be extubated @ this time. Pt placed back on previous settings

## 2020-04-06 NOTE — PROGRESS NOTES
04/06/20 1145   Vital Signs   Temp 98.6 °F (37 °C)   Pulse (!) 118   Resp (!) 29   SpO2 100 %   Oxygen Concentration (%) 50   Art Line   Arterial Line /63   Arterial Line MAP (mmHg) 90 mmHg   Post-Hemodialysis Assessment   Rinseback Volume (mL) 250 mL   Blood Volume Processed (Liters) 50 L   Dialyzer Clearance Moderately streaked   Duration of Treatment (minutes) 180 minutes   Hemodialysis Intake (mL) 500 mL   Total UF (mL) 3000 mL   Net Fluid Removal 3500   Patient Response to Treatment tolerated well   Post-Treatment Weight 112.5 kg (248 lb 0.3 oz)   Treatment Weight Change -2.8   Post-Hemodialysis Comments cath flushed, capped and clamped

## 2020-04-06 NOTE — RESPIRATORY THERAPY
04/06/20 1118   Patient Assessment/Suction   Level of Consciousness (AVPU) responds to pain   Suction Method tracheal   Secretions Amount small   Secretions Color tan   Secretions Characteristics thick   PRE-TX-O2   O2 Device (Oxygen Therapy) ventilator   Oxygen Concentration (%) 50   SpO2 100 %   Pulse Oximetry Type Continuous   Pulse (!) 115   Resp (!) 25   Temp 98.2 °F (36.8 °C)   Vent Select   Conventional Vent Y   Charged w/in last 24h NO   IHI Ventilator Associated Pneumonia Bundle   Head of Bed Elevated  HOB 30   Preset Conventional Ventilator Settings   Vent Type    Ventilation Type PC   Vent Mode Spont   Humidity HME   Set Rate 0 BPM   Vt Set 0 mL   PEEP/CPAP 5 cmH20   Pressure Support 5 cmH20   Peak Flow 0 L/min   Set Inspiratory Pressure 25 cmH20   Insp Time 0 Sec(s)   Plateau Set/Insp. Hold (sec) 0   Insp Rise Time  50 %   Trigger Sensitivity Flow/I-Trigger 1 L/min   P High 0 cm H2O   P Low 0 cm H2O   T High 0 sec   T Low 0 sec   Patient Ventilator Parameters   Resp Rate Total 23 br/min   Peak Airway Pressure 11 cmH2O   Mean Airway Pressure 7.3 cmH20   Plateau Pressure 26 cmH20   Exhaled Vt 486 mL   Total Ve 11.3 mL   Spont Ve 11.3 L   I:E Ratio Measured 1:2.10   Conventional Ventilator Alarms   Ve High Alarm 22 L/min   Resp Rate High Alarm 60 br/min   Press High Alarm 60 cmH2O   Apnea Rate 10   Apnea Volume (mL) 450 mL   Apnea Oxygen Concentration  100   Apnea Flow Rate (L/min) 75   T Apnea 20 sec(s)   Ready to Wean/Extubation Screen   FIO2<=50 (chart decimal) 0.5   MV<16L (chart vol.) 11.3   PEEP <=8 (chart #) 5   Ready to Wean Parameters   F/VT Ratio<105 (RSBI) (!) 51.44     Vent change done per Dr. Mcguire

## 2020-04-06 NOTE — SIGNIFICANT EVENT
Results for WOLF SINGER (MRN [id number]) as of 4/6/2020 05:14   Ref. Range 4/6/2020 05:09   POC PH Latest Ref Range: 7.35 - 7.45  7.305 (L)   POC PCO2 Latest Ref Range: 35 - 45 mmHg 36.2   POC PO2 Latest Ref Range: 80 - 100 mmHg 82   POC BE Latest Ref Range: -2 to 2 mmol/L -8   POC HCO3 Latest Ref Range: 24 - 28 mmol/L 18.0 (L)   POC SATURATED O2 Latest Ref Range: 95 - 100 % 95   POC TCO2 Latest Ref Range: 23 - 27 mmol/L 19 (L)   FiO2 Unknown 50   PEEP Unknown 5   Sample Unknown ARTERIAL   DelSys Unknown Adult Vent   Allens Test Unknown N/A   Site Unknown Chelly/UAC   Mode Unknown CPAP     Pt remains on CPAP

## 2020-04-06 NOTE — PLAN OF CARE
Continues in ICU. Propofol and versed titrated then stopped. Patient tolerating at this time. Fentanyl titrated per patient tolerance. Restarted related to patient became restless moving head repeatedly from left to right. Afebrile at this time. Cooling blanket off 0345. Continues on TPN. Continues to not follow verbal commands. Lovenox held related to NG tube content. Continues eye drops to patient right eye. Budding yeast to pediatric bottle reported to TIFF Rose NP this shift. No new orders received at this time. Safety intact.

## 2020-04-06 NOTE — NURSING
Rounded with Dr Burris. MD made aware of +bc. Dr Newman consulted and has already rounded on patient. Anesthesia and surgery consulted for replacement of trialysis and quad lumen central line. Current receiving dialysis.

## 2020-04-06 NOTE — RESPIRATORY THERAPY
04/06/20 0802   Patient Assessment/Suction   Level of Consciousness (AVPU) responds to pain   All Lung Fields Breath Sounds crackles;diminished   PRE-TX-O2   O2 Device (Oxygen Therapy) ventilator   $ Is the patient on Low Flow Oxygen? Yes   Oxygen Concentration (%) 50   SpO2 100 %   Pulse Oximetry Type Continuous   $ Pulse Oximetry - Multiple Charge Pulse Oximetry - Multiple   Pulse 99   Resp 19   Wound Care   $ Wound Care Tech Time 15 min   Area of Concern Upper lip;Lower lip;Corner lip   Skin Color/Characteristics without discoloration   Skin Temperature warm        Airway - Non-Surgical 03/25/20 2145 Endotracheal Tube   Placement Date/Time: 03/25/20 2145   Present Prior to Hospital Arrival?: No  Inserted by: MD  Airway Device: Endotracheal Tube  Airway Device Size: 7.5  Style: Cuffed  Cuff Inflated With: Air  Placement Verified By: Auscultation;Chest X-ray;Colorimetr...   Secured at 24 cm   Measured At Lips   Secured Location Left   Secured by Commercial tube owusu   Bite Block left   Site Condition Dry   Status Intact;Secured;Patent   Site Assessment Clean;Dry   Vent Select   Conventional Vent Y   Ventilator Initiated No   $ Ventilator Subsequent 1   Charged w/in last 24h YES   IHI Ventilator Associated Pneumonia Bundle   Head of Bed Elevated  HOB 30   Preset Conventional Ventilator Settings   Vent Type    Ventilation Type PC   Vent Mode Spont   Humidity HME   Set Rate 0 BPM   Vt Set 0 mL   PEEP/CPAP 5 cmH20   Pressure Support 10 cmH20   Peak Flow 0 L/min   Set Inspiratory Pressure 25 cmH20   Insp Time 0 Sec(s)   Plateau Set/Insp. Hold (sec) 0   Insp Rise Time  50 %   Trigger Sensitivity Flow/I-Trigger 1 L/min   P High 0 cm H2O   P Low 0 cm H2O   T High 0 sec   T Low 0 sec   Patient Ventilator Parameters   Resp Rate Total 17 br/min   Peak Airway Pressure 16 cmH2O   Mean Airway Pressure 8 cmH20   Plateau Pressure 26 cmH20   Exhaled Vt 500 mL   Total Ve 8.51 mL   Spont Ve 8.51 L   I:E Ratio Measured  1:3.00   Conventional Ventilator Alarms   Ve High Alarm 22 L/min   Resp Rate High Alarm 60 br/min   Press High Alarm 60 cmH2O   Apnea Rate 10   Apnea Volume (mL) 450 mL   Apnea Oxygen Concentration  100   Apnea Flow Rate (L/min) 75   T Apnea 20 sec(s)   Ready to Wean/Extubation Screen   FIO2<=50 (chart decimal) 0.5   MV<16L (chart vol.) 8.51   PEEP <=8 (chart #) 5   Ready to Wean Parameters   F/VT Ratio<105 (RSBI) (!) 38

## 2020-04-06 NOTE — PROGRESS NOTES
INPATIENT NEPHROLOGY PROGRESS NOTE  [facility] NEPHROLOGY    Patient Name: Maria Victoria Hernandez  Date: 04/06/2020    Reason for consultation: MAIKOL    History of Present Illness:  54AAF nurse with morbid obesity, hypothyroidism presents PNA, intubated, positive for COVID19. Admit Cr 0.7 went 5.1 and HD started on 3/29.     3/28  Scr worse today.  Only one shift recorded for output, 520cc.  Still hyponatremic, vent setting adjusted per pulmonology note for acidosis.  K+ at goal after repletion.  Has siri, may need HD initiated.  3/29  Non oliguric.  In no distress  3/30 VSS, seen and examined on HD, tolerating well. Plan another HD in AM, discussed with daughter who is a nurse here too.  3/31 VSS, intubated still. UO is not great but she is dialyzed daily. Seen and examined on HD, tolerating well. Plan another HD in AM.  4/1 VSS, intubated still. UO is not great but she is dialyzed daily. Seen and examined on HD, tolerating well. Plan another HD PRN.  4/2 VSS, intubated still. UO is not great. Plan another HD in AM.  4/3 VSS, intubated still. UO is not great but she is dialyzed daily recently. Seen and examined on HD, tolerating well. Plan another HD on Mon or PRN.  4/4 febrile, BP stable, FIO2 50%, intubated, sedated, on levophed, UOP 900cc, transfused  4/5 febrile, tachy, SBP 100s, FIO2 65%, intubated, sedated, off levophed, UOP 935cc  4/6 intubated,. sedated    Plan of Care:    1. Septic shock 2/2 COVID PNA with HHRF requiring MV  - WBC down to 13k. Still spiking fevers     - CXR: no significant interval change in the bilateral airspace disease  - on plaquenil  - remains intubated/sedated    2. MAIKOL - suspect multifactorial ATN in setting of shock/COVID/intravascular volume depletion- initiated HD 3/29  - she is now nonoliguric but clearance parameters are worse  - dialysis today  - no nsaids or IV contrast    3. Hypervolemic hyponatremia  - ordered 140 Na bath, 2-3L UF tomorrow    4. Acidosis  -  35 bicarb  bath    5. Anemia  - transfused last week- Epogen with hd    Thank you for allowing us to participate in this patient's care. We will continue to follow.    Vital Signs:  Temp Readings from Last 3 Encounters:   04/06/20 98 °F (36.7 °C) (Core Rectal)       Pulse Readings from Last 3 Encounters:   04/06/20 99   01/15/15 84   12/17/13 80       BP Readings from Last 3 Encounters:   04/06/20 132/61   01/15/15 124/82   12/17/13 102/68       Weight:  Wt Readings from Last 3 Encounters:   03/30/20 115.3 kg (254 lb 3.1 oz)   01/15/15 105.8 kg (233 lb 4.8 oz)   12/17/13 101.2 kg (223 lb)     Medications:  Scheduled Meds:   acetaminophen  1,000 mg Intravenous Once    ascorbic acid (vitamin C)  500 mg Oral QID    calcitRIOL  0.25 mcg Oral Daily    enoxparin  40 mg Subcutaneous Q12H    gentamicin  1 drop Right Eye Q4H    levothyroxine  100 mcg Oral Before breakfast    midazolam  2 mg Intravenous Once    pantoprazole  40 mg Intravenous BID    piperacillin-tazobactam (ZOSYN) IVPB  4.5 g Intravenous Q12H    polyethylene glycol  17 g Oral Daily    rocuronium  100 mg Intravenous Once    zinc sulfate  220 mg Oral Daily     Continuous Infusions:   Amino acid 4.25% - dextrose 10% (CLINIMIX) solution with additives (1L  provides 340 kcal/L dextrose, with 42.5 gm AA, 100 gm CHO, and no electrolytes except acetate 37 mEq/Cl- 17 mEq) 50 mL/hr at 04/05/20 1537    fentanyl 175 mcg/hr (04/06/20 0643)    midazolam (VERSED) infusion (titrating) Stopped (04/06/20 0348)    niCARdipine 1 mg/hr (04/06/20 0707)    norepinephrine bitartrate-D5W Stopped (04/03/20 0731)    propofoL Stopped (04/06/20 0348)     PRN Meds:.sodium chloride, acetaminophen, heparin (porcine), metoprolol, promethazine (PHENERGAN) IVPB, propofoL, sodium chloride 0.9%, Pharmacy to dose Vancomycin consult **AND** vancomycin - pharmacy to dose  No current facility-administered medications on file prior to encounter.      Current Outpatient Medications on File  "Prior to Encounter   Medication Sig Dispense Refill    ferrous sulfate 325 mg (65 mg iron) Tab tablet Take 1 tablet (325 mg total) by mouth daily with breakfast. (Patient taking differently: Take 325 mg by mouth daily with breakfast. Take 2 tablets daily) 90 tablet 3    fluticasone propionate (FLONASE) 50 mcg/actuation nasal spray 1 spray by Each Nostril route once daily.      levothyroxine (SYNTHROID) 100 MCG tablet Take 1 tablet (100 mcg total) by mouth once daily. (Patient taking differently: Take 150 mcg by mouth once daily. ) 90 tablet 3    meloxicam (MOBIC) 7.5 MG tablet Take 7.5 mg by mouth once daily.      clotrimazole-betamethasone 1-0.05% (LOTRISONE) cream Apply topically 2 (two) times daily. 45 g 1    levocetirizine (XYZAL) 5 MG tablet Take 1 tablet (5 mg total) by mouth every evening. 30 tablet 6     Review of Systems:  Unable to obtain due to MV    Physical Exam:  /61   Pulse 99   Temp 98 °F (36.7 °C) (Core Rectal)   Resp 20   Ht 5' 1" (1.549 m)   Wt 115.3 kg (254 lb 3.1 oz)   SpO2 100%   Breastfeeding? No   BMI 48.03 kg/m²     INS/OUTS:  I/O last 3 completed shifts:  In: 1884.3 [I.V.:1084.3; IV Piggyback:100]  Out: 1250 [Urine:1250]  No intake/output data recorded.    Did exam via video monitoring  Did not go in room    Physical Exam:  Constitutional: acutely ill, appears stated age,   HEENT: Eyes closed, MMM  Heart: RRR on monitor, no edema  Lungs: intubated, no lb  Abdomen: nd  Skin: no rash  MSK: no deformity  CNS: sedated      Results:  Lab Results   Component Value Date     (L) 04/06/2020    K 4.1 04/06/2020    CL 94 (L) 04/06/2020    CO2 15 (L) 04/06/2020    BUN 68 (H) 04/06/2020    CREATININE 5.2 (H) 04/06/2020    CALCIUM 8.3 (L) 04/06/2020    ANIONGAP 17 (H) 04/06/2020    ESTGFRAFRICA 10 (A) 04/06/2020    EGFRNONAA 9 (A) 04/06/2020       Lab Results   Component Value Date    CALCIUM 8.3 (L) 04/06/2020    PHOS 4.8 (H) 03/29/2020       Recent Labs   Lab 04/06/20  0244 "   WBC 12.75*   RBC 3.12*   HGB 8.3*   HCT 26.1*      MCV 84   MCH 26.6*   MCHC 31.8*     I have personally reviewed pertinent radiological imaging and reports.    Don Collins MD  Nephrology  [facility]  [phone]

## 2020-04-06 NOTE — PLAN OF CARE
Patient remains  on  on PC 25, rate 35, peep +5 and Fi02 .5.  Hr 111, ETC02 37mmhg and 02 saturation 100%.  Patient intubated via 7.5 et tube and secured at 24cm@ lip with [id number] cuff psi. With bite block in place.  Ambu bag and mask at bedside with alarms on and functioning properly at this time.

## 2020-04-06 NOTE — RESPIRATORY THERAPY
04/05/20 1912   Patient Assessment/Suction   Suction Method oral;tracheal   $ Suction Charges Inline Suction Procedure Stat Charge   Secretions Amount small   Secretions Color tan   PRE-TX-O2   O2 Device (Oxygen Therapy) ventilator   Oxygen Concentration (%) 50   SpO2 99 %   Pulse Oximetry Type Continuous   Pulse 108   Resp (!) 35   Vent Select   Conventional Vent Y   Charged w/in last 24h YES   Preset Conventional Ventilator Settings   Vent Type    Ventilation Type PC   Vent Mode A/C   Humidity HME   Set Rate 35 BPM   Vt Set 0 mL   PEEP/CPAP 5 cmH20   Pressure Support 0 cmH20   Peak Flow 0 L/min   Set Inspiratory Pressure 25 cmH20   Insp Time 0.7 Sec(s)   Plateau Set/Insp. Hold (sec) 0   Insp Rise Time  50 %   Trigger Sensitivity Flow/I-Trigger 1 L/min   P High 0 cm H2O   P Low 0 cm H2O   T High 0 sec   T Low 0 sec   Patient Ventilator Parameters   Resp Rate Total 35 br/min   Peak Airway Pressure 30 cmH2O   Mean Airway Pressure 15 cmH20   Plateau Pressure 26 cmH20   Exhaled Vt 427 mL   Total Ve 14.7 mL   Spont Ve 0 L   I:E Ratio Measured 1:1.50   Conventional Ventilator Alarms   Ve High Alarm 22 L/min   Resp Rate High Alarm 60 br/min   Press High Alarm 60 cmH2O   Apnea Rate 10   Apnea Volume (mL) 450 mL   Apnea Oxygen Concentration  100   Apnea Flow Rate (L/min) 75   T Apnea 20 sec(s)   Ready to Wean/Extubation Screen   FIO2<=50 (chart decimal) 0.5   MV<16L (chart vol.) 14.7   PEEP <=8 (chart #) 5   Ready to Wean Parameters   F/VT Ratio<105 (RSBI) (!) 81.97

## 2020-04-06 NOTE — PROGRESS NOTES
Pharmacokinetic Assessment Follow Up: IV Vancomycin    Vancomycin serum concentration assessment(s):    The random level was drawn correctly and can be used to guide therapy at this time. The measurement is above the desired definitive target range of 15 to 20 mcg/mL.    Vancomycin Regimen Plan:    1 - Patient is receiving HD today. No dose will be ordered postHD today.   2 - Re-dose when the random level is less than 20 mcg/mL, next level to be drawn on 04/07/20 with AM Labs.    Drug levels (last 3 results):  Recent Labs   Lab Result Units 04/06/20  0244   Vancomycin, Random ug/mL 30.2       Pharmacy will continue to follow and monitor vancomycin.    Please contact pharmacy at extension [phone] for questions regarding this assessment.    Thank you for the consult,   Jose R Francis       Patient brief summary:  Maria Victoria Hernandez is a 54 y.o. female initiated on antimicrobial therapy with IV Vancomycin for treatment of lower respiratory infection.    The patient is being dosed by level.     Drug Allergies:   Review of patient's allergies indicates:  No Known Allergies    Actual Body Weight:   115.3 kg (254 lb 3.1 oz)    Renal Function:   Estimated Creatinine Clearance: 14.8 mL/min (A) (based on SCr of 5.2 mg/dL (H)).,     Dialysis Method (if applicable):  intermittent HD    CBC (last 72 hours):  Recent Labs   Lab Result Units 04/04/20  0339 04/05/20 0249 04/05/20  0951 04/06/20  0244   WBC K/uL 9.74 12.02 17.89* 12.75*   Hemoglobin g/dL 9.1* 8.9* 9.9* 8.3*   Hematocrit % 28.9* 27.6* 31.6* 26.1*   Platelets K/uL 309 307 336 270   Gran% % 80.0* 93.0* 64.0 87.0*   Lymph% % 9.0* 5.0* 8.0* 5.0*   Mono% % 5.0 1.0* 2.0* 4.0   Eosinophil% % 3.0 0.0 0.0 0.0   Basophil% % 0.0 0.0 0.0 0.0   Differential Method  Manual Manual Manual Manual       Metabolic Panel (last 72 hours):  Recent Labs   Lab Result Units 04/04/20  0339 04/04/20  0902 04/05/20  0249 04/06/20  0244   Sodium mmol/L 132*  --  130* 126*   Potassium  mmol/L 4.0  --  4.0 4.1   Chloride mmol/L 98  --  98 94*   CO2 mmol/L 21*  --  18* 15*   Glucose mg/dL 102  --  107 98   Glucose, UA   --  Negative  --   --    BUN, Bld mg/dL 34*  --  56* 68*   Creatinine mg/dL 3.9*  --  4.8* 5.2*   Albumin g/dL 2.4*  --  2.3* 2.1*   Total Bilirubin mg/dL 0.7  --  1.0 2.2*   Alkaline Phosphatase U/L 160*  --  188* 197*   AST U/L 25  --  28 35   ALT U/L 28  --  30 33       Vancomycin Administrations:  vancomycin given in the last 96 hours                     vancomycin (VANCOCIN) 1,750 mg in dextrose 5 % 500 mL IVPB (mg) 1,750 mg New Bag 04/05/20 1200                      Microbiologic Results:  Microbiology Results (last 7 days)       Procedure Component Value Units Date/Time    Culture, Respiratory with Gram Stain [[id number]] Collected:  04/05/20 0050    Order Status:  Completed Specimen:  Respiratory from Endotracheal Aspirate Updated:  04/06/20 0809     Respiratory Culture Insufficient incubation, culture in progress     Gram Stain (Respiratory) <10 epithelial cells per low power field.     Gram Stain (Respiratory) Many WBC's     Gram Stain (Respiratory) Rare Gram positive cocci     Gram Stain (Respiratory) Rare yeast    Blood culture [[id number]] Collected:  04/06/20 0806    Order Status:  Sent Specimen:  Blood Updated:  04/06/20 0807    Blood culture [[id number]] Collected:  04/04/20 0912    Order Status:  Completed Specimen:  Blood from Line, Central Updated:  04/06/20 0304     Blood Culture, Routine Gram stain peds bottle: budding yeast      Results called to and read back by: Carri Pryor RN  04/06/2020  03:04    Blood culture [[id number]] Collected:  04/05/20 0813    Order Status:  Completed Specimen:  Blood from Line, Central Left  Neck Updated:  04/05/20 1745     Blood Culture, Routine No Growth to date    Blood culture [[id number]] Collected:  04/05/20 0813    Order Status:  Completed Specimen:  Blood from Peripheral, Left  Hand Updated:  04/05/20 1745     Blood Culture,  Routine No Growth to date    Blood culture [[id number]]     Order Status:  Canceled Specimen:  Blood     Urine Culture High Risk [[id number]]     Order Status:  Canceled Specimen:  Urine, Catheterized     Culture, Respiratory with Gram Stain [[id number]]     Order Status:  Canceled Specimen:  Respiratory from Tracheal Aspirate     Blood culture [[id number]] Collected:  03/26/20 0432    Order Status:  Completed Specimen:  Blood Updated:  03/30/20 1214     Blood Culture, Routine No Growth after 4 days.     Blood culture [[id number]] Collected:  03/26/20 0612    Order Status:  Completed Specimen:  Blood Updated:  03/30/20 1214     Blood Culture, Routine No Growth after 4 days.     Blood culture x two cultures. Draw prior to antibiotics. [[id number]] Collected:  03/25/20 1411    Order Status:  Completed Specimen:  Blood from Antecubital, Right  Arm Updated:  03/30/20 1212     Blood Culture, Routine No Growth after 4 days.     Narrative:       Aerobic and anaerobic

## 2020-04-06 NOTE — PROGRESS NOTES
Progress Note  PULMONARY    Admit Date: 3/25/2020   04/06/2020      SUBJECTIVE:     3/27- remains intubated, on vent. Was on Lasix drip overnight and now w/ IVF for UOP. On minimal Levophed   3/28- remains intubated, on PEEP 14/FiO2 50%. Levophed 0.06 mcg/kg/min  3/29- remains intubated, PEEP 12, FiO2 40%. Levophed off  3/30- remains intubated, PEEP 8, FiO2 40%. Levophed off. Started HD yesterday and having second session today. Daughter came to visit (works on 3rd floor), and updated this am  3/31- remains intubated, PEEP 8/FiO2 40%. HD yesterday w/ removal of 3.5L. With SBT yesterday not following commands and vomited stomach contents  4/1- remains intubated, PEEP 8/FiO2 40%. Not waking up or following commands off sedation- even w/ daughter at bedside, failed SBT due to tachypnea. Levophed at 0.02 mcg/kg/min. Had HD yesterday w/ removal of 2.5L. Had head CT. EEG pending  4/2- remains intubated, PEEP 6, FiO2 40%. w/ hemodynamic instability yesterday-- hypertensive then very hypotensive when tried to move her, which delayed weaning and MRI. Now off Levophed. HD yesterday w/ removal of 3.5L  4/3not arousing,  4/4 arousing somewhat- looks air hungry,  4/5- no c/o,sedated  4/6-  Remains intubated,  PEEP 5 FiO2 50%. On Nicardipine for HTN. HD this am. On CPAP trial since 4am w/ good gas exchange. Opens eyes and looks around but not following commands    PFSH and Allergies reviewed.    OBJECTIVE:     Vitals (Most recent):  Vitals:    04/06/20 0930   BP:    Pulse: 90   Resp: 17   Temp: 97.2 °F (36.2 °C)       Vitals (24 hour range):  Temp:  [97 °F (36.1 °C)-100.7 °F (38.2 °C)]   Pulse:  []   Resp:  [16-36]   BP: ()/(45-72)   SpO2:  [93 %-100 %]   Arterial Line BP: ()/(42-70)       Intake/Output Summary (Last 24 hours) at 4/6/2020 1013  Last data filed at 4/6/2020 0821  Gross per 24 hour   Intake 825.17 ml   Output 1025 ml   Net -199.83 ml        Vitals (24 hour range):  Temp:  [98.4 °F (36.9 °C)-99.4 °F  (37.4 °C)]   Pulse:  []   Resp:  [34-36]   BP: ()/()   SpO2:  [96 %-100 %]   Arterial Line BP: (125-204)/(52-80)       Intake/Output Summary (Last 24 hours) at 4/3/2020 1048  Last data filed at 4/3/2020 1015  Gross per 24 hour   Intake 2354.11 ml   Output 775 ml   Net 1579.11 ml          Physical Exam:  The patient's neuro status (alertness,orientation,cognitive function,motor skills,), pharyngeal exam (oral lesions, hygiene, abn dentition,), Neck (jvd,mass,thyroid,nodes in neck and above/below clavicle),RESPIRATORY(symmetry,effort,fremitus,percussion,auscultation),  Cor(rhythm,heart tones including gallops,perfusion,edema)ABD(distention,hepatic&splenomegaly,tenderness,masses), Skin(rash,cyanosis),Psyc(affect,judgement,).  Exam negative except for these pertinent findings:    Awake, opens eyes and tracks  R eye w/ injected sclera and edematous, red conjunctiva sticking out. Pupil 4mm right and 3mm left  Does not follow commands  Examined on HD  Lungs clear  RR in low 20s, work of breathing looks acceptable  Edematous bilateral upper ext and legs      Radiographs reviewed:  cxr 4/5 ARDS + pulmonary edema  CXR 4/2- bibasilar fluffy opacities and pulmonary edema, similar appearance  CT head 3/31  1.  There are slight limitations related to moderate patient tilted in the scanner and mild patient motion but there is no obvious acute abnormality.  There is no hemorrhage, mass, mass effect or obvious acute edema or ischemia.  It should be noted that MRI is more sensitive in the detection of subtle or acute nonhemorrhagic ischemic disease.    2.  Support tubing is seen in the left nares.  Bilateral mastoid and middle ear effusions are likely related to support tubing and/or intubation.  Correlate clinically.  The same is true for changes throughout the paranasal sinuses.    CXR 3/31- improving bibasilar opacities  CXR 3/29- unchanged  CXR 3/27- bilateral mid and lower lobe fluffy airspace disease  CXR  3/26- increased consolidation RLL    TTE 3/27  · Mild left ventricular enlargement.  · Mildly decreased left ventricular systolic function. The estimated ejection fraction is 45%.  · Grade I (mild) left ventricular diastolic dysfunction consistent with impaired relaxation.  · Normal right ventricular systolic function.  · Mild left atrial enlargement.  · Moderate mitral regurgitation.  · Mild tricuspid regurgitation.  · Mild pulmonary hypertension present. PASP 40 mm of Hg.    Labs     Recent Labs   Lab 04/06/20  0244   WBC 12.75*   HGB 8.3*   HCT 26.1*      BAND 4.0     Recent Labs   Lab 04/06/20  0244   *   K 4.1   CL 94*   CO2 15*   BUN 68*   CREATININE 5.2*   GLU 98   CALCIUM 8.3*   AST 35   ALT 33   ALKPHOS 197*   BILITOT 2.2*   PROT 7.4   ALBUMIN 2.1*     Recent Labs   Lab 04/06/20  0509   PH 7.305*   PCO2 36.2   PO2 82   HCO3 18.0*     Microbiology Results (last 7 days)     Procedure Component Value Units Date/Time    Blood culture [[id number]] Collected:  04/05/20 0813    Order Status:  Completed Specimen:  Blood from Peripheral, Left  Hand Updated:  04/06/20 0959     Blood Culture, Routine Gram stain peds bottle: Gram positive cocci in clusters resembling Staph       Results called to and read back by: Lynn Sawyer RN 04/06/2020  09:57    Blood culture [[id number]] Collected:  04/05/20 0813    Order Status:  Completed Specimen:  Blood from Line, Central Left  Neck Updated:  04/06/20 0957     Blood Culture, Routine Gram stain tank bottle: Gram positive cocci in clusters resembling Staph       Results called to and read back by: Lynn Sawyer RN 04/06/2020  09:57    Culture, Respiratory with Gram Stain [[id number]] Collected:  04/05/20 0050    Order Status:  Completed Specimen:  Respiratory from Endotracheal Aspirate Updated:  04/06/20 0809     Respiratory Culture Insufficient incubation, culture in progress     Gram Stain (Respiratory) <10 epithelial cells per low power field.     Gram Stain  (Respiratory) Many WBC's     Gram Stain (Respiratory) Rare Gram positive cocci     Gram Stain (Respiratory) Rare yeast    Blood culture [[id number]] Collected:  04/06/20 0806    Order Status:  Sent Specimen:  Blood Updated:  04/06/20 0807    Blood culture [[id number]] Collected:  04/04/20 0912    Order Status:  Completed Specimen:  Blood from Line, Central Updated:  04/06/20 0304     Blood Culture, Routine Gram stain peds bottle: budding yeast      Results called to and read back by: Carri Pryor RN  04/06/2020  03:04    Blood culture [[id number]]     Order Status:  Canceled Specimen:  Blood     Urine Culture High Risk [[id number]]     Order Status:  Canceled Specimen:  Urine, Catheterized     Culture, Respiratory with Gram Stain [[id number]]     Order Status:  Canceled Specimen:  Respiratory from Tracheal Aspirate     Blood culture [[id number]] Collected:  03/26/20 0432    Order Status:  Completed Specimen:  Blood Updated:  03/30/20 1214     Blood Culture, Routine No Growth after 4 days.     Blood culture [[id number]] Collected:  03/26/20 0612    Order Status:  Completed Specimen:  Blood Updated:  03/30/20 1214     Blood Culture, Routine No Growth after 4 days.     Blood culture x two cultures. Draw prior to antibiotics. [[id number]] Collected:  03/25/20 1411    Order Status:  Completed Specimen:  Blood from Antecubital, Right  Arm Updated:  03/30/20 1212     Blood Culture, Routine No Growth after 4 days.     Narrative:       Aerobic and anaerobic        Results for WOLF SINGER (MRN [id number]) as of 4/1/2020 09:28   Ref. Range 4/1/2020 02:53   ALT Latest Ref Range: 10 - 44 U/L 54 (H)     Impression:  Active Hospital Problems    Diagnosis  POA    *Acute respiratory failure [J96.00]  Yes    Pneumonia, ventilator associated [J95.851]  No    Acute encephalopathy [G93.40]  No    Acute renal failure [N17.9]  No    ARDS (adult respiratory distress syndrome) [J80]  Yes    COVID-19 virus infection [U07.1]  Yes     Morbid obesity [E66.01]  Yes    Hypothyroid [E03.9]  Yes    COVID-19 virus detected [U07.1]  Yes    Diverticulosis of large intestine without hemorrhage [K57.30]  Yes    Iron deficiency anemia, unspecified [D50.9]  Yes      Resolved Hospital Problems   No resolved problems to display.               Plan:   Acute hypoxemic respiratory failure, stable to improving O2 reqt  COVID-19 pneumonia/ARDS  Acute renal failure, on HD  Metabolic acidosis due to renal failure  Shock, resolved   HTN  Combined heart failure, EF 45%  Morbid obesity  Emesis, poor tolerance of tube feeds  Acute encephalopathy  Right conjunctivitis  Bacteremia, possible line infection      - O2 requirements improved today and good gas exchange after SBT-- extubate  - mental status tenuous but opening eyes and tracking  - continue Nicardipine drip to control BP  - continue HD per nephro  - neuro recommendations reviewed  - has had adequate course (7d) of azithromycin, ceftriaxone, hydroxychloroquine  - ID recommendations reviewed  - on Daptomycin, Zosyn, micafungin  - central line being changed  - discussed with hospitalist      The following were evaluated and adjusted as needed: mechanical ventilator settings and weaning status, sedation and neurologic status, antibiotics, hemodynamics, support tubes and access lines and invasive monitoring, acid base balance and oxygenation needs and input and output and renal status       Critical Care  - THE PATIENT HAS A HIGH PROBABILITY OF IMMINENT OR LIFE THREATENING DETERIORATION.  Over 50%time of encounter was in direct care at bedside.  Time was 30 to 74 minutes required for patient care.  Time needed for all of the above totaled 35 minutes.      Milvia Mcguire MD  Pulmonary & Critical Care Medicine

## 2020-04-06 NOTE — PLAN OF CARE
Plan of care is progressing. Changed out both right quad and trialysis catheters and tips sent to lab. Tmax 100 - remains on cooling blanket. Cpap with PS throughout shift. Fentanyl weaned off with success. To extubate this shift. BP managed with Cardene. -130s. Will look towards face when name is called but will not follow commands. Daughter updated.

## 2020-04-06 NOTE — CONSULTS
Consult Note  Infectious Disease    Reason for Consult:      HPI: Maria Victoria Hernandez is a   54 y.o. female employee of [employer].  with past medical history of morbid obesity, BMI of 48, diabetes, diet controlled, anemia, B12 deficiency, vitamin-D deficiency, hypothyroidism, was admitted on 03/25/2020 due to shortness of breath, diagnosed with COVID 19 is positive.  Patient has been intubated.  She went into renal failure and has been receiving HD by nephrologist.  Intensivist has been managing the vent.    Patient has completed 10 days of hydroxychloroquine and about 8 days of Zithromax and ceftriaxone.  She started spiking fever of  103 on 04/04.  White count jumped to 17.8.  She was started on vancomycin and Zosyn and blood cultures were sent.    ID consult called for g positives and yeast in blood cultures.  Patient is afebrile at the time.  WBC has improved.  Discussed with nurse.  Will discontinue lines.  Continue vancomycin, Daptomycin x1.  Add micafungin daily.  Antibiotics (From admission, onward)    Start     Stop Route Frequency Ordered    04/06/20 1115  DAPTOmycin (CUBICIN) 1,000 mg in sodium chloride 0.9% IVPB      -- IV Once 04/06/20 0959    04/05/20 0900  piperacillin-tazobactam 4.5 g in dextrose 5 % 100 mL IVPB (ready to mix system)      -- IV Every 12 hours (non-standard times) 04/05/20 0748    04/05/20 0848  vancomycin - pharmacy to dose  (vancomycin IVPB)      -- IV pharmacy to manage frequency 04/05/20 0748    04/02/20 2315  gentamicin 0.3 % ophthalmic solution 1 drop      -- RIGHT EYE Every 4 hours 04/02/20 2310        Antifungals (From admission, onward)    Start     Stop Route Frequency Ordered    04/06/20 1100  micafungin 100 mg in sodium chloride 0.9 % 100 mL IVPB (ready to mix system)      -- IV Every 24 hours (non-standard times) 04/06/20 0959        Antivirals (From admission, onward)    None          Review of patient's allergies indicates:  No Known  Allergies  Past Medical History:   Diagnosis Date    Colon polyp     Diverticulosis large intestine w/o perforation or abscess w/bleeding     Iron deficiency anemia     Prediabetes     Thyroid disease     Vitamin B 12 deficiency     Vitamin D deficiency      Past Surgical History:   Procedure Laterality Date    TUBAL LIGATION       Social History     Socioeconomic History    Marital status:      Spouse name: Not on file    Number of children: Not on file    Years of education: Not on file    Highest education level: Not on file   Occupational History    Not on file   Social Needs    Financial resource strain: Not on file    Food insecurity:     Worry: Not on file     Inability: Not on file    Transportation needs:     Medical: Not on file     Non-medical: Not on file   Tobacco Use    Smoking status: Never Smoker    Smokeless tobacco: Never Used   Substance and Sexual Activity    Alcohol use: No    Drug use: Not on file    Sexual activity: Not on file   Lifestyle    Physical activity:     Days per week: Not on file     Minutes per session: Not on file    Stress: Not on file   Relationships    Social connections:     Talks on phone: Not on file     Gets together: Not on file     Attends Caodaism service: Not on file     Active member of club or organization: Not on file     Attends meetings of clubs or organizations: Not on file     Relationship status: Not on file   Other Topics Concern    Not on file   Social History Narrative    Not on file     Family History   Problem Relation Age of Onset    Heart disease Mother     Heart disease Maternal Grandmother     Heart disease Maternal Grandfather     Mental illness Paternal Grandmother        Pertinent medications noted:     Review of Systems:na      EXAM & DIAGNOSTICS REVIEWED:   Vitals:     Temp:  [97 °F (36.1 °C)-100.7 °F (38.2 °C)]   Temp: 98.4 °F (36.9 °C) (04/06/20 1130)  Pulse: (!) 115 (04/06/20 1130)  Resp: (!) 23 (04/06/20  1130)  BP: (!) 114/56 (04/06/20 1100)  SpO2: 100 % (04/06/20 1130)    Intake/Output Summary (Last 24 hours) at 4/6/2020 1253  Last data filed at 4/6/2020 0821  Gross per 24 hour   Intake 825.17 ml   Output 1025 ml   Net -199.83 ml       General:  In NAD.  I feel she close the it open eyes to follow my instructions.   Eyes:  Anicteric, right eye thickened edematous conjunctiva  ENT:  No ulcers, exudates, thrush, nares patent, OT tube in place  Neck:  supple, no masses or adenopathy appreciated  Lungs: Clear, no consolidation, rales, wheezes, rub  Heart:  RRR, no gallop/murmur/rub noted  Abd:  Soft, NT, ND, normal BS, no masses or organomegaly appreciated.  :  Cuellar  Musc:  Joints without effusion, swelling, erythema, synovitis, muscle wasting.   Skin:  No rashes. No palmar or plantar lesions. No subungual petechiae  Wound:   Neuro: Cannot move her arms and legs cannot move her toes and fingers when asked her to do so.  I felt she closed her eyes at my instruction  Psych:  Calm  Lymphatic:     No cervical, supraclavicular, axillary, or inguinal nodes  Extrem: No edema, erythema, phlebitis, cellulitis, warm and well perfused  VAD:       Isolation:      Lines/Tubes/Drains:  Trialysis catheter 03/27/2020.      General Labs reviewed:  Recent Labs   Lab 04/05/20  0249 04/05/20  0951 04/06/20  0244   WBC 12.02 17.89* 12.75*   HGB 8.9* 9.9* 8.3*   HCT 27.6* 31.6* 26.1*    336 270       Recent Labs   Lab 04/04/20  0339 04/05/20  0249 04/06/20  0244   * 130* 126*   K 4.0 4.0 4.1   CL 98 98 94*   CO2 21* 18* 15*   BUN 34* 56* 68*   CREATININE 3.9* 4.8* 5.2*   CALCIUM 9.2 8.8 8.3*   PROT 8.0 7.7 7.4   BILITOT 0.7 1.0 2.2*   ALKPHOS 160* 188* 197*   ALT 28 30 33   AST 25 28 35           Micro:  Microbiology Results (last 7 days)     Procedure Component Value Units Date/Time    Blood culture [[id number]] Collected:  04/06/20 0806    Order Status:  Sent Specimen:  Blood from Antecubital, Left Updated:  04/06/20 1200     IV catheter culture [[id number]]     Order Status:  No result Specimen:  Catheter Tip, Peripheral     IV catheter culture [[id number]]     Order Status:  No result Specimen:  Catheter Tip     Blood culture [[id number]] Collected:  04/05/20 0813    Order Status:  Completed Specimen:  Blood from Peripheral, Left  Hand Updated:  04/06/20 0959     Blood Culture, Routine Gram stain peds bottle: Gram positive cocci in clusters resembling Staph       Results called to and read back by: Lynn Sawyer RN 04/06/2020  09:57    Blood culture [[id number]] Collected:  04/05/20 0813    Order Status:  Completed Specimen:  Blood from Line, Central Left  Neck Updated:  04/06/20 0957     Blood Culture, Routine Gram stain tank bottle: Gram positive cocci in clusters resembling Staph       Results called to and read back by: Lynn Sawyer RN 04/06/2020  09:57    Culture, Respiratory with Gram Stain [[id number]] Collected:  04/05/20 0050    Order Status:  Completed Specimen:  Respiratory from Endotracheal Aspirate Updated:  04/06/20 0809     Respiratory Culture Insufficient incubation, culture in progress     Gram Stain (Respiratory) <10 epithelial cells per low power field.     Gram Stain (Respiratory) Many WBC's     Gram Stain (Respiratory) Rare Gram positive cocci     Gram Stain (Respiratory) Rare yeast    Blood culture [[id number]] Collected:  04/04/20 0912    Order Status:  Completed Specimen:  Blood from Line, Central Updated:  04/06/20 0304     Blood Culture, Routine Gram stain peds bottle: budding yeast      Results called to and read back by: Carri Pryor RN  04/06/2020  03:04    Blood culture [[id number]]     Order Status:  Canceled Specimen:  Blood     Urine Culture High Risk [[id number]]     Order Status:  Canceled Specimen:  Urine, Catheterized     Culture, Respiratory with Gram Stain [[id number]]     Order Status:  Canceled Specimen:  Respiratory from Tracheal Aspirate         Imaging Reviewed:   CXR 04/05/2020 No significant  interval change in the bilateral airspace disease.  Support devices in stable position.    Cardiology:  Sinus rhythm    IMPRESSION & PLAN     Fever on 04/04  Gram-positive bacteremia  Yeast in blood cultures    Respiratory failure, ARDS, COVID  This patient is high risk for life-threatening deterioration and death secondary to above comorbidities and need for IV treatment Critical care 35 min    Recommendations:   Will discontinue lines---Stent catheter tips for culture  Continue vancomycin,   Daptomycin x1.   Add micafungin daily.

## 2020-04-06 NOTE — NURSING
Call received from [facility] to report a positive blood culture budding yeast from the pediatric bottle collected 4/4/2020 at 0912. Result reported to TIFF Rose NP.

## 2020-04-06 NOTE — NURSING
Cleaned right eye with sterile water. Gentamicin eye drop placed. Lacri-lube with telfa and paper tape placed over eye to keep moist. Spoke with Dr Mcguire regarding tachycardia, tachypnia and agitation. Will place back on a rate and attempt extubation in a.m.

## 2020-04-07 PROBLEM — J96.01 ACUTE RESPIRATORY FAILURE WITH HYPOXIA: Status: ACTIVE | Noted: 2020-03-25

## 2020-04-07 LAB
ALBUMIN SERPL BCP-MCNC: 2 G/DL (ref 3.5–5.2)
ALLENS TEST: ABNORMAL
ALLENS TEST: ABNORMAL
ALP SERPL-CCNC: 222 U/L (ref 55–135)
ALT SERPL W/O P-5'-P-CCNC: 30 U/L (ref 10–44)
ANION GAP SERPL CALC-SCNC: 16 MMOL/L (ref 8–16)
AST SERPL-CCNC: 36 U/L (ref 10–40)
BASOPHILS # BLD AUTO: 0.04 K/UL (ref 0–0.2)
BASOPHILS NFR BLD: 0.4 % (ref 0–1.9)
BILIRUB SERPL-MCNC: 2.6 MG/DL (ref 0.1–1)
BUN SERPL-MCNC: 49 MG/DL (ref 6–20)
CALCIUM SERPL-MCNC: 8.1 MG/DL (ref 8.7–10.5)
CHLORIDE SERPL-SCNC: 93 MMOL/L (ref 95–110)
CO2 SERPL-SCNC: 19 MMOL/L (ref 23–29)
CREAT SERPL-MCNC: 4.3 MG/DL (ref 0.5–1.4)
DELSYS: ABNORMAL
DELSYS: ABNORMAL
DIFFERENTIAL METHOD: ABNORMAL
EOSINOPHIL # BLD AUTO: 0 K/UL (ref 0–0.5)
EOSINOPHIL NFR BLD: 0.2 % (ref 0–8)
ERYTHROCYTE [DISTWIDTH] IN BLOOD BY AUTOMATED COUNT: 17.2 % (ref 11.5–14.5)
ERYTHROCYTE [SEDIMENTATION RATE] IN BLOOD BY WESTERGREN METHOD: 28 MM/H
ERYTHROCYTE [SEDIMENTATION RATE] IN BLOOD BY WESTERGREN METHOD: 35 MM/H
EST. GFR  (AFRICAN AMERICAN): 13 ML/MIN/1.73 M^2
EST. GFR  (NON AFRICAN AMERICAN): 11 ML/MIN/1.73 M^2
FERRITIN SERPL-MCNC: 1897 NG/ML (ref 20–300)
FIO2: 35
FIO2: 50
GLUCOSE SERPL-MCNC: 103 MG/DL (ref 70–110)
HCO3 UR-SCNC: 17.2 MMOL/L (ref 24–28)
HCO3 UR-SCNC: 23.2 MMOL/L (ref 24–28)
HCT VFR BLD AUTO: 24 % (ref 37–48.5)
HGB BLD-MCNC: 8.1 G/DL (ref 12–16)
IMM GRANULOCYTES # BLD AUTO: 0.21 K/UL (ref 0–0.04)
IMM GRANULOCYTES NFR BLD AUTO: 2 % (ref 0–0.5)
LYMPHOCYTES # BLD AUTO: 0.7 K/UL (ref 1–4.8)
LYMPHOCYTES NFR BLD: 6.7 % (ref 18–48)
MCH RBC QN AUTO: 27 PG (ref 27–31)
MCHC RBC AUTO-ENTMCNC: 33.8 G/DL (ref 32–36)
MCV RBC AUTO: 80 FL (ref 82–98)
MODE: ABNORMAL
MODE: ABNORMAL
MONOCYTES # BLD AUTO: 0.7 K/UL (ref 0.3–1)
MONOCYTES NFR BLD: 6.4 % (ref 4–15)
NEUTROPHILS # BLD AUTO: 9 K/UL (ref 1.8–7.7)
NEUTROPHILS NFR BLD: 84.3 % (ref 38–73)
NRBC BLD-RTO: 0 /100 WBC
PCO2 BLDA: 23.5 MMHG (ref 35–45)
PCO2 BLDA: 35.5 MMHG (ref 35–45)
PEEP: 5
PEEP: 5
PH SMN: 7.42 [PH] (ref 7.35–7.45)
PH SMN: 7.47 [PH] (ref 7.35–7.45)
PLATELET # BLD AUTO: 289 K/UL (ref 150–350)
PMV BLD AUTO: 11.7 FL (ref 9.2–12.9)
PO2 BLDA: 102 MMHG (ref 80–100)
PO2 BLDA: 117 MMHG (ref 80–100)
POC BE: -1 MMOL/L
POC BE: -6 MMOL/L
POC SATURATED O2: 98 % (ref 95–100)
POC SATURATED O2: 99 % (ref 95–100)
POC TCO2: 18 MMOL/L (ref 23–27)
POC TCO2: 24 MMOL/L (ref 23–27)
POTASSIUM SERPL-SCNC: 3.4 MMOL/L (ref 3.5–5.1)
PROT SERPL-MCNC: 7.5 G/DL (ref 6–8.4)
PYRIDOXAL SERPL-MCNC: <2 UG/L (ref 5–50)
RBC # BLD AUTO: 3 M/UL (ref 4–5.4)
SAMPLE: ABNORMAL
SAMPLE: ABNORMAL
SITE: ABNORMAL
SITE: ABNORMAL
SODIUM SERPL-SCNC: 128 MMOL/L (ref 136–145)
VANCOMYCIN SERPL-MCNC: 17.4 UG/ML
VT: 350
WBC # BLD AUTO: 10.64 K/UL (ref 3.9–12.7)

## 2020-04-07 PROCEDURE — 85025 COMPLETE CBC W/AUTO DIFF WBC: CPT

## 2020-04-07 PROCEDURE — 99900026 HC AIRWAY MAINTENANCE (STAT)

## 2020-04-07 PROCEDURE — 25000003 PHARM REV CODE 250: Performed by: INTERNAL MEDICINE

## 2020-04-07 PROCEDURE — 80202 ASSAY OF VANCOMYCIN: CPT

## 2020-04-07 PROCEDURE — 94770 HC EXHALED C02 TEST: CPT

## 2020-04-07 PROCEDURE — 20000000 HC ICU ROOM

## 2020-04-07 PROCEDURE — 82728 ASSAY OF FERRITIN: CPT

## 2020-04-07 PROCEDURE — 37799 UNLISTED PX VASCULAR SURGERY: CPT

## 2020-04-07 PROCEDURE — 99291 PR CRITICAL CARE, E/M 30-74 MINUTES: ICD-10-PCS | Mod: S$GLB,,, | Performed by: INTERNAL MEDICINE

## 2020-04-07 PROCEDURE — 63600175 PHARM REV CODE 636 W HCPCS: Performed by: INTERNAL MEDICINE

## 2020-04-07 PROCEDURE — 80053 COMPREHEN METABOLIC PANEL: CPT

## 2020-04-07 PROCEDURE — 94003 VENT MGMT INPAT SUBQ DAY: CPT

## 2020-04-07 PROCEDURE — 27000221 HC OXYGEN, UP TO 24 HOURS

## 2020-04-07 PROCEDURE — 63600175 PHARM REV CODE 636 W HCPCS: Performed by: HOSPITALIST

## 2020-04-07 PROCEDURE — 99291 CRITICAL CARE FIRST HOUR: CPT | Mod: ,,, | Performed by: INTERNAL MEDICINE

## 2020-04-07 PROCEDURE — C9113 INJ PANTOPRAZOLE SODIUM, VIA: HCPCS | Performed by: INTERNAL MEDICINE

## 2020-04-07 PROCEDURE — 11000001 HC ACUTE MED/SURG PRIVATE ROOM

## 2020-04-07 PROCEDURE — 99900035 HC TECH TIME PER 15 MIN (STAT)

## 2020-04-07 PROCEDURE — 94761 N-INVAS EAR/PLS OXIMETRY MLT: CPT

## 2020-04-07 PROCEDURE — 82803 BLOOD GASES ANY COMBINATION: CPT

## 2020-04-07 PROCEDURE — 99291 CRITICAL CARE FIRST HOUR: CPT | Mod: S$GLB,,, | Performed by: INTERNAL MEDICINE

## 2020-04-07 PROCEDURE — 99291 PR CRITICAL CARE, E/M 30-74 MINUTES: ICD-10-PCS | Mod: ,,, | Performed by: INTERNAL MEDICINE

## 2020-04-07 PROCEDURE — 25000003 PHARM REV CODE 250: Performed by: HOSPITALIST

## 2020-04-07 PROCEDURE — 36415 COLL VENOUS BLD VENIPUNCTURE: CPT

## 2020-04-07 PROCEDURE — 87040 BLOOD CULTURE FOR BACTERIA: CPT | Mod: 59

## 2020-04-07 RX ORDER — MUPIROCIN 20 MG/G
OINTMENT TOPICAL 2 TIMES DAILY
Status: DISCONTINUED | OUTPATIENT
Start: 2020-04-07 | End: 2020-04-12

## 2020-04-07 RX ORDER — DEXMEDETOMIDINE HYDROCHLORIDE 4 UG/ML
0.2 INJECTION, SOLUTION INTRAVENOUS CONTINUOUS
Status: DISCONTINUED | OUTPATIENT
Start: 2020-04-07 | End: 2020-04-13

## 2020-04-07 RX ORDER — HEPARIN SODIUM 5000 [USP'U]/ML
5000 INJECTION, SOLUTION INTRAVENOUS; SUBCUTANEOUS EVERY 12 HOURS
Status: DISCONTINUED | OUTPATIENT
Start: 2020-04-07 | End: 2020-04-21 | Stop reason: HOSPADM

## 2020-04-07 RX ORDER — NOREPINEPHRINE BITARTRATE 0.03 MG/ML
0.02 INJECTION, SOLUTION INTRAVENOUS CONTINUOUS
Status: DISCONTINUED | OUTPATIENT
Start: 2020-04-07 | End: 2020-04-07

## 2020-04-07 RX ORDER — SODIUM CHLORIDE 9 MG/ML
INJECTION, SOLUTION INTRAVENOUS ONCE
Status: CANCELLED | OUTPATIENT
Start: 2020-04-07 | End: 2020-04-07

## 2020-04-07 RX ORDER — HEPARIN SODIUM 5000 [USP'U]/ML
5000 INJECTION, SOLUTION INTRAVENOUS; SUBCUTANEOUS
Status: CANCELLED | OUTPATIENT
Start: 2020-04-07

## 2020-04-07 RX ORDER — NOREPINEPHRINE BITARTRATE 0.03 MG/ML
0.02 INJECTION, SOLUTION INTRAVENOUS CONTINUOUS
Status: DISCONTINUED | OUTPATIENT
Start: 2020-04-07 | End: 2020-04-13

## 2020-04-07 RX ORDER — SODIUM CHLORIDE 9 MG/ML
INJECTION, SOLUTION INTRAVENOUS
Status: CANCELLED | OUTPATIENT
Start: 2020-04-07

## 2020-04-07 RX ADMIN — ACETAMINOPHEN 650 MG: 325 TABLET ORAL at 03:04

## 2020-04-07 RX ADMIN — GENTAMICIN SULFATE 1 DROP: 3 SOLUTION OPHTHALMIC at 05:04

## 2020-04-07 RX ADMIN — OXYCODONE HYDROCHLORIDE AND ACETAMINOPHEN 500 MG: 500 TABLET ORAL at 09:04

## 2020-04-07 RX ADMIN — OXYCODONE HYDROCHLORIDE AND ACETAMINOPHEN 500 MG: 500 TABLET ORAL at 04:04

## 2020-04-07 RX ADMIN — MINERAL OIL AND PETROLATUM: 150; 830 OINTMENT OPHTHALMIC at 08:04

## 2020-04-07 RX ADMIN — ZINC SULFATE 220 MG (50 MG) CAPSULE 220 MG: CAPSULE at 09:04

## 2020-04-07 RX ADMIN — DEXMEDETOMIDINE HYDROCHLORIDE 0.2 MCG/KG/HR: 100 INJECTION, SOLUTION, CONCENTRATE INTRAVENOUS at 01:04

## 2020-04-07 RX ADMIN — DEXMEDETOMIDINE HYDROCHLORIDE 0.5 MCG/KG/HR: 100 INJECTION, SOLUTION, CONCENTRATE INTRAVENOUS at 05:04

## 2020-04-07 RX ADMIN — GENTAMICIN SULFATE 1 DROP: 3 SOLUTION OPHTHALMIC at 01:04

## 2020-04-07 RX ADMIN — GENTAMICIN SULFATE 1 DROP: 3 SOLUTION OPHTHALMIC at 07:04

## 2020-04-07 RX ADMIN — SODIUM CHLORIDE 0.02 MCG/KG/MIN: 0.9 INJECTION, SOLUTION INTRAVENOUS at 08:04

## 2020-04-07 RX ADMIN — GENTAMICIN SULFATE 1 DROP: 3 SOLUTION OPHTHALMIC at 02:04

## 2020-04-07 RX ADMIN — PANTOPRAZOLE SODIUM 40 MG: 40 INJECTION, POWDER, LYOPHILIZED, FOR SOLUTION INTRAVENOUS at 08:04

## 2020-04-07 RX ADMIN — GENTAMICIN SULFATE 1 DROP: 3 SOLUTION OPHTHALMIC at 09:04

## 2020-04-07 RX ADMIN — PIPERACILLIN AND TAZOBACTAM 4.5 G: 4; .5 INJECTION, POWDER, LYOPHILIZED, FOR SOLUTION INTRAVENOUS; PARENTERAL at 09:04

## 2020-04-07 RX ADMIN — PIPERACILLIN AND TAZOBACTAM 4.5 G: 4; .5 INJECTION, POWDER, LYOPHILIZED, FOR SOLUTION INTRAVENOUS; PARENTERAL at 08:04

## 2020-04-07 RX ADMIN — POLYETHYLENE GLYCOL 3350 17 G: 17 POWDER, FOR SOLUTION ORAL at 09:04

## 2020-04-07 RX ADMIN — LEVOTHYROXINE SODIUM 100 MCG: 100 TABLET ORAL at 07:04

## 2020-04-07 RX ADMIN — CALCITRIOL CAPSULES 0.25 MCG 0.25 MCG: 0.25 CAPSULE ORAL at 09:04

## 2020-04-07 RX ADMIN — PANTOPRAZOLE SODIUM 40 MG: 40 INJECTION, POWDER, LYOPHILIZED, FOR SOLUTION INTRAVENOUS at 09:04

## 2020-04-07 RX ADMIN — MUPIROCIN: 20 OINTMENT TOPICAL at 11:04

## 2020-04-07 RX ADMIN — NICARDIPINE HYDROCHLORIDE 1 MG/HR: 0.2 INJECTION, SOLUTION INTRAVENOUS at 11:04

## 2020-04-07 RX ADMIN — HEPARIN SODIUM 5000 UNITS: 5000 INJECTION, SOLUTION INTRAVENOUS; SUBCUTANEOUS at 08:04

## 2020-04-07 RX ADMIN — OXYCODONE HYDROCHLORIDE AND ACETAMINOPHEN 500 MG: 500 TABLET ORAL at 02:04

## 2020-04-07 RX ADMIN — OXYCODONE HYDROCHLORIDE AND ACETAMINOPHEN 500 MG: 500 TABLET ORAL at 08:04

## 2020-04-07 RX ADMIN — MUPIROCIN: 20 OINTMENT TOPICAL at 08:04

## 2020-04-07 RX ADMIN — MICAFUNGIN SODIUM 100 MG: 20 INJECTION, POWDER, LYOPHILIZED, FOR SOLUTION INTRAVENOUS at 11:04

## 2020-04-07 NOTE — PLAN OF CARE
Pt remains intubated/sedated. Max temp 101.8, tylenol given, responding well, core temp now 99.8. Pt will open eyes and respond to pain. CPAP trial attempted this am, pt did not tolerate well, back on rate. Sinus to sinus tach on monitor. A-line with good wave form noted, good distal circulation noted, correlates well with BP cuff. Cardene titrated off. Cuellar patent to gravity, urine dark alvarez to light alvarez with strands of blood. Safety maintained.

## 2020-04-07 NOTE — SUBJECTIVE & OBJECTIVE
Interval History:   Remains encephalopathic per nursing/pulm-  Temp to 101 this am early and tachycardia   WBC improved   Did not tolerate wean trial per pulm -avoiding versed     Review of Systems   Unable to perform ROS: Intubated     Objective:     Vital Signs (Most Recent):  Temp: 99.7 °F (37.6 °C) (04/07/20 1128)  Pulse: 105 (04/07/20 1128)  Resp: (!) 43 (04/07/20 1128)  BP: 113/67 (04/07/20 1015)  SpO2: 100 % (04/07/20 1128) Vital Signs (24h Range):  Temp:  [97.2 °F (36.2 °C)-101.7 °F (38.7 °C)] 99.7 °F (37.6 °C)  Pulse:  [] 105  Resp:  [20-52] 43  SpO2:  [82 %-100 %] 100 %  BP: ()/(37-68) 113/67  Arterial Line BP: ()/(44-67) 141/57     Weight: 113.4 kg (250 lb)  Body mass index is 47.24 kg/m².    Intake/Output Summary (Last 24 hours) at 4/7/2020 1343  Last data filed at 4/7/2020 0904  Gross per 24 hour   Intake 1557.25 ml   Output 95 ml   Net 1462.25 ml      Physical Exam   Constitutional:   Intubated   HENT:   ET tube in place   Nursing note and vitals reviewed.  PATIENT WAS SEEN AS A VIDEO VISIT WITH NURSE ASSIST DURING THE VISIT. PHYSICAL EXAM FINDINGS ARE AS VIEWED BY MYSELF VIA VIDEO OR AS REPORTED BY NURSE IF SPECIFIED AS SUCH, EXAM NOT DONE PERSONALLY BY MYSELF AT BEDSIDE.  Not moving much per pulmonary report   Eye red/swollen -await photo       Significant Labs: All pertinent labs within the past 24 hours have been reviewed.    Significant Imaging: I have reviewed and interpreted all pertinent imaging results/findings within the past 24 hours.

## 2020-04-07 NOTE — PROGRESS NOTES
Progress Note  PULMONARY    Admit Date: 3/25/2020   04/07/2020      SUBJECTIVE:     3/27- remains intubated, on vent. Was on Lasix drip overnight and now w/ IVF for UOP. On minimal Levophed   3/28- remains intubated, on PEEP 14/FiO2 50%. Levophed 0.06 mcg/kg/min  3/29- remains intubated, PEEP 12, FiO2 40%. Levophed off  3/30- remains intubated, PEEP 8, FiO2 40%. Levophed off. Started HD yesterday and having second session today. Daughter came to visit (works on 3rd floor), and updated this am  3/31- remains intubated, PEEP 8/FiO2 40%. HD yesterday w/ removal of 3.5L. With SBT yesterday not following commands and vomited stomach contents  4/1- remains intubated, PEEP 8/FiO2 40%. Not waking up or following commands off sedation- even w/ daughter at bedside, failed SBT due to tachypnea. Levophed at 0.02 mcg/kg/min. Had HD yesterday w/ removal of 2.5L. Had head CT. EEG pending  4/2- remains intubated, PEEP 6, FiO2 40%. w/ hemodynamic instability yesterday-- hypertensive then very hypotensive when tried to move her, which delayed weaning and MRI. Now off Levophed. HD yesterday w/ removal of 3.5L  4/3not arousing,  4/4 arousing somewhat- looks air hungry,  4/5- no c/o,sedated  4/6-  Remains intubated,  PEEP 5 FiO2 50%. On Nicardipine for HTN. HD this am. On CPAP trial since 4am w/ good gas exchange. Opens eyes and looks around but not following commands  4/7- remains intubated, had central lines replaced yesterday. Became tachypneic and put back on vent later in afternoon but tolerated PS trial all day. Today tachypneic w/ increased work of breathing with weaning trial    PFSH and Allergies reviewed.    OBJECTIVE:     Vitals (Most recent):  Vitals:    04/07/20 0615   BP: (!) 92/48   Pulse: 97   Resp: (!) 35   Temp: 99.9 °F (37.7 °C)       Vitals (24 hour range):  Temp:  [97.2 °F (36.2 °C)-101.7 °F (38.7 °C)]   Pulse:  []   Resp:  [17-42]   BP: ()/(37-65)   SpO2:  [82 %-100 %]   Arterial Line BP:  ()/(44-68)       Intake/Output Summary (Last 24 hours) at 4/7/2020 0932  Last data filed at 4/7/2020 0600  Gross per 24 hour   Intake 1997.25 ml   Output 3095 ml   Net -1097.75 ml        Vitals (24 hour range):  Temp:  [98.4 °F (36.9 °C)-99.4 °F (37.4 °C)]   Pulse:  []   Resp:  [34-36]   BP: ()/()   SpO2:  [96 %-100 %]   Arterial Line BP: (125-204)/(52-80)       Intake/Output Summary (Last 24 hours) at 4/3/2020 1048  Last data filed at 4/3/2020 1015  Gross per 24 hour   Intake 2354.11 ml   Output 775 ml   Net 1579.11 ml          Physical Exam:  The patient's neuro status (alertness,orientation,cognitive function,motor skills,), pharyngeal exam (oral lesions, hygiene, abn dentition,), Neck (jvd,mass,thyroid,nodes in neck and above/below clavicle),RESPIRATORY(symmetry,effort,fremitus,percussion,auscultation),  Cor(rhythm,heart tones including gallops,perfusion,edema)ABD(distention,hepatic&splenomegaly,tenderness,masses), Skin(rash,cyanosis),Psyc(affect,judgement,).  Exam negative except for these pertinent findings:    Sedated, intubated, opens eyes and resists exam  R eye w/ injected sclera and edematous, erythematous conjunctiva- appears improved, covered w/ gauze & ointment  Does not follow commands  Lungs clear  Edematous bilateral upper ext and legs      Radiographs reviewed:  CXR 4/6- similar  cxr 4/5 ARDS + pulmonary edema  CXR 4/2- bibasilar fluffy opacities and pulmonary edema, similar appearance  CT head 3/31  1.  There are slight limitations related to moderate patient tilted in the scanner and mild patient motion but there is no obvious acute abnormality.  There is no hemorrhage, mass, mass effect or obvious acute edema or ischemia.  It should be noted that MRI is more sensitive in the detection of subtle or acute nonhemorrhagic ischemic disease.    2.  Support tubing is seen in the left nares.  Bilateral mastoid and middle ear effusions are likely related to support tubing and/or  intubation.  Correlate clinically.  The same is true for changes throughout the paranasal sinuses.    CXR 3/31- improving bibasilar opacities  CXR 3/29- unchanged  CXR 3/27- bilateral mid and lower lobe fluffy airspace disease  CXR 3/26- increased consolidation RLL    TTE 3/27  · Mild left ventricular enlargement.  · Mildly decreased left ventricular systolic function. The estimated ejection fraction is 45%.  · Grade I (mild) left ventricular diastolic dysfunction consistent with impaired relaxation.  · Normal right ventricular systolic function.  · Mild left atrial enlargement.  · Moderate mitral regurgitation.  · Mild tricuspid regurgitation.  · Mild pulmonary hypertension present. PASP 40 mm of Hg.    Labs     Recent Labs   Lab 04/07/20  0253   WBC 10.64   HGB 8.1*   HCT 24.0*        Recent Labs   Lab 04/07/20  0253   *   K 3.4*   CL 93*   CO2 19*   BUN 49*   CREATININE 4.3*      CALCIUM 8.1*   AST 36   ALT 30   ALKPHOS 222*   BILITOT 2.6*   PROT 7.5   ALBUMIN 2.0*     Recent Labs   Lab 04/07/20  0432   PH 7.473*   PCO2 23.5*   PO2 117*   HCO3 17.2*     Microbiology Results (last 7 days)     Procedure Component Value Units Date/Time    Blood culture [[id number]] Collected:  04/07/20 0907    Order Status:  Sent Specimen:  Blood Updated:  04/07/20 0907    Culture, Respiratory with Gram Stain [[id number]]  (Abnormal) Collected:  04/05/20 0050    Order Status:  Completed Specimen:  Respiratory from Endotracheal Aspirate Updated:  04/07/20 0828     Respiratory Culture YEAST   Few  Identification pending  Normal respiratory anamaria also present       Gram Stain (Respiratory) <10 epithelial cells per low power field.     Gram Stain (Respiratory) Many WBC's     Gram Stain (Respiratory) Rare Gram positive cocci     Gram Stain (Respiratory) Rare yeast    Blood culture [[id number]] Collected:  04/06/20 1802    Order Status:  Completed Specimen:  Blood from Line, Central Updated:  04/07/20 0345     Blood  Culture, Routine No Growth to date    IV catheter culture [[id number]] Collected:  04/06/20 1455    Order Status:  Sent Specimen:  Catheter Tip, Intrajugular Updated:  04/06/20 2029    IV catheter culture [[id number]] Collected:  04/06/20 1615    Order Status:  Sent Specimen:  Catheter Tip, Dialysis Updated:  04/06/20 2029    Blood culture [[id number]] Collected:  04/06/20 0806    Order Status:  Completed Specimen:  Blood from Antecubital, Left Updated:  04/06/20 1915     Blood Culture, Routine No Growth to date    Blood culture [[id number]] Collected:  04/05/20 0813    Order Status:  Completed Specimen:  Blood from Line, Central Left  Neck Updated:  04/06/20 1552     Blood Culture, Routine Gram stain tank bottle: Gram positive cocci in clusters resembling Staph       Results called to and read back by: Lynn Sawyer RN 04/06/2020  09:57      Gram stain aer bottle: Gram positive cocci in clusters resembling Staph       Positive results previously called 04/06/2020  15:52    Blood culture [[id number]] Collected:  04/05/20 0813    Order Status:  Completed Specimen:  Blood from Peripheral, Left  Hand Updated:  04/06/20 0959     Blood Culture, Routine Gram stain peds bottle: Gram positive cocci in clusters resembling Staph       Results called to and read back by: Lynn Sawyer RN 04/06/2020  09:57    Blood culture [[id number]] Collected:  04/04/20 0912    Order Status:  Completed Specimen:  Blood from Line, Central Updated:  04/06/20 0304     Blood Culture, Routine Gram stain peds bottle: budding yeast      Results called to and read back by: Carri Pryor RN  04/06/2020  03:04    Blood culture [[id number]]     Order Status:  Canceled Specimen:  Blood     Urine Culture High Risk [[id number]]     Order Status:  Canceled Specimen:  Urine, Catheterized     Culture, Respiratory with Gram Stain [[id number]]     Order Status:  Canceled Specimen:  Respiratory from Tracheal Aspirate         Results for WLOF SINGER (MRN  [id number]) as of 4/1/2020 09:28   Ref. Range 4/1/2020 02:53   ALT Latest Ref Range: 10 - 44 U/L 54 (H)     Impression:  Active Hospital Problems    Diagnosis  POA    *Acute respiratory failure [J96.00]  Yes    Fungemia [B49]  No    Bacteremia [R78.81]  No    Pneumonia, ventilator associated [J95.851]  No    Acute encephalopathy [G93.40]  No    Acute renal failure [N17.9]  No    ARDS (adult respiratory distress syndrome) [J80]  Yes    COVID-19 virus infection [U07.1]  Yes    Morbid obesity [E66.01]  Yes    Hypothyroid [E03.9]  Yes    COVID-19 virus detected [U07.1]  Yes    Diverticulosis of large intestine without hemorrhage [K57.30]  Yes    Iron deficiency anemia, unspecified [D50.9]  Yes      Resolved Hospital Problems   No resolved problems to display.               Plan:   Acute hypoxemic respiratory failure, stable to improving O2 reqt  COVID-19 pneumonia/ARDS  Acute renal failure, on HD  Metabolic acidosis due to renal failure  Shock, resolved   HTN  Combined heart failure, EF 45%  Morbid obesity  Emesis, poor tolerance of tube feeds  Acute encephalopathy  Right conjunctivitis  Bacteremia, possible line infection      - continue daily SAT/SBT-- failed today w/ tachypnea, having difficulty keeping up w/ metabolic acidosis. Mental status also not optimal  - decrease RR to 28  - dc Versed drip  - start precedex  - continue Nicardipine drip to control BP  - continue HD per nephro  - consider adding enteral bicarb?  - has had adequate course (7d) of azithromycin, ceftriaxone, hydroxychloroquine  - ID recommendations reviewed  - on Daptomycin, Zosyn, micafungin  - discussed with hospitalist      The following were evaluated and adjusted as needed: mechanical ventilator settings and weaning status, sedation and neurologic status, antibiotics, hemodynamics, support tubes and access lines and invasive monitoring, acid base balance and oxygenation needs and input and output and renal status       Critical Care   - THE PATIENT HAS A HIGH PROBABILITY OF IMMINENT OR LIFE THREATENING DETERIORATION.  Over 50%time of encounter was in direct care at bedside.  Time was 30 to 74 minutes required for patient care.  Time needed for all of the above totaled 38 minutes.      Milvia Mcguire MD  Pulmonary & Critical Care Medicine

## 2020-04-07 NOTE — ASSESSMENT & PLAN NOTE
Acute-ID consulted-cultures from 4/4   micafungin per ID  HD and TLC removed and cultured  Repeat blood cultures -q 12 hours per pulm

## 2020-04-07 NOTE — ASSESSMENT & PLAN NOTE
Tolerating Wean of oxygen and peep -see resp failure  --will cont ARDSnet Protocol.  --Target 6-8ml/kg Ideal Body Weight. Decrease TV as tolerated.  --Plateau pressure goal <30cm H2O.  --Oxygenation goal PaO2 55-80 or SpO2 88-95%.  --pH goal 7.30-7.45.  --Wean to PSV as tolerated.

## 2020-04-07 NOTE — PROGRESS NOTES
[facility] Medicine  Progress Note    Patient Name: Maria Victoria Hernandez  MRN: [id number]  Patient Class: IP- Inpatient   Admission Date: 3/25/2020  Length of Stay: 13 days  Attending Physician: Sharonda Burris MD  Primary Care Provider: Candice Deluna MD        Subjective:     Principal Problem:Acute respiratory failure with hypoxia        HPI:  Patient is a 54 years old  female with morbid obesity in past medical history significant for hypothyroidism is being admitted to intensive care unit under inpatient status from [facility] Emergency room with worsening shortness of breath.  Three days ago patient was tested positive for COVID - 19.  Patient works at [employer].  Patient has been experiencing subjective fever, generalized body aches and pains and nonproductive cough for few days.  Patient is getting increasingly short of breath especially for the past 2 days.  Upon arrival to the emergency room patient was noted to be hypoxic around 89%.  Up on 3 L patient's oxygen sats are around 96%.  Patient denies any chest pain, leg swelling or calf tenderness.      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 And treated per protocol for Covid and ARDS with ceftriaxone/zithromax and HC x 5 days. And ARDS protocol on vent. Pulmonary consulted and followed.   cxr on 4/5 severe ards pattern.-Vancomycin/zosyn added. 4/6 blood cultures pos for yeast so ID consulted and micafungin added. HD  Catheter and  TLC line removed and cultured.   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 Acute encephalopathy- delirium vs encephalitis or other structural cause. MRI could not be performed as she was very unstable.  At the time of admission Cr 0.7 worsened to 5.1, Renal was consulted. and HD started  on 3/29. In addition had, Metabolic acidosis due to renal failure.   Ngtube       Interval History:   Remains encephalopathic per nursing/pulm-  Temp to 101 this am early and tachycardia   WBC improved   Did not tolerate wean trial per pulm -avoiding versed     Review of Systems   Unable to perform ROS: Intubated     Objective:     Vital Signs (Most Recent):  Temp: 99.7 °F (37.6 °C) (04/07/20 1128)  Pulse: 105 (04/07/20 1128)  Resp: (!) 43 (04/07/20 1128)  BP: 113/67 (04/07/20 1015)  SpO2: 100 % (04/07/20 1128) Vital Signs (24h Range):  Temp:  [97.2 °F (36.2 °C)-101.7 °F (38.7 °C)] 99.7 °F (37.6 °C)  Pulse:  [] 105  Resp:  [20-52] 43  SpO2:  [82 %-100 %] 100 %  BP: ()/(37-68) 113/67  Arterial Line BP: ()/(44-67) 141/57     Weight: 113.4 kg (250 lb)  Body mass index is 47.24 kg/m².    Intake/Output Summary (Last 24 hours) at 4/7/2020 1343  Last data filed at 4/7/2020 0904  Gross per 24 hour   Intake 1557.25 ml   Output 95 ml   Net 1462.25 ml      Physical Exam   Constitutional:   Intubated   HENT:   ET tube in place   Nursing note and vitals reviewed.  PATIENT WAS SEEN AS A VIDEO VISIT WITH NURSE ASSIST DURING THE VISIT. PHYSICAL EXAM FINDINGS ARE AS VIEWED BY MYSELF VIA VIDEO OR AS REPORTED BY NURSE IF SPECIFIED AS SUCH, EXAM NOT DONE PERSONALLY BY MYSELF AT BEDSIDE.  Not moving much per pulmonary report   Eye red/swollen -await photo       Significant Labs: All pertinent labs within the past 24 hours have been reviewed.    Significant Imaging: I have reviewed and interpreted all pertinent imaging results/findings within the past 24 hours.      Assessment/Plan:      * Acute respiratory failure with hypoxia  Patient with Hypoxic Respiratory failure which is Acute.  she is not on home oxygen. Supplemental ventilation was provided and noted- Vent Mode: A/C  Oxygen Concentration (%):  [40-50] 40  Resp Rate Total:  [22 br/min-42 br/min] 35 br/min  Vt Set:  [0 mL] 0 mL  PEEP/CPAP:  [5 cmH20] 5  cmH20  Pressure Support:  [0 cmH20-15 cmH20] 0 cmH20  Mean Airway Pressure:  [8.9 gbP55-84 cmH20] 15 cmH20 and oxygen saturations . Differential diagnosis includes - Pneumonia Viral Labs and images were reviewed. Patient Has recent ABG- . Treated  underlying causes and adjust management of respiratory failure as follows-        Pulmonary consultation.   Continue beta 2 agonist bronchodilator treatments.    IV antibiotics - ceftriaxone and azithromycin. And Plaquenil 400 mg daily x 5  Vanc/zosyn initiated 4/5   micafungin for yeast +blood/sputum 4/6 /ID consulted     Microbiology Results (last 7 days)     Procedure Component Value Units Date/Time    Culture, Respiratory with Gram Stain [[id number]]  (Abnormal) Collected:  04/05/20 0050    Order Status:  Completed Specimen:  Respiratory from Endotracheal Aspirate Updated:  04/07/20 1136     Respiratory Culture AJ ALBICANS  Few  Normal respiratory anamaria also present       Gram Stain (Respiratory) <10 epithelial cells per low power field.     Gram Stain (Respiratory) Many WBC's     Gram Stain (Respiratory) Rare Gram positive cocci     Gram Stain (Respiratory) Rare yeast    Blood culture [[id number]]  (Abnormal) Collected:  04/04/20 0912    Order Status:  Completed Specimen:  Blood from Line, Central Updated:  04/07/20 1010     Blood Culture, Routine Gram stain peds bottle: budding yeast      Results called to and read back by: Carri Pryor RN  04/06/2020  03:04      YEAST   Identification pending  ID consult required at [facility],[place] and [facility] locations.      Blood culture [[id number]] Collected:  04/05/20 0813    Order Status:  Completed Specimen:  Blood from Peripheral, Left  Hand Updated:  04/07/20 1003     Blood Culture, Routine Gram stain peds bottle: Gram positive cocci in clusters resembling Staph       Results called to and read back by: Lynn Sawyer RN 04/06/2020  09:57    Blood culture [[id number]] Collected:  04/05/20 0813    Order Status:   Completed Specimen:  Blood from Line, Central Left  Neck Updated:  04/07/20 1001     Blood Culture, Routine Gram stain tank bottle: Gram positive cocci in clusters resembling Staph       Results called to and read back by: Lynn Sawyer RN 04/06/2020  09:57      Gram stain aer bottle: Gram positive cocci in clusters resembling Staph       Positive results previously called 04/06/2020  15:52    Blood culture [[id number]] Collected:  04/07/20 0907    Order Status:  Sent Specimen:  Blood Updated:  04/07/20 0907    Blood culture [[id number]] Collected:  04/06/20 1802    Order Status:  Completed Specimen:  Blood from Line, Central Updated:  04/07/20 0345     Blood Culture, Routine No Growth to date    IV catheter culture [[id number]] Collected:  04/06/20 1455    Order Status:  Sent Specimen:  Catheter Tip, Intrajugular Updated:  04/06/20 2029    IV catheter culture [[id number]] Collected:  04/06/20 1615    Order Status:  Sent Specimen:  Catheter Tip, Dialysis Updated:  04/06/20 2029    Blood culture [[id number]] Collected:  04/06/20 0806    Order Status:  Completed Specimen:  Blood from Antecubital, Left Updated:  04/06/20 1915     Blood Culture, Routine No Growth to date    Blood culture [[id number]]     Order Status:  Canceled Specimen:  Blood     Urine Culture High Risk [[id number]]     Order Status:  Canceled Specimen:  Urine, Catheterized     Culture, Respiratory with Gram Stain [[id number]]     Order Status:  Canceled Specimen:  Respiratory from Tracheal Aspirate           Continue routine medications as before.   Follow airborne/droplet precautions.            Bacteremia  Blood cultures from 4/5 -gm +cocci -follow up final   Vancomyin 4/4 -  Consulted ID  4/4 blood culture yeast   HD/TLC lines removed   4/5 blood culture staph   Blood culture neg 4/6 4/7 blood culture   4/6 -HD catheter tip   4/6 IV catheter tip  4/5 resp culture candida albicans   Urine culture cancelled         Fungemia  Acute-ID consulted-cultures  from 4/4   micafungin per ID  HD and TLC removed and cultured  Repeat blood cultures -q 12 hours per pulm         Pneumonia, ventilator associated  Acute -post viral -possible MRSA -  Added Vanc zosyn  Cultures -pend. Few yeast   micafungin added for + yeast in blood culture     Acute encephalopathy  AMS:: Hypoxia vs. Infection vs. TIA/stroke vs. Metabolic/Toxic.  -prolonged paralytic?   Continue supportive care   Likely multifactorial -unable to get  /MRI until stable   Ct head neg    Lab Results   Component Value Date    WBC 10.64 04/07/2020     MRI not done    focal findings on physical exam  No early signs of stroke on Head CT, no hemorrhage or acute findings.  EEG: EEG 30 min awake and drowsy results noted no seizures  Continue supportive care       Iron deficiency anemia, unspecified  Patient's anemia is currently controlled. Trending down  Will send for stool guc    Has recieved 1 units of PRBCs on 4/3.   Will transfuse  Unit as pt is hypotensive and ESRD   Etiology likely d/t Anemia of chronic disease  Current CBC reviewed-   Lab Results   Component Value Date    HGB 9.9 (L) 04/05/2020    HCT 31.6 (L) 04/05/2020     Monitor serial CBC and transfuse if patient becomes hemodynamically unstable, symptomatic or H/H drops below 8/24        Diverticulosis of large intestine without hemorrhage  Patient's anemia is currently controlled. Has recieved 1 units of PRBCs on 4/3.   Will need to Monitor for GI bleed  Lab Results   Component Value Date    HGB 8.3 (L) 04/06/2020    HCT 26.1 (L) 04/06/2020     Monitor serial CBC and transfuse if patient becomes hemodynamically unstable, symptomatic or H/H drops below 7/21.   Will continue to monitor        Acute renal failure  Acute, required HD for acute renal failure  ? Element ATN 2/2 hypovolemia/sepsis requiring pressor  Nephrology consulted/follwed.   Bicarb po and on HD for Metabolic acidosis             COVID-19 virus infection  Completed Plaquenil    Covid-19 Virus  Infection  - Infection Control notified    - Isolation:   - Airborne and Droplet Precautions  - N95 masks must be fit tested, wear eye protection  - 20 second hand hygiene   - Limit visitors per hospital policy   - Consolidating lab draws, nursing care, and interventions    - Diagnostics: (rising CRP, persistent lymphopenia, hyponatremia, hyperferritinemia, elevated troponin, elevated d-dimer, age, and comorbidities are significant predictors of poor clinical outcome)   - CBC:   trend Q48hrs  - CMP:        trend Q48hrs  - Procalcitonin:  - D-dimer:  trend Q48hrs  - Ferritin:  repeat prior to discharge  - CRP:        trend Q48hrs  - LDH:  - BNP:  - Troponin:    - ECG:   - rapid Flu:   - RIP only if BMT/solid transplant:   - Legionella antigen:   - Blood culture x2:   - Sputum culture:   - CXR:   - UA and culture:      - Management:   - Bundle care as able to minimize in/out of room   - Supplemental O2 to maintain SpO2 >92%,   if requiring 6L NC or higher, place on nonrebreather and discuss case with MICU   - Telemetry & continuous Pulse Ox   - albuterol INHALER PRN 4puff Q6hr approximates a nebulizer (avoid nebulization of secretions)   - apap PRN fever   - Avoiding NIPPV to prevent aerosolization   (including home CPAP/BiPAP unless on a case-by-case basis and only in negative pressure room)   - Cautious use of NSAIDS for fever per WHO recommendations (3/16/2020)   - No new ACEi/ARB start or discontinuation of chronic med unless hypotensive (Esler et al. Journal of Hypertension 2020, 38:000-000)   - Careful use of steroids in the absence of other indications   - unless septic shock due to increased viral replication   - Fluid sparing resuscitation   - Empiric antibiotics per likely source & patient allergies    - CAP: x 5 day course  Ceftriaxone 1g IV Q24hrs            Azithromycin 500mg IV day #1, then 250mg PO daily x4 days                 If MRSA risk factors, add Vancomycin IV (PharmD consult)   - If patient  meets criteria per Hospital Protocol    - start statin (if CPK WNL)    - start HCQ 400mg PO BID x1 day, then 400mg PO daily x 4 days (check G6PD, ECG, and start Qshift POCT glucose)    Goals of care, counseling/discussion  - Reviewed the typical clinical course of COVIDLeona with the daughter Danya (patient name or relationship to patient), including the potential for acute decompensation requiring intubation and mechanical ventilation  - Discussed again as part of routine daily evaluation, patient/POA maintains code status of Full code    VTE High Risk Prophylaxis: enoxaparin 40mg sq QHS @ 2100 (bundled care) if GFR >30    Patient's chronic/stable medical conditions noted in the problem list above will be managed with the patient's home medications as tolerated.           ARDS (adult respiratory distress syndrome)  Tolerating Wean of oxygen and peep -see resp failure  --will cont ARDSnet Protocol.  --Target 6-8ml/kg Ideal Body Weight. Decrease TV as tolerated.  --Plateau pressure goal <30cm H2O.  --Oxygenation goal PaO2 55-80 or SpO2 88-95%.  --pH goal 7.30-7.45.  --Wean to PSV as tolerated.        COVID-19 virus detected   Plaquenil course completed  Continue routine medications as before.   Follow airborne/droplet precautions.      Hypothyroid  Chronic problem.  Lab Results   Component Value Date    TSH 2.082 03/31/2020     Not on any medication            Morbid obesity  Body mass index is 48.03 kg/m². Morbid obesity complicates all aspects of disease management from diagnostic modalities to treatment. Weight loss encouraged and health benefits explained to patient.            VTE Risk Mitigation (From admission, onward)         Ordered     heparin (porcine) injection 5,000 Units  Every 12 hours      04/07/20 1349     heparin (porcine) 1,000 unit/mL injection      04/06/20 1138     heparin (porcine) injection 4,000 Units  As needed (PRN)      03/29/20 2001     IP VTE HIGH RISK PATIENT  Once      03/25/20 3818                 Critical care time spent on the evaluation and treatment of severe organ dysfunction, review of pertinent labs and imaging studies, discussions with consulting providers and discussions with patient/family: 48minutes.  Avoid long acting sedative -versed /changed to precedex  Hold tpn 2/2 candida -trickle feeds only -further discuss with dietician  Discussed plan of care and progress with Danya -daughter     Sharonda Burris MD  Department of Hospital Medicine   [facility]

## 2020-04-07 NOTE — ASSESSMENT & PLAN NOTE
Blood cultures from 4/5 -gm +cocci -follow up final   Vancomyin 4/4 -  Consulted ID  4/4 blood culture yeast   HD/TLC lines removed   4/5 blood culture staph   Blood culture neg 4/6 4/7 blood culture   4/6 -HD catheter tip   4/6 IV catheter tip  4/5 resp culture candida albicans   Urine culture cancelled

## 2020-04-07 NOTE — PROGRESS NOTES
Pharmacokinetic Assessment Follow Up: IV Vancomycin    Vancomycin serum concentration assessment(s):    The random level was drawn correctly and can be used to guide therapy at this time. The measurement is within the desired definitive target range of 15 to 20 mcg/mL.    Vancomycin Regimen Plan:    1 - Patient will not receive HD today. No dose will be ordered today.   2 - Re-dose when the random level is less than 20 mcg/mL and scheduled to received HD, next level to be drawn on 04/08/20 with AM Labs.    Drug levels (last 3 results):  Recent Labs   Lab Result Units 04/06/20 0244 04/07/20 0253   Vancomycin, Random ug/mL 30.2 17.4       Pharmacy will continue to follow and monitor vancomycin.    Please contact pharmacy at extension [phone] for questions regarding this assessment.    Thank you for the consult,   Sami Baugh       Patient brief summary:  Maria Victoria Hernandez is a 54 y.o. female initiated on antimicrobial therapy with IV Vancomycin for treatment of lower respiratory infection    The patient's current regimen is pulse dosing    Drug Allergies:   Review of patient's allergies indicates:  No Known Allergies    Actual Body Weight:   113.4kg    Renal Function:   Estimated Creatinine Clearance: 17.7 mL/min (A) (based on SCr of 4.3 mg/dL (H)).,     Dialysis Method (if applicable):  intermittent HD PRN    CBC (last 72 hours):  Recent Labs   Lab Result Units 04/05/20 0249 04/05/20  0951 04/06/20 0244 04/07/20 0253   WBC K/uL 12.02 17.89* 12.75* 10.64   Hemoglobin g/dL 8.9* 9.9* 8.3* 8.1*   Hematocrit % 27.6* 31.6* 26.1* 24.0*   Platelets K/uL 307 336 270 289   Gran% % 93.0* 64.0 87.0* 84.3*   Lymph% % 5.0* 8.0* 5.0* 6.7*   Mono% % 1.0* 2.0* 4.0 6.4   Eosinophil% % 0.0 0.0 0.0 0.2   Basophil% % 0.0 0.0 0.0 0.4   Differential Method  Manual Manual Manual Automated       Metabolic Panel (last 72 hours):  Recent Labs   Lab Result Units 04/05/20  0249 04/06/20 0244 04/07/20  0253   Sodium mmol/L 130* 126*  128*   Potassium mmol/L 4.0 4.1 3.4*   Chloride mmol/L 98 94* 93*   CO2 mmol/L 18* 15* 19*   Glucose mg/dL 107 98 103   BUN, Bld mg/dL 56* 68* 49*   Creatinine mg/dL 4.8* 5.2* 4.3*   Albumin g/dL 2.3* 2.1* 2.0*   Total Bilirubin mg/dL 1.0 2.2* 2.6*   Alkaline Phosphatase U/L 188* 197* 222*   AST U/L 28 35 36   ALT U/L 30 33 30       Vancomycin Administrations:  vancomycin given in the last 96 hours                     vancomycin (VANCOCIN) 1,750 mg in dextrose 5 % 500 mL IVPB (mg) 1,750 mg New Bag 04/05/20 1200                      Microbiologic Results:  Microbiology Results (last 7 days)       Procedure Component Value Units Date/Time    Blood culture [[id number]] Collected:  04/07/20 0907    Order Status:  Sent Specimen:  Blood Updated:  04/07/20 0907    Culture, Respiratory with Gram Stain [[id number]]  (Abnormal) Collected:  04/05/20 0050    Order Status:  Completed Specimen:  Respiratory from Endotracheal Aspirate Updated:  04/07/20 0828     Respiratory Culture YEAST   Few  Identification pending  Normal respiratory anamaria also present       Gram Stain (Respiratory) <10 epithelial cells per low power field.     Gram Stain (Respiratory) Many WBC's     Gram Stain (Respiratory) Rare Gram positive cocci     Gram Stain (Respiratory) Rare yeast    Blood culture [[id number]] Collected:  04/06/20 1802    Order Status:  Completed Specimen:  Blood from Line, Central Updated:  04/07/20 0345     Blood Culture, Routine No Growth to date    IV catheter culture [[id number]] Collected:  04/06/20 1455    Order Status:  Sent Specimen:  Catheter Tip, Intrajugular Updated:  04/06/20 2029    IV catheter culture [[id number]] Collected:  04/06/20 1615    Order Status:  Sent Specimen:  Catheter Tip, Dialysis Updated:  04/06/20 2029    Blood culture [[id number]] Collected:  04/06/20 0806    Order Status:  Completed Specimen:  Blood from Antecubital, Left Updated:  04/06/20 1915     Blood Culture, Routine No Growth to date    Blood culture  [[id number]] Collected:  04/05/20 0813    Order Status:  Completed Specimen:  Blood from Line, Central Left  Neck Updated:  04/06/20 1552     Blood Culture, Routine Gram stain tank bottle: Gram positive cocci in clusters resembling Staph       Results called to and read back by: Lynn Sawyer RN 04/06/2020  09:57      Gram stain aer bottle: Gram positive cocci in clusters resembling Staph       Positive results previously called 04/06/2020  15:52    Blood culture [[id number]] Collected:  04/05/20 0813    Order Status:  Completed Specimen:  Blood from Peripheral, Left  Hand Updated:  04/06/20 0959     Blood Culture, Routine Gram stain peds bottle: Gram positive cocci in clusters resembling Staph       Results called to and read back by: Lynn Sawyer RN 04/06/2020  09:57    Blood culture [[id number]] Collected:  04/04/20 0912    Order Status:  Completed Specimen:  Blood from Line, Central Updated:  04/06/20 0304     Blood Culture, Routine Gram stain peds bottle: budding yeast      Results called to and read back by: Carri Pryor RN  04/06/2020  03:04    Blood culture [[id number]]     Order Status:  Canceled Specimen:  Blood     Urine Culture High Risk [[id number]]     Order Status:  Canceled Specimen:  Urine, Catheterized     Culture, Respiratory with Gram Stain [[id number]]     Order Status:  Canceled Specimen:  Respiratory from Tracheal Aspirate

## 2020-04-07 NOTE — NURSING
0505: Pt's blood pressure at 80/39; turned off Cardene, Versed and Fentanyl. Placed pt in trendelenburg.   Called eICU RN, updated on ABG results, H&H, creatinine and electrolytes, prn order for norepinephrine.   Notified norepi ordered from Rx.

## 2020-04-07 NOTE — ASSESSMENT & PLAN NOTE
Patient with Hypoxic Respiratory failure which is Acute.  she is not on home oxygen. Supplemental ventilation was provided and noted- Vent Mode: A/C  Oxygen Concentration (%):  [40-50] 40  Resp Rate Total:  [22 br/min-42 br/min] 35 br/min  Vt Set:  [0 mL] 0 mL  PEEP/CPAP:  [5 cmH20] 5 cmH20  Pressure Support:  [0 cmH20-15 cmH20] 0 cmH20  Mean Airway Pressure:  [8.9 bbH13-85 cmH20] 15 cmH20 and oxygen saturations . Differential diagnosis includes - Pneumonia Viral Labs and images were reviewed. Patient Has recent ABG- . Treated  underlying causes and adjust management of respiratory failure as follows-        Pulmonary consultation.   Continue beta 2 agonist bronchodilator treatments.    IV antibiotics - ceftriaxone and azithromycin. And Plaquenil 400 mg daily x 5  Vanc/zosyn initiated 4/5   micafungin for yeast +blood/sputum 4/6 /ID consulted     Microbiology Results (last 7 days)     Procedure Component Value Units Date/Time    Culture, Respiratory with Gram Stain [[id number]]  (Abnormal) Collected:  04/05/20 0050    Order Status:  Completed Specimen:  Respiratory from Endotracheal Aspirate Updated:  04/07/20 1136     Respiratory Culture AJ ALBICANS  Few  Normal respiratory anamaria also present       Gram Stain (Respiratory) <10 epithelial cells per low power field.     Gram Stain (Respiratory) Many WBC's     Gram Stain (Respiratory) Rare Gram positive cocci     Gram Stain (Respiratory) Rare yeast    Blood culture [[id number]]  (Abnormal) Collected:  04/04/20 0912    Order Status:  Completed Specimen:  Blood from Line, Central Updated:  04/07/20 1010     Blood Culture, Routine Gram stain peds bottle: budding yeast      Results called to and read back by: Carri Pryor RN  04/06/2020  03:04      YEAST   Identification pending  [place] consult required [place] [facility] [place] locations.      Blood culture [[id number]] Collected:  04/05/20 0813    Order Status:  Completed Specimen:  Blood from Peripheral, Left   Hand Updated:  04/07/20 1003     Blood Culture, Routine Gram stain peds bottle: Gram positive cocci in clusters resembling Staph       Results called to and read back by: Lynn Sawyer RN 04/06/2020  09:57    Blood culture [[id number]] Collected:  04/05/20 0813    Order Status:  Completed Specimen:  Blood from Line, Central Left  Neck Updated:  04/07/20 1001     Blood Culture, Routine Gram stain tank bottle: Gram positive cocci in clusters resembling Staph       Results called to and read back by: Lynn Sawyer RN 04/06/2020  09:57      Gram stain aer bottle: Gram positive cocci in clusters resembling Staph       Positive results previously called 04/06/2020  15:52    Blood culture [[id number]] Collected:  04/07/20 0907    Order Status:  Sent Specimen:  Blood Updated:  04/07/20 0907    Blood culture [[id number]] Collected:  04/06/20 1802    Order Status:  Completed Specimen:  Blood from Line, Central Updated:  04/07/20 0345     Blood Culture, Routine No Growth to date    IV catheter culture [[id number]] Collected:  04/06/20 1455    Order Status:  Sent Specimen:  Catheter Tip, Intrajugular Updated:  04/06/20 2029    IV catheter culture [[id number]] Collected:  04/06/20 1615    Order Status:  Sent Specimen:  Catheter Tip, Dialysis Updated:  04/06/20 2029    Blood culture [[id number]] Collected:  04/06/20 0806    Order Status:  Completed Specimen:  Blood from Antecubital, Left Updated:  04/06/20 1915     Blood Culture, Routine No Growth to date    Blood culture [[id number]]     Order Status:  Canceled Specimen:  Blood     Urine Culture High Risk [[id number]]     Order Status:  Canceled Specimen:  Urine, Catheterized     Culture, Respiratory with Gram Stain [[id number]]     Order Status:  Canceled Specimen:  Respiratory from Tracheal Aspirate           Continue routine medications as before.   Follow airborne/droplet precautions.

## 2020-04-07 NOTE — ASSESSMENT & PLAN NOTE
Patient's anemia is currently controlled. Has recieved 1 units of PRBCs on 4/3.   Will need to Monitor for GI bleed  Lab Results   Component Value Date    HGB 8.3 (L) 04/06/2020    HCT 26.1 (L) 04/06/2020     Monitor serial CBC and transfuse if patient becomes hemodynamically unstable, symptomatic or H/H drops below 7/21.   Will continue to monitor

## 2020-04-07 NOTE — ASSESSMENT & PLAN NOTE
Acute, required HD for acute renal failure  ? Element ATN 2/2 hypovolemia/sepsis requiring pressor  Nephrology consulted/follwed.   Bicarb po and on HD for Metabolic acidosis

## 2020-04-07 NOTE — PLAN OF CARE
Plan of care is progressing. Precedex for sedation. Tolerated vent well. No vent changes this shift. Restarted TF. TPN discontinued. Cardene on and off throughout shift. Lateral rotation. Safety maintained.

## 2020-04-07 NOTE — SUBJECTIVE & OBJECTIVE
Interval History:   Intubated /sedated. -visit via telemed today  Tolerated cpap overnight -unable to extubate today -on peed 5/50%   +blood cultures yeast   For HD today   Neuro-not tracking per nursing  Eye is red and swollen       Review of Systems   Unable to perform ROS: Intubated     Objective:     Vital Signs (Most Recent):  Temp: 97.2 °F (36.2 °C) (04/06/20 1841)  Pulse: (!) 112 (04/06/20 1841)  Resp: (!) 36 (04/06/20 1841)  BP: 138/65 (04/06/20 1700)  SpO2: 100 % (04/06/20 1841) Vital Signs (24h Range):  Temp:  [97 °F (36.1 °C)-100 °F (37.8 °C)] 97.2 °F (36.2 °C)  Pulse:  [] 112  Resp:  [16-42] 36  SpO2:  [98 %-100 %] 100 %  BP: ()/(50-72) 138/65  Arterial Line BP: ()/(44-70) 166/66     Weight: 115.3 kg (254 lb 3.1 oz)  Body mass index is 48.03 kg/m².    Intake/Output Summary (Last 24 hours) at 4/6/2020 1917  Last data filed at 4/6/2020 1800  Gross per 24 hour   Intake 1957.25 ml   Output 3615 ml   Net -1657.75 ml      Physical Exam   Constitutional:   Intubated   HENT:   ET tube in place   Nursing note and vitals reviewed.  PATIENT WAS SEEN AS A VIDEO VISIT WITH NURSE ASSIST DURING THE VISIT. PHYSICAL EXAM FINDINGS ARE AS VIEWED BY MYSELF VIA VIDEO OR AS REPORTED BY NURSE IF SPECIFIED AS SUCH, EXAM NOT DONE PERSONALLY BY MYSELF AT BEDSIDE.    Significant Labs:   BMP:   Recent Labs   Lab 04/06/20  0244   GLU 98   *   K 4.1   CL 94*   CO2 15*   BUN 68*   CREATININE 5.2*   CALCIUM 8.3*     CBC:   Recent Labs   Lab 04/05/20  0249 04/05/20  0951 04/06/20  0244   WBC 12.02 17.89* 12.75*   HGB 8.9* 9.9* 8.3*   HCT 27.6* 31.6* 26.1*    336 270       Significant Imaging: I have reviewed and interpreted all pertinent imaging results/findings within the past 24 hours.   Impression       Support devices as above.  No pneumothorax.  Moderate pulmonary airspace disease without significant change.      Electronically signed by: Ayaan Terrazas MD  Date: 04/06/2020  Time: 16:38

## 2020-04-07 NOTE — ASSESSMENT & PLAN NOTE
AMS:: Hypoxia vs. Infection vs. TIA/stroke vs. Metabolic/Toxic.   Continue supportive care   Likely multifactorial -unable to get CT /MRI until stable     Lab Results   Component Value Date    WBC 12.75 (H) 04/06/2020     MRI not done    focal findings on physical exam  No early signs of stroke on Head CT, no hemorrhage or acute findings.  EEG: EEG 30 min awake and drowsy results noted no seizures  Continue supportive care

## 2020-04-07 NOTE — ASSESSMENT & PLAN NOTE
Acute -post viral -possible MRSA -  Added Vanc zosyn  Cultures -pend. Few yeast   micafungin added for + yeast in blood culture

## 2020-04-07 NOTE — RESPIRATORY THERAPY
04/07/20 0723   Patient Assessment/Suction   Level of Consciousness (AVPU) responds to pain   All Lung Fields Breath Sounds crackles;diminished   Suction Method oral;tracheal   Secretions Amount moderate   Secretions Color red-streaked;white   Secretions Characteristics thick   PRE-TX-O2   O2 Device (Oxygen Therapy) ventilator   $ Is the patient on Low Flow Oxygen? Yes   Pulse Oximetry Type Continuous   $ Pulse Oximetry - Multiple Charge Pulse Oximetry - Multiple   Wound Care   $ Wound Care Tech Time 15 min   Area of Concern Upper lip;Lower lip;Corner lip   Skin Color/Characteristics without discoloration   Skin Temperature warm        Airway - Non-Surgical 03/25/20 2145 Endotracheal Tube   Placement Date/Time: 03/25/20 2145   Present Prior to Hospital Arrival?: No  Inserted by: MD  Airway Device: Endotracheal Tube  Airway Device Size: 7.5  Style: Cuffed  Cuff Inflated With: Air  Placement Verified By: Auscultation;Chest X-ray;Colorimetr...   Secured at 24 cm   Measured At Lips   Secured Location Left   Secured by Commercial tube owusu   Bite Block left   Site Condition Dry   Status Intact;Secured;Patent   Site Assessment Clean;Dry   Vent Select   Conventional Vent Y   Ventilator Initiated No   $ Ventilator Subsequent 1   Charged w/in last 24h YES   IHI Ventilator Associated Pneumonia Bundle   Head of Bed Elevated  HOB 30   Preset Conventional Ventilator Settings   Vent Type    Humidity HME

## 2020-04-07 NOTE — PLAN OF CARE
Patient placed on CPAP +5 and Pressure support +10 and patient respiratory rate in creased to 37bpm.  In creased Pressure support to 15 and patient RR 28bpm.  Patient placed back on previous setting.

## 2020-04-07 NOTE — ASSESSMENT & PLAN NOTE
Blood cultures from 4/5 -gm +cocci -follow up final   Vancomyin 4/4 -  Consulted ID  HD/TLC lines removed

## 2020-04-07 NOTE — PROGRESS NOTES
Consult Note  Infectious Disease    Reason for Consult:      HPI: Maria Victoria Hernandez is a   54 y.o. female employee of [employer].  with past medical history of morbid obesity, BMI of 48, diabetes, diet controlled, anemia, B12 deficiency, vitamin-D deficiency, hypothyroidism, was admitted on 03/25/2020 due to shortness of breath, diagnosed with COVID 19 is positive.  Patient has been intubated.  She went into renal failure and has been receiving HD by nephrologist.  Intensivist has been managing the vent.    Patient has completed 10 days of hydroxychloroquine and about 8 days of Zithromax and ceftriaxone.  She started spiking fever of  103 on 04/04.  White count jumped to 17.8.  She was started on vancomycin and Zosyn and blood cultures were sent.    ID consult called for g positives and yeast in blood cultures.  Patient is afebrile at the time.  WBC has improved.  Discussed with nurse.  Will discontinue lines.  Continue vancomycin, Daptomycin x1.  Add micafungin daily.    04/07/2020  WBC improved 17--12--10  Ferritin 1752--1538--1897    04/08/2020.  Intubated Sedated.  FiO2 of 35 people 5.  T-max 101.7°  Dialysis catheter changed yesterday  Leukocytosis improved 12/17/2010    Antibiotics (From admission, onward)    Start     Stop Route Frequency Ordered    04/07/20 1045  mupirocin 2 % ointment      04/12 0859 Nasl 2 times daily 04/07/20 0942    04/05/20 0900  piperacillin-tazobactam 4.5 g in dextrose 5 % 100 mL IVPB (ready to mix system)      -- IV Every 12 hours (non-standard times) 04/05/20 0748    04/05/20 0848  vancomycin - pharmacy to dose  (vancomycin IVPB)      -- IV pharmacy to manage frequency 04/05/20 0748    04/02/20 2315  gentamicin 0.3 % ophthalmic solution 1 drop      -- RIGHT EYE Every 4 hours 04/02/20 2310        Antifungals (From admission, onward)    Start     Stop Route Frequency Ordered    04/06/20 1100  micafungin 100 mg in sodium chloride 0.9 % 100 mL IVPB (ready to  mix system)      -- IV Every 24 hours (non-standard times) 04/06/20 0959        Antivirals (From admission, onward)    None          EXAM & DIAGNOSTICS REVIEWED:   Vitals:     Temp:  [97.2 °F (36.2 °C)-101.7 °F (38.7 °C)]   Temp: 99.7 °F (37.6 °C) (04/07/20 1128)  Pulse: 94 (04/07/20 1358)  Resp: (!) 28 (04/07/20 1358)  BP: (!) 100/54 (04/07/20 1358)  SpO2: 96 % (04/07/20 1358)    Intake/Output Summary (Last 24 hours) at 4/7/2020 1439  Last data filed at 4/7/2020 0904  Gross per 24 hour   Intake 1557.25 ml   Output 95 ml   Net 1462.25 ml     Vent Mode: A/C  Oxygen Concentration (%):  [35-50] 35  Resp Rate Total:  [22 br/min-42 br/min] 28 br/min  Vt Set:  [0 mL-350 mL] 350 mL  PEEP/CPAP:  [5 cmH20] 5 cmH20  Pressure Support:  [0 cmH20-15 cmH20] 0 cmH20  Mean Airway Pressure:  [8.9 orZ82-97 cmH20] 12 cmH20    General:  In NAD   Eyes:  Anicteric, right eye thickened edematous conjunctiva, improved, covered well by nurse  ENT:  No ulcers, exudates, thrush, nares patent, OT tube in place  Neck:  supple, no masses or adenopathy appreciated  Lungs: Clear, no consolidation, rales, wheezes, rub  Heart:  RRR, no gallop/murmur/rub noted  Abd:  Soft, NT, ND, normal BS, no masses or organomegaly appreciated.  :  Cuellar  Musc:  Joints without effusion, swelling, erythema, synovitis, muscle wasting.   Skin:  No rashes. No palmar or plantar lesions. No subungual petechiae  Wound:   Neuro: Sedated at this time  Psych:  Calm  Lymphatic:     No cervical, supraclavicular, axillary, or inguinal nodes  Extrem: No edema, erythema, phlebitis, cellulitis, warm and well perfused  VAD:       Isolation:      Lines/Tubes/Drains:  Trialysis catheter 03/27/2020.Dialysis catheter changed yesterday  [place] 03/25  OT 03/25    General Labs reviewed:  Recent Labs   Lab 04/05/20  0951 04/06/20  0244 04/07/20  0253   WBC 17.89* 12.75* 10.64   HGB 9.9* 8.3* 8.1*   HCT 31.6* 26.1* 24.0*    270 289       Recent Labs   Lab 04/05/20  0249  04/06/20  0244 04/07/20  0253   * 126* 128*   K 4.0 4.1 3.4*   CL 98 94* 93*   CO2 18* 15* 19*   BUN 56* 68* 49*   CREATININE 4.8* 5.2* 4.3*   CALCIUM 8.8 8.3* 8.1*   PROT 7.7 7.4 7.5   BILITOT 1.0 2.2* 2.6*   ALKPHOS 188* 197* 222*   ALT 30 33 30   AST 28 35 36     Micro:  Microbiology Results (last 7 days)     Procedure Component Value Units Date/Time    Blood culture [[id number]] Collected:  04/06/20 0806    Order Status:  Completed Specimen:  Blood from Antecubital, Left Updated:  04/07/20 1412     Blood Culture, Routine No Growth to date      No Growth to date    Culture, Respiratory with Gram Stain [[id number]]  (Abnormal) Collected:  04/05/20 0050    Order Status:  Completed Specimen:  Respiratory from Endotracheal Aspirate Updated:  04/07/20 1136     Respiratory Culture AJ ALBICANS  Few  Normal respiratory anamaria also present       Gram Stain (Respiratory) <10 epithelial cells per low power field.     Gram Stain (Respiratory) Many WBC's     Gram Stain (Respiratory) Rare Gram positive cocci     Gram Stain (Respiratory) Rare yeast    Blood culture [[id number]]  (Abnormal) Collected:  04/04/20 0912    Order Status:  Completed Specimen:  Blood from Line, Central Updated:  04/07/20 1010     Blood Culture, Routine Gram stain peds bottle: budding yeast      Results called to and read back by: Carri Pryor RN  04/06/2020  03:04      YEAST   Identification pending  ID consult required at [facility].[place] and [place].      Blood culture [[id number]] Collected:  04/05/20 0813    Order Status:  Completed Specimen:  Blood from Peripheral, Left  Hand Updated:  04/07/20 1003     Blood Culture, Routine Gram stain peds bottle: Gram positive cocci in clusters resembling Staph       Results called to and read back by: Lynn Sawyer RN 04/06/2020  09:57    Blood culture [[id number]] Collected:  04/05/20 0813    Order Status:  Completed Specimen:  Blood from Line, Central Left  Neck Updated:  04/07/20 1001      Blood Culture, Routine Gram stain tank bottle: Gram positive cocci in clusters resembling Staph       Results called to and read back by: Lynn Sawyer RN 04/06/2020  09:57      Gram stain aer bottle: Gram positive cocci in clusters resembling Staph       Positive results previously called 04/06/2020  15:52    Blood culture [[id number]] Collected:  04/07/20 0907    Order Status:  Sent Specimen:  Blood Updated:  04/07/20 0907    Blood culture [[id number]] Collected:  04/06/20 1802    Order Status:  Completed Specimen:  Blood from Line, Central Updated:  04/07/20 0345     Blood Culture, Routine No Growth to date    IV catheter culture [[id number]] Collected:  04/06/20 1455    Order Status:  Sent Specimen:  Catheter Tip, Intrajugular Updated:  04/06/20 2029    IV catheter culture [[id number]] Collected:  04/06/20 1615    Order Status:  Sent Specimen:  Catheter Tip, Dialysis Updated:  04/06/20 2029    Blood culture [[id number]]     Order Status:  Canceled Specimen:  Blood     Urine Culture High Risk [[id number]]     Order Status:  Canceled Specimen:  Urine, Catheterized     Culture, Respiratory with Gram Stain [[id number]]     Order Status:  Canceled Specimen:  Respiratory from Tracheal Aspirate         Imaging Reviewed:  CXR 04/06/2020Support devices as above.  No pneumothorax.  Moderate pulmonary airspace disease without significant change.  CXR 04/05/2020 No significant interval change in the bilateral airspace disease.  Support devices in stable position.    Cardiology:  Sinus rhythm    IMPRESSION & PLAN     Fever on 04/04  Leukocytosis improved 12/17/2010  Gram-positive bacteremia 04/05  Yeast in blood cultures 04/04    Respiratory failure, ARDS, COVID19  HTN  This patient is high risk for life-threatening deterioration and death secondary to above comorbidities and need for IV treatment Critical care 35 min    Ferritin 1752--1538--1897.    Procalcitonin 3.67     Recommendations:  Change  lines---Sent catheter tips  for culture  Continue vancomycin, Zosyn  Daptomycin x1 04/06  Added micafungin 100mg  daily 04/06

## 2020-04-07 NOTE — ASSESSMENT & PLAN NOTE
Patient with Hypoxic Respiratory failure which is Acute.  she is not on home oxygen. Supplemental ventilation was provided and noted- Vent Mode: A/C  Oxygen Concentration (%):  [50] 50  Resp Rate Total:  [17 br/min-42 br/min] 36 br/min  Vt Set:  [0 mL] 0 mL  PEEP/CPAP:  [5 cmH20] 5 cmH20  Pressure Support:  [0 pmI40-11 cmH20] 0 cmH20  Mean Airway Pressure:  [7.3 fhB49-67 cmH20] 16 cmH20 and oxygen saturations . Differential diagnosis includes - Pneumonia Viral Labs and images were reviewed. Patient Has recent ABG- . Treated  underlying causes and adjust management of respiratory failure as follows-        Pulmonary consultation.   Continue beta 2 agonist bronchodilator treatments.    IV antibiotics - ceftriaxone and azithromycin. And Plaquenil 400 mg daily x 5  Vanc/zosyn initiated 4/5     Microbiology Results (last 7 days)     Procedure Component Value Units Date/Time    Blood culture [[id number]] Collected:  04/06/20 1802    Order Status:  Sent Specimen:  Blood from Line, Central Updated:  04/06/20 1802    IV catheter culture [[id number]] Collected:  04/06/20 1615    Order Status:  Sent Specimen:  Catheter Tip, Dialysis Updated:  04/06/20 1656    Blood culture [[id number]] Collected:  04/05/20 0813    Order Status:  Completed Specimen:  Blood from Line, Central Left  Neck Updated:  04/06/20 1552     Blood Culture, Routine Gram stain tank bottle: Gram positive cocci in clusters resembling Staph       Results called to and read back by: Lynn Sawyer RN 04/06/2020  09:57      Gram stain aer bottle: Gram positive cocci in clusters resembling Staph       Positive results previously called 04/06/2020  15:52    IV catheter culture [[id number]] Collected:  04/06/20 1455    Order Status:  Sent Specimen:  Catheter Tip, Intrajugular Updated:  04/06/20 1524    Blood culture [[id number]] Collected:  04/06/20 0806    Order Status:  Sent Specimen:  Blood from Antecubital, Left Updated:  04/06/20 1209    Blood culture [[id number]]  Collected:  04/05/20 0813    Order Status:  Completed Specimen:  Blood from Peripheral, Left  Hand Updated:  04/06/20 0959     Blood Culture, Routine Gram stain peds bottle: Gram positive cocci in clusters resembling Staph       Results called to and read back by: Lynn Sawyer RN 04/06/2020  09:57    Culture, Respiratory with Gram Stain [[id number]] Collected:  04/05/20 0050    Order Status:  Completed Specimen:  Respiratory from Endotracheal Aspirate Updated:  04/06/20 0809     Respiratory Culture Insufficient incubation, culture in progress     Gram Stain (Respiratory) <10 epithelial cells per low power field.     Gram Stain (Respiratory) Many WBC's     Gram Stain (Respiratory) Rare Gram positive cocci     Gram Stain (Respiratory) Rare yeast    Blood culture [[id number]] Collected:  04/04/20 0912    Order Status:  Completed Specimen:  Blood from Line, Central Updated:  04/06/20 0304     Blood Culture, Routine Gram stain peds bottle: budding yeast      Results called to and read back by: Carri Pryor RN  04/06/2020  03:04    Blood culture [[id number]]     Order Status:  Canceled Specimen:  Blood     Urine Culture High Risk [[id number]]     Order Status:  Canceled Specimen:  Urine, Catheterized     Culture, Respiratory with Gram Stain [[id number]]     Order Status:  Canceled Specimen:  Respiratory from Tracheal Aspirate           Continue routine medications as before.   Follow airborne/droplet precautions.

## 2020-04-07 NOTE — PROGRESS NOTES
[facility] Medicine  Progress Note    Patient Name: Maria Victoria Hernandez  MRN: [id number]  Patient Class: IP- Inpatient   Admission Date: 3/25/2020  Length of Stay: 12 days  Attending Physician: Sharonda Burris MD  Primary Care Provider: Candice Deluna MD        Subjective:     Principal Problem:Acute respiratory failure        HPI:  Patient is a 54 years old  female with morbid obesity in past medical history significant for hypothyroidism is being admitted to intensive care unit under inpatient status from [facility] Emergency room with worsening shortness of breath.  Three days ago patient was tested positive for COVID - 19.  Patient works at [employer].  Patient has been experiencing subjective fever, generalized body aches and pains and nonproductive cough for few days.  Patient is getting increasingly short of breath especially for the past 2 days.  Upon arrival to the emergency room patient was noted to be hypoxic around 89%.  Up on 3 L patient's oxygen sats are around 96%.  Patient denies any chest pain, leg swelling or calf tenderness.      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 And treated per protocol for Covid and ARDS with ceftriaxone/zithromax and HC x 5 days. And ARDS protocol on vent. Pulmonary consulted and followed.   cxr on 4/5 severe ards pattern.-Vancomycin/zosyn added. 4/6 blood cultures pos for yeast so ID consulted and micafungin added. HD  Catheter and  TLC line removed and cultured.   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 Acute encephalopathy- delirium vs encephalitis or other structural cause. MRI could not be performed as she was very unstable.  At the time of admission Cr 0.7 worsened to 5.1, Renal was consulted. and HD started on 3/29. In  addition had, Metabolic acidosis due to renal failure.   Ngtube       Interval History:   Intubated /sedated. -visit via telemed today  Tolerated cpap overnight -unable to extubate today -on peed 5/50%   +blood cultures yeast   For HD today   Neuro-not tracking per nursing  Eye is red and swollen       Review of Systems   Unable to perform ROS: Intubated     Objective:     Vital Signs (Most Recent):  Temp: 97.2 °F (36.2 °C) (04/06/20 1841)  Pulse: (!) 112 (04/06/20 1841)  Resp: (!) 36 (04/06/20 1841)  BP: 138/65 (04/06/20 1700)  SpO2: 100 % (04/06/20 1841) Vital Signs (24h Range):  Temp:  [97 °F (36.1 °C)-100 °F (37.8 °C)] 97.2 °F (36.2 °C)  Pulse:  [] 112  Resp:  [16-42] 36  SpO2:  [98 %-100 %] 100 %  BP: ()/(50-72) 138/65  Arterial Line BP: ()/(44-70) 166/66     Weight: 115.3 kg (254 lb 3.1 oz)  Body mass index is 48.03 kg/m².    Intake/Output Summary (Last 24 hours) at 4/6/2020 1917  Last data filed at 4/6/2020 1800  Gross per 24 hour   Intake 1957.25 ml   Output 3615 ml   Net -1657.75 ml      Physical Exam   Constitutional:   Intubated   HENT:   ET tube in place   Nursing note and vitals reviewed.  PATIENT WAS SEEN AS A VIDEO VISIT WITH NURSE ASSIST DURING THE VISIT. PHYSICAL EXAM FINDINGS ARE AS VIEWED BY MYSELF VIA VIDEO OR AS REPORTED BY NURSE IF SPECIFIED AS SUCH, EXAM NOT DONE PERSONALLY BY MYSELF AT BEDSIDE.    Significant Labs:   BMP:   Recent Labs   Lab 04/06/20  0244   GLU 98   *   K 4.1   CL 94*   CO2 15*   BUN 68*   CREATININE 5.2*   CALCIUM 8.3*     CBC:   Recent Labs   Lab 04/05/20  0249 04/05/20  0951 04/06/20  0244   WBC 12.02 17.89* 12.75*   HGB 8.9* 9.9* 8.3*   HCT 27.6* 31.6* 26.1*    336 270       Significant Imaging: I have reviewed and interpreted all pertinent imaging results/findings within the past 24 hours.   Impression       Support devices as above.  No pneumothorax.  Moderate pulmonary airspace disease without significant change.      Electronically  signed by: Ayaan Terrazas MD  Date: 04/06/2020  Time: 16:38             Assessment/Plan:      * Acute respiratory failure  Patient with Hypoxic Respiratory failure which is Acute.  she is not on home oxygen. Supplemental ventilation was provided and noted- Vent Mode: A/C  Oxygen Concentration (%):  [50] 50  Resp Rate Total:  [17 br/min-42 br/min] 36 br/min  Vt Set:  [0 mL] 0 mL  PEEP/CPAP:  [5 cmH20] 5 cmH20  Pressure Support:  [0 raS97-96 cmH20] 0 cmH20  Mean Airway Pressure:  [7.3 ejM53-72 cmH20] 16 cmH20 and oxygen saturations . Differential diagnosis includes - Pneumonia Viral Labs and images were reviewed. Patient Has recent ABG- . Treated  underlying causes and adjust management of respiratory failure as follows-        Pulmonary consultation.   Continue beta 2 agonist bronchodilator treatments.    IV antibiotics - ceftriaxone and azithromycin. And Plaquenil 400 mg daily x 5  Vanc/zosyn initiated 4/5     Microbiology Results (last 7 days)     Procedure Component Value Units Date/Time    Blood culture [[id number]] Collected:  04/06/20 1802    Order Status:  Sent Specimen:  Blood from Line, Central Updated:  04/06/20 1802    IV catheter culture [[id number]] Collected:  04/06/20 1615    Order Status:  Sent Specimen:  Catheter Tip, Dialysis Updated:  04/06/20 1656    Blood culture [[id number]] Collected:  04/05/20 0813    Order Status:  Completed Specimen:  Blood from Line, Central Left  Neck Updated:  04/06/20 1552     Blood Culture, Routine Gram stain tank bottle: Gram positive cocci in clusters resembling Staph       Results called to and read back by: Lynn Sawyer RN 04/06/2020  09:57      Gram stain aer bottle: Gram positive cocci in clusters resembling Staph       Positive results previously called 04/06/2020  15:52    IV catheter culture [[id number]] Collected:  04/06/20 1455    Order Status:  Sent Specimen:  Catheter Tip, Intrajugular Updated:  04/06/20 1524    Blood culture [[id number]] Collected:   04/06/20 0806    Order Status:  Sent Specimen:  Blood from Antecubital, Left Updated:  04/06/20 1209    Blood culture [[id number]] Collected:  04/05/20 0813    Order Status:  Completed Specimen:  Blood from Peripheral, Left  Hand Updated:  04/06/20 0959     Blood Culture, Routine Gram stain peds bottle: Gram positive cocci in clusters resembling Staph       Results called to and read back by: Lynn Sawyer RN 04/06/2020  09:57    Culture, Respiratory with Gram Stain [[id number]] Collected:  04/05/20 0050    Order Status:  Completed Specimen:  Respiratory from Endotracheal Aspirate Updated:  04/06/20 0809     Respiratory Culture Insufficient incubation, culture in progress     Gram Stain (Respiratory) <10 epithelial cells per low power field.     Gram Stain (Respiratory) Many WBC's     Gram Stain (Respiratory) Rare Gram positive cocci     Gram Stain (Respiratory) Rare yeast    Blood culture [[id number]] Collected:  04/04/20 0912    Order Status:  Completed Specimen:  Blood from Line, Central Updated:  04/06/20 0304     Blood Culture, Routine Gram stain peds bottle: budding yeast      Results called to and read back by: Carri Pryor RN  04/06/2020  03:04    Blood culture [[id number]]     Order Status:  Canceled Specimen:  Blood     Urine Culture High Risk [[id number]]     Order Status:  Canceled Specimen:  Urine, Catheterized     Culture, Respiratory with Gram Stain [[id number]]     Order Status:  Canceled Specimen:  Respiratory from Tracheal Aspirate           Continue routine medications as before.   Follow airborne/droplet precautions.            Bacteremia  Blood cultures from 4/5 -gm +cocci -follow up final   Vancomyin 4/4 -  Consulted ID  HD/TLC lines removed         Fungemia  Acute-ID consulted-cultures from 4/4   micafungin per ID  HD and TLC removed and cultured  Repeat blood cultures -q 12 hours per pulm         Pneumonia, ventilator associated  Acute -post viral -possible MRSA -  Added Vanc zosyn  Cultures  -pend. Few yeast   micafungin added for + yeast in blood culture     Acute encephalopathy  AMS:: Hypoxia vs. Infection vs. TIA/stroke vs. Metabolic/Toxic.   Continue supportive care   Likely multifactorial -unable to get CT /MRI until stable     Lab Results   Component Value Date    WBC 12.75 (H) 04/06/2020     MRI not done    focal findings on physical exam  No early signs of stroke on Head CT, no hemorrhage or acute findings.  EEG: EEG 30 min awake and drowsy results noted no seizures  Continue supportive care       Iron deficiency anemia, unspecified  Patient's anemia is currently controlled. Trending down  Will send for stool [facility]    Has recieved 1 units of PRBCs on 4/3.   Will transfuse  Unit as pt is hypotensive and ESRD   Etiology likely d/t Anemia of chronic disease  Current CBC reviewed-   Lab Results   Component Value Date    HGB 9.9 (L) 04/05/2020    HCT 31.6 (L) 04/05/2020     Monitor serial CBC and transfuse if patient becomes hemodynamically unstable, symptomatic or H/H drops below 8/24        Diverticulosis of large intestine without hemorrhage  Patient's anemia is currently controlled. Has recieved 1 units of PRBCs on 4/3.   Will need to Monitor for GI bleed  Lab Results   Component Value Date    HGB 8.3 (L) 04/06/2020    HCT 26.1 (L) 04/06/2020     Monitor serial CBC and transfuse if patient becomes hemodynamically unstable, symptomatic or H/H drops below 7/21.   Will continue to monitor        Acute renal failure  Currently on HD  As per renal, no acute RRT needs this weekend             COVID-19 virus infection  Completed Plaquenil    Covid-19 Virus Infection  - Infection Control notified    - Isolation:   - Airborne and Droplet Precautions  - N95 masks must be fit tested, wear eye protection  - 20 second hand hygiene   - Limit visitors per hospital policy   - Consolidating lab draws, nursing care, and interventions    - Diagnostics: (rising CRP, persistent lymphopenia, hyponatremia,  hyperferritinemia, elevated troponin, elevated d-dimer, age, and comorbidities are significant predictors of poor clinical outcome)   - CBC:   trend Q48hrs  - CMP:        trend Q48hrs  - Procalcitonin:  - D-dimer:  trend Q48hrs  - Ferritin:  repeat prior to discharge  - CRP:        trend Q48hrs  - LDH:  - BNP:  - Troponin:    - ECG:   - rapid Flu:   - RIP only if BMT/solid transplant:   - Legionella antigen:   - Blood culture x2:   - Sputum culture:   - CXR:   - UA and culture:      - Management:   - Bundle care as able to minimize in/out of room   - Supplemental O2 to maintain SpO2 >92%,   if requiring 6L NC or higher, place on nonrebreather and discuss case with MICU   - Telemetry & continuous Pulse Ox   - albuterol INHALER PRN 4puff Q6hr approximates a nebulizer (avoid nebulization of secretions)   - apap PRN fever   - Avoiding NIPPV to prevent aerosolization   (including home CPAP/BiPAP unless on a case-by-case basis and only in negative pressure room)   - Cautious use of NSAIDS for fever per WHO recommendations (3/16/2020)   - No new ACEi/ARB start or discontinuation of chronic med unless hypotensive (Esler et al. Journal of Hypertension 2020, 38:000-000)   - Careful use of steroids in the absence of other indications   - unless septic shock due to increased viral replication   - Fluid sparing resuscitation   - Empiric antibiotics per likely source & patient allergies    - CAP: x 5 day course  Ceftriaxone 1g IV Q24hrs            Azithromycin 500mg IV day #1, then 250mg PO daily x4 days                 If MRSA risk factors, add Vancomycin IV (PharmD consult)   - If patient meets criteria per Hospital Protocol    - start statin (if CPK WNL)    - start HCQ 400mg PO BID x1 day, then 400mg PO daily x 4 days (check G6PD, ECG, and start Qshift POCT glucose)    Goals of care, counseling/discussion  - Reviewed the typical clinical course of [id number] with the daughter Danya (patient name or relationship to patient),  including the potential for acute decompensation requiring intubation and mechanical ventilation  - Discussed again as part of routine daily evaluation, patient/POA maintains code status of Full code    VTE High Risk Prophylaxis: enoxaparin 40mg sq QHS @ 2100 (bundled care) if GFR >30    Patient's chronic/stable medical conditions noted in the problem list above will be managed with the patient's home medications as tolerated.           ARDS (adult respiratory distress syndrome)  Tolerating Wean of oxygen and peep -see resp failure  --will cont ARDSnet Protocol.  --Target 6-8ml/kg Ideal Body Weight. Decrease TV as tolerated.  --Plateau pressure goal <30cm H2O.  --Oxygenation goal PaO2 55-80 or SpO2 88-95%.  --pH goal 7.30-7.45.  --Wean to PSV as tolerated.        COVID-19 virus detected   Plaquenil course completed  Continue routine medications as before.   Follow airborne/droplet precautions.      Hypothyroid  Chronic problem.  Lab Results   Component Value Date    TSH 2.082 03/31/2020     Not on any medication            Morbid obesity  Body mass index is 48.03 kg/m². Morbid obesity complicates all aspects of disease management from diagnostic modalities to treatment. Weight loss encouraged and health benefits explained to patient.            VTE Risk Mitigation (From admission, onward)         Ordered     heparin (porcine) 1,000 unit/mL injection      04/06/20 1138     heparin (porcine) injection 4,000 Units  As needed (PRN)      03/29/20 2001     enoxaparin injection 40 mg  Every 12 hours      03/25/20 2313     IP VTE HIGH RISK PATIENT  Once      03/25/20 2313                Critical care time spent on the evaluation and treatment of severe organ dysfunction, review of pertinent labs and imaging studies, discussions with consulting providers and discussions with patient/family: 48 minutes.  Discussed with nursing staff, Pulmonary, and daughter who is a nurse in our facility.  Updated progress and plan of  care.    Sharonda Burris MD  Department of Hospital Medicine   [facility]

## 2020-04-07 NOTE — NURSING
Pulmonologist, Dr. Laird, notified. Full update given with lab results, ABG's. All sedation off, Cardene off.

## 2020-04-07 NOTE — PROGRESS NOTES
INPATIENT NEPHROLOGY PROGRESS NOTE  [facility] NEPHROLOGY    Patient Name: Maria Victoria Hernandez  Date: 04/07/2020    Reason for consultation: MAIKOL    History of Present Illness:  54AAF nurse with morbid obesity, hypothyroidism presents PNA, intubated, positive for COVID19. Admit Cr 0.7 went 5.1 and HD started on 3/29.     3/28  Scr worse today.  Only one shift recorded for output, 520cc.  Still hyponatremic, vent setting adjusted per pulmonology note for acidosis.  K+ at goal after repletion.  Has siri, may need HD initiated.  3/29  Non oliguric.  In no distress  3/30 VSS, seen and examined on HD, tolerating well. Plan another HD in AM, discussed with daughter who is a nurse here too.  3/31 VSS, intubated still. UO is not great but she is dialyzed daily. Seen and examined on HD, tolerating well. Plan another HD in AM.  4/1 VSS, intubated still. UO is not great but she is dialyzed daily. Seen and examined on HD, tolerating well. Plan another HD PRN.  4/2 VSS, intubated still. UO is not great. Plan another HD in AM.  4/3 VSS, intubated still. UO is not great but she is dialyzed daily recently. Seen and examined on HD, tolerating well. Plan another HD on Mon or PRN.  4/4 febrile, BP stable, FIO2 50%, intubated, sedated, on levophed, UOP 900cc, transfused  4/5 febrile, tachy, SBP 100s, FIO2 65%, intubated, sedated, off levophed, UOP 935cc  4/6 intubated,. Sedated  4/7 intubated, sedated. Dialysis catheter changed yesterday    Plan of Care:    1. Septic shock 2/2 COVID PNA with HHRF requiring MV  - WBC down to 10.6. Still spiking fevers          2. MAIKOL - suspect multifactorial ATN in setting of shock/COVID/intravascular volume depletion- initiated HD 3/29  - she is now nonoliguric but clearance parameters are worse  - dialysis tomorrow  - no nsaids or IV contrast    3. Hypervolemic hyponatremia  - ordered 140 Na bath, 2-3L UF tomorrow as tolerated    4. Acidosis  -  35 bicarb bath    5. Anemia  - transfused last  week- Epogen with hd    Thank you for allowing us to participate in this patient's care. We will continue to follow.    Vital Signs:  Temp Readings from Last 3 Encounters:   04/07/20 99.9 °F (37.7 °C)       Pulse Readings from Last 3 Encounters:   04/07/20 97   01/15/15 84   12/17/13 80       BP Readings from Last 3 Encounters:   04/07/20 (!) 92/48   01/15/15 124/82   12/17/13 102/68       Weight:  Wt Readings from Last 3 Encounters:   04/07/20 113.4 kg (250 lb)   01/15/15 105.8 kg (233 lb 4.8 oz)   12/17/13 101.2 kg (223 lb)     Medications:  Scheduled Meds:   ascorbic acid (vitamin C)  500 mg Oral QID    calcitRIOL  0.25 mcg Oral Daily    enoxparin  40 mg Subcutaneous Q12H    gentamicin  1 drop Right Eye Q4H    levothyroxine  100 mcg Oral Before breakfast    micafungin (MYCAMINE) IVPB  100 mg Intravenous Q24H    midazolam  2 mg Intravenous Once    pantoprazole  40 mg Intravenous BID    piperacillin-tazobactam (ZOSYN) IVPB  4.5 g Intravenous Q12H    polyethylene glycol  17 g Oral Daily    rocuronium  100 mg Intravenous Once    white petrolatum-mineral oiL   Right Eye QHS    zinc sulfate  220 mg Oral Daily     Continuous Infusions:   fentanyl Stopped (04/07/20 0505)    midazolam (VERSED) infusion (titrating) 1 mg/hr (04/07/20 0636)    niCARdipine Stopped (04/07/20 0505)    norepinephrine bitartrate-D5W      propofoL Stopped (04/06/20 0348)     PRN Meds:.sodium chloride, sodium chloride, acetaminophen, heparin (porcine), metoprolol, promethazine (PHENERGAN) IVPB, propofoL, sodium chloride 0.9%, sodium chloride 0.9%, Pharmacy to dose Vancomycin consult **AND** vancomycin - pharmacy to dose  No current facility-administered medications on file prior to encounter.      Current Outpatient Medications on File Prior to Encounter   Medication Sig Dispense Refill    ferrous sulfate 325 mg (65 mg iron) Tab tablet Take 1 tablet (325 mg total) by mouth daily with breakfast. (Patient taking differently: Take  "325 mg by mouth daily with breakfast. Take 2 tablets daily) 90 tablet 3    fluticasone propionate (FLONASE) 50 mcg/actuation nasal spray 1 spray by Each Nostril route once daily.      levothyroxine (SYNTHROID) 100 MCG tablet Take 1 tablet (100 mcg total) by mouth once daily. (Patient taking differently: Take 150 mcg by mouth once daily. ) 90 tablet 3    meloxicam (MOBIC) 7.5 MG tablet Take 7.5 mg by mouth once daily.      clotrimazole-betamethasone 1-0.05% (LOTRISONE) cream Apply topically 2 (two) times daily. 45 g 1    levocetirizine (XYZAL) 5 MG tablet Take 1 tablet (5 mg total) by mouth every evening. 30 tablet 6     Review of Systems:  Unable to obtain due to MV    Physical Exam:  BP (!) 92/48   Pulse 97   Temp 99.9 °F (37.7 °C)   Resp (!) 35   Ht 5' 1" (1.549 m)   Wt 113.4 kg (250 lb)   SpO2 100%   Breastfeeding? No   BMI 47.24 kg/m²     INS/OUTS:  I/O last 3 completed shifts:  In: 2117.3 [I.V.:587.3; Other:500; NG/GT:180; IV Piggyback:400]  Out: 3770 [Urine:770; Other:3000]  No intake/output data recorded.    Did exam via video monitoring  Did not go in room    Physical Exam:  Constitutional: acutely ill, appears stated age,   HEENT: Eyes closed, MMM  Heart: RRR on monitor, no edema  Lungs: intubated, no lb  Abdomen: nd  Skin: no rash  MSK: no deformity  CNS: sedated      Results:  Lab Results   Component Value Date     (L) 04/07/2020    K 3.4 (L) 04/07/2020    CL 93 (L) 04/07/2020    CO2 19 (L) 04/07/2020    BUN 49 (H) 04/07/2020    CREATININE 4.3 (H) 04/07/2020    CALCIUM 8.1 (L) 04/07/2020    ANIONGAP 16 04/07/2020    ESTGFRAFRICA 13 (A) 04/07/2020    EGFRNONAA 11 (A) 04/07/2020       Lab Results   Component Value Date    CALCIUM 8.1 (L) 04/07/2020    PHOS 4.8 (H) 03/29/2020       Recent Labs   Lab 04/07/20  0253   WBC 10.64   RBC 3.00*   HGB 8.1*   HCT 24.0*      MCV 80*   MCH 27.0   MCHC 33.8     I have personally reviewed pertinent radiological imaging and reports.    Carolyn" MD Sajan MPH  [facility] [place]  [address]  [place] [address]  [phone] (p)  [phone] (f)

## 2020-04-07 NOTE — ASSESSMENT & PLAN NOTE
AMS:: Hypoxia vs. Infection vs. TIA/stroke vs. Metabolic/Toxic.  -prolonged paralytic?   Continue supportive care   Likely multifactorial -unable to get  /MRI until stable   Ct head neg    Lab Results   Component Value Date    WBC 10.64 04/07/2020     MRI not done    focal findings on physical exam  No early signs of stroke on Head CT, no hemorrhage or acute findings.  EEG: EEG 30 min awake and drowsy results noted no seizures  Continue supportive care

## 2020-04-08 LAB
ALBUMIN SERPL BCP-MCNC: 2 G/DL (ref 3.5–5.2)
ALLENS TEST: ABNORMAL
ALLENS TEST: NORMAL
ALP SERPL-CCNC: 208 U/L (ref 55–135)
ALT SERPL W/O P-5'-P-CCNC: 28 U/L (ref 10–44)
ANION GAP SERPL CALC-SCNC: 15 MMOL/L (ref 8–16)
AST SERPL-CCNC: 32 U/L (ref 10–40)
BACTERIA BLD CULT: ABNORMAL
BACTERIA SPEC AEROBE CULT: ABNORMAL
BACTERIA SPEC AEROBE CULT: ABNORMAL
BASOPHILS # BLD AUTO: 0.07 K/UL (ref 0–0.2)
BASOPHILS NFR BLD: 0.6 % (ref 0–1.9)
BILIRUB SERPL-MCNC: 2.8 MG/DL (ref 0.1–1)
BUN SERPL-MCNC: 70 MG/DL (ref 6–20)
CALCIUM SERPL-MCNC: 8.8 MG/DL (ref 8.7–10.5)
CHLORIDE SERPL-SCNC: 95 MMOL/L (ref 95–110)
CO2 SERPL-SCNC: 18 MMOL/L (ref 23–29)
CREAT SERPL-MCNC: 5.4 MG/DL (ref 0.5–1.4)
CRP SERPL-MCNC: 215.1 MG/L (ref 0–8.2)
DELSYS: ABNORMAL
DELSYS: NORMAL
DIFFERENTIAL METHOD: ABNORMAL
EOSINOPHIL # BLD AUTO: 0.1 K/UL (ref 0–0.5)
EOSINOPHIL NFR BLD: 0.5 % (ref 0–8)
ERYTHROCYTE [DISTWIDTH] IN BLOOD BY AUTOMATED COUNT: 17.3 % (ref 11.5–14.5)
ERYTHROCYTE [SEDIMENTATION RATE] IN BLOOD BY WESTERGREN METHOD: 28 MM/H
EST. GFR  (AFRICAN AMERICAN): 10 ML/MIN/1.73 M^2
EST. GFR  (NON AFRICAN AMERICAN): 8 ML/MIN/1.73 M^2
FERRITIN SERPL-MCNC: 1895 NG/ML (ref 20–300)
FIO2: 35
FIO2: 35
GLUCOSE SERPL-MCNC: 110 MG/DL (ref 70–110)
GRAM STN SPEC: ABNORMAL
HCO3 UR-SCNC: 20.9 MMOL/L (ref 24–28)
HCO3 UR-SCNC: 24.1 MMOL/L (ref 24–28)
HCT VFR BLD AUTO: 23.3 % (ref 37–48.5)
HGB BLD-MCNC: 7.5 G/DL (ref 12–16)
IMM GRANULOCYTES # BLD AUTO: 0.27 K/UL (ref 0–0.04)
IMM GRANULOCYTES NFR BLD AUTO: 2.4 % (ref 0–0.5)
LYMPHOCYTES # BLD AUTO: 1 K/UL (ref 1–4.8)
LYMPHOCYTES NFR BLD: 9.2 % (ref 18–48)
MCH RBC QN AUTO: 26 PG (ref 27–31)
MCHC RBC AUTO-ENTMCNC: 32.2 G/DL (ref 32–36)
MCV RBC AUTO: 81 FL (ref 82–98)
MIN VOL: 13.8
MODE: ABNORMAL
MODE: NORMAL
MONOCYTES # BLD AUTO: 1.3 K/UL (ref 0.3–1)
MONOCYTES NFR BLD: 11.2 % (ref 4–15)
NEUTROPHILS # BLD AUTO: 8.5 K/UL (ref 1.8–7.7)
NEUTROPHILS NFR BLD: 76.1 % (ref 38–73)
NRBC BLD-RTO: 0 /100 WBC
PCO2 BLDA: 33.9 MMHG (ref 35–45)
PCO2 BLDA: 38.9 MMHG (ref 35–45)
PEEP: 5
PEEP: 5
PH SMN: 7.4 [PH] (ref 7.35–7.45)
PH SMN: 7.4 [PH] (ref 7.35–7.45)
PLATELET # BLD AUTO: 300 K/UL (ref 150–350)
PMV BLD AUTO: 10.7 FL (ref 9.2–12.9)
PO2 BLDA: 94 MMHG (ref 80–100)
PO2 BLDA: 96 MMHG (ref 80–100)
POC BE: -1 MMOL/L
POC BE: -4 MMOL/L
POC SATURATED O2: 97 % (ref 95–100)
POC SATURATED O2: 98 % (ref 95–100)
POC TCO2: 22 MMOL/L (ref 23–27)
POC TCO2: 25 MMOL/L (ref 23–27)
POTASSIUM SERPL-SCNC: 3.5 MMOL/L (ref 3.5–5.1)
PROT SERPL-MCNC: 7.3 G/DL (ref 6–8.4)
PS: 10
RBC # BLD AUTO: 2.89 M/UL (ref 4–5.4)
SAMPLE: ABNORMAL
SAMPLE: NORMAL
SITE: ABNORMAL
SITE: NORMAL
SODIUM SERPL-SCNC: 128 MMOL/L (ref 136–145)
SP02: 97
SPONT RATE: 35
VANCOMYCIN SERPL-MCNC: 15.1 UG/ML
VT: 350
WBC # BLD AUTO: 11.23 K/UL (ref 3.9–12.7)

## 2020-04-08 PROCEDURE — 87040 BLOOD CULTURE FOR BACTERIA: CPT | Mod: 59

## 2020-04-08 PROCEDURE — 63600175 PHARM REV CODE 636 W HCPCS: Performed by: INTERNAL MEDICINE

## 2020-04-08 PROCEDURE — 63600175 PHARM REV CODE 636 W HCPCS: Performed by: HOSPITALIST

## 2020-04-08 PROCEDURE — 25000003 PHARM REV CODE 250: Performed by: INTERNAL MEDICINE

## 2020-04-08 PROCEDURE — 99291 CRITICAL CARE FIRST HOUR: CPT | Mod: ,,, | Performed by: INTERNAL MEDICINE

## 2020-04-08 PROCEDURE — 27000221 HC OXYGEN, UP TO 24 HOURS

## 2020-04-08 PROCEDURE — 82728 ASSAY OF FERRITIN: CPT

## 2020-04-08 PROCEDURE — 99291 PR CRITICAL CARE, E/M 30-74 MINUTES: ICD-10-PCS | Mod: ,,, | Performed by: INTERNAL MEDICINE

## 2020-04-08 PROCEDURE — 99291 PR CRITICAL CARE, E/M 30-74 MINUTES: ICD-10-PCS | Mod: S$GLB,,, | Performed by: INTERNAL MEDICINE

## 2020-04-08 PROCEDURE — 36415 COLL VENOUS BLD VENIPUNCTURE: CPT

## 2020-04-08 PROCEDURE — 99291 CRITICAL CARE FIRST HOUR: CPT | Mod: S$GLB,,, | Performed by: INTERNAL MEDICINE

## 2020-04-08 PROCEDURE — 94770 HC EXHALED C02 TEST: CPT

## 2020-04-08 PROCEDURE — 86140 C-REACTIVE PROTEIN: CPT

## 2020-04-08 PROCEDURE — 80053 COMPREHEN METABOLIC PANEL: CPT

## 2020-04-08 PROCEDURE — 97803 MED NUTRITION INDIV SUBSEQ: CPT

## 2020-04-08 PROCEDURE — 25000003 PHARM REV CODE 250: Performed by: HOSPITALIST

## 2020-04-08 PROCEDURE — 20000000 HC ICU ROOM

## 2020-04-08 PROCEDURE — 80100014 HC HEMODIALYSIS 1:1

## 2020-04-08 PROCEDURE — C9113 INJ PANTOPRAZOLE SODIUM, VIA: HCPCS | Performed by: INTERNAL MEDICINE

## 2020-04-08 PROCEDURE — 94761 N-INVAS EAR/PLS OXIMETRY MLT: CPT

## 2020-04-08 PROCEDURE — 11000001 HC ACUTE MED/SURG PRIVATE ROOM

## 2020-04-08 PROCEDURE — 80202 ASSAY OF VANCOMYCIN: CPT

## 2020-04-08 PROCEDURE — 85025 COMPLETE CBC W/AUTO DIFF WBC: CPT

## 2020-04-08 PROCEDURE — 94003 VENT MGMT INPAT SUBQ DAY: CPT

## 2020-04-08 PROCEDURE — 99900026 HC AIRWAY MAINTENANCE (STAT)

## 2020-04-08 PROCEDURE — 82803 BLOOD GASES ANY COMBINATION: CPT

## 2020-04-08 PROCEDURE — 99900035 HC TECH TIME PER 15 MIN (STAT)

## 2020-04-08 PROCEDURE — 37799 UNLISTED PX VASCULAR SURGERY: CPT

## 2020-04-08 RX ORDER — SODIUM BICARBONATE 650 MG/1
1300 TABLET ORAL 3 TIMES DAILY
Status: DISCONTINUED | OUTPATIENT
Start: 2020-04-08 | End: 2020-04-13

## 2020-04-08 RX ORDER — HYDROMORPHONE HYDROCHLORIDE 2 MG/ML
0.2 INJECTION, SOLUTION INTRAMUSCULAR; INTRAVENOUS; SUBCUTANEOUS EVERY 4 HOURS PRN
Status: DISCONTINUED | OUTPATIENT
Start: 2020-04-08 | End: 2020-04-17

## 2020-04-08 RX ADMIN — EPOETIN ALFA-EPBX 10000 UNITS: 10000 INJECTION, SOLUTION INTRAVENOUS; SUBCUTANEOUS at 12:04

## 2020-04-08 RX ADMIN — GENTAMICIN SULFATE 1 DROP: 3 SOLUTION OPHTHALMIC at 05:04

## 2020-04-08 RX ADMIN — MICAFUNGIN SODIUM 100 MG: 20 INJECTION, POWDER, LYOPHILIZED, FOR SOLUTION INTRAVENOUS at 11:04

## 2020-04-08 RX ADMIN — DEXMEDETOMIDINE HYDROCHLORIDE 0.4 MCG/KG/HR: 100 INJECTION, SOLUTION, CONCENTRATE INTRAVENOUS at 03:04

## 2020-04-08 RX ADMIN — VANCOMYCIN HYDROCHLORIDE 500 MG: 500 INJECTION, POWDER, LYOPHILIZED, FOR SOLUTION INTRAVENOUS at 05:04

## 2020-04-08 RX ADMIN — CALCITRIOL CAPSULES 0.25 MCG 0.25 MCG: 0.25 CAPSULE ORAL at 09:04

## 2020-04-08 RX ADMIN — HEPARIN SODIUM 4000 UNITS: 1000 INJECTION, SOLUTION INTRAVENOUS; SUBCUTANEOUS at 09:04

## 2020-04-08 RX ADMIN — DEXMEDETOMIDINE HYDROCHLORIDE 0.2 MCG/KG/HR: 100 INJECTION, SOLUTION, CONCENTRATE INTRAVENOUS at 12:04

## 2020-04-08 RX ADMIN — POLYETHYLENE GLYCOL 3350 17 G: 17 POWDER, FOR SOLUTION ORAL at 09:04

## 2020-04-08 RX ADMIN — GENTAMICIN SULFATE 1 DROP: 3 SOLUTION OPHTHALMIC at 02:04

## 2020-04-08 RX ADMIN — SODIUM BICARBONATE 650 MG TABLET 1300 MG: at 03:04

## 2020-04-08 RX ADMIN — SODIUM BICARBONATE 650 MG TABLET 1300 MG: at 09:04

## 2020-04-08 RX ADMIN — HEPARIN SODIUM 5000 UNITS: 5000 INJECTION, SOLUTION INTRAVENOUS; SUBCUTANEOUS at 09:04

## 2020-04-08 RX ADMIN — PANTOPRAZOLE SODIUM 40 MG: 40 INJECTION, POWDER, LYOPHILIZED, FOR SOLUTION INTRAVENOUS at 09:04

## 2020-04-08 RX ADMIN — ZINC SULFATE 220 MG (50 MG) CAPSULE 220 MG: CAPSULE at 09:04

## 2020-04-08 RX ADMIN — OXYCODONE HYDROCHLORIDE AND ACETAMINOPHEN 500 MG: 500 TABLET ORAL at 09:04

## 2020-04-08 RX ADMIN — LEVOTHYROXINE SODIUM 100 MCG: 100 TABLET ORAL at 05:04

## 2020-04-08 RX ADMIN — GENTAMICIN SULFATE 1 DROP: 3 SOLUTION OPHTHALMIC at 09:04

## 2020-04-08 RX ADMIN — OXYCODONE HYDROCHLORIDE AND ACETAMINOPHEN 500 MG: 500 TABLET ORAL at 05:04

## 2020-04-08 RX ADMIN — HYDROMORPHONE HYDROCHLORIDE 0.2 MG: 2 INJECTION INTRAMUSCULAR; INTRAVENOUS; SUBCUTANEOUS at 01:04

## 2020-04-08 RX ADMIN — PIPERACILLIN SODIUM AND TAZOBACTAM SODIUM 4.5 G: 4; .5 INJECTION, POWDER, LYOPHILIZED, FOR SOLUTION INTRAVENOUS at 12:04

## 2020-04-08 RX ADMIN — MINERAL OIL AND PETROLATUM: 150; 830 OINTMENT OPHTHALMIC at 09:04

## 2020-04-08 RX ADMIN — DEXMEDETOMIDINE HYDROCHLORIDE 0.6 MCG/KG/HR: 100 INJECTION, SOLUTION, CONCENTRATE INTRAVENOUS at 08:04

## 2020-04-08 RX ADMIN — MUPIROCIN: 20 OINTMENT TOPICAL at 08:04

## 2020-04-08 RX ADMIN — DEXMEDETOMIDINE HYDROCHLORIDE 0.5 MCG/KG/HR: 100 INJECTION, SOLUTION, CONCENTRATE INTRAVENOUS at 01:04

## 2020-04-08 RX ADMIN — OXYCODONE HYDROCHLORIDE AND ACETAMINOPHEN 500 MG: 500 TABLET ORAL at 12:04

## 2020-04-08 NOTE — RESPIRATORY THERAPY
Report from PM shift was that pt was unable to complete CPAP trials. Became tachy, sob, increased bp and aggitated. Placed back on previous settings

## 2020-04-08 NOTE — PROGRESS NOTES
Progress Note  PULMONARY    Admit Date: 3/25/2020   04/08/2020      SUBJECTIVE:     3/27- remains intubated, on vent. Was on Lasix drip overnight and now w/ IVF for UOP. On minimal Levophed   3/28- remains intubated, on PEEP 14/FiO2 50%. Levophed 0.06 mcg/kg/min  3/29- remains intubated, PEEP 12, FiO2 40%. Levophed off  3/30- remains intubated, PEEP 8, FiO2 40%. Levophed off. Started HD yesterday and having second session today. Daughter came to visit (works on 3rd floor), and updated this am  3/31- remains intubated, PEEP 8/FiO2 40%. HD yesterday w/ removal of 3.5L. With SBT yesterday not following commands and vomited stomach contents  4/1- remains intubated, PEEP 8/FiO2 40%. Not waking up or following commands off sedation- even w/ daughter at bedside, failed SBT due to tachypnea. Levophed at 0.02 mcg/kg/min. Had HD yesterday w/ removal of 2.5L. Had head CT. EEG pending  4/2- remains intubated, PEEP 6, FiO2 40%. w/ hemodynamic instability yesterday-- hypertensive then very hypotensive when tried to move her, which delayed weaning and MRI. Now off Levophed. HD yesterday w/ removal of 3.5L  4/3not arousing,  4/4 arousing somewhat- looks air hungry,  4/5- no c/o,sedated  4/6-  Remains intubated,  PEEP 5 FiO2 50%. On Nicardipine for HTN. HD this am. On CPAP trial since 4am w/ good gas exchange. Opens eyes and looks around but not following commands  4/7- remains intubated, had central lines replaced yesterday. Became tachypneic and put back on vent later in afternoon but tolerated PS trial all day. Today tachypneic w/ increased work of breathing with weaning trial  4/8- remains on vent, PEEP 5 FiO2 35%. On Levophed 0.04mcg/kg/min, precedex drip    PFSH and Allergies reviewed.    OBJECTIVE:     Vitals (Most recent):  Vitals:    04/08/20 1030   BP: 115/71   Pulse: 89   Resp: (!) 39   Temp:        Vitals (24 hour range):  Temp:  [95.7 °F (35.4 °C)-99.9 °F (37.7 °C)]   Pulse:  []   Resp:  [22-43]   BP:  ()/(48-89)   SpO2:  [87 %-100 %]   Arterial Line BP: ()/(40-76)       Intake/Output Summary (Last 24 hours) at 4/8/2020 1037  Last data filed at 4/8/2020 0800  Gross per 24 hour   Intake 1660.89 ml   Output 645 ml   Net 1015.89 ml        Vitals (24 hour range):  Temp:  [98.4 °F (36.9 °C)-99.4 °F (37.4 °C)]   Pulse:  []   Resp:  [34-36]   BP: ()/()   SpO2:  [96 %-100 %]   Arterial Line BP: (125-204)/(52-80)       Intake/Output Summary (Last 24 hours) at 4/3/2020 1048  Last data filed at 4/3/2020 1015  Gross per 24 hour   Intake 2354.11 ml   Output 775 ml   Net 1579.11 ml          Physical Exam:  The patient's neuro status (alertness,orientation,cognitive function,motor skills,), pharyngeal exam (oral lesions, hygiene, abn dentition,), Neck (jvd,mass,thyroid,nodes in neck and above/below clavicle),RESPIRATORY(symmetry,effort,fremitus,percussion,auscultation),  Cor(rhythm,heart tones including gallops,perfusion,edema)ABD(distention,hepatic&splenomegaly,tenderness,masses), Skin(rash,cyanosis),Psyc(affect,judgement,).  Exam negative except for these pertinent findings:    Intubated, awake, calm  Follows command with R hand and opens L eye and tracks  R eye covered w/ gauze & ointment  Lungs clear  Edematous bilateral upper ext and legs  Examined on HD    Radiographs reviewed:  CXR 4/6- similar  cxr 4/5 ARDS + pulmonary edema  CXR 4/2- bibasilar fluffy opacities and pulmonary edema, similar appearance  CT head 3/31  1.  There are slight limitations related to moderate patient tilted in the scanner and mild patient motion but there is no obvious acute abnormality.  There is no hemorrhage, mass, mass effect or obvious acute edema or ischemia.  It should be noted that MRI is more sensitive in the detection of subtle or acute nonhemorrhagic ischemic disease.    2.  Support tubing is seen in the left nares.  Bilateral mastoid and middle ear effusions are likely related to support tubing and/or  intubation.  Correlate clinically.  The same is true for changes throughout the paranasal sinuses.    CXR 3/31- improving bibasilar opacities  CXR 3/29- unchanged  CXR 3/27- bilateral mid and lower lobe fluffy airspace disease  CXR 3/26- increased consolidation RLL    TTE 3/27  · Mild left ventricular enlargement.  · Mildly decreased left ventricular systolic function. The estimated ejection fraction is 45%.  · Grade I (mild) left ventricular diastolic dysfunction consistent with impaired relaxation.  · Normal right ventricular systolic function.  · Mild left atrial enlargement.  · Moderate mitral regurgitation.  · Mild tricuspid regurgitation.  · Mild pulmonary hypertension present. PASP 40 mm of Hg.    Labs     Recent Labs   Lab 04/08/20 0311   WBC 11.23   HGB 7.5*   HCT 23.3*        Recent Labs   Lab 04/08/20 0311   *   K 3.5   CL 95   CO2 18*   BUN 70*   CREATININE 5.4*      CALCIUM 8.8   AST 32   ALT 28   ALKPHOS 208*   BILITOT 2.8*   PROT 7.3   ALBUMIN 2.0*   .1*     Recent Labs   Lab 04/08/20  0506   PH 7.398   PCO2 33.9*   PO2 94   HCO3 20.9*     Microbiology Results (last 7 days)     Procedure Component Value Units Date/Time    Blood culture [[id number]] Collected:  04/08/20 0745    Order Status:  Sent Specimen:  Blood Updated:  04/08/20 0756    IV catheter culture [[id number]] Collected:  04/06/20 1615    Order Status:  Completed Specimen:  Catheter Tip, Dialysis Updated:  04/08/20 0747     Aerobic Culture - Cath tip No growth    Blood culture [[id number]] Collected:  04/07/20 1730    Order Status:  Completed Specimen:  Blood from Line, Central Updated:  04/08/20 0345     Blood Culture, Routine No Growth to date    Blood culture [[id number]] Collected:  04/07/20 0907    Order Status:  Completed Specimen:  Blood Updated:  04/08/20 0345     Blood Culture, Routine No Growth to date    Blood culture [[id number]] Collected:  04/06/20 1802    Order Status:  Completed Specimen:  Blood from  Line, Central Updated:  04/07/20 2212     Blood Culture, Routine No Growth to date      No Growth to date    Blood culture [[id number]]  (Abnormal) Collected:  04/05/20 0813    Order Status:  Completed Specimen:  Blood from Peripheral, Left  Hand Updated:  04/07/20 1454     Blood Culture, Routine Gram stain peds bottle: Gram positive cocci in clusters resembling Staph       Results called to and read back by: Lynn Sawyer RN 04/06/2020  09:57      STAPHYLOCOCCUS EPIDERMIDIS  Susceptibility pending      Blood culture [[id number]]  (Abnormal) Collected:  04/05/20 0813    Order Status:  Completed Specimen:  Blood from Line, Central Left  Neck Updated:  04/07/20 1451     Blood Culture, Routine Gram stain tank bottle: Gram positive cocci in clusters resembling Staph       Results called to and read back by: Lynn Sawyer RN 04/06/2020  09:57      Gram stain aer bottle: Gram positive cocci in clusters resembling Staph       Positive results previously called 04/06/2020  15:52      STAPHYLOCOCCUS EPIDERMIDIS  Susceptibility pending      Blood culture [[id number]] Collected:  04/06/20 0806    Order Status:  Completed Specimen:  Blood from Antecubital, Left Updated:  04/07/20 1412     Blood Culture, Routine No Growth to date      No Growth to date    Culture, Respiratory with Gram Stain [[id number]]  (Abnormal) Collected:  04/05/20 0050    Order Status:  Completed Specimen:  Respiratory from Endotracheal Aspirate Updated:  04/07/20 1136     Respiratory Culture AJ ALBICANS  Few  Normal respiratory anamaria also present       Gram Stain (Respiratory) <10 epithelial cells per low power field.     Gram Stain (Respiratory) Many WBC's     Gram Stain (Respiratory) Rare Gram positive cocci     Gram Stain (Respiratory) Rare yeast    Blood culture [[id number]]  (Abnormal) Collected:  04/04/20 0912    Order Status:  Completed Specimen:  Blood from Line, Central Updated:  04/07/20 1010     Blood Culture, Routine Gram stain peds bottle:  budding yeast      Results called to and read back by: Carri Pryor RN  04/06/2020  03:04      YEAST   Identification pending  ID consult required at [facility].Robin Barahona and Meliton jimenez.      IV catheter culture [[id number]] Collected:  04/06/20 1455    Order Status:  Sent Specimen:  Catheter Tip, Intrajugular Updated:  04/06/20 2029    Blood culture [[id number]]     Order Status:  Canceled Specimen:  Blood     Urine Culture High Risk [[id number]]     Order Status:  Canceled Specimen:  Urine, Catheterized     Culture, Respiratory with Gram Stain [[id number]]     Order Status:  Canceled Specimen:  Respiratory from Tracheal Aspirate         Results for WOLF SINGER (MRN [id number]) as of 4/1/2020 09:28   Ref. Range 4/1/2020 02:53   ALT Latest Ref Range: 10 - 44 U/L 54 (H)     Impression:  Active Hospital Problems    Diagnosis  POA    *Acute respiratory failure with hypoxia [J96.01]  Yes    Fungemia [B49]  No    Bacteremia [R78.81]  No    Pneumonia, ventilator associated [J95.851]  No    Acute encephalopathy [G93.40]  No    Acute renal failure [N17.9]  No    ARDS (adult respiratory distress syndrome) [J80]  Yes    COVID-19 virus infection [U07.1]  Yes    Morbid obesity [E66.01]  Yes    Hypothyroid [E03.9]  Yes    COVID-19 virus detected [U07.1]  Yes    Diverticulosis of large intestine without hemorrhage [K57.30]  Yes    Iron deficiency anemia, unspecified [D50.9]  Yes      Resolved Hospital Problems   No resolved problems to display.               Plan:   Acute hypoxemic respiratory failure, stable to improving O2 reqt  COVID-19 pneumonia/ARDS  Acute renal failure, on HD  Metabolic acidosis due to renal failure  Shock, resolved   HTN  Combined heart failure, EF 45%  Morbid obesity  Emesis, poor tolerance of tube feeds  Acute encephalopathy  Right conjunctivitis  Bacteremia, possible line infection      - continue daily SAT/SBT-- try today after HD keep precedex on.   - mental status  improved  - continue precedex  - continue Nicardipine drip to control BP  - continue HD per nephro  - add enteral bicarb- control metabolic acidosis to help w/ respiratory status  - has had adequate course (7d) of azithromycin, ceftriaxone, hydroxychloroquine  - ID recommendations reviewed  - on Daptomycin, Zosyn, micafungin  - discussed with hospitalist      The following were evaluated and adjusted as needed: mechanical ventilator settings and weaning status, sedation and neurologic status, antibiotics, hemodynamics, support tubes and access lines and invasive monitoring, acid base balance and oxygenation needs and input and output and renal status       Critical Care  - THE PATIENT HAS A HIGH PROBABILITY OF IMMINENT OR LIFE THREATENING DETERIORATION.  Over 50%time of encounter was in direct care at bedside.  Time was 30 to 74 minutes required for patient care.  Time needed for all of the above totaled 38 minutes.      Milvia Mcguire MD  Pulmonary & Critical Care Medicine

## 2020-04-08 NOTE — SIGNIFICANT EVENT
Extubated at 1717 to NC @5L  Wean O2 as mike. BBS coarse but equal and good RR 28-37 and even in no distress.

## 2020-04-08 NOTE — PROGRESS NOTES
Progress Note  Infectious Disease    Reason for Consult:      HPI: Maria Victoria Hernandez is a   54 y.o. female employee of [employer].  with past medical history of morbid obesity, BMI of 48, diabetes, diet controlled, anemia, B12 deficiency, vitamin-D deficiency, hypothyroidism, was admitted on 03/25/2020 due to shortness of breath, diagnosed with COVID 19 is positive.  Patient has been intubated.  She went into renal failure and has been receiving HD by nephrologist.  Intensivist has been managing the vent.    Patient has completed 10 days of hydroxychloroquine and about 8 days of Zithromax and ceftriaxone.  She started spiking fever of  103 on 04/04.  White count jumped to 17.8.  She was started on vancomycin and Zosyn and blood cultures were sent.    ID consult called for g positives and yeast in blood cultures.  Patient is afebrile at the time.  WBC has improved.  Discussed with nurse.  Will discontinue lines.  Continue vancomycin, Daptomycin x1.  Add micafungin daily.    04/07/2020  WBC improved 17--12--10  Ferritin 1752--1538--1897  Intubated Sedated.  FiO2 of 35 people 5.  T-max 101.7°  Dialysis catheter changed yesterday  Leukocytosis improved 12/17/2010 04/08/2020  T-max 99.9°  Intubated sedated.  FiO2 35, peep of 5.  Fail CPAP trial.  Tolerating vancomycin, Zosyn, Micafungin.   Lines are fresh.  Nurse is trying to protect them.  Discussed with nurse:  Her daughter who is a nurse came and saw mother today, she agrees right eye looks better.      Antibiotics (From admission, onward)    Start     Stop Route Frequency Ordered    04/08/20 1700  vancomycin 500 mg in dextrose 5 % 100 mL IVPB (ready to mix system)      -- IV Once 04/08/20 0726    04/07/20 1045  mupirocin 2 % ointment      04/12 0859 Nasl 2 times daily 04/07/20 0942    04/05/20 0848  vancomycin - pharmacy to dose  (vancomycin IVPB)      -- IV pharmacy to manage frequency 04/05/20 0748    04/02/20 2315  gentamicin 0.3 %  ophthalmic solution 1 drop      -- RIGHT EYE Every 4 hours 04/02/20 2310        Antifungals (From admission, onward)    Start     Stop Route Frequency Ordered    04/06/20 1100  micafungin 100 mg in sodium chloride 0.9 % 100 mL IVPB (ready to mix system)      -- IV Every 24 hours (non-standard times) 04/06/20 0959        Antivirals (From admission, onward)    None          EXAM & DIAGNOSTICS REVIEWED:   Vitals:     Temp:  [95.7 °F (35.4 °C)-99.2 °F (37.3 °C)]   Temp: 97.9 °F (36.6 °C) (04/08/20 1200)  Pulse: 107 (04/08/20 1246)  Resp: (!) 36 (04/08/20 1246)  BP: (!) 140/70 (04/08/20 1200)  SpO2: 97 % (04/08/20 1246)    Intake/Output Summary (Last 24 hours) at 4/8/2020 1434  Last data filed at 4/8/2020 1200  Gross per 24 hour   Intake 2393.85 ml   Output 4205 ml   Net -1811.15 ml     Vent Mode: Spont  Oxygen Concentration (%):  [35] 35  Resp Rate Total:  [28 br/min-42 br/min] 33 br/min  Vt Set:  [350 mL] 350 mL  PEEP/CPAP:  [5 cmH20] 5 cmH20  Pressure Support:  [0 cmH20-10 cmH20] 10 cmH20  Mean Airway Pressure:  [9.2 anK42-00 cmH20] 9.2 cmH20    General:  In NAD   Eyes:  Anicteric, right eye thickened edematous conjunctiva, improved, covered well by nurse  ENT:  No ulcers, exudates, thrush, nares patent, OT tube in place  Neck:  supple, no masses or adenopathy appreciated  Lungs: Clear, no consolidation, rales, wheezes, rub  Heart:  RRR, no gallop/murmur/rub noted  Abd:  Soft, NT, ND, normal BS, no masses or organomegaly appreciated.  :  Cuellar  Musc:  Joints without effusion, swelling, erythema, synovitis, muscle wasting.   Skin:  No rashes. No palmar or plantar lesions. No subungual petechiae  Wound:   Neuro: Sedated at this time  Psych:  Calm  Lymphatic:     No cervical, supraclavicular, axillary, or inguinal nodes  Extrem: No edema, erythema, phlebitis, cellulitis, warm and well perfused  VAD:       Isolation:      Lines/Tubes/Drains:  Right IJ 04/06  Left IJ 04/06  Cuellar 03/25  OT 03/25    General Labs  reviewed:  Recent Labs   Lab 04/06/20  0244 04/07/20  0253 04/08/20  0311   WBC 12.75* 10.64 11.23   HGB 8.3* 8.1* 7.5*   HCT 26.1* 24.0* 23.3*    289 300       Recent Labs   Lab 04/06/20  0244 04/07/20  0253 04/08/20  0311   * 128* 128*   K 4.1 3.4* 3.5   CL 94* 93* 95   CO2 15* 19* 18*   BUN 68* 49* 70*   CREATININE 5.2* 4.3* 5.4*   CALCIUM 8.3* 8.1* 8.8   PROT 7.4 7.5 7.3   BILITOT 2.2* 2.6* 2.8*   ALKPHOS 197* 222* 208*   ALT 33 30 28   AST 35 36 32     Micro:  Microbiology Results (last 7 days)     Procedure Component Value Units Date/Time    IV catheter culture [[id number]]  (Abnormal) Collected:  04/06/20 1455    Order Status:  Completed Specimen:  Catheter Tip, Intrajugular Updated:  04/08/20 1254     Aerobic Culture - Cath tip STAPHYLOCOCCUS EPIDERMIDIS  < 15 colonies      Blood culture [[id number]]  (Abnormal) Collected:  04/04/20 0912    Order Status:  Completed Specimen:  Blood from Line, Central Updated:  04/08/20 1239     Blood Culture, Routine Gram stain peds bottle: budding yeast      Results called to and read back by: Carri Pryor RN  04/06/2020  03:04      YEAST   Identification pending  ID consult required at [facility].      Blood culture [[id number]]  (Abnormal)  (Susceptibility) Collected:  04/05/20 0813    Order Status:  Completed Specimen:  Blood from Peripheral, Left  Hand Updated:  04/08/20 1236     Blood Culture, Routine Gram stain peds bottle: Gram positive cocci in clusters resembling Staph       Results called to and read back by: Lynn Sawyer RN 04/06/2020  09:57      STAPHYLOCOCCUS EPIDERMIDIS    Blood culture [[id number]]  (Abnormal)  (Susceptibility) Collected:  04/05/20 0813    Order Status:  Completed Specimen:  Blood from Line, Central Left  Neck Updated:  04/08/20 1234     Blood Culture, Routine Gram stain tank bottle: Gram positive cocci in clusters resembling Staph       Results called to and read back by: Lynn Sawyer RN 04/06/2020   09:57      Gram stain aer bottle: Gram positive cocci in clusters resembling Staph       Positive results previously called 04/06/2020  15:52      STAPHYLOCOCCUS EPIDERMIDIS    Blood culture [[id number]] Collected:  04/08/20 0745    Order Status:  Sent Specimen:  Blood Updated:  04/08/20 1155    Blood culture [[id number]] Collected:  04/06/20 0806    Order Status:  Completed Specimen:  Blood from Antecubital, Left Updated:  04/08/20 1120     Blood Culture, Routine Gram stain peds bottle: yeast       Results called to and read back by: Tania Nolan RN 04/08/2020        11:20    Culture, Respiratory with Gram Stain [[id number]]  (Abnormal) Collected:  04/05/20 0050    Order Status:  Completed Specimen:  Respiratory from Endotracheal Aspirate Updated:  04/08/20 1046     Respiratory Culture No S aureus or Pseudomonas isolated.      CANDIDA ALBICANS  Few  Normal respiratory anamaria also present       Gram Stain (Respiratory) <10 epithelial cells per low power field.     Gram Stain (Respiratory) Many WBC's     Gram Stain (Respiratory) Rare Gram positive cocci     Gram Stain (Respiratory) Rare yeast    IV catheter culture [[id number]] Collected:  04/06/20 1615    Order Status:  Completed Specimen:  Catheter Tip, Dialysis Updated:  04/08/20 0747     Aerobic Culture - Cath tip No growth    Blood culture [[id number]] Collected:  04/07/20 1730    Order Status:  Completed Specimen:  Blood from Line, Central Updated:  04/08/20 0345     Blood Culture, Routine No Growth to date    Blood culture [[id number]] Collected:  04/07/20 0907    Order Status:  Completed Specimen:  Blood Updated:  04/08/20 0345     Blood Culture, Routine No Growth to date    Blood culture [[id number]] Collected:  04/06/20 1802    Order Status:  Completed Specimen:  Blood from Line, Central Updated:  04/07/20 2212     Blood Culture, Routine No Growth to date      No Growth to date    Blood culture [[id number]]     Order Status:  Canceled Specimen:  Blood     Urine  Culture High Risk [[id number]]     Order Status:  Canceled Specimen:  Urine, Catheterized     Culture, Respiratory with Gram Stain [[id number]]     Order Status:  Canceled Specimen:  Respiratory from Tracheal Aspirate         Imaging Reviewed:  CXR 04/06/2020Support devices as above.  No pneumothorax.  Moderate pulmonary airspace disease without significant change.  CXR 04/05/2020 No significant interval change in the bilateral airspace disease.  Support devices in stable position.    Cardiology:  Sinus rhythm    IMPRESSION & PLAN     Staphylococcus epidermidis bacteremia, versus colonizer 04/05  Candidemia due to Candida albicans on 04/04  Respiratory failure, ARDS, COVID19, completed treatment  HTN, CHF with EF of 45%  This patient is high risk for life-threatening deterioration and death secondary to above comorbidities and need for IV treatment Critical care 35 min    Ferritin 1752--1538--1897---1865  --215  Procalcitonin 3.67     Recommendations:  Changed  lines-on 04/06  Continue vancomycin  DC Zosyn  Continue micafungin because of fungemia on 04/04.

## 2020-04-08 NOTE — ASSESSMENT & PLAN NOTE
Contributing Nutrition Diagnosis  Obesity stage 3    Related to (etiology):   Hx. Of excessive energy intakes    Signs and Symptoms (as evidenced by):   BMI > 40 kg/m2    Interventions/Recommendations (treatment strategy):  Above    Nutrition Diagnosis Status:   continues

## 2020-04-08 NOTE — SUBJECTIVE & OBJECTIVE
Interval History:   Patient seen and examined via telemed with nursing staff   Per nursing -had some pain after HD and off sedation    Got 3 Liters off  Daughter     Review of Systems   Unable to perform ROS: Intubated     Objective:     Vital Signs (Most Recent):  Temp: 97.9 °F (36.6 °C) (04/08/20 1200)  Pulse: 107 (04/08/20 1246)  Resp: (!) 36 (04/08/20 1246)  BP: (!) 140/70 (04/08/20 1200)  SpO2: 97 % (04/08/20 1246) Vital Signs (24h Range):  Temp:  [95.7 °F (35.4 °C)-99.2 °F (37.3 °C)] 97.9 °F (36.6 °C)  Pulse:  [] 107  Resp:  [22-43] 36  SpO2:  [94 %-99 %] 97 %  BP: ()/(48-82) 140/70  Arterial Line BP: ()/(40-82) 148/74     Weight: 113.4 kg (250 lb)  Body mass index is 47.24 kg/m².    Intake/Output Summary (Last 24 hours) at 4/8/2020 1312  Last data filed at 4/8/2020 1200  Gross per 24 hour   Intake 2423.85 ml   Output 4205 ml   Net -1781.15 ml      Physical Exam  PATIENT WAS SEEN AS A VIDEO VISIT WITH NURSE ASSIST DURING THE VISIT. PHYSICAL EXAM FINDINGS ARE AS VIEWED BY MYSELF VIA VIDEO OR AS REPORTED BY NURSE IF SPECIFIED AS SUCH, EXAM NOT DONE PERSONALLY BY MYSELF AT BEDSIDE.  Significant Labs: All pertinent labs within the past 24 hours have been reviewed.    Significant Imaging: I have reviewed and interpreted all pertinent imaging results/findings within the past 24 hours.

## 2020-04-08 NOTE — PROGRESS NOTES
tx complete, pt tolerated well, lines re-infused, cath clamped and Hep locked per protocol. VSS, NAD.     Goal UF: 3000

## 2020-04-08 NOTE — PROGRESS NOTES
[facility]  Adult Nutrition  Progress Note    SUMMARY   Intervention: enteral nutrition therapy   current intake:  Nutren 1.5 @ 10 ml/hr ( provides 360 kcal, 16 g protein, 182 ml free water)  - was receiving D 10 AA 4.25 @ 50 ml/hr ( provides 51 g protein, 612 kcal)     Recommendations     1. As tolerated continue Nutren 1.5 @ 10 ml/hr advancing to 45 ml/ hr + Promod 30 ml TID + 130 ml flush q 4 hr   ( provides 1620 kcal ( 79% EEN), 73 g protein + promod (86% EPN), and 820 ml free water, Phos 1296 mg)  -If necessary can switch to Novasource renal @ 30 ml/hr + promod 30 ml TID   -if pt does not tolerate TF consult for TPN recs.    2. Weigh pt s/p HD  3. Once extubated advance PO diet to goal of cardiac     Goals: 1.) Meet >85% EEN/EPN by RD f/u 2) nutrition support to meet > 75% EEN at f/u  Nutrition Goal Status: 1) was meeting- not met 4/4-4/8 2) new  Communication of RD Recs: (POC, sticky note)     1. Covid-19 Virus Infection    2. Acute respiratory failure    3. Intubation of airway performed without difficulty    4. Encounter for nasogastric (NG) tube placement    5. Ruled out for myocardial infarction    6. At risk for long QT syndrome    7. Acute respiratory failure with hypoxia    8. ARDS (adult respiratory distress syndrome)    9. Morbid obesity    10. Pneumonia due to Covid-19 Virus    11. Shock               Past Medical History:   Diagnosis Date    Colon polyp      Diverticulosis large intestine w/o perforation or abscess w/bleeding      Iron deficiency anemia      Prediabetes      Thyroid disease      Vitamin B 12 deficiency      Vitamin D deficiency           Reason for Assessment     Reason For Assessment: RD follow up  Rounds: did not attend( did not enter pt room)     General Information Comments: Pt is intubated in ICU. NFPE not performed,patient is noted as being positive for COVID-19. Per epic records, no evidence of weight loss.  3/31: Not tolerating TF per RN. ngt in  "place. Dialysis today.   4/3/20 Pt was tolerating TF at goal, propofol high, decreased rate to 35 ml/hr yesterday. Tolerating today. Getting HD. + vent.   4/8/20 4/3 later in the day TF stopped ? Reason and restarted slowly advanced but 4/4 TF stopped r/t emesis and TPN started ( 46% EEN/ 53% EPN). Received TPN 4-4/4/6. TPN d/c'd yesterday and TF restarted @ 10 ml/hr now. Had small loose stool today. Continues on HD.      Nutrition Discharge Planning: pending medical course- cardiac     Nutrition Risk Screen     Nutrition Risk Screen: no indicators present     Nutrition/Diet History     Spiritual, Cultural Beliefs, Worship Practices, Values that Affect Care: no   factors affecting PO intakes: NPO, vent, TF infusing at < goal rate     Anthropometrics  Height Method: Estimated  Height: 5' 1"  Height (inches): 61 in  Weight Method: Bed Scale  Weight: 113.4 kg 4/7/20  Weight (lb): 250 lb ( 4 lb loss)  Ideal Body Weight (IBW), Female: 105 lb  % Ideal Body Weight, Female (lb): 225.71 %  BMI (Calculated): 47 kg/m2  BMI Grade: greater than 40 - morbid obesity  115.3 kg (3/30/20)     Lab/Procedures/Meds     Pertinent Labs Reviewed: reviewed  BMP  Lab Results   Component Value Date     (L) 04/08/2020    K 3.5 04/08/2020    CL 95 04/08/2020    CO2 18 (L) 04/08/2020    BUN 70 (H) 04/08/2020    CREATININE 5.4 (H) 04/08/2020    CALCIUM 8.8 04/08/2020    ANIONGAP 15 04/08/2020    ESTGFRAFRICA 10 (A) 04/08/2020    EGFRNONAA 8 (A) 04/08/2020     Lab Results   Component Value Date    ALBUMIN 2.0 (L) 04/08/2020     Lab Results   Component Value Date    CALCIUM 8.8 04/08/2020    PHOS 4.8 (H) 03/29/2020     Pertinent Medications Reviewed: reviewed  VitamiN C, calcitriol, zinc, polyethylene glycol, phenergan ( pressor stopped as well as propofol)    Estimated/Assessed Needs   modified  Weight Used For Calorie Calculations: 107.5 kg (236 lb 15.9 oz)  Energy Need Method: [place]: 2037 kcals/day (underfeeds would be 65-75% of " target energy needs)   Protein Requirements: 95 g ( 2.0 g protein/kg HD vs BMI > 48)  Weight Used For Protein Calculations: 47.6 kg (105 lb)(ideal body weight)  RDA: urine output + 1000 ml  CHO Requirement: 147g        Nutrition Prescription Ordered  NPO + TF above     Evaluation of Received Nutrient/Fluid Intake   energy need: not meeting  Protein needs: not meeting  Fluid needs: not meeting  % Intake of Estimated Energy Needs: 17%  % Meal Intake: npo + TF   meeting ~50% or less of needs x past 4-5 days    Nutrition Risk     2x week moderate     Assessment and Plan     Nutrition Problem  Inadequate energy intake     Related to (etiology):   No diet order     Signs and Symptoms (as evidenced by):   Intake of <85% EEN/EPN     Interventions/Recommendations (treatment strategy):  Collaboration with other providers  Enteral nutrition     Nutrition Diagnosis Status:   Continues     Morbid obesity  Contributing Nutrition Diagnosis  Obesity stage 3    Related to (etiology):   Hx. Of excessive energy intakes    Signs and Symptoms (as evidenced by):   BMI > 40 kg/m2    Interventions/Recommendations (treatment strategy):  Above    Nutrition Diagnosis Status:   continues       Malnutrition:  Edema: 2-3+  Robe: 13  NFPE not done r/t restrictions to prevent spread of COVID-19, to be done at a later date. Noted to appear well nourished.     Monitor and Evaluation     Food and Nutrient Intake: energy intake  Food and Nutrient Adminstration: enteral and parenteral nutrition administration  Anthropometric Measurements: weight  Biochemical Data, Medical Tests and Procedures: electrolyte and renal panel, glucose/endocrine profile , gastrointestinal     Nutrition Follow-Up     RD Follow-up?: Yes

## 2020-04-08 NOTE — SIGNIFICANT EVENT
Results for WOLF SINGER (MRN [id number]) as of 4/8/2020 13:31   Ref. Range 4/8/2020 13:04   POC PH Latest Ref Range: 7.35 - 7.45  7.401   POC PCO2 Latest Ref Range: 35 - 45 mmHg 38.9   POC PO2 Latest Ref Range: 80 - 100 mmHg 96   POC BE Latest Ref Range: -2 to 2 mmol/L -1   POC HCO3 Latest Ref Range: 24 - 28 mmol/L 24.1   POC SATURATED O2 Latest Ref Range: 95 - 100 % 98   POC TCO2 Latest Ref Range: 23 - 27 mmol/L 25   FiO2 Unknown 35   PEEP Unknown 5   Sample Unknown ARTERIAL   DelSys Unknown Adult Vent   Allens Test Unknown N/A   Site Unknown [place]/UAC   Mode Unknown CPAP

## 2020-04-08 NOTE — PHYSICIAN QUERY
PT Name: Maria Victoria Hernandez  MR #: [id number]     Physician Query Form - Documentation Clarification      CDS/: Sarina Marsh               Contact information: [email or web address]      This form is a permanent document in the medical record.     Query Date: April 8, 2020    By submitting this query, we are merely seeking further clarification of documentation. Please utilize your independent clinical judgment when addressing the question(s) below.    The Medical record reflects the following:    Supporting Clinical Findings Location in Medical Record   Pneumonia, ventilator associated  Acute -post viral -possible MRSA -  Added Vanc zosyn  Cultures -pend. Few yeast   micafungin added for + yeast in blood culture     Fever on 04/04  Leukocytosis improved 12/17/2010  Gram-positive bacteremia 04/05  Yeast in blood cultures 04/04     Respiratory failure, ARDS, COVID19  HTN  This patient is high risk for life-threatening deterioration and death secondary to above comorbidities and need for IV treatment Critical care 35 min     Ferritin 1752--1538--1897.    Procalcitonin 3.67      Recommendations:  Change  lines---Sent catheter tips for culture  Continue vancomycin, Zosyn  Daptomycin x1 04/06  Added micafungin 100mg  daily 04/06   HM note 4/7             ID note 4/7                                                                             Doctor, Please specify diagnosis or diagnoses associated with above clinical findings.    Provider Use Only      [    ] Ventilator associated Pneumonia   [    ] Fungal Pneumonia   [  x  ] Viral Pneumonia   [   ] Other:__________________                                                                                                                     [  ] Clinically Undetermined

## 2020-04-08 NOTE — PHYSICIAN QUERY
"PT Name: Maria Victoria Hernandez  MR #: [id number]    Physician Query Form - Heart  Condition Clarification     CDS/: Sarina Marsh               Contact information: [email or web address]    This form is a permanent document in the medical record.     Query Date: April 8, 2020    By submitting this query, we are merely seeking further clarification of documentation. Please utilize your independent clinical judgment when addressing the question(s) below.    The medical record contains the following   Indicators     Supporting Clinical Findings Location in Medical Record   x BNP <10 ---<10 ------ 360  Labs 3/25 -- 3/27 --- 3/30    x EF · The estimated ejection fraction is 45%.   Echo 3/27     Radiology findings     x Echo Results · Mild left ventricular enlargement.  · Mildly decreased left ventricular systolic function. The estimated ejection fraction is 45%.  · Grade I (mild) left ventricular diastolic dysfunction consistent with impaired relaxation.  · Normal right ventricular systolic function.  · Mild left atrial enlargement.  · Moderate mitral regurgitation.  · Mild tricuspid regurgitation.  · Mild pulmonary hypertension present. PASP 40 mm of Hg. Echo 3/27     "Ascites" documented      "SOB" or "SWAN" documented      "Hypoxia" documented     x Heart Failure documented Combined heart failure, EF 45% Pulm note 4/8     "Edema" documented     x Diuretics/Meds furosemide (LASIX) 2 mg/mL in sodium chloride 0.9% 100 mL continuous infusion (conc: 2 mg/mL)   Rate: 1.25 mL/hr Dose: 2.5 mg/hr  Freq: Continuous Route: IV  Start: 03/26/20 1900 End: 03/27/20 0102 MAR     Treatment:      Other:      Heart failure (HF) can be acute, chronic or both. It is generally further specificed as systolic, diastolic, or combined. Lastly, it is important to identify an underlying etiology if known or suspected.     Common clues to acute exacerbation:  Rapidly progressive symptoms (w/in 2 weeks of presentation), using IV diuretics " to treat, using supplemental O2, pulmonary edema on Xray, MI w/in 4 weeks, and/or BNP >500    Systolic Heart Failure: is defined as chart documentation of a left ventricular ejection fraction (LVEF) less than 40%     Diastolic Heart Failure: is defined as a left ventricular ejection fraction (LVEF) greater than 40%   +      Evidence of diastolic dysfunction on echocardiography OR    Right heart catheterization wedge pressure above 12 mm Hg OR    Left heart catheterization left ventricular end diastolic pressure 18 mm Hg or above.    References: *American Heart Association    The clinical guidelines noted below are only system guidelines, and do not replace the providers clinical judgment.     Provider, please specify the diagnosis associated with above clinical findings  [   ] Acute on Chronic Combined Systolic and Diastolic Heart Failure      [   x] Chronic Combined Systolic and Diastolic Heart Failure   [   ] Other Type of Heart Failure (please specify type):   [   ] Other (please specify):   [  ] Clinically Undetermined                           Please document in your progress notes daily for the duration of treatment until resolved and include in your discharge summary.

## 2020-04-08 NOTE — PROGRESS NOTES
Pharmacokinetic Assessment Follow Up: IV Vancomycin    Vancomycin serum concentration assessment(s):    The random level was drawn correctly and can be used to guide therapy at this time. The measurement is within the desired definitive target range of 15 to 20 mcg/mL.    Vancomycin Regimen Plan:    Patient's preHD level was 15.1 mcg/mL and within the therapeutic range of 15-20 mcg/mL.  Patient will be given vancomycin 500 mg after dialysis today.   A random level will be drawn on 04/09/20 with AM labs.    Drug levels (last 3 results):  Recent Labs   Lab Result Units 04/06/20  0244 04/07/20  0253 04/08/20  0311   Vancomycin, Random ug/mL 30.2 17.4 15.1       Pharmacy will continue to follow and monitor vancomycin.    Please contact pharmacy at extension [phone] for questions regarding this assessment.    Thank you for the consult,   Jose R Francis       Patient brief summary:  Maria Victoria Hernandez is a 54 y.o. female initiated on antimicrobial therapy with IV Vancomycin for treatment of lower respiratory infection.    The patient is being dosed by level.     Drug Allergies:   Review of patient's allergies indicates:  No Known Allergies    Actual Body Weight:   113.4 kg (250 lb)    Renal Function:   Estimated Creatinine Clearance: 14.1 mL/min (A) (based on SCr of 5.4 mg/dL (H)).,     Dialysis Method (if applicable):  intermittent HD. HD PRN.     CBC (last 72 hours):  Recent Labs   Lab Result Units 04/05/20  0951 04/06/20  0244 04/07/20  0253 04/08/20  0311   WBC K/uL 17.89* 12.75* 10.64 11.23   Hemoglobin g/dL 9.9* 8.3* 8.1* 7.5*   Hematocrit % 31.6* 26.1* 24.0* 23.3*   Platelets K/uL 336 270 289 300   Gran% % 64.0 87.0* 84.3* 76.1*   Lymph% % 8.0* 5.0* 6.7* 9.2*   Mono% % 2.0* 4.0 6.4 11.2   Eosinophil% % 0.0 0.0 0.2 0.5   Basophil% % 0.0 0.0 0.4 0.6   Differential Method  Manual Manual Automated Automated       Metabolic Panel (last 72 hours):  Recent Labs   Lab Result Units 04/06/20  0244 04/07/20  0253  04/08/20  0311   Sodium mmol/L 126* 128* 128*   Potassium mmol/L 4.1 3.4* 3.5   Chloride mmol/L 94* 93* 95   CO2 mmol/L 15* 19* 18*   Glucose mg/dL 98 103 110   BUN, Bld mg/dL 68* 49* 70*   Creatinine mg/dL 5.2* 4.3* 5.4*   Albumin g/dL 2.1* 2.0* 2.0*   Total Bilirubin mg/dL 2.2* 2.6* 2.8*   Alkaline Phosphatase U/L 197* 222* 208*   AST U/L 35 36 32   ALT U/L 33 30 28       Vancomycin Administrations:  vancomycin given in the last 96 hours        No antibiotic orders with administrations found.                      Microbiologic Results:  Microbiology Results (last 7 days)       Procedure Component Value Units Date/Time    Blood culture [[id number]]     Order Status:  Sent Specimen:  Blood     Blood culture [[id number]] Collected:  04/07/20 1730    Order Status:  Completed Specimen:  Blood from Line, Central Updated:  04/08/20 0345     Blood Culture, Routine No Growth to date    Blood culture [[id number]] Collected:  04/07/20 0907    Order Status:  Completed Specimen:  Blood Updated:  04/08/20 0345     Blood Culture, Routine No Growth to date    Blood culture [[id number]] Collected:  04/06/20 1802    Order Status:  Completed Specimen:  Blood from Line, Central Updated:  04/07/20 2212     Blood Culture, Routine No Growth to date      No Growth to date    Blood culture [[id number]]  (Abnormal) Collected:  04/05/20 0813    Order Status:  Completed Specimen:  Blood from Peripheral, Left  Hand Updated:  04/07/20 1454     Blood Culture, Routine Gram stain peds bottle: Gram positive cocci in clusters resembling Staph       Results called to and read back by: Lynn Sawyer RN 04/06/2020  09:57      STAPHYLOCOCCUS EPIDERMIDIS  Susceptibility pending      Blood culture [[id number]]  (Abnormal) Collected:  04/05/20 0813    Order Status:  Completed Specimen:  Blood from Line, Central Left  Neck Updated:  04/07/20 1451     Blood Culture, Routine Gram stain tank bottle: Gram positive cocci in clusters resembling Staph       Results  called to and read back by: Lynn Sawyer RN 04/06/2020  09:57      Gram stain aer bottle: Gram positive cocci in clusters resembling Staph       Positive results previously called 04/06/2020  15:52      STAPHYLOCOCCUS EPIDERMIDIS  Susceptibility pending      Blood culture [[id number]] Collected:  04/06/20 0806    Order Status:  Completed Specimen:  Blood from Antecubital, Left Updated:  04/07/20 1412     Blood Culture, Routine No Growth to date      No Growth to date    Culture, Respiratory with Gram Stain [[id number]]  (Abnormal) Collected:  04/05/20 0050    Order Status:  Completed Specimen:  Respiratory from Endotracheal Aspirate Updated:  04/07/20 1136     Respiratory Culture AJ ALBICANS  Few  Normal respiratory anamaria also present       Gram Stain (Respiratory) <10 epithelial cells per low power field.     Gram Stain (Respiratory) Many WBC's     Gram Stain (Respiratory) Rare Gram positive cocci     Gram Stain (Respiratory) Rare yeast    Blood culture [[id number]]  (Abnormal) Collected:  04/04/20 0912    Order Status:  Completed Specimen:  Blood from Line, Central Updated:  04/07/20 1010     Blood Culture, Routine Gram stain peds bottle: budding yeast      Results called to and read back by: Carri Pryor RN  04/06/2020  03:04      YEAST   Identification pending  ID consult required at [facility].Robin Barahona and Meliton jimenez.      IV catheter culture [[id number]] Collected:  04/06/20 1455    Order Status:  Sent Specimen:  Catheter Tip, Intrajugular Updated:  04/06/20 2029    IV catheter culture [[id number]] Collected:  04/06/20 1615    Order Status:  Sent Specimen:  Catheter Tip, Dialysis Updated:  04/06/20 2029    Blood culture [[id number]]     Order Status:  Canceled Specimen:  Blood     Urine Culture High Risk [[id number]]     Order Status:  Canceled Specimen:  Urine, Catheterized     Culture, Respiratory with Gram Stain [[id number]]     Order Status:  Canceled Specimen:  Respiratory from Tracheal Aspirate

## 2020-04-08 NOTE — PROGRESS NOTES
INPATIENT NEPHROLOGY PROGRESS NOTE  [facility] NEPHROLOGY    Patient Name: Maria Victoria Hernandez  Date: 04/08/2020    Reason for consultation: MAIKOL    History of Present Illness:  54AAF nurse with morbid obesity, hypothyroidism presents PNA, intubated, positive for COVID19. Admit Cr 0.7 went 5.1 and HD started on 3/29.     3/28  Scr worse today.  Only one shift recorded for output, 520cc.  Still hyponatremic, vent setting adjusted per pulmonology note for acidosis.  K+ at goal after repletion.  Has siri, may need HD initiated.  3/29  Non oliguric.  In no distress  3/30 VSS, seen and examined on HD, tolerating well. Plan another HD in AM, discussed with daughter who is a nurse here too.  3/31 VSS, intubated still. UO is not great but she is dialyzed daily. Seen and examined on HD, tolerating well. Plan another HD in AM.  4/1 VSS, intubated still. UO is not great but she is dialyzed daily. Seen and examined on HD, tolerating well. Plan another HD PRN.  4/2 VSS, intubated still. UO is not great. Plan another HD in AM.  4/3 VSS, intubated still. UO is not great but she is dialyzed daily recently. Seen and examined on HD, tolerating well. Plan another HD on Mon or PRN.  4/4 febrile, BP stable, FIO2 50%, intubated, sedated, on levophed, UOP 900cc, transfused  4/5 febrile, tachy, SBP 100s, FIO2 65%, intubated, sedated, off levophed, UOP 935cc  4/6 intubated,. Sedated  4/7 intubated, sedated. Dialysis catheter changed yesterday  4/8 just finished dialysis. uf 3 liters    Plan of Care:    1. Septic shock 2/2 COVID PNA with HHRF requiring MV  - WBC down to 10.6. Still spiking fevers          2. MAIKOL - suspect multifactorial ATN in setting of shock/COVID/intravascular volume depletion- initiated HD 3/29  - she is now nonoliguric but clearance parameters are worse     - no nsaids or IV contrast    3. Hypervolemic hyponatremia  - ordered 140 Na bath, 2-3L UF tomorrow as tolerated    4. Acidosis  -  35 bicarb bath    5.  Anemia  - transfused last week- Epogen with hd    Thank you for allowing us to participate in this patient's care. We will continue to follow.    Vital Signs:  Temp Readings from Last 3 Encounters:   04/08/20 98.7 °F (37.1 °C)       Pulse Readings from Last 3 Encounters:   04/08/20 99   01/15/15 84   12/17/13 80       BP Readings from Last 3 Encounters:   04/08/20 130/82   01/15/15 124/82   12/17/13 102/68       Weight:  Wt Readings from Last 3 Encounters:   04/07/20 113.4 kg (250 lb)   01/15/15 105.8 kg (233 lb 4.8 oz)   12/17/13 101.2 kg (223 lb)     Medications:  Scheduled Meds:   ascorbic acid (vitamin C)  500 mg Oral QID    calcitRIOL  0.25 mcg Oral Daily    gentamicin  1 drop Right Eye Q4H    heparin (porcine)  5,000 Units Subcutaneous Q12H    levothyroxine  100 mcg Oral Before breakfast    micafungin (MYCAMINE) IVPB  100 mg Intravenous Q24H    mupirocin   Nasal BID    pantoprazole  40 mg Intravenous BID    piperacillin-tazobactam (ZOSYN) IVPB  4.5 g Intravenous Q12H    polyethylene glycol  17 g Oral Daily    rocuronium  100 mg Intravenous Once    sodium bicarbonate  1,300 mg Per NG tube TID    vancomycin (VANCOCIN) IVPB  500 mg Intravenous Once    white petrolatum-mineral oiL   Right Eye QHS    zinc sulfate  220 mg Oral Daily     Continuous Infusions:   dexmedetomidine (PRECEDEX) infusion 0.6 mcg/kg/hr (04/08/20 0858)    niCARdipine Stopped (04/07/20 1440)    norepinephrine bitartrate-D5W 0.04 mcg/kg/min (04/08/20 0800)    propofoL Stopped (04/06/20 0348)     PRN Meds:.sodium chloride, sodium chloride, acetaminophen, heparin (porcine), metoprolol, promethazine (PHENERGAN) IVPB, propofoL, sodium chloride 0.9%, sodium chloride 0.9%, Pharmacy to dose Vancomycin consult **AND** vancomycin - pharmacy to dose  No current facility-administered medications on file prior to encounter.      Current Outpatient Medications on File Prior to Encounter   Medication Sig Dispense Refill    ferrous sulfate  "325 mg (65 mg iron) Tab tablet Take 1 tablet (325 mg total) by mouth daily with breakfast. (Patient taking differently: Take 325 mg by mouth daily with breakfast. Take 2 tablets daily) 90 tablet 3    fluticasone propionate (FLONASE) 50 mcg/actuation nasal spray 1 spray by Each Nostril route once daily.      levothyroxine (SYNTHROID) 100 MCG tablet Take 1 tablet (100 mcg total) by mouth once daily. (Patient taking differently: Take 150 mcg by mouth once daily. ) 90 tablet 3    meloxicam (MOBIC) 7.5 MG tablet Take 7.5 mg by mouth once daily.      clotrimazole-betamethasone 1-0.05% (LOTRISONE) cream Apply topically 2 (two) times daily. 45 g 1    levocetirizine (XYZAL) 5 MG tablet Take 1 tablet (5 mg total) by mouth every evening. 30 tablet 6     Review of Systems:  Unable to obtain due to MV    Physical Exam:  /82   Pulse 99   Temp 98.7 °F (37.1 °C)   Resp (!) 41   Ht 5' 1" (1.549 m)   Wt 113.4 kg (250 lb)   SpO2 97%   Breastfeeding? No   BMI 47.24 kg/m²     INS/OUTS:  I/O last 3 completed shifts:  In: 1880.9 [I.V.:600.9; NG/GT:380; IV Piggyback:300]  Out: 665 [Urine:665]  I/O this shift:  In: 600 [Other:500; IV Piggyback:100]  Out: 3575 [Urine:75; Other:3500]    Did exam via video monitoring  Did not go in room    Physical Exam:  Constitutional: acutely ill, appears stated age,   HEENT: Eyes closed, MMM  Heart: RRR on monitor, no edema  Lungs: intubated, no lb  Abdomen: nd  Skin: no rash  MSK: no deformity  CNS: sedated      Results:  Lab Results   Component Value Date     (L) 04/08/2020    K 3.5 04/08/2020    CL 95 04/08/2020    CO2 18 (L) 04/08/2020    BUN 70 (H) 04/08/2020    CREATININE 5.4 (H) 04/08/2020    CALCIUM 8.8 04/08/2020    ANIONGAP 15 04/08/2020    ESTGFRAFRICA 10 (A) 04/08/2020    EGFRNONAA 8 (A) 04/08/2020       Lab Results   Component Value Date    CALCIUM 8.8 04/08/2020    PHOS 4.8 (H) 03/29/2020       Recent Labs   Lab 04/08/20  0311   WBC 11.23   RBC 2.89*   HGB 7.5*   HCT " 23.3*      MCV 81*   MCH 26.0*   MCHC 32.2     I have personally reviewed pertinent radiological imaging and reports.    Don Collins MD  Nephrology  [facility] [place]  [phone]

## 2020-04-08 NOTE — PLAN OF CARE
Plan of care continued, safety maintained. Remains intubated on vent a/c 35% 5 PEEP. Attempted CPAP trial, became tachypneic, tachycardic, increased precidex, returned to a/c after 9 minutes. Tube feedings continued at 10cc/h, ng flushes easily, no residual. Small loose stool noted, pericare done. RT Eye remains inflamed, red, painful. Occasionally awakens and opens eyes spont, does not follow commands.

## 2020-04-08 NOTE — PLAN OF CARE
Intervention: enteral nutrition therapy   current intake:  Nutren 1.5 @ 10 ml/hr ( provides 360 kcal, 16 g protein, 182 ml free water)  - was receiving D 10 AA 4.25 @ 50 ml/hr ( provides 51 g protein, 612 kcal)     Recommendations     1. As tolerated continue Nutren 1.5 @ 10 ml/hr advancing to 45 ml/ hr + Promod 30 ml TID + 130 ml flush q 4 hr   ( provides 1620 kcal ( 79% EEN), 73 g protein + promod (86% EPN), and 820 ml free water, Phos 1296 mg)  -If necessary can switch to Novasource renal @ 30 ml/hr + promod 30 ml TID   -if pt does not tolerate TF consult for TPN recs.     2. Weigh pt s/p HD  3. Once extubated advance PO diet to goal of cardiac     Goals: 1.) Meet >85% EEN/EPN by RD f/u 2) nutrition support to meet > 75% EEN at f/u  Nutrition Goal Status: 1) was meeting- not met 4/4-4/8 2) new  Communication of RD Recs: (POC, sticky note)

## 2020-04-08 NOTE — RESPIRATORY THERAPY
04/08/20 1135   Preset Conventional Ventilator Settings   Vent Type    Ventilation Type VC   Vent Mode Spont   Set Rate 0 BPM   Vt Set 350 mL   PEEP/CPAP 5 cmH20   Pressure Support 10 cmH20   Waveform RAMP   Peak Flow 60 L/min   Set Inspiratory Pressure 0 cmH20   Insp Time 0 Sec(s)   Plateau Set/Insp. Hold (sec) 0   Insp Rise Time  50 %   Trigger Sensitivity Flow/I-Trigger 2 L/min   P High 0 cm H2O   P Low 0 cm H2O   T High 0 sec   T Low 0 sec   CPAP trial initiated.

## 2020-04-09 LAB
ALBUMIN SERPL BCP-MCNC: 2.2 G/DL (ref 3.5–5.2)
ALLENS TEST: ABNORMAL
ALP SERPL-CCNC: 292 U/L (ref 55–135)
ALT SERPL W/O P-5'-P-CCNC: 38 U/L (ref 10–44)
ANION GAP SERPL CALC-SCNC: 16 MMOL/L (ref 8–16)
AST SERPL-CCNC: 42 U/L (ref 10–40)
BACTERIA CATH TIP CULT: ABNORMAL
BACTERIA CATH TIP CULT: NO GROWTH
BASOPHILS # BLD AUTO: 0.11 K/UL (ref 0–0.2)
BASOPHILS NFR BLD: 0.8 % (ref 0–1.9)
BILIRUB SERPL-MCNC: 2.8 MG/DL (ref 0.1–1)
BUN SERPL-MCNC: 48 MG/DL (ref 6–20)
CALCIUM SERPL-MCNC: 9.4 MG/DL (ref 8.7–10.5)
CHLORIDE SERPL-SCNC: 94 MMOL/L (ref 95–110)
CO2 SERPL-SCNC: 22 MMOL/L (ref 23–29)
CREAT SERPL-MCNC: 4.4 MG/DL (ref 0.5–1.4)
CRP SERPL-MCNC: 190.2 MG/L (ref 0–8.2)
DELSYS: ABNORMAL
DIFFERENTIAL METHOD: ABNORMAL
EOSINOPHIL # BLD AUTO: 0 K/UL (ref 0–0.5)
EOSINOPHIL NFR BLD: 0.1 % (ref 0–8)
ERYTHROCYTE [DISTWIDTH] IN BLOOD BY AUTOMATED COUNT: 17.3 % (ref 11.5–14.5)
EST. GFR  (AFRICAN AMERICAN): 12 ML/MIN/1.73 M^2
EST. GFR  (NON AFRICAN AMERICAN): 11 ML/MIN/1.73 M^2
FERRITIN SERPL-MCNC: 1927 NG/ML (ref 20–300)
FIO2: 40
GLUCOSE SERPL-MCNC: 149 MG/DL (ref 70–110)
HCO3 UR-SCNC: 20.4 MMOL/L (ref 24–28)
HCT VFR BLD AUTO: 27.3 % (ref 37–48.5)
HGB BLD-MCNC: 8.7 G/DL (ref 12–16)
IMM GRANULOCYTES # BLD AUTO: 0.6 K/UL (ref 0–0.04)
IMM GRANULOCYTES NFR BLD AUTO: 4.4 % (ref 0–0.5)
LYMPHOCYTES # BLD AUTO: 1.1 K/UL (ref 1–4.8)
LYMPHOCYTES NFR BLD: 7.7 % (ref 18–48)
MAGNESIUM SERPL-MCNC: 2 MG/DL (ref 1.6–2.6)
MCH RBC QN AUTO: 26.4 PG (ref 27–31)
MCHC RBC AUTO-ENTMCNC: 31.9 G/DL (ref 32–36)
MCV RBC AUTO: 83 FL (ref 82–98)
MODE: ABNORMAL
MONOCYTES # BLD AUTO: 1.3 K/UL (ref 0.3–1)
MONOCYTES NFR BLD: 9.3 % (ref 4–15)
NEUTROPHILS # BLD AUTO: 10.7 K/UL (ref 1.8–7.7)
NEUTROPHILS NFR BLD: 77.7 % (ref 38–73)
NRBC BLD-RTO: 0 /100 WBC
PCO2 BLDA: 30.7 MMHG (ref 35–45)
PH SMN: 7.43 [PH] (ref 7.35–7.45)
PLATELET # BLD AUTO: 402 K/UL (ref 150–350)
PMV BLD AUTO: 11.4 FL (ref 9.2–12.9)
PO2 BLDA: 73 MMHG (ref 80–100)
POC BE: -4 MMOL/L
POC SATURATED O2: 95 % (ref 95–100)
POC TCO2: 21 MMOL/L (ref 23–27)
POTASSIUM SERPL-SCNC: 3.3 MMOL/L (ref 3.5–5.1)
PROT SERPL-MCNC: 8.3 G/DL (ref 6–8.4)
RBC # BLD AUTO: 3.3 M/UL (ref 4–5.4)
SAMPLE: ABNORMAL
SITE: ABNORMAL
SODIUM SERPL-SCNC: 132 MMOL/L (ref 136–145)
VANCOMYCIN SERPL-MCNC: 19.9 UG/ML
WBC # BLD AUTO: 13.7 K/UL (ref 3.9–12.7)

## 2020-04-09 PROCEDURE — 86140 C-REACTIVE PROTEIN: CPT

## 2020-04-09 PROCEDURE — 99291 PR CRITICAL CARE, E/M 30-74 MINUTES: ICD-10-PCS | Mod: ,,, | Performed by: INTERNAL MEDICINE

## 2020-04-09 PROCEDURE — 80202 ASSAY OF VANCOMYCIN: CPT

## 2020-04-09 PROCEDURE — 25000003 PHARM REV CODE 250: Performed by: INTERNAL MEDICINE

## 2020-04-09 PROCEDURE — 85025 COMPLETE CBC W/AUTO DIFF WBC: CPT

## 2020-04-09 PROCEDURE — 82728 ASSAY OF FERRITIN: CPT

## 2020-04-09 PROCEDURE — 36415 COLL VENOUS BLD VENIPUNCTURE: CPT

## 2020-04-09 PROCEDURE — 63600175 PHARM REV CODE 636 W HCPCS: Performed by: INTERNAL MEDICINE

## 2020-04-09 PROCEDURE — 27000221 HC OXYGEN, UP TO 24 HOURS

## 2020-04-09 PROCEDURE — 63600175 PHARM REV CODE 636 W HCPCS: Performed by: HOSPITALIST

## 2020-04-09 PROCEDURE — 11000001 HC ACUTE MED/SURG PRIVATE ROOM

## 2020-04-09 PROCEDURE — 99291 CRITICAL CARE FIRST HOUR: CPT | Mod: ,,, | Performed by: INTERNAL MEDICINE

## 2020-04-09 PROCEDURE — 99900035 HC TECH TIME PER 15 MIN (STAT)

## 2020-04-09 PROCEDURE — 99291 PR CRITICAL CARE, E/M 30-74 MINUTES: ICD-10-PCS | Mod: S$GLB,,, | Performed by: INTERNAL MEDICINE

## 2020-04-09 PROCEDURE — 82803 BLOOD GASES ANY COMBINATION: CPT

## 2020-04-09 PROCEDURE — 99291 CRITICAL CARE FIRST HOUR: CPT | Mod: S$GLB,,, | Performed by: INTERNAL MEDICINE

## 2020-04-09 PROCEDURE — 83735 ASSAY OF MAGNESIUM: CPT

## 2020-04-09 PROCEDURE — 20000000 HC ICU ROOM

## 2020-04-09 PROCEDURE — 25000003 PHARM REV CODE 250: Performed by: HOSPITALIST

## 2020-04-09 PROCEDURE — 80053 COMPREHEN METABOLIC PANEL: CPT

## 2020-04-09 PROCEDURE — 97161 PT EVAL LOW COMPLEX 20 MIN: CPT

## 2020-04-09 PROCEDURE — 94761 N-INVAS EAR/PLS OXIMETRY MLT: CPT

## 2020-04-09 PROCEDURE — 37799 UNLISTED PX VASCULAR SURGERY: CPT

## 2020-04-09 RX ORDER — MORPHINE SULFATE 2 MG/ML
2 INJECTION, SOLUTION INTRAMUSCULAR; INTRAVENOUS
Status: DISCONTINUED | OUTPATIENT
Start: 2020-04-09 | End: 2020-04-17

## 2020-04-09 RX ORDER — ASCORBIC ACID 500 MG
1000 TABLET ORAL 4 TIMES DAILY
Status: DISCONTINUED | OUTPATIENT
Start: 2020-04-09 | End: 2020-04-09

## 2020-04-09 RX ORDER — ASCORBIC ACID 500 MG
1000 TABLET ORAL 4 TIMES DAILY
Status: DISCONTINUED | OUTPATIENT
Start: 2020-04-09 | End: 2020-04-17

## 2020-04-09 RX ORDER — LOPERAMIDE HYDROCHLORIDE 2 MG/1
2 CAPSULE ORAL 4 TIMES DAILY PRN
Status: DISCONTINUED | OUTPATIENT
Start: 2020-04-09 | End: 2020-04-21 | Stop reason: HOSPADM

## 2020-04-09 RX ADMIN — HYDROMORPHONE HYDROCHLORIDE 0.2 MG: 2 INJECTION INTRAMUSCULAR; INTRAVENOUS; SUBCUTANEOUS at 12:04

## 2020-04-09 RX ADMIN — ZINC SULFATE 220 MG (50 MG) CAPSULE 220 MG: CAPSULE at 09:04

## 2020-04-09 RX ADMIN — NICARDIPINE HYDROCHLORIDE 7.5 MG/HR: 0.2 INJECTION, SOLUTION INTRAVENOUS at 09:04

## 2020-04-09 RX ADMIN — GENTAMICIN SULFATE 1 DROP: 3 SOLUTION OPHTHALMIC at 06:04

## 2020-04-09 RX ADMIN — GENTAMICIN SULFATE 1 DROP: 3 SOLUTION OPHTHALMIC at 02:04

## 2020-04-09 RX ADMIN — SODIUM BICARBONATE 650 MG TABLET 1300 MG: at 09:04

## 2020-04-09 RX ADMIN — OXYCODONE HYDROCHLORIDE AND ACETAMINOPHEN 500 MG: 500 TABLET ORAL at 12:04

## 2020-04-09 RX ADMIN — CALCITRIOL CAPSULES 0.25 MCG 0.25 MCG: 0.25 CAPSULE ORAL at 09:04

## 2020-04-09 RX ADMIN — DEXMEDETOMIDINE HYDROCHLORIDE 0.2 MCG/KG/HR: 100 INJECTION, SOLUTION, CONCENTRATE INTRAVENOUS at 09:04

## 2020-04-09 RX ADMIN — HYDROMORPHONE HYDROCHLORIDE 0.2 MG: 2 INJECTION INTRAMUSCULAR; INTRAVENOUS; SUBCUTANEOUS at 04:04

## 2020-04-09 RX ADMIN — GENTAMICIN SULFATE 1 DROP: 3 SOLUTION OPHTHALMIC at 03:04

## 2020-04-09 RX ADMIN — SODIUM BICARBONATE 650 MG TABLET 1300 MG: at 08:04

## 2020-04-09 RX ADMIN — NICARDIPINE HYDROCHLORIDE 5 MG/HR: 0.2 INJECTION, SOLUTION INTRAVENOUS at 07:04

## 2020-04-09 RX ADMIN — HYDROMORPHONE HYDROCHLORIDE 0.2 MG: 2 INJECTION INTRAMUSCULAR; INTRAVENOUS; SUBCUTANEOUS at 09:04

## 2020-04-09 RX ADMIN — DEXMEDETOMIDINE HYDROCHLORIDE 0.2 MCG/KG/HR: 100 INJECTION, SOLUTION, CONCENTRATE INTRAVENOUS at 08:04

## 2020-04-09 RX ADMIN — HEPARIN SODIUM 5000 UNITS: 5000 INJECTION, SOLUTION INTRAVENOUS; SUBCUTANEOUS at 09:04

## 2020-04-09 RX ADMIN — GENTAMICIN SULFATE 1 DROP: 3 SOLUTION OPHTHALMIC at 09:04

## 2020-04-09 RX ADMIN — NICARDIPINE HYDROCHLORIDE 7.5 MG/HR: 0.2 INJECTION, SOLUTION INTRAVENOUS at 03:04

## 2020-04-09 RX ADMIN — LEVOTHYROXINE SODIUM 100 MCG: 100 TABLET ORAL at 06:04

## 2020-04-09 RX ADMIN — MINERAL OIL AND PETROLATUM: 150; 830 OINTMENT OPHTHALMIC at 09:04

## 2020-04-09 RX ADMIN — HYDROMORPHONE HYDROCHLORIDE 0.2 MG: 2 INJECTION INTRAMUSCULAR; INTRAVENOUS; SUBCUTANEOUS at 03:04

## 2020-04-09 RX ADMIN — MUPIROCIN: 20 OINTMENT TOPICAL at 09:04

## 2020-04-09 RX ADMIN — LACTOBACILLUS TAB 2 TABLET: TAB at 09:04

## 2020-04-09 RX ADMIN — SODIUM BICARBONATE 650 MG TABLET 1300 MG: at 03:04

## 2020-04-09 RX ADMIN — OXYCODONE HYDROCHLORIDE AND ACETAMINOPHEN 1000 MG: 500 TABLET ORAL at 09:04

## 2020-04-09 RX ADMIN — OXYCODONE HYDROCHLORIDE AND ACETAMINOPHEN 500 MG: 500 TABLET ORAL at 09:04

## 2020-04-09 RX ADMIN — MICAFUNGIN SODIUM 100 MG: 20 INJECTION, POWDER, LYOPHILIZED, FOR SOLUTION INTRAVENOUS at 10:04

## 2020-04-09 RX ADMIN — OXYCODONE HYDROCHLORIDE AND ACETAMINOPHEN 1000 MG: 500 TABLET ORAL at 04:04

## 2020-04-09 NOTE — SUBJECTIVE & OBJECTIVE
Interval History:   Patient seen and examined via telemed with nursing staff   Extubated last pm -on 5 Liter  Had HD yesterday   Spoke w daughter-pt answering yes/no  Has ngtube in place   incontinuend large stool then became restless-on precede  On cardene for elevated BP        Review of Systems   Constitutional: Negative for chills and fever.   Respiratory: Positive for shortness of breath. Negative for cough.    Gastrointestinal: Positive for diarrhea. Negative for abdominal pain.   Psychiatric/Behavioral: Positive for agitation. The patient is nervous/anxious.      Objective:     Vital Signs (Most Recent):  Temp: 98.6 °F (37 °C) (04/09/20 0845)  Pulse: (!) 121 (04/09/20 0845)  Resp: (!) 45 (04/09/20 0845)  BP: 126/70 (04/09/20 0845)  SpO2: 98 % (04/09/20 0845) Vital Signs (24h Range):  Temp:  [97.9 °F (36.6 °C)-99.6 °F (37.6 °C)] 98.6 °F (37 °C)  Pulse:  [] 121  Resp:  [26-54] 45  SpO2:  [90 %-99 %] 98 %  BP: ()/(50-95) 126/70  Arterial Line BP: (130-192)/(62-82) 161/67     Weight: 113.4 kg (250 lb)  Body mass index is 47.24 kg/m².    Intake/Output Summary (Last 24 hours) at 4/9/2020 1022  Last data filed at 4/9/2020 0845  Gross per 24 hour   Intake 1114.97 ml   Output 4270 ml   Net -3155.03 ml      Physical Exam   PATIENT WAS SEEN AS A VIDEO VISIT WITH NURSE ASSIST DURING THE VISIT. PHYSICAL EXAM FINDINGS ARE AS VIEWED BY MYSELF VIA VIDEO OR AS REPORTED BY NURSE IF SPECIFIED AS SUCH, EXAM NOT DONE PERSONALLY BY MYSELF AT BEDSIDE.        Significant Labs:   BMP:   Recent Labs   Lab 04/09/20  0353   *   *   K 3.3*   CL 94*   CO2 22*   BUN 48*   CREATININE 4.4*   CALCIUM 9.4   MG 2.0     CBC:   Recent Labs   Lab 04/08/20  0311 04/09/20  0353   WBC 11.23 13.70*   HGB 7.5* 8.7*   HCT 23.3* 27.3*    402*       Significant Imaging: I have reviewed and interpreted all pertinent imaging results/findings within the past 24 hours.

## 2020-04-09 NOTE — ASSESSMENT & PLAN NOTE
4/8 extubated    Tolerating Wean of oxygen and peep -see resp failure  --will cont ARDSnet Protocol.  --Target 6-8ml/kg Ideal Body Weight. Decrease TV as tolerated.  --Plateau pressure goal <30cm H2O.  --Oxygenation goal PaO2 55-80 or SpO2 88-95%.  --pH goal 7.30-7.45.  --Wean to PSV as tolerated.

## 2020-04-09 NOTE — ASSESSMENT & PLAN NOTE
Blood cultures from 4/5 -gm +cocci -follow up final   Vancomyin 4/4 -  Consulted ID  4/4 blood culture yeast   HD/TLC lines removed   4/5 blood culture staph epidermidis   Blood culture neg 4/6 4/7 blood culture -neg  4/8 blood culture pend    4/6 -HD catheter tip staph epidermidis   4/6 IV catheter tipstaph epidermidis  4/5 resp culture candida albicans   Urine culture cancelled   Recommendations: per ID   Changed  lines-on 04/06  Continue vancomycin  DC Zosyn  Continue micafungin because of fungemia on 04/04.

## 2020-04-09 NOTE — RESPIRATORY THERAPY
04/09/20 0837   PRE-TX-O2   O2 Device (Oxygen Therapy) room air   $ Is the patient on Low Flow Oxygen? Yes   Flow (L/min) 5   SpO2 97 %   Pulse Oximetry Type Continuous   $ Pulse Oximetry - Multiple Charge Pulse Oximetry - Multiple   Pulse (!) 120   Resp (!) 45   /66

## 2020-04-09 NOTE — ASSESSMENT & PLAN NOTE
Body mass index is 47.24 kg/m². Morbid obesity complicates all aspects of disease management from diagnostic modalities to treatment. Weight loss encouraged and health benefits explained to patient.

## 2020-04-09 NOTE — PROGRESS NOTES
INPATIENT NEPHROLOGY PROGRESS NOTE  [facility] NEPHROLOGY    Patient Name: Maria Victoria Hernandez  Date: 04/09/2020    Reason for consultation: MAIKOL    History of Present Illness:  54AAF nurse with morbid obesity, hypothyroidism presents PNA, intubated, positive for COVID19. Admit Cr 0.7 went 5.1 and HD started on 3/29.     3/28  Scr worse today.  Only one shift recorded for output, 520cc.  Still hyponatremic, vent setting adjusted per pulmonology note for acidosis.  K+ at goal after repletion.  Has siri, may need HD initiated.  3/29  Non oliguric.  In no distress  3/30 VSS, seen and examined on HD, tolerating well. Plan another HD in AM, discussed with daughter who is a nurse here too.  3/31 VSS, intubated still. UO is not great but she is dialyzed daily. Seen and examined on HD, tolerating well. Plan another HD in AM.  4/1 VSS, intubated still. UO is not great but she is dialyzed daily. Seen and examined on HD, tolerating well. Plan another HD PRN.  4/2 VSS, intubated still. UO is not great. Plan another HD in AM.  4/3 VSS, intubated still. UO is not great but she is dialyzed daily recently. Seen and examined on HD, tolerating well. Plan another HD on Mon or PRN.  4/4 febrile, BP stable, FIO2 50%, intubated, sedated, on levophed, UOP 900cc, transfused  4/5 febrile, tachy, SBP 100s, FIO2 65%, intubated, sedated, off levophed, UOP 935cc  4/6 intubated,. Sedated  4/7 intubated, sedated. Dialysis catheter changed yesterday  4/8 just finished dialysis. uf 3 liters    Plan of Care:    1. Septic shock 2/2 COVID PNA with HHRF requiring MV  - WBC down to 10.6. Still spiking fevers          2. MAIKOL - suspect multifactorial ATN in setting of shock/COVID/intravascular volume depletion- initiated HD 3/29  - she is now nonoliguric but clearance parameters are worse     - no nsaids or IV contrast    3. Hypervolemic hyponatremia  - ordered 140 Na bath, 2-3L UF tomorrow as tolerated    4. Acidosis  -  35 bicarb bath    5.  Anemia  - transfused last week- Epogen with hd    Thank you for allowing us to participate in this patient's care. We will continue to follow.    Vital Signs:  Temp Readings from Last 3 Encounters:   04/09/20 99.6 °F (37.6 °C) (Core Rectal)       Pulse Readings from Last 3 Encounters:   04/09/20 105   01/15/15 84   12/17/13 80       BP Readings from Last 3 Encounters:   04/09/20 (!) 114/57   01/15/15 124/82   12/17/13 102/68       Weight:  Wt Readings from Last 3 Encounters:   04/07/20 113.4 kg (250 lb)   01/15/15 105.8 kg (233 lb 4.8 oz)   12/17/13 101.2 kg (223 lb)     Medications:  Scheduled Meds:   ascorbic acid (vitamin C)  500 mg Oral QID    calcitRIOL  0.25 mcg Oral Daily    gentamicin  1 drop Right Eye Q4H    heparin (porcine)  5,000 Units Subcutaneous Q12H    levothyroxine  100 mcg Oral Before breakfast    micafungin (MYCAMINE) IVPB  100 mg Intravenous Q24H    mupirocin   Nasal BID    polyethylene glycol  17 g Oral Daily    sodium bicarbonate  1,300 mg Per NG tube TID    white petrolatum-mineral oiL   Right Eye QHS    zinc sulfate  220 mg Oral Daily     Continuous Infusions:   dexmedetomidine (PRECEDEX) infusion 0.2 mcg/kg/hr (04/08/20 2000)    niCARdipine 5 mg/hr (04/09/20 0740)    norepinephrine bitartrate-D5W Stopped (04/08/20 1800)     PRN Meds:.sodium chloride, sodium chloride, acetaminophen, heparin (porcine), HYDROmorphone, metoprolol, morphine, promethazine (PHENERGAN) IVPB, propofoL, sodium chloride 0.9%, sodium chloride 0.9%, Pharmacy to dose Vancomycin consult **AND** vancomycin - pharmacy to dose  No current facility-administered medications on file prior to encounter.      Current Outpatient Medications on File Prior to Encounter   Medication Sig Dispense Refill    ferrous sulfate 325 mg (65 mg iron) Tab tablet Take 1 tablet (325 mg total) by mouth daily with breakfast. (Patient taking differently: Take 325 mg by mouth daily with breakfast. Take 2 tablets daily) 90 tablet 3     "fluticasone propionate (FLONASE) 50 mcg/actuation nasal spray 1 spray by Each Nostril route once daily.      levothyroxine (SYNTHROID) 100 MCG tablet Take 1 tablet (100 mcg total) by mouth once daily. (Patient taking differently: Take 150 mcg by mouth once daily. ) 90 tablet 3    meloxicam (MOBIC) 7.5 MG tablet Take 7.5 mg by mouth once daily.      clotrimazole-betamethasone 1-0.05% (LOTRISONE) cream Apply topically 2 (two) times daily. 45 g 1    levocetirizine (XYZAL) 5 MG tablet Take 1 tablet (5 mg total) by mouth every evening. 30 tablet 6     Review of Systems:  Unable to obtain due to MV    Physical Exam:  BP (!) 114/57   Pulse 105   Temp 99.6 °F (37.6 °C) (Core Rectal)   Resp (!) 29   Ht 5' 1" (1.549 m)   Wt 113.4 kg (250 lb)   SpO2 98%   Breastfeeding? No   BMI 47.24 kg/m²     INS/OUTS:  I/O last 3 completed shifts:  In: 1477.9 [I.V.:517.9; Other:500; NG/GT:160; IV Piggyback:300]  Out: 4575 [Urine:975; Other:3500; Stool:100]  No intake/output data recorded.    Did exam via video monitoring  Did not go in room    Physical Exam:  Constitutional: acutely ill, appears stated age,   HEENT: Eyes closed, MMM  Heart: RRR on monitor, no edema  Lungs: intubated, no lb  Abdomen: nd  Skin: no rash  MSK: no deformity  CNS: sedated      Results:  Lab Results   Component Value Date     (L) 04/09/2020    K 3.3 (L) 04/09/2020    CL 94 (L) 04/09/2020    CO2 22 (L) 04/09/2020    BUN 48 (H) 04/09/2020    CREATININE 4.4 (H) 04/09/2020    CALCIUM 9.4 04/09/2020    ANIONGAP 16 04/09/2020    ESTGFRAFRICA 12 (A) 04/09/2020    EGFRNONAA 11 (A) 04/09/2020       Lab Results   Component Value Date    CALCIUM 9.4 04/09/2020    PHOS 4.8 (H) 03/29/2020       Recent Labs   Lab 04/09/20  0353   WBC 13.70*   RBC 3.30*   HGB 8.7*   HCT 27.3*   *   MCV 83   MCH 26.4*   MCHC 31.9*     I have personally reviewed pertinent radiological imaging and reports.    Don Collins MD  Nephrology  [facility] " [place]  [phone]

## 2020-04-09 NOTE — PT/OT/SLP PROGRESS
Speech Language Pathology      Maria Victoria Hernandez  MRN: [id number]    Patient not seen today secondary to Nursing hold (Comment). Will follow-up 4/10/2020.    Cinda Pereira, BEST-SLP

## 2020-04-09 NOTE — PT/OT/SLP EVAL
Physical Therapy Evaluation    Patient Name:  Maria Victoria Hernandez   MRN:  [id number]    Recommendations:     Discharge Recommendations:  nursing facility, skilled   Discharge Equipment Recommendations: other (see comments)(unclear at this time)   Barriers to discharge: patient potential unclear at this time    Assessment:     Maria Victoria Hernandez is a 54 y.o. female admitted with a medical diagnosis of Acute respiratory failure with hypoxia.  She presents with the following impairments/functional limitations:  weakness, impaired endurance, impaired sensation, impaired cognition, impaired functional mobilty, decreased lower extremity function, gait instability, impaired balance, decreased coordination, decreased ROM .  Patient unresponsive during all attempted PT evaluation this afternoon.  LE ROM only thing that could be tested due to patient being unresponsive.  PT to try further evaluation tomorrow and if patient still unresponsive, PT to hold until patient becomes more responsive and appropriate for PT.    Rehab Prognosis: Fair; patient would benefit from acute skilled PT services to address these deficits and reach maximum level of function.    Recent Surgery: * No surgery found *      Plan:     During this hospitalization, patient to be seen 5 x/week to address the identified rehab impairments via gait training, therapeutic activities, therapeutic exercises and progress toward the following goals:    · Plan of Care Expires:       Subjective     Chief Complaint: none given  Patient/Family Comments/goals: none given  Pain/Comfort:  ·      Patients cultural, spiritual, Orthodoxy conflicts given the current situation:      Living Environment:  Unclear  Prior to admission, patients level of function was unclear.  Equipment used at home: none.  DME owned (not currently used): none.  Upon discharge, patient will have assistance from family.    Objective:     Communicated with nurse prior to session.   Patient found supine with oxygen, telemetry, central line, blood pressure cuff, bed alarm, pulse ox (continuous), peripheral IV(rectal tube)  upon PT entry to room.    General Precautions: Standard, airborne, contact, fall, droplet   Orthopedic Precautions:    Braces:       Exams:  · RLE ROM: Deficits: no AROM at all joints  · RLE Strength: Deficits: no muscle activity all joints  · LLE ROM: Deficits: no AROM al joints  · LLE Strength: Deficits: no muscle activity all joints    Functional Mobility:  · Bed Mobility:     · Rolling Left:  dependence  · Rolling Right: dependence      Therapeutic Activities and Exercises:   none given    AM-PAC 6 CLICK MOBILITY  Total Score:8     Patient left supine with call button in reach and bed alarm on.    GOALS:   Multidisciplinary Problems     Physical Therapy Goals        Problem: Physical Therapy Goal    Goal Priority Disciplines Outcome Goal Variances Interventions   Physical Therapy Goal     PT, PT/OT Ongoing, Progressing     Description:  Goals to be met by: 2020    Patient will increase functional independence with mobility by performin. Supine to sit with MInimal Assistance  2. Sit to supine with MInimal Assistance  3. Sit to stand transfer with Minimal Assistance  4. Bed to chair transfer with Minimal Assistance using Rolling Walker  5. Gait  x 25 feet with Minimal Assistance using Rolling Walker.                       History:     Past Medical History:   Diagnosis Date    Colon polyp     Diverticulosis large intestine w/o perforation or abscess w/bleeding     Iron deficiency anemia     Prediabetes     Thyroid disease     Vitamin B 12 deficiency     Vitamin D deficiency        Past Surgical History:   Procedure Laterality Date    TUBAL LIGATION         Time Tracking:     PT Received On: 20  PT Start Time: 1436     PT Stop Time: 1455  PT Total Time (min): 19 min     Billable Minutes: Evaluation 19      Chris Megilligan, PT  2020

## 2020-04-09 NOTE — PROGRESS NOTES
[facility] Medicine  Progress Note    Patient Name: Maria Victoria Hernandez  MRN: [id number]  Patient Class: IP- Inpatient   Admission Date: 3/25/2020  Length of Stay: 15 days  Attending Physician: Sharonda Burris MD  Primary Care Provider: Candice Deluna MD        Subjective:     Principal Problem:Acute respiratory failure with hypoxia        HPI:  Patient is a 54 years old  female with morbid obesity in past medical history significant for hypothyroidism is being admitted to intensive care unit under inpatient status from [facility] Emergency room with worsening shortness of breath.  Three days ago patient was tested positive for COVID - 19.  Patient works at [employer].  Patient has been experiencing subjective fever, generalized body aches and pains and nonproductive cough for few days.  Patient is getting increasingly short of breath especially for the past 2 days.  Upon arrival to the emergency room patient was noted to be hypoxic around 89%.  Up on 3 L patient's oxygen sats are around 96%.  Patient denies any chest pain, leg swelling or calf tenderness.      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 And treated per protocol for Covid and ARDS with ceftriaxone/zithromax and HC x 5 days. And ARDS protocol on vent. Pulmonary consulted and followed.   cxr on 4/5 severe ards pattern.-Vancomycin/zosyn added. 4/6 blood cultures pos for yeast so ID consulted and micafungin added. HD  Catheter and  TLC line removed and cultured and cultures negative. Sputum + yeast also  .  Blood cultures q 12 hours were negative for yeast.     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 Acute encephalopathy- delirium vs encephalitis or other structural cause. MRI could not be performed as she was  very unstable.   At the time of admission Cr 0.7 worsened to 5.1, Renal was consulted. and HD started on 3/29. In addition had, Metabolic acidosis due to renal failure.   Feedings given via ngtube with diabetisource and accucks/ISS given with close monitoring.     Interval History:   Patient seen and examined via telemed with nursing staff   Extubated last pm -on 5 Liter  Had HD yesterday   Spoke w daughter-pt answering yes/no  Has ngtube in place   incontinuend large stool then became restless-on precede  On cardene for elevated BP        Review of Systems   Constitutional: Negative for chills and fever.   Respiratory: Positive for shortness of breath. Negative for cough.    Gastrointestinal: Positive for diarrhea. Negative for abdominal pain.   Psychiatric/Behavioral: Positive for agitation. The patient is nervous/anxious.      Objective:     Vital Signs (Most Recent):  Temp: 98.6 °F (37 °C) (04/09/20 0845)  Pulse: (!) 121 (04/09/20 0845)  Resp: (!) 45 (04/09/20 0845)  BP: 126/70 (04/09/20 0845)  SpO2: 98 % (04/09/20 0845) Vital Signs (24h Range):  Temp:  [97.9 °F (36.6 °C)-99.6 °F (37.6 °C)] 98.6 °F (37 °C)  Pulse:  [] 121  Resp:  [26-54] 45  SpO2:  [90 %-99 %] 98 %  BP: ()/(50-95) 126/70  Arterial Line BP: (130-192)/(62-82) 161/67     Weight: 113.4 kg (250 lb)  Body mass index is 47.24 kg/m².    Intake/Output Summary (Last 24 hours) at 4/9/2020 1022  Last data filed at 4/9/2020 0845  Gross per 24 hour   Intake 1114.97 ml   Output 4270 ml   Net -3155.03 ml      Physical Exam   PATIENT WAS SEEN AS A VIDEO VISIT WITH NURSE ASSIST DURING THE VISIT. PHYSICAL EXAM FINDINGS ARE AS VIEWED BY MYSELF VIA VIDEO OR AS REPORTED BY NURSE IF SPECIFIED AS SUCH, EXAM NOT DONE PERSONALLY BY MYSELF AT BEDSIDE.        Significant Labs:   BMP:   Recent Labs   Lab 04/09/20  0353   *   *   K 3.3*   CL 94*   CO2 22*   BUN 48*   CREATININE 4.4*   CALCIUM 9.4   MG 2.0     CBC:   Recent Labs   Lab 04/08/20  6527  04/09/20  0353   WBC 11.23 13.70*   HGB 7.5* 8.7*   HCT 23.3* 27.3*    402*       Significant Imaging: I have reviewed and interpreted all pertinent imaging results/findings within the past 24 hours.      Assessment/Plan:      * Acute respiratory failure with hypoxia  Patient with Hypoxic Respiratory failure which is Acute.  she is not on home oxygen.   Treatment for Covid/ARDS -with MV/prone and supportive care.   4/8 extubated to [place] oxygen       Pulmonary consultation.    IV antibiotics - ceftriaxone and azithromycin. And Plaquenil 400 mg daily x 5  Vanc/zosyn initiated 4/5   micafungin for yeast +blood/sputum 4/6 /ID consulted     Microbiology Results (last 7 days)     Procedure Component Value Units Date/Time    Blood culture [[id number]]  (Abnormal) Collected:  04/04/20 0912    Order Status:  Completed Specimen:  Blood from Line, Central Updated:  04/09/20 1418     Blood Culture, Routine Gram stain peds bottle: budding yeast      Results called to and read back by: Carri Pryor RN  04/06/2020  03:04      CANDIDA ALBICANS  Susceptibility pending  ID consult required at [facility] and [place] locations.      Blood culture [[id number]] Collected:  04/08/20 0745    Order Status:  Completed Specimen:  Blood Updated:  04/09/20 1412     Blood Culture, Routine No Growth to date      No Growth to date    IV catheter culture [[id number]]  (Abnormal) Collected:  04/06/20 1455    Order Status:  Completed Specimen:  Catheter Tip, Intrajugular Updated:  04/09/20 1300     Aerobic Culture - Cath tip STAPHYLOCOCCUS EPIDERMIDIS  < 15 colonies      IV catheter culture [[id number]] Collected:  04/06/20 1615    Order Status:  Completed Specimen:  Catheter Tip, Dialysis Updated:  04/09/20 1121     Aerobic Culture - Cath tip No growth    Blood culture [[id number]] Collected:  04/06/20 0806    Order Status:  Completed Specimen:  Blood from Antecubital, Left Updated:  04/09/20 0940     Blood Culture, Routine Gram stain peds  bottle: yeast       Results called to and read back by: Tania Nolan RN 04/08/2020        11:20    Blood culture [[id number]]     Order Status:  Canceled Specimen:  Blood     Blood culture [[id number]] Collected:  04/08/20 1739    Order Status:  Completed Specimen:  Blood from Line, Central Updated:  04/09/20 0315     Blood Culture, Routine No Growth to date    Blood culture [[id number]] Collected:  04/07/20 1730    Order Status:  Completed Specimen:  Blood from Line, Central Updated:  04/08/20 2212     Blood Culture, Routine No Growth to date      No Growth to date    Blood culture [[id number]] Collected:  04/07/20 0907    Order Status:  Completed Specimen:  Blood Updated:  04/08/20 2212     Blood Culture, Routine No Growth to date      No Growth to date    Blood culture [[id number]] Collected:  04/06/20 1802    Order Status:  Completed Specimen:  Blood from Line, Central Updated:  04/08/20 2212     Blood Culture, Routine No Growth to date      No Growth to date      No Growth to date    Blood culture [[id number]]  (Abnormal)  (Susceptibility) Collected:  04/05/20 0813    Order Status:  Completed Specimen:  Blood from Peripheral, Left  Hand Updated:  04/08/20 1236     Blood Culture, Routine Gram stain peds bottle: Gram positive cocci in clusters resembling Staph       Results called to and read back by: Lynn Sawyer RN 04/06/2020  09:57      STAPHYLOCOCCUS EPIDERMIDIS    Blood culture [[id number]]  (Abnormal)  (Susceptibility) Collected:  04/05/20 0813    Order Status:  Completed Specimen:  Blood from Line, Central Left  Neck Updated:  04/08/20 1234     Blood Culture, Routine Gram stain tank bottle: Gram positive cocci in clusters resembling Staph       Results called to and read back by: Lynn Sawyer RN 04/06/2020  09:57      Gram stain aer bottle: Gram positive cocci in clusters resembling Staph       Positive results previously called 04/06/2020  15:52      STAPHYLOCOCCUS EPIDERMIDIS    Culture, Respiratory  with Gram Stain [[id number]]  (Abnormal) Collected:  04/05/20 0050    Order Status:  Completed Specimen:  Respiratory from Endotracheal Aspirate Updated:  04/08/20 1046     Respiratory Culture No S aureus or Pseudomonas isolated.      CANDIDA ALBICANS  Few  Normal respiratory anamaria also present       Gram Stain (Respiratory) <10 epithelial cells per low power field.     Gram Stain (Respiratory) Many WBC's     Gram Stain (Respiratory) Rare Gram positive cocci     Gram Stain (Respiratory) Rare yeast    Blood culture [[id number]]     Order Status:  Canceled Specimen:  Blood     Urine Culture High Risk [[id number]]     Order Status:  Canceled Specimen:  Urine, Catheterized     Culture, Respiratory with Gram Stain [[id number]]     Order Status:  Canceled Specimen:  Respiratory from Tracheal Aspirate           Continue routine medications as before.   Follow airborne/droplet precautions.            Bacteremia  Blood cultures from 4/5 -gm +cocci -follow up final   Vancomyin 4/4 -  Consulted ID  4/4 blood culture yeast   HD/TLC lines removed   4/5 blood culture staph epidermidis   Blood culture neg 4/6 4/7 blood culture -neg  4/8 blood culture pend    4/6 -HD catheter tip staph epidermidis   4/6 IV catheter tipstaph epidermidis  4/5 resp culture candida albicans   Urine culture cancelled   Recommendations: per ID   Changed  lines-on 04/06  Continue vancomycin  DC Zosyn  Continue micafungin because of fungemia on 04/04.      Fungemia  Acute-ID consulted-cultures from 4/4   micafungin per ID  HD and TLC removed and cultured  Repeat blood cultures -q 12 hours per pulm         Pneumonia, ventilator associated  Acute -post viral -possible MRSA -  Added Vanc zosyn  Cultures -pend. Few yeast   micafungin added for + yeast in blood culture     Acute encephalopathy  AMS:: Hypoxia vs. Infection vs. TIA/stroke vs. Metabolic/Toxic.  -prolonged paralytic?   Continue supportive care   Likely multifactorial -unable to get  /MRI until stable    Ct head neg    Lab Results   Component Value Date    WBC 13.70 (H) 04/09/2020     MRI not done    focal findings on physical exam  No early signs of stroke on Head CT, no hemorrhage or acute findings.  EEG: EEG 30 min awake and drowsy results noted no seizures  Continue supportive care       Iron deficiency anemia, unspecified  Patient's anemia is currently controlled. Trending down  Will send for stool guaic    Has recieved 1 units of PRBCs on 4/3.   Will transfuse  Unit as pt is hypotensive and ESRD   Etiology likely d/t Anemia of chronic disease  Current CBC reviewed-   Lab Results   Component Value Date    HGB 9.9 (L) 04/05/2020    HCT 31.6 (L) 04/05/2020     Monitor serial CBC and transfuse if patient becomes hemodynamically unstable, symptomatic or H/H drops below 8/24        Diverticulosis of large intestine without hemorrhage  Patient's anemia is currently controlled. Has recieved 1 units of PRBCs on 4/3.   Will need to Monitor for GI bleed  Lab Results   Component Value Date    HGB 8.3 (L) 04/06/2020    HCT 26.1 (L) 04/06/2020     Monitor serial CBC and transfuse if patient becomes hemodynamically unstable, symptomatic or H/H drops below 7/21.   Will continue to monitor        Acute renal failure  Acute, required HD for acute renal failure  ? Element ATN 2/2 hypovolemia/sepsis requiring pressor  Nephrology consulted/follwed.   Bicarb po and on HD for Metabolic acidosis             COVID-19 virus infection  Completed Plaquenil    Covid-19 Virus Infection  - Infection Control notified    - Isolation:   - Airborne and Droplet Precautions  - N95 masks must be fit tested, wear eye protection  - 20 second hand hygiene   - Limit visitors per hospital policy   - Consolidating lab draws, nursing care, and interventions    - Diagnostics: (rising CRP, persistent lymphopenia, hyponatremia, hyperferritinemia, elevated troponin, elevated d-dimer, age, and comorbidities are significant predictors of poor clinical  outcome)   - CBC:   trend Q48hrs  - CMP:        trend Q48hrs  - Procalcitonin:  - D-dimer:  trend Q48hrs  - Ferritin:  repeat prior to discharge  - CRP:        trend Q48hrs  - LDH:  - BNP:  - Troponin:    - ECG:   - rapid Flu:   - RIP only if BMT/solid transplant:   - Legionella antigen:   - Blood culture x2:   - Sputum culture:   - CXR:   - UA and culture:      - Management:   - Bundle care as able to minimize in/out of room   - Supplemental O2 to maintain SpO2 >92%,   if requiring 6L NC or higher, place on nonrebreather and discuss case with MICU   - Telemetry & continuous Pulse Ox   - albuterol INHALER PRN 4puff Q6hr approximates a nebulizer (avoid nebulization of secretions)   - apap PRN fever   - Avoiding NIPPV to prevent aerosolization   (including home CPAP/BiPAP unless on a case-by-case basis and only in negative pressure room)   - Cautious use of NSAIDS for fever per WHO recommendations (3/16/2020)   - No new ACEi/ARB start or discontinuation of chronic med unless hypotensive (Esler et al. Journal of Hypertension 2020, 38:000-000)   - Careful use of steroids in the absence of other indications   - unless septic shock due to increased viral replication   - Fluid sparing resuscitation   - Empiric antibiotics per likely source & patient allergies    - CAP: x 5 day course  Ceftriaxone 1g IV Q24hrs            Azithromycin 500mg IV day #1, then 250mg PO daily x4 days                 If MRSA risk factors, add Vancomycin IV (PharmD consult)   - If patient meets criteria per Hospital Protocol    - start statin (if CPK WNL)    - start HCQ 400mg PO BID x1 day, then 400mg PO daily x 4 days (check G6PD, ECG, and start Qshift POCT glucose)    Goals of care, counseling/discussion  - Reviewed the typical clinical course of COVID19 with the daughter Danya (patient name or relationship to patient), including the potential for acute decompensation requiring intubation and mechanical ventilation  - Discussed again as part of  routine daily evaluation, patient/POA maintains code status of Full code    VTE High Risk Prophylaxis: enoxaparin 40mg sq QHS @ 2100 (bundled care) if GFR >30    Patient's chronic/stable medical conditions noted in the problem list above will be managed with the patient's home medications as tolerated.           ARDS (adult respiratory distress syndrome)  4/8 extubated    Tolerating Wean of oxygen and peep -see resp failure  --will cont ARDSnet Protocol.  --Target 6-8ml/kg Ideal Body Weight. Decrease TV as tolerated.  --Plateau pressure goal <30cm H2O.  --Oxygenation goal PaO2 55-80 or SpO2 88-95%.  --pH goal 7.30-7.45.  --Wean to PSV as tolerated.        COVID-19 virus detected   Plaquenil course completed  Continue routine medications as before.   Follow airborne/droplet precautions.      Hypothyroid  Chronic problem.  Lab Results   Component Value Date    TSH 2.082 03/31/2020     Not on any medication            Morbid obesity  Body mass index is 47.24 kg/m². Morbid obesity complicates all aspects of disease management from diagnostic modalities to treatment. Weight loss encouraged and health benefits explained to patient.            VTE Risk Mitigation (From admission, onward)         Ordered     heparin (porcine) injection 5,000 Units  Every 12 hours      04/07/20 1349     heparin (porcine) 1,000 unit/mL injection      04/06/20 1138     heparin (porcine) injection 4,000 Units  As needed (PRN)      03/29/20 2001     IP VTE HIGH RISK PATIENT  Once      03/25/20 9179                Critical care time spent on the evaluation and treatment of severe organ dysfunction, review of pertinent labs and imaging studies, discussions with consulting providers and discussions with patient/family: 46 minutes.  Discussed with pulm /ID and  Daughter  Reviewed PT/OT notes -for CM discussion in am   Sharonda Burris MD  Department of Hospital Medicine   [facility]

## 2020-04-09 NOTE — ASSESSMENT & PLAN NOTE
Patient with Hypoxic Respiratory failure which is Acute.  she is not on home oxygen. Supplemental ventilation was provided and noted- Vent Mode: Spont  Oxygen Concentration (%):  [32-40] 40  Resp Rate Total:  [26 br/min-44 br/min] 37 br/min  Vt Set:  [350 mL] 350 mL  PEEP/CPAP:  [5 cmH20] 5 cmH20  Pressure Support:  [10 cmH20] 10 cmH20  Mean Airway Pressure:  [9.2 cmH20-9.9 cmH20] 9.5 cmH20 and oxygen saturations . Differential diagnosis includes - Pneumonia Viral Labs and images were reviewed. Patient Has recent ABG- . Treated  underlying causes and adjust management of respiratory failure as follows-        Pulmonary consultation.   Continue beta 2 agonist bronchodilator treatments.    IV antibiotics - ceftriaxone and azithromycin. And Plaquenil 400 mg daily x 5  Vanc/zosyn initiated 4/5   micafungin for yeast +blood/sputum 4/6 /ID consulted     Microbiology Results (last 7 days)     Procedure Component Value Units Date/Time    Blood culture [[id number]] Collected:  04/06/20 0806    Order Status:  Completed Specimen:  Blood from Antecubital, Left Updated:  04/09/20 0940     Blood Culture, Routine Gram stain peds bottle: yeast       Results called to and read back by: Tania Nolan RN 04/08/2020        11:20    Blood culture [[id number]]  (Abnormal) Collected:  04/04/20 0912    Order Status:  Completed Specimen:  Blood from Line, Central Updated:  04/09/20 0803     Blood Culture, Routine Gram stain peds bottle: budding yeast      Results called to and read back by: Carri Pryor RN  04/06/2020  03:04      YEAST   Identification pending  ID consult required at [facility].[place] and [place] locations.      Blood culture [[id number]]     Order Status:  Canceled Specimen:  Blood     Blood culture [[id number]] Collected:  04/08/20 1739    Order Status:  Completed Specimen:  Blood from Line, Central Updated:  04/09/20 0315     Blood Culture, Routine No Growth to date    Blood culture [[id number]] Collected:  04/07/20  1730    Order Status:  Completed Specimen:  Blood from Line, Central Updated:  04/08/20 2212     Blood Culture, Routine No Growth to date      No Growth to date    Blood culture [[id number]] Collected:  04/07/20 0907    Order Status:  Completed Specimen:  Blood Updated:  04/08/20 2212     Blood Culture, Routine No Growth to date      No Growth to date    Blood culture [[id number]] Collected:  04/06/20 1802    Order Status:  Completed Specimen:  Blood from Line, Central Updated:  04/08/20 2212     Blood Culture, Routine No Growth to date      No Growth to date      No Growth to date    Blood culture [[id number]] Collected:  04/08/20 0745    Order Status:  Completed Specimen:  Blood Updated:  04/08/20 1915     Blood Culture, Routine No Growth to date    IV catheter culture [[id number]]  (Abnormal) Collected:  04/06/20 1455    Order Status:  Completed Specimen:  Catheter Tip, Intrajugular Updated:  04/08/20 1254     Aerobic Culture - Cath tip STAPHYLOCOCCUS EPIDERMIDIS  < 15 colonies      Blood culture [[id number]]  (Abnormal)  (Susceptibility) Collected:  04/05/20 0813    Order Status:  Completed Specimen:  Blood from Peripheral, Left  Hand Updated:  04/08/20 1236     Blood Culture, Routine Gram stain peds bottle: Gram positive cocci in clusters resembling Staph       Results called to and read back by: Lynn Sawyer RN 04/06/2020  09:57      STAPHYLOCOCCUS EPIDERMIDIS    Blood culture [[id number]]  (Abnormal)  (Susceptibility) Collected:  04/05/20 0813    Order Status:  Completed Specimen:  Blood from Line, Central Left  Neck Updated:  04/08/20 1234     Blood Culture, Routine Gram stain tank bottle: Gram positive cocci in clusters resembling Staph       Results called to and read back by: Lynn Sawyer RN 04/06/2020  09:57      Gram stain aer bottle: Gram positive cocci in clusters resembling Staph       Positive results previously called 04/06/2020  15:52      STAPHYLOCOCCUS EPIDERMIDIS    Culture, Respiratory with Gram  Stain [[id number]]  (Abnormal) Collected:  04/05/20 0050    Order Status:  Completed Specimen:  Respiratory from Endotracheal Aspirate Updated:  04/08/20 1046     Respiratory Culture No S aureus or Pseudomonas isolated.      CANDIDA ALBICANS  Few  Normal respiratory anamaria also present       Gram Stain (Respiratory) <10 epithelial cells per low power field.     Gram Stain (Respiratory) Many WBC's     Gram Stain (Respiratory) Rare Gram positive cocci     Gram Stain (Respiratory) Rare yeast    IV catheter culture [[id number]] Collected:  04/06/20 1615    Order Status:  Completed Specimen:  Catheter Tip, Dialysis Updated:  04/08/20 0747     Aerobic Culture - Cath tip No growth    Blood culture [[id number]]     Order Status:  Canceled Specimen:  Blood     Urine Culture High Risk [[id number]]     Order Status:  Canceled Specimen:  Urine, Catheterized     Culture, Respiratory with Gram Stain [[id number]]     Order Status:  Canceled Specimen:  Respiratory from Tracheal Aspirate           Continue routine medications as before.   Follow airborne/droplet precautions.

## 2020-04-09 NOTE — PROGRESS NOTES
Progress Note  Infectious Disease    Reason for Consult:      HPI: Maria Victoria Hernandez is a   54 y.o. female employee of [employer].  with past medical history of morbid obesity, BMI of 48, diabetes, diet controlled, anemia, B12 deficiency, vitamin-D deficiency, hypothyroidism, was admitted on 03/25/2020 due to shortness of breath, diagnosed with COVID 19 is positive.  Patient has been intubated.  She went into renal failure and has been receiving HD by nephrologist.  Intensivist has been managing the vent.    Patient has completed 10 days of hydroxychloroquine and about 8 days of Zithromax and ceftriaxone.  She started spiking fever of  103 on 04/04.  White count jumped to 17.8.  She was started on vancomycin and Zosyn and blood cultures were sent.    ID consult called for g positives and yeast in blood cultures.  Patient is afebrile at the time.  WBC has improved.  Discussed with nurse.  Will discontinue lines.  Continue vancomycin, Daptomycin x1.  Add micafungin daily.    04/07/2020  WBC improved 17--12--10  Ferritin 1752--1538--1897  Intubated Sedated.  FiO2 of 35 people 5.  T-max 101.7°  Dialysis catheter changed yesterday  Leukocytosis improved 12/17/2010 04/08/2020  T-max 99.9°  Intubated sedated.  FiO2 35, peep of 5.  Fail CPAP trial.  Tolerating vancomycin, Zosyn, Micafungin.   Lines are fresh.  Nurse is trying to protect them.  Discussed with nurse:  Her daughter who is a nurse came and saw mother today, she agrees right eye looks better.    04/09/2020  Patient is extubated today, Really weak, Does not breath in deep, I advised her on deep breathing  Continues to need Cardene drip for BP  HAd loose stool-- flexiseal was placed    Antibiotics (From admission, onward)    Start     Stop Route Frequency Ordered    04/07/20 1045  mupirocin 2 % ointment      04/12 0859 Nasl 2 times daily 04/07/20 0942    04/05/20 0848  vancomycin - pharmacy to dose  (vancomycin IVPB)      -- IV  pharmacy to manage frequency 04/05/20 0748    04/02/20 2315  gentamicin 0.3 % ophthalmic solution 1 drop      -- RIGHT EYE Every 4 hours 04/02/20 2310        Antifungals (From admission, onward)    Start     Stop Route Frequency Ordered    04/06/20 1100  micafungin 100 mg in sodium chloride 0.9 % 100 mL IVPB (ready to mix system)      -- IV Every 24 hours (non-standard times) 04/06/20 0959        Antivirals (From admission, onward)    None          EXAM & DIAGNOSTICS REVIEWED:   Vitals:     Temp:  [98.6 °F (37 °C)-99.6 °F (37.6 °C)]   Temp: 99 °F (37.2 °C) (04/09/20 1519)  Pulse: 104 (04/09/20 1815)  Resp: (!) 30 (04/09/20 1815)  BP: 122/63 (04/09/20 1800)  SpO2: 97 % (04/09/20 1815)    Intake/Output Summary (Last 24 hours) at 4/9/2020 1850  Last data filed at 4/9/2020 1815  Gross per 24 hour   Intake 1803.38 ml   Output 1145 ml   Net 658.38 ml     Oxygen Concentration (%):  [32-40] 40    General:  In NAD   Eyes:  Anicteric,  ENT:  No ulcers, exudates, thrush, nares patent, extubated today  Neck:  supple, no masses or adenopathy appreciated  Lungs: Clear, no consolidation, rales, wheezes, rub  Heart:  RRR, no gallop/murmur/rub noted  Abd:  Soft, NT, ND, normal BS, no masses or organomegaly appreciated.  :  Cuellar  Musc:  Joints without effusion, swelling, erythema, synovitis, muscle wasting.   Skin:  No rashes. No palmar or plantar lesions. No subungual petechiae  Wound:   Neuro: Tired, tries to follow commands  Psych:  Calm  Lymphatic:     No cervical, supraclavicular, axillary, or inguinal nodes  Extrem: No edema, erythema, phlebitis, cellulitis, warm and well perfused  VAD:       Isolation:      Lines/Tubes/Drains:  Right IJ 04/06  Left IJ 04/06  Cuellar 03/25  OT 03/25    General Labs reviewed:  Recent Labs   Lab 04/07/20  0253 04/08/20  0311 04/09/20  0353   WBC 10.64 11.23 13.70*   HGB 8.1* 7.5* 8.7*   HCT 24.0* 23.3* 27.3*    300 402*       Recent Labs   Lab 04/07/20  0253 04/08/20  0311 04/09/20  0353    * 128* 132*   K 3.4* 3.5 3.3*   CL 93* 95 94*   CO2 19* 18* 22*   BUN 49* 70* 48*   CREATININE 4.3* 5.4* 4.4*   CALCIUM 8.1* 8.8 9.4   PROT 7.5 7.3 8.3   BILITOT 2.6* 2.8* 2.8*   ALKPHOS 222* 208* 292*   ALT 30 28 38   AST 36 32 42*     Micro:  Microbiology Results (last 7 days)     Procedure Component Value Units Date/Time    Blood culture [[id number]]  (Abnormal) Collected:  04/04/20 0912    Order Status:  Completed Specimen:  Blood from Line, Central Updated:  04/09/20 1418     Blood Culture, Routine Gram stain peds bottle: budding yeast      Results called to and read back by: Carri Pryor RN  04/06/2020  03:04      CANDIDA ALBICANS  Susceptibility pending  ID consult required at [facility],[place] and [facility] locations.      Blood culture [[id number]] Collected:  04/08/20 0745    Order Status:  Completed Specimen:  Blood Updated:  04/09/20 1412     Blood Culture, Routine No Growth to date      No Growth to date    IV catheter culture [[id number]]  (Abnormal) Collected:  04/06/20 1455    Order Status:  Completed Specimen:  Catheter Tip, Intrajugular Updated:  04/09/20 1300     Aerobic Culture - Cath tip STAPHYLOCOCCUS EPIDERMIDIS  < 15 colonies      IV catheter culture [[id number]] Collected:  04/06/20 1615    Order Status:  Completed Specimen:  Catheter Tip, Dialysis Updated:  04/09/20 1121     Aerobic Culture - Cath tip No growth    Blood culture [[id number]] Collected:  04/06/20 0806    Order Status:  Completed Specimen:  Blood from Antecubital, Left Updated:  04/09/20 0940     Blood Culture, Routine Gram stain peds bottle: yeast       Results called to and read back by: Tania Nolan RN 04/08/2020        11:20    Blood culture [[id number]]     Order Status:  Canceled Specimen:  Blood     Blood culture [[id number]] Collected:  04/08/20 1739    Order Status:  Completed Specimen:  Blood from Line, Central Updated:  04/09/20 0315     Blood Culture, Routine No Growth to date    Blood culture [[id number]]  Collected:  04/07/20 1730    Order Status:  Completed Specimen:  Blood from Line, Central Updated:  04/08/20 2212     Blood Culture, Routine No Growth to date      No Growth to date    Blood culture [[id number]] Collected:  04/07/20 0907    Order Status:  Completed Specimen:  Blood Updated:  04/08/20 2212     Blood Culture, Routine No Growth to date      No Growth to date    Blood culture [[id number]] Collected:  04/06/20 1802    Order Status:  Completed Specimen:  Blood from Line, Central Updated:  04/08/20 2212     Blood Culture, Routine No Growth to date      No Growth to date      No Growth to date    Blood culture [[id number]]  (Abnormal)  (Susceptibility) Collected:  04/05/20 0813    Order Status:  Completed Specimen:  Blood from Peripheral, Left  Hand Updated:  04/08/20 1236     Blood Culture, Routine Gram stain peds bottle: Gram positive cocci in clusters resembling Staph       Results called to and read back by: Lynn Sawyer RN 04/06/2020  09:57      STAPHYLOCOCCUS EPIDERMIDIS    Blood culture [[id number]]  (Abnormal)  (Susceptibility) Collected:  04/05/20 0813    Order Status:  Completed Specimen:  Blood from Line, Central Left  Neck Updated:  04/08/20 1234     Blood Culture, Routine Gram stain tank bottle: Gram positive cocci in clusters resembling Staph       Results called to and read back by: Lynn Sawyer RN 04/06/2020  09:57      Gram stain aer bottle: Gram positive cocci in clusters resembling Staph       Positive results previously called 04/06/2020  15:52      STAPHYLOCOCCUS EPIDERMIDIS    Culture, Respiratory with Gram Stain [[id number]]  (Abnormal) Collected:  04/05/20 0050    Order Status:  Completed Specimen:  Respiratory from Endotracheal Aspirate Updated:  04/08/20 1046     Respiratory Culture No S aureus or Pseudomonas isolated.      CANDIDA ALBICANS  Few  Normal respiratory anamaria also present       Gram Stain (Respiratory) <10 epithelial cells per low power field.     Gram Stain (Respiratory)  Many WBC's     Gram Stain (Respiratory) Rare Gram positive cocci     Gram Stain (Respiratory) Rare yeast    Blood culture [[id number]]     Order Status:  Canceled Specimen:  Blood     Urine Culture High Risk [[id number]]     Order Status:  Canceled Specimen:  Urine, Catheterized     Culture, Respiratory with Gram Stain [[id number]]     Order Status:  Canceled Specimen:  Respiratory from Tracheal Aspirate         Imaging Reviewed:  CXR 04/06/2020Support devices as above.  No pneumothorax.  Moderate pulmonary airspace disease without significant change.  CXR 04/05/2020 No significant interval change in the bilateral airspace disease.  Support devices in stable position.    Cardiology:  Sinus rhythm    IMPRESSION & PLAN     Staphylococcus epidermidis bacteremia, 04/05  Candidemia due to Candida albicans on 04/04  Respiratory failure, ARDS, COVID19, completed treatment  HTN, CHF with EF of 45%  This patient is high risk for life-threatening deterioration and death secondary to above comorbidities and need for IV treatment Critical care 35 min    Ferritin 1752--1538--1897---1865  --215  Procalcitonin 3.67     Recommendations:  Changed  lines-on 04/06  Continue vancomycin for S epi.   Continue micafungin because of fungemia on 04/04. Repeat Bcx is am. Eventually may need retinal exam ?  Pulm toilet

## 2020-04-09 NOTE — PLAN OF CARE
Report received from Tania DIOP RN.  RN coming in to take over Pt care shortly, will monitor and provide care until arrival.

## 2020-04-09 NOTE — NURSING
"Heart rate up to 148 max, with respirations 51-53. 99.6 core temp. Flexi seal and mueller patent to gravity. This patient is breathing so fast unable to answer questions with "yes"/"No" Does node "yes " when asked if problems breathing/ hurts to breath.. Repositioned up in bed. Cool cloths applied. Cardene gtt and precedex gtt remains infusing. Robby CRNA at bedside. This patient appears to be experiencing withdrawals. Dilaudid given IVP slowly. After brief period heart rate down to 110 with respirations down to low 30's. Does not appear to be in distress.   "

## 2020-04-09 NOTE — PLAN OF CARE
Tolerating O2 via NC at 5L, very anxious throughout the day, Precedex in use. Anxiety with extreme tachypnea, increased HR and increased BPwith Cardene infusion in use. PT started today, and patient attempting to exercise in bed this afternoon, very weak. Tolerating TF at goal. Updated patient's daughter, Danya at bedside who is very attentive and involved in care of patient. Skin intact. Safety maintained.

## 2020-04-09 NOTE — PLAN OF CARE
"Patient free of falls and injuries this shift. Very weak but able to communicate. Nodes "yes/No" to questions asked. Denies any complaints on initial assessment but Cardene and precedex restarted after this patient found incontinent of large, liquid stool, b/p elevated with SBP up to 200's. Full bath given with skin care done. Became very restless and apprehensive and then escalated up to technic. Responds well to restarting gtts  after bed bath completed. Flexiseal placed and patent to gravity. Prn medications given x 1. Tolerated well. Cuellar with 400 ml of clear alvarez to yellow urine noted. Sinus to sinus tach on monitor, Rectal core temp with max temp 99.5.  "

## 2020-04-09 NOTE — ASSESSMENT & PLAN NOTE
Patient with Hypoxic Respiratory failure which is Acute.  she is not on home oxygen.   Treatment for Covid/ARDS -with MV/prone and supportive care.   4/8 extubated to [place] oxygen       Pulmonary consultation.    IV antibiotics - ceftriaxone and azithromycin. And Plaquenil 400 mg daily x 5  Vanc/zosyn initiated 4/5   micafungin for yeast +blood/sputum 4/6 /ID consulted     Microbiology Results (last 7 days)     Procedure Component Value Units Date/Time    Blood culture [[id number]]  (Abnormal) Collected:  04/04/20 0912    Order Status:  Completed Specimen:  Blood from Line, Central Updated:  04/09/20 1418     Blood Culture, Routine Gram stain peds bottle: budding yeast      Results called to and read back by: Carri Pryor RN  04/06/2020  03:04      CANDIDA ALBICANS  Susceptibility pending  ID consult required at [facility].Robin Barahona and Meliton jimenez.      Blood culture [[id number]] Collected:  04/08/20 0745    Order Status:  Completed Specimen:  Blood Updated:  04/09/20 1412     Blood Culture, Routine No Growth to date      No Growth to date    IV catheter culture [[id number]]  (Abnormal) Collected:  04/06/20 1455    Order Status:  Completed Specimen:  Catheter Tip, Intrajugular Updated:  04/09/20 1300     Aerobic Culture - Cath tip STAPHYLOCOCCUS EPIDERMIDIS  < 15 colonies      IV catheter culture [[id number]] Collected:  04/06/20 1615    Order Status:  Completed Specimen:  Catheter Tip, Dialysis Updated:  04/09/20 1121     Aerobic Culture - Cath tip No growth    Blood culture [[id number]] Collected:  04/06/20 0806    Order Status:  Completed Specimen:  Blood from Antecubital, Left Updated:  04/09/20 0940     Blood Culture, Routine Gram stain peds bottle: yeast       Results called to and read back by: Tania Nolan RN 04/08/2020        11:20    Blood culture [[id number]]     Order Status:  Canceled Specimen:  Blood     Blood culture [[id number]] Collected:  04/08/20 1739    Order Status:  Completed Specimen:  Blood  from Line, Central Updated:  04/09/20 0315     Blood Culture, Routine No Growth to date    Blood culture [[id number]] Collected:  04/07/20 1730    Order Status:  Completed Specimen:  Blood from Line, Central Updated:  04/08/20 2212     Blood Culture, Routine No Growth to date      No Growth to date    Blood culture [[id number]] Collected:  04/07/20 0907    Order Status:  Completed Specimen:  Blood Updated:  04/08/20 2212     Blood Culture, Routine No Growth to date      No Growth to date    Blood culture [[id number]] Collected:  04/06/20 1802    Order Status:  Completed Specimen:  Blood from Line, Central Updated:  04/08/20 2212     Blood Culture, Routine No Growth to date      No Growth to date      No Growth to date    Blood culture [[id number]]  (Abnormal)  (Susceptibility) Collected:  04/05/20 0813    Order Status:  Completed Specimen:  Blood from Peripheral, Left  Hand Updated:  04/08/20 1236     Blood Culture, Routine Gram stain peds bottle: Gram positive cocci in clusters resembling Staph       Results called to and read back by: Lynn Sawyer RN 04/06/2020  09:57      STAPHYLOCOCCUS EPIDERMIDIS    Blood culture [[id number]]  (Abnormal)  (Susceptibility) Collected:  04/05/20 0813    Order Status:  Completed Specimen:  Blood from Line, Central Left  Neck Updated:  04/08/20 1234     Blood Culture, Routine Gram stain tank bottle: Gram positive cocci in clusters resembling Staph       Results called to and read back by: Lynn Sawyer RN 04/06/2020  09:57      Gram stain aer bottle: Gram positive cocci in clusters resembling Staph       Positive results previously called 04/06/2020  15:52      STAPHYLOCOCCUS EPIDERMIDIS    Culture, Respiratory with Gram Stain [[id number]]  (Abnormal) Collected:  04/05/20 0050    Order Status:  Completed Specimen:  Respiratory from Endotracheal Aspirate Updated:  04/08/20 1046     Respiratory Culture No S aureus or Pseudomonas isolated.      CANDIDA ALBICANS  Few  Normal respiratory  anamaria also present       Gram Stain (Respiratory) <10 epithelial cells per low power field.     Gram Stain (Respiratory) Many WBC's     Gram Stain (Respiratory) Rare Gram positive cocci     Gram Stain (Respiratory) Rare yeast    Blood culture [[id number]]     Order Status:  Canceled Specimen:  Blood     Urine Culture High Risk [[id number]]     Order Status:  Canceled Specimen:  Urine, Catheterized     Culture, Respiratory with Gram Stain [[id number]]     Order Status:  Canceled Specimen:  Respiratory from Tracheal Aspirate           Continue routine medications as before.   Follow airborne/droplet precautions.

## 2020-04-09 NOTE — PROGRESS NOTES
Pharmacokinetic Assessment Follow Up: IV Vancomycin    Vancomycin serum concentration assessment(s):    The random level was drawn correctly and can be used to guide therapy at this time. The measurement is within the desired definitive target range of 15 to 20 mcg/mL.    Vancomycin Regimen Plan:    1 - Patient will not receive HD today. No dose will be ordered today.   2 - Re-dose when the random level is less than 20 mcg/mL and scheduled to received HD, next level to be drawn on 04/10/20 with AM Labs.    Drug levels (last 3 results):  Recent Labs   Lab Result Units 04/07/20 0253 04/08/20 0311 04/09/20  0353   Vancomycin, Random ug/mL 17.4 15.1 19.9       Pharmacy will continue to follow and monitor vancomycin.    Please contact pharmacy at extension [phone] for questions regarding this assessment.    Thank you for the consult,   Sami Baugh       Patient brief summary:  Maria Victoria Hernandez is a 54 y.o. female initiated on antimicrobial therapy with IV Vancomycin for treatment of lower respiratory infection    The patient's current regimen is pulse dosing    Drug Allergies:   Review of patient's allergies indicates:  No Known Allergies    Actual Body Weight:   113.4kg    Renal Function:   Estimated Creatinine Clearance: 17.3 mL/min (A) (based on SCr of 4.4 mg/dL (H)).,     Dialysis Method (if applicable):  intermittent HD PRN    CBC (last 72 hours):  Recent Labs   Lab Result Units 04/07/20 0253 04/08/20 0311 04/09/20  0353   WBC K/uL 10.64 11.23 13.70*   Hemoglobin g/dL 8.1* 7.5* 8.7*   Hematocrit % 24.0* 23.3* 27.3*   Platelets K/uL 289 300 402*   Gran% % 84.3* 76.1* 77.7*   Lymph% % 6.7* 9.2* 7.7*   Mono% % 6.4 11.2 9.3   Eosinophil% % 0.2 0.5 0.1   Basophil% % 0.4 0.6 0.8   Differential Method  Automated Automated Automated       Metabolic Panel (last 72 hours):  Recent Labs   Lab Result Units 04/07/20 0253 04/08/20 0311 04/09/20  0353   Sodium mmol/L 128* 128* 132*   Potassium mmol/L 3.4* 3.5 3.3*    Chloride mmol/L 93* 95 94*   CO2 mmol/L 19* 18* 22*   Glucose mg/dL 103 110 149*   BUN, Bld mg/dL 49* 70* 48*   Creatinine mg/dL 4.3* 5.4* 4.4*   Albumin g/dL 2.0* 2.0* 2.2*   Total Bilirubin mg/dL 2.6* 2.8* 2.8*   Alkaline Phosphatase U/L 222* 208* 292*   AST U/L 36 32 42*   ALT U/L 30 28 38   Magnesium mg/dL  --   --  2.0       Vancomycin Administrations:  vancomycin given in the last 96 hours                     vancomycin 500 mg in dextrose 5 % 100 mL IVPB (ready to mix system) (mg) 500 mg New Bag 04/08/20 1703                      Microbiologic Results:  Microbiology Results (last 7 days)       Procedure Component Value Units Date/Time    IV catheter culture [[id number]] Collected:  04/06/20 1615    Order Status:  Completed Specimen:  Catheter Tip, Dialysis Updated:  04/09/20 1121     Aerobic Culture - Cath tip No growth    Blood culture [[id number]] Collected:  04/06/20 0806    Order Status:  Completed Specimen:  Blood from Antecubital, Left Updated:  04/09/20 0940     Blood Culture, Routine Gram stain peds bottle: yeast       Results called to and read back by: Tania Nolan RN 04/08/2020        11:20    Blood culture [[id number]]  (Abnormal) Collected:  04/04/20 0912    Order Status:  Completed Specimen:  Blood from Line, Central Updated:  04/09/20 0803     Blood Culture, Routine Gram stain peds bottle: budding yeast      Results called to and read back by: Carri Pryor RN  04/06/2020  03:04      YEAST   Identification pending  ID consult required at [facility].Robin Barahona and Meliton jimenez.      Blood culture [[id number]]     Order Status:  Canceled Specimen:  Blood     Blood culture [[id number]] Collected:  04/08/20 1739    Order Status:  Completed Specimen:  Blood from Line, Central Updated:  04/09/20 0315     Blood Culture, Routine No Growth to date    Blood culture [[id number]] Collected:  04/07/20 1730    Order Status:  Completed Specimen:  Blood from Line, Central Updated:  04/08/20 2212     Blood  Culture, Routine No Growth to date      No Growth to date    Blood culture [[id number]] Collected:  04/07/20 0907    Order Status:  Completed Specimen:  Blood Updated:  04/08/20 2212     Blood Culture, Routine No Growth to date      No Growth to date    Blood culture [[id number]] Collected:  04/06/20 1802    Order Status:  Completed Specimen:  Blood from Line, Central Updated:  04/08/20 2212     Blood Culture, Routine No Growth to date      No Growth to date      No Growth to date    Blood culture [[id number]] Collected:  04/08/20 0745    Order Status:  Completed Specimen:  Blood Updated:  04/08/20 1915     Blood Culture, Routine No Growth to date    IV catheter culture [[id number]]  (Abnormal) Collected:  04/06/20 1455    Order Status:  Completed Specimen:  Catheter Tip, Intrajugular Updated:  04/08/20 1254     Aerobic Culture - Cath tip STAPHYLOCOCCUS EPIDERMIDIS  < 15 colonies      Blood culture [[id number]]  (Abnormal)  (Susceptibility) Collected:  04/05/20 0813    Order Status:  Completed Specimen:  Blood from Peripheral, Left  Hand Updated:  04/08/20 1236     Blood Culture, Routine Gram stain peds bottle: Gram positive cocci in clusters resembling Staph       Results called to and read back by: Lynn Sawyer RN 04/06/2020  09:57      STAPHYLOCOCCUS EPIDERMIDIS    Blood culture [[id number]]  (Abnormal)  (Susceptibility) Collected:  04/05/20 0813    Order Status:  Completed Specimen:  Blood from Line, Central Left  Neck Updated:  04/08/20 1234     Blood Culture, Routine Gram stain tank bottle: Gram positive cocci in clusters resembling Staph       Results called to and read back by: Lynn Sawyer RN 04/06/2020  09:57      Gram stain aer bottle: Gram positive cocci in clusters resembling Staph       Positive results previously called 04/06/2020  15:52      STAPHYLOCOCCUS EPIDERMIDIS    Culture, Respiratory with Gram Stain [[id number]]  (Abnormal) Collected:  04/05/20 0050    Order Status:  Completed Specimen:   Respiratory from Endotracheal Aspirate Updated:  04/08/20 1046     Respiratory Culture No S aureus or Pseudomonas isolated.      CANDIDA ALBICANS  Few  Normal respiratory anamaria also present       Gram Stain (Respiratory) <10 epithelial cells per low power field.     Gram Stain (Respiratory) Many WBC's     Gram Stain (Respiratory) Rare Gram positive cocci     Gram Stain (Respiratory) Rare yeast    Blood culture [[id number]]     Order Status:  Canceled Specimen:  Blood     Urine Culture High Risk [[id number]]     Order Status:  Canceled Specimen:  Urine, Catheterized     Culture, Respiratory with Gram Stain [[id number]]     Order Status:  Canceled Specimen:  Respiratory from Tracheal Aspirate

## 2020-04-09 NOTE — PLAN OF CARE
Problem: Physical Therapy Goal  Goal: Physical Therapy Goal  Description  Goals to be met by: 2020    Patient will increase functional independence with mobility by performin. Supine to sit with MInimal Assistance  2. Sit to supine with MInimal Assistance  3. Sit to stand transfer with Minimal Assistance  4. Bed to chair transfer with Minimal Assistance using Rolling Walker  5. Gait  x 25 feet with Minimal Assistance using Rolling Walker.      Outcome: Ongoing, Progressing

## 2020-04-09 NOTE — PT/OT/SLP PROGRESS
Occupational Therapy      Patient Name:  Maria Victoria Hernandez   MRN:  [id number]    Patient not seen today secondary to (Nursing hold). Will follow-up 4/10/20.    NORMAN Aguilar  4/9/2020

## 2020-04-09 NOTE — PROGRESS NOTES
[facility] Medicine  Progress Note    Patient Name: Maria Victoria Hernandez  MRN: [id number]  Patient Class: IP- Inpatient   Admission Date: 3/25/2020  Length of Stay: 15 days  Attending Physician: Sharonda Burris MD  Primary Care Provider: Candice Deluna MD        Subjective:     Principal Problem:Acute respiratory failure with hypoxia        HPI:  Patient is a 54 years old  female with morbid obesity in past medical history significant for hypothyroidism is being admitted to intensive care unit under inpatient status from [facility] Emergency room with worsening shortness of breath.  Three days ago patient was tested positive for COVID - 19.  Patient works at [employer].  Patient has been experiencing subjective fever, generalized body aches and pains and nonproductive cough for few days.  Patient is getting increasingly short of breath especially for the past 2 days.  Upon arrival to the emergency room patient was noted to be hypoxic around 89%.  Up on 3 L patient's oxygen sats are around 96%.  Patient denies any chest pain, leg swelling or calf tenderness.      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 And treated per protocol for Covid and ARDS with ceftriaxone/zithromax and HC x 5 days. And ARDS protocol on vent. Pulmonary consulted and followed.   cxr on 4/5 severe ards pattern.-Vancomycin/zosyn added. 4/6 blood cultures pos for yeast so ID consulted and micafungin added. HD  Catheter and  TLC line removed and cultured.   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 Acute encephalopathy- delirium vs encephalitis or other structural cause. MRI could not be performed as she was very unstable.  At the time of admission Cr 0.7 worsened to 5.1, Renal was consulted. and HD started  on 3/29. In addition had, Metabolic acidosis due to renal failure.   Ngtube       Interval History:   Patient seen and examined via telemed with nursing staff   Per nursing -had some pain after HD and off sedation    Got 3 Liters off  Daughter     Review of Systems   Unable to perform ROS: Intubated     Objective:     Vital Signs (Most Recent):  Temp: 97.9 °F (36.6 °C) (04/08/20 1200)  Pulse: 107 (04/08/20 1246)  Resp: (!) 36 (04/08/20 1246)  BP: (!) 140/70 (04/08/20 1200)  SpO2: 97 % (04/08/20 1246) Vital Signs (24h Range):  Temp:  [95.7 °F (35.4 °C)-99.2 °F (37.3 °C)] 97.9 °F (36.6 °C)  Pulse:  [] 107  Resp:  [22-43] 36  SpO2:  [94 %-99 %] 97 %  BP: ()/(48-82) 140/70  Arterial Line BP: ()/(40-82) 148/74     Weight: 113.4 kg (250 lb)  Body mass index is 47.24 kg/m².    Intake/Output Summary (Last 24 hours) at 4/8/2020 1312  Last data filed at 4/8/2020 1200  Gross per 24 hour   Intake 2423.85 ml   Output 4205 ml   Net -1781.15 ml      Physical Exam  PATIENT WAS SEEN AS A VIDEO VISIT WITH NURSE ASSIST DURING THE VISIT. PHYSICAL EXAM FINDINGS ARE AS VIEWED BY MYSELF VIA VIDEO OR AS REPORTED BY NURSE IF SPECIFIED AS SUCH, EXAM NOT DONE PERSONALLY BY MYSELF AT BEDSIDE.  Significant Labs: All pertinent labs within the past 24 hours have been reviewed.    Significant Imaging: I have reviewed and interpreted all pertinent imaging results/findings within the past 24 hours.      Assessment/Plan:      * Acute respiratory failure with hypoxia  Patient with Hypoxic Respiratory failure which is Acute.  she is not on home oxygen. Supplemental ventilation was provided and noted- Vent Mode: Spont  Oxygen Concentration (%):  [32-40] 40  Resp Rate Total:  [26 br/min-44 br/min] 37 br/min  Vt Set:  [350 mL] 350 mL  PEEP/CPAP:  [5 cmH20] 5 cmH20  Pressure Support:  [10 cmH20] 10 cmH20  Mean Airway Pressure:  [9.2 cmH20-9.9 cmH20] 9.5 cmH20 and oxygen saturations . Differential diagnosis includes - Pneumonia Viral Labs  and images were reviewed. Patient Has recent ABG- . Treated  underlying causes and adjust management of respiratory failure as follows-        Pulmonary consultation.   Continue beta 2 agonist bronchodilator treatments.    IV antibiotics - ceftriaxone and azithromycin. And Plaquenil 400 mg daily x 5  Vanc/zosyn initiated 4/5   micafungin for yeast +blood/sputum 4/6 /ID consulted     Microbiology Results (last 7 days)     Procedure Component Value Units Date/Time    Blood culture [[id number]] Collected:  04/06/20 0806    Order Status:  Completed Specimen:  Blood from Antecubital, Left Updated:  04/09/20 0940     Blood Culture, Routine Gram stain peds bottle: yeast       Results called to and read back by: Tania Nolan RN 04/08/2020        11:20    Blood culture [[id number]]  (Abnormal) Collected:  04/04/20 0912    Order Status:  Completed Specimen:  Blood from Line, Central Updated:  04/09/20 0803     Blood Culture, Routine Gram stain peds bottle: budding yeast      Results called to and read back by: Carri Pryor RN  04/06/2020  03:04      YEAST   Identification pending  ID consult required at [facility],[place] and [facility] locations.      Blood culture [[id number]]     Order Status:  Canceled Specimen:  Blood     Blood culture [[id number]] Collected:  04/08/20 1739    Order Status:  Completed Specimen:  Blood from Line, Central Updated:  04/09/20 0315     Blood Culture, Routine No Growth to date    Blood culture [[id number]] Collected:  04/07/20 1730    Order Status:  Completed Specimen:  Blood from Line, Central Updated:  04/08/20 2212     Blood Culture, Routine No Growth to date      No Growth to date    Blood culture [[id number]] Collected:  04/07/20 0907    Order Status:  Completed Specimen:  Blood Updated:  04/08/20 2212     Blood Culture, Routine No Growth to date      No Growth to date    Blood culture [[id number]] Collected:  04/06/20 1802    Order Status:  Completed Specimen:  Blood from Line, Central  Updated:  04/08/20 2212     Blood Culture, Routine No Growth to date      No Growth to date      No Growth to date    Blood culture [[id number]] Collected:  04/08/20 0745    Order Status:  Completed Specimen:  Blood Updated:  04/08/20 1915     Blood Culture, Routine No Growth to date    IV catheter culture [[id number]]  (Abnormal) Collected:  04/06/20 1455    Order Status:  Completed Specimen:  Catheter Tip, Intrajugular Updated:  04/08/20 1254     Aerobic Culture - Cath tip STAPHYLOCOCCUS EPIDERMIDIS  < 15 colonies      Blood culture [[id number]]  (Abnormal)  (Susceptibility) Collected:  04/05/20 0813    Order Status:  Completed Specimen:  Blood from Peripheral, Left  Hand Updated:  04/08/20 1236     Blood Culture, Routine Gram stain peds bottle: Gram positive cocci in clusters resembling Staph       Results called to and read back by: yLnn Sawyer RN 04/06/2020  09:57      STAPHYLOCOCCUS EPIDERMIDIS    Blood culture [[id number]]  (Abnormal)  (Susceptibility) Collected:  04/05/20 0813    Order Status:  Completed Specimen:  Blood from Line, Central Left  Neck Updated:  04/08/20 1234     Blood Culture, Routine Gram stain tank bottle: Gram positive cocci in clusters resembling Staph       Results called to and read back by: Lynn Sawyer RN 04/06/2020  09:57      Gram stain aer bottle: Gram positive cocci in clusters resembling Staph       Positive results previously called 04/06/2020  15:52      STAPHYLOCOCCUS EPIDERMIDIS    Culture, Respiratory with Gram Stain [[id number]]  (Abnormal) Collected:  04/05/20 0050    Order Status:  Completed Specimen:  Respiratory from Endotracheal Aspirate Updated:  04/08/20 1046     Respiratory Culture No S aureus or Pseudomonas isolated.      CANDIDA ALBICANS  Few  Normal respiratory anamaria also present       Gram Stain (Respiratory) <10 epithelial cells per low power field.     Gram Stain (Respiratory) Many WBC's     Gram Stain (Respiratory) Rare Gram positive cocci     Gram Stain  (Respiratory) Rare yeast    IV catheter culture [[id number]] Collected:  04/06/20 1615    Order Status:  Completed Specimen:  Catheter Tip, Dialysis Updated:  04/08/20 0747     Aerobic Culture - Cath tip No growth    Blood culture [[id number]]     Order Status:  Canceled Specimen:  Blood     Urine Culture High Risk [[id number]]     Order Status:  Canceled Specimen:  Urine, Catheterized     Culture, Respiratory with Gram Stain [[id number]]     Order Status:  Canceled Specimen:  Respiratory from Tracheal Aspirate           Continue routine medications as before.   Follow airborne/droplet precautions.            Bacteremia  Blood cultures from 4/5 -gm +cocci -follow up final   Vancomyin 4/4 -  Consulted ID  4/4 blood culture yeast   HD/TLC lines removed   4/5 blood culture staph epidermidis   Blood culture neg 4/6 4/7 blood culture -neg  4/8 blood culture pend    4/6 -HD catheter tip staph epidermidis   4/6 IV catheter tipstaph epidermidis  4/5 resp culture candida albicans   Urine culture cancelled   Recommendations: per ID   Changed  lines-on 04/06  Continue vancomycin  DC Zosyn  Continue micafungin because of fungemia on 04/04.      Fungemia  Acute-ID consulted-cultures from 4/4   micafungin per ID  HD and TLC removed and cultured  Repeat blood cultures -q 12 hours per pulm         Pneumonia, ventilator associated  Acute -post viral -possible MRSA -  Added Vanc zosyn  Cultures -pend. Few yeast   micafungin added for + yeast in blood culture     Acute encephalopathy  AMS:: Hypoxia vs. Infection vs. TIA/stroke vs. Metabolic/Toxic.  -prolonged paralytic?   Continue supportive care   Likely multifactorial -unable to get  /MRI until stable   Ct head neg    Lab Results   Component Value Date    WBC 10.64 04/07/2020     MRI not done    focal findings on physical exam  No early signs of stroke on Head CT, no hemorrhage or acute findings.  EEG: EEG 30 min awake and drowsy results noted no seizures  Continue supportive care        Iron deficiency anemia, unspecified  Patient's anemia is currently controlled. Trending down  Will send for stool angelicac    Has recieved 1 units of PRBCs on 4/3.   Will transfuse  Unit as pt is hypotensive and ESRD   Etiology likely d/t Anemia of chronic disease  Current CBC reviewed-   Lab Results   Component Value Date    HGB 9.9 (L) 04/05/2020    HCT 31.6 (L) 04/05/2020     Monitor serial CBC and transfuse if patient becomes hemodynamically unstable, symptomatic or H/H drops below 8/24        Diverticulosis of large intestine without hemorrhage  Patient's anemia is currently controlled. Has recieved 1 units of PRBCs on 4/3.   Will need to Monitor for GI bleed  Lab Results   Component Value Date    HGB 8.3 (L) 04/06/2020    HCT 26.1 (L) 04/06/2020     Monitor serial CBC and transfuse if patient becomes hemodynamically unstable, symptomatic or H/H drops below 7/21.   Will continue to monitor        Acute renal failure  Acute, required HD for acute renal failure  ? Element ATN 2/2 hypovolemia/sepsis requiring pressor  Nephrology consulted/follwed.   Bicarb po and on HD for Metabolic acidosis             COVID-19 virus infection  Completed Plaquenil    Covid-19 Virus Infection  - Infection Control notified    - Isolation:   - Airborne and Droplet Precautions  - N95 masks must be fit tested, wear eye protection  - 20 second hand hygiene   - Limit visitors per hospital policy   - Consolidating lab draws, nursing care, and interventions    - Diagnostics: (rising CRP, persistent lymphopenia, hyponatremia, hyperferritinemia, elevated troponin, elevated d-dimer, age, and comorbidities are significant predictors of poor clinical outcome)   - CBC:   trend Q48hrs  - CMP:        trend Q48hrs  - Procalcitonin:  - D-dimer:  trend Q48hrs  - Ferritin:  repeat prior to discharge  - CRP:        trend Q48hrs  - LDH:  - BNP:  - Troponin:    - ECG:   - rapid Flu:   - RIP only if BMT/solid transplant:   - Legionella antigen:   -  Blood culture x2:   - Sputum culture:   - CXR:   - UA and culture:      - Management:   - Bundle care as able to minimize in/out of room   - Supplemental O2 to maintain SpO2 >92%,   if requiring 6L NC or higher, place on nonrebreather and discuss case with MICU   - Telemetry & continuous Pulse Ox   - albuterol INHALER PRN 4puff Q6hr approximates a nebulizer (avoid nebulization of secretions)   - apap PRN fever   - Avoiding NIPPV to prevent aerosolization   (including home CPAP/BiPAP unless on a case-by-case basis and only in negative pressure room)   - Cautious use of NSAIDS for fever per WHO recommendations (3/16/2020)   - No new ACEi/ARB start or discontinuation of chronic med unless hypotensive (Esler et al. Journal of Hypertension 2020, 38:000-000)   - Careful use of steroids in the absence of other indications   - unless septic shock due to increased viral replication   - Fluid sparing resuscitation   - Empiric antibiotics per likely source & patient allergies    - CAP: x 5 day course  Ceftriaxone 1g IV Q24hrs            Azithromycin 500mg IV day #1, then 250mg PO daily x4 days                 If MRSA risk factors, add Vancomycin IV (PharmD consult)   - If patient meets criteria per Hospital Protocol    - start statin (if CPK WNL)    - start HCQ 400mg PO BID x1 day, then 400mg PO daily x 4 days (check G6PD, ECG, and start Qshift POCT glucose)    Goals of care, counseling/discussion  - Reviewed the typical clinical course of COVID19 with the daughter Danya (patient name or relationship to patient), including the potential for acute decompensation requiring intubation and mechanical ventilation  - Discussed again as part of routine daily evaluation, patient/POA maintains code status of Full code    VTE High Risk Prophylaxis: enoxaparin 40mg sq QHS @ 2100 (bundled care) if GFR >30    Patient's chronic/stable medical conditions noted in the problem list above will be managed with the patient's home medications as  tolerated.           ARDS (adult respiratory distress syndrome)  Tolerating Wean of oxygen and peep -see resp failure  --will cont ARDSnet Protocol.  --Target 6-8ml/kg Ideal Body Weight. Decrease TV as tolerated.  --Plateau pressure goal <30cm H2O.  --Oxygenation goal PaO2 55-80 or SpO2 88-95%.  --pH goal 7.30-7.45.  --Wean to PSV as tolerated.        COVID-19 virus detected   Plaquenil course completed  Continue routine medications as before.   Follow airborne/droplet precautions.      Hypothyroid  Chronic problem.  Lab Results   Component Value Date    TSH 2.082 03/31/2020     Not on any medication            Morbid obesity  Body mass index is 47.24 kg/m². Morbid obesity complicates all aspects of disease management from diagnostic modalities to treatment. Weight loss encouraged and health benefits explained to patient.            VTE Risk Mitigation (From admission, onward)         Ordered     heparin (porcine) injection 5,000 Units  Every 12 hours      04/07/20 1349     heparin (porcine) 1,000 unit/mL injection      04/06/20 1138     heparin (porcine) injection 4,000 Units  As needed (PRN)      03/29/20 2001     IP VTE HIGH RISK PATIENT  Once      03/25/20 4324                Critical care time spent on the evaluation and treatment of severe organ dysfunction, review of pertinent labs and imaging studies, discussions with consulting providers and discussions with patient/family: 35 minutes.      Sharonda Burris MD  Department of Hospital Medicine   [facility]

## 2020-04-09 NOTE — RESPIRATORY THERAPY
04/08/20 1928   Patient Assessment/Suction   Respiratory Effort Unlabored   Expansion/Accessory Muscles/Retractions no use of accessory muscles   All Lung Fields Breath Sounds diminished   Cough Frequency no cough   PRE-TX-O2   O2 Device (Oxygen Therapy) nasal cannula   Flow (L/min) 3   Oxygen Concentration (%) 32   SpO2 98 %   Pulse Oximetry Type Continuous   $ Pulse Oximetry - Multiple Charge Pulse Oximetry - Multiple   Pulse 102   Resp (!) 37   /76   Ready to Wean/Extubation Screen   FIO2<=50 (chart decimal) 0.32

## 2020-04-09 NOTE — ASSESSMENT & PLAN NOTE
AMS:: Hypoxia vs. Infection vs. TIA/stroke vs. Metabolic/Toxic.  -prolonged paralytic?   Continue supportive care   Likely multifactorial -unable to get  /MRI until stable   Ct head neg    Lab Results   Component Value Date    WBC 13.70 (H) 04/09/2020     MRI not done    focal findings on physical exam  No early signs of stroke on Head CT, no hemorrhage or acute findings.  EEG: EEG 30 min awake and drowsy results noted no seizures  Continue supportive care

## 2020-04-09 NOTE — PROGRESS NOTES
Progress Note  PULMONARY    Admit Date: 3/25/2020   04/09/2020      SUBJECTIVE:     3/27- remains intubated, on vent. Was on Lasix drip overnight and now w/ IVF for UOP. On minimal Levophed   3/28- remains intubated, on PEEP 14/FiO2 50%. Levophed 0.06 mcg/kg/min  3/29- remains intubated, PEEP 12, FiO2 40%. Levophed off  3/30- remains intubated, PEEP 8, FiO2 40%. Levophed off. Started HD yesterday and having second session today. Daughter came to visit (works on 3rd floor), and updated this am  3/31- remains intubated, PEEP 8/FiO2 40%. HD yesterday w/ removal of 3.5L. With SBT yesterday not following commands and vomited stomach contents  4/1- remains intubated, PEEP 8/FiO2 40%. Not waking up or following commands off sedation- even w/ daughter at bedside, failed SBT due to tachypnea. Levophed at 0.02 mcg/kg/min. Had HD yesterday w/ removal of 2.5L. Had head CT. EEG pending  4/2- remains intubated, PEEP 6, FiO2 40%. w/ hemodynamic instability yesterday-- hypertensive then very hypotensive when tried to move her, which delayed weaning and MRI. Now off Levophed. HD yesterday w/ removal of 3.5L  4/3not arousing,  4/4 arousing somewhat- looks air hungry,  4/5- no c/o,sedated  4/6-  Remains intubated,  PEEP 5 FiO2 50%. On Nicardipine for HTN. HD this am. On CPAP trial since 4am w/ good gas exchange. Opens eyes and looks around but not following commands  4/7- remains intubated, had central lines replaced yesterday. Became tachypneic and put back on vent later in afternoon but tolerated PS trial all day. Today tachypneic w/ increased work of breathing with weaning trial  4/8- remains on vent, PEEP 5 FiO2 35%. On Levophed 0.04mcg/kg/min, precedex drip  4/9- extubated yesterday, on 5L nasal cannula. Very weak, per nurse was agitated and w/ chest tightness w/ breathing- better after Dilaudid. Per report answers yes/no questions and follows commands    PFSH and Allergies reviewed.    OBJECTIVE:     Vitals (Most  recent):  Vitals:    04/09/20 0700   BP: (!) 114/57   Pulse: 105   Resp: (!) 29   Temp:        Vitals (24 hour range):  Temp:  [97.9 °F (36.6 °C)-99.6 °F (37.6 °C)]   Pulse:  []   Resp:  [26-54]   BP: ()/(50-95)   SpO2:  [90 %-99 %]   Arterial Line BP: ()/(48-82)       Intake/Output Summary (Last 24 hours) at 4/9/2020 0809  Last data filed at 4/9/2020 0600  Gross per 24 hour   Intake 1087.47 ml   Output 4180 ml   Net -3092.53 ml        Vitals (24 hour range):  Temp:  [98.4 °F (36.9 °C)-99.4 °F (37.4 °C)]   Pulse:  []   Resp:  [34-36]   BP: ()/()   SpO2:  [96 %-100 %]   Arterial Line BP: (125-204)/(52-80)       Intake/Output Summary (Last 24 hours) at 4/3/2020 1048  Last data filed at 4/3/2020 1015  Gross per 24 hour   Intake 2354.11 ml   Output 775 ml   Net 1579.11 ml          Physical Exam:  The patient's neuro status (alertness,orientation,cognitive function,motor skills,), pharyngeal exam (oral lesions, hygiene, abn dentition,), Neck (jvd,mass,thyroid,nodes in neck and above/below clavicle),RESPIRATORY(symmetry,effort,fremitus,percussion,auscultation),  Cor(rhythm,heart tones including gallops,perfusion,edema)ABD(distention,hepatic&splenomegaly,tenderness,masses), Skin(rash,cyanosis),Psyc(affect,judgement,).  Exam negative except for these pertinent findings:    Awake, encephalopathic, does not verbalize or follow commands  Eyes open spontaneously  R sclera injected & conjunctival erythema w/ crusting  Lungs clear  Obese  Abdomen soft  Edema improved    Radiographs reviewed:  CXR 4/6- similar  cxr 4/5 ARDS + pulmonary edema  CXR 4/2- bibasilar fluffy opacities and pulmonary edema, similar appearance  CT head 3/31  1.  There are slight limitations related to moderate patient tilted in the scanner and mild patient motion but there is no obvious acute abnormality.  There is no hemorrhage, mass, mass effect or obvious acute edema or ischemia.  It should be noted that MRI is more  sensitive in the detection of subtle or acute nonhemorrhagic ischemic disease.    2.  Support tubing is seen in the left nares.  Bilateral mastoid and middle ear effusions are likely related to support tubing and/or intubation.  Correlate clinically.  The same is true for changes throughout the paranasal sinuses.    CXR 3/31- improving bibasilar opacities  CXR 3/29- unchanged  CXR 3/27- bilateral mid and lower lobe fluffy airspace disease  CXR 3/26- increased consolidation RLL    TTE 3/27  · Mild left ventricular enlargement.  · Mildly decreased left ventricular systolic function. The estimated ejection fraction is 45%.  · Grade I (mild) left ventricular diastolic dysfunction consistent with impaired relaxation.  · Normal right ventricular systolic function.  · Mild left atrial enlargement.  · Moderate mitral regurgitation.  · Mild tricuspid regurgitation.  · Mild pulmonary hypertension present. PASP 40 mm of Hg.    Labs     Recent Labs   Lab 04/09/20  0353   WBC 13.70*   HGB 8.7*   HCT 27.3*   *     Recent Labs   Lab 04/09/20  0353   *   K 3.3*   CL 94*   CO2 22*   BUN 48*   CREATININE 4.4*   *   CALCIUM 9.4   MG 2.0   AST 42*   ALT 38   ALKPHOS 292*   BILITOT 2.8*   PROT 8.3   ALBUMIN 2.2*   .2*     Recent Labs   Lab 04/09/20  0027   PH 7.431   PCO2 30.7*   PO2 73*   HCO3 20.4*     Microbiology Results (last 7 days)     Procedure Component Value Units Date/Time    Blood culture [[id number]]  (Abnormal) Collected:  04/04/20 0912    Order Status:  Completed Specimen:  [facility] Updated:  04/09/20 0803     Blood Culture, Routine Gram stain peds bottle: budding yeast      Results called to and read back by: Carri Pryor RN  04/06/2020  03:04      YEAST   Identification pending  [facility] [place] [facility]      Blood culture [[id number]]     Order Status:  Canceled Specimen:  Blood     Blood culture [[id number]] Collected:  04/08/20 6434     Order Status:  Completed Specimen:  Blood from Line, Central Updated:  04/09/20 0315     Blood Culture, Routine No Growth to date    Blood culture [[id number]] Collected:  04/07/20 1730    Order Status:  Completed Specimen:  Blood from Line, Central Updated:  04/08/20 2212     Blood Culture, Routine No Growth to date      No Growth to date    Blood culture [[id number]] Collected:  04/07/20 0907    Order Status:  Completed Specimen:  Blood Updated:  04/08/20 2212     Blood Culture, Routine No Growth to date      No Growth to date    Blood culture [[id number]] Collected:  04/06/20 1802    Order Status:  Completed Specimen:  Blood from Line, Central Updated:  04/08/20 2212     Blood Culture, Routine No Growth to date      No Growth to date      No Growth to date    Blood culture [[id number]] Collected:  04/08/20 0745    Order Status:  Completed Specimen:  Blood Updated:  04/08/20 1915     Blood Culture, Routine No Growth to date    IV catheter culture [[id number]]  (Abnormal) Collected:  04/06/20 1455    Order Status:  Completed Specimen:  Catheter Tip, Intrajugular Updated:  04/08/20 1254     Aerobic Culture - Cath tip STAPHYLOCOCCUS EPIDERMIDIS  < 15 colonies      Blood culture [[id number]]  (Abnormal)  (Susceptibility) Collected:  04/05/20 0813    Order Status:  Completed Specimen:  Blood from Peripheral, Left  Hand Updated:  04/08/20 1236     Blood Culture, Routine Gram stain peds bottle: Gram positive cocci in clusters resembling Staph       Results called to and read back by: Lynn Sawyer RN 04/06/2020  09:57      STAPHYLOCOCCUS EPIDERMIDIS    Blood culture [[id number]]  (Abnormal)  (Susceptibility) Collected:  04/05/20 0813    Order Status:  Completed Specimen:  Blood from Line, Central Left  Neck Updated:  04/08/20 1234     Blood Culture, Routine Gram stain tank bottle: Gram positive cocci in clusters resembling Staph       Results called to and read back by: Lynn Sawyer RN 04/06/2020  09:57      Gram stain aer  bottle: Gram positive cocci in clusters resembling Staph       Positive results previously called 04/06/2020  15:52      STAPHYLOCOCCUS EPIDERMIDIS    Blood culture [[id number]] Collected:  04/06/20 0806    Order Status:  Completed Specimen:  Blood from Antecubital, Left Updated:  04/08/20 1120     Blood Culture, Routine Gram stain peds bottle: yeast       Results called to and read back by: Tania Nolan RN 04/08/2020        11:20    Culture, Respiratory with Gram Stain [[id number]]  (Abnormal) Collected:  04/05/20 0050    Order Status:  Completed Specimen:  Respiratory from Endotracheal Aspirate Updated:  04/08/20 1046     Respiratory Culture No S aureus or Pseudomonas isolated.      CANDIDA ALBICANS  Few  Normal respiratory anamaria also present       Gram Stain (Respiratory) <10 epithelial cells per low power field.     Gram Stain (Respiratory) Many WBC's     Gram Stain (Respiratory) Rare Gram positive cocci     Gram Stain (Respiratory) Rare yeast    IV catheter culture [[id number]] Collected:  04/06/20 1615    Order Status:  Completed Specimen:  Catheter Tip, Dialysis Updated:  04/08/20 0747     Aerobic Culture - Cath tip No growth    Blood culture [[id number]]     Order Status:  Canceled Specimen:  Blood     Urine Culture High Risk [[id number]]     Order Status:  Canceled Specimen:  Urine, Catheterized     Culture, Respiratory with Gram Stain [[id number]]     Order Status:  Canceled Specimen:  Respiratory from Tracheal Aspirate         Results for WOLF SINGER (MRN [id number]) as of 4/1/2020 09:28   Ref. Range 4/1/2020 02:53   ALT Latest Ref Range: 10 - 44 U/L 54 (H)     Impression:  Active Hospital Problems    Diagnosis  POA    *Acute respiratory failure with hypoxia [J96.01]  Yes    Fungemia [B49]  No    Bacteremia [R78.81]  No    Pneumonia, ventilator associated [J95.851]  No    Acute encephalopathy [G93.40]  No    Acute renal failure [N17.9]  No    ARDS (adult respiratory distress syndrome)  [J80]  Yes    COVID-19 virus infection [U07.1]  Yes    Morbid obesity [E66.01]  Yes    Hypothyroid [E03.9]  Yes    COVID-19 virus detected [U07.1]  Yes    Diverticulosis of large intestine without hemorrhage [K57.30]  Yes    Iron deficiency anemia, unspecified [D50.9]  Yes      Resolved Hospital Problems   No resolved problems to display.               Plan:   Acute hypoxemic respiratory failure, stable to improving O2 reqt  COVID-19 pneumonia/ARDS  Acute renal failure, on HD  Metabolic acidosis due to renal failure  Shock, resolved   HTN  Combined heart failure, EF 45%  Morbid obesity  Acute encephalopathy  Right conjunctivitis  Bacteremia w/ s. Epidermidis; fungemia- s/p exchange of central lines      - continue supplemental oxygen to keep sats greater than 92%  - speech swallow eval  - needs PT  - titrate off precedex gradually  - continue Nicardipine drip to control BP  - continue HD per nephro  - continue PO bicarb- control metabolic acidosis to help w/ respiratory status  - optimize nutrition w/ tube feeds via NG   - has had adequate course (7d) of azithromycin, ceftriaxone, hydroxychloroquine  - ID recommendations reviewed  - on Daptomycin, Zosyn, micafungin  - discussed with hospitalist  - mental status and respiratory status tenuous w/ profound weakness, would keep in ICU to monitor and continue titrating IV drips    The following were evaluated and adjusted as needed: sedation and neurologic status, hemodynamics and acid base balance and oxygenation needs       Critical Care  - THE PATIENT HAS A HIGH PROBABILITY OF IMMINENT OR LIFE THREATENING DETERIORATION.  Over 50%time of encounter was in direct care at bedside.  Time was 30 to 74 minutes required for patient care.  Time needed for all of the above totaled 40 minutes.      Milvia Mcguire MD  Pulmonary & Critical Care Medicine

## 2020-04-10 PROBLEM — I50.43 ACUTE ON CHRONIC COMBINED SYSTOLIC AND DIASTOLIC CONGESTIVE HEART FAILURE: Status: ACTIVE | Noted: 2020-04-10

## 2020-04-10 PROBLEM — J18.9 PNEUMONIA DUE TO INFECTIOUS ORGANISM: Status: ACTIVE | Noted: 2020-04-05

## 2020-04-10 LAB
ALBUMIN SERPL BCP-MCNC: 2.1 G/DL (ref 3.5–5.2)
ALP SERPL-CCNC: 268 U/L (ref 55–135)
ALT SERPL W/O P-5'-P-CCNC: 41 U/L (ref 10–44)
ANION GAP SERPL CALC-SCNC: 16 MMOL/L (ref 8–16)
APTT BLDCRRT: 38 SEC (ref 21–32)
AST SERPL-CCNC: 38 U/L (ref 10–40)
BACTERIA BLD CULT: NORMAL
BASOPHILS # BLD AUTO: 0.09 K/UL (ref 0–0.2)
BASOPHILS NFR BLD: 0.7 % (ref 0–1.9)
BILIRUB SERPL-MCNC: 1.8 MG/DL (ref 0.1–1)
BLD PROD TYP BPU: NORMAL
BLOOD UNIT EXPIRATION DATE: NORMAL
BLOOD UNIT TYPE CODE: 6200
BLOOD UNIT TYPE: NORMAL
BUN SERPL-MCNC: 59 MG/DL (ref 6–20)
CALCIUM SERPL-MCNC: 9.5 MG/DL (ref 8.7–10.5)
CHLORIDE SERPL-SCNC: 97 MMOL/L (ref 95–110)
CO2 SERPL-SCNC: 21 MMOL/L (ref 23–29)
CODING SYSTEM: NORMAL
CREAT SERPL-MCNC: 5.1 MG/DL (ref 0.5–1.4)
CRP SERPL-MCNC: 174.6 MG/L (ref 0–8.2)
DIFFERENTIAL METHOD: ABNORMAL
DISPENSE STATUS: NORMAL
EOSINOPHIL # BLD AUTO: 0 K/UL (ref 0–0.5)
EOSINOPHIL NFR BLD: 0.1 % (ref 0–8)
ERYTHROCYTE [DISTWIDTH] IN BLOOD BY AUTOMATED COUNT: 17.3 % (ref 11.5–14.5)
EST. GFR  (AFRICAN AMERICAN): 10 ML/MIN/1.73 M^2
EST. GFR  (NON AFRICAN AMERICAN): 9 ML/MIN/1.73 M^2
FERRITIN SERPL-MCNC: 1961 NG/ML (ref 20–300)
GLUCOSE SERPL-MCNC: 105 MG/DL (ref 70–110)
HCT VFR BLD AUTO: 26.5 % (ref 37–48.5)
HGB BLD-MCNC: 8.4 G/DL (ref 12–16)
IMM GRANULOCYTES # BLD AUTO: 0.65 K/UL (ref 0–0.04)
IMM GRANULOCYTES NFR BLD AUTO: 4.9 % (ref 0–0.5)
INR PPP: 1 (ref 0.8–1.2)
LYMPHOCYTES # BLD AUTO: 1.1 K/UL (ref 1–4.8)
LYMPHOCYTES NFR BLD: 8.4 % (ref 18–48)
MCH RBC QN AUTO: 26.2 PG (ref 27–31)
MCHC RBC AUTO-ENTMCNC: 31.7 G/DL (ref 32–36)
MCV RBC AUTO: 83 FL (ref 82–98)
MONOCYTES # BLD AUTO: 1.2 K/UL (ref 0.3–1)
MONOCYTES NFR BLD: 8.8 % (ref 4–15)
NEUTROPHILS # BLD AUTO: 10.3 K/UL (ref 1.8–7.7)
NEUTROPHILS NFR BLD: 77.1 % (ref 38–73)
NRBC BLD-RTO: 0 /100 WBC
NUM UNITS TRANS PACKED RBC: NORMAL
PLATELET # BLD AUTO: 406 K/UL (ref 150–350)
PMV BLD AUTO: 11.1 FL (ref 9.2–12.9)
POTASSIUM SERPL-SCNC: 3.8 MMOL/L (ref 3.5–5.1)
PROT SERPL-MCNC: 7.8 G/DL (ref 6–8.4)
PROTHROMBIN TIME: 11.2 SEC (ref 9–12.5)
RBC # BLD AUTO: 3.21 M/UL (ref 4–5.4)
SODIUM SERPL-SCNC: 134 MMOL/L (ref 136–145)
VANCOMYCIN SERPL-MCNC: 14.7 UG/ML
WBC # BLD AUTO: 13.39 K/UL (ref 3.9–12.7)

## 2020-04-10 PROCEDURE — 25000003 PHARM REV CODE 250: Performed by: INTERNAL MEDICINE

## 2020-04-10 PROCEDURE — 97803 MED NUTRITION INDIV SUBSEQ: CPT

## 2020-04-10 PROCEDURE — 80100014 HC HEMODIALYSIS 1:1

## 2020-04-10 PROCEDURE — 63600175 PHARM REV CODE 636 W HCPCS: Performed by: INTERNAL MEDICINE

## 2020-04-10 PROCEDURE — 36415 COLL VENOUS BLD VENIPUNCTURE: CPT

## 2020-04-10 PROCEDURE — 25000003 PHARM REV CODE 250: Performed by: HOSPITALIST

## 2020-04-10 PROCEDURE — 85730 THROMBOPLASTIN TIME PARTIAL: CPT

## 2020-04-10 PROCEDURE — 86140 C-REACTIVE PROTEIN: CPT

## 2020-04-10 PROCEDURE — 63600175 PHARM REV CODE 636 W HCPCS: Performed by: HOSPITALIST

## 2020-04-10 PROCEDURE — 99291 CRITICAL CARE FIRST HOUR: CPT | Mod: ,,, | Performed by: INTERNAL MEDICINE

## 2020-04-10 PROCEDURE — 80202 ASSAY OF VANCOMYCIN: CPT

## 2020-04-10 PROCEDURE — 94761 N-INVAS EAR/PLS OXIMETRY MLT: CPT

## 2020-04-10 PROCEDURE — 11000001 HC ACUTE MED/SURG PRIVATE ROOM

## 2020-04-10 PROCEDURE — 80053 COMPREHEN METABOLIC PANEL: CPT

## 2020-04-10 PROCEDURE — 27000221 HC OXYGEN, UP TO 24 HOURS

## 2020-04-10 PROCEDURE — 85025 COMPLETE CBC W/AUTO DIFF WBC: CPT

## 2020-04-10 PROCEDURE — 99291 PR CRITICAL CARE, E/M 30-74 MINUTES: ICD-10-PCS | Mod: S$GLB,,, | Performed by: INTERNAL MEDICINE

## 2020-04-10 PROCEDURE — 20000000 HC ICU ROOM

## 2020-04-10 PROCEDURE — 82728 ASSAY OF FERRITIN: CPT

## 2020-04-10 PROCEDURE — 99291 PR CRITICAL CARE, E/M 30-74 MINUTES: ICD-10-PCS | Mod: ,,, | Performed by: INTERNAL MEDICINE

## 2020-04-10 PROCEDURE — 99291 CRITICAL CARE FIRST HOUR: CPT | Mod: S$GLB,,, | Performed by: INTERNAL MEDICINE

## 2020-04-10 PROCEDURE — 87040 BLOOD CULTURE FOR BACTERIA: CPT

## 2020-04-10 PROCEDURE — 85610 PROTHROMBIN TIME: CPT

## 2020-04-10 RX ORDER — SODIUM CHLORIDE 9 MG/ML
INJECTION, SOLUTION INTRAVENOUS
Status: CANCELLED | OUTPATIENT
Start: 2020-04-10

## 2020-04-10 RX ORDER — SODIUM CHLORIDE 9 MG/ML
INJECTION, SOLUTION INTRAVENOUS ONCE
Status: CANCELLED | OUTPATIENT
Start: 2020-04-10 | End: 2020-04-10

## 2020-04-10 RX ORDER — ACETAMINOPHEN 10 MG/ML
1000 INJECTION, SOLUTION INTRAVENOUS EVERY 8 HOURS
Status: COMPLETED | OUTPATIENT
Start: 2020-04-10 | End: 2020-04-11

## 2020-04-10 RX ORDER — HEPARIN SODIUM 5000 [USP'U]/ML
5000 INJECTION, SOLUTION INTRAVENOUS; SUBCUTANEOUS
Status: CANCELLED | OUTPATIENT
Start: 2020-04-10

## 2020-04-10 RX ADMIN — GENTAMICIN SULFATE 1 DROP: 3 SOLUTION OPHTHALMIC at 02:04

## 2020-04-10 RX ADMIN — SODIUM BICARBONATE 650 MG TABLET 1300 MG: at 03:04

## 2020-04-10 RX ADMIN — LEVOTHYROXINE SODIUM 100 MCG: 100 TABLET ORAL at 05:04

## 2020-04-10 RX ADMIN — GENTAMICIN SULFATE 1 DROP: 3 SOLUTION OPHTHALMIC at 09:04

## 2020-04-10 RX ADMIN — NICARDIPINE HYDROCHLORIDE 7.5 MG/HR: 0.2 INJECTION, SOLUTION INTRAVENOUS at 07:04

## 2020-04-10 RX ADMIN — MORPHINE SULFATE 2 MG: 2 INJECTION, SOLUTION INTRAMUSCULAR; INTRAVENOUS at 09:04

## 2020-04-10 RX ADMIN — NICARDIPINE HYDROCHLORIDE 7.5 MG/HR: 0.2 INJECTION, SOLUTION INTRAVENOUS at 01:04

## 2020-04-10 RX ADMIN — GENTAMICIN SULFATE 1 DROP: 3 SOLUTION OPHTHALMIC at 10:04

## 2020-04-10 RX ADMIN — ACETAMINOPHEN 1000 MG: 10 INJECTION, SOLUTION INTRAVENOUS at 01:04

## 2020-04-10 RX ADMIN — EPOETIN ALFA-EPBX 10000 UNITS: 10000 INJECTION, SOLUTION INTRAVENOUS; SUBCUTANEOUS at 02:04

## 2020-04-10 RX ADMIN — MUPIROCIN: 20 OINTMENT TOPICAL at 09:04

## 2020-04-10 RX ADMIN — HYDROMORPHONE HYDROCHLORIDE 0.2 MG: 2 INJECTION INTRAMUSCULAR; INTRAVENOUS; SUBCUTANEOUS at 02:04

## 2020-04-10 RX ADMIN — SODIUM BICARBONATE 650 MG TABLET 1300 MG: at 10:04

## 2020-04-10 RX ADMIN — LACTOBACILLUS TAB 2 TABLET: TAB at 10:04

## 2020-04-10 RX ADMIN — GENTAMICIN SULFATE 1 DROP: 3 SOLUTION OPHTHALMIC at 06:04

## 2020-04-10 RX ADMIN — MICAFUNGIN SODIUM 100 MG: 20 INJECTION, POWDER, LYOPHILIZED, FOR SOLUTION INTRAVENOUS at 12:04

## 2020-04-10 RX ADMIN — HYDROMORPHONE HYDROCHLORIDE 0.2 MG: 2 INJECTION INTRAMUSCULAR; INTRAVENOUS; SUBCUTANEOUS at 10:04

## 2020-04-10 RX ADMIN — HEPARIN SODIUM 4000 UNITS: 1000 INJECTION, SOLUTION INTRAVENOUS; SUBCUTANEOUS at 03:04

## 2020-04-10 RX ADMIN — LACTOBACILLUS TAB 2 TABLET: TAB at 09:04

## 2020-04-10 RX ADMIN — GENTAMICIN SULFATE 1 DROP: 3 SOLUTION OPHTHALMIC at 05:04

## 2020-04-10 RX ADMIN — ACETAMINOPHEN 1000 MG: 10 INJECTION, SOLUTION INTRAVENOUS at 10:04

## 2020-04-10 RX ADMIN — SODIUM BICARBONATE 650 MG TABLET 1300 MG: at 09:04

## 2020-04-10 RX ADMIN — OXYCODONE HYDROCHLORIDE AND ACETAMINOPHEN 1000 MG: 500 TABLET ORAL at 12:04

## 2020-04-10 RX ADMIN — ZINC SULFATE 220 MG (50 MG) CAPSULE 220 MG: CAPSULE at 09:04

## 2020-04-10 RX ADMIN — MINERAL OIL AND PETROLATUM: 150; 830 OINTMENT OPHTHALMIC at 10:04

## 2020-04-10 RX ADMIN — DEXMEDETOMIDINE HYDROCHLORIDE 0.4 MCG/KG/HR: 100 INJECTION, SOLUTION, CONCENTRATE INTRAVENOUS at 05:04

## 2020-04-10 RX ADMIN — OXYCODONE HYDROCHLORIDE AND ACETAMINOPHEN 1000 MG: 500 TABLET ORAL at 09:04

## 2020-04-10 RX ADMIN — HYDROMORPHONE HYDROCHLORIDE 0.2 MG: 2 INJECTION INTRAMUSCULAR; INTRAVENOUS; SUBCUTANEOUS at 11:04

## 2020-04-10 RX ADMIN — DEXMEDETOMIDINE HYDROCHLORIDE 0.4 MCG/KG/HR: 100 INJECTION, SOLUTION, CONCENTRATE INTRAVENOUS at 03:04

## 2020-04-10 RX ADMIN — GENTAMICIN SULFATE 1 DROP: 3 SOLUTION OPHTHALMIC at 01:04

## 2020-04-10 RX ADMIN — NICARDIPINE HYDROCHLORIDE 7.5 MG/HR: 0.2 INJECTION, SOLUTION INTRAVENOUS at 12:04

## 2020-04-10 RX ADMIN — OXYCODONE HYDROCHLORIDE AND ACETAMINOPHEN 1000 MG: 500 TABLET ORAL at 06:04

## 2020-04-10 RX ADMIN — OXYCODONE HYDROCHLORIDE AND ACETAMINOPHEN 1000 MG: 500 TABLET ORAL at 10:04

## 2020-04-10 RX ADMIN — MORPHINE SULFATE 2 MG: 2 INJECTION, SOLUTION INTRAMUSCULAR; INTRAVENOUS at 06:04

## 2020-04-10 RX ADMIN — HYDROMORPHONE HYDROCHLORIDE 0.2 MG: 2 INJECTION INTRAMUSCULAR; INTRAVENOUS; SUBCUTANEOUS at 06:04

## 2020-04-10 RX ADMIN — LABETALOL HYDROCHLORIDE 1 MG/MIN: 5 INJECTION INTRAVENOUS at 07:04

## 2020-04-10 NOTE — PROGRESS NOTES
Pharmacokinetic Assessment Follow Up: IV Vancomycin    Vancomycin serum concentration assessment(s):    The random level was drawn correctly and can be used to guide therapy at this time. The measurement is within the desired definitive target range of 15 to 20 mcg/mL.    Vancomycin Regimen Plan:    Patient scheduled for HD today.  Vanc dose will be  500 mg at 1700 postHD today.    Vancomycin level ordered with AM labs on 04/11/2020.  Target goal is 15-20 mg/dL.       Drug levels (last 3 results):  Recent Labs   Lab Result Units 04/08/20 0311 04/09/20 0353 04/10/20  0329   Vancomycin, Random ug/mL 15.1 19.9 14.7       Pharmacy will continue to follow and monitor vancomycin.    Please contact pharmacy at extension [phone] for questions regarding this assessment.    Thank you for the consult,   Sami Baugh       Patient brief summary:  Maria Victoria Hernandez is a 54 y.o. female initiated on antimicrobial therapy with IV Vancomycin for treatment of lower respiratory infection    The patient's current regimen is pulse dosing    Drug Allergies:   Review of patient's allergies indicates:  No Known Allergies    Actual Body Weight:   113.4kg    Renal Function:   Estimated Creatinine Clearance: 14.9 mL/min (A) (based on SCr of 5.1 mg/dL (H)).,     Dialysis Method (if applicable):  intermittent HD PRN    CBC (last 72 hours):  Recent Labs   Lab Result Units 04/08/20  0311 04/09/20  0353 04/10/20  0329   WBC K/uL 11.23 13.70* 13.39*   Hemoglobin g/dL 7.5* 8.7* 8.4*   Hematocrit % 23.3* 27.3* 26.5*   Platelets K/uL 300 402* 406*   Gran% % 76.1* 77.7* 77.1*   Lymph% % 9.2* 7.7* 8.4*   Mono% % 11.2 9.3 8.8   Eosinophil% % 0.5 0.1 0.1   Basophil% % 0.6 0.8 0.7   Differential Method  Automated Automated Automated       Metabolic Panel (last 72 hours):  Recent Labs   Lab Result Units 04/08/20  0311 04/09/20  0353 04/10/20  0329   Sodium mmol/L 128* 132* 134*   Potassium mmol/L 3.5 3.3* 3.8   Chloride mmol/L 95 94* 97   CO2  mmol/L 18* 22* 21*   Glucose mg/dL 110 149* 105   BUN, Bld mg/dL 70* 48* 59*   Creatinine mg/dL 5.4* 4.4* 5.1*   Albumin g/dL 2.0* 2.2* 2.1*   Total Bilirubin mg/dL 2.8* 2.8* 1.8*   Alkaline Phosphatase U/L 208* 292* 268*   AST U/L 32 42* 38   ALT U/L 28 38 41   Magnesium mg/dL  --  2.0  --        Vancomycin Administrations:  vancomycin given in the last 96 hours                     vancomycin 500 mg in dextrose 5 % 100 mL IVPB (ready to mix system) (mg) 500 mg New Bag 04/08/20 1703                      Microbiologic Results:  Microbiology Results (last 7 days)       Procedure Component Value Units Date/Time    Blood culture [[id number]] Collected:  04/10/20 0453    Order Status:  Sent Specimen:  Blood Updated:  04/10/20 0453    Blood culture [[id number]] Collected:  04/08/20 1739    Order Status:  Completed Specimen:  Blood from Line, Central Updated:  04/09/20 2213     Blood Culture, Routine No Growth to date      No Growth to date    Blood culture [[id number]] Collected:  04/07/20 1730    Order Status:  Completed Specimen:  Blood from Line, Central Updated:  04/09/20 2212     Blood Culture, Routine No Growth to date      No Growth to date      No Growth to date    Blood culture [[id number]] Collected:  04/07/20 0907    Order Status:  Completed Specimen:  Blood Updated:  04/09/20 2212     Blood Culture, Routine No Growth to date      No Growth to date      No Growth to date    Blood culture [[id number]] Collected:  04/06/20 1802    Order Status:  Completed Specimen:  Blood from Line, Central Updated:  04/09/20 2212     Blood Culture, Routine No Growth to date      No Growth to date      No Growth to date      No Growth to date    Blood culture [[id number]]  (Abnormal) Collected:  04/04/20 0912    Order Status:  Completed Specimen:  Blood from Line, Central Updated:  04/09/20 1418     Blood Culture, Routine Gram stain peds bottle: budding yeast      Results called to and read back by: Carri Pryor RN  04/06/2020  03:04       AJ ALBICANS  Susceptibility pending  ID consult required at [facility] [place] and [place] locations.      Blood culture [[id number]] Collected:  04/08/20 0745    Order Status:  Completed Specimen:  Blood Updated:  04/09/20 1412     Blood Culture, Routine No Growth to date      No Growth to date    IV catheter culture [[id number]]  (Abnormal) Collected:  04/06/20 1455    Order Status:  Completed Specimen:  Catheter Tip, Intrajugular Updated:  04/09/20 1300     Aerobic Culture - Cath tip STAPHYLOCOCCUS EPIDERMIDIS  < 15 colonies      IV catheter culture [[id number]] Collected:  04/06/20 1615    Order Status:  Completed Specimen:  Catheter Tip, Dialysis Updated:  04/09/20 1121     Aerobic Culture - Cath tip No growth    Blood culture [[id number]] Collected:  04/06/20 0806    Order Status:  Completed Specimen:  Blood from Antecubital, Left Updated:  04/09/20 0940     Blood Culture, Routine Gram stain peds bottle: yeast       Results called to and read back by: Tania Nolan RN 04/08/2020        11:20    Blood culture [[id number]]     Order Status:  Canceled Specimen:  Blood     Blood culture [[id number]]  (Abnormal)  (Susceptibility) Collected:  04/05/20 0813    Order Status:  Completed Specimen:  Blood from Peripheral, Left  Hand Updated:  04/08/20 1236     Blood Culture, Routine Gram stain peds bottle: Gram positive cocci in clusters resembling Staph       Results called to and read back by: Lynn Sawyer RN 04/06/2020  09:57      STAPHYLOCOCCUS EPIDERMIDIS    Blood culture [[id number]]  (Abnormal)  (Susceptibility) Collected:  04/05/20 0813    Order Status:  Completed Specimen:  Blood from Line, Central Left  Neck Updated:  04/08/20 1234     Blood Culture, Routine Gram stain tank bottle: Gram positive cocci in clusters resembling Staph       Results called to and read back by: Lynn Sawyer RN 04/06/2020  09:57      Gram stain aer bottle: Gram positive cocci in clusters resembling Staph       Positive  results previously called 04/06/2020  15:52      STAPHYLOCOCCUS EPIDERMIDIS    Culture, Respiratory with Gram Stain [[id number]]  (Abnormal) Collected:  04/05/20 0050    Order Status:  Completed Specimen:  Respiratory from Endotracheal Aspirate Updated:  04/08/20 1046     Respiratory Culture No S aureus or Pseudomonas isolated.      CANDIDA ALBICANS  Few  Normal respiratory anamaria also present       Gram Stain (Respiratory) <10 epithelial cells per low power field.     Gram Stain (Respiratory) Many WBC's     Gram Stain (Respiratory) Rare Gram positive cocci     Gram Stain (Respiratory) Rare yeast    Blood culture [[id number]]     Order Status:  Canceled Specimen:  Blood     Urine Culture High Risk [[id number]]     Order Status:  Canceled Specimen:  Urine, Catheterized     Culture, Respiratory with Gram Stain [[id number]]     Order Status:  Canceled Specimen:  Respiratory from Tracheal Aspirate

## 2020-04-10 NOTE — PLAN OF CARE
Intervention: enteral nutrition therapy   current intake:   Nutren 1.5 @ 45 ml/ hr + Promod 30 ml TID + 130 ml flush q 4 hr   ( provides 1620 kcal ( 79% EEN), 73 g protein + promod (86% EPN), and 820 ml free water, Phos 1296 mg)     Recommendations     1. Until diet advanced continue NGT TF Nutren 1.5 @ 45 ml/ hr + Promod 30 ml TID + 130 ml flush q 4 hr   ( provides 1620 kcal ( 79% EEN), 73 g protein + promod (86% EPN), and 820 ml free water, Phos 1296 mg)  -If necessary can switch to Novasource renal @ 30 ml/hr + promod 30 ml TID     2. Weigh pt s/p HD  3. Once extubated advance PO diet to goal of cardiac     Goals: 1.) Meet >85% EEN/EPN by RD f/u 2) nutrition support to meet > 75% EEN at f/u 3) continue TF at goal or PO diet advanced at f/u  Nutrition Goal Status: 1) was meeting- not met 4/4-4/8 2) meeting 3) new  Communication of RD Recs: (POC, sticky note)

## 2020-04-10 NOTE — EICU
1. Re insert NG tube and follow KUB.  2. Non violent bilateral soft wrist restraints to prevent dself injury and harm by pulling lines. S/p extubation status on precedex.

## 2020-04-10 NOTE — PROGRESS NOTES
[facility]  Adult Nutrition  Progress Note    SUMMARY     Intervention: enteral nutrition therapy   current intake:   Nutren 1.5 @ 45 ml/ hr + Promod 30 ml TID + 130 ml flush q 4 hr   ( provides 1620 kcal ( 79% EEN), 73 g protein + promod (86% EPN), and 820 ml free water, Phos 1296 mg)     Recommendations     1. Until diet advanced continue NGT TF Nutren 1.5 @ 45 ml/ hr + Promod 30 ml TID + 130 ml flush q 4 hr   ( provides 1620 kcal ( 79% EEN), 73 g protein + promod (86% EPN), and 820 ml free water, Phos 1296 mg)  -If necessary can switch to Novasource renal @ 30 ml/hr + promod 30 ml TID     2. Weigh pt s/p HD  3. Once extubated advance PO diet to goal of cardiac     Goals: 1.) Meet >85% EEN/EPN by RD f/u 2) nutrition support to meet > 75% EEN at f/u 3) continue TF at goal or PO diet advanced at f/u  Nutrition Goal Status: 1) was meeting- not met 4/4-4/8 2) meeting 3) new  Communication of RD Recs: (POC, sticky note)     1. Covid-19 Virus Infection    2. Acute respiratory failure    3. Intubation of airway performed without difficulty    4. Encounter for nasogastric (NG) tube placement    5. Ruled out for myocardial infarction    6. At risk for long QT syndrome    7. Acute respiratory failure with hypoxia    8. ARDS (adult respiratory distress syndrome)    9. Morbid obesity    10. Pneumonia due to Covid-19 Virus    11. Shock               Past Medical History:   Diagnosis Date    Colon polyp      Diverticulosis large intestine w/o perforation or abscess w/bleeding      Iron deficiency anemia      Prediabetes      Thyroid disease      Vitamin B 12 deficiency      Vitamin D deficiency           Reason for Assessment     Reason For Assessment: RD follow up  Rounds: no rounds( did not enter pt room)     General Information Comments: Pt is intubated in ICU. NFPE not performed,patient is noted as being positive for COVID-19. Per epic records, no evidence of weight loss.  3/31: Not tolerating TF  "per RN. ngt in place. Dialysis today.   4/3/20 Pt was tolerating TF at goal, propofol high, decreased rate to 35 ml/hr yesterday. Tolerating today. Getting HD. + vent.   4/8/20 4/3 later in the day TF stopped ? Reason and restarted slowly advanced but 4/4 TF stopped r/t emesis and TPN started ( 46% EEN/ 53% EPN). Received TPN 4-4/4/6. TPN d/c'd yesterday and TF restarted @ 10 ml/hr now. Had small loose stool today. Continues on HD.   4/10/20 Pt tolerating TF at goal via NGT. Extubated 4/8. Continues on HD. Awaiting SLP eval.      Nutrition Discharge Planning: pending medical course- cardiac     Nutrition Risk Screen     Nutrition Risk Screen: no indicators present     Nutrition/Diet History     Spiritual, Cultural Beliefs, Adventist Practices, Values that Affect Care: no   factors affecting PO intakes: NPO     Anthropometrics  Height Method: Estimated  Height: 5' 1"  Height (inches): 61 in  Weight Method: Bed Scale  Weight: 113.4 kg 4/7/20  Weight (lb): 250 lb ( 4 lb loss)  Ideal Body Weight (IBW), Female: 105 lb  % Ideal Body Weight, Female (lb): 225.71 %  BMI (Calculated): 47 kg/m2  BMI Grade: greater than 40 - morbid obesity  115.3 kg (3/30/20)     Lab/Procedures/Meds     Pertinent Labs Reviewed: reviewed  BMP  Lab Results   Component Value Date     (L) 04/10/2020    K 3.8 04/10/2020    CL 97 04/10/2020    CO2 21 (L) 04/10/2020    BUN 59 (H) 04/10/2020    CREATININE 5.1 (H) 04/10/2020    CALCIUM 9.5 04/10/2020    ANIONGAP 16 04/10/2020    ESTGFRAFRICA 10 (A) 04/10/2020    EGFRNONAA 9 (A) 04/10/2020     Lab Results   Component Value Date    CALCIUM 9.5 04/10/2020    PHOS 4.8 (H) 03/29/2020     Lab Results   Component Value Date    ALBUMIN 2.1 (L) 04/10/2020     Lab Results   Component Value Date    .6 (H) 04/10/2020     Pertinent Medications Reviewed: reviewed  Vitamin C, epoetin, probiotic, zinc, phenergan     Estimated/Assessed Needs   modified  Weight Used For Calorie Calculations: 107.5 kg (236 lb " 15.9 oz)  Energy Need Method: 1615 kcal ( mifflin st Jeor  no activity factor)  Protein Requirements: 95 g ( 2.0 g protein/kg HD vs BMI > 47)  Weight Used For Protein Calculations: 47.6 kg (105 lb)(ideal body weight)  RDA: urine output + 1000 ml  CHO Requirement: 147g        Nutrition Prescription Ordered  NPO + TF above     Evaluation of Received Nutrient/Fluid Intake   energy need: meeting  Protein needs: meeting  Fluid needs: meeting  % Intake of Estimated Energy Needs: 100% x 2 days  % Meal Intake: npo + TF     Nutrition Risk     2x week moderate     Assessment and Plan     Nutrition Problem  Inadequate energy intake     Related to (etiology):   No diet order     Signs and Symptoms (as evidenced by):   Intake of <85% EEN/EPN     Interventions/Recommendations (treatment strategy):  Collaboration with other providers  Enteral nutrition     Nutrition Diagnosis Status:   Continues      Morbid obesity  Contributing Nutrition Diagnosis  Obesity stage 3     Related to (etiology):   Hx. Of excessive energy intakes     Signs and Symptoms (as evidenced by):   BMI > 40 kg/m2     Interventions/Recommendations (treatment strategy):  Above     Nutrition Diagnosis Status:   continues         Malnutrition:  Edema: 2-3+  Robe: 14  NFPE not done r/t restrictions to prevent spread of COVID-19, to be done at a later date. Noted to appear well nourished.      Monitor and Evaluation     Food and Nutrient Intake: energy intake  Food and Nutrient Adminstration: enteral and parenteral nutrition administration, diet order  Anthropometric Measurements: weight  Biochemical Data, Medical Tests and Procedures: electrolyte and renal panel, glucose/endocrine profile , gastrointestinal, CRP     Nutrition Follow-Up     RD Follow-up?: Yes

## 2020-04-10 NOTE — PLAN OF CARE
Patient free of falls and injuries this shift. Awake and alert, becomes anxious at times with heart rate and b/p up. Becaomes SOB and drops 02 sats to low 90's. Prn dilaudid given and responds well. Sinus to sinus tach on monitor. Only low grade temps 99.2 max temp. Pulled NGT last night with NGT intact. Reinserted new tube without distress. Restarted tube feedings and remains with low residuals. Cuellar patent to gravity with alvarez to blood tinged urine noted. APTT done.

## 2020-04-10 NOTE — SIGNIFICANT EVENT
Patient tachycardica and tachypneic -  Discussed with Charge nurse -sinus tachy-cardene changed to labetolol.  Requested pulmonary to review possible  bipap -for Covid must have extra filter for machines which are limited  And  Patient must be in negative pressure room     Time -15 min

## 2020-04-10 NOTE — PT/OT/SLP PROGRESS
Physical Therapy      Patient Name:  Maria Victoria Hernandez   MRN:  [id number]    Patient not seen today secondary to Dialysis. Will follow-up 04/13/2020 to see if patient appropriate for inpatient PT at this time.    Chris Megilligan, PT

## 2020-04-10 NOTE — PROGRESS NOTES
INPATIENT NEPHROLOGY PROGRESS NOTE  [facility] NEPHROLOGY    Patient Name: Maria Victoria Hernandez  Date: 04/10/2020    Reason for consultation: MAIKOL    History of Present Illness:  54AAF nurse with morbid obesity, hypothyroidism presents PNA, intubated, positive for COVID19. Admit Cr 0.7 went 5.1 and HD started on 3/29.     3/28  Scr worse today.  Only one shift recorded for output, 520cc.  Still hyponatremic, vent setting adjusted per pulmonology note for acidosis.  K+ at goal after repletion.  Has siri, may need HD initiated.  3/29  Non oliguric.  In no distress  3/30 VSS, seen and examined on HD, tolerating well. Plan another HD in AM, discussed with daughter who is a nurse here too.  3/31 VSS, intubated still. UO is not great but she is dialyzed daily. Seen and examined on HD, tolerating well. Plan another HD in AM.  4/1 VSS, intubated still. UO is not great but she is dialyzed daily. Seen and examined on HD, tolerating well. Plan another HD PRN.  4/2 VSS, intubated still. UO is not great. Plan another HD in AM.  4/3 VSS, intubated still. UO is not great but she is dialyzed daily recently. Seen and examined on HD, tolerating well. Plan another HD on Mon or PRN.  4/4 febrile, BP stable, FIO2 50%, intubated, sedated, on levophed, UOP 900cc, transfused  4/5 febrile, tachy, SBP 100s, FIO2 65%, intubated, sedated, off levophed, UOP 935cc  4/6 intubated,. Sedated  4/7 intubated, sedated. Dialysis catheter changed yesterday  4/8 just finished dialysis. uf 3 liters. Extubated  4/10  Seen on dialysis.  No distress.    Plan of Care:    1. Septic shock 2/2 COVID PNA with HHRF requiring MV  - WBC down to 10.6. Still spiking fevers          2. MAIKOL - suspect multifactorial ATN in setting of shock/COVID/intravascular volume depletion- initiated HD 3/29  - she is now nonoliguric but clearance parameters are worse   --dialysis today.  Monitor renal function over weekend  - no nsaids or IV contrast    3. Hypervolemic  hyponatremia  - ordered 140 Na bath, 2-3L UF tomorrow as tolerated    4. Acidosis  -  35 bicarb bath    5. Anemia  - epogen with hd    Thank you for allowing us to participate in this patient's care. We will continue to follow.    Vital Signs:  Temp Readings from Last 3 Encounters:   04/10/20 99 °F (37.2 °C) (Axillary)       Pulse Readings from Last 3 Encounters:   04/10/20 (!) 111   01/15/15 84   12/17/13 80       BP Readings from Last 3 Encounters:   04/10/20 129/64   01/15/15 124/82   12/17/13 102/68       Weight:  Wt Readings from Last 3 Encounters:   04/07/20 113.4 kg (250 lb)   01/15/15 105.8 kg (233 lb 4.8 oz)   12/17/13 101.2 kg (223 lb)     Medications:  Scheduled Meds:   ascorbic acid (vitamin C)  1,000 mg Per NG tube QID    calcitRIOL  0.25 mcg Oral Daily    gentamicin  1 drop Right Eye Q4H    heparin (porcine)  5,000 Units Subcutaneous Q12H    Lactobacillus acidoph-L.bulgar  2 tablet Per NG tube BID    levothyroxine  100 mcg Oral Before breakfast    micafungin (MYCAMINE) IVPB  100 mg Intravenous Q24H    mupirocin   Nasal BID    sodium bicarbonate  1,300 mg Per NG tube TID    white petrolatum-mineral oiL   Right Eye QHS    zinc sulfate  220 mg Oral Daily     Continuous Infusions:   dexmedetomidine (PRECEDEX) infusion 0.4 mcg/kg/hr (04/10/20 0521)    niCARdipine 7.5 mg/hr (04/10/20 0744)    norepinephrine bitartrate-D5W Stopped (04/08/20 1800)     PRN Meds:.sodium chloride, sodium chloride, acetaminophen, heparin (porcine), HYDROmorphone, loperamide, metoprolol, morphine, promethazine (PHENERGAN) IVPB, propofoL, sodium chloride 0.9%, sodium chloride 0.9%, Pharmacy to dose Vancomycin consult **AND** vancomycin - pharmacy to dose  No current facility-administered medications on file prior to encounter.      Current Outpatient Medications on File Prior to Encounter   Medication Sig Dispense Refill    ferrous sulfate 325 mg (65 mg iron) Tab tablet Take 1 tablet (325 mg total) by mouth daily with  "breakfast. (Patient taking differently: Take 325 mg by mouth daily with breakfast. Take 2 tablets daily) 90 tablet 3    fluticasone propionate (FLONASE) 50 mcg/actuation nasal spray 1 spray by Each Nostril route once daily.      levothyroxine (SYNTHROID) 100 MCG tablet Take 1 tablet (100 mcg total) by mouth once daily. (Patient taking differently: Take 150 mcg by mouth once daily. ) 90 tablet 3    meloxicam (MOBIC) 7.5 MG tablet Take 7.5 mg by mouth once daily.      clotrimazole-betamethasone 1-0.05% (LOTRISONE) cream Apply topically 2 (two) times daily. 45 g 1    levocetirizine (XYZAL) 5 MG tablet Take 1 tablet (5 mg total) by mouth every evening. 30 tablet 6     Review of Systems:  Unable to obtain due to MV    Physical Exam:  /64   Pulse (!) 111   Temp 99 °F (37.2 °C) (Axillary)   Resp (!) 27   Ht 5' 1" (1.549 m)   Wt 113.4 kg (250 lb)   SpO2 95%   Breastfeeding? No   BMI 47.24 kg/m²     INS/OUTS:  I/O last 3 completed shifts:  In: 1803.4 [I.V.:733.4; NG/GT:970; IV Piggyback:100]  Out: 1493 [Urine:1393; Stool:100]  I/O this shift:  In: -   Out: 300 [Urine:300]    Did exam via video monitoring  Did not go in room    Physical Exam:  Constitutional: acutely ill, appears stated age,   HEENT: Eyes closed, MMM  Heart: RRR on monitor,   Lungs: extubated  Abdomen: nd  Skin: no rash  MSK: no deformity  CNS: sedated      Results:  Lab Results   Component Value Date     (L) 04/10/2020    K 3.8 04/10/2020    CL 97 04/10/2020    CO2 21 (L) 04/10/2020    BUN 59 (H) 04/10/2020    CREATININE 5.1 (H) 04/10/2020    CALCIUM 9.5 04/10/2020    ANIONGAP 16 04/10/2020    ESTGFRAFRICA 10 (A) 04/10/2020    EGFRNONAA 9 (A) 04/10/2020       Lab Results   Component Value Date    CALCIUM 9.5 04/10/2020    PHOS 4.8 (H) 03/29/2020       Recent Labs   Lab 04/10/20  0329   WBC 13.39*   RBC 3.21*   HGB 8.4*   HCT 26.5*   *   MCV 83   MCH 26.2*   MCHC 31.7*     I have personally reviewed pertinent radiological " imaging and reports.      Don Collins MD  Nephrology  [facility] [place]  [phone]

## 2020-04-10 NOTE — PT/OT/SLP DISCHARGE
Occupational Therapy Discharge Summary    Maria Victoria Hernandez  MRN: [id number]   Principal Problem: Acute respiratory failure with hypoxia      Patient Discharged from acute Occupational Therapy on 4/10/2020.        Objective:     GOALS:   Multidisciplinary Problems     Occupational Therapy Goals     Not on file                Reasons for Discontinuation of Therapy Services  Patient is unable to participate in therapy due to poor medical status.     Plan:     Please re-consult OT services when patient is appropriate.     Obey Mackenzie, OT  4/10/2020

## 2020-04-10 NOTE — SUBJECTIVE & OBJECTIVE
Interval History:   Patient seen and examined via telemed with nursing staff   [facility]-patient in hospital  Provider at home   Nurse -at bedside-Efraín (deuce )  Remains on Precedex/prn pain medications  Oxygenation stable -very weak -on 5 L but tachynpeic and Tachycardia   ngt need resinserted overnight -small amt blood nares and urine -clearing now per nursing     Review of Systems   Constitutional: Negative for chills and fever.   Respiratory: Positive for shortness of breath. Negative for cough.    Gastrointestinal: Positive for diarrhea. Negative for abdominal pain.   Psychiatric/Behavioral: Positive for agitation. The patient is nervous/anxious.      Objective:     Vital Signs (Most Recent):  Temp: 98.6 °F (37 °C) (04/10/20 1245)  Pulse: 105 (04/10/20 1430)  Resp: (!) 27 (04/10/20 1430)  BP: (!) 111/56 (04/10/20 1430)  SpO2: 96 % (04/10/20 1430) Vital Signs (24h Range):  Temp:  [98.2 °F (36.8 °C)-99.2 °F (37.3 °C)] 98.6 °F (37 °C)  Pulse:  [103-145] 105  Resp:  [24-87] 27  SpO2:  [85 %-98 %] 96 %  BP: (111-143)/(56-76) 111/56  Arterial Line BP: (131-191)/(53-75) 135/54     Weight: 113.4 kg (250 lb)  Body mass index is 47.24 kg/m².    Intake/Output Summary (Last 24 hours) at 4/10/2020 1439  Last data filed at 4/10/2020 1340  Gross per 24 hour   Intake 669.98 ml   Output 1028 ml   Net -358.02 ml      Physical Exam  PATIENT WAS SEEN AS A VIDEO VISIT WITH NURSE ASSIST DURING THE VISIT. PHYSICAL EXAM FINDINGS ARE AS VIEWED BY MYSELF VIA VIDEO OR AS REPORTED BY NURSE IF SPECIFIED AS SUCH, EXAM NOT DONE PERSONALLY BY MYSELF AT BEDSIDE.  Significant Labs:   BMP:   Recent Labs   Lab 04/09/20  0353 04/10/20  0329   * 105   * 134*   K 3.3* 3.8   CL 94* 97   CO2 22* 21*   BUN 48* 59*   CREATININE 4.4* 5.1*   CALCIUM 9.4 9.5   MG 2.0  --      CBC:   Recent Labs   Lab 04/09/20  0353 04/10/20  0329   WBC 13.70* 13.39*   HGB 8.7* 8.4*   HCT 27.3* 26.5*   * 406*       Significant Imaging: I have  reviewed and interpreted all pertinent imaging results/findings within the past 24 hours.

## 2020-04-10 NOTE — PROGRESS NOTES
Progress Note  PULMONARY    Admit Date: 3/25/2020   04/10/2020      SUBJECTIVE:     3/27- remains intubated, on vent. Was on Lasix drip overnight and now w/ IVF for UOP. On minimal Levophed   3/28- remains intubated, on PEEP 14/FiO2 50%. Levophed 0.06 mcg/kg/min  3/29- remains intubated, PEEP 12, FiO2 40%. Levophed off  3/30- remains intubated, PEEP 8, FiO2 40%. Levophed off. Started HD yesterday and having second session today. Daughter came to visit (works on 3rd floor), and updated this am  3/31- remains intubated, PEEP 8/FiO2 40%. HD yesterday w/ removal of 3.5L. With SBT yesterday not following commands and vomited stomach contents  4/1- remains intubated, PEEP 8/FiO2 40%. Not waking up or following commands off sedation- even w/ daughter at bedside, failed SBT due to tachypnea. Levophed at 0.02 mcg/kg/min. Had HD yesterday w/ removal of 2.5L. Had head CT. EEG pending  4/2- remains intubated, PEEP 6, FiO2 40%. w/ hemodynamic instability yesterday-- hypertensive then very hypotensive when tried to move her, which delayed weaning and MRI. Now off Levophed. HD yesterday w/ removal of 3.5L  4/3not arousing,  4/4 arousing somewhat- looks air hungry,  4/5- no c/o,sedated  4/6-  Remains intubated,  PEEP 5 FiO2 50%. On Nicardipine for HTN. HD this am. On CPAP trial since 4am w/ good gas exchange. Opens eyes and looks around but not following commands  4/7- remains intubated, had central lines replaced yesterday. Became tachypneic and put back on vent later in afternoon but tolerated PS trial all day. Today tachypneic w/ increased work of breathing with weaning trial  4/8- remains on vent, PEEP 5 FiO2 35%. On Levophed 0.04mcg/kg/min, precedex drip  4/9- extubated yesterday, on 5L nasal cannula. Very weak, per nurse was agitated and w/ chest tightness w/ breathing- better after Dilaudid. Per report answers yes/no questions and follows commands  4/10- on 5L nasal cannula, precedex drip    PFSH and Allergies  reviewed.    OBJECTIVE:     Vitals (Most recent):  Vitals:    04/10/20 0645   BP:    Pulse: (!) 111   Resp: (!) 27   Temp:        Vitals (24 hour range):  Temp:  [98.2 °F (36.8 °C)-99.2 °F (37.3 °C)]   Pulse:  [102-145]   Resp:  [24-87]   BP: (107-143)/(57-76)   SpO2:  [85 %-98 %]   Arterial Line BP: (123-191)/(53-75)       Intake/Output Summary (Last 24 hours) at 4/10/2020 0838  Last data filed at 4/10/2020 0521  Gross per 24 hour   Intake 1803.38 ml   Output 893 ml   Net 910.38 ml        Vitals (24 hour range):  Temp:  [98.4 °F (36.9 °C)-99.4 °F (37.4 °C)]   Pulse:  []   Resp:  [34-36]   BP: ()/()   SpO2:  [96 %-100 %]   Arterial Line BP: (125-204)/(52-80)       Intake/Output Summary (Last 24 hours) at 4/3/2020 1048  Last data filed at 4/3/2020 1015  Gross per 24 hour   Intake 2354.11 ml   Output 775 ml   Net 1579.11 ml          Physical Exam:  The patient's neuro status (alertness,orientation,cognitive function,motor skills,), pharyngeal exam (oral lesions, hygiene, abn dentition,), Neck (jvd,mass,thyroid,nodes in neck and above/below clavicle),RESPIRATORY(symmetry,effort,fremitus,percussion,auscultation),  Cor(rhythm,heart tones including gallops,perfusion,edema)ABD(distention,hepatic&splenomegaly,tenderness,masses), Skin(rash,cyanosis),Psyc(affect,judgement,).  Exam negative except for these pertinent findings:    Somnolent  R sclera injected & conjunctival erythema w/ crusting  Resists eye exam, otherwise does not interact  Lungs clear  Obese  Abdomen soft  Edema improved    Radiographs reviewed:  CXR 4/6- similar  cxr 4/5 ARDS + pulmonary edema  CXR 4/2- bibasilar fluffy opacities and pulmonary edema, similar appearance  CT head 3/31  1.  There are slight limitations related to moderate patient tilted in the scanner and mild patient motion but there is no obvious acute abnormality.  There is no hemorrhage, mass, mass effect or obvious acute edema or ischemia.  It should be noted that MRI is  more sensitive in the detection of subtle or acute nonhemorrhagic ischemic disease.    2.  Support tubing is seen in the left nares.  Bilateral mastoid and middle ear effusions are likely related to support tubing and/or intubation.  Correlate clinically.  The same is true for changes throughout the paranasal sinuses.    CXR 3/31- improving bibasilar opacities  CXR 3/29- unchanged  CXR 3/27- bilateral mid and lower lobe fluffy airspace disease  CXR 3/26- increased consolidation RLL    TTE 3/27  · Mild left ventricular enlargement.  · Mildly decreased left ventricular systolic function. The estimated ejection fraction is 45%.  · Grade I (mild) left ventricular diastolic dysfunction consistent with impaired relaxation.  · Normal right ventricular systolic function.  · Mild left atrial enlargement.  · Moderate mitral regurgitation.  · Mild tricuspid regurgitation.  · Mild pulmonary hypertension present. PASP 40 mm of Hg.    Labs     Recent Labs   Lab 04/10/20  0329   WBC 13.39*   HGB 8.4*   HCT 26.5*   *     Recent Labs   Lab 04/10/20  0329 04/10/20  0453   *  --    K 3.8  --    CL 97  --    CO2 21*  --    BUN 59*  --    CREATININE 5.1*  --      --    CALCIUM 9.5  --    AST 38  --    ALT 41  --    ALKPHOS 268*  --    BILITOT 1.8*  --    PROT 7.8  --    ALBUMIN 2.1*  --    .6*  --    INR  --  1.0     No results for input(s): PH, PCO2, PO2, HCO3 in the last 24 hours.  Microbiology Results (last 7 days)     Procedure Component Value Units Date/Time    Blood culture [[id number]] Collected:  04/10/20 0453    Order Status:  Sent Specimen:  Blood Updated:  04/10/20 0453    Blood culture [[id number]] Collected:  04/08/20 1739    Order Status:  Completed Specimen:  Blood from Line, Central Updated:  04/09/20 2213     Blood Culture, Routine No Growth to date      No Growth to date    Blood culture [[id number]] Collected:  04/07/20 1730    Order Status:  Completed Specimen:  Blood from Line, Central  Updated:  04/09/20 2212     Blood Culture, Routine No Growth to date      No Growth to date      No Growth to date    Blood culture [[id number]] Collected:  04/07/20 0907    Order Status:  Completed Specimen:  Blood Updated:  04/09/20 2212     Blood Culture, Routine No Growth to date      No Growth to date      No Growth to date    Blood culture [[id number]] Collected:  04/06/20 1802    Order Status:  Completed Specimen:  Blood from Line, Central Updated:  04/09/20 2212     Blood Culture, Routine No Growth to date      No Growth to date      No Growth to date      No Growth to date    Blood culture [[id number]]  (Abnormal) Collected:  04/04/20 0912    Order Status:  Completed Specimen:  Blood from Line, Central Updated:  04/09/20 1418     Blood Culture, Routine Gram stain peds bottle: budding yeast      Results called to and read back by: Carri Pryor RN  04/06/2020  03:04      CANDIDA ALBICANS  Susceptibility pending  ID consult required at [facility].Robin Barahona and [facility].      Blood culture [[id number]] Collected:  04/08/20 0745    Order Status:  Completed Specimen:  Blood Updated:  04/09/20 1412     Blood Culture, Routine No Growth to date      No Growth to date    IV catheter culture [[id number]]  (Abnormal) Collected:  04/06/20 1455    Order Status:  Completed Specimen:  Catheter Tip, Intrajugular Updated:  04/09/20 1300     Aerobic Culture - Cath tip STAPHYLOCOCCUS EPIDERMIDIS  < 15 colonies      IV catheter culture [[id number]] Collected:  04/06/20 1615    Order Status:  Completed Specimen:  Catheter Tip, Dialysis Updated:  04/09/20 1121     Aerobic Culture - Cath tip No growth    Blood culture [[id number]] Collected:  04/06/20 0806    Order Status:  Completed Specimen:  Blood from Antecubital, Left Updated:  04/09/20 0940     Blood Culture, Routine Gram stain peds bottle: yeast       Results called to and read back by: Tania Nolan RN 04/08/2020        11:20    Blood culture [[id number]]     Order  Status:  Canceled Specimen:  Blood     Blood culture [[id number]]  (Abnormal)  (Susceptibility) Collected:  04/05/20 0813    Order Status:  Completed Specimen:  Blood from Peripheral, Left  Hand Updated:  04/08/20 1236     Blood Culture, Routine Gram stain peds bottle: Gram positive cocci in clusters resembling Staph       Results called to and read back by: Lynn Sawyer RN 04/06/2020  09:57      STAPHYLOCOCCUS EPIDERMIDIS    Blood culture [[id number]]  (Abnormal)  (Susceptibility) Collected:  04/05/20 0813    Order Status:  Completed Specimen:  Blood from Line, Central Left  Neck Updated:  04/08/20 1234     Blood Culture, Routine Gram stain tank bottle: Gram positive cocci in clusters resembling Staph       Results called to and read back by: Lynn Sawyer RN 04/06/2020  09:57      Gram stain aer bottle: Gram positive cocci in clusters resembling Staph       Positive results previously called 04/06/2020  15:52      STAPHYLOCOCCUS EPIDERMIDIS    Culture, Respiratory with Gram Stain [[id number]]  (Abnormal) Collected:  04/05/20 0050    Order Status:  Completed Specimen:  Respiratory from Endotracheal Aspirate Updated:  04/08/20 1046     Respiratory Culture No S aureus or Pseudomonas isolated.      CANDIDA ALBICANS  Few  Normal respiratory anamaria also present       Gram Stain (Respiratory) <10 epithelial cells per low power field.     Gram Stain (Respiratory) Many WBC's     Gram Stain (Respiratory) Rare Gram positive cocci     Gram Stain (Respiratory) Rare yeast    Blood culture [[id number]]     Order Status:  Canceled Specimen:  Blood     Urine Culture High Risk [[id number]]     Order Status:  Canceled Specimen:  Urine, Catheterized     Culture, Respiratory with Gram Stain [[id number]]     Order Status:  Canceled Specimen:  Respiratory from Tracheal Aspirate         Results for LUCRECIA WOLF LOW (MRN [id number]) as of 4/1/2020 09:28   Ref. Range 4/1/2020 02:53   ALT Latest Ref Range: 10 - 44 U/L 54 (H)      Impression:  Active Hospital Problems    Diagnosis  POA    *Acute respiratory failure with hypoxia [J96.01]  Yes    Fungemia [B49]  No    Bacteremia [R78.81]  No    Pneumonia, ventilator associated [J95.851]  No    Acute encephalopathy [G93.40]  No    Acute renal failure [N17.9]  No    ARDS (adult respiratory distress syndrome) [J80]  Yes    COVID-19 virus infection [U07.1]  Yes    Morbid obesity [E66.01]  Yes    Hypothyroid [E03.9]  Yes    COVID-19 virus detected [U07.1]  Yes    Diverticulosis of large intestine without hemorrhage [K57.30]  Yes    Iron deficiency anemia, unspecified [D50.9]  Yes      Resolved Hospital Problems   No resolved problems to display.               Plan:   Acute hypoxemic respiratory failure, stable to improving O2 reqt  COVID-19 pneumonia/ARDS  Acute renal failure, on HD  Metabolic acidosis due to renal failure  Shock, resolved   HTN  Combined heart failure, EF 45%  Morbid obesity  Acute encephalopathy  Right conjunctivitis  Bacteremia w/ s. Epidermidis; fungemia- s/p exchange of central lines      - continue supplemental oxygen to keep sats greater than 92%  - speech swallow eval  - needs PT  - titrate off precedex gradually- seems to be having some withdrawal symptoms, continue  - continue Nicardipine drip to control BP  - continue HD per nephro  - continue PO bicarb- control metabolic acidosis to help w/ respiratory status  - optimize nutrition w/ tube feeds via NG   - has had adequate course (7d) of azithromycin, ceftriaxone, hydroxychloroquine  - ID recommendations reviewed  - on Daptomycin, Zosyn, micafungin  - discussed with hospitalist  - mental status and respiratory status tenuous w/ profound weakness, would keep in ICU to monitor and continue titrating IV drips    The following were evaluated and adjusted as needed: sedation and neurologic status, hemodynamics and acid base balance and oxygenation needs       Critical Care  - THE PATIENT HAS A HIGH PROBABILITY  OF IMMINENT OR LIFE THREATENING DETERIORATION.  Over 50%time of encounter was in direct care at bedside.  Time was 30 to 74 minutes required for patient care.  Time needed for all of the above totaled 35 minutes.      Milvia Mcguire MD  Pulmonary & Critical Care Medicine

## 2020-04-10 NOTE — PT/OT/SLP PROGRESS
Speech Language Pathology      Maria Victoria Hernandez  MRN: [id number]    Patient not seen today secondary to Nursing hold (Comment). Will follow-up 4/11/2020.    Cinda Pereira, BEST-SLP

## 2020-04-10 NOTE — PROGRESS NOTES
tx complete, pt tolerated well. Lines re-infused, catheter clamped and Hep locked per protocol. VSS, NAD    GoalUF: 3000

## 2020-04-10 NOTE — ASSESSMENT & PLAN NOTE
Patient with Hypoxic Respiratory failure which is Acute.  she is not on home oxygen.   Treatment for Covid/ARDS -with MV/prone and supportive care.   4/8 extubated to [place] oxygen     Pulmonary consultation.    IV antibiotics - ceftriaxone and azithromycin. And Plaquenil 400 mg daily x 5  Vanc/zosyn initiated 4/5   micafungin for yeast +blood/sputum 4/6 /ID consulted     Microbiology Results (last 7 days)     Procedure Component Value Units Date/Time    Blood culture [[id number]]  (Abnormal) Collected:  04/04/20 0912    Order Status:  Completed Specimen:  Blood from Line, Central Updated:  04/10/20 1430     Blood Culture, Routine Gram stain peds bottle: budding yeast      Results called to and read back by: Carri Pryor RN  04/06/2020  03:04      CANDIDA ALBICANS  Susceptibility pending  ID consult required at [facility].      Blood culture [[id number]] Collected:  04/08/20 0745    Order Status:  Completed Specimen:  Blood Updated:  04/10/20 1412     Blood Culture, Routine No Growth to date      No Growth to date      No Growth to date    Blood culture [[id number]]  (Abnormal) Collected:  04/06/20 0806    Order Status:  Completed Specimen:  Blood from Antecubital, Left Updated:  04/10/20 1315     Blood Culture, Routine Gram stain peds bottle: yeast       Results called to and read back by: Tania Nolan RN 04/08/2020        11:20      YEAST   Identification pending  Susceptibility pending  ID consult required at [facility].Robin obando and [facility].      Blood culture [[id number]] Collected:  04/10/20 0453    Order Status:  Sent Specimen:  Blood Updated:  04/10/20 1249    Blood culture [[id number]] Collected:  04/08/20 1739    Order Status:  Completed Specimen:  Blood from Line, Central Updated:  04/09/20 2213     Blood Culture, Routine No Growth to date      No Growth to date    Blood culture [[id number]] Collected:  04/07/20 1730    Order Status:  Completed Specimen:  Blood from  Line, Central Updated:  04/09/20 2212     Blood Culture, Routine No Growth to date      No Growth to date      No Growth to date    Blood culture [[id number]] Collected:  04/07/20 0907    Order Status:  Completed Specimen:  Blood Updated:  04/09/20 2212     Blood Culture, Routine No Growth to date      No Growth to date      No Growth to date    Blood culture [[id number]] Collected:  04/06/20 1802    Order Status:  Completed Specimen:  Blood from Line, Central Updated:  04/09/20 2212     Blood Culture, Routine No Growth to date      No Growth to date      No Growth to date      No Growth to date    IV catheter culture [[id number]]  (Abnormal) Collected:  04/06/20 1455    Order Status:  Completed Specimen:  Catheter Tip, Intrajugular Updated:  04/09/20 1300     Aerobic Culture - Cath tip STAPHYLOCOCCUS EPIDERMIDIS  < 15 colonies      IV catheter culture [[id number]] Collected:  04/06/20 1615    Order Status:  Completed Specimen:  Catheter Tip, Dialysis Updated:  04/09/20 1121     Aerobic Culture - Cath tip No growth    Blood culture [[id number]]     Order Status:  Canceled Specimen:  Blood     Blood culture [[id number]]  (Abnormal)  (Susceptibility) Collected:  04/05/20 0813    Order Status:  Completed Specimen:  Blood from Peripheral, Left  Hand Updated:  04/08/20 1236     Blood Culture, Routine Gram stain peds bottle: Gram positive cocci in clusters resembling Staph       Results called to and read back by: Lynn Sawyer RN 04/06/2020  09:57      STAPHYLOCOCCUS EPIDERMIDIS    Blood culture [[id number]]  (Abnormal)  (Susceptibility) Collected:  04/05/20 0813    Order Status:  Completed Specimen:  Blood from Line, Central Left  Neck Updated:  04/08/20 1234     Blood Culture, Routine Gram stain tank bottle: Gram positive cocci in clusters resembling Staph       Results called to and read back by: Lynn Sawyer RN 04/06/2020  09:57      Gram stain aer bottle: Gram positive cocci in clusters resembling Staph       Positive  results previously called 04/06/2020  15:52      STAPHYLOCOCCUS EPIDERMIDIS    Culture, Respiratory with Gram Stain [[id number]]  (Abnormal) Collected:  04/05/20 0050    Order Status:  Completed Specimen:  Respiratory from Endotracheal Aspirate Updated:  04/08/20 1046     Respiratory Culture No S aureus or Pseudomonas isolated.      CANDIDA ALBICANS  Few  Normal respiratory anamaria also present       Gram Stain (Respiratory) <10 epithelial cells per low power field.     Gram Stain (Respiratory) Many WBC's     Gram Stain (Respiratory) Rare Gram positive cocci     Gram Stain (Respiratory) Rare yeast    Blood culture [[id number]]     Order Status:  Canceled Specimen:  Blood     Urine Culture High Risk [[id number]]     Order Status:  Canceled Specimen:  Urine, Catheterized     Culture, Respiratory with Gram Stain [[id number]]     Order Status:  Canceled Specimen:  Respiratory from Tracheal Aspirate           Continue routine medications as before.   Follow airborne/droplet precautions.

## 2020-04-10 NOTE — PROGRESS NOTES
Progress Note  Infectious Disease    Reason for Consult:      HPI: Maria Victoria Hernandez is a   54 y.o. female employee of [employer].  with past medical history of morbid obesity, BMI of 48, diabetes, diet controlled, anemia, B12 deficiency, vitamin-D deficiency, hypothyroidism, was admitted on 03/25/2020 due to shortness of breath, diagnosed with COVID 19 is positive.  Patient has been intubated.  She went into renal failure and has been receiving HD by nephrologist.  Intensivist has been managing the vent.    Patient has completed 10 days of hydroxychloroquine and about 8 days of Zithromax and ceftriaxone.  She started spiking fever of  103 on 04/04.  White count jumped to 17.8.  She was started on vancomycin and Zosyn and blood cultures were sent.    ID consult called for g positives and yeast in blood cultures.  Patient is afebrile at the time.  WBC has improved.  Discussed with nurse.  Will discontinue lines.  Continue vancomycin, Daptomycin x1.  Add micafungin daily.    04/07/2020  WBC improved 17--12--10  Ferritin 1752--1538--1897  Intubated Sedated.  FiO2 of 35 people 5.  T-max 101.7°  Dialysis catheter changed yesterday  Leukocytosis improved 12/17/2010 04/08/2020  T-max 99.9°  Intubated sedated.  FiO2 35, peep of 5.  Fail CPAP trial.  Tolerating vancomycin, Zosyn, Micafungin.   Lines are fresh.  Nurse is trying to protect them.  Discussed with nurse:  Her daughter who is a nurse came and saw mother today, she agrees right eye looks better.    04/09/2020  Patient is extubated today, Really weak, Does not breath in deep, I advised her on deep breathing  Continues to need Cardene drip for BP  HAd loose stool-- flexiseal was placed    04/10/2020.  She is very weak.  She tries to opens her eyes and interact with me.  Right eye is improved.  Dialysis nurse is about to start dialysis.    Antibiotics (From admission, onward)    Start     Stop Route Frequency Ordered    04/10/20 1700   vancomycin 500 mg in dextrose 5 % 100 mL IVPB (ready to mix system)      -- IV Once 04/10/20 1209    04/07/20 1045  mupirocin 2 % ointment      04/12 0859 Nasl 2 times daily 04/07/20 0942    04/05/20 0848  vancomycin - pharmacy to dose  (vancomycin IVPB)      -- IV pharmacy to manage frequency 04/05/20 0748    04/02/20 2315  gentamicin 0.3 % ophthalmic solution 1 drop      -- RIGHT EYE Every 4 hours 04/02/20 2310        Antifungals (From admission, onward)    Start     Stop Route Frequency Ordered    04/06/20 1100  micafungin 100 mg in sodium chloride 0.9 % 100 mL IVPB (ready to mix system)      -- IV Every 24 hours (non-standard times) 04/06/20 0959        Antivirals (From admission, onward)    None          EXAM & DIAGNOSTICS REVIEWED:   Vitals:     Temp:  [98.2 °F (36.8 °C)-99.2 °F (37.3 °C)]   Temp: 98.7 °F (37.1 °C) (04/10/20 1545)  Pulse: (!) 116 (04/10/20 1615)  Resp: (!) 33 (04/10/20 1615)  BP: (!) 114/58 (04/10/20 1615)  SpO2: 95 % (04/10/20 1615)    Intake/Output Summary (Last 24 hours) at 4/10/2020 1701  Last data filed at 4/10/2020 1615  Gross per 24 hour   Intake 738.58 ml   Output 4403 ml   Net -3664.42 ml          General:  In NAD   Eyes:  Anicteric,  ENT:  No ulcers, exudates, thrush, nares patent, extubated today  Neck:  supple, no masses or adenopathy appreciated  Lungs: Clear, no consolidation, rales, wheezes, rub  Heart:  RRR, no gallop/murmur/rub noted  Abd:  Soft, NT, ND, normal BS, no masses or organomegaly appreciated.  :  Cuellar  Musc:  Joints without effusion, swelling, erythema, synovitis, muscle wasting.   Skin:  No rashes. No palmar or plantar lesions. No subungual petechiae  Wound:   Neuro: Tired, tries to follow commands  Psych:  Calm  Lymphatic:     No cervical, supraclavicular, axillary, or inguinal nodes  Extrem: No edema, erythema, phlebitis, cellulitis, warm and well perfused  VAD:       Isolation:      Lines/Tubes/Drains:  Right IJ 04/06  Left IJ 04/06  Cuellar 03/25  OT  03/25    General Labs reviewed:  Recent Labs   Lab 04/08/20  0311 04/09/20  0353 04/10/20  0329   WBC 11.23 13.70* 13.39*   HGB 7.5* 8.7* 8.4*   HCT 23.3* 27.3* 26.5*    402* 406*       Recent Labs   Lab 04/08/20  0311 04/09/20  0353 04/10/20  0329   * 132* 134*   K 3.5 3.3* 3.8   CL 95 94* 97   CO2 18* 22* 21*   BUN 70* 48* 59*   CREATININE 5.4* 4.4* 5.1*   CALCIUM 8.8 9.4 9.5   PROT 7.3 8.3 7.8   BILITOT 2.8* 2.8* 1.8*   ALKPHOS 208* 292* 268*   ALT 28 38 41   AST 32 42* 38     Micro:  Microbiology Results (last 7 days)     Procedure Component Value Units Date/Time    Blood culture [[id number]]  (Abnormal) Collected:  04/06/20 0806    Order Status:  Completed Specimen:  Blood from Antecubital, Left Updated:  04/10/20 1450     Blood Culture, Routine Gram stain peds bottle: yeast       Results called to and read back by: Tania Nolan RN 04/08/2020        11:20      CANDIDA ALBICANS  Susceptibility pending  ID consult required at [facility] [place] and [place] locations.      Blood culture [[id number]]  (Abnormal) Collected:  04/04/20 0912    Order Status:  Completed Specimen:  Blood from Line, Central Updated:  04/10/20 1430     Blood Culture, Routine Gram stain peds bottle: budding yeast      Results called to and read back by: Carri Pryor RN  04/06/2020  03:04      CANDIDA ALBICANS  Susceptibility pending  [facility]      Blood culture [[id number]] Collected:  04/08/20 0745    Order Status:  Completed Specimen:  Blood Updated:  04/10/20 1412     Blood Culture, Routine No Growth to date      No Growth to date      No Growth to date    Blood culture [[id number]] Collected:  04/10/20 0453    Order Status:  Sent Specimen:  Blood Updated:  04/10/20 1249    Blood culture [[id number]] Collected:  04/08/20 1739    Order Status:  Completed Specimen:  Blood from Line, Central Updated:  04/09/20 4081     Blood Culture, Routine No Growth to date      No  Growth to date    Blood culture [[id number]] Collected:  04/07/20 1730    Order Status:  Completed Specimen:  Blood from Line, Central Updated:  04/09/20 2212     Blood Culture, Routine No Growth to date      No Growth to date      No Growth to date    Blood culture [[id number]] Collected:  04/07/20 0907    Order Status:  Completed Specimen:  Blood Updated:  04/09/20 2212     Blood Culture, Routine No Growth to date      No Growth to date      No Growth to date    Blood culture [[id number]] Collected:  04/06/20 1802    Order Status:  Completed Specimen:  Blood from Line, Central Updated:  04/09/20 2212     Blood Culture, Routine No Growth to date      No Growth to date      No Growth to date      No Growth to date    IV catheter culture [[id number]]  (Abnormal) Collected:  04/06/20 1455    Order Status:  Completed Specimen:  Catheter Tip, Intrajugular Updated:  04/09/20 1300     Aerobic Culture - Cath tip STAPHYLOCOCCUS EPIDERMIDIS  < 15 colonies      IV catheter culture [[id number]] Collected:  04/06/20 1615    Order Status:  Completed Specimen:  Catheter Tip, Dialysis Updated:  04/09/20 1121     Aerobic Culture - Cath tip No growth    Blood culture [[id number]]     Order Status:  Canceled Specimen:  Blood     Blood culture [[id number]]  (Abnormal)  (Susceptibility) Collected:  04/05/20 0813    Order Status:  Completed Specimen:  Blood from Peripheral, Left  Hand Updated:  04/08/20 1236     Blood Culture, Routine Gram stain peds bottle: Gram positive cocci in clusters resembling Staph       Results called to and read back by: Lynn Sawyer RN 04/06/2020  09:57      STAPHYLOCOCCUS EPIDERMIDIS    Blood culture [[id number]]  (Abnormal)  (Susceptibility) Collected:  04/05/20 0813    Order Status:  Completed Specimen:  Blood from Line, Central Left  Neck Updated:  04/08/20 1234     Blood Culture, Routine Gram stain tank bottle: Gram positive cocci in clusters resembling Staph       Results called to and read back by: Lynn  Silverio RN 04/06/2020  09:57      Gram stain aer bottle: Gram positive cocci in clusters resembling Staph       Positive results previously called 04/06/2020  15:52      STAPHYLOCOCCUS EPIDERMIDIS    Culture, Respiratory with Gram Stain [[id number]]  (Abnormal) Collected:  04/05/20 0050    Order Status:  Completed Specimen:  Respiratory from Endotracheal Aspirate Updated:  04/08/20 1046     Respiratory Culture No S aureus or Pseudomonas isolated.      CANDIDA ALBICANS  Few  Normal respiratory anamaria also present       Gram Stain (Respiratory) <10 epithelial cells per low power field.     Gram Stain (Respiratory) Many WBC's     Gram Stain (Respiratory) Rare Gram positive cocci     Gram Stain (Respiratory) Rare yeast    Blood culture [[id number]]     Order Status:  Canceled Specimen:  Blood     Urine Culture High Risk [[id number]]     Order Status:  Canceled Specimen:  Urine, Catheterized     Culture, Respiratory with Gram Stain [[id number]]     Order Status:  Canceled Specimen:  Respiratory from Tracheal Aspirate         Imaging Reviewed:  CXR 04/06/2020Support devices as above.  No pneumothorax.  Moderate pulmonary airspace disease without significant change.  CXR 04/05/2020 No significant interval change in the bilateral airspace disease.  Support devices in stable position.    Cardiology:  Sinus rhythm  ECHO  · Mild left ventricular enlargement.  · Mildly decreased left ventricular systolic function. The estimated ejection fraction is 45%.  · Grade I (mild) left ventricular diastolic dysfunction consistent with impaired relaxation.  · Normal right ventricular systolic function.  · Mild left atrial enlargement.  · Moderate mitral regurgitation.  · Mild tricuspid regurgitation.  · Mild pulmonary hypertension present. PASP 40 mm of Hg.     IMPRESSION & PLAN     Staphylococcus epidermidis bacteremia, line infection.  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   Continue micafungin because of fungemia on 04/04.Eventually may need retinal exam   Pulm toilet

## 2020-04-10 NOTE — PROGRESS NOTES
[facility] Medicine  Progress Note    Patient Name: Maria Victoria Hernandez  MRN: [id number]  Patient Class: IP- Inpatient   Admission Date: 3/25/2020  Length of Stay: 16 days  Attending Physician: Sharonda Burris MD  Primary Care Provider: Candice Deluna MD        Subjective:     Principal Problem:Acute respiratory failure with hypoxia        HPI:  Patient is a 54 years old  female with morbid obesity in past medical history significant for hypothyroidism is being admitted to intensive care unit under inpatient status from [facility] Emergency room with worsening shortness of breath.  Three days ago patient was tested positive for COVID - 19.  Patient works at [employer].  Patient has been experiencing subjective fever, generalized body aches and pains and nonproductive cough for few days.  Patient is getting increasingly short of breath especially for the past 2 days.  Upon arrival to the emergency room patient was noted to be hypoxic around 89%.  Up on 3 L patient's oxygen sats are around 96%.  Patient denies any chest pain, leg swelling or calf tenderness.      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 And treated per protocol for Covid and ARDS with ceftriaxone/zithromax and HC x 5 days. And ARDS protocol on vent. Pulmonary consulted and followed.   cxr on 4/5 severe ards pattern.-Vancomycin/zosyn added. 4/6 blood cultures pos for yeast so ID consulted and micafungin added. HD  Catheter and  TLC line removed and cultured and cultures negative. Sputum + yeast also  .  Blood cultures q 12 hours were negative for yeast.     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 Acute encephalopathy- delirium vs encephalitis or other structural cause. MRI could not be performed as she was  very unstable.   At the time of admission Cr 0.7 worsened to 5.1, Renal was consulted. and HD started on 3/29. In addition had, Metabolic acidosis due to renal failure.   Feedings given via ngtube with diabetisource and accucks/ISS given with close monitoring.     Interval History:   Patient seen and examined via telemed with nursing staff   Via Vidyo-patient in hospital  Provider at home   Nurse -at bedside-Efraín heath )  Remains on Precedex/prn pain medications  Oxygenation stable -very weak -on 5 L but tachynpeic and Tachycardia   ngt need resinserted overnight -small amt blood nares and urine -clearing now per nursing     Review of Systems   Constitutional: Negative for chills and fever.   Respiratory: Positive for shortness of breath. Negative for cough.    Gastrointestinal: Positive for diarrhea. Negative for abdominal pain.   Psychiatric/Behavioral: Positive for agitation. The patient is nervous/anxious.      Objective:     Vital Signs (Most Recent):  Temp: 98.6 °F (37 °C) (04/10/20 1245)  Pulse: 105 (04/10/20 1430)  Resp: (!) 27 (04/10/20 1430)  BP: (!) 111/56 (04/10/20 1430)  SpO2: 96 % (04/10/20 1430) Vital Signs (24h Range):  Temp:  [98.2 °F (36.8 °C)-99.2 °F (37.3 °C)] 98.6 °F (37 °C)  Pulse:  [103-145] 105  Resp:  [24-87] 27  SpO2:  [85 %-98 %] 96 %  BP: (111-143)/(56-76) 111/56  Arterial Line BP: (131-191)/(53-75) 135/54     Weight: 113.4 kg (250 lb)  Body mass index is 47.24 kg/m².    Intake/Output Summary (Last 24 hours) at 4/10/2020 1439  Last data filed at 4/10/2020 1340  Gross per 24 hour   Intake 669.98 ml   Output 1028 ml   Net -358.02 ml      Physical Exam  PATIENT WAS SEEN AS A VIDEO VISIT WITH NURSE ASSIST DURING THE VISIT. PHYSICAL EXAM FINDINGS ARE AS VIEWED BY MYSELF VIA VIDEO OR AS REPORTED BY NURSE IF SPECIFIED AS SUCH, EXAM NOT DONE PERSONALLY BY MYSELF AT BEDSIDE.  Significant Labs:   BMP:   Recent Labs   Lab 04/09/20  0353 04/10/20  0329   * 105   * 134*   K 3.3* 3.8    CL 94* 97   CO2 22* 21*   BUN 48* 59*   CREATININE 4.4* 5.1*   CALCIUM 9.4 9.5   MG 2.0  --      CBC:   Recent Labs   Lab 04/09/20  0353 04/10/20  0329   WBC 13.70* 13.39*   HGB 8.7* 8.4*   HCT 27.3* 26.5*   * 406*       Significant Imaging: I have reviewed and interpreted all pertinent imaging results/findings within the past 24 hours.      Assessment/Plan:      * Acute respiratory failure with hypoxia  Patient with Hypoxic Respiratory failure which is Acute.  she is not on home oxygen.   Treatment for Covid/ARDS -with MV/prone and supportive care.   4/8 extubated to N[place] oxygen     Pulmonary consultation.    IV antibiotics - ceftriaxone and azithromycin. And Plaquenil 400 mg daily x 5  Vanc/zosyn initiated 4/5   micafungin for yeast +blood/sputum 4/6 /ID consulted     Microbiology Results (last 7 days)     Procedure Component Value Units Date/Time    Blood culture [[id number]]  (Abnormal) Collected:  04/04/20 0912    Order Status:  Completed Specimen:  Blood from Line, Central Updated:  04/10/20 1430     Blood Culture, Routine Gram stain peds bottle: budding yeast      Results called to and read back by: Carri Pryor RN  04/06/2020  03:04      CANDIDA ALBICANS  Susceptibility pending  ID consult required at [facility] and [facility].      Blood culture [[id number]] Collected:  04/08/20 0745    Order Status:  Completed Specimen:  Blood Updated:  04/10/20 1412     Blood Culture, Routine No Growth to date      No Growth to date      No Growth to date    Blood culture [[id number]]  (Abnormal) Collected:  04/06/20 0806    Order Status:  Completed Specimen:  Blood from Antecubital, Left Updated:  04/10/20 1315     Blood Culture, Routine Gram stain peds bottle: yeast       Results called to and read back by: Tania Nolan RN 04/08/2020        11:20      YEAST   Identification pending  Susceptibility pending  [facility]      Blood culture  [[id number]] Collected:  04/10/20 0453    Order Status:  Sent Specimen:  Blood Updated:  04/10/20 1249    Blood culture [[id number]] Collected:  04/08/20 1739    Order Status:  Completed Specimen:  Blood from Line, Central Updated:  04/09/20 2213     Blood Culture, Routine No Growth to date      No Growth to date    Blood culture [[id number]] Collected:  04/07/20 1730    Order Status:  Completed Specimen:  Blood from Line, Central Updated:  04/09/20 2212     Blood Culture, Routine No Growth to date      No Growth to date      No Growth to date    Blood culture [[id number]] Collected:  04/07/20 0907    Order Status:  Completed Specimen:  Blood Updated:  04/09/20 2212     Blood Culture, Routine No Growth to date      No Growth to date      No Growth to date    Blood culture [[id number]] Collected:  04/06/20 1802    Order Status:  Completed Specimen:  Blood from Line, Central Updated:  04/09/20 2212     Blood Culture, Routine No Growth to date      No Growth to date      No Growth to date      No Growth to date    IV catheter culture [[id number]]  (Abnormal) Collected:  04/06/20 1455    Order Status:  Completed Specimen:  Catheter Tip, Intrajugular Updated:  04/09/20 1300     Aerobic Culture - Cath tip STAPHYLOCOCCUS EPIDERMIDIS  < 15 colonies      IV catheter culture [[id number]] Collected:  04/06/20 1615    Order Status:  Completed Specimen:  Catheter Tip, Dialysis Updated:  04/09/20 1121     Aerobic Culture - Cath tip No growth    Blood culture [[id number]]     Order Status:  Canceled Specimen:  Blood     Blood culture [[id number]]  (Abnormal)  (Susceptibility) Collected:  04/05/20 0813    Order Status:  Completed Specimen:  Blood from Peripheral, Left  Hand Updated:  04/08/20 1236     Blood Culture, Routine Gram stain peds bottle: Gram positive cocci in clusters resembling Staph       Results called to and read back by: Lynn Sawyer RN 04/06/2020  09:57      STAPHYLOCOCCUS EPIDERMIDIS    Blood culture [[id number]]   (Abnormal)  (Susceptibility) Collected:  04/05/20 0813    Order Status:  Completed Specimen:  Blood from Line, Central Left  Neck Updated:  04/08/20 1234     Blood Culture, Routine Gram stain tank bottle: Gram positive cocci in clusters resembling Staph       Results called to and read back by: Lynn Sawyer RN 04/06/2020  09:57      Gram stain aer bottle: Gram positive cocci in clusters resembling Staph       Positive results previously called 04/06/2020  15:52      STAPHYLOCOCCUS EPIDERMIDIS    Culture, Respiratory with Gram Stain [[id number]]  (Abnormal) Collected:  04/05/20 0050    Order Status:  Completed Specimen:  Respiratory from Endotracheal Aspirate Updated:  04/08/20 1046     Respiratory Culture No S aureus or Pseudomonas isolated.      CANDIDA ALBICANS  Few  Normal respiratory anamaria also present       Gram Stain (Respiratory) <10 epithelial cells per low power field.     Gram Stain (Respiratory) Many WBC's     Gram Stain (Respiratory) Rare Gram positive cocci     Gram Stain (Respiratory) Rare yeast    Blood culture [[id number]]     Order Status:  Canceled Specimen:  Blood     Urine Culture High Risk [[id number]]     Order Status:  Canceled Specimen:  Urine, Catheterized     Culture, Respiratory with Gram Stain [[id number]]     Order Status:  Canceled Specimen:  Respiratory from Tracheal Aspirate           Continue routine medications as before.   Follow airborne/droplet precautions.            Bacteremia  Blood cultures from 4/5 -gm +cocci -follow up final   Vancomyin 4/4 -  Consulted ID  4/4 blood culture yeast   HD/TLC lines removed   4/5 blood culture staph epidermidis   Blood culture neg 4/6 4/7 blood culture -neg  4/8 blood culture pend    4/6 -HD catheter tip staph epidermidis   4/6 IV catheter tipstaph epidermidis  4/5 resp culture candida albicans   Urine culture cancelled   Recommendations: per ID   Changed  lines-on 04/06  Continue vancomycin  DC Zosyn  Continue micafungin because of fungemia on  04/04.      Fungemia  Acute-ID consulted-cultures from 4/4   micafungin per ID  HD and TLC removed and cultured  Repeat blood cultures -q 12 hours per pulm         Pneumonia, ventilator associated  Acute -post viral -possible MRSA -  Added Vanc zosyn  Cultures -pend. Few yeast   micafungin added for + yeast in blood culture     Acute encephalopathy  AMS:: Hypoxia vs. Infection vs. TIA/stroke vs. Metabolic/Toxic.  -prolonged paralytic?   Continue supportive care   Likely multifactorial -unable to get  /MRI until stable   Ct head neg    Lab Results   Component Value Date    WBC 13.70 (H) 04/09/2020     MRI not done    focal findings on physical exam  No early signs of stroke on Head CT, no hemorrhage or acute findings.  EEG: EEG 30 min awake and drowsy results noted no seizures  Continue supportive care       Iron deficiency anemia, unspecified  Patient's anemia is currently controlled. Trending down  Will send for stool guaic    Has recieved 1 units of PRBCs on 4/3.   Will transfuse  Unit as pt is hypotensive and ESRD   Etiology likely d/t Anemia of chronic disease  Current CBC reviewed-   Lab Results   Component Value Date    HGB 9.9 (L) 04/05/2020    HCT 31.6 (L) 04/05/2020     Monitor serial CBC and transfuse if patient becomes hemodynamically unstable, symptomatic or H/H drops below 8/24        Diverticulosis of large intestine without hemorrhage  Patient's anemia is currently controlled. Has recieved 1 units of PRBCs on 4/3.   Will need to Monitor for GI bleed  Lab Results   Component Value Date    HGB 8.3 (L) 04/06/2020    HCT 26.1 (L) 04/06/2020     Monitor serial CBC and transfuse if patient becomes hemodynamically unstable, symptomatic or H/H drops below 7/21.   Will continue to monitor        Acute renal failure  Acute, required HD for acute renal failure  ? Element ATN 2/2 hypovolemia/sepsis requiring pressor  Nephrology consulted/follwed.   Bicarb po and on HD for Metabolic acidosis             COVID-19  virus infection  Completed Plaquenil    Covid-19 Virus Infection  - Infection Control notified    - Isolation:   - Airborne and Droplet Precautions  - N95 masks must be fit tested, wear eye protection  - 20 second hand hygiene   - Limit visitors per hospital policy   - Consolidating lab draws, nursing care, and interventions    - Diagnostics: (rising CRP, persistent lymphopenia, hyponatremia, hyperferritinemia, elevated troponin, elevated d-dimer, age, and comorbidities are significant predictors of poor clinical outcome)   - CBC:   trend Q48hrs  - CMP:        trend Q48hrs  - Procalcitonin:  - D-dimer:  trend Q48hrs  - Ferritin:  repeat prior to discharge  - CRP:        trend Q48hrs  - LDH:  - BNP:  - Troponin:    - ECG:   - rapid Flu:   - RIP only if BMT/solid transplant:   - Legionella antigen:   - Blood culture x2:   - Sputum culture:   - CXR:   - UA and culture:      - Management:   - Bundle care as able to minimize in/out of room   - Supplemental O2 to maintain SpO2 >92%,   if requiring 6L NC or higher, place on nonrebreather and discuss case with MICU   - Telemetry & continuous Pulse Ox   - albuterol INHALER PRN 4puff Q6hr approximates a nebulizer (avoid nebulization of secretions)   - apap PRN fever   - Avoiding NIPPV to prevent aerosolization   (including home CPAP/BiPAP unless on a case-by-case basis and only in negative pressure room)   - Cautious use of NSAIDS for fever per WHO recommendations (3/16/2020)   - No new ACEi/ARB start or discontinuation of chronic med unless hypotensive (Esler et al. Journal of Hypertension 2020, 38:000-000)   - Careful use of steroids in the absence of other indications   - unless septic shock due to increased viral replication   - Fluid sparing resuscitation   - Empiric antibiotics per likely source & patient allergies    - CAP: x 5 day course  Ceftriaxone 1g IV Q24hrs            Azithromycin 500mg IV day #1, then 250mg PO daily x4 days                 If MRSA risk factors,  add Vancomycin IV (PharmD consult)   - If patient meets criteria per Hospital Protocol    - start statin (if CPK WNL)    - start HCQ 400mg PO BID x1 day, then 400mg PO daily x 4 days (check G6PD, ECG, and start Qshift POCT glucose)    Goals of care, counseling/discussion  - Reviewed the typical clinical course of COVID19 with the daughter Danya (patient name or relationship to patient), including the potential for acute decompensation requiring intubation and mechanical ventilation  - Discussed again as part of routine daily evaluation, patient/POA maintains code status of Full code    VTE High Risk Prophylaxis: enoxaparin 40mg sq QHS @ 2100 (bundled care) if GFR >30    Patient's chronic/stable medical conditions noted in the problem list above will be managed with the patient's home medications as tolerated.           ARDS (adult respiratory distress syndrome)  4/8 extubated  4/10 -tachypneic /chest pain --treacherous   Tolerating Wean of oxygen and peep -see resp failure  --will cont ARDSnet Protocol.  --Target 6-8ml/kg Ideal Body Weight. Decrease TV as tolerated.  --Plateau pressure goal <30cm H2O.  --Oxygenation goal PaO2 55-80 or SpO2 88-95%.  --pH goal 7.30-7.45.  --Wean to PSV as tolerated.        COVID-19 virus detected   Plaquenil course completed  Continue routine medications as before.   Follow airborne/droplet precautions.      Hypothyroid  Chronic problem.  Lab Results   Component Value Date    TSH 2.082 03/31/2020     Not on any medication            Morbid obesity  Body mass index is 47.24 kg/m². Morbid obesity complicates all aspects of disease management from diagnostic modalities to treatment. Weight loss encouraged and health benefits explained to patient.            VTE Risk Mitigation (From admission, onward)         Ordered     heparin (porcine) injection 5,000 Units  Every 12 hours      04/07/20 1349     heparin (porcine) 1,000 unit/mL injection      04/06/20 1138     heparin (porcine)  injection 4,000 Units  As needed (PRN)      03/29/20 2001     IP VTE HIGH RISK PATIENT  Once      03/25/20 3388                Critical care time spent on the evaluation and treatment of severe organ dysfunction, review of pertinent labs and imaging studies, discussions with consulting providers and discussions with patient/family: 55minutes.  TC to daughter -LM  Discussed with nursing /pulmonary    Sharonda Burris MD  Department of Hospital Medicine   [facility]

## 2020-04-10 NOTE — NURSING
Placed call to Sylvia Feldman NP. Notified of pink tinged urine about 2200 and red in color. Some scant bleeding to nose(when re-inserting NGT) and orally( when oral care done) now red tinged urine noted. New orders received. PTT/INR

## 2020-04-10 NOTE — ASSESSMENT & PLAN NOTE
4/8 extubated  4/10 -tachypneic /chest pain --treacherous   Tolerating Wean of oxygen and peep -see resp failure  --will cont ARDSnet Protocol.  --Target 6-8ml/kg Ideal Body Weight. Decrease TV as tolerated.  --Plateau pressure goal <30cm H2O.  --Oxygenation goal PaO2 55-80 or SpO2 88-95%.  --pH goal 7.30-7.45.  --Wean to PSV as tolerated.

## 2020-04-11 LAB
ALBUMIN SERPL BCP-MCNC: 2.2 G/DL (ref 3.5–5.2)
ALP SERPL-CCNC: 255 U/L (ref 55–135)
ALT SERPL W/O P-5'-P-CCNC: 36 U/L (ref 10–44)
ANION GAP SERPL CALC-SCNC: 13 MMOL/L (ref 8–16)
ANISOCYTOSIS BLD QL SMEAR: SLIGHT
AST SERPL-CCNC: 26 U/L (ref 10–40)
BACTERIA BLD CULT: NORMAL
BACTERIA BLD CULT: NORMAL
BASOPHILS # BLD AUTO: 0.14 K/UL (ref 0–0.2)
BASOPHILS NFR BLD: 1.1 % (ref 0–1.9)
BILIRUB SERPL-MCNC: 1.4 MG/DL (ref 0.1–1)
BUN SERPL-MCNC: 43 MG/DL (ref 6–20)
CALCIUM SERPL-MCNC: 9.7 MG/DL (ref 8.7–10.5)
CHLORIDE SERPL-SCNC: 103 MMOL/L (ref 95–110)
CO2 SERPL-SCNC: 23 MMOL/L (ref 23–29)
CREAT SERPL-MCNC: 4.3 MG/DL (ref 0.5–1.4)
CRP SERPL-MCNC: 186.5 MG/L (ref 0–8.2)
DACRYOCYTES BLD QL SMEAR: ABNORMAL
DIFFERENTIAL METHOD: ABNORMAL
EOSINOPHIL # BLD AUTO: 0.1 K/UL (ref 0–0.5)
EOSINOPHIL NFR BLD: 0.7 % (ref 0–8)
ERYTHROCYTE [DISTWIDTH] IN BLOOD BY AUTOMATED COUNT: 18.1 % (ref 11.5–14.5)
ERYTHROCYTE [SEDIMENTATION RATE] IN BLOOD BY WESTERGREN METHOD: 150 MM/HR (ref 0–20)
EST. GFR  (AFRICAN AMERICAN): 13 ML/MIN/1.73 M^2
EST. GFR  (NON AFRICAN AMERICAN): 11 ML/MIN/1.73 M^2
FERRITIN SERPL-MCNC: 1867 NG/ML (ref 20–300)
GLUCOSE SERPL-MCNC: 147 MG/DL (ref 70–110)
HCT VFR BLD AUTO: 26.3 % (ref 37–48.5)
HGB BLD-MCNC: 8.2 G/DL (ref 12–16)
HYPOCHROMIA BLD QL SMEAR: ABNORMAL
IMM GRANULOCYTES # BLD AUTO: 0.75 K/UL (ref 0–0.04)
IMM GRANULOCYTES NFR BLD AUTO: 6 % (ref 0–0.5)
LYMPHOCYTES # BLD AUTO: 1.5 K/UL (ref 1–4.8)
LYMPHOCYTES NFR BLD: 12.1 % (ref 18–48)
MCH RBC QN AUTO: 26.3 PG (ref 27–31)
MCHC RBC AUTO-ENTMCNC: 31.2 G/DL (ref 32–36)
MCV RBC AUTO: 84 FL (ref 82–98)
MONOCYTES # BLD AUTO: 1.2 K/UL (ref 0.3–1)
MONOCYTES NFR BLD: 9.5 % (ref 4–15)
NEUTROPHILS # BLD AUTO: 8.8 K/UL (ref 1.8–7.7)
NEUTROPHILS NFR BLD: 70.6 % (ref 38–73)
NRBC BLD-RTO: 0 /100 WBC
PLATELET # BLD AUTO: 440 K/UL (ref 150–350)
PLATELET BLD QL SMEAR: ABNORMAL
PMV BLD AUTO: 10.5 FL (ref 9.2–12.9)
POIKILOCYTOSIS BLD QL SMEAR: SLIGHT
POTASSIUM SERPL-SCNC: 4 MMOL/L (ref 3.5–5.1)
PROCALCITONIN SERPL IA-MCNC: 5.34 NG/ML
PROT SERPL-MCNC: 8 G/DL (ref 6–8.4)
RBC # BLD AUTO: 3.12 M/UL (ref 4–5.4)
SODIUM SERPL-SCNC: 139 MMOL/L (ref 136–145)
VANCOMYCIN SERPL-MCNC: 18.5 UG/ML
WBC # BLD AUTO: 12.43 K/UL (ref 3.9–12.7)

## 2020-04-11 PROCEDURE — 25000003 PHARM REV CODE 250: Performed by: INTERNAL MEDICINE

## 2020-04-11 PROCEDURE — 80202 ASSAY OF VANCOMYCIN: CPT

## 2020-04-11 PROCEDURE — 85025 COMPLETE CBC W/AUTO DIFF WBC: CPT

## 2020-04-11 PROCEDURE — 93005 ELECTROCARDIOGRAM TRACING: CPT

## 2020-04-11 PROCEDURE — 63600175 PHARM REV CODE 636 W HCPCS: Performed by: INTERNAL MEDICINE

## 2020-04-11 PROCEDURE — 86140 C-REACTIVE PROTEIN: CPT

## 2020-04-11 PROCEDURE — 84145 PROCALCITONIN (PCT): CPT

## 2020-04-11 PROCEDURE — 82728 ASSAY OF FERRITIN: CPT

## 2020-04-11 PROCEDURE — 99291 PR CRITICAL CARE, E/M 30-74 MINUTES: ICD-10-PCS | Mod: S$GLB,,, | Performed by: INTERNAL MEDICINE

## 2020-04-11 PROCEDURE — 80053 COMPREHEN METABOLIC PANEL: CPT

## 2020-04-11 PROCEDURE — 63600175 PHARM REV CODE 636 W HCPCS: Performed by: HOSPITALIST

## 2020-04-11 PROCEDURE — 25000003 PHARM REV CODE 250: Performed by: HOSPITALIST

## 2020-04-11 PROCEDURE — 99291 CRITICAL CARE FIRST HOUR: CPT | Mod: ,,, | Performed by: INTERNAL MEDICINE

## 2020-04-11 PROCEDURE — 36415 COLL VENOUS BLD VENIPUNCTURE: CPT

## 2020-04-11 PROCEDURE — 99291 CRITICAL CARE FIRST HOUR: CPT | Mod: S$GLB,,, | Performed by: INTERNAL MEDICINE

## 2020-04-11 PROCEDURE — 20000000 HC ICU ROOM

## 2020-04-11 PROCEDURE — 27000221 HC OXYGEN, UP TO 24 HOURS

## 2020-04-11 PROCEDURE — 11000001 HC ACUTE MED/SURG PRIVATE ROOM

## 2020-04-11 PROCEDURE — 92610 EVALUATE SWALLOWING FUNCTION: CPT

## 2020-04-11 PROCEDURE — 85651 RBC SED RATE NONAUTOMATED: CPT

## 2020-04-11 PROCEDURE — 99291 PR CRITICAL CARE, E/M 30-74 MINUTES: ICD-10-PCS | Mod: ,,, | Performed by: INTERNAL MEDICINE

## 2020-04-11 RX ORDER — METOPROLOL TARTRATE 25 MG/1
25 TABLET, FILM COATED ORAL 2 TIMES DAILY
Status: DISCONTINUED | OUTPATIENT
Start: 2020-04-11 | End: 2020-04-17

## 2020-04-11 RX ORDER — LOPERAMIDE HYDROCHLORIDE 2 MG/1
2 CAPSULE ORAL 3 TIMES DAILY
Status: DISPENSED | OUTPATIENT
Start: 2020-04-12 | End: 2020-04-13

## 2020-04-11 RX ORDER — FUROSEMIDE 10 MG/ML
60 INJECTION INTRAMUSCULAR; INTRAVENOUS 2 TIMES DAILY
Status: DISCONTINUED | OUTPATIENT
Start: 2020-04-11 | End: 2020-04-15

## 2020-04-11 RX ORDER — CHOLESTYRAMINE 4 G/4.8G
1 POWDER, FOR SUSPENSION ORAL 2 TIMES DAILY
Status: DISCONTINUED | OUTPATIENT
Start: 2020-04-11 | End: 2020-04-17

## 2020-04-11 RX ADMIN — OXYCODONE HYDROCHLORIDE AND ACETAMINOPHEN 1000 MG: 500 TABLET ORAL at 08:04

## 2020-04-11 RX ADMIN — MORPHINE SULFATE 2 MG: 2 INJECTION, SOLUTION INTRAMUSCULAR; INTRAVENOUS at 08:04

## 2020-04-11 RX ADMIN — MUPIROCIN: 20 OINTMENT TOPICAL at 08:04

## 2020-04-11 RX ADMIN — MICAFUNGIN SODIUM 100 MG: 20 INJECTION, POWDER, LYOPHILIZED, FOR SOLUTION INTRAVENOUS at 11:04

## 2020-04-11 RX ADMIN — GENTAMICIN SULFATE 1 DROP: 3 SOLUTION OPHTHALMIC at 02:04

## 2020-04-11 RX ADMIN — ACETAMINOPHEN 1000 MG: 10 INJECTION, SOLUTION INTRAVENOUS at 05:04

## 2020-04-11 RX ADMIN — SODIUM BICARBONATE 650 MG TABLET 1300 MG: at 03:04

## 2020-04-11 RX ADMIN — LABETALOL HYDROCHLORIDE 1 MG/MIN: 5 INJECTION INTRAVENOUS at 01:04

## 2020-04-11 RX ADMIN — SODIUM BICARBONATE 650 MG TABLET 1300 MG: at 08:04

## 2020-04-11 RX ADMIN — OXYCODONE HYDROCHLORIDE AND ACETAMINOPHEN 1000 MG: 500 TABLET ORAL at 03:04

## 2020-04-11 RX ADMIN — SODIUM BICARBONATE 650 MG TABLET 1300 MG: at 09:04

## 2020-04-11 RX ADMIN — MUPIROCIN: 20 OINTMENT TOPICAL at 09:04

## 2020-04-11 RX ADMIN — METOPROLOL TARTRATE 25 MG: 25 TABLET, FILM COATED ORAL at 08:04

## 2020-04-11 RX ADMIN — OXYCODONE HYDROCHLORIDE AND ACETAMINOPHEN 1000 MG: 500 TABLET ORAL at 09:04

## 2020-04-11 RX ADMIN — LEVOTHYROXINE SODIUM 100 MCG: 100 TABLET ORAL at 05:04

## 2020-04-11 RX ADMIN — GENTAMICIN SULFATE 1 DROP: 3 SOLUTION OPHTHALMIC at 10:04

## 2020-04-11 RX ADMIN — DEXMEDETOMIDINE HYDROCHLORIDE 0.4 MCG/KG/HR: 100 INJECTION, SOLUTION, CONCENTRATE INTRAVENOUS at 12:04

## 2020-04-11 RX ADMIN — ZINC SULFATE 220 MG (50 MG) CAPSULE 220 MG: CAPSULE at 09:04

## 2020-04-11 RX ADMIN — GENTAMICIN SULFATE 1 DROP: 3 SOLUTION OPHTHALMIC at 09:04

## 2020-04-11 RX ADMIN — MINERAL OIL AND PETROLATUM: 150; 830 OINTMENT OPHTHALMIC at 08:04

## 2020-04-11 RX ADMIN — HEPARIN SODIUM 5000 UNITS: 5000 INJECTION, SOLUTION INTRAVENOUS; SUBCUTANEOUS at 09:04

## 2020-04-11 RX ADMIN — LACTOBACILLUS TAB 2 TABLET: TAB at 08:04

## 2020-04-11 RX ADMIN — HEPARIN SODIUM 5000 UNITS: 5000 INJECTION, SOLUTION INTRAVENOUS; SUBCUTANEOUS at 08:04

## 2020-04-11 RX ADMIN — GENTAMICIN SULFATE 1 DROP: 3 SOLUTION OPHTHALMIC at 05:04

## 2020-04-11 RX ADMIN — FUROSEMIDE 60 MG: 10 INJECTION, SOLUTION INTRAMUSCULAR; INTRAVENOUS at 08:04

## 2020-04-11 RX ADMIN — CHOLESTYRAMINE 4 G: 4 POWDER, FOR SUSPENSION ORAL at 09:04

## 2020-04-11 RX ADMIN — GENTAMICIN SULFATE 1 DROP: 3 SOLUTION OPHTHALMIC at 03:04

## 2020-04-11 RX ADMIN — GENTAMICIN SULFATE 1 DROP: 3 SOLUTION OPHTHALMIC at 06:04

## 2020-04-11 RX ADMIN — METOPROLOL TARTRATE 25 MG: 25 TABLET, FILM COATED ORAL at 11:04

## 2020-04-11 RX ADMIN — OXYCODONE HYDROCHLORIDE AND ACETAMINOPHEN 1000 MG: 500 TABLET ORAL at 06:04

## 2020-04-11 RX ADMIN — LACTOBACILLUS TAB 2 TABLET: TAB at 09:04

## 2020-04-11 NOTE — PROGRESS NOTES
Pharmacokinetic Assessment Follow Up: IV Vancomycin    Vancomycin serum concentration assessment(s):    The random level was drawn correctly and can be used to guide therapy at this time. The measurement is within the desired definitive target range of 15 to 20 mcg/mL.    Vancomycin Regimen Plan:    1. Patient's random level was 18.5 mcg/mL and within the therapeutic range of 15-20 mcg/mL.  2. Patient not scheduled to receive HD today. No dose will be given.   3. A random level will be drawn on 04/12/20 with AM labs.     Drug levels (last 3 results):  Recent Labs   Lab Result Units 04/09/20  0353 04/10/20  0329 04/11/20  0352   Vancomycin, Random ug/mL 19.9 14.7 18.5     Pharmacy will continue to follow and monitor vancomycin.    Please contact pharmacy at extension [phone] for questions regarding this assessment.    Thank you for the consult,   Jose R Francis       Patient brief summary:  Maria Victoria Hernandez is a 54 y.o. female initiated on antimicrobial therapy with IV Vancomycin for treatment of bacteremia (line infection).    The patient is being dosed by level.     Drug Allergies:   Review of patient's allergies indicates:  No Known Allergies    Actual Body Weight:   113.4 kg (250 lb)    Renal Function:   Estimated Creatinine Clearance: 17.7 mL/min (A) (based on SCr of 4.3 mg/dL (H)).,     Dialysis Method (if applicable):  intermittent HD. Patient is receiving HD PRN.     CBC (last 72 hours):  Recent Labs   Lab Result Units 04/09/20  0353 04/10/20  0329 04/11/20  0351   WBC K/uL 13.70* 13.39* 12.43   Hemoglobin g/dL 8.7* 8.4* 8.2*   Hematocrit % 27.3* 26.5* 26.3*   Platelets K/uL 402* 406* 440*   Gran% % 77.7* 77.1* 70.6   Lymph% % 7.7* 8.4* 12.1*   Mono% % 9.3 8.8 9.5   Eosinophil% % 0.1 0.1 0.7   Basophil% % 0.8 0.7 1.1   Differential Method  Automated Automated Automated       Metabolic Panel (last 72 hours):  Recent Labs   Lab Result Units 04/09/20  0353 04/10/20  0329 04/11/20  0351   Sodium mmol/L  132* 134* 139   Potassium mmol/L 3.3* 3.8 4.0   Chloride mmol/L 94* 97 103   CO2 mmol/L 22* 21* 23   Glucose mg/dL 149* 105 147*   BUN, Bld mg/dL 48* 59* 43*   Creatinine mg/dL 4.4* 5.1* 4.3*   Albumin g/dL 2.2* 2.1* 2.2*   Total Bilirubin mg/dL 2.8* 1.8* 1.4*   Alkaline Phosphatase U/L 292* 268* 255*   AST U/L 42* 38 26   ALT U/L 38 41 36   Magnesium mg/dL 2.0  --   --        Vancomycin Administrations:  vancomycin given in the last 96 hours        No antibiotic orders with administrations found.                      Microbiologic Results:  Microbiology Results (last 7 days)       Procedure Component Value Units Date/Time    Blood culture [[id number]] Collected:  04/08/20 1739    Order Status:  Completed Specimen:  Blood from Line, Central Updated:  04/10/20 2213     Blood Culture, Routine No Growth to date      No Growth to date      No Growth to date    Blood culture [[id number]] Collected:  04/07/20 1730    Order Status:  Completed Specimen:  Blood from Line, Central Updated:  04/10/20 2212     Blood Culture, Routine No Growth to date      No Growth to date      No Growth to date      No Growth to date    Blood culture [[id number]] Collected:  04/07/20 0907    Order Status:  Completed Specimen:  Blood Updated:  04/10/20 2212     Blood Culture, Routine No Growth to date      No Growth to date      No Growth to date      No Growth to date    Blood culture [[id number]] Collected:  04/06/20 1802    Order Status:  Completed Specimen:  Blood from Line, Central Updated:  04/10/20 2212     Blood Culture, Routine No Growth after 4 days.     Blood culture [[id number]] Collected:  04/10/20 0453    Order Status:  Completed Specimen:  Blood Updated:  04/10/20 1915     Blood Culture, Routine No Growth to date    Blood culture [[id number]]  (Abnormal) Collected:  04/06/20 0806    Order Status:  Completed Specimen:  Blood from Antecubital, Left Updated:  04/10/20 1450     Blood Culture, Routine Gram stain peds bottle: yeast        Results called to and read back by: Tania Nolan RN 04/08/2020        11:20      CANDIDA ALBICANS  Susceptibility pending  ID consult required at [facility].      Blood culture [[id number]]  (Abnormal) Collected:  04/04/20 0912    Order Status:  Completed Specimen:  Blood from Line, Central Updated:  04/10/20 1430     Blood Culture, Routine Gram stain peds bottle: budding yeast      Results called to and read back by: Carri Pryor RN  04/06/2020  03:04      CANDIDA ALBICANS  Susceptibility pending  ID consult required at [facility].      Blood culture [[id number]] Collected:  04/08/20 0745    Order Status:  Completed Specimen:  Blood Updated:  04/10/20 1412     Blood Culture, Routine No Growth to date      No Growth to date      No Growth to date    IV catheter culture [[id number]]  (Abnormal) Collected:  04/06/20 1455    Order Status:  Completed Specimen:  Catheter Tip, Intrajugular Updated:  04/09/20 1300     Aerobic Culture - Cath tip STAPHYLOCOCCUS EPIDERMIDIS  < 15 colonies      IV catheter culture [[id number]] Collected:  04/06/20 1615    Order Status:  Completed Specimen:  Catheter Tip, Dialysis Updated:  04/09/20 1121     Aerobic Culture - Cath tip No growth    Blood culture [[id number]]     Order Status:  Canceled Specimen:  Blood     Blood culture [[id number]]  (Abnormal)  (Susceptibility) Collected:  04/05/20 0813    Order Status:  Completed Specimen:  Blood from Peripheral, Left  Hand Updated:  04/08/20 1236     Blood Culture, Routine Gram stain peds bottle: Gram positive cocci in clusters resembling Staph       Results called to and read back by: Lynn Sawyer RN 04/06/2020  09:57      STAPHYLOCOCCUS EPIDERMIDIS    Blood culture [[id number]]  (Abnormal)  (Susceptibility) Collected:  04/05/20 0813    Order Status:  Completed Specimen:  Blood from Line, Central Left  Neck Updated:  04/08/20 1234     Blood Culture, Routine Gram stain tank bottle:  Gram positive cocci in clusters resembling Staph       Results called to and read back by: Lynn Sawyer RN 04/06/2020  09:57      Gram stain aer bottle: Gram positive cocci in clusters resembling Staph       Positive results previously called 04/06/2020  15:52      STAPHYLOCOCCUS EPIDERMIDIS    Culture, Respiratory with Gram Stain [[id number]]  (Abnormal) Collected:  04/05/20 0050    Order Status:  Completed Specimen:  Respiratory from Endotracheal Aspirate Updated:  04/08/20 1046     Respiratory Culture No S aureus or Pseudomonas isolated.      CANDIDA ALBICANS  Few  Normal respiratory anamaria also present       Gram Stain (Respiratory) <10 epithelial cells per low power field.     Gram Stain (Respiratory) Many WBC's     Gram Stain (Respiratory) Rare Gram positive cocci     Gram Stain (Respiratory) Rare yeast    Blood culture [[id number]]     Order Status:  Canceled Specimen:  Blood     Urine Culture High Risk [[id number]]     Order Status:  Canceled Specimen:  Urine, Catheterized     Culture, Respiratory with Gram Stain [[id number]]     Order Status:  Canceled Specimen:  Respiratory from Tracheal Aspirate

## 2020-04-11 NOTE — PROGRESS NOTES
Progress Note  Infectious Disease    Reason for Consult:      HPI: Maria Victoria Hernandez is a   54 y.o. female employee of [employer].  with past medical history of morbid obesity, BMI of 48, diabetes, diet controlled, anemia, B12 deficiency, vitamin-D deficiency, hypothyroidism, was admitted on 03/25/2020 due to shortness of breath, diagnosed with COVID 19 is positive.  Patient has been intubated.  She went into renal failure and has been receiving HD by nephrologist.  Intensivist has been managing the vent.    Patient has completed 10 days of hydroxychloroquine and about 8 days of Zithromax and ceftriaxone.  She started spiking fever of  103 on 04/04.  White count jumped to 17.8.  She was started on vancomycin and Zosyn and blood cultures were sent.    ID consult called for g positives and yeast in blood cultures.  Patient is afebrile at the time.  WBC has improved.  Discussed with nurse.  Will discontinue lines.  Continue vancomycin, Daptomycin x1.  Add micafungin daily.    04/07/2020  WBC improved 17--12--10  Ferritin 1752--1538--1897  Intubated Sedated.  FiO2 of 35 people 5.  T-max 101.7°  Dialysis catheter changed yesterday  Leukocytosis improved 12/17/2010 04/08/2020  T-max 99.9°  Intubated sedated.  FiO2 35, peep of 5.  Fail CPAP trial.  Tolerating vancomycin, Zosyn, Micafungin.   Lines are fresh.  Nurse is trying to protect them.  Discussed with nurse:  Her daughter who is a nurse came and saw mother today, she agrees right eye looks better.    04/09/2020  Patient is extubated today, Really weak, Does not breath in deep, I advised her on deep breathing  Continues to need Cardene drip for BP  HAd loose stool-- flexiseal was placed    04/10/2020.  She is very weak.  She tries to opens her eyes and interact with me.  Right eye is improved.  Dialysis nurse is about to start dialysis.    04/11/2020  T max 100.3 yesterday.   She looks tired, but better than yesterday. Today she is  more  "awake and slighty stronger  She was tachycardic yesterday -- Cardene was switched to labetalol  WBC is about the same. ESR 50;  procal is still elveated. vanc level today is 14  Hospitalist ": Requested pulmonary to review possible  bipap -for Covid must have extra filter for machines which are limited "  As per renal :   " UOP 750cc.  3L UF w/HD yesterday.  K+ at goal.  Holding dialysis over weekend to assess for recovery"    Antibiotics (From admission, onward)    Start     Stop Route Frequency Ordered    04/10/20 1700  vancomycin 500 mg in dextrose 5 % 100 mL IVPB (ready to mix system)      -- IV Once 04/10/20 1209    04/07/20 1045  mupirocin 2 % ointment      04/12 0859 Nasl 2 times daily 04/07/20 0942    04/05/20 0848  vancomycin - pharmacy to dose  (vancomycin IVPB)      -- IV pharmacy to manage frequency 04/05/20 0748    04/02/20 2315  gentamicin 0.3 % ophthalmic solution 1 drop      -- RIGHT EYE Every 4 hours 04/02/20 2310        Antifungals (From admission, onward)    Start     Stop Route Frequency Ordered    04/06/20 1100  micafungin 100 mg in sodium chloride 0.9 % 100 mL IVPB (ready to mix system)      -- IV Every 24 hours (non-standard times) 04/06/20 0959        Antivirals (From admission, onward)    None          EXAM & DIAGNOSTICS REVIEWED:   Vitals:     Temp:  [97.6 °F (36.4 °C)-100.3 °F (37.9 °C)]   Temp: 97.6 °F (36.4 °C) (04/11/20 0800)  Pulse: 86 (04/11/20 1100)  Resp: (!) 29 (04/11/20 1100)  BP: (!) 99/54 (04/11/20 1000)  SpO2: 98 % (04/11/20 1100)    Intake/Output Summary (Last 24 hours) at 4/11/2020 1138  Last data filed at 4/11/2020 0531  Gross per 24 hour   Intake 2309.5 ml   Output 4460 ml   Net -2150.5 ml          General:  In NAD   Eyes:  Anicteric,  ENT:  No ulcers, exudates, thrush, nares patent, extubated today  Neck:  supple, no masses or adenopathy appreciated  Lungs: Clear, no consolidation, rales, wheezes, rub  Heart:  RRR, no gallop/murmur/rub noted  Abd:  Soft, NT, ND, normal BS, " no masses or organomegaly appreciated.  :  Cuellar  Musc:  Joints without effusion, swelling, erythema, synovitis, muscle wasting.   Skin:  No rashes. No palmar or plantar lesions. No subungual petechiae  Wound:   Neuro: Tired, tries to follow commands  Psych:  Calm  Lymphatic:     No cervical, supraclavicular, axillary, or inguinal nodes  Extrem: No edema, erythema, phlebitis, cellulitis, warm and well perfused  VAD:       Isolation:      Lines/Tubes/Drains:  Right IJ 04/06  Left IJ 04/06  Cuellar 03/25  OT 03/25    General Labs reviewed:  Recent Labs   Lab 04/09/20  0353 04/10/20  0329 04/11/20  0351   WBC 13.70* 13.39* 12.43   HGB 8.7* 8.4* 8.2*   HCT 27.3* 26.5* 26.3*   * 406* 440*       Recent Labs   Lab 04/09/20  0353 04/10/20  0329 04/11/20  0351   * 134* 139   K 3.3* 3.8 4.0   CL 94* 97 103   CO2 22* 21* 23   BUN 48* 59* 43*   CREATININE 4.4* 5.1* 4.3*   CALCIUM 9.4 9.5 9.7   PROT 8.3 7.8 8.0   BILITOT 2.8* 1.8* 1.4*   ALKPHOS 292* 268* 255*   ALT 38 41 36   AST 42* 38 26     Micro:  Microbiology Results (last 7 days)     Procedure Component Value Units Date/Time    Blood culture [[id number]] Collected:  04/08/20 1739    Order Status:  Completed Specimen:  Blood from Line, Central Updated:  04/10/20 2213     Blood Culture, Routine No Growth to date      No Growth to date      No Growth to date    Blood culture [[id number]] Collected:  04/07/20 1730    Order Status:  Completed Specimen:  Blood from Line, Central Updated:  04/10/20 2212     Blood Culture, Routine No Growth to date      No Growth to date      No Growth to date      No Growth to date    Blood culture [[id number]] Collected:  04/07/20 0907    Order Status:  Completed Specimen:  Blood Updated:  04/10/20 2212     Blood Culture, Routine No Growth to date      No Growth to date      No Growth to date      No Growth to date    Blood culture [[id number]] Collected:  04/06/20 1802    Order Status:  Completed Specimen:  Blood from Line, Central  Updated:  04/10/20 2212     Blood Culture, Routine No Growth after 4 days.     Blood culture [[id number]] Collected:  04/10/20 0453    Order Status:  Completed Specimen:  Blood Updated:  04/10/20 1915     Blood Culture, Routine No Growth to date    Blood culture [[id number]]  (Abnormal) Collected:  04/06/20 0806    Order Status:  Completed Specimen:  Blood from Antecubital, Left Updated:  04/10/20 1450     Blood Culture, Routine Gram stain peds bottle: yeast       Results called to and read back by: Tania Nolan RN 04/08/2020        11:20      CANDIDA ALBICANS  Susceptibility pending  ID consult required at [facility] and [facility] locations.      Blood culture [[id number]]  (Abnormal) Collected:  04/04/20 0912    Order Status:  Completed Specimen:  Blood from Line, Central Updated:  04/10/20 1430     Blood Culture, Routine Gram stain peds bottle: budding yeast      Results called to and read back by: Carri Pryor RN  04/06/2020  03:04      CANDIDA ALBICANS  Susceptibility pending  [facility] locations.      Blood culture [[id number]] Collected:  04/08/20 0745    Order Status:  Completed Specimen:  Blood Updated:  04/10/20 1412     Blood Culture, Routine No Growth to date      No Growth to date      No Growth to date    IV catheter culture [[id number]]  (Abnormal) Collected:  04/06/20 1455    Order Status:  Completed Specimen:  Catheter Tip, Intrajugular Updated:  04/09/20 1300     Aerobic Culture - Cath tip STAPHYLOCOCCUS EPIDERMIDIS  < 15 colonies      IV catheter culture [[id number]] Collected:  04/06/20 1615    Order Status:  Completed Specimen:  Catheter Tip, Dialysis Updated:  04/09/20 1121     Aerobic Culture - Cath tip No growth    Blood culture [[id number]]     Order Status:  Canceled Specimen:  Blood     Blood culture [[id number]]  (Abnormal)  (Susceptibility) Collected:  04/05/20 0813    Order Status:  Completed Specimen:  Blood from Peripheral, Left  Hand  Updated:  04/08/20 1236     Blood Culture, Routine Gram stain peds bottle: Gram positive cocci in clusters resembling Staph       Results called to and read back by: Lynn Sawyer RN 04/06/2020  09:57      STAPHYLOCOCCUS EPIDERMIDIS    Blood culture [[id number]]  (Abnormal)  (Susceptibility) Collected:  04/05/20 0813    Order Status:  Completed Specimen:  Blood from Line, Central Left  Neck Updated:  04/08/20 1234     Blood Culture, Routine Gram stain tank bottle: Gram positive cocci in clusters resembling Staph       Results called to and read back by: Lynn Sawyer RN 04/06/2020  09:57      Gram stain aer bottle: Gram positive cocci in clusters resembling Staph       Positive results previously called 04/06/2020  15:52      STAPHYLOCOCCUS EPIDERMIDIS    Culture, Respiratory with Gram Stain [[id number]]  (Abnormal) Collected:  04/05/20 0050    Order Status:  Completed Specimen:  Respiratory from Endotracheal Aspirate Updated:  04/08/20 1046     Respiratory Culture No S aureus or Pseudomonas isolated.      CANDIDA ALBICANS  Few  Normal respiratory anamaria also present       Gram Stain (Respiratory) <10 epithelial cells per low power field.     Gram Stain (Respiratory) Many WBC's     Gram Stain (Respiratory) Rare Gram positive cocci     Gram Stain (Respiratory) Rare yeast    Blood culture [[id number]]     Order Status:  Canceled Specimen:  Blood     Urine Culture High Risk [[id number]]     Order Status:  Canceled Specimen:  Urine, Catheterized     Culture, Respiratory with Gram Stain [[id number]]     Order Status:  Canceled Specimen:  Respiratory from Tracheal Aspirate         Imaging Reviewed:  KUB 0409/2020NG tube present with tip overlying the stomach in the left abdomen.  No evidence of bowel obstruction.  No radiographic mass.  CXR 04/06/2020Support devices as above.  No pneumothorax.  Moderate pulmonary airspace disease without significant change.  CXR 04/05/2020 No significant interval change in the bilateral  airspace disease.  Support devices in stable position.    Cardiology:  Sinus rhythm  ECHO  · Mild left ventricular enlargement.  · Mildly decreased left ventricular systolic function. The estimated ejection fraction is 45%.  · Grade I (mild) left ventricular diastolic dysfunction consistent with impaired relaxation.  · Normal right ventricular systolic function.  · Mild left atrial enlargement.  · Moderate mitral regurgitation.  · Mild tricuspid regurgitation.  · Mild pulmonary hypertension present. PASP 40 mm of Hg.     IMPRESSION & PLAN     Staphylococcus epidermidis bacteremia, line infection.  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   Eventually may need retinal exam   Changed  Lines  on 04/06 in afternoon.   BCx drawn on the am of 04/06 were positive for C albicans!  Limited ECHO ---- ro vegetation

## 2020-04-11 NOTE — PLAN OF CARE
Patient free of falls and injuries this shift. Follows some simple commands. Able to raise arms with elbows off of bed bilateral at times. Moves legs independently but not able to raise them off of bed. Weak and tires easily. Very hoarse and speaks in low tone. Cuellar patent with ok urine output. Bilateral wrist restraints with good distal pulses. Rt radial kathleen correlates well with cuff pressure, good capillary refill. BM x1.Sinus to sinus tach on monitor.

## 2020-04-11 NOTE — PT/OT/SLP EVAL
"Speech Language Pathology Evaluation  Bedside Swallow    Patient Name:  Maria Victoria Hernandez   MRN:  [id number]  Admitting Diagnosis: Acute respiratory failure with hypoxia    Recommendations:                 General Recommendations:  ongoing BSS  Diet recommendations:   , NPO   Aspiration Precautions: Strict aspiration precautions   General Precautions: Standard, aspiration, contact, airborne, fall, droplet, NPO  Communication strategies:  yes/no questions only, provide increased time to answer and go to room if call light pushed    History:     Past Medical History:   Diagnosis Date    Colon polyp     Diverticulosis large intestine w/o perforation or abscess w/bleeding     Iron deficiency anemia     Prediabetes     Thyroid disease     Vitamin B 12 deficiency     Vitamin D deficiency        Past Surgical History:   Procedure Laterality Date    TUBAL LIGATION         Social History: Patient lives in the community.    Prior Intubation HX:  Extubated Wed. after 2 weeks intubation    Modified Barium Swallow: daughter denied prior dysphagia    Chest X-Rays: 4/6 There is prominent perihilar and bibasilar airspace disease bilaterally without significant change.  No significant pleural effusion is present.    Prior diet: regular textures & liquids.    Occupation/hobbies/homemaking: employed as CNA.    Subjective     (aphonic) "yes"  Patient goals: unstated     Objective:     Oral Musculature Evaluation  · Oral Musculature: general weakness  · Dentition: (manymissing molars)  · Secretion Management: oral holding  · Mucosal Quality: sticky(daughter performing oral hygiene prior to eval)  · Mandibular Strength and Mobility: impaired  · Oral Labial Strength and Mobility: impaired seal, impaired coordination  · Lingual Strength and Mobility: impaired strength, functional anterior elevation, functional lateral movement(weak & discoordinated)  · Velar Elevation: (not visualized)  · Volitional Cough: weak  · Voice Prior " to PO Intake: aphonic    Bedside Swallow Eval:   Consistencies Assessed:  · Thin liquids ice chips  · Nectar thick liquids dipped spoon   · Puree 1/2 tsp applesauce, 1/2 tsp pudding     Oral Phase:   · Prolonged manipulation of ice chips  · Stuck tongue out to have applesauce swept from it, did not spit out  · discoordinated movement of pudding side to side & finally back  · Oral residue on pudding    Pharyngeal Phase:   · Strong coughing after nectar thick liquid swallow  · Subtle throat clearing after 1 of 2 ice chips  · Multiple swallows after pudding    Compensatory Strategies  · None    Treatment: education to pt & daughter re impressions & plan    Assessment:     Maria Victoria Hernandez is a 54 y.o. female with an SLP diagnosis of Dysphagia.  She presenteds with no hx dysphagia, intubation for 2 weeks, extubation 3 days ago, and s/s oropharyngela dysphagia.  She should remain npo with good mouth care;.will follow for appropriate time for oral diet.    Goals:   Multidisciplinary Problems     SLP Goals        Problem: SLP Goal    Goal Priority Disciplines Outcome   SLP Goal     SLP    Description:  Consume oral diet with no s/s oropharyngeal dysphagia                    Plan:     · Patient to be seen:  5 x/week   · Plan of Care expires:     · Plan of Care reviewed with:  patient, daughter   · SLP Follow-Up:  Yes       Discharge recommendations:      Barriers to Discharge:  None    Time Tracking:     SLP Treatment Date:   04/11/20  Speech Start Time:  1122  Speech Stop Time:  1142     Speech Total Time (min):  20 min    Billable Minutes: Eval Swallow and Oral Function 20     Trina Ochoa CCC-SLP/A  04/11/2020

## 2020-04-11 NOTE — PROGRESS NOTES
INPATIENT NEPHROLOGY PROGRESS NOTE  [facility] NEPHROLOGY    Patient Name: Maria Victoria Hernandez  Date: 04/11/2020    Reason for consultation: MAIKOL    History of Present Illness:  54AAF nurse with morbid obesity, hypothyroidism presents PNA, intubated, positive for COVID19. Admit Cr 0.7 went 5.1 and HD started on 3/29.     3/28  Scr worse today.  Only one shift recorded for output, 520cc.  Still hyponatremic, vent setting adjusted per pulmonology note for acidosis.  K+ at goal after repletion.  Has siri, may need HD initiated.  3/29  Non oliguric.  In no distress  3/30 VSS, seen and examined on HD, tolerating well. Plan another HD in AM, discussed with daughter who is a nurse here too.  3/31 VSS, intubated still. UO is not great but she is dialyzed daily. Seen and examined on HD, tolerating well. Plan another HD in AM.  4/1 VSS, intubated still. UO is not great but she is dialyzed daily. Seen and examined on HD, tolerating well. Plan another HD PRN.  4/2 VSS, intubated still. UO is not great. Plan another HD in AM.  4/3 VSS, intubated still. UO is not great but she is dialyzed daily recently. Seen and examined on HD, tolerating well. Plan another HD on Mon or PRN.  4/4 febrile, BP stable, FIO2 50%, intubated, sedated, on levophed, UOP 900cc, transfused  4/5 febrile, tachy, SBP 100s, FIO2 65%, intubated, sedated, off levophed, UOP 935cc  4/6 intubated,. Sedated  4/7 intubated, sedated. Dialysis catheter changed yesterday  4/8 just finished dialysis. uf 3 liters. Extubated  4/10  Seen on dialysis.  No distress.  4/11  UOP 750cc.  3L UF w/HD yesterday.  K+ at goal.  Holding dialysis over weekend to assess for recovery.  Significant event noted 6pm yesterday per primary notes.  Med changes made 2/2 tachycardia, tachypnea.   She is hypotensive today.      Plan of Care:    1. Septic shock 2/2 COVID PNA with HHRF requiring MV  - WBC down. Still spiking fevers sporadically.          2. MAIKOL - suspect multifactorial ATN  in setting of shock/COVID/intravascular volume depletion- initiated HD 3/29  - she is now nonoliguric but clearance parameters are worse   --dialysis today.  Monitor renal function over weekend  - no nsaids or IV contrast    3. Hypervolemic hyponatremia  - ordered 140 Na bath, 2-3L UF tomorrow as tolerated    4. Acidosis  -  35 bicarb bath  - improved    5. Anemia  - epogen with hd    Thank you for allowing us to participate in this patient's care. We will continue to follow.    Vital Signs:  Temp Readings from Last 3 Encounters:   04/11/20 97.9 °F (36.6 °C) (Axillary)       Pulse Readings from Last 3 Encounters:   04/11/20 78   01/15/15 84   12/17/13 80       BP Readings from Last 3 Encounters:   04/11/20 (!) 84/52   01/15/15 124/82   12/17/13 102/68       Weight:  Wt Readings from Last 3 Encounters:   04/07/20 113.4 kg (250 lb)   01/15/15 105.8 kg (233 lb 4.8 oz)   12/17/13 101.2 kg (223 lb)     Medications:  Scheduled Meds:   ascorbic acid (vitamin C)  1,000 mg Per NG tube QID    calcitRIOL  0.25 mcg Oral Daily    gentamicin  1 drop Right Eye Q4H    heparin (porcine)  5,000 Units Subcutaneous Q12H    Lactobacillus acidoph-L.bulgar  2 tablet Per NG tube BID    levothyroxine  100 mcg Oral Before breakfast    micafungin (MYCAMINE) IVPB  100 mg Intravenous Q24H    mupirocin   Nasal BID    sodium bicarbonate  1,300 mg Per NG tube TID    vancomycin (VANCOCIN) IVPB  500 mg Intravenous Once    white petrolatum-mineral oiL   Right Eye QHS    zinc sulfate  220 mg Oral Daily     Continuous Infusions:   dexmedetomidine (PRECEDEX) infusion 0.399 mcg/kg/hr (04/11/20 0031)    labetalol (NORMODYNE) 5 mg/mL infusion (TITRATING) 1 mg/min (04/11/20 0149)    norepinephrine bitartrate-D5W Stopped (04/08/20 1800)     PRN Meds:.sodium chloride, sodium chloride, acetaminophen, heparin (porcine), HYDROmorphone, loperamide, morphine, promethazine (PHENERGAN) IVPB, propofoL, sodium chloride 0.9%, sodium chloride 0.9%,  "Pharmacy to dose Vancomycin consult **AND** vancomycin - pharmacy to dose  No current facility-administered medications on file prior to encounter.      Current Outpatient Medications on File Prior to Encounter   Medication Sig Dispense Refill    ferrous sulfate 325 mg (65 mg iron) Tab tablet Take 1 tablet (325 mg total) by mouth daily with breakfast. (Patient taking differently: Take 325 mg by mouth daily with breakfast. Take 2 tablets daily) 90 tablet 3    fluticasone propionate (FLONASE) 50 mcg/actuation nasal spray 1 spray by Each Nostril route once daily.      levothyroxine (SYNTHROID) 100 MCG tablet Take 1 tablet (100 mcg total) by mouth once daily. (Patient taking differently: Take 150 mcg by mouth once daily. ) 90 tablet 3    meloxicam (MOBIC) 7.5 MG tablet Take 7.5 mg by mouth once daily.      clotrimazole-betamethasone 1-0.05% (LOTRISONE) cream Apply topically 2 (two) times daily. 45 g 1    levocetirizine (XYZAL) 5 MG tablet Take 1 tablet (5 mg total) by mouth every evening. 30 tablet 6     Review of Systems:  Unable to obtain due to MV    Physical Exam:  BP (!) 84/52   Pulse 78   Temp 97.9 °F (36.6 °C) (Axillary)   Resp 18   Ht 5' 1" (1.549 m)   Wt 113.4 kg (250 lb)   SpO2 100%   Breastfeeding? No   BMI 47.24 kg/m²     INS/OUTS:  I/O last 3 completed shifts:  In: 2309.5 [I.V.:1129.5; Other:500; NG/GT:480; IV Piggyback:200]  Out: 5108 [Urine:1098; Drains:510; Other:3500]  No intake/output data recorded.    Did exam via video monitoring  Did not go in room    Physical Exam:  Constitutional: acutely ill, appears stated age,   HEENT: Eyes closed, MMM  Heart: RRR on monitor,   Lungs: extubated  Abdomen: nd  Skin: no rash  MSK: no deformity  CNS: sedated      Results:  Lab Results   Component Value Date     04/11/2020    K 4.0 04/11/2020     04/11/2020    CO2 23 04/11/2020    BUN 43 (H) 04/11/2020    CREATININE 4.3 (H) 04/11/2020    CALCIUM 9.7 04/11/2020    ANIONGAP 13 04/11/2020    " ESTGFRAFRICA 13 (A) 04/11/2020    EGFRNONAA 11 (A) 04/11/2020       Lab Results   Component Value Date    CALCIUM 9.7 04/11/2020    PHOS 4.8 (H) 03/29/2020       Recent Labs   Lab 04/11/20  0351   WBC 12.43   RBC 3.12*   HGB 8.2*   HCT 26.3*   *   MCV 84   MCH 26.3*   MCHC 31.2*     I have personally reviewed pertinent radiological imaging and reports.      Nephrology  [facility] [place]  [phone]

## 2020-04-11 NOTE — PROGRESS NOTES
Progress Note  PULMONARY    Admit Date: 3/25/2020   04/11/2020      SUBJECTIVE:     3/27- remains intubated, on vent. Was on Lasix drip overnight and now w/ IVF for UOP. On minimal Levophed   3/28- remains intubated, on PEEP 14/FiO2 50%. Levophed 0.06 mcg/kg/min  3/29- remains intubated, PEEP 12, FiO2 40%. Levophed off  3/30- remains intubated, PEEP 8, FiO2 40%. Levophed off. Started HD yesterday and having second session today. Daughter came to visit (works on 3rd floor), and updated this am  3/31- remains intubated, PEEP 8/FiO2 40%. HD yesterday w/ removal of 3.5L. With SBT yesterday not following commands and vomited stomach contents  4/1- remains intubated, PEEP 8/FiO2 40%. Not waking up or following commands off sedation- even w/ daughter at bedside, failed SBT due to tachypnea. Levophed at 0.02 mcg/kg/min. Had HD yesterday w/ removal of 2.5L. Had head CT. EEG pending  4/2- remains intubated, PEEP 6, FiO2 40%. w/ hemodynamic instability yesterday-- hypertensive then very hypotensive when tried to move her, which delayed weaning and MRI. Now off Levophed. HD yesterday w/ removal of 3.5L  4/3not arousing,  4/4 arousing somewhat- looks air hungry,  4/5- no c/o,sedated  4/6-  Remains intubated,  PEEP 5 FiO2 50%. On Nicardipine for HTN. HD this am. On CPAP trial since 4am w/ good gas exchange. Opens eyes and looks around but not following commands  4/7- remains intubated, had central lines replaced yesterday. Became tachypneic and put back on vent later in afternoon but tolerated PS trial all day. Today tachypneic w/ increased work of breathing with weaning trial  4/8- remains on vent, PEEP 5 FiO2 35%. On Levophed 0.04mcg/kg/min, precedex drip  4/9- extubated yesterday, on 5L nasal cannula. Very weak, per nurse was agitated and w/ chest tightness w/ breathing- better after Dilaudid. Per report answers yes/no questions and follows commands  4/10- on 5L nasal cannula, precedex drip  4/11- on 4L nasal cannula w/ sat  100%, waking up more- per daughter waved at her    [facility] and Allergies reviewed.    OBJECTIVE:     Vitals (Most recent):  Vitals:    04/11/20 0730   BP: (!) 84/52   Pulse: 78   Resp: 18   Temp:        Vitals (24 hour range):  Temp:  [97.9 °F (36.6 °C)-100.3 °F (37.9 °C)]   Pulse:  []   Resp:  [17-52]   BP: ()/(52-76)   SpO2:  [77 %-100 %]   Arterial Line BP: (106-160)/(48-69)       Intake/Output Summary (Last 24 hours) at 4/11/2020 0910  Last data filed at 4/11/2020 0531  Gross per 24 hour   Intake 2309.5 ml   Output 4760 ml   Net -2450.5 ml          Physical Exam:  The patient's neuro status (alertness,orientation,cognitive function,motor skills,), pharyngeal exam (oral lesions, hygiene, abn dentition,), Neck (jvd,mass,thyroid,nodes in neck and above/below clavicle),RESPIRATORY(symmetry,effort,fremitus,percussion,auscultation),  Cor(rhythm,heart tones including gallops,perfusion,edema)ABD(distention,hepatic&splenomegaly,tenderness,masses), Skin(rash,cyanosis),Psyc(affect,judgement,).  Exam negative except for these pertinent findings:    Somnolent, opens eyes-- still on precedex drip  R sclera injected & conjunctival erythema w/ crusting  Kussmaul breathing, no acute distress  NG in place  Lungs clear  Obese  Abdomen soft  Edema improved    Radiographs reviewed:  CXR 4/6- similar  cxr 4/5 ARDS + pulmonary edema  CXR 4/2- bibasilar fluffy opacities and pulmonary edema, similar appearance  CT head 3/31  1.  There are slight limitations related to moderate patient tilted in the scanner and mild patient motion but there is no obvious acute abnormality.  There is no hemorrhage, mass, mass effect or obvious acute edema or ischemia.  It should be noted that MRI is more sensitive in the detection of subtle or acute nonhemorrhagic ischemic disease.    2.  Support tubing is seen in the left nares.  Bilateral mastoid and middle ear effusions are likely related to support tubing and/or intubation.  Correlate  clinically.  The same is true for changes throughout the paranasal sinuses.    CXR 3/31- improving bibasilar opacities  CXR 3/29- unchanged  CXR 3/27- bilateral mid and lower lobe fluffy airspace disease  CXR 3/26- increased consolidation RLL    TTE 3/27  · Mild left ventricular enlargement.  · Mildly decreased left ventricular systolic function. The estimated ejection fraction is 45%.  · Grade I (mild) left ventricular diastolic dysfunction consistent with impaired relaxation.  · Normal right ventricular systolic function.  · Mild left atrial enlargement.  · Moderate mitral regurgitation.  · Mild tricuspid regurgitation.  · Mild pulmonary hypertension present. PASP 40 mm of Hg.    Labs     Recent Labs   Lab 04/11/20 0351   WBC 12.43   HGB 8.2*   HCT 26.3*   *     Recent Labs   Lab 04/11/20 0351 04/11/20 0352     --    K 4.0  --      --    CO2 23  --    BUN 43*  --    CREATININE 4.3*  --    *  --    CALCIUM 9.7  --    AST 26  --    ALT 36  --    ALKPHOS 255*  --    BILITOT 1.4*  --    PROT 8.0  --    ALBUMIN 2.2*  --    .5*  --    SEDRATE  --  150*   PROCAL  --  5.34*     No results for input(s): PH, PCO2, PO2, HCO3 in the last 24 hours.  Microbiology Results (last 7 days)     Procedure Component Value Units Date/Time    Blood culture [[id number]] Collected:  04/08/20 1739    Order Status:  Completed Specimen:  Blood from Line, Central Updated:  04/10/20 2213     Blood Culture, Routine No Growth to date      No Growth to date      No Growth to date    Blood culture [[id number]] Collected:  04/07/20 1730    Order Status:  Completed Specimen:  Blood from Line, Central Updated:  04/10/20 2212     Blood Culture, Routine No Growth to date      No Growth to date      No Growth to date      No Growth to date    Blood culture [[id number]] Collected:  04/07/20 0907    Order Status:  Completed Specimen:  Blood Updated:  04/10/20 2212     Blood Culture, Routine No Growth to date      No Growth  to date      No Growth to date      No Growth to date    Blood culture [[id number]] Collected:  04/06/20 1802    Order Status:  Completed Specimen:  Blood from Line, Central Updated:  04/10/20 2212     Blood Culture, Routine No Growth after 4 days.     Blood culture [[id number]] Collected:  04/10/20 0453    Order Status:  Completed Specimen:  Blood Updated:  04/10/20 1915     Blood Culture, Routine No Growth to date    Blood culture [[id number]]  (Abnormal) Collected:  04/06/20 0806    Order Status:  Completed Specimen:  Blood from Antecubital, Left Updated:  04/10/20 1450     Blood Culture, Routine Gram stain peds bottle: yeast       Results called to and read back by: Tania Nolan RN 04/08/2020        11:20      CANDIDA ALBICANS  Susceptibility pending  [facility].      Blood culture [[id number]]  (Abnormal) Collected:  04/04/20 0912    Order Status:  Completed Specimen:  Blood from Line, Central Updated:  04/10/20 1430     Blood Culture, Routine Gram stain peds bottle: budding yeast      Results called to and read back by: Carri Pryor RN  04/06/2020  03:04      CANDIDA ALBICANS  Susceptibility pending  [facility].      Blood culture [[id number]] Collected:  04/08/20 0745    Order Status:  Completed Specimen:  Blood Updated:  04/10/20 1412     Blood Culture, Routine No Growth to date      No Growth to date      No Growth to date    IV catheter culture [[id number]]  (Abnormal) Collected:  04/06/20 1455    Order Status:  Completed Specimen:  Catheter Tip, Intrajugular Updated:  04/09/20 1300     Aerobic Culture - Cath tip STAPHYLOCOCCUS EPIDERMIDIS  < 15 colonies      IV catheter culture [[id number]] Collected:  04/06/20 1615    Order Status:  Completed Specimen:  Catheter Tip, Dialysis Updated:  04/09/20 1121     Aerobic Culture - Cath tip No growth    Blood culture [[id number]]     Order Status:  Canceled  Specimen:  Blood     Blood culture [[id number]]  (Abnormal)  (Susceptibility) Collected:  04/05/20 0813    Order Status:  Completed Specimen:  Blood from Peripheral, Left  Hand Updated:  04/08/20 1236     Blood Culture, Routine Gram stain peds bottle: Gram positive cocci in clusters resembling Staph       Results called to and read back by: Lynn Sawyer RN 04/06/2020  09:57      STAPHYLOCOCCUS EPIDERMIDIS    Blood culture [[id number]]  (Abnormal)  (Susceptibility) Collected:  04/05/20 0813    Order Status:  Completed Specimen:  Blood from Line, Central Left  Neck Updated:  04/08/20 1234     Blood Culture, Routine Gram stain tank bottle: Gram positive cocci in clusters resembling Staph       Results called to and read back by: Lynn Sawyer RN 04/06/2020  09:57      Gram stain aer bottle: Gram positive cocci in clusters resembling Staph       Positive results previously called 04/06/2020  15:52      STAPHYLOCOCCUS EPIDERMIDIS    Culture, Respiratory with Gram Stain [[id number]]  (Abnormal) Collected:  04/05/20 0050    Order Status:  Completed Specimen:  Respiratory from Endotracheal Aspirate Updated:  04/08/20 1046     Respiratory Culture No S aureus or Pseudomonas isolated.      CANDIDA ALBICANS  Few  Normal respiratory anamaria also present       Gram Stain (Respiratory) <10 epithelial cells per low power field.     Gram Stain (Respiratory) Many WBC's     Gram Stain (Respiratory) Rare Gram positive cocci     Gram Stain (Respiratory) Rare yeast    Blood culture [[id number]]     Order Status:  Canceled Specimen:  Blood     Urine Culture High Risk [[id number]]     Order Status:  Canceled Specimen:  Urine, Catheterized     Culture, Respiratory with Gram Stain [[id number]]     Order Status:  Canceled Specimen:  Respiratory from Tracheal Aspirate         Results for WOLF SINGER (MRN [id number]) as of 4/1/2020 09:28   Ref. Range 4/1/2020 02:53   ALT Latest Ref Range: 10 - 44 U/L 54 (H)     Impression:  Active  Hospital Problems    Diagnosis  POA    *Acute respiratory failure with hypoxia [J96.01]  Yes    Acute on chronic combined systolic and diastolic congestive heart failure [I50.43]  Yes    Fungemia [B49]  No    Bacteremia [R78.81]  No     Staphylococcus epidermidis bacteremia, line infection.  04/05  Candidemia due to Candida albicans on 04/04  Respiratory failure, ARDS, COVID19, completed treatment  HTN, CHF with EF of 45%  This patient is high risk for life-threatening deterioration and death secondary to above comorbidities and need for IV treatment Critical care 35 min         Pneumonia due to infectious organism [J18.9]  Yes    Acute encephalopathy [G93.40]  No    Acute renal failure [N17.9]  No    ARDS (adult respiratory distress syndrome) [J80]  Yes    COVID-19 virus infection [U07.1]  Yes    Morbid obesity [E66.01]  Yes    Hypothyroid [E03.9]  Yes    COVID-19 virus detected [U07.1]  Yes    Diverticulosis of large intestine without hemorrhage [K57.30]  Yes    Iron deficiency anemia, unspecified [D50.9]  Yes      Resolved Hospital Problems   No resolved problems to display.               Plan:   Acute hypoxemic respiratory failure, stable to improving O2 reqt  COVID-19 pneumonia/ARDS  Acute renal failure, on HD  Metabolic acidosis due to renal failure  Shock, resolved   HTN  Combined heart failure, EF 45%  Morbid obesity  Acute encephalopathy  Right conjunctivitis  Bacteremia w/ s. Epidermidis; fungemia- s/p exchange of central lines      - continue supplemental oxygen to keep sats greater than 92%  - speech swallow eval- failed, high aspiration risk  - continue NG tube for feeds and meds  - needs PT  - add combivent inhaler PRN  - dc precedex  - continue Nicardipine drip to control BP  - add oral antihypertensives via NG  - continue HD per nephro  - continue PO bicarb- control metabolic acidosis to help w/ respiratory status  - has had adequate course (7d) of azithromycin, ceftriaxone,  hydroxychloroquine  - antibiotics per ID- treating for bacteremia & fungemia  - may be ready to transfer out of ICU tomorrow if stable  - spoke to daughter Danya today when she visited the ICU      The following were evaluated and adjusted as needed: sedation and neurologic status, hemodynamics and acid base balance and oxygenation needs       Critical Care  - THE PATIENT HAS A HIGH PROBABILITY OF IMMINENT OR LIFE THREATENING DETERIORATION.  Over 50%time of encounter was in direct care at bedside.  Time was 30 to 74 minutes required for patient care.  Time needed for all of the above totaled 32 minutes.      Milvia Mcguire MD  Pulmonary & Critical Care Medicine

## 2020-04-12 PROBLEM — B94.8 POST VIRAL DEBILITY: Status: ACTIVE | Noted: 2020-04-12

## 2020-04-12 PROBLEM — R53.81 POST VIRAL DEBILITY: Status: ACTIVE | Noted: 2020-04-12

## 2020-04-12 LAB
ALBUMIN SERPL BCP-MCNC: 2.3 G/DL (ref 3.5–5.2)
ALP SERPL-CCNC: 238 U/L (ref 55–135)
ALT SERPL W/O P-5'-P-CCNC: 37 U/L (ref 10–44)
ANION GAP SERPL CALC-SCNC: 16 MMOL/L (ref 8–16)
ANISOCYTOSIS BLD QL SMEAR: SLIGHT
AST SERPL-CCNC: 32 U/L (ref 10–40)
BACTERIA BLD CULT: NORMAL
BACTERIA BLD CULT: NORMAL
BASOPHILS # BLD AUTO: 0.12 K/UL (ref 0–0.2)
BASOPHILS NFR BLD: 1 % (ref 0–1.9)
BILIRUB SERPL-MCNC: 1 MG/DL (ref 0.1–1)
BUN SERPL-MCNC: 61 MG/DL (ref 6–20)
CALCIUM SERPL-MCNC: 10 MG/DL (ref 8.7–10.5)
CHLORIDE SERPL-SCNC: 101 MMOL/L (ref 95–110)
CO2 SERPL-SCNC: 22 MMOL/L (ref 23–29)
CREAT SERPL-MCNC: 5 MG/DL (ref 0.5–1.4)
CRP SERPL-MCNC: 139.8 MG/L (ref 0–8.2)
DACRYOCYTES BLD QL SMEAR: ABNORMAL
DIFFERENTIAL METHOD: ABNORMAL
EOSINOPHIL # BLD AUTO: 0.2 K/UL (ref 0–0.5)
EOSINOPHIL NFR BLD: 1.8 % (ref 0–8)
ERYTHROCYTE [DISTWIDTH] IN BLOOD BY AUTOMATED COUNT: 18.3 % (ref 11.5–14.5)
EST. GFR  (AFRICAN AMERICAN): 11 ML/MIN/1.73 M^2
EST. GFR  (NON AFRICAN AMERICAN): 9 ML/MIN/1.73 M^2
FERRITIN SERPL-MCNC: 1760 NG/ML (ref 20–300)
GLUCOSE SERPL-MCNC: 132 MG/DL (ref 70–110)
HCT VFR BLD AUTO: 25.9 % (ref 37–48.5)
HGB BLD-MCNC: 8.2 G/DL (ref 12–16)
HYPOCHROMIA BLD QL SMEAR: ABNORMAL
IMM GRANULOCYTES # BLD AUTO: 0.85 K/UL (ref 0–0.04)
IMM GRANULOCYTES NFR BLD AUTO: 7 % (ref 0–0.5)
LYMPHOCYTES # BLD AUTO: 1.4 K/UL (ref 1–4.8)
LYMPHOCYTES NFR BLD: 11.9 % (ref 18–48)
MCH RBC QN AUTO: 26.7 PG (ref 27–31)
MCHC RBC AUTO-ENTMCNC: 31.7 G/DL (ref 32–36)
MCV RBC AUTO: 84 FL (ref 82–98)
MONOCYTES # BLD AUTO: 1.1 K/UL (ref 0.3–1)
MONOCYTES NFR BLD: 9.2 % (ref 4–15)
NEUTROPHILS # BLD AUTO: 8.3 K/UL (ref 1.8–7.7)
NEUTROPHILS NFR BLD: 69.1 % (ref 38–73)
NRBC BLD-RTO: 0 /100 WBC
PLATELET # BLD AUTO: 518 K/UL (ref 150–350)
PLATELET BLD QL SMEAR: ABNORMAL
PMV BLD AUTO: 11 FL (ref 9.2–12.9)
POIKILOCYTOSIS BLD QL SMEAR: SLIGHT
POTASSIUM SERPL-SCNC: 4.6 MMOL/L (ref 3.5–5.1)
PROT SERPL-MCNC: 8.3 G/DL (ref 6–8.4)
RBC # BLD AUTO: 3.07 M/UL (ref 4–5.4)
SODIUM SERPL-SCNC: 139 MMOL/L (ref 136–145)
VANCOMYCIN SERPL-MCNC: 15.4 UG/ML
WBC # BLD AUTO: 12.06 K/UL (ref 3.9–12.7)

## 2020-04-12 PROCEDURE — 99291 PR CRITICAL CARE, E/M 30-74 MINUTES: ICD-10-PCS | Mod: S$GLB,,, | Performed by: INTERNAL MEDICINE

## 2020-04-12 PROCEDURE — 99233 PR SUBSEQUENT HOSPITAL CARE,LEVL III: ICD-10-PCS | Mod: ,,, | Performed by: INTERNAL MEDICINE

## 2020-04-12 PROCEDURE — 87324 CLOSTRIDIUM AG IA: CPT

## 2020-04-12 PROCEDURE — 82728 ASSAY OF FERRITIN: CPT

## 2020-04-12 PROCEDURE — 63600175 PHARM REV CODE 636 W HCPCS: Performed by: INTERNAL MEDICINE

## 2020-04-12 PROCEDURE — 63600175 PHARM REV CODE 636 W HCPCS: Performed by: HOSPITALIST

## 2020-04-12 PROCEDURE — 87493 C DIFF AMPLIFIED PROBE: CPT

## 2020-04-12 PROCEDURE — 27000221 HC OXYGEN, UP TO 24 HOURS

## 2020-04-12 PROCEDURE — 25000003 PHARM REV CODE 250: Performed by: INTERNAL MEDICINE

## 2020-04-12 PROCEDURE — 11000001 HC ACUTE MED/SURG PRIVATE ROOM

## 2020-04-12 PROCEDURE — 20000000 HC ICU ROOM

## 2020-04-12 PROCEDURE — 99233 SBSQ HOSP IP/OBS HIGH 50: CPT | Mod: ,,, | Performed by: INTERNAL MEDICINE

## 2020-04-12 PROCEDURE — 80053 COMPREHEN METABOLIC PANEL: CPT

## 2020-04-12 PROCEDURE — 25000003 PHARM REV CODE 250: Performed by: HOSPITALIST

## 2020-04-12 PROCEDURE — 80202 ASSAY OF VANCOMYCIN: CPT

## 2020-04-12 PROCEDURE — 85025 COMPLETE CBC W/AUTO DIFF WBC: CPT

## 2020-04-12 PROCEDURE — 99900035 HC TECH TIME PER 15 MIN (STAT)

## 2020-04-12 PROCEDURE — 87449 NOS EACH ORGANISM AG IA: CPT

## 2020-04-12 PROCEDURE — 86140 C-REACTIVE PROTEIN: CPT

## 2020-04-12 PROCEDURE — 94761 N-INVAS EAR/PLS OXIMETRY MLT: CPT

## 2020-04-12 PROCEDURE — 99291 CRITICAL CARE FIRST HOUR: CPT | Mod: S$GLB,,, | Performed by: INTERNAL MEDICINE

## 2020-04-12 RX ORDER — SODIUM CHLORIDE 9 MG/ML
INJECTION, SOLUTION INTRAVENOUS ONCE
Status: CANCELLED | OUTPATIENT
Start: 2020-04-12 | End: 2020-04-12

## 2020-04-12 RX ORDER — SODIUM CHLORIDE 9 MG/ML
INJECTION, SOLUTION INTRAVENOUS
Status: CANCELLED | OUTPATIENT
Start: 2020-04-12

## 2020-04-12 RX ORDER — CLONIDINE HYDROCHLORIDE 0.1 MG/1
0.1 TABLET ORAL EVERY 4 HOURS PRN
Status: DISCONTINUED | OUTPATIENT
Start: 2020-04-12 | End: 2020-04-17

## 2020-04-12 RX ADMIN — OXYCODONE HYDROCHLORIDE AND ACETAMINOPHEN 1000 MG: 500 TABLET ORAL at 09:04

## 2020-04-12 RX ADMIN — MINERAL OIL AND PETROLATUM: 150; 830 OINTMENT OPHTHALMIC at 09:04

## 2020-04-12 RX ADMIN — HYDROMORPHONE HYDROCHLORIDE 0.2 MG: 2 INJECTION INTRAMUSCULAR; INTRAVENOUS; SUBCUTANEOUS at 12:04

## 2020-04-12 RX ADMIN — LOPERAMIDE HYDROCHLORIDE 2 MG: 2 CAPSULE ORAL at 08:04

## 2020-04-12 RX ADMIN — METOPROLOL TARTRATE 25 MG: 25 TABLET, FILM COATED ORAL at 08:04

## 2020-04-12 RX ADMIN — OXYCODONE HYDROCHLORIDE AND ACETAMINOPHEN 1000 MG: 500 TABLET ORAL at 08:04

## 2020-04-12 RX ADMIN — HEPARIN SODIUM 5000 UNITS: 5000 INJECTION, SOLUTION INTRAVENOUS; SUBCUTANEOUS at 08:04

## 2020-04-12 RX ADMIN — OXYCODONE HYDROCHLORIDE AND ACETAMINOPHEN 1000 MG: 500 TABLET ORAL at 12:04

## 2020-04-12 RX ADMIN — CLONIDINE HYDROCHLORIDE 0.1 MG: 0.1 TABLET ORAL at 12:04

## 2020-04-12 RX ADMIN — HEPARIN SODIUM 5000 UNITS: 5000 INJECTION, SOLUTION INTRAVENOUS; SUBCUTANEOUS at 09:04

## 2020-04-12 RX ADMIN — GENTAMICIN SULFATE 1 DROP: 3 SOLUTION OPHTHALMIC at 02:04

## 2020-04-12 RX ADMIN — GENTAMICIN SULFATE 1 DROP: 3 SOLUTION OPHTHALMIC at 05:04

## 2020-04-12 RX ADMIN — GENTAMICIN SULFATE 1 DROP: 3 SOLUTION OPHTHALMIC at 01:04

## 2020-04-12 RX ADMIN — SODIUM BICARBONATE 650 MG TABLET 1300 MG: at 02:04

## 2020-04-12 RX ADMIN — GENTAMICIN SULFATE 1 DROP: 3 SOLUTION OPHTHALMIC at 09:04

## 2020-04-12 RX ADMIN — LOPERAMIDE HYDROCHLORIDE 2 MG: 2 CAPSULE ORAL at 02:04

## 2020-04-12 RX ADMIN — MICAFUNGIN SODIUM 100 MG: 20 INJECTION, POWDER, LYOPHILIZED, FOR SOLUTION INTRAVENOUS at 10:04

## 2020-04-12 RX ADMIN — HYDROMORPHONE HYDROCHLORIDE 0.2 MG: 2 INJECTION INTRAMUSCULAR; INTRAVENOUS; SUBCUTANEOUS at 06:04

## 2020-04-12 RX ADMIN — LEVOTHYROXINE SODIUM 100 MCG: 100 TABLET ORAL at 05:04

## 2020-04-12 RX ADMIN — FUROSEMIDE 60 MG: 10 INJECTION, SOLUTION INTRAMUSCULAR; INTRAVENOUS at 08:04

## 2020-04-12 RX ADMIN — CHOLESTYRAMINE 4 G: 4 POWDER, FOR SUSPENSION ORAL at 09:04

## 2020-04-12 RX ADMIN — SODIUM BICARBONATE 650 MG TABLET 1300 MG: at 09:04

## 2020-04-12 RX ADMIN — FUROSEMIDE 60 MG: 10 INJECTION, SOLUTION INTRAMUSCULAR; INTRAVENOUS at 09:04

## 2020-04-12 RX ADMIN — SODIUM BICARBONATE 650 MG TABLET 1300 MG: at 08:04

## 2020-04-12 RX ADMIN — ZINC SULFATE 220 MG (50 MG) CAPSULE 220 MG: CAPSULE at 08:04

## 2020-04-12 RX ADMIN — LOPERAMIDE HYDROCHLORIDE 2 MG: 2 CAPSULE ORAL at 09:04

## 2020-04-12 RX ADMIN — METOPROLOL TARTRATE 25 MG: 25 TABLET, FILM COATED ORAL at 09:04

## 2020-04-12 RX ADMIN — LACTOBACILLUS TAB 2 TABLET: TAB at 09:04

## 2020-04-12 RX ADMIN — OXYCODONE HYDROCHLORIDE AND ACETAMINOPHEN 1000 MG: 500 TABLET ORAL at 04:04

## 2020-04-12 RX ADMIN — GENTAMICIN SULFATE 1 DROP: 3 SOLUTION OPHTHALMIC at 10:04

## 2020-04-12 RX ADMIN — CHOLESTYRAMINE 4 G: 4 POWDER, FOR SUSPENSION ORAL at 08:04

## 2020-04-12 RX ADMIN — LACTOBACILLUS TAB 2 TABLET: TAB at 08:04

## 2020-04-12 NOTE — PROGRESS NOTES
Pharmacokinetic Assessment Follow Up: IV Vancomycin    Vancomycin serum concentration assessment(s):    The random level was drawn correctly and can be used to guide therapy at this time. The measurement is within the desired definitive target range of 15 to 20 mcg/mL.    Vancomycin Regimen Plan:    Patient's random level was 15.4 mcg/mL and within the therapeutic range of 15-20 mcg/mL.  Patient will not be receiving HD this weekend. Patient is currently receiving HD PRN.   Patient will be re-dosed tomorrow.   A random level will be drawn on 04/12/20 with AM labs.     Drug levels (last 3 results):  Recent Labs   Lab Result Units 04/10/20  0329 04/11/20  0352 04/12/20  0355   Vancomycin, Random ug/mL 14.7 18.5 15.4     Pharmacy will continue to follow and monitor vancomycin.    Please contact pharmacy at extension [phone] for questions regarding this assessment.    Thank you for the consult,   Jose R Francis       Patient brief summary:  Maria Victoria Hernandez is a 54 y.o. female initiated on antimicrobial therapy with IV Vancomycin for treatment of bacteremia.    The patient is being dosed by level.     Drug Allergies:   Review of patient's allergies indicates:  No Known Allergies    Actual Body Weight:   113.4 kg (250 lb)    Renal Function:   Estimated Creatinine Clearance: 15.2 mL/min (A) (based on SCr of 5 mg/dL (H)).,     Dialysis Method (if applicable):  intermittent HD. HD PRN.     CBC (last 72 hours):  Recent Labs   Lab Result Units 04/10/20  0329 04/11/20  0351 04/12/20  0355   WBC K/uL 13.39* 12.43 12.06   Hemoglobin g/dL 8.4* 8.2* 8.2*   Hematocrit % 26.5* 26.3* 25.9*   Platelets K/uL 406* 440* 518*   Gran% % 77.1* 70.6 69.1   Lymph% % 8.4* 12.1* 11.9*   Mono% % 8.8 9.5 9.2   Eosinophil% % 0.1 0.7 1.8   Basophil% % 0.7 1.1 1.0   Differential Method  Automated Automated Automated       Metabolic Panel (last 72 hours):  Recent Labs   Lab Result Units 04/10/20  0329 04/11/20  0351 04/12/20  0355   Sodium  mmol/L 134* 139 139   Potassium mmol/L 3.8 4.0 4.6   Chloride mmol/L 97 103 101   CO2 mmol/L 21* 23 22*   Glucose mg/dL 105 147* 132*   BUN, Bld mg/dL 59* 43* 61*   Creatinine mg/dL 5.1* 4.3* 5.0*   Albumin g/dL 2.1* 2.2* 2.3*   Total Bilirubin mg/dL 1.8* 1.4* 1.0   Alkaline Phosphatase U/L 268* 255* 238*   AST U/L 38 26 32   ALT U/L 41 36 37       Vancomycin Administrations:  vancomycin given in the last 96 hours        No antibiotic orders with administrations found.                      Microbiologic Results:  Microbiology Results (last 7 days)       Procedure Component Value Units Date/Time    Blood culture [[id number]] Collected:  04/08/20 1739    Order Status:  Completed Specimen:  Blood from Line, Central Updated:  04/11/20 2212     Blood Culture, Routine No Growth to date      No Growth to date      No Growth to date      No Growth to date    Blood culture [[id number]] Collected:  04/07/20 1730    Order Status:  Completed Specimen:  Blood from Line, Central Updated:  04/11/20 2212     Blood Culture, Routine No Growth after 4 days.     Blood culture [[id number]] Collected:  04/07/20 0907    Order Status:  Completed Specimen:  Blood Updated:  04/11/20 2212     Blood Culture, Routine No Growth after 4 days.     Clostridium difficile EIA [[id number]]     Order Status:  No result Specimen:  Stool     Blood culture [[id number]]  (Abnormal) Collected:  04/04/20 0912    Order Status:  Completed Specimen:  Blood from Line, Central Updated:  04/11/20 1453     Blood Culture, Routine Gram stain peds bottle: budding yeast      Results called to and read back by: Carri Pryor RN  04/06/2020  03:04      CANDIDA ALBICANS  Susceptibility pending  ID consult required at [facility] and [facility].      Blood culture [[id number]]  (Abnormal) Collected:  04/06/20 0806    Order Status:  Completed Specimen:  Blood from Antecubital, Left Updated:  04/11/20 1453     Blood Culture, Routine Gram stain peds bottle: yeast        Results called to and read back by: Tania Nolan RN 04/08/2020        11:20      CANDIDA ALBICANS  Susceptibility pending  [place] consult required [place] [facility] [place] [facility] [facility].      Blood culture [[id number]] Collected:  04/10/20 0453    Order Status:  Completed Specimen:  Blood Updated:  04/11/20 1412     Blood Culture, Routine No Growth to date      No Growth to date    Blood culture [[id number]] Collected:  04/08/20 0745    Order Status:  Completed Specimen:  Blood Updated:  04/11/20 1412     Blood Culture, Routine No Growth to date      No Growth to date      No Growth to date      No Growth to date    Blood culture [[id number]] Collected:  04/06/20 1802    Order Status:  Completed Specimen:  Blood from Line, Central Updated:  04/10/20 2212     Blood Culture, Routine No Growth after 4 days.     IV catheter culture [[id number]]  (Abnormal) Collected:  04/06/20 1455    Order Status:  Completed Specimen:  Catheter Tip, Intrajugular Updated:  04/09/20 1300     Aerobic Culture - Cath tip STAPHYLOCOCCUS EPIDERMIDIS  < 15 colonies      IV catheter culture [[id number]] Collected:  04/06/20 1615    Order Status:  Completed Specimen:  Catheter Tip, Dialysis Updated:  04/09/20 1121     Aerobic Culture - Cath tip No growth    Blood culture [[id number]]     Order Status:  Canceled Specimen:  Blood     Blood culture [[id number]]  (Abnormal)  (Susceptibility) Collected:  04/05/20 0813    Order Status:  Completed Specimen:  Blood from Peripheral, Left  Hand Updated:  04/08/20 1236     Blood Culture, Routine Gram stain peds bottle: Gram positive cocci in clusters resembling Staph       Results called to and read back by: Lynn Sawyer RN 04/06/2020  09:57      STAPHYLOCOCCUS EPIDERMIDIS    Blood culture [[id number]]  (Abnormal)  (Susceptibility) Collected:  04/05/20 0813    Order Status:  Completed Specimen:  Blood from Line, Central Left  Neck Updated:  04/08/20 1234     Blood Culture, Routine Gram stain tank bottle:  Gram positive cocci in clusters resembling Staph       Results called to and read back by: Lynn Sawyer RN 04/06/2020  09:57      Gram stain aer bottle: Gram positive cocci in clusters resembling Staph       Positive results previously called 04/06/2020  15:52      STAPHYLOCOCCUS EPIDERMIDIS    Culture, Respiratory with Gram Stain [[id number]]  (Abnormal) Collected:  04/05/20 0050    Order Status:  Completed Specimen:  Respiratory from Endotracheal Aspirate Updated:  04/08/20 1046     Respiratory Culture No S aureus or Pseudomonas isolated.      CANDIDA ALBICANS  Few  Normal respiratory anamaria also present       Gram Stain (Respiratory) <10 epithelial cells per low power field.     Gram Stain (Respiratory) Many WBC's     Gram Stain (Respiratory) Rare Gram positive cocci     Gram Stain (Respiratory) Rare yeast    Blood culture [[id number]]     Order Status:  Canceled Specimen:  Blood

## 2020-04-12 NOTE — PROGRESS NOTES
Progress Note  Infectious Disease    Reason for Consult:      HPI: Maria Victoria Hernandez is a   54 y.o. female employee of [employer].  with past medical history of morbid obesity, BMI of 48, diabetes, diet controlled, anemia, B12 deficiency, vitamin-D deficiency, hypothyroidism, was admitted on 03/25/2020 due to shortness of breath, diagnosed with COVID 19 is positive.  Patient has been intubated.  She went into renal failure and has been receiving HD by nephrologist.  Intensivist has been managing the vent.    Patient has completed 10 days of hydroxychloroquine and about 8 days of Zithromax and ceftriaxone.  She started spiking fever of  103 on 04/04.  White count jumped to 17.8.  She was started on vancomycin and Zosyn and blood cultures were sent.    ID consult called for g positives and yeast in blood cultures.  Patient is afebrile at the time.  WBC has improved.  Discussed with nurse.  Will discontinue lines.  Continue vancomycin, Daptomycin x1.  Add micafungin daily.    04/07/2020  WBC improved 17--12--10  Ferritin 1752--1538--1897  Intubated Sedated.  FiO2 of 35 people 5.  T-max 101.7°  Dialysis catheter changed yesterday  Leukocytosis improved 12/17/2010 04/08/2020  T-max 99.9°  Intubated sedated.  FiO2 35, peep of 5.  Fail CPAP trial.  Tolerating vancomycin, Zosyn, Micafungin.   Lines are fresh.  Nurse is trying to protect them.  Discussed with nurse:  Her daughter who is a nurse came and saw mother today, she agrees right eye looks better.    04/09/2020  Patient is extubated today, Really weak, Does not breath in deep, I advised her on deep breathing  Continues to need Cardene drip for BP  HAd loose stool-- flexiseal was placed    04/10/2020.  She is very weak.  She tries to opens her eyes and interact with me.  Right eye is improved.  Dialysis nurse is about to start dialysis.    04/11/2020  T max 100.3 yesterday.   She looks tired, but better than yesterday. Today she is  more  "awake and slighty stronger  She was tachycardic yesterday -- Cardene was switched to labetalol  WBC is about the same. ESR 50;  procal is still elveated. vanc level today is 14  Hospitalist ": Requested pulmonary to review possible  bipap -for Covid must have extra filter for machines which are limited "  As per renal :   " UOP 750cc.  3L UF w/HD yesterday.  K+ at goal.  Holding dialysis over weekend to assess for recovery"    Antibiotics (From admission, onward)    Start     Stop Route Frequency Ordered    04/10/20 1700  vancomycin 500 mg in dextrose 5 % 100 mL IVPB (ready to mix system)      -- IV Once 04/10/20 1209    04/05/20 0848  vancomycin - pharmacy to dose  (vancomycin IVPB)      -- IV pharmacy to manage frequency 04/05/20 0748    04/02/20 2315  gentamicin 0.3 % ophthalmic solution 1 drop      -- RIGHT EYE Every 4 hours 04/02/20 2310        Antifungals (From admission, onward)    Start     Stop Route Frequency Ordered    04/06/20 1100  micafungin 100 mg in sodium chloride 0.9 % 100 mL IVPB (ready to mix system)      -- IV Every 24 hours (non-standard times) 04/06/20 0959        Antivirals (From admission, onward)    None          EXAM & DIAGNOSTICS REVIEWED:   Vitals:     Temp:  [97.9 °F (36.6 °C)-100 °F (37.8 °C)]   Temp: 98.2 °F (36.8 °C) (04/12/20 1200)  Pulse: 93 (04/12/20 1430)  Resp: (!) 29 (04/12/20 1430)  BP: (!) 142/81 (04/12/20 1430)  SpO2: 100 % (04/12/20 1430)    Intake/Output Summary (Last 24 hours) at 4/12/2020 1441  Last data filed at 4/12/2020 1400  Gross per 24 hour   Intake 2483 ml   Output 2586 ml   Net -103 ml     Oxygen Concentration (%):  [36] 36    General:  In NAD   Eyes:  Anicteric,  ENT:  No ulcers, exudates, thrush, nares patent, extubated today  Neck:  supple, no masses or adenopathy appreciated  Lungs: Clear, no consolidation, rales, wheezes, rub  Heart:  RRR, no gallop/murmur/rub noted  Abd:  Soft, NT, ND, normal BS, no masses or organomegaly " appreciated.  :  Cuellar  Musc:  Joints without effusion, swelling, erythema, synovitis, muscle wasting.   Skin:  No rashes. No palmar or plantar lesions. No subungual petechiae  Wound:   Neuro: Tired, tries to follow commands  Psych:  Calm  Lymphatic:     No cervical, supraclavicular, axillary, or inguinal nodes  Extrem: No edema, erythema, phlebitis, cellulitis, warm and well perfused  VAD:       Isolation:      Lines/Tubes/Drains:  Right IJ 04/06  Left IJ 04/06  Cuellar 03/25  OT 03/25    General Labs reviewed:  Recent Labs   Lab 04/10/20  0329 04/11/20  0351 04/12/20  0355   WBC 13.39* 12.43 12.06   HGB 8.4* 8.2* 8.2*   HCT 26.5* 26.3* 25.9*   * 440* 518*       Recent Labs   Lab 04/10/20  0329 04/11/20  0351 04/12/20  0355   * 139 139   K 3.8 4.0 4.6   CL 97 103 101   CO2 21* 23 22*   BUN 59* 43* 61*   CREATININE 5.1* 4.3* 5.0*   CALCIUM 9.5 9.7 10.0   PROT 7.8 8.0 8.3   BILITOT 1.8* 1.4* 1.0   ALKPHOS 268* 255* 238*   ALT 41 36 37   AST 38 26 32     Micro:  Microbiology Results (last 7 days)     Procedure Component Value Units Date/Time    Blood culture [[id number]] Collected:  04/10/20 0453    Order Status:  Completed Specimen:  Blood Updated:  04/12/20 1412     Blood Culture, Routine No Growth to date      No Growth to date      No Growth to date    Blood culture [[id number]] Collected:  04/08/20 0745    Order Status:  Completed Specimen:  Blood Updated:  04/12/20 1412     Blood Culture, Routine No Growth after 4 days.     Blood culture [[id number]]  (Abnormal) Collected:  04/04/20 0912    Order Status:  Completed Specimen:  Blood from Line, Central Updated:  04/12/20 1136     Blood Culture, Routine Gram stain peds bottle: budding yeast      Results called to and read back by: Carri Pryor RN  04/06/2020  03:04      CANDIDA ALBICANS  Susceptibility pending  [facility] [place] [facility].      Blood culture [[id number]]  (Abnormal) Collected:  04/06/20 0806    Order  Status:  Completed Specimen:  Blood from Antecubital, Left Updated:  04/12/20 1136     Blood Culture, Routine Gram stain peds bottle: yeast       Results called to and read back by: Tania Nolan RN 04/08/2020        11:20      CANDIDA ALBICANS  Susceptibility pending  ID consult required at [facility] and [facility] locations.      Blood culture [[id number]] Collected:  04/08/20 1739    Order Status:  Completed Specimen:  Blood from Line, Central Updated:  04/11/20 2212     Blood Culture, Routine No Growth to date      No Growth to date      No Growth to date      No Growth to date    Blood culture [[id number]] Collected:  04/07/20 1730    Order Status:  Completed Specimen:  Blood from Line, Central Updated:  04/11/20 2212     Blood Culture, Routine No Growth after 4 days.     Blood culture [[id number]] Collected:  04/07/20 0907    Order Status:  Completed Specimen:  Blood Updated:  04/11/20 2212     Blood Culture, Routine No Growth after 4 days.     Clostridium difficile EIA [[id number]]     Order Status:  No result Specimen:  Stool     Blood culture [[id number]] Collected:  04/06/20 1802    Order Status:  Completed Specimen:  Blood from Line, Central Updated:  04/10/20 2212     Blood Culture, Routine No Growth after 4 days.     IV catheter culture [[id number]]  (Abnormal) Collected:  04/06/20 1455    Order Status:  Completed Specimen:  Catheter Tip, Intrajugular Updated:  04/09/20 1300     Aerobic Culture - Cath tip STAPHYLOCOCCUS EPIDERMIDIS  < 15 colonies      IV catheter culture [[id number]] Collected:  04/06/20 1615    Order Status:  Completed Specimen:  Catheter Tip, Dialysis Updated:  04/09/20 1121     Aerobic Culture - Cath tip No growth    Blood culture [[id number]]     Order Status:  Canceled Specimen:  Blood     Blood culture [[id number]]  (Abnormal)  (Susceptibility) Collected:  04/05/20 0813    Order Status:  Completed Specimen:  Blood from Peripheral, Left  Hand Updated:  04/08/20 1236     Blood  Culture, Routine Gram stain peds bottle: Gram positive cocci in clusters resembling Staph       Results called to and read back by: Lynn Sawyer RN 04/06/2020  09:57      STAPHYLOCOCCUS EPIDERMIDIS    Blood culture [[id number]]  (Abnormal)  (Susceptibility) Collected:  04/05/20 0813    Order Status:  Completed Specimen:  Blood from Line, Central Left  Neck Updated:  04/08/20 1234     Blood Culture, Routine Gram stain tank bottle: Gram positive cocci in clusters resembling Staph       Results called to and read back by: Lynn Sawyer RN 04/06/2020  09:57      Gram stain aer bottle: Gram positive cocci in clusters resembling Staph       Positive results previously called 04/06/2020  15:52      STAPHYLOCOCCUS EPIDERMIDIS    Culture, Respiratory with Gram Stain [[id number]]  (Abnormal) Collected:  04/05/20 0050    Order Status:  Completed Specimen:  Respiratory from Endotracheal Aspirate Updated:  04/08/20 1046     Respiratory Culture No S aureus or Pseudomonas isolated.      CANDIDA ALBICANS  Few  Normal respiratory anamaria also present       Gram Stain (Respiratory) <10 epithelial cells per low power field.     Gram Stain (Respiratory) Many WBC's     Gram Stain (Respiratory) Rare Gram positive cocci     Gram Stain (Respiratory) Rare yeast    Blood culture [[id number]]     Order Status:  Canceled Specimen:  Blood         Imaging Reviewed:  KUB 0409/2020NG tube present with tip overlying the stomach in the left abdomen.  No evidence of bowel obstruction.  No radiographic mass.  CXR 04/06/2020Support devices as above.  No pneumothorax.  Moderate pulmonary airspace disease without significant change.  CXR 04/05/2020 No significant interval change in the bilateral airspace disease.  Support devices in stable position.    Cardiology:  Sinus rhythm  ECHO  · Mild left ventricular enlargement.  · Mildly decreased left ventricular systolic function. The estimated ejection fraction is 45%.  · Grade I (mild) left ventricular  diastolic dysfunction consistent with impaired relaxation.  · Normal right ventricular systolic function.  · Mild left atrial enlargement.  · Moderate mitral regurgitation.  · Mild tricuspid regurgitation.  · Mild pulmonary hypertension present. PASP 40 mm of Hg.     IMPRESSION & PLAN     Staphylococcus epidermidis bacteremia, line infection.  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   Eventually may need retinal exam   Changed  Lines  on 04/06 in afternoon.   BCx drawn on the am of 04/06 were positive for C albicans!  Limited ECHO ---- ro vegetation   Progress Note  Infectious Disease    Reason for Consult:      HPI: Maria Victoria Hernandez is a   54 y.o. female employee of [employer].  with past medical history of morbid obesity, BMI of 48, diabetes, diet controlled, anemia, B12 deficiency, vitamin-D deficiency, hypothyroidism, was admitted on 03/25/2020 due to shortness of breath, diagnosed with COVID 19 is positive.  Patient has been intubated.  She went into renal failure and has been receiving HD by nephrologist.  Intensivist has been managing the vent.    Patient has completed 10 days of hydroxychloroquine and about 8 days of Zithromax and ceftriaxone.  She started spiking fever of  103 on 04/04.  White count jumped to 17.8.  She was started on vancomycin and Zosyn and blood cultures were sent.    ID consult called for g positives and yeast in blood cultures.  Patient is afebrile at the time.  WBC has  "improved.  Discussed with nurse.  Will discontinue lines.  Continue vancomycin, Daptomycin x1.  Add micafungin daily.    04/07/2020  WBC improved 17--12--10  Ferritin 1752--1538--1897  Intubated Sedated.  FiO2 of 35 people 5.  T-max 101.7°  Dialysis catheter changed yesterday  Leukocytosis improved 12/17/2010 04/08/2020  T-max 99.9°  Intubated sedated.  FiO2 35, peep of 5.  Fail CPAP trial.  Tolerating vancomycin, Zosyn, Micafungin.   Lines are fresh.  Nurse is trying to protect them.  Discussed with nurse:  Her daughter who is a nurse came and saw mother today, she agrees right eye looks better.    04/09/2020  Patient is extubated today, Really weak, Does not breath in deep, I advised her on deep breathing  Continues to need Cardene drip for BP  HAd loose stool-- flexiseal was placed    04/10/2020.  She is very weak.  She tries to opens her eyes and interact with me.  Right eye is improved.  Dialysis nurse is about to start dialysis.    04/11/2020  T max 100.3 yesterday.   She looks tired, but better than yesterday. Today she is  more awake and slighty stronger  She was tachycardic yesterday -- Cardene was switched to labetalol  WBC is about the same. ESR 50;  procal is still elveated. vanc level today is 14  Hospitalist ": Requested pulmonary to review possible  bipap -for Covid must have extra filter for machines which are limited "  As per renal :   " UOP 750cc.  3L UF w/HD yesterday.  K+ at goal.  Holding dialysis over weekend to assess for recovery"    Antibiotics (From admission, onward)    Start     Stop Route Frequency Ordered    04/10/20 1700  vancomycin 500 mg in dextrose 5 % 100 mL IVPB (ready to mix system)      -- IV Once 04/10/20 1209    04/05/20 0848  vancomycin - pharmacy to dose  (vancomycin IVPB)      -- IV pharmacy to manage frequency 04/05/20 0748    04/02/20 2315  gentamicin 0.3 % ophthalmic solution 1 drop      -- RIGHT EYE Every 4 hours 04/02/20 2310        Antifungals (From admission, " onward)    Start     Stop Route Frequency Ordered    04/06/20 1100  micafungin 100 mg in sodium chloride 0.9 % 100 mL IVPB (ready to mix system)      -- IV Every 24 hours (non-standard times) 04/06/20 0959        Antivirals (From admission, onward)    None          EXAM & DIAGNOSTICS REVIEWED:   Vitals:     Temp:  [97.9 °F (36.6 °C)-100 °F (37.8 °C)]   Temp: 98.2 °F (36.8 °C) (04/12/20 1200)  Pulse: 93 (04/12/20 1430)  Resp: (!) 29 (04/12/20 1430)  BP: (!) 142/81 (04/12/20 1430)  SpO2: 100 % (04/12/20 1430)    Intake/Output Summary (Last 24 hours) at 4/12/2020 1441  Last data filed at 4/12/2020 1400  Gross per 24 hour   Intake 2483 ml   Output 2586 ml   Net -103 ml     Oxygen Concentration (%):  [36] 36    General:  In NAD   Eyes:  Anicteric,  ENT:  No ulcers, exudates, thrush, nares patent, extubated today  Neck:  supple, no masses or adenopathy appreciated  Lungs: Clear, no consolidation, rales, wheezes, rub  Heart:  RRR, no gallop/murmur/rub noted  Abd:  Soft, NT, ND, normal BS, no masses or organomegaly appreciated.  :  Cuellar  Musc:  Joints without effusion, swelling, erythema, synovitis, muscle wasting.   Skin:  No rashes. No palmar or plantar lesions. No subungual petechiae  Wound:   Neuro: Tired, tries to follow commands  Psych:  Calm  Lymphatic:     No cervical, supraclavicular, axillary, or inguinal nodes  Extrem: No edema, erythema, phlebitis, cellulitis, warm and well perfused  VAD:       Isolation:      Lines/Tubes/Drains:  Right IJ 04/06  Left IJ 04/06  Cuellar 03/25  OT 03/25    General Labs reviewed:  Recent Labs   Lab 04/10/20  0329 04/11/20  0351 04/12/20  0355   WBC 13.39* 12.43 12.06   HGB 8.4* 8.2* 8.2*   HCT 26.5* 26.3* 25.9*   * 440* 518*       Recent Labs   Lab 04/10/20  0329 04/11/20  0351 04/12/20  0355   * 139 139   K 3.8 4.0 4.6   CL 97 103 101   CO2 21* 23 22*   BUN 59* 43* 61*   CREATININE 5.1* 4.3* 5.0*   CALCIUM 9.5 9.7 10.0   PROT 7.8 8.0 8.3   BILITOT 1.8* 1.4* 1.0    ALKPHOS 268* 255* 238*   ALT 41 36 37   AST 38 26 32     Micro:  Microbiology Results (last 7 days)     Procedure Component Value Units Date/Time    Blood culture [[id number]] Collected:  04/10/20 0453    Order Status:  Completed Specimen:  Blood Updated:  04/12/20 1412     Blood Culture, Routine No Growth to date      No Growth to date      No Growth to date    Blood culture [[id number]] Collected:  04/08/20 0745    Order Status:  Completed Specimen:  Blood Updated:  04/12/20 1412     Blood Culture, Routine No Growth after 4 days.     Blood culture [[id number]]  (Abnormal) Collected:  04/04/20 0912    Order Status:  Completed Specimen:  Blood from Line, Central Updated:  04/12/20 1136     Blood Culture, Routine Gram stain peds bottle: budding yeast      Results called to and read back by: Carri Pryor RN  04/06/2020  03:04      CANDIDA ALBICANS  Susceptibility pending  ID consult required at [facility] and [facility] locations.      Blood culture [[id number]]  (Abnormal) Collected:  04/06/20 0806    Order Status:  Completed Specimen:  Blood from Antecubital, Left Updated:  04/12/20 1136     Blood Culture, Routine Gram stain peds bottle: yeast       Results called to and read back by: Tania Nolan RN 04/08/2020        11:20      CANDIDA ALBICANS  Susceptibility pending  ID consult required at [facility] and [facility] locations.      Blood culture [[id number]] Collected:  04/08/20 1739    Order Status:  Completed Specimen:  Blood from Line, Central Updated:  04/11/20 2212     Blood Culture, Routine No Growth to date      No Growth to date      No Growth to date      No Growth to date    Blood culture [[id number]] Collected:  04/07/20 1730    Order Status:  Completed Specimen:  Blood from Line, Central Updated:  04/11/20 2212     Blood Culture, Routine No Growth after 4 days.     Blood culture [[id number]] Collected:  04/07/20 0907    Order Status:  Completed Specimen:  Blood Updated:  04/11/20 2212      Blood Culture, Routine No Growth after 4 days.     Clostridium difficile EIA [[id number]]     Order Status:  No result Specimen:  Stool     Blood culture [[id number]] Collected:  04/06/20 1802    Order Status:  Completed Specimen:  Blood from Line, Central Updated:  04/10/20 2212     Blood Culture, Routine No Growth after 4 days.     IV catheter culture [[id number]]  (Abnormal) Collected:  04/06/20 1455    Order Status:  Completed Specimen:  Catheter Tip, Intrajugular Updated:  04/09/20 1300     Aerobic Culture - Cath tip STAPHYLOCOCCUS EPIDERMIDIS  < 15 colonies      IV catheter culture [[id number]] Collected:  04/06/20 1615    Order Status:  Completed Specimen:  Catheter Tip, Dialysis Updated:  04/09/20 1121     Aerobic Culture - Cath tip No growth    Blood culture [[id number]]     Order Status:  Canceled Specimen:  Blood     Blood culture [[id number]]  (Abnormal)  (Susceptibility) Collected:  04/05/20 0813    Order Status:  Completed Specimen:  Blood from Peripheral, Left  Hand Updated:  04/08/20 1236     Blood Culture, Routine Gram stain peds bottle: Gram positive cocci in clusters resembling Staph       Results called to and read back by: Lynn Sawyer RN 04/06/2020  09:57      STAPHYLOCOCCUS EPIDERMIDIS    Blood culture [[id number]]  (Abnormal)  (Susceptibility) Collected:  04/05/20 0813    Order Status:  Completed Specimen:  Blood from Line, Central Left  Neck Updated:  04/08/20 1234     Blood Culture, Routine Gram stain tank bottle: Gram positive cocci in clusters resembling Staph       Results called to and read back by: Lynn Sawyer RN 04/06/2020  09:57      Gram stain aer bottle: Gram positive cocci in clusters resembling Staph       Positive results previously called 04/06/2020  15:52      STAPHYLOCOCCUS EPIDERMIDIS    Culture, Respiratory with Gram Stain [[id number]]  (Abnormal) Collected:  04/05/20 0050    Order Status:  Completed Specimen:  Respiratory from Endotracheal Aspirate Updated:  04/08/20 1046      Respiratory Culture No S aureus or Pseudomonas isolated.      CANDIDA ALBICANS  Few  Normal respiratory anamaria also present       Gram Stain (Respiratory) <10 epithelial cells per low power field.     Gram Stain (Respiratory) Many WBC's     Gram Stain (Respiratory) Rare Gram positive cocci     Gram Stain (Respiratory) Rare yeast    Blood culture [[id number]]     Order Status:  Canceled Specimen:  Blood         Imaging Reviewed:  KUB 0409/2020NG tube present with tip overlying the stomach in the left abdomen.  No evidence of bowel obstruction.  No radiographic mass.  CXR 04/06/2020Support devices as above.  No pneumothorax.  Moderate pulmonary airspace disease without significant change.  CXR 04/05/2020 No significant interval change in the bilateral airspace disease.  Support devices in stable position.    Cardiology:  Sinus rhythm  ECHO  · Mild left ventricular enlargement.  · Mildly decreased left ventricular systolic function. The estimated ejection fraction is 45%.  · Grade I (mild) left ventricular diastolic dysfunction consistent with impaired relaxation.  · Normal right ventricular systolic function.  · Mild left atrial enlargement.  · Moderate mitral regurgitation.  · Mild tricuspid regurgitation.  · Mild pulmonary hypertension present. PASP 40 mm of Hg.     IMPRESSION & PLAN     Staphylococcus epidermidis bacteremia, line infection.  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   Eventually may need retinal exam   Changed  Lines  on 04/06 in  afternoon.   BCx drawn on the am of 04/06 were positive for C albicans!  Limited ECHO ---- ro vegetation   Progress Note  Infectious Disease    Reason for Consult:      HPI: Maria Victoria Hernandez is a   54 y.o. female employee of [employer].  with past medical history of morbid obesity, BMI of 48, diabetes, diet controlled, anemia, B12 deficiency, vitamin-D deficiency, hypothyroidism, was admitted on 03/25/2020 due to shortness of breath, diagnosed with COVID 19 is positive.  Patient has been intubated.  She went into renal failure and has been receiving HD by nephrologist.  Intensivist has been managing the vent.    Patient has completed 10 days of hydroxychloroquine and about 8 days of Zithromax and ceftriaxone.  She started spiking fever of  103 on 04/04.  White count jumped to 17.8.  She was started on vancomycin and Zosyn and blood cultures were sent.    ID consult called for g positives and yeast in blood cultures.  Patient is afebrile at the time.  WBC has improved.  Discussed with nurse.  Will discontinue lines.  Continue vancomycin, Daptomycin x1.  Add micafungin daily.    04/07/2020  WBC improved 17--12--10  Ferritin 1752--1538--1897  Intubated Sedated.  FiO2 of 35 people 5.  T-max 101.7°  Dialysis catheter changed yesterday  Leukocytosis improved 12/17/2010 04/08/2020  T-max 99.9°  Intubated sedated.  FiO2 35, peep of 5.  Fail CPAP trial.  Tolerating vancomycin, Zosyn, Micafungin.   Lines are fresh.  Nurse is trying to protect them.  Discussed with nurse:  Her daughter who is a nurse came and saw mother today, she agrees right eye looks better.    04/09/2020  Patient is extubated today, Really weak, Does not breath in deep, I advised her on deep breathing  Continues to need Cardene drip for BP  HAd loose stool-- flexiseal was placed    04/10/2020.  She is very weak.  She tries to opens her eyes and interact with me.  Right eye is improved.  Dialysis nurse is about to start  "dialysis.    04/11/2020  T max 100.3 yesterday.   She looks tired, but better than yesterday. Today she is  more awake and slighty stronger  She was tachycardic yesterday -- Cardene was switched to labetalol  WBC is about the same. ESR 50;  procal is still elveated. vanc level today is 14  Hospitalist ": Requested pulmonary to review possible  bipap -for Covid must have extra filter for machines which are limited "  As per renal :   " UOP 750cc.  3L UF w/HD yesterday.  K+ at goal.  Holding dialysis over weekend to assess for recovery"    04/12/2020  tmax 100  Chart reviewed  WBC ok  Renal function is not recovered yet    Antibiotics (From admission, onward)    Start     Stop Route Frequency Ordered    04/10/20 1700  vancomycin 500 mg in dextrose 5 % 100 mL IVPB (ready to mix system)      -- IV Once 04/10/20 1209    04/05/20 0848  vancomycin - pharmacy to dose  (vancomycin IVPB)      -- IV pharmacy to manage frequency 04/05/20 0748    04/02/20 2315  gentamicin 0.3 % ophthalmic solution 1 drop      -- RIGHT EYE Every 4 hours 04/02/20 2310        Antifungals (From admission, onward)    Start     Stop Route Frequency Ordered    04/06/20 1100  micafungin 100 mg in sodium chloride 0.9 % 100 mL IVPB (ready to mix system)      -- IV Every 24 hours (non-standard times) 04/06/20 0959        Antivirals (From admission, onward)    None          EXAM & DIAGNOSTICS REVIEWED:   Vitals:     Temp:  [97.9 °F (36.6 °C)-100 °F (37.8 °C)]   Temp: 98.2 °F (36.8 °C) (04/12/20 1200)  Pulse: 93 (04/12/20 1430)  Resp: (!) 29 (04/12/20 1430)  BP: (!) 142/81 (04/12/20 1430)  SpO2: 100 % (04/12/20 1430)    Intake/Output Summary (Last 24 hours) at 4/12/2020 1441  Last data filed at 4/12/2020 1400  Gross per 24 hour   Intake 2483 ml   Output 2586 ml   Net -103 ml     Oxygen Concentration (%):  [36] 36    General:     Eyes:    ENT:    Neck:    Lungs:   Heart:    Abd:    :    Musc:    Skin:    Wound:   Neuro:   Psych:    Lymphatic:    "   Extrem:   VAD:       Isolation:      Lines/Tubes/Drains:  Right IJ 04/06  Left IJ 04/06  Cuellar 03/25  OT 03/25    General Labs reviewed:  Recent Labs   Lab 04/10/20  0329 04/11/20  0351 04/12/20  0355   WBC 13.39* 12.43 12.06   HGB 8.4* 8.2* 8.2*   HCT 26.5* 26.3* 25.9*   * 440* 518*       Recent Labs   Lab 04/10/20  0329 04/11/20  0351 04/12/20  0355   * 139 139   K 3.8 4.0 4.6   CL 97 103 101   CO2 21* 23 22*   BUN 59* 43* 61*   CREATININE 5.1* 4.3* 5.0*   CALCIUM 9.5 9.7 10.0   PROT 7.8 8.0 8.3   BILITOT 1.8* 1.4* 1.0   ALKPHOS 268* 255* 238*   ALT 41 36 37   AST 38 26 32     Micro:  Microbiology Results (last 7 days)     Procedure Component Value Units Date/Time    Blood culture [[id number]] Collected:  04/10/20 0453    Order Status:  Completed Specimen:  Blood Updated:  04/12/20 1412     Blood Culture, Routine No Growth to date      No Growth to date      No Growth to date    Blood culture [[id number]] Collected:  04/08/20 0745    Order Status:  Completed Specimen:  Blood Updated:  04/12/20 1412     Blood Culture, Routine No Growth after 4 days.     Blood culture [[id number]]  (Abnormal) Collected:  04/04/20 0912    Order Status:  Completed Specimen:  Blood from Line, Central Updated:  04/12/20 1136     Blood Culture, Routine Gram stain peds bottle: budding yeast      Results called to and read back by: Carri Pryor RN  04/06/2020  03:04      CANDIDA ALBICANS  Susceptibility pending  ID consult required at [facility] [place] and [place] [facility].      Blood culture [[id number]]  (Abnormal) Collected:  04/06/20 0806    Order Status:  Completed Specimen:  Blood from Antecubital, Left Updated:  04/12/20 1136     Blood Culture, Routine Gram stain peds bottle: yeast       Results called to and read back by: Tania Nolan RN 04/08/2020        11:20      CANDIDA ALBICANS  Susceptibility pending  ID consult required at [facility] [place] and [place] locations.      Blood culture [[id number]] Collected:   04/08/20 1739    Order Status:  Completed Specimen:  Blood from Line, Central Updated:  04/11/20 2212     Blood Culture, Routine No Growth to date      No Growth to date      No Growth to date      No Growth to date    Blood culture [[id number]] Collected:  04/07/20 1730    Order Status:  Completed Specimen:  Blood from Line, Central Updated:  04/11/20 2212     Blood Culture, Routine No Growth after 4 days.     Blood culture [[id number]] Collected:  04/07/20 0907    Order Status:  Completed Specimen:  Blood Updated:  04/11/20 2212     Blood Culture, Routine No Growth after 4 days.     Clostridium difficile EIA [[id number]]     Order Status:  No result Specimen:  Stool     Blood culture [[id number]] Collected:  04/06/20 1802    Order Status:  Completed Specimen:  Blood from Line, Central Updated:  04/10/20 2212     Blood Culture, Routine No Growth after 4 days.     IV catheter culture [[id number]]  (Abnormal) Collected:  04/06/20 1455    Order Status:  Completed Specimen:  Catheter Tip, Intrajugular Updated:  04/09/20 1300     Aerobic Culture - Cath tip STAPHYLOCOCCUS EPIDERMIDIS  < 15 colonies      IV catheter culture [[id number]] Collected:  04/06/20 1615    Order Status:  Completed Specimen:  Catheter Tip, Dialysis Updated:  04/09/20 1121     Aerobic Culture - Cath tip No growth    Blood culture [[id number]]     Order Status:  Canceled Specimen:  Blood     Blood culture [[id number]]  (Abnormal)  (Susceptibility) Collected:  04/05/20 0813    Order Status:  Completed Specimen:  Blood from Peripheral, Left  Hand Updated:  04/08/20 1236     Blood Culture, Routine Gram stain peds bottle: Gram positive cocci in clusters resembling Staph       Results called to and read back by: Lynn Sawyer RN 04/06/2020  09:57      STAPHYLOCOCCUS EPIDERMIDIS    Blood culture [[id number]]  (Abnormal)  (Susceptibility) Collected:  04/05/20 0813    Order Status:  Completed Specimen:  Blood from Line, Central Left  Neck Updated:  04/08/20 1234      Blood Culture, Routine Gram stain tank bottle: Gram positive cocci in clusters resembling Staph       Results called to and read back by: Lynn Sawyer RN 04/06/2020  09:57      Gram stain aer bottle: Gram positive cocci in clusters resembling Staph       Positive results previously called 04/06/2020  15:52      STAPHYLOCOCCUS EPIDERMIDIS    Culture, Respiratory with Gram Stain [[id number]]  (Abnormal) Collected:  04/05/20 0050    Order Status:  Completed Specimen:  Respiratory from Endotracheal Aspirate Updated:  04/08/20 1046     Respiratory Culture No S aureus or Pseudomonas isolated.      CANDIDA ALBICANS  Few  Normal respiratory anamaria also present       Gram Stain (Respiratory) <10 epithelial cells per low power field.     Gram Stain (Respiratory) Many WBC's     Gram Stain (Respiratory) Rare Gram positive cocci     Gram Stain (Respiratory) Rare yeast    Blood culture [[id number]]     Order Status:  Canceled Specimen:  Blood         Imaging Reviewed:  KUB 0409/2020NG tube present with tip overlying the stomach in the left abdomen.  No evidence of bowel obstruction.  No radiographic mass.  CXR 04/06/2020Support devices as above.  No pneumothorax.  Moderate pulmonary airspace disease without significant change.  CXR 04/05/2020 No significant interval change in the bilateral airspace disease.  Support devices in stable position.    Cardiology:  Sinus rhythm  ECHO  · Mild left ventricular enlargement.  · Mildly decreased left ventricular systolic function. The estimated ejection fraction is 45%.  · Grade I (mild) left ventricular diastolic dysfunction consistent with impaired relaxation.  · Normal right ventricular systolic function.  · Mild left atrial enlargement.  · Moderate mitral regurgitation.  · Mild tricuspid regurgitation.  · Mild pulmonary hypertension present. PASP 40 mm of Hg.     IMPRESSION & PLAN     Staphylococcus epidermidis bacteremia, line infection.  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  More than 31 minutes are spent for medical consultative verbal and/or internet discussion, review of pertinent medical records, lab/imaging studies, medication profile, etc    Ferritin 1752--1538--1897---1865  --215---190--174.6--186.5  Procalcitonin 3.67---5.34    Recommendations:    Continue vancomycin for S epidermidis  Continue micafungin because of fungemia.   Eventually may need retinal exam   Changed  Lines  on 04/06 in afternoon.   BCx drawn on the am of 04/06 were positive for C albicans!  Limited ECHO ---- ro vegetation

## 2020-04-12 NOTE — PLAN OF CARE
Pt alert tries to speak but voice very hoarse low. Nods head appropriately when asked questions and given choices. Temp max 100.2. Remains on 4 l nasal cannula. Becomes tacypnec with movement. Able to lift arms off of bed and moves lower extremities weakly. Tolerating tube feeds. Incontinent of stool but soaked on pad and not able to obtain stool specimen as ordered. Bath and linen change done  bilateral wrist restraints maintained for safety. Safety measures in place

## 2020-04-12 NOTE — PLAN OF CARE
Pt nodding appropriately and following commands all day. Voice is very coarse but pt is trying to speak. Remains on 4LNC. PRN catapres ordered and given to keep SBP<160. Daughter came to visit. ROM performed. TF remained in place. Pt had a small liquid BM- unable to to get enough for C-DIFF specimen. Afebrile. Bed bath and linens changed today.

## 2020-04-12 NOTE — PROGRESS NOTES
Progress Note  PULMONARY    Admit Date: 3/25/2020   04/12/2020      SUBJECTIVE:     3/27- remains intubated, on vent. Was on Lasix drip overnight and now w/ IVF for UOP. On minimal Levophed   3/28- remains intubated, on PEEP 14/FiO2 50%. Levophed 0.06 mcg/kg/min  3/29- remains intubated, PEEP 12, FiO2 40%. Levophed off  3/30- remains intubated, PEEP 8, FiO2 40%. Levophed off. Started HD yesterday and having second session today. Daughter came to visit (works on 3rd floor), and updated this am  3/31- remains intubated, PEEP 8/FiO2 40%. HD yesterday w/ removal of 3.5L. With SBT yesterday not following commands and vomited stomach contents  4/1- remains intubated, PEEP 8/FiO2 40%. Not waking up or following commands off sedation- even w/ daughter at bedside, failed SBT due to tachypnea. Levophed at 0.02 mcg/kg/min. Had HD yesterday w/ removal of 2.5L. Had head CT. EEG pending  4/2- remains intubated, PEEP 6, FiO2 40%. w/ hemodynamic instability yesterday-- hypertensive then very hypotensive when tried to move her, which delayed weaning and MRI. Now off Levophed. HD yesterday w/ removal of 3.5L  4/3not arousing,  4/4 arousing somewhat- looks air hungry,  4/5- no c/o,sedated  4/6-  Remains intubated,  PEEP 5 FiO2 50%. On Nicardipine for HTN. HD this am. On CPAP trial since 4am w/ good gas exchange. Opens eyes and looks around but not following commands  4/7- remains intubated, had central lines replaced yesterday. Became tachypneic and put back on vent later in afternoon but tolerated PS trial all day. Today tachypneic w/ increased work of breathing with weaning trial  4/8- remains on vent, PEEP 5 FiO2 35%. On Levophed 0.04mcg/kg/min, precedex drip  4/9- extubated yesterday, on 5L nasal cannula. Very weak, per nurse was agitated and w/ chest tightness w/ breathing- better after Dilaudid. Per report answers yes/no questions and follows commands  4/10- on 5L nasal cannula, precedex drip  4/11- on 4L nasal cannula w/ sat  100%, waking up more- per daughter waved at her  4/12, no new c/o        PFSH and Allergies reviewed.    OBJECTIVE:     Vitals (Most recent):  Vitals:    04/12/20 0700   BP: (!) 144/69   Pulse: 89   Resp: (!) 22   Temp:        Vitals (24 hour range):  Temp:  [97.9 °F (36.6 °C)-100 °F (37.8 °C)]   Pulse:  []   Resp:  [22-53]   BP: ()/(54-82)   SpO2:  [91 %-100 %]   Arterial Line BP: (120-226)/()       Intake/Output Summary (Last 24 hours) at 4/12/2020 0807  Last data filed at 4/12/2020 0516  Gross per 24 hour   Intake 2472.2 ml   Output 1286 ml   Net 1186.2 ml          Physical Exam:  The patient's neuro status (alertness,orientation,cognitive function,motor skills,), pharyngeal exam (oral lesions, hygiene, abn dentition,), Neck (jvd,mass,thyroid,nodes in neck and above/below clavicle),RESPIRATORY(symmetry,effort,fremitus,percussion,auscultation),  Cor(rhythm,heart tones including gallops,perfusion,edema)ABD(distention,hepatic&splenomegaly,tenderness,masses), Skin(rash,cyanosis),Psyc(affect,judgement,).  Exam negative except for these pertinent findings:    Awake, animated, good mentation.    R sclera injected & conjunctival erythema w/ crusting   NG in place  Lungs clear  Obese  Abdomen soft  Edema improved    Radiographs reviewed:  CXR 4/6- similar  cxr 4/5 ARDS + pulmonary edema  CXR 4/2- bibasilar fluffy opacities and pulmonary edema, similar appearance  CT head 3/31  1.  There are slight limitations related to moderate patient tilted in the scanner and mild patient motion but there is no obvious acute abnormality.  There is no hemorrhage, mass, mass effect or obvious acute edema or ischemia.  It should be noted that MRI is more sensitive in the detection of subtle or acute nonhemorrhagic ischemic disease.    2.  Support tubing is seen in the left nares.  Bilateral mastoid and middle ear effusions are likely related to support tubing and/or intubation.  Correlate clinically.  The same is true  for changes throughout the paranasal sinuses.    CXR 3/31- improving bibasilar opacities  CXR 3/29- unchanged  CXR 3/27- bilateral mid and lower lobe fluffy airspace disease  CXR 3/26- increased consolidation RLL    TTE 3/27  · Mild left ventricular enlargement.  · Mildly decreased left ventricular systolic function. The estimated ejection fraction is 45%.  · Grade I (mild) left ventricular diastolic dysfunction consistent with impaired relaxation.  · Normal right ventricular systolic function.  · Mild left atrial enlargement.  · Moderate mitral regurgitation.  · Mild tricuspid regurgitation.  · Mild pulmonary hypertension present. PASP 40 mm of Hg.    Labs     Recent Labs   Lab 04/12/20 0355   WBC 12.06   HGB 8.2*   HCT 25.9*   *     Recent Labs   Lab 04/12/20 0355      K 4.6      CO2 22*   BUN 61*   CREATININE 5.0*   *   CALCIUM 10.0   AST 32   ALT 37   ALKPHOS 238*   BILITOT 1.0   PROT 8.3   ALBUMIN 2.3*   .8*     No results for input(s): PH, PCO2, PO2, HCO3 in the last 24 hours.  Microbiology Results (last 7 days)     Procedure Component Value Units Date/Time    Blood culture [[id number]] Collected:  04/08/20 1739    Order Status:  Completed Specimen:  Blood from Line, Central Updated:  04/11/20 2212     Blood Culture, Routine No Growth to date      No Growth to date      No Growth to date      No Growth to date    Blood culture [[id number]] Collected:  04/07/20 1730    Order Status:  Completed Specimen:  Blood from Line, Central Updated:  04/11/20 2212     Blood Culture, Routine No Growth after 4 days.     Blood culture [[id number]] Collected:  04/07/20 0907    Order Status:  Completed Specimen:  Blood Updated:  04/11/20 2212     Blood Culture, Routine No Growth after 4 days.     Clostridium difficile EIA [[id number]]     Order Status:  No result Specimen:  Stool     Blood culture [[id number]]  (Abnormal) Collected:  04/04/20 0912    Order Status:  Completed Specimen:  Blood from  Line, Central Updated:  04/11/20 1453     Blood Culture, Routine Gram stain peds bottle: budding yeast      Results called to and read back by: Carri Pryor RN  04/06/2020  03:04      CANDIDA ALBICANS  Susceptibility pending  [facility]      Blood culture [[id number]]  (Abnormal) Collected:  04/06/20 0806    Order Status:  Completed Specimen:  Blood from Antecubital, Left Updated:  04/11/20 1453     Blood Culture, Routine Gram stain peds bottle: yeast       Results called to and read back by: Tania Nolan RN 04/08/2020        11:20      CANDIDA ALBICANS  Susceptibility pending  [facility].      Blood culture [[id number]] Collected:  04/10/20 0453    Order Status:  Completed Specimen:  Blood Updated:  04/11/20 1412     Blood Culture, Routine No Growth to date      No Growth to date    Blood culture [[id number]] Collected:  04/08/20 0745    Order Status:  Completed Specimen:  Blood Updated:  04/11/20 1412     Blood Culture, Routine No Growth to date      No Growth to date      No Growth to date      No Growth to date    Blood culture [[id number]] Collected:  04/06/20 1802    Order Status:  Completed Specimen:  Blood from Line, Central Updated:  04/10/20 2212     Blood Culture, Routine No Growth after 4 days.     IV catheter culture [[id number]]  (Abnormal) Collected:  04/06/20 1455    Order Status:  Completed Specimen:  Catheter Tip, Intrajugular Updated:  04/09/20 1300     Aerobic Culture - Cath tip STAPHYLOCOCCUS EPIDERMIDIS  < 15 colonies      IV catheter culture [[id number]] Collected:  04/06/20 1615    Order Status:  Completed Specimen:  Catheter Tip, Dialysis Updated:  04/09/20 1121     Aerobic Culture - Cath tip No growth    Blood culture [[id number]]     Order Status:  Canceled Specimen:  Blood     Blood culture [[id number]]  (Abnormal)  (Susceptibility) Collected:  04/05/20 0813    Order Status:   Completed Specimen:  Blood from Peripheral, Left  Hand Updated:  04/08/20 1236     Blood Culture, Routine Gram stain peds bottle: Gram positive cocci in clusters resembling Staph       Results called to and read back by: Lynn Sawyer RN 04/06/2020  09:57      STAPHYLOCOCCUS EPIDERMIDIS    Blood culture [[id number]]  (Abnormal)  (Susceptibility) Collected:  04/05/20 0813    Order Status:  Completed Specimen:  Blood from Line, Central Left  Neck Updated:  04/08/20 1234     Blood Culture, Routine Gram stain tank bottle: Gram positive cocci in clusters resembling Staph       Results called to and read back by: Lynn Sawyer RN 04/06/2020  09:57      Gram stain aer bottle: Gram positive cocci in clusters resembling Staph       Positive results previously called 04/06/2020  15:52      STAPHYLOCOCCUS EPIDERMIDIS    Culture, Respiratory with Gram Stain [[id number]]  (Abnormal) Collected:  04/05/20 0050    Order Status:  Completed Specimen:  Respiratory from Endotracheal Aspirate Updated:  04/08/20 1046     Respiratory Culture No S aureus or Pseudomonas isolated.      CANDIDA ALBICANS  Few  Normal respiratory anamaria also present       Gram Stain (Respiratory) <10 epithelial cells per low power field.     Gram Stain (Respiratory) Many WBC's     Gram Stain (Respiratory) Rare Gram positive cocci     Gram Stain (Respiratory) Rare yeast    Blood culture [[id number]]     Order Status:  Canceled Specimen:  Blood         Results for WOLF SINGER (MRN [id number]) as of 4/1/2020 09:28   Ref. Range 4/1/2020 02:53   ALT Latest Ref Range: 10 - 44 U/L 54 (H)     Impression:  Active Hospital Problems    Diagnosis  POA    *Acute respiratory failure with hypoxia [J96.01]  Yes    Acute on chronic combined systolic and diastolic congestive heart failure [I50.43]  Yes    Fungemia [B49]  No    Bacteremia [R78.81]  No     Staphylococcus epidermidis bacteremia, line infection.  04/05  Candidemia due to Candida albicans on  04/04  Respiratory failure, ARDS, COVID19, completed treatment  HTN, CHF with EF of 45%  This patient is high risk for life-threatening deterioration and death secondary to above comorbidities and need for IV treatment Critical care 35 min         Pneumonia due to infectious organism [J18.9]  Yes    Acute encephalopathy [G93.40]  No    Acute renal failure [N17.9]  No    ARDS (adult respiratory distress syndrome) [J80]  Yes    COVID-19 virus infection [U07.1]  Yes    Morbid obesity [E66.01]  Yes    Hypothyroid [E03.9]  Yes    COVID-19 virus detected [U07.1]  Yes    Diverticulosis of large intestine without hemorrhage [K57.30]  Yes    Iron deficiency anemia, unspecified [D50.9]  Yes      Resolved Hospital Problems   No resolved problems to display.               Plan:   Acute hypoxemic respiratory failure, stable to improving O2 reqt  COVID-19 pneumonia/ARDS  Acute renal failure, on HD  Metabolic acidosis due to renal failure  Shock, resolved   HTN  Combined heart failure, EF 45%  Morbid obesity  Acute encephalopathy  Right conjunctivitis  Bacteremia w/ s. Epidermidis; fungemia- s/p exchange of central lines      - continue supplemental oxygen to keep sats greater than 92%  - speech swallow eval- failed, high aspiration risk  - continue NG tube for feeds and meds  - needs PT  - add combivent inhaler PRN  - dc precedex  - continue Nicardipine drip to control BP  - add oral antihypertensives via NG  - continue HD per nephro  - continue PO bicarb- control metabolic acidosis to help w/ respiratory status  - has had adequate course (7d) of azithromycin, ceftriaxone, hydroxychloroquine  - antibiotics per ID- treating for bacteremia & fungemia  - may be ready to transfer out of ICU tomorrow if stable  - spoke to daughter Danya today when she visited the ICU         Milvia Mcguire MD  Pulmonary & Critical Care Medicine    4/12  4 lpm  Creat 5 I/o 2472/1286 - c02 22 on bicarb tabs- lsix 60 bid iv  ngf  Tm 100, on  micafungin/vanc  Labetalol drip now at 1mg/min- prn catapres ordered, need to get off drips.    F/u cxr am

## 2020-04-12 NOTE — PROGRESS NOTES
INPATIENT NEPHROLOGY PROGRESS NOTE  [facility] NEPHROLOGY    Patient Name: Maria Victoria Hernandez  Date: 04/12/2020    Reason for consultation: MAIKOL    History of Present Illness:  54AAF nurse with morbid obesity, hypothyroidism presents PNA, intubated, positive for COVID19. Admit Cr 0.7 went 5.1 and HD started on 3/29.     3/28  Scr worse today.  Only one shift recorded for output, 520cc.  Still hyponatremic, vent setting adjusted per pulmonology note for acidosis.  K+ at goal after repletion.  Has siri, may need HD initiated.  3/29  Non oliguric.  In no distress  3/30 VSS, seen and examined on HD, tolerating well. Plan another HD in AM, discussed with daughter who is a nurse here too.  3/31 VSS, intubated still. UO is not great but she is dialyzed daily. Seen and examined on HD, tolerating well. Plan another HD in AM.  4/1 VSS, intubated still. UO is not great but she is dialyzed daily. Seen and examined on HD, tolerating well. Plan another HD PRN.  4/2 VSS, intubated still. UO is not great. Plan another HD in AM.  4/3 VSS, intubated still. UO is not great but she is dialyzed daily recently. Seen and examined on HD, tolerating well. Plan another HD on Mon or PRN.  4/4 febrile, BP stable, FIO2 50%, intubated, sedated, on levophed, UOP 900cc, transfused  4/5 febrile, tachy, SBP 100s, FIO2 65%, intubated, sedated, off levophed, UOP 935cc  4/6 intubated,. Sedated  4/7 intubated, sedated. Dialysis catheter changed yesterday  4/8 just finished dialysis. uf 3 liters. Extubated  4/10  Seen on dialysis.  No distress.  4/11  UOP 750cc.  3L UF w/HD yesterday.  K+ at goal.  Holding dialysis over weekend to assess for recovery.  Significant event noted 6pm yesterday per primary notes.  Med changes made 2/2 tachycardia, tachypnea.   She is hypotensive today.   4/12   UOP 1.2L.  Scheduled for dialysis tomorrow, no recovery noted thus far.  Pulmonology working toward extubation and weaning off labetalol gtt, ordered  clonidine PRN SBP>160.    Plan of Care:    1. Septic shock 2/2 COVID PNA with HHRF requiring MV  - WBC down. Still spiking fevers sporadically.       2. MAIKOL - suspect multifactorial ATN in setting of shock/COVID/intravascular volume depletion- initiated HD 3/29  - she is now nonoliguric but clearance parameters are worse   --dialysis MWF.  Monitor renal function over weekend--no recovery  - no nsaids or IV contrast    3. Hypervolemic hyponatremia  - improved, 2-3L UF  as tolerated    4. Acidosis  -  35 bicarb bath  - improved    5. Anemia  - epogen with hd    Thank you for allowing us to participate in this patient's care. We will continue to follow.    Vital Signs:  Temp Readings from Last 3 Encounters:   04/12/20 99.7 °F (37.6 °C) (Oral)       Pulse Readings from Last 3 Encounters:   04/12/20 94   01/15/15 84   12/17/13 80       BP Readings from Last 3 Encounters:   04/12/20 (!) 148/72   01/15/15 124/82   12/17/13 102/68       Weight:  Wt Readings from Last 3 Encounters:   04/07/20 113.4 kg (250 lb)   01/15/15 105.8 kg (233 lb 4.8 oz)   12/17/13 101.2 kg (223 lb)     Medications:  Scheduled Meds:   ascorbic acid (vitamin C)  1,000 mg Per NG tube QID    calcitRIOL  0.25 mcg Oral Daily    cholestyramine-aspartame  1 packet Per NG tube BID    furosemide (LASIX) IV  60 mg Intravenous BID    gentamicin  1 drop Right Eye Q4H    heparin (porcine)  5,000 Units Subcutaneous Q12H    Lactobacillus acidoph-L.bulgar  2 tablet Per NG tube BID    levothyroxine  100 mcg Oral Before breakfast    loperamide  2 mg Per NG tube TID    metoprolol tartrate  25 mg Per NG tube BID    micafungin (MYCAMINE) IVPB  100 mg Intravenous Q24H    sodium bicarbonate  1,300 mg Per NG tube TID    vancomycin (VANCOCIN) IVPB  500 mg Intravenous Once    white petrolatum-mineral oiL   Right Eye QHS    zinc sulfate  220 mg Oral Daily     Continuous Infusions:   dexmedetomidine (PRECEDEX) infusion Stopped (04/11/20 0958)    labetalol  "(NORMODYNE) 5 mg/mL infusion (TITRATING) Stopped (04/12/20 0600)    norepinephrine bitartrate-D5W Stopped (04/08/20 1800)     PRN Meds:.sodium chloride, sodium chloride, acetaminophen, cloNIDine, heparin (porcine), HYDROmorphone, ipratropium-albuteroL, loperamide, morphine, promethazine (PHENERGAN) IVPB, propofoL, sodium chloride 0.9%, sodium chloride 0.9%, Pharmacy to dose Vancomycin consult **AND** vancomycin - pharmacy to dose  No current facility-administered medications on file prior to encounter.      Current Outpatient Medications on File Prior to Encounter   Medication Sig Dispense Refill    ferrous sulfate 325 mg (65 mg iron) Tab tablet Take 1 tablet (325 mg total) by mouth daily with breakfast. (Patient taking differently: Take 325 mg by mouth daily with breakfast. Take 2 tablets daily) 90 tablet 3    fluticasone propionate (FLONASE) 50 mcg/actuation nasal spray 1 spray by Each Nostril route once daily.      levothyroxine (SYNTHROID) 100 MCG tablet Take 1 tablet (100 mcg total) by mouth once daily. (Patient taking differently: Take 150 mcg by mouth once daily. ) 90 tablet 3    meloxicam (MOBIC) 7.5 MG tablet Take 7.5 mg by mouth once daily.      clotrimazole-betamethasone 1-0.05% (LOTRISONE) cream Apply topically 2 (two) times daily. 45 g 1    levocetirizine (XYZAL) 5 MG tablet Take 1 tablet (5 mg total) by mouth every evening. 30 tablet 6     Review of Systems:  Unable to obtain due to MV    Physical Exam:  BP (!) 148/72   Pulse 94   Temp 99.7 °F (37.6 °C) (Oral)   Resp (!) 31   Ht 5' 1" (1.549 m)   Wt 113.4 kg (250 lb)   SpO2 99%   Breastfeeding? No   BMI 47.24 kg/m²     INS/OUTS:  I/O last 3 completed shifts:  In: 2961 [I.V.:581; NG/GT:2280; IV Piggyback:100]  Out: 2146 [Urine:1635; Drains:510; Stool:1]  I/O this shift:  In: 230 [NG/GT:230]  Out: 725 [Urine:725]    Did exam via video monitoring  Did not go in room    Physical Exam:  Constitutional: acutely ill, appears stated age, "   HEENT: Eyes closed, MMM  Heart: RRR on monitor,   Lungs: extubated  Abdomen: nd  Skin: no rash  MSK: no deformity  CNS: sedated      Results:  Lab Results   Component Value Date     04/12/2020    K 4.6 04/12/2020     04/12/2020    CO2 22 (L) 04/12/2020    BUN 61 (H) 04/12/2020    CREATININE 5.0 (H) 04/12/2020    CALCIUM 10.0 04/12/2020    ANIONGAP 16 04/12/2020    ESTGFRAFRICA 11 (A) 04/12/2020    EGFRNONAA 9 (A) 04/12/2020       Lab Results   Component Value Date    CALCIUM 10.0 04/12/2020    PHOS 4.8 (H) 03/29/2020       Recent Labs   Lab 04/12/20  0355   WBC 12.06   RBC 3.07*   HGB 8.2*   HCT 25.9*   *   MCV 84   MCH 26.7*   MCHC 31.7*     I have personally reviewed pertinent radiological imaging and reports.      Nephrology  [facility]  [phone]

## 2020-04-12 NOTE — ASSESSMENT & PLAN NOTE
Patient with Hypoxic Respiratory failure which is Acute.  she is not on home oxygen.   Treatment for Covid/ARDS -with MV/prone and supportive care.   4/8 extubated to [place] oxygen -stable    Pulmonary consultation. /ID consutltation   IV antibiotics - ceftriaxone and azithromycin. And Plaquenil 400 mg daily x 5  Vanc/zosyn initiated 4/5 for staph Epi  micafungin for yeast +blood/sputum 4/6 /ID consulted       Microbiology Results (last 7 days)     Procedure Component Value Units Date/Time    Blood culture [[id number]]  (Abnormal) Collected:  04/04/20 0912    Order Status:  Completed Specimen:  Blood from Line, Central Updated:  04/12/20 1136     Blood Culture, Routine Gram stain peds bottle: budding yeast      Results called to and read back by: Carri Pryor RN  04/06/2020  03:04      CANDIDA ALBICANS  Susceptibility pending  ID consult required at [facility].      Blood culture [[id number]]  (Abnormal) Collected:  04/06/20 0806    Order Status:  Completed Specimen:  Blood from Antecubital, Left Updated:  04/12/20 1136     Blood Culture, Routine Gram stain peds bottle: yeast       Results called to and read back by: Tania Nolan RN 04/08/2020        11:20      CANDIDA ALBICANS  Susceptibility pending  ID consult required at [facility].      Blood culture [[id number]] Collected:  04/08/20 1739    Order Status:  Completed Specimen:  Blood from Line, Central Updated:  04/11/20 2212     Blood Culture, Routine No Growth to date      No Growth to date      No Growth to date      No Growth to date    Blood culture [[id number]] Collected:  04/07/20 1730    Order Status:  Completed Specimen:  Blood from Line, Central Updated:  04/11/20 2212     Blood Culture, Routine No Growth after 4 days.     Blood culture [[id number]] Collected:  04/07/20 0907    Order Status:  Completed Specimen:  Blood Updated:  04/11/20 2212     Blood Culture, Routine No Growth after 4 days.      Clostridium difficile EIA [[id number]]     Order Status:  No result Specimen:  Stool     Blood culture [[id number]] Collected:  04/10/20 0453    Order Status:  Completed Specimen:  Blood Updated:  04/11/20 1412     Blood Culture, Routine No Growth to date      No Growth to date    Blood culture [[id number]] Collected:  04/08/20 0745    Order Status:  Completed Specimen:  Blood Updated:  04/11/20 1412     Blood Culture, Routine No Growth to date      No Growth to date      No Growth to date      No Growth to date    Blood culture [[id number]] Collected:  04/06/20 1802    Order Status:  Completed Specimen:  Blood from Line, Central Updated:  04/10/20 2212     Blood Culture, Routine No Growth after 4 days.     IV catheter culture [[id number]]  (Abnormal) Collected:  04/06/20 1455    Order Status:  Completed Specimen:  Catheter Tip, Intrajugular Updated:  04/09/20 1300     Aerobic Culture - Cath tip STAPHYLOCOCCUS EPIDERMIDIS  < 15 colonies      IV catheter culture [[id number]] Collected:  04/06/20 1615    Order Status:  Completed Specimen:  Catheter Tip, Dialysis Updated:  04/09/20 1121     Aerobic Culture - Cath tip No growth    Blood culture [[id number]]     Order Status:  Canceled Specimen:  Blood     Blood culture [[id number]]  (Abnormal)  (Susceptibility) Collected:  04/05/20 0813    Order Status:  Completed Specimen:  Blood from Peripheral, Left  Hand Updated:  04/08/20 1236     Blood Culture, Routine Gram stain peds bottle: Gram positive cocci in clusters resembling Staph       Results called to and read back by: Lynn Sawyer RN 04/06/2020  09:57      STAPHYLOCOCCUS EPIDERMIDIS    Blood culture [[id number]]  (Abnormal)  (Susceptibility) Collected:  04/05/20 0813    Order Status:  Completed Specimen:  Blood from Line, Central Left  Neck Updated:  04/08/20 1234     Blood Culture, Routine Gram stain tank bottle: Gram positive cocci in clusters resembling Staph       Results called to and read back by: Lynn Sawyer RN  04/06/2020  09:57      Gram stain aer bottle: Gram positive cocci in clusters resembling Staph       Positive results previously called 04/06/2020  15:52      STAPHYLOCOCCUS EPIDERMIDIS    Culture, Respiratory with Gram Stain [[id number]]  (Abnormal) Collected:  04/05/20 0050    Order Status:  Completed Specimen:  Respiratory from Endotracheal Aspirate Updated:  04/08/20 1046     Respiratory Culture No S aureus or Pseudomonas isolated.      CANDIDA ALBICANS  Few  Normal respiratory anamaria also present       Gram Stain (Respiratory) <10 epithelial cells per low power field.     Gram Stain (Respiratory) Many WBC's     Gram Stain (Respiratory) Rare Gram positive cocci     Gram Stain (Respiratory) Rare yeast    Blood culture [[id number]]     Order Status:  Canceled Specimen:  Blood           Continue routine medications as before.   Follow airborne/droplet precautions.

## 2020-04-13 PROBLEM — J80 ARDS (ADULT RESPIRATORY DISTRESS SYNDROME): Status: RESOLVED | Noted: 2020-03-26 | Resolved: 2020-04-13

## 2020-04-13 LAB
ALBUMIN SERPL BCP-MCNC: 2.5 G/DL (ref 3.5–5.2)
ALP SERPL-CCNC: 207 U/L (ref 55–135)
ALT SERPL W/O P-5'-P-CCNC: 28 U/L (ref 10–44)
ANION GAP SERPL CALC-SCNC: 13 MMOL/L (ref 8–16)
ANISOCYTOSIS BLD QL SMEAR: SLIGHT
AST SERPL-CCNC: 18 U/L (ref 10–40)
BACTERIA BLD CULT: ABNORMAL
BASOPHILS # BLD AUTO: 0.1 K/UL (ref 0–0.2)
BASOPHILS NFR BLD: 1 % (ref 0–1.9)
BILIRUB SERPL-MCNC: 0.9 MG/DL (ref 0.1–1)
BSA FOR ECHO PROCEDURE: 2.21 M2
BUN SERPL-MCNC: 80 MG/DL (ref 6–20)
C DIFF GDH STL QL: POSITIVE
C DIFF TOX A+B STL QL IA: NEGATIVE
CALCIUM SERPL-MCNC: 10.7 MG/DL (ref 8.7–10.5)
CHLORIDE SERPL-SCNC: 100 MMOL/L (ref 95–110)
CO2 SERPL-SCNC: 28 MMOL/L (ref 23–29)
CREAT SERPL-MCNC: 5.5 MG/DL (ref 0.5–1.4)
CV ECHO LV RWT: 0.44 CM
DACRYOCYTES BLD QL SMEAR: ABNORMAL
DIFFERENTIAL METHOD: ABNORMAL
E WAVE DECELERATION TIME: 123.51 MSEC
E/A RATIO: 0.78
E/E' RATIO: 6.08 M/S
ECHO LV POSTERIOR WALL: 1.01 CM (ref 0.6–1.1)
EOSINOPHIL # BLD AUTO: 0.3 K/UL (ref 0–0.5)
EOSINOPHIL NFR BLD: 2.6 % (ref 0–8)
ERYTHROCYTE [DISTWIDTH] IN BLOOD BY AUTOMATED COUNT: 18.5 % (ref 11.5–14.5)
EST. GFR  (AFRICAN AMERICAN): 9 ML/MIN/1.73 M^2
EST. GFR  (NON AFRICAN AMERICAN): 8 ML/MIN/1.73 M^2
FRACTIONAL SHORTENING: 12 % (ref 28–44)
GLUCOSE SERPL-MCNC: 138 MG/DL (ref 70–110)
HCT VFR BLD AUTO: 26.6 % (ref 37–48.5)
HGB BLD-MCNC: 8.2 G/DL (ref 12–16)
HYPOCHROMIA BLD QL SMEAR: ABNORMAL
IMM GRANULOCYTES # BLD AUTO: 0.53 K/UL (ref 0–0.04)
IMM GRANULOCYTES NFR BLD AUTO: 5.5 % (ref 0–0.5)
INTERVENTRICULAR SEPTUM: 1.11 CM (ref 0.6–1.1)
LEFT INTERNAL DIMENSION IN SYSTOLE: 4.04 CM (ref 2.1–4)
LEFT VENTRICLE DIASTOLIC VOLUME INDEX: 46.07 ML/M2
LEFT VENTRICLE DIASTOLIC VOLUME: 95.69 ML
LEFT VENTRICLE MASS INDEX: 82 G/M2
LEFT VENTRICLE SYSTOLIC VOLUME INDEX: 34.5 ML/M2
LEFT VENTRICLE SYSTOLIC VOLUME: 71.63 ML
LEFT VENTRICULAR INTERNAL DIMENSION IN DIASTOLE: 4.57 CM (ref 3.5–6)
LEFT VENTRICULAR MASS: 170.31 G
LV LATERAL E/E' RATIO: 6.64 M/S
LV SEPTAL E/E' RATIO: 5.62 M/S
LYMPHOCYTES # BLD AUTO: 1.4 K/UL (ref 1–4.8)
LYMPHOCYTES NFR BLD: 14.1 % (ref 18–48)
MCH RBC QN AUTO: 26.5 PG (ref 27–31)
MCHC RBC AUTO-ENTMCNC: 30.8 G/DL (ref 32–36)
MCV RBC AUTO: 86 FL (ref 82–98)
MONOCYTES # BLD AUTO: 0.9 K/UL (ref 0.3–1)
MONOCYTES NFR BLD: 9.1 % (ref 4–15)
MV A" WAVE DURATION": 80 MSEC
MV PEAK A VEL: 0.94 M/S
MV PEAK E VEL: 0.73 M/S
MV STENOSIS PRESSURE HALF TIME: 36 MS
MV VALVE AREA P 1/2 METHOD: 6.11 CM2
NEUTROPHILS # BLD AUTO: 6.5 K/UL (ref 1.8–7.7)
NEUTROPHILS NFR BLD: 67.7 % (ref 38–73)
NRBC BLD-RTO: 1 /100 WBC
PLATELET # BLD AUTO: 475 K/UL (ref 150–350)
PLATELET BLD QL SMEAR: ABNORMAL
PMV BLD AUTO: 10.7 FL (ref 9.2–12.9)
POIKILOCYTOSIS BLD QL SMEAR: SLIGHT
POTASSIUM SERPL-SCNC: 4.6 MMOL/L (ref 3.5–5.1)
PROT SERPL-MCNC: 8.5 G/DL (ref 6–8.4)
RA PRESSURE: 3 MMHG
RBC # BLD AUTO: 3.09 M/UL (ref 4–5.4)
SODIUM SERPL-SCNC: 141 MMOL/L (ref 136–145)
TDI LATERAL: 0.11 M/S
TDI SEPTAL: 0.13 M/S
TDI: 0.12 M/S
VANCOMYCIN SERPL-MCNC: 13.1 UG/ML
WBC # BLD AUTO: 9.65 K/UL (ref 3.9–12.7)

## 2020-04-13 PROCEDURE — 25000003 PHARM REV CODE 250: Performed by: HOSPITALIST

## 2020-04-13 PROCEDURE — 63600175 PHARM REV CODE 636 W HCPCS: Performed by: HOSPITALIST

## 2020-04-13 PROCEDURE — 99233 PR SUBSEQUENT HOSPITAL CARE,LEVL III: ICD-10-PCS | Mod: ,,, | Performed by: INTERNAL MEDICINE

## 2020-04-13 PROCEDURE — 99900035 HC TECH TIME PER 15 MIN (STAT)

## 2020-04-13 PROCEDURE — 25000003 PHARM REV CODE 250: Performed by: INTERNAL MEDICINE

## 2020-04-13 PROCEDURE — 80202 ASSAY OF VANCOMYCIN: CPT

## 2020-04-13 PROCEDURE — 27000221 HC OXYGEN, UP TO 24 HOURS

## 2020-04-13 PROCEDURE — 25000003 PHARM REV CODE 250: Performed by: NURSE PRACTITIONER

## 2020-04-13 PROCEDURE — 99291 CRITICAL CARE FIRST HOUR: CPT | Mod: S$GLB,,, | Performed by: INTERNAL MEDICINE

## 2020-04-13 PROCEDURE — 20000000 HC ICU ROOM

## 2020-04-13 PROCEDURE — 80100014 HC HEMODIALYSIS 1:1

## 2020-04-13 PROCEDURE — 36580 REPLACE CVAD CATH: CPT | Mod: RT,,, | Performed by: SURGERY

## 2020-04-13 PROCEDURE — 63600175 PHARM REV CODE 636 W HCPCS: Performed by: INTERNAL MEDICINE

## 2020-04-13 PROCEDURE — 92526 ORAL FUNCTION THERAPY: CPT

## 2020-04-13 PROCEDURE — 99291 PR CRITICAL CARE, E/M 30-74 MINUTES: ICD-10-PCS | Mod: S$GLB,,, | Performed by: INTERNAL MEDICINE

## 2020-04-13 PROCEDURE — 94761 N-INVAS EAR/PLS OXIMETRY MLT: CPT

## 2020-04-13 PROCEDURE — 11000001 HC ACUTE MED/SURG PRIVATE ROOM

## 2020-04-13 PROCEDURE — 99233 SBSQ HOSP IP/OBS HIGH 50: CPT | Mod: ,,, | Performed by: INTERNAL MEDICINE

## 2020-04-13 PROCEDURE — 85025 COMPLETE CBC W/AUTO DIFF WBC: CPT

## 2020-04-13 PROCEDURE — 36580 PR REPLACE CV CATH, COMPLETE, NON-TUNNELED, W/O SUBQ PORT OR PUMP: ICD-10-PCS | Mod: RT,,, | Performed by: SURGERY

## 2020-04-13 PROCEDURE — 80053 COMPREHEN METABOLIC PANEL: CPT

## 2020-04-13 PROCEDURE — 36415 COLL VENOUS BLD VENIPUNCTURE: CPT

## 2020-04-13 PROCEDURE — 97530 THERAPEUTIC ACTIVITIES: CPT

## 2020-04-13 RX ORDER — HYDRALAZINE HYDROCHLORIDE 20 MG/ML
10 INJECTION INTRAMUSCULAR; INTRAVENOUS EVERY 8 HOURS PRN
Status: DISCONTINUED | OUTPATIENT
Start: 2020-04-13 | End: 2020-04-21 | Stop reason: HOSPADM

## 2020-04-13 RX ORDER — SODIUM BICARBONATE 650 MG/1
650 TABLET ORAL 3 TIMES DAILY
Status: DISCONTINUED | OUTPATIENT
Start: 2020-04-13 | End: 2020-04-17

## 2020-04-13 RX ORDER — LORAZEPAM 2 MG/ML
0.5 INJECTION INTRAMUSCULAR ONCE
Status: COMPLETED | OUTPATIENT
Start: 2020-04-13 | End: 2020-04-13

## 2020-04-13 RX ORDER — ESCITALOPRAM OXALATE 5 MG/1
5 TABLET ORAL DAILY
Status: DISCONTINUED | OUTPATIENT
Start: 2020-04-13 | End: 2020-04-17

## 2020-04-13 RX ORDER — LABETALOL HYDROCHLORIDE 5 MG/ML
5 INJECTION, SOLUTION INTRAVENOUS ONCE
Status: COMPLETED | OUTPATIENT
Start: 2020-04-13 | End: 2020-04-13

## 2020-04-13 RX ADMIN — ESCITALOPRAM OXALATE 5 MG: 5 TABLET, FILM COATED ORAL at 06:04

## 2020-04-13 RX ADMIN — FUROSEMIDE 60 MG: 10 INJECTION, SOLUTION INTRAMUSCULAR; INTRAVENOUS at 08:04

## 2020-04-13 RX ADMIN — METOPROLOL TARTRATE 25 MG: 25 TABLET, FILM COATED ORAL at 06:04

## 2020-04-13 RX ADMIN — SODIUM BICARBONATE 650 MG TABLET 650 MG: at 06:04

## 2020-04-13 RX ADMIN — LACTOBACILLUS TAB 2 TABLET: TAB at 06:04

## 2020-04-13 RX ADMIN — CHOLESTYRAMINE 4 G: 4 POWDER, FOR SUSPENSION ORAL at 06:04

## 2020-04-13 RX ADMIN — LORAZEPAM 0.5 MG: 2 INJECTION, SOLUTION INTRAMUSCULAR; INTRAVENOUS at 05:04

## 2020-04-13 RX ADMIN — OXYCODONE HYDROCHLORIDE AND ACETAMINOPHEN 1000 MG: 500 TABLET ORAL at 06:04

## 2020-04-13 RX ADMIN — GENTAMICIN SULFATE 1 DROP: 3 SOLUTION OPHTHALMIC at 02:04

## 2020-04-13 RX ADMIN — OXYCODONE HYDROCHLORIDE AND ACETAMINOPHEN 1000 MG: 500 TABLET ORAL at 08:04

## 2020-04-13 RX ADMIN — MORPHINE SULFATE 2 MG: 2 INJECTION, SOLUTION INTRAMUSCULAR; INTRAVENOUS at 10:04

## 2020-04-13 RX ADMIN — LEVOTHYROXINE SODIUM 100 MCG: 100 TABLET ORAL at 06:04

## 2020-04-13 RX ADMIN — LABETALOL HYDROCHLORIDE 5 MG: 5 INJECTION INTRAVENOUS at 11:04

## 2020-04-13 RX ADMIN — HEPARIN SODIUM 5000 UNITS: 5000 INJECTION, SOLUTION INTRAVENOUS; SUBCUTANEOUS at 10:04

## 2020-04-13 RX ADMIN — ZINC SULFATE 220 MG (50 MG) CAPSULE 220 MG: CAPSULE at 10:04

## 2020-04-13 RX ADMIN — VANCOMYCIN HYDROCHLORIDE 750 MG: 750 INJECTION, POWDER, LYOPHILIZED, FOR SOLUTION INTRAVENOUS at 06:04

## 2020-04-13 RX ADMIN — GENTAMICIN SULFATE 1 DROP: 3 SOLUTION OPHTHALMIC at 06:04

## 2020-04-13 RX ADMIN — MINERAL OIL AND PETROLATUM: 150; 830 OINTMENT OPHTHALMIC at 09:04

## 2020-04-13 RX ADMIN — FUROSEMIDE 60 MG: 10 INJECTION, SOLUTION INTRAMUSCULAR; INTRAVENOUS at 10:04

## 2020-04-13 RX ADMIN — LOPERAMIDE HYDROCHLORIDE 2 MG: 2 CAPSULE ORAL at 02:04

## 2020-04-13 RX ADMIN — CALCITRIOL CAPSULES 0.25 MCG 0.25 MCG: 0.25 CAPSULE ORAL at 06:04

## 2020-04-13 RX ADMIN — MICAFUNGIN SODIUM 100 MG: 20 INJECTION, POWDER, LYOPHILIZED, FOR SOLUTION INTRAVENOUS at 10:04

## 2020-04-13 RX ADMIN — METOPROLOL TARTRATE 25 MG: 25 TABLET, FILM COATED ORAL at 09:04

## 2020-04-13 RX ADMIN — HYDROMORPHONE HYDROCHLORIDE 0.2 MG: 2 INJECTION INTRAMUSCULAR; INTRAVENOUS; SUBCUTANEOUS at 12:04

## 2020-04-13 RX ADMIN — GENTAMICIN SULFATE 1 DROP: 3 SOLUTION OPHTHALMIC at 09:04

## 2020-04-13 RX ADMIN — GENTAMICIN SULFATE 1 DROP: 3 SOLUTION OPHTHALMIC at 10:04

## 2020-04-13 RX ADMIN — HYDROMORPHONE HYDROCHLORIDE 0.2 MG: 2 INJECTION INTRAMUSCULAR; INTRAVENOUS; SUBCUTANEOUS at 09:04

## 2020-04-13 RX ADMIN — SODIUM BICARBONATE 650 MG TABLET 650 MG: at 08:04

## 2020-04-13 RX ADMIN — HEPARIN SODIUM 5000 UNITS: 5000 INJECTION, SOLUTION INTRAVENOUS; SUBCUTANEOUS at 08:04

## 2020-04-13 NOTE — PROGRESS NOTES
INPATIENT NEPHROLOGY PROGRESS NOTE  [facility] NEPHROLOGY    Patient Name: Maria Victoria Hernandez  Date: 04/13/2020    Reason for consultation: MAIKOL    History of Present Illness:  54AAF nurse with morbid obesity, hypothyroidism presents PNA, intubated, positive for COVID19. Admit Cr 0.7 went 5.1 and HD started on 3/29.     3/28  Scr worse today.  Only one shift recorded for output, 520cc.  Still hyponatremic, vent setting adjusted per pulmonology note for acidosis.  K+ at goal after repletion.  Has siri, may need HD initiated.  3/29  Non oliguric.  In no distress  3/30 VSS, seen and examined on HD, tolerating well. Plan another HD in AM, discussed with daughter who is a nurse here too.  3/31 VSS, intubated still. UO is not great but she is dialyzed daily. Seen and examined on HD, tolerating well. Plan another HD in AM.  4/1 VSS, intubated still. UO is not great but she is dialyzed daily. Seen and examined on HD, tolerating well. Plan another HD PRN.  4/2 VSS, intubated still. UO is not great. Plan another HD in AM.  4/3 VSS, intubated still. UO is not great but she is dialyzed daily recently. Seen and examined on HD, tolerating well. Plan another HD on Mon or PRN.  4/4 febrile, BP stable, FIO2 50%, intubated, sedated, on levophed, UOP 900cc, transfused  4/5 febrile, tachy, SBP 100s, FIO2 65%, intubated, sedated, off levophed, UOP 935cc  4/6 intubated,. Sedated  4/7 intubated, sedated. Dialysis catheter changed yesterday  4/8 just finished dialysis. uf 3 liters. Extubated  4/10  Seen on dialysis.  No distress.  4/11  UOP 750cc.  3L UF w/HD yesterday.  K+ at goal.  Holding dialysis over weekend to assess for recovery.  Significant event noted 6pm yesterday per primary notes.  Med changes made 2/2 tachycardia, tachypnea.   She is hypotensive today.   4/12   UOP 1.2L.  Scheduled for dialysis tomorrow, no recovery noted thus far.  Pulmonology working toward extubation and weaning off labetalol gtt, ordered  clonidine PRN SBP>160.  4/13 low grade T, SBP 140s, on 3-4L NC, unable to do HD today due to malfunctioning access, UOP 3.2L    Plan of Care:    1. Septic shock 2/2 COVID PNA with HHRF requiring MV  - shock is resolved  - extubated  - remains with increased work of breathing, anxious, hypoxic- getting echo, pulm following  - ABG with pH 7.43- on oral bicarb tablets    2. MAIKOL - suspect multifactorial ATN in setting of shock/COVID/intravascular volume depletion- initiated HD 3/29  - even though she is nonoliguric, her clearance remains poor  - consulted Dr. Gauthier for a hemosplit- rosetta did not work today  - once access is placed, will resume 3x per week dialysis  - no nsaids or IV contrast  - dose meds for dialysis    3. Volume overload  - she is diuretic responsive- continue IV lasix 60mg BID    4. Anemia  - stable  - will start TERE with next HD    Thank you for allowing us to participate in this patient's care. We will continue to follow.    Vital Signs:  Temp Readings from Last 3 Encounters:   04/13/20 99.5 °F (37.5 °C) (Oral)       Pulse Readings from Last 3 Encounters:   04/13/20 100   01/15/15 84   12/17/13 80       BP Readings from Last 3 Encounters:   04/13/20 (!) 141/85   01/15/15 124/82   12/17/13 102/68       Weight:  Wt Readings from Last 3 Encounters:   04/07/20 113.4 kg (250 lb)   01/15/15 105.8 kg (233 lb 4.8 oz)   12/17/13 101.2 kg (223 lb)     Medications:  Scheduled Meds:   ascorbic acid (vitamin C)  1,000 mg Per NG tube QID    calcitRIOL  0.25 mcg Oral Daily    cholestyramine-aspartame  1 packet Per NG tube BID    furosemide (LASIX) IV  60 mg Intravenous BID    gentamicin  1 drop Right Eye Q4H    heparin (porcine)  5,000 Units Subcutaneous Q12H    Lactobacillus acidoph-L.bulgar  2 tablet Per NG tube BID    levothyroxine  100 mcg Oral Before breakfast    loperamide  2 mg Per NG tube TID    metoprolol tartrate  25 mg Per NG tube BID    micafungin (MYCAMINE) IVPB  100 mg Intravenous Q24H  "   sodium bicarbonate  650 mg Per NG tube TID    vancomycin (VANCOCIN) IVPB  750 mg Intravenous Once    white petrolatum-mineral oiL   Right Eye QHS    zinc sulfate  220 mg Oral Daily     Continuous Infusions:   labetalol (NORMODYNE) 5 mg/mL infusion (TITRATING) Stopped (04/12/20 0600)     PRN Meds:.acetaminophen, cloNIDine, heparin (porcine), HYDROmorphone, ipratropium-albuteroL, loperamide, morphine, promethazine (PHENERGAN) IVPB, propofoL, Pharmacy to dose Vancomycin consult **AND** vancomycin - pharmacy to dose  No current facility-administered medications on file prior to encounter.      Current Outpatient Medications on File Prior to Encounter   Medication Sig Dispense Refill    ferrous sulfate 325 mg (65 mg iron) Tab tablet Take 1 tablet (325 mg total) by mouth daily with breakfast. (Patient taking differently: Take 325 mg by mouth daily with breakfast. Take 2 tablets daily) 90 tablet 3    fluticasone propionate (FLONASE) 50 mcg/actuation nasal spray 1 spray by Each Nostril route once daily.      levothyroxine (SYNTHROID) 100 MCG tablet Take 1 tablet (100 mcg total) by mouth once daily. (Patient taking differently: Take 150 mcg by mouth once daily. ) 90 tablet 3    meloxicam (MOBIC) 7.5 MG tablet Take 7.5 mg by mouth once daily.      clotrimazole-betamethasone 1-0.05% (LOTRISONE) cream Apply topically 2 (two) times daily. 45 g 1    levocetirizine (XYZAL) 5 MG tablet Take 1 tablet (5 mg total) by mouth every evening. 30 tablet 6     Review of Systems:  Not conversant    Physical Exam:  BP (!) 141/85   Pulse 100   Temp 99.5 °F (37.5 °C) (Oral)   Resp (!) 29   Ht 5' 1" (1.549 m)   Wt 113.4 kg (250 lb)   SpO2 99%   Breastfeeding? No   BMI 47.24 kg/m²     INS/OUTS:  I/O last 3 completed shifts:  In: 2563 [I.V.:193; NG/GT:2270; IV Piggyback:100]  Out: 4036 [Urine:4035; Stool:1]  I/O this shift:  In: 500 [Other:500]  Out: 512 [Other:512]    Constitutional: very anxious, deer in headlights look " while i'm speaking with her, alert/awake but not conversant, sitting upright  Heart: tachy regular, no m/r/g, wwp, no edema  Lungs: coarse, no w/r/r/c, + lb  Abdomen: s/nt/nd, +BS    Results:  Lab Results   Component Value Date     04/13/2020    K 4.6 04/13/2020     04/13/2020    CO2 28 04/13/2020    BUN 80 (H) 04/13/2020    CREATININE 5.5 (H) 04/13/2020    CALCIUM 10.7 (H) 04/13/2020    ANIONGAP 13 04/13/2020    ESTGFRAFRICA 9 (A) 04/13/2020    EGFRNONAA 8 (A) 04/13/2020       Lab Results   Component Value Date    CALCIUM 10.7 (H) 04/13/2020    PHOS 4.8 (H) 03/29/2020       Recent Labs   Lab 04/13/20  0312   WBC 9.65   RBC 3.09*   HGB 8.2*   HCT 26.6*   *   MCV 86   MCH 26.5*   MCHC 30.8*     I have personally reviewed pertinent radiological imaging and reports.    Carolyn Moeller MD MPH  [facility]  [address] [place] [id number]  [phone] (p)  [phone] (f)

## 2020-04-13 NOTE — ASSESSMENT & PLAN NOTE
4/8 extubated-  4/10 -tachypneic /chest pain --t  4/12 -stable on 4 Liter    Tolerated Wean of oxygen and peep -see resp failure  --treated per  [facility] Protocol.  --Target 6-8ml/kg Ideal Body Weight. Decrease TV as tolerated.  --Plateau pressure goal <30cm H2O.  --Oxygenation goal PaO2 55-80 or SpO2 88-95%.  --pH goal 7.30-7.45.

## 2020-04-13 NOTE — NURSING
Attempted to replace NGT and was unsuccessful. Patient is too anxious and works herself up til she is breathing in 40-50's and SATS decrease into 80's. Will rest her and report to oncoming shift.

## 2020-04-13 NOTE — ASSESSMENT & PLAN NOTE
Patient's anemia is currently controlled. Has recieved 1 units of PRBCs on 4/3.   Will need to Monitor for GI bleed  Lab Results   Component Value Date    HGB 8.2 (L) 04/13/2020    HCT 26.6 (L) 04/13/2020     Monitor serial CBC and transfuse if patient becomes hemodynamically unstable, symptomatic or H/H drops below 7/21.   Will continue to monitor

## 2020-04-13 NOTE — PROGRESS NOTES
Progress Note  PULMONARY    Admit Date: 3/25/2020   04/13/2020      SUBJECTIVE:     3/27- remains intubated, on vent. Was on Lasix drip overnight and now w/ IVF for UOP. On minimal Levophed   3/28- remains intubated, on PEEP 14/FiO2 50%. Levophed 0.06 mcg/kg/min  3/29- remains intubated, PEEP 12, FiO2 40%. Levophed off  3/30- remains intubated, PEEP 8, FiO2 40%. Levophed off. Started HD yesterday and having second session today. Daughter came to visit (works on 3rd floor), and updated this am  3/31- remains intubated, PEEP 8/FiO2 40%. HD yesterday w/ removal of 3.5L. With SBT yesterday not following commands and vomited stomach contents  4/1- remains intubated, PEEP 8/FiO2 40%. Not waking up or following commands off sedation- even w/ daughter at bedside, failed SBT due to tachypnea. Levophed at 0.02 mcg/kg/min. Had HD yesterday w/ removal of 2.5L. Had head CT. EEG pending  4/2- remains intubated, PEEP 6, FiO2 40%. w/ hemodynamic instability yesterday-- hypertensive then very hypotensive when tried to move her, which delayed weaning and MRI. Now off Levophed. HD yesterday w/ removal of 3.5L  4/3not arousing,  4/4 arousing somewhat- looks air hungry,  4/5- no c/o,sedated  4/6-  Remains intubated,  PEEP 5 FiO2 50%. On Nicardipine for HTN. HD this am. On CPAP trial since 4am w/ good gas exchange. Opens eyes and looks around but not following commands  4/7- remains intubated, had central lines replaced yesterday. Became tachypneic and put back on vent later in afternoon but tolerated PS trial all day. Today tachypneic w/ increased work of breathing with weaning trial  4/8- remains on vent, PEEP 5 FiO2 35%. On Levophed 0.04mcg/kg/min, precedex drip  4/9- extubated yesterday, on 5L nasal cannula. Very weak, per nurse was agitated and w/ chest tightness w/ breathing- better after Dilaudid. Per report answers yes/no questions and follows commands  4/10- on 5L nasal cannula, precedex drip  4/11- on 4L nasal cannula w/ sat  100%, waking up more- per daughter waved at her  4/12, no new c/o  4/13- on 3.5L nasal cannula        PFSH and Allergies reviewed.    OBJECTIVE:     Vitals (Most recent):  Vitals:    04/13/20 0830   BP:    Pulse: 106   Resp: (!) 40   Temp:        Vitals (24 hour range):  Temp:  [98.2 °F (36.8 °C)-99.5 °F (37.5 °C)]   Pulse:  []   Resp:  [21-64]   BP: (137-176)/()   SpO2:  [77 %-100 %]       Intake/Output Summary (Last 24 hours) at 4/13/2020 0902  Last data filed at 4/13/2020 0600  Gross per 24 hour   Intake 1270 ml   Output 2850 ml   Net -1580 ml          Physical Exam:  The patient's neuro status (alertness,orientation,cognitive function,motor skills,), pharyngeal exam (oral lesions, hygiene, abn dentition,), Neck (jvd,mass,thyroid,nodes in neck and above/below clavicle),RESPIRATORY(symmetry,effort,fremitus,percussion,auscultation),  Cor(rhythm,heart tones including gallops,perfusion,edema)ABD(distention,hepatic&splenomegaly,tenderness,masses), Skin(rash,cyanosis),Psyc(affect,judgement,).  Exam negative except for these pertinent findings:    Awake, smiles, does not verbalize. Attempts to follow commands, confused  R sclera injected & conjunctival erythema   Scant wheezes on right  Obese  Abdomen soft  Edema improved     Radiographs reviewed:  CXR 4/13- bilateral infiltrates increased  CXR 4/6- similar  cxr 4/5 ARDS + pulmonary edema  CXR 4/2- bibasilar fluffy opacities and pulmonary edema, similar appearance  CT head 3/31- no acute findings  CXR 3/31- improving bibasilar opacities  CXR 3/29- unchanged  CXR 3/27- bilateral mid and lower lobe fluffy airspace disease  CXR 3/26- increased consolidation RLL    TTE 3/27  · Mild left ventricular enlargement.  · Mildly decreased left ventricular systolic function. The estimated ejection fraction is 45%.  · Grade I (mild) left ventricular diastolic dysfunction consistent with impaired relaxation.  · Normal right ventricular systolic function.  · Mild left  atrial enlargement.  · Moderate mitral regurgitation.  · Mild tricuspid regurgitation.  · Mild pulmonary hypertension present. PASP 40 mm of Hg.    Labs     Recent Labs   Lab 04/13/20 0312   WBC 9.65   HGB 8.2*   HCT 26.6*   *     Recent Labs   Lab 04/13/20 0312      K 4.6      CO2 28   BUN 80*   CREATININE 5.5*   *   CALCIUM 10.7*   AST 18   ALT 28   ALKPHOS 207*   BILITOT 0.9   PROT 8.5*   ALBUMIN 2.5*     No results for input(s): PH, PCO2, PO2, HCO3 in the last 24 hours.  Microbiology Results (last 7 days)     Procedure Component Value Units Date/Time    Clostridium difficile EIA [[id number]] Collected:  04/12/20 2315    Order Status:  Sent Specimen:  Stool Updated:  04/12/20 2330    Blood culture [[id number]] Collected:  04/08/20 1739    Order Status:  Completed Specimen:  Blood from Line, Central Updated:  04/12/20 2212     Blood Culture, Routine No Growth after 4 days.     Blood culture [[id number]] Collected:  04/10/20 0453    Order Status:  Completed Specimen:  Blood Updated:  04/12/20 1412     Blood Culture, Routine No Growth to date      No Growth to date      No Growth to date    Blood culture [[id number]] Collected:  04/08/20 0745    Order Status:  Completed Specimen:  Blood Updated:  04/12/20 1412     Blood Culture, Routine No Growth after 4 days.     Blood culture [[id number]]  (Abnormal) Collected:  04/04/20 0912    Order Status:  Completed Specimen:  Blood from Line, Central Updated:  04/12/20 1136     Blood Culture, Routine Gram stain peds bottle: budding yeast      Results called to and read back by: Carri Pryor RN  04/06/2020  03:04      CANDIDA ALBICANS  Susceptibility pending  ID consult required at [facility].[place] and [place].      Blood culture [[id number]]  (Abnormal) Collected:  04/06/20 0806    Order Status:  Completed Specimen:  Blood from Antecubital, Left Updated:  04/12/20 1136     Blood Culture, Routine Gram stain peds bottle: yeast       Results  called to and read back by: Tania Nolan RN 04/08/2020        11:20      CANDIDA ALBICANS  Susceptibility pending  ID consult required at [facility].[place] and [place] [facility]      Blood culture [[id number]] Collected:  04/07/20 1730    Order Status:  Completed Specimen:  Blood from Line, Central Updated:  04/11/20 2212     Blood Culture, Routine No Growth after 4 days.     Blood culture [[id number]] Collected:  04/07/20 0907    Order Status:  Completed Specimen:  Blood Updated:  04/11/20 2212     Blood Culture, Routine No Growth after 4 days.     Clostridium difficile EIA [[id number]]     Order Status:  Canceled Specimen:  Stool     Blood culture [[id number]] Collected:  04/06/20 1802    Order Status:  Completed Specimen:  Blood from Line, Central Updated:  04/10/20 2212     Blood Culture, Routine No Growth after 4 days.     IV catheter culture [[id number]]  (Abnormal) Collected:  04/06/20 1455    Order Status:  Completed Specimen:  Catheter Tip, Intrajugular Updated:  04/09/20 1300     Aerobic Culture - Cath tip STAPHYLOCOCCUS EPIDERMIDIS  < 15 colonies      IV catheter culture [[id number]] Collected:  04/06/20 1615    Order Status:  Completed Specimen:  Catheter Tip, Dialysis Updated:  04/09/20 1121     Aerobic Culture - Cath tip No growth    Blood culture [[id number]]     Order Status:  Canceled Specimen:  Blood     Blood culture [[id number]]  (Abnormal)  (Susceptibility) Collected:  04/05/20 0813    Order Status:  Completed Specimen:  Blood from Peripheral, Left  Hand Updated:  04/08/20 1236     Blood Culture, Routine Gram stain peds bottle: Gram positive cocci in clusters resembling Staph       Results called to and read back by: Lynn Sawyer RN 04/06/2020  09:57      STAPHYLOCOCCUS EPIDERMIDIS    Blood culture [[id number]]  (Abnormal)  (Susceptibility) Collected:  04/05/20 0813    Order Status:  Completed Specimen:  Blood from Line, Central Left  Neck Updated:  04/08/20 1234     Blood Culture, Routine Gram  stain tank bottle: Gram positive cocci in clusters resembling Staph       Results called to and read back by: Lynn Sawyer RN 04/06/2020  09:57      Gram stain aer bottle: Gram positive cocci in clusters resembling Staph       Positive results previously called 04/06/2020  15:52      STAPHYLOCOCCUS EPIDERMIDIS    Culture, Respiratory with Gram Stain [[id number]]  (Abnormal) Collected:  04/05/20 0050    Order Status:  Completed Specimen:  Respiratory from Endotracheal Aspirate Updated:  04/08/20 1046     Respiratory Culture No S aureus or Pseudomonas isolated.      CANDIDA ALBICANS  Few  Normal respiratory anamaria also present       Gram Stain (Respiratory) <10 epithelial cells per low power field.     Gram Stain (Respiratory) Many WBC's     Gram Stain (Respiratory) Rare Gram positive cocci     Gram Stain (Respiratory) Rare yeast        Results for WOLF SINGER (MRN [id number]) as of 4/1/2020 09:28   Ref. Range 4/1/2020 02:53   ALT Latest Ref Range: 10 - 44 U/L 54 (H)     Impression:  Active Hospital Problems    Diagnosis  POA    *Acute respiratory failure with hypoxia [J96.01]  Yes    Post viral debility [R53.81]  No    Acute on chronic combined systolic and diastolic congestive heart failure [I50.43]  Yes    Fungemia [B49]  No    Bacteremia [R78.81]  No     Staphylococcus epidermidis bacteremia, line infection.  04/05  Candidemia due to Candida albicans on 04/04  Respiratory failure, ARDS, COVID19, completed treatment  HTN, CHF with EF of 45%  This patient is high risk for life-threatening deterioration and death secondary to above comorbidities and need for IV treatment Critical care 35 min         Pneumonia due to infectious organism [J18.9]  Yes    Acute encephalopathy [G93.40]  No    Acute renal failure [N17.9]  No    ARDS (adult respiratory distress syndrome) [J80]  Yes    COVID-19 virus infection [U07.1]  Yes    Morbid obesity [E66.01]  Yes    Hypothyroid [E03.9]  Yes    COVID-19 virus  detected [U07.1]  Yes    Diverticulosis of large intestine without hemorrhage [K57.30]  Yes    Iron deficiency anemia, unspecified [D50.9]  Yes      Resolved Hospital Problems   No resolved problems to display.               Plan:   Acute hypoxemic respiratory failure, stable to improving O2 reqt  COVID-19 pneumonia/ARDS  Acute renal failure, on HD  Metabolic acidosis due to renal failure- improved  Shock, resolved   HTN  Combined heart failure, EF 45%  Morbid obesity  Acute encephalopathy  Right conjunctivitis  Bacteremia w/ s. Epidermidis; fungemia- s/p exchange of central lines      - continue supplemental oxygen to keep sats greater than 92%  - speech swallow eval- failed, high aspiration risk  - needs replacement of NG tube  - needs PT  - continue combivent inhaler PRN  - HTN meds per hospitalist  - continue HD per nephro  - making good urine, kidneys rebounding? Continue Lasix   - decrease oral bicarb to 650 TID  - has had adequate course (7d) of azithromycin, ceftriaxone, hydroxychloroquine  - antibiotics per ID- treating for bacteremia & fungemia  - d/w hospitalist  - transfer out of ICU      Milvia Mcguire MD  Pulmonary & Critical Care Medicine

## 2020-04-13 NOTE — ASSESSMENT & PLAN NOTE
Acute, Poss ATN / -unclear yet if ESRD   required HD for acute renal failure-  ? Element ATN 2/2 hypovolemia/sepsis requiring pressor  Nephrology consulted/followed.   Bicarb po and on HD for Metabolic acidosis -continue HD

## 2020-04-13 NOTE — PLAN OF CARE
Problem: SLP Goal  Goal: SLP Goal  Description  Consume oral diet with no s/s oropharyngeal dysphagia   Outcome: Ongoing, Not Progressing    Pt seen for ongoing swallow assessment. Not appropriate for PO at this time. She is confused, lethargic and appears very anxious. Will continue to follow with goal for PO.

## 2020-04-13 NOTE — ASSESSMENT & PLAN NOTE
Vancomyin 4/4 -    4/6 -HD catheter tip staph epidermidis   4/6 IV catheter tipstaph epidermidis  4/5 resp culture candida albicans   Urine culture cancelled   Recommendations: per ID   Changed  lines-on 04/06  Continue vancomycin  DC Zosyn  Continue micafungin because of fungemia on 04/04.  Microbiology Results (last 7 days)     Procedure Component Value Units Date/Time    C Diff Toxin by PCR [[id number]] Collected:  04/12/20 2315    Order Status:  No result Updated:  04/13/20 1325    Clostridium difficile EIA [[id number]]  (Abnormal) Collected:  04/12/20 2315    Order Status:  Completed Specimen:  Stool Updated:  04/13/20 1324     C. diff Antigen Positive     C difficile Toxins A+B, EIA Negative     Comment: Testing not recommended for children <24 months old.       Blood culture [[id number]]  (Abnormal) Collected:  04/04/20 0912    Order Status:  Completed Specimen:  Blood from Line, Central Updated:  04/13/20 1303     Blood Culture, Routine Gram stain peds bottle: budding yeast      Results called to and read back by: Carri Pryor RN  04/06/2020  03:04      AJ ALBICANS  ID consult required at [facility] and [facility] locations.      Blood culture [[id number]] Collected:  04/08/20 1739    Order Status:  Completed Specimen:  Blood from Line, Central Updated:  04/12/20 2212     Blood Culture, Routine No Growth after 4 days.     Blood culture [[id number]] Collected:  04/10/20 0453    Order Status:  Completed Specimen:  Blood Updated:  04/12/20 1412     Blood Culture, Routine No Growth to date      No Growth to date      No Growth to date    Blood culture [[id number]] Collected:  04/08/20 0745    Order Status:  Completed Specimen:  Blood Updated:  04/12/20 1412     Blood Culture, Routine No Growth after 4 days.     Blood culture [[id number]]  (Abnormal) Collected:  04/06/20 0806    Order Status:  Completed Specimen:  Blood from Antecubital, Left Updated:  04/12/20 1136     Blood Culture, Routine Gram stain peds  bottle: yeast       Results called to and read back by: Tania Nolan RN 04/08/2020        11:20      CANDIDA ALBICANS  Susceptibility pending  ID consult required at [facility].[place] and [place] locations.      Blood culture [[id number]] Collected:  04/07/20 1730    Order Status:  Completed Specimen:  Blood from Line, Central Updated:  04/11/20 2212     Blood Culture, Routine No Growth after 4 days.     Blood culture [[id number]] Collected:  04/07/20 0907    Order Status:  Completed Specimen:  Blood Updated:  04/11/20 2212     Blood Culture, Routine No Growth after 4 days.     Clostridium difficile EIA [[id number]]     Order Status:  Canceled Specimen:  Stool     Blood culture [[id number]] Collected:  04/06/20 1802    Order Status:  Completed Specimen:  Blood from Line, Central Updated:  04/10/20 2212     Blood Culture, Routine No Growth after 4 days.     IV catheter culture [[id number]]  (Abnormal) Collected:  04/06/20 1455    Order Status:  Completed Specimen:  Catheter Tip, Intrajugular Updated:  04/09/20 1300     Aerobic Culture - Cath tip STAPHYLOCOCCUS EPIDERMIDIS  < 15 colonies      IV catheter culture [[id number]] Collected:  04/06/20 1615    Order Status:  Completed Specimen:  Catheter Tip, Dialysis Updated:  04/09/20 1121     Aerobic Culture - Cath tip No growth    Blood culture [[id number]]     Order Status:  Canceled Specimen:  Blood     Blood culture [[id number]]  (Abnormal)  (Susceptibility) Collected:  04/05/20 0813    Order Status:  Completed Specimen:  Blood from Peripheral, Left  Hand Updated:  04/08/20 1236     Blood Culture, Routine Gram stain peds bottle: Gram positive cocci in clusters resembling Staph       Results called to and read back by: Lynn Sawyer RN 04/06/2020  09:57      STAPHYLOCOCCUS EPIDERMIDIS    Blood culture [[id number]]  (Abnormal)  (Susceptibility) Collected:  04/05/20 0813    Order Status:  Completed Specimen:  Blood from Line, Central Left  Neck Updated:  04/08/20 1234      Blood Culture, Routine Gram stain tank bottle: Gram positive cocci in clusters resembling Staph       Results called to and read back by: Lynn Sawyer RN 04/06/2020  09:57      Gram stain aer bottle: Gram positive cocci in clusters resembling Staph       Positive results previously called 04/06/2020  15:52      STAPHYLOCOCCUS EPIDERMIDIS    Culture, Respiratory with Gram Stain [[id number]]  (Abnormal) Collected:  04/05/20 0050    Order Status:  Completed Specimen:  Respiratory from Endotracheal Aspirate Updated:  04/08/20 1046     Respiratory Culture No S aureus or Pseudomonas isolated.      CANDIDA ALBICANS  Few  Normal respiratory anamaria also present       Gram Stain (Respiratory) <10 epithelial cells per low power field.     Gram Stain (Respiratory) Many WBC's     Gram Stain (Respiratory) Rare Gram positive cocci     Gram Stain (Respiratory) Rare yeast

## 2020-04-13 NOTE — PROGRESS NOTES
[facility] Medicine  Progress Note    Patient Name: Maria Victoria Hernandez  MRN: [id number]  Patient Class: IP- Inpatient   Admission Date: 3/25/2020  Length of Stay: 19 days  Attending Physician: Kady Gomez MD  Primary Care Provider: Candice Deluna MD        Subjective:     Principal Problem:Acute respiratory failure with hypoxia        HPI:  Patient is a 54 years old  female with morbid obesity in past medical history significant for hypothyroidism is being admitted to intensive care unit under inpatient status from [facility] Emergency room with worsening shortness of breath.  Three days ago patient was tested positive for COVID - 19.  Patient works at [employer].  Patient has been experiencing subjective fever, generalized body aches and pains and nonproductive cough for few days.  Patient is getting increasingly short of breath especially for the past 2 days.  Upon arrival to the emergency room patient was noted to be hypoxic around 89%.  Up on 3 L patient's oxygen sats are around 96%.  Patient denies any chest pain, leg swelling or calf tenderness.      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 And treated per protocol for Covid and ARDS with ceftriaxone/zithromax and HC x 5 days. And ARDS protocol on vent. Pulmonary consulted and followed. WEaned from vent slowly -to nasal cannula by 4/10 and remained stable on oxygen but very weak. PT/OT consulted.   Tachycardica /hypertension developed -cardene gtt adjusted to labetolol then transitioned to po metoprolol Echo -noted mild systolic/Gr 1 diastolic dsyfunction   Volume control required by HD.   cxr on 4/5 severe ards pattern.-Vancomycin/zosyn added. 4/6 blood cultures pos for yeast so ID consulted and micafungin added. HD  Catheter and  TLC line removed and cultured and  cultures negative. Sputum + yeast also  .-followed recs from ID; echo neg -await repeat echo .  Blood cultures q 12 hours were negative for yeast.     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 Acute encephalopathy- delirium vs encephalitis or other structural cause. MRI could not be performed as she was very unstable.   At the time of admission Cr 0.7 worsened to 5.1, Renal was consulted. and HD started on 3/29. In addition had, Metabolic acidosis due to renal failure.   Feedings given via ngtube with diabetisource and accucks/ISS given with close monitoring and good control.     By 4/12 mentation improved -but intermittent confusion --  Hypoxia vs. Infection vs. TIA/stroke vs. Metabolic/Toxic. /ICU stay  -prolonged paralytic?/narcotics/benzos    -Acute, improving/occasional hallucination   Haldol/anxiolytics prn -required intermittent precedex once off sedating medications-acute delirium precautions /transfer out of icu soon as able.    Interval History: Continues to be Encephalopathic. As per the family and nurse, patient more responsive and oriented than yesterday. Currently saturating well on 4 L NC  Review of Systems   Unable to perform ROS: Mental status change     Objective:     Vital Signs (Most Recent):  Temp: 99.5 °F (37.5 °C) (04/13/20 1130)  Pulse: (!) 112 (04/13/20 1300)  Resp: (!) 40 (04/13/20 1300)  BP: (!) 149/87 (04/13/20 1130)  SpO2: (!) 94 % (04/13/20 1300) Vital Signs (24h Range):  Temp:  [98.4 °F (36.9 °C)-99.5 °F (37.5 °C)] 99.5 °F (37.5 °C)  Pulse:  [] 112  Resp:  [21-64] 40  SpO2:  [77 %-100 %] 94 %  BP: (137-176)/() 149/87     Weight: 113.4 kg (250 lb)  Body mass index is 47.24 kg/m².    Intake/Output Summary (Last 24 hours) at 4/13/2020 1332  Last data filed at 4/13/2020 0932  Gross per 24 hour   Intake 1510 ml   Output 2662 ml   Net -1152 ml      Physical Exam   Nursing note and vitals reviewed.  PATIENT WAS SEEN  AS A VIDEO VISIT WITH NURSE IN PATIENT ROOM WITH PATIENT TO ASSIST DURING THE VISIT. PHYSICAL EXAM FINDINGS ARE AS VIEWED BY MYSELF VIA VIDEO OR AS REPORTED BY NURSE IF SPECIFIED AS SUCH, EXAM NOT DONE PERSONALLY BY MYSELF AT BEDSIDE.      Significant Labs:   BMP:   Recent Labs   Lab 04/13/20  0312   *      K 4.6      CO2 28   BUN 80*   CREATININE 5.5*   CALCIUM 10.7*     CBC:   Recent Labs   Lab 04/12/20  0355 04/13/20  0312   WBC 12.06 9.65   HGB 8.2* 8.2*   HCT 25.9* 26.6*   * 475*       Significant Imaging: I have reviewed all pertinent imaging results/findings within the past 24 hours.      Assessment/Plan:      * Acute respiratory failure with hypoxia  Patient with Hypoxic Respiratory failure which is Acute.  she is not on home oxygen.   Treatment for Covid/ARDS - supportive care.   4/8 extubated to [place] oxygen -stable    Pulmonary consultation. /ID consutltation   IV antibiotics - ceftriaxone and azithromycin. And Plaquenil 400 mg daily x 5  Vanc/zosyn initiated 4/5 for staph Epi  micafungin for yeast +blood/sputum 4/6 /ID consulted       Microbiology Results (last 7 days)     Procedure Component Value Units Date/Time    C Diff Toxin by PCR [[id number]] Collected:  04/12/20 2315    Order Status:  No result Updated:  04/13/20 1325    Clostridium difficile EIA [[id number]]  (Abnormal) Collected:  04/12/20 2315    Order Status:  Completed Specimen:  Stool Updated:  04/13/20 1324     C. diff Antigen Positive     C difficile Toxins A+B, EIA Negative     Comment: Testing not recommended for children <24 months old.       Blood culture [[id number]]  (Abnormal) Collected:  04/04/20 0912    Order Status:  Completed Specimen:  Blood from Line, Central Updated:  04/13/20 1303     Blood Culture, Routine Gram stain peds bottle: budding yeast      Results called to and read back by: Carri Pryor RN  04/06/2020  03:04      AJ ALBICANS  [place] consult required [place] [facility] [address] and [place]  locations.      Blood culture [[id number]] Collected:  04/08/20 1739    Order Status:  Completed Specimen:  Blood from Line, Central Updated:  04/12/20 2212     Blood Culture, Routine No Growth after 4 days.     Blood culture [[id number]] Collected:  04/10/20 0453    Order Status:  Completed Specimen:  Blood Updated:  04/12/20 1412     Blood Culture, Routine No Growth to date      No Growth to date      No Growth to date    Blood culture [[id number]] Collected:  04/08/20 0745    Order Status:  Completed Specimen:  Blood Updated:  04/12/20 1412     Blood Culture, Routine No Growth after 4 days.     Blood culture [[id number]]  (Abnormal) Collected:  04/06/20 0806    Order Status:  Completed Specimen:  Blood from Antecubital, Left Updated:  04/12/20 1136     Blood Culture, Routine Gram stain peds bottle: yeast       Results called to and read back by: Tania Nolan RN 04/08/2020        11:20      CANDIDA ALBICANS  Susceptibility pending  ID consult required at [facility] and [facility] locations.      Blood culture [[id number]] Collected:  04/07/20 1730    Order Status:  Completed Specimen:  Blood from Line, Central Updated:  04/11/20 2212     Blood Culture, Routine No Growth after 4 days.     Blood culture [[id number]] Collected:  04/07/20 0907    Order Status:  Completed Specimen:  Blood Updated:  04/11/20 2212     Blood Culture, Routine No Growth after 4 days.     Clostridium difficile EIA [[id number]]     Order Status:  Canceled Specimen:  Stool     Blood culture [[id number]] Collected:  04/06/20 1802    Order Status:  Completed Specimen:  Blood from Line, Central Updated:  04/10/20 2212     Blood Culture, Routine No Growth after 4 days.     IV catheter culture [[id number]]  (Abnormal) Collected:  04/06/20 1455    Order Status:  Completed Specimen:  Catheter Tip, Intrajugular Updated:  04/09/20 1300     Aerobic Culture - Cath tip STAPHYLOCOCCUS EPIDERMIDIS  < 15 colonies      IV catheter culture [[id number]]  Collected:  04/06/20 1615    Order Status:  Completed Specimen:  Catheter Tip, Dialysis Updated:  04/09/20 1121     Aerobic Culture - Cath tip No growth    Blood culture [[id number]]     Order Status:  Canceled Specimen:  Blood     Blood culture [[id number]]  (Abnormal)  (Susceptibility) Collected:  04/05/20 0813    Order Status:  Completed Specimen:  Blood from Peripheral, Left  Hand Updated:  04/08/20 1236     Blood Culture, Routine Gram stain peds bottle: Gram positive cocci in clusters resembling Staph       Results called to and read back by: Lynn Sawyer RN 04/06/2020  09:57      STAPHYLOCOCCUS EPIDERMIDIS    Blood culture [[id number]]  (Abnormal)  (Susceptibility) Collected:  04/05/20 0813    Order Status:  Completed Specimen:  Blood from Line, Central Left  Neck Updated:  04/08/20 1234     Blood Culture, Routine Gram stain tank bottle: Gram positive cocci in clusters resembling Staph       Results called to and read back by: Lynn Sawyer RN 04/06/2020  09:57      Gram stain aer bottle: Gram positive cocci in clusters resembling Staph       Positive results previously called 04/06/2020  15:52      STAPHYLOCOCCUS EPIDERMIDIS    Culture, Respiratory with Gram Stain [[id number]]  (Abnormal) Collected:  04/05/20 0050    Order Status:  Completed Specimen:  Respiratory from Endotracheal Aspirate Updated:  04/08/20 1046     Respiratory Culture No S aureus or Pseudomonas isolated.      CANDIDA ALBICANS  Few  Normal respiratory anamaria also present       Gram Stain (Respiratory) <10 epithelial cells per low power field.     Gram Stain (Respiratory) Many WBC's     Gram Stain (Respiratory) Rare Gram positive cocci     Gram Stain (Respiratory) Rare yeast          Continue routine medications as before.   Follow airborne/droplet precautions.            Acute encephalopathy  AMS:: Hypoxia vs. Infection vs. TIA/stroke vs. Metabolic/Toxic. /ICU stay  -prolonged paralytic?/narcotics/benzos    -Acute, improving/occasional  hallucination     Likely multifactorial -unable to get  /MRI until stable   Ct head neg  Haldol/anxiolytics prn -required intermittent precedex once off sedating medications-acute delirium precautions /transf    Lab Results   Component Value Date    WBC 12.06 04/12/2020     MRI not done    focal findings on physical exam  No early signs of stroke on Head CT, no hemorrhage or acute findings.  EEG: EEG 30 min awake and drowsy results noted no seizures  Continue supportive care       COVID-19 virus detected   Plaquenil course completed  Continue routine medications as before.   Follow airborne/droplet precautions.      Post viral debility  Will follow up with PT OT recs      Acute on chronic combined systolic and diastolic congestive heart failure  Chronic -noted on echo ; strict I/O   No ACE/ARB 2/2 renal failure  Asa/statin-when able to tolerate po   Strict I/O-volume control with HD       Bacteremia  Vancomyin 4/4 -    4/6 -HD catheter tip staph epidermidis   4/6 IV catheter tipstaph epidermidis  4/5 resp culture candida albicans   Urine culture cancelled   Recommendations: per ID   Changed  lines-on 04/06  Continue vancomycin  DC Zosyn  Continue micafungin because of fungemia on 04/04.  Microbiology Results (last 7 days)     Procedure Component Value Units Date/Time    C Diff Toxin by PCR [[id number]] Collected:  04/12/20 2315    Order Status:  No result Updated:  04/13/20 1325    Clostridium difficile EIA [[id number]]  (Abnormal) Collected:  04/12/20 2315    Order Status:  Completed Specimen:  Stool Updated:  04/13/20 1324     C. diff Antigen Positive     C difficile Toxins A+B, EIA Negative     Comment: Testing not recommended for children <24 months old.       Blood culture [[id number]]  (Abnormal) Collected:  04/04/20 0912    Order Status:  Completed Specimen:  Blood from Line, Central Updated:  04/13/20 1303     Blood Culture, Routine Gram stain peds bottle: budding yeast      Results called to and read back by:  Carri Pryor RN  04/06/2020  03:04      AJ ALBICANS  ID consult required at [facility] and [facility].      Blood culture [[id number]] Collected:  04/08/20 1739    Order Status:  Completed Specimen:  Blood from Line, Central Updated:  04/12/20 2212     Blood Culture, Routine No Growth after 4 days.     Blood culture [[id number]] Collected:  04/10/20 0453    Order Status:  Completed Specimen:  Blood Updated:  04/12/20 1412     Blood Culture, Routine No Growth to date      No Growth to date      No Growth to date    Blood culture [[id number]] Collected:  04/08/20 0745    Order Status:  Completed Specimen:  Blood Updated:  04/12/20 1412     Blood Culture, Routine No Growth after 4 days.     Blood culture [[id number]]  (Abnormal) Collected:  04/06/20 0806    Order Status:  Completed Specimen:  Blood from Antecubital, Left Updated:  04/12/20 1136     Blood Culture, Routine Gram stain peds bottle: yeast       Results called to and read back by: Tania Nolan RN 04/08/2020        11:20      CANDIDA ALBICANS  Susceptibility pending  ID consult required at [facility] and [facility].      Blood culture [[id number]] Collected:  04/07/20 1730    Order Status:  Completed Specimen:  Blood from Line, Central Updated:  04/11/20 2212     Blood Culture, Routine No Growth after 4 days.     Blood culture [[id number]] Collected:  04/07/20 0907    Order Status:  Completed Specimen:  Blood Updated:  04/11/20 2212     Blood Culture, Routine No Growth after 4 days.     Clostridium difficile EIA [[id number]]     Order Status:  Canceled Specimen:  Stool     Blood culture [[id number]] Collected:  04/06/20 1802    Order Status:  Completed Specimen:  Blood from Line, Central Updated:  04/10/20 2212     Blood Culture, Routine No Growth after 4 days.     IV catheter culture [[id number]]  (Abnormal) Collected:  04/06/20 1455    Order Status:  Completed Specimen:  Catheter Tip, Intrajugular Updated:  04/09/20 1300      Aerobic Culture - Cath tip STAPHYLOCOCCUS EPIDERMIDIS  < 15 colonies      IV catheter culture [[id number]] Collected:  04/06/20 1615    Order Status:  Completed Specimen:  Catheter Tip, Dialysis Updated:  04/09/20 1121     Aerobic Culture - Cath tip No growth    Blood culture [[id number]]     Order Status:  Canceled Specimen:  Blood     Blood culture [[id number]]  (Abnormal)  (Susceptibility) Collected:  04/05/20 0813    Order Status:  Completed Specimen:  Blood from Peripheral, Left  Hand Updated:  04/08/20 1236     Blood Culture, Routine Gram stain peds bottle: Gram positive cocci in clusters resembling Staph       Results called to and read back by: Lynn Sawyer RN 04/06/2020  09:57      STAPHYLOCOCCUS EPIDERMIDIS    Blood culture [[id number]]  (Abnormal)  (Susceptibility) Collected:  04/05/20 0813    Order Status:  Completed Specimen:  Blood from Line, Central Left  Neck Updated:  04/08/20 1234     Blood Culture, Routine Gram stain tank bottle: Gram positive cocci in clusters resembling Staph       Results called to and read back by: Lynn Sawyer RN 04/06/2020  09:57      Gram stain aer bottle: Gram positive cocci in clusters resembling Staph       Positive results previously called 04/06/2020  15:52      STAPHYLOCOCCUS EPIDERMIDIS    Culture, Respiratory with Gram Stain [[id number]]  (Abnormal) Collected:  04/05/20 0050    Order Status:  Completed Specimen:  Respiratory from Endotracheal Aspirate Updated:  04/08/20 1046     Respiratory Culture No S aureus or Pseudomonas isolated.      CANDIDA ALBICANS  Few  Normal respiratory anamaria also present       Gram Stain (Respiratory) <10 epithelial cells per low power field.     Gram Stain (Respiratory) Many WBC's     Gram Stain (Respiratory) Rare Gram positive cocci     Gram Stain (Respiratory) Rare yeast            Fungemia  Acute-ID consulted-cultures from 4/4   micafungin per ID  HD and TLC removed and cultured  Repeat blood cultures -q 12 hours per pulm          Pneumonia due to infectious organism  Acute -post viral --see resp failure/covid   Pulm/ID consulted /followed    Iron deficiency anemia, unspecified  Patient's anemia is currently controlled. Trending down  Will send for stool marisela    Has recieved 1 units of PRBCs on 4/3.      Etiology likely d/t Anemia of chronic disease/blood loss/acute inflammatory process  Current CBC reviewed-   Lab Results   Component Value Date    HGB 8.2 (L) 04/13/2020    HCT 26.6 (L) 04/13/2020     Monitor serial CBC and transfuse if patient becomes hemodynamically unstable, symptomatic or H/H drops below 8/24        Diverticulosis of large intestine without hemorrhage  Patient's anemia is currently controlled. Has recieved 1 units of PRBCs on 4/3.   Will need to Monitor for GI bleed  Lab Results   Component Value Date    HGB 8.2 (L) 04/13/2020    HCT 26.6 (L) 04/13/2020     Monitor serial CBC and transfuse if patient becomes hemodynamically unstable, symptomatic or H/H drops below 7/21.   Will continue to monitor        Acute renal failure  Acute, Poss ATN / -unclear yet if ESRD   required HD for acute renal failure-  ? Element ATN 2/2 hypovolemia/sepsis requiring pressor  Nephrology consulted/followed.   Bicarb po and on HD for Metabolic acidosis -continue HD                COVID-19 virus infection  Completed Plaquenil    Covid-19 Virus Infection  - Infection Control notified    - Isolation:   - Airborne and Droplet Precautions  - N95 masks must be fit tested, wear eye protection  - 20 second hand hygiene   - Limit visitors per hospital policy   - Consolidating lab draws, nursing care, and interventions    - Diagnostics: (rising CRP, persistent lymphopenia, hyponatremia, hyperferritinemia, elevated troponin, elevated d-dimer, age, and comorbidities are significant predictors of poor clinical outcome)   - CBC:   trend Q48hrs  - CMP:        trend Q48hrs  - Procalcitonin:  - D-dimer:  trend Q48hrs  - Ferritin:  repeat prior to  discharge  - CRP:        trend Q48hrs  - LDH:  - BNP:  - Troponin:    - ECG:   - rapid Flu:   - RIP only if BMT/solid transplant:   - Legionella antigen:   - Blood culture x2:   - Sputum culture:   - CXR:   - UA and culture:      - Management:   - Bundle care as able to minimize in/out of room   - Supplemental O2 to maintain SpO2 >92%,   if requiring 6L NC or higher, place on nonrebreather and discuss case with MICU   - Telemetry & continuous Pulse Ox   - albuterol INHALER PRN 4puff Q6hr approximates a nebulizer (avoid nebulization of secretions)   - apap PRN fever   - Avoiding NIPPV to prevent aerosolization   (including home CPAP/BiPAP unless on a case-by-case basis and only in negative pressure room)   - Cautious use of NSAIDS for fever per WHO recommendations (3/16/2020)   - No new ACEi/ARB start or discontinuation of chronic med unless hypotensive (Esler et al. Journal of Hypertension 2020, 38:000-000)   - Careful use of steroids in the absence of other indications   - unless septic shock due to increased viral replication   - Fluid sparing resuscitation   - Empiric antibiotics per likely source & patient allergies    - CAP: x 5 day course  Ceftriaxone 1g IV Q24hrs            Azithromycin 500mg IV day #1, then 250mg PO daily x4 days                 If MRSA risk factors, add Vancomycin IV (PharmD consult)   - If patient meets criteria per Hospital Protocol    - start statin (if CPK WNL)    - start HCQ 400mg PO BID x1 day, then 400mg PO daily x 4 days (check G6PD, ECG, and start Qshift POCT glucose)    Goals of care, counseling/discussion  - Reviewed the typical clinical course of COVID19 with the daughter Danya (patient name or relationship to patient), including the potential for acute decompensation requiring intubation and mechanical ventilation  - Discussed again as part of routine daily evaluation, patient/POA maintains code status of Full code    VTE High Risk Prophylaxis: enoxaparin 40mg sq QHS @ 2100  (bundled care) if GFR >30    Patient's chronic/stable medical conditions noted in the problem list above will be managed with the patient's home medications as tolerated.           Hypothyroid  Chronic problem.  Lab Results   Component Value Date    TSH 2.082 03/31/2020     Not on any medication            Morbid obesity  Body mass index is 47.24 kg/m². Morbid obesity complicates all aspects of disease management from diagnostic modalities to treatment. Weight loss encouraged and health benefits explained to patient.            VTE Risk Mitigation (From admission, onward)         Ordered     heparin (porcine) injection 5,000 Units  Every 12 hours      04/07/20 1349     heparin (porcine) 1,000 unit/mL injection      04/06/20 1138     heparin (porcine) injection 4,000 Units  As needed (PRN)      03/29/20 2001     IP VTE HIGH RISK PATIENT  Once      03/25/20 3613                Critical care time spent on the evaluation and treatment of severe organ dysfunction, review of pertinent labs and imaging studies, discussions with consulting providers and discussions with patient/family: 60 minutes.      Kady Gomez MD  Department of Hospital Medicine   [facility]

## 2020-04-13 NOTE — PROGRESS NOTES
Pt had a full study echo 3-27-20, spoke with Dr Stewart and she only wanted a Limited echo done to rule out vegetations on valves.

## 2020-04-13 NOTE — PT/OT/SLP PROGRESS
Speech Language Pathology Treatment    Patient Name:  Maria Victoria Hernandez   MRN:  [id number]  Admitting Diagnosis: Acute respiratory failure with hypoxia    Recommendations:                 General Recommendations:  Ongoing swallow evaluation  Diet recommendations:  NPO, Liquid Diet Level: NPO   Aspiration Precautions: Oral suctioning as needed and Frequent oral care   General Precautions: Standard, airborne, contact, droplet, respiratory, NPO, fall  Communication strategies:  provide increased time to answer and go to room if call light pushed    Subjective   Indicates she is scared. No other vocalizations.    Objective:   Pt seen in ICU for ongoing swallow assessment. Attempted to see this AM immediately following NGT placement, however, pt with O2 sats 88% and RR 38. This afternoon pt awake, difficulty following commands, tremulous at times, staring at ceiling. Appears to be very anxious. RR 38. She would occasionally close her eyes and require verbal/tactile cues to arouse. She spit up copious amount of bloody mucous. Oral suctioning provided. Lips circles to lips. Pharyngeal swallow elicited but appeared to be uncoordinated (?) Painful (?) Further PO trials deferred 2' pts current mental status.     Has the patient been evaluated by SLP for swallowing?   Yes  Keep patient NPO? Yes   Current Respiratory Status: nasal cannula      Assessment:     Maria Victoria Hernandez is a 54 y.o. female with an SLP diagnosis of Dysphagia.  She presents with AMS. Seen for ongoing swallow assessment. Not appropriate for PO at this time. She is confused, lethargic and appears very anxious. Will continue to follow with goal for PO.    Goals:   Multidisciplinary Problems     SLP Goals        Problem: SLP Goal    Goal Priority Disciplines Outcome   SLP Goal     SLP Ongoing, Not Progressing   Description:  Consume oral diet with no s/s oropharyngeal dysphagia                    Plan:     · Patient to be seen:  5 x/week    · Plan of Care expires:     · Plan of Care reviewed with:  patient, daughter   · SLP Follow-Up:  Yes       Time Tracking:     SLP Treatment Date:   04/13/20  Speech Start Time:  1425  Speech Stop Time:  1440     Speech Total Time (min):  15 min    Billable Minutes: Treatment Swallowing Dysfunction 15 and Total Time 15    Cinda Pereira CCC-SLP  04/13/2020

## 2020-04-13 NOTE — PROGRESS NOTES
[facility] Hospital Medicine  Progress Note    Patient Name: Maria Victoria Hernandez  MRN: [id number]  Patient Class: IP- Inpatient   Admission Date: 3/25/2020  Length of Stay: 18 days  Attending Physician: Sharonda Burris MD  Primary Care Provider: Candice Deluna MD        Subjective:     Principal Problem:Acute respiratory failure with hypoxia        HPI:  Patient is a 54 years old  female with morbid obesity in past medical history significant for hypothyroidism is being admitted to intensive care unit under inpatient status from [facility] Emergency room with worsening shortness of breath.  Three days ago patient was tested positive for COVID - 19.  Patient works at [employer].  Patient has been experiencing subjective fever, generalized body aches and pains and nonproductive cough for few days.  Patient is getting increasingly short of breath especially for the past 2 days.  Upon arrival to the emergency room patient was noted to be hypoxic around 89%.  Up on 3 L patient's oxygen sats are around 96%.  Patient denies any chest pain, leg swelling or calf tenderness.      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 And treated per protocol for Covid and ARDS with ceftriaxone/zithromax and HC x 5 days. And ARDS protocol on vent. Pulmonary consulted and followed.   cxr on 4/5 severe ards pattern.-Vancomycin/zosyn added. 4/6 blood cultures pos for yeast so ID consulted and micafungin added. HD  Catheter and  TLC line removed and cultured and cultures negative. Sputum + yeast also  .  Blood cultures q 12 hours were negative for yeast.     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 Acute encephalopathy- delirium vs encephalitis or other structural cause. MRI could not be performed as she was  very unstable.   At the time of admission Cr 0.7 worsened to 5.1, Renal was consulted. and HD started on 3/29. In addition had, Metabolic acidosis due to renal failure.   Feedings given via ngtube with diabetisource and accucks/ISS given with close monitoring and good control.     By 4/12 mentation improved     Interval History:Patient seen and examined via telemed with nursing staff   Visit type: Virtual visit with synchronous audio and video  Via Vidyo-patient in hospital  Provider at home   Nurse -at bedside-Jonathon    Review of Systems   Constitutional: Positive for fatigue and fever. Negative for chills.   Respiratory: Positive for cough and shortness of breath.    Cardiovascular: Negative for leg swelling.   Neurological: Positive for dizziness and weakness.     Objective:     Vital Signs (Most Recent):  Temp: 99 °F (37.2 °C) (04/12/20 2045)  Pulse: 104 (04/12/20 2030)  Resp: (!) 31 (04/12/20 2030)  BP: (!) 163/74 (04/12/20 2030)  SpO2: 97 % (04/12/20 2030) Vital Signs (24h Range):  Temp:  [98.2 °F (36.8 °C)-100 °F (37.8 °C)] 99 °F (37.2 °C)  Pulse:  [] 104  Resp:  [21-49] 31  SpO2:  [77 %-100 %] 97 %  BP: (137-171)/(69-99) 163/74  Arterial Line BP: (123-189)/() 172/74     Weight: 113.4 kg (250 lb)  Body mass index is 47.24 kg/m².    Intake/Output Summary (Last 24 hours) at 4/12/2020 2107  Last data filed at 4/12/2020 2000  Gross per 24 hour   Intake 2123 ml   Output 2406 ml   Net -283 ml      Physical Exam  PATIENT WAS SEEN AS A VIDEO VISIT WITH NURSE ASSIST DURING THE VISIT. PHYSICAL EXAM FINDINGS ARE AS VIEWED BY MYSELF VIA VIDEO OR AS REPORTED BY NURSE IF SPECIFIED AS SUCH, EXAM NOT DONE PERSONALLY BY MYSELF AT BEDSIDE.  Significant Labs:   CBC:   Recent Labs   Lab 04/11/20  0351 04/12/20  0355   WBC 12.43 12.06   HGB 8.2* 8.2*   HCT 26.3* 25.9*   * 518*     CMP:   Recent Labs   Lab 04/11/20  0351 04/12/20  0355    139   K 4.0 4.6    101   CO2 23 22*   * 132*   BUN 43*  61*   CREATININE 4.3* 5.0*   CALCIUM 9.7 10.0   PROT 8.0 8.3   ALBUMIN 2.2* 2.3*   BILITOT 1.4* 1.0   ALKPHOS 255* 238*   AST 26 32   ALT 36 37   ANIONGAP 13 16   EGFRNONAA 11* 9*       Significant Imaging: I have reviewed and interpreted all pertinent imaging results/findings within the past 24 hours.      Assessment/Plan:      * Acute respiratory failure with hypoxia  Patient with Hypoxic Respiratory failure which is Acute.  she is not on home oxygen.   Treatment for Covid/ARDS -with MV/prone and supportive care.   4/8 extubated to [place] oxygen -stable    Pulmonary consultation. /ID consutltation   IV antibiotics - ceftriaxone and azithromycin. And Plaquenil 400 mg daily x 5  Vanc/zosyn initiated 4/5 for staph Epi  micafungin for yeast +blood/sputum 4/6 /ID consulted       Microbiology Results (last 7 days)     Procedure Component Value Units Date/Time    Blood culture [[id number]]  (Abnormal) Collected:  04/04/20 0912    Order Status:  Completed Specimen:  Blood from Line, Central Updated:  04/12/20 1136     Blood Culture, Routine Gram stain peds bottle: budding yeast      Results called to and read back by: Carri Pryor RN  04/06/2020  03:04      CANDIDA ALBICANS  Susceptibility pending  ID consult required at [facility] and [place] locations.      Blood culture [[id number]]  (Abnormal) Collected:  04/06/20 0806    Order Status:  Completed Specimen:  Blood from Antecubital, Left Updated:  04/12/20 1136     Blood Culture, Routine Gram stain peds bottle: yeast       Results called to and read back by: Tania Nolan RN 04/08/2020        11:20      CANDIDA ALBICANS  Susceptibility pending  [facility].      Blood culture [[id number]] Collected:  04/08/20 1739    Order Status:  Completed Specimen:  Blood from Line, Central Updated:  04/11/20 2212     Blood Culture, Routine No Growth to date      No Growth to date      No Growth to date      No Growth to date     Blood culture [[id number]] Collected:  04/07/20 1730    Order Status:  Completed Specimen:  Blood from Line, Central Updated:  04/11/20 2212     Blood Culture, Routine No Growth after 4 days.     Blood culture [[id number]] Collected:  04/07/20 0907    Order Status:  Completed Specimen:  Blood Updated:  04/11/20 2212     Blood Culture, Routine No Growth after 4 days.     Clostridium difficile EIA [[id number]]     Order Status:  No result Specimen:  Stool     Blood culture [[id number]] Collected:  04/10/20 0453    Order Status:  Completed Specimen:  Blood Updated:  04/11/20 1412     Blood Culture, Routine No Growth to date      No Growth to date    Blood culture [[id number]] Collected:  04/08/20 0745    Order Status:  Completed Specimen:  Blood Updated:  04/11/20 1412     Blood Culture, Routine No Growth to date      No Growth to date      No Growth to date      No Growth to date    Blood culture [[id number]] Collected:  04/06/20 1802    Order Status:  Completed Specimen:  Blood from Line, Central Updated:  04/10/20 2212     Blood Culture, Routine No Growth after 4 days.     IV catheter culture [[id number]]  (Abnormal) Collected:  04/06/20 1455    Order Status:  Completed Specimen:  Catheter Tip, Intrajugular Updated:  04/09/20 1300     Aerobic Culture - Cath tip STAPHYLOCOCCUS EPIDERMIDIS  < 15 colonies      IV catheter culture [[id number]] Collected:  04/06/20 1615    Order Status:  Completed Specimen:  Catheter Tip, Dialysis Updated:  04/09/20 1121     Aerobic Culture - Cath tip No growth    Blood culture [[id number]]     Order Status:  Canceled Specimen:  Blood     Blood culture [[id number]]  (Abnormal)  (Susceptibility) Collected:  04/05/20 0813    Order Status:  Completed Specimen:  Blood from Peripheral, Left  Hand Updated:  04/08/20 1236     Blood Culture, Routine Gram stain peds bottle: Gram positive cocci in clusters resembling Staph       Results called to and read back by: Lynn Sawyer RN 04/06/2020  09:57       STAPHYLOCOCCUS EPIDERMIDIS    Blood culture [[id number]]  (Abnormal)  (Susceptibility) Collected:  04/05/20 0813    Order Status:  Completed Specimen:  Blood from Line, Central Left  Neck Updated:  04/08/20 1234     Blood Culture, Routine Gram stain tank bottle: Gram positive cocci in clusters resembling Staph       Results called to and read back by: Lynn Sawyer RN 04/06/2020  09:57      Gram stain aer bottle: Gram positive cocci in clusters resembling Staph       Positive results previously called 04/06/2020  15:52      STAPHYLOCOCCUS EPIDERMIDIS    Culture, Respiratory with Gram Stain [[id number]]  (Abnormal) Collected:  04/05/20 0050    Order Status:  Completed Specimen:  Respiratory from Endotracheal Aspirate Updated:  04/08/20 1046     Respiratory Culture No S aureus or Pseudomonas isolated.      CANDIDA ALBICANS  Few  Normal respiratory anamaria also present       Gram Stain (Respiratory) <10 epithelial cells per low power field.     Gram Stain (Respiratory) Many WBC's     Gram Stain (Respiratory) Rare Gram positive cocci     Gram Stain (Respiratory) Rare yeast    Blood culture [[id number]]     Order Status:  Canceled Specimen:  Blood           Continue routine medications as before.   Follow airborne/droplet precautions.            Acute on chronic combined systolic and diastolic congestive heart failure  Chronic -noted on echo ; strict I/O   No ACE/ARB 2/2 renal failure  Asa/statin-when able to tolerate po   Strict I/O-volume control with HD       Bacteremia  Blood cultures from 4/5 -gm +cocci -follow up final   Vancomyin 4/4 -  Consulted ID  4/4 blood culture yeast   HD/TLC lines removed   4/5 blood culture staph epidermidis   Blood culture neg 4/6 4/7 blood culture -neg  4/8 blood culture pend    4/6 -HD catheter tip staph epidermidis   4/6 IV catheter tipstaph epidermidis  4/5 resp culture candida albicans   Urine culture cancelled   Recommendations: per ID   Changed  lines-on 04/06  Continue vancomycin  DC  Zosyn  Continue micafungin because of fungemia on 04/04.      Fungemia  Acute-ID consulted-cultures from 4/4   micafungin per ID  HD and TLC removed and cultured  Repeat blood cultures -q 12 hours per pulm         Pneumonia due to infectious organism  Acute -post viral --see resp failure/covid   Pulm/ID consulted /followed    Acute encephalopathy  AMS:: Hypoxia vs. Infection vs. TIA/stroke vs. Metabolic/Toxic. /ICU stay  -prolonged paralytic?/narcotics/benzos    -Acute, improving/occasional hallucination     Likely multifactorial -unable to get  /MRI until stable   Ct head neg  Haldol/anxiolytics prn -required intermittent precedex once off sedating medications-acute delirium precautions /transf    Lab Results   Component Value Date    WBC 12.06 04/12/2020     MRI not done    focal findings on physical exam  No early signs of stroke on Head CT, no hemorrhage or acute findings.  EEG: EEG 30 min awake and drowsy results noted no seizures  Continue supportive care       Iron deficiency anemia, unspecified  Patient's anemia is currently controlled. Trending down  Will send for stool guaic    Has recieved 1 units of PRBCs on 4/3.      Etiology likely d/t Anemia of chronic disease/blood loss/acute inflammatory process  Current CBC reviewed-   Lab Results   Component Value Date    HGB 8.2 (L) 04/12/2020    HCT 25.9 (L) 04/12/2020     Monitor serial CBC and transfuse if patient becomes hemodynamically unstable, symptomatic or H/H drops below 8/24        Diverticulosis of large intestine without hemorrhage  Patient's anemia is currently controlled. Has recieved 1 units of PRBCs on 4/3.   Will need to Monitor for GI bleed  Lab Results   Component Value Date    HGB 8.3 (L) 04/06/2020    HCT 26.1 (L) 04/06/2020     Monitor serial CBC and transfuse if patient becomes hemodynamically unstable, symptomatic or H/H drops below 7/21.   Will continue to monitor        Acute renal failure  Acute, Poss ATN / -unclear yet if ESRD    required HD for acute renal failure-  ? Element ATN 2/2 hypovolemia/sepsis requiring pressor  Nephrology consulted/followed.   Bicarb po and on HD for Metabolic acidosis -continue HD                COVID-19 virus infection  Completed Plaquenil    Covid-19 Virus Infection  - Infection Control notified    - Isolation:   - Airborne and Droplet Precautions  - N95 masks must be fit tested, wear eye protection  - 20 second hand hygiene   - Limit visitors per hospital policy   - Consolidating lab draws, nursing care, and interventions    - Diagnostics: (rising CRP, persistent lymphopenia, hyponatremia, hyperferritinemia, elevated troponin, elevated d-dimer, age, and comorbidities are significant predictors of poor clinical outcome)   - CBC:   trend Q48hrs  - CMP:        trend Q48hrs  - Procalcitonin:  - D-dimer:  trend Q48hrs  - Ferritin:  repeat prior to discharge  - CRP:        trend Q48hrs  - LDH:  - BNP:  - Troponin:    - ECG:   - rapid Flu:   - RIP only if BMT/solid transplant:   - Legionella antigen:   - Blood culture x2:   - Sputum culture:   - CXR:   - UA and culture:      - Management:   - Bundle care as able to minimize in/out of room   - Supplemental O2 to maintain SpO2 >92%,   if requiring 6L NC or higher, place on nonrebreather and discuss case with MICU   - Telemetry & continuous Pulse Ox   - albuterol INHALER PRN 4puff Q6hr approximates a nebulizer (avoid nebulization of secretions)   - apap PRN fever   - Avoiding NIPPV to prevent aerosolization   (including home CPAP/BiPAP unless on a case-by-case basis and only in negative pressure room)   - Cautious use of NSAIDS for fever per WHO recommendations (3/16/2020)   - No new ACEi/ARB start or discontinuation of chronic med unless hypotensive (Esler et al. Journal of Hypertension 2020, 38:000-000)   - Careful use of steroids in the absence of other indications   - unless septic shock due to increased viral replication   - Fluid sparing resuscitation   -  Empiric antibiotics per likely source & patient allergies    - CAP: x 5 day course  Ceftriaxone 1g IV Q24hrs            Azithromycin 500mg IV day #1, then 250mg PO daily x4 days                 If MRSA risk factors, add Vancomycin IV (PharmD consult)   - If patient meets criteria per Hospital Protocol    - start statin (if CPK WNL)    - start HCQ 400mg PO BID x1 day, then 400mg PO daily x 4 days (check G6PD, ECG, and start Qshift POCT glucose)    Goals of care, counseling/discussion  - Reviewed the typical clinical course of COVID19 with the daughter Danya (patient name or relationship to patient), including the potential for acute decompensation requiring intubation and mechanical ventilation  - Discussed again as part of routine daily evaluation, patient/POA maintains code status of Full code    VTE High Risk Prophylaxis: enoxaparin 40mg sq QHS @ 2100 (bundled care) if GFR >30    Patient's chronic/stable medical conditions noted in the problem list above will be managed with the patient's home medications as tolerated.           ARDS (adult respiratory distress syndrome)  4/8 extubated-  4/10 -tachypneic /chest pain --t  4/12 -stable on 4 Liter    Tolerated Wean of oxygen and peep -see resp failure  --treated per  ARDSnet Protocol.  --Target 6-8ml/kg Ideal Body Weight. Decrease TV as tolerated.  --Plateau pressure goal <30cm H2O.  --Oxygenation goal PaO2 55-80 or SpO2 88-95%.  --pH goal 7.30-7.45.          COVID-19 virus detected   Plaquenil course completed  Continue routine medications as before.   Follow airborne/droplet precautions.      Hypothyroid  Chronic problem.  Lab Results   Component Value Date    TSH 2.082 03/31/2020     Not on any medication            Morbid obesity  Body mass index is 47.24 kg/m². Morbid obesity complicates all aspects of disease management from diagnostic modalities to treatment. Weight loss encouraged and health benefits explained to patient.            VTE Risk Mitigation (From  admission, onward)         Ordered     heparin (porcine) injection 5,000 Units  Every 12 hours      04/07/20 1349     heparin (porcine) 1,000 unit/mL injection      04/06/20 1138     heparin (porcine) injection 4,000 Units  As needed (PRN)      03/29/20 2001     IP VTE HIGH RISK PATIENT  Once      03/25/20 5487                Critical care time spent on the evaluation and treatment of severe organ dysfunction, review of pertinent labs and imaging studies, discussions with consulting providers and discussions with patient/family: 38 minutes.    Discussed with pulm/daughter  Start Pt/OT/speech when able   Transfer to floor when off iv drips-likely in am   Sharonda Burris MD  Department of Hospital Medicine   [facility]

## 2020-04-13 NOTE — PROGRESS NOTES
Pharmacokinetic Assessment Follow Up: IV Vancomycin    Vancomycin serum concentration assessment(s):    The random level was drawn correctly and can be used to guide therapy at this time. The measurement is below the desired definitive target range of 15 to 20 mcg/mL.    Vancomycin Regimen Plan:    Patient's random level was 13.1 mcg/mL and below the therapeutic range of 15-20 mcg/mL.  Patient will be given vancomycin 750 mg after dialysis today.   A random level will be drawn on 04/15/20 with AM labs.     Drug levels (last 3 results):  Recent Labs   Lab Result Units 04/11/20  0352 04/12/20 0355 04/13/20  0858   Vancomycin, Random ug/mL 18.5 15.4 13.1       Pharmacy will continue to follow and monitor vancomycin.    Please contact pharmacy at extension [phone] for questions regarding this assessment.    Thank you for the consult,   Jose R Francis       Patient brief summary:  Maria Victoria Hernandez is a 54 y.o. female initiated on antimicrobial therapy with IV Vancomycin for treatment of bacteremia    The patient is being dosed by level.     Drug Allergies:   Review of patient's allergies indicates:  No Known Allergies    Actual Body Weight:   113.4 kg (250 lb)    Renal Function:   Estimated Creatinine Clearance: 13.8 mL/min (A) (based on SCr of 5.5 mg/dL (H)).,     Dialysis Method (if applicable):  intermittent HD. Patient scheduled to receive HD on MWF.    CBC (last 72 hours):  Recent Labs   Lab Result Units 04/11/20 0351 04/12/20 0355 04/13/20  0312   WBC K/uL 12.43 12.06 9.65   Hemoglobin g/dL 8.2* 8.2* 8.2*   Hematocrit % 26.3* 25.9* 26.6*   Platelets K/uL 440* 518* 475*   Gran% % 70.6 69.1 67.7   Lymph% % 12.1* 11.9* 14.1*   Mono% % 9.5 9.2 9.1   Eosinophil% % 0.7 1.8 2.6   Basophil% % 1.1 1.0 1.0   Differential Method  Automated Automated Automated       Metabolic Panel (last 72 hours):  Recent Labs   Lab Result Units 04/11/20  0351 04/12/20 0355 04/13/20  0312   Sodium mmol/L 139 139 141   Potassium  mmol/L 4.0 4.6 4.6   Chloride mmol/L 103 101 100   CO2 mmol/L 23 22* 28   Glucose mg/dL 147* 132* 138*   BUN, Bld mg/dL 43* 61* 80*   Creatinine mg/dL 4.3* 5.0* 5.5*   Albumin g/dL 2.2* 2.3* 2.5*   Total Bilirubin mg/dL 1.4* 1.0 0.9   Alkaline Phosphatase U/L 255* 238* 207*   AST U/L 26 32 18   ALT U/L 36 37 28       Vancomycin Administrations:  vancomycin given in the last 96 hours        No antibiotic orders with administrations found.                      Microbiologic Results:  Microbiology Results (last 7 days)       Procedure Component Value Units Date/Time    Clostridium difficile EIA [[id number]] Collected:  04/12/20 2315    Order Status:  Sent Specimen:  Stool Updated:  04/12/20 2330    Blood culture [[id number]] Collected:  04/08/20 1739    Order Status:  Completed Specimen:  Blood from Line, Central Updated:  04/12/20 2212     Blood Culture, Routine No Growth after 4 days.     Blood culture [[id number]] Collected:  04/10/20 0453    Order Status:  Completed Specimen:  Blood Updated:  04/12/20 1412     Blood Culture, Routine No Growth to date      No Growth to date      No Growth to date    Blood culture [[id number]] Collected:  04/08/20 0745    Order Status:  Completed Specimen:  Blood Updated:  04/12/20 1412     Blood Culture, Routine No Growth after 4 days.     Blood culture [[id number]]  (Abnormal) Collected:  04/04/20 0912    Order Status:  Completed Specimen:  Blood from Line, Central Updated:  04/12/20 1136     Blood Culture, Routine Gram stain peds bottle: budding yeast      Results called to and read back by: Carri Pryor RN  04/06/2020  03:04      CANDIDA ALBICANS  Susceptibility pending  ID consult required at [facility] and [place] [facility].      Blood culture [[id number]]  (Abnormal) Collected:  04/06/20 0806    Order Status:  Completed Specimen:  Blood from Antecubital, Left Updated:  04/12/20 1136     Blood Culture, Routine Gram stain peds bottle: yeast       Results called to and read  back by: Tania Nolan RN 04/08/2020        11:20      CANDIDA ALBICANS  Susceptibility pending  ID consult required at [facility],[place] and [facility] locations.      Blood culture [[id number]] Collected:  04/07/20 1730    Order Status:  Completed Specimen:  Blood from Line, Central Updated:  04/11/20 2212     Blood Culture, Routine No Growth after 4 days.     Blood culture [[id number]] Collected:  04/07/20 0907    Order Status:  Completed Specimen:  Blood Updated:  04/11/20 2212     Blood Culture, Routine No Growth after 4 days.     Clostridium difficile EIA [[id number]]     Order Status:  Canceled Specimen:  Stool     Blood culture [[id number]] Collected:  04/06/20 1802    Order Status:  Completed Specimen:  Blood from Line, Central Updated:  04/10/20 2212     Blood Culture, Routine No Growth after 4 days.     IV catheter culture [[id number]]  (Abnormal) Collected:  04/06/20 1455    Order Status:  Completed Specimen:  Catheter Tip, Intrajugular Updated:  04/09/20 1300     Aerobic Culture - Cath tip STAPHYLOCOCCUS EPIDERMIDIS  < 15 colonies      IV catheter culture [[id number]] Collected:  04/06/20 1615    Order Status:  Completed Specimen:  Catheter Tip, Dialysis Updated:  04/09/20 1121     Aerobic Culture - Cath tip No growth    Blood culture [[id number]]     Order Status:  Canceled Specimen:  Blood     Blood culture [[id number]]  (Abnormal)  (Susceptibility) Collected:  04/05/20 0813    Order Status:  Completed Specimen:  Blood from Peripheral, Left  Hand Updated:  04/08/20 1236     Blood Culture, Routine Gram stain peds bottle: Gram positive cocci in clusters resembling Staph       Results called to and read back by: Lynn Sawyer RN 04/06/2020  09:57      STAPHYLOCOCCUS EPIDERMIDIS    Blood culture [[id number]]  (Abnormal)  (Susceptibility) Collected:  04/05/20 0813    Order Status:  Completed Specimen:  Blood from Line, Central Left  Neck Updated:  04/08/20 1234     Blood Culture, Routine Gram stain tank bottle:  Gram positive cocci in clusters resembling Staph       Results called to and read back by: Lynn Sawyer RN 04/06/2020  09:57      Gram stain aer bottle: Gram positive cocci in clusters resembling Staph       Positive results previously called 04/06/2020  15:52      STAPHYLOCOCCUS EPIDERMIDIS    Culture, Respiratory with Gram Stain [[id number]]  (Abnormal) Collected:  04/05/20 0050    Order Status:  Completed Specimen:  Respiratory from Endotracheal Aspirate Updated:  04/08/20 1046     Respiratory Culture No S aureus or Pseudomonas isolated.      CANDIDA ALBICANS  Few  Normal respiratory anamaria also present       Gram Stain (Respiratory) <10 epithelial cells per low power field.     Gram Stain (Respiratory) Many WBC's     Gram Stain (Respiratory) Rare Gram positive cocci     Gram Stain (Respiratory) Rare yeast

## 2020-04-13 NOTE — PROGRESS NOTES
Progress Note  Infectious Disease    Reason for Consult:      HPI: Maria Victoria Hernandez is a   54 y.o. female employee of [employer].  with past medical history of morbid obesity, BMI of 48, diabetes, diet controlled, anemia, B12 deficiency, vitamin-D deficiency, hypothyroidism, was admitted on 03/25/2020 due to shortness of breath, diagnosed with COVID 19 is positive.  Patient has been intubated.  She went into renal failure and has been receiving HD by nephrologist.  Intensivist has been managing the vent.    Patient has completed 10 days of hydroxychloroquine and about 8 days of Zithromax and ceftriaxone.  She started spiking fever of  103 on 04/04.  White count jumped to 17.8.  She was started on vancomycin and Zosyn and blood cultures were sent.    ID consult called for g positives and yeast in blood cultures.  Patient is afebrile at the time.  WBC has improved.  Discussed with nurse.  Will discontinue lines.  Continue vancomycin, Daptomycin x1.  Add micafungin daily.    04/07/2020  WBC improved 17--12--10  Ferritin 1752--1538--1897  Intubated Sedated.  FiO2 of 35 people 5.  T-max 101.7°  Dialysis catheter changed yesterday  Leukocytosis improved 12/17/2010 04/08/2020  T-max 99.9°  Intubated sedated.  FiO2 35, peep of 5.  Fail CPAP trial.  Tolerating vancomycin, Zosyn, Micafungin.   Lines are fresh.  Nurse is trying to protect them.  Discussed with nurse:  Her daughter who is a nurse came and saw mother today, she agrees right eye looks better.    04/09/2020  Patient is extubated today, Really weak, Does not breath in deep, I advised her on deep breathing  Continues to need Cardene drip for BP  HAd loose stool-- flexiseal was placed    04/10/2020.  She is very weak.  She tries to opens her eyes and interact with me.  Right eye is improved.  Dialysis nurse is about to start dialysis.    04/11/2020  T max 100.3 yesterday.   She looks tired, but better than yesterday. Today she is  more  "awake and slighty stronger  She was tachycardic yesterday -- Cardene was switched to labetalol  WBC is about the same. ESR 50;  procal is still elveated. vanc level today is 14  Hospitalist ": Requested pulmonary to review possible  bipap -for Covid must have extra filter for machines which are limited "  As per renal :   " UOP 750cc.  3L UF w/HD yesterday.  K+ at goal.  Holding dialysis over weekend to assess for recovery"    04/12/2020 chart review    04/13/2020  Tmax 99.9, WBC normal, platelet still high  Pulled out ngt, It was replaced  Patient is tolerating vancomycin and micafungin.  She continues to have diarrhea.  C diff antigen is positive.    Antibiotics (From admission, onward)    Start     Stop Route Frequency Ordered    04/13/20 1700  vancomycin 750 mg in dextrose 5 % 250 mL IVPB (ready to mix system)      -- IV Once 04/13/20 0951    04/05/20 0848  vancomycin - pharmacy to dose  (vancomycin IVPB)      -- IV pharmacy to manage frequency 04/05/20 0748    04/02/20 2315  gentamicin 0.3 % ophthalmic solution 1 drop      -- RIGHT EYE Every 4 hours 04/02/20 2310        Antifungals (From admission, onward)    Start     Stop Route Frequency Ordered    04/06/20 1100  micafungin 100 mg in sodium chloride 0.9 % 100 mL IVPB (ready to mix system)      -- IV Every 24 hours (non-standard times) 04/06/20 0959        Antivirals (From admission, onward)    None          EXAM & DIAGNOSTICS REVIEWED:   Vitals:     Temp:  [98.4 °F (36.9 °C)-99.5 °F (37.5 °C)]   Temp: 99.5 °F (37.5 °C) (04/13/20 1130)  Pulse: (!) 112 (04/13/20 1300)  Resp: (!) 40 (04/13/20 1300)  BP: (!) 149/87 (04/13/20 1130)  SpO2: (!) 94 % (04/13/20 1300)    Intake/Output Summary (Last 24 hours) at 4/13/2020 1414  Last data filed at 4/13/2020 0932  Gross per 24 hour   Intake 1510 ml   Output 2462 ml   Net -952 ml          General:  In NAD she looks anxious.  I tried to comfort.   Eyes:  Anicteric,  ENT:  Slightly Coated tongue.  Lines on bilateral neck " dressed appropriately.   Neck:  supple, no masses or adenopathy appreciated  Lungs: Does not breathe all the weight deep.  Clear otherwise.  Heart:  RRR, no gallop/murmur/rub noted  Abd:  Soft, NT, ND, normal BS, no masses or organomegaly appreciated.  Rectal tube in place.  :  Cuellar  Musc:  Joints without effusion, swelling, erythema, synovitis, muscle wasting.   Skin:  No rashes. No palmar or plantar lesions. No subungual petechiae  Wound:   Neuro: Follows commands.  Very weak, especially in lower extremities.  Psych:  anxious.  Lymphatic:     No cervical, supraclavicular, axillary, or inguinal nodes  Extrem: No edema, erythema, phlebitis, cellulitis, warm and well perfused  VAD:       Isolation:      Lines/Tubes/Drains:  Right IJ 04/06  Left IJ 04/06  Cuellar 03/25  OT 03/25    General Labs reviewed:  Recent Labs   Lab 04/11/20  0351 04/12/20  0355 04/13/20 0312   WBC 12.43 12.06 9.65   HGB 8.2* 8.2* 8.2*   HCT 26.3* 25.9* 26.6*   * 518* 475*       Recent Labs   Lab 04/11/20  0351 04/12/20  0355 04/13/20  0312    139 141   K 4.0 4.6 4.6    101 100   CO2 23 22* 28   BUN 43* 61* 80*   CREATININE 4.3* 5.0* 5.5*   CALCIUM 9.7 10.0 10.7*   PROT 8.0 8.3 8.5*   BILITOT 1.4* 1.0 0.9   ALKPHOS 255* 238* 207*   ALT 36 37 28   AST 26 32 18     Micro:  Microbiology Results (last 7 days)     Procedure Component Value Units Date/Time    Blood culture [[id number]] Collected:  04/10/20 0453    Order Status:  Completed Specimen:  Blood Updated:  04/13/20 1412     Blood Culture, Routine No Growth to date      No Growth to date      No Growth to date      No Growth to date    C Diff Toxin by PCR [[id number]] Collected:  04/12/20 2315    Order Status:  No result Updated:  04/13/20 1325    Clostridium difficile EIA [[id number]]  (Abnormal) Collected:  04/12/20 2315    Order Status:  Completed Specimen:  Stool Updated:  04/13/20 1324     C. diff Antigen Positive     C difficile Toxins A+B, EIA Negative     Comment:  Testing not recommended for children <24 months old.       Blood culture [[id number]]  (Abnormal) Collected:  04/04/20 0912    Order Status:  Completed Specimen:  Blood from Line, Central Updated:  04/13/20 1303     Blood Culture, Routine Gram stain peds bottle: budding yeast      Results called to and read back by: Carri Pryor RN  04/06/2020  03:04      AJ ALBICANS  ID consult required at [facility] locations.      Blood culture [[id number]] Collected:  04/08/20 1739    Order Status:  Completed Specimen:  Blood from Line, Central Updated:  04/12/20 2212     Blood Culture, Routine No Growth after 4 days.     Blood culture [[id number]] Collected:  04/08/20 0745    Order Status:  Completed Specimen:  Blood Updated:  04/12/20 1412     Blood Culture, Routine No Growth after 4 days.     Blood culture [[id number]]  (Abnormal) Collected:  04/06/20 0806    Order Status:  Completed Specimen:  Blood from Antecubital, Left Updated:  04/12/20 1136     Blood Culture, Routine Gram stain peds bottle: yeast       Results called to and read back by: Tania Nolan RN 04/08/2020        11:20      CANDIDA ALBICANS  Susceptibility pending  ID consult required at [facility] and [facility] locations.      Blood culture [[id number]] Collected:  04/07/20 1730    Order Status:  Completed Specimen:  Blood from Line, Central Updated:  04/11/20 2212     Blood Culture, Routine No Growth after 4 days.     Blood culture [[id number]] Collected:  04/07/20 0907    Order Status:  Completed Specimen:  Blood Updated:  04/11/20 2212     Blood Culture, Routine No Growth after 4 days.     Clostridium difficile EIA [[id number]]     Order Status:  Canceled Specimen:  Stool     Blood culture [[id number]] Collected:  04/06/20 1802    Order Status:  Completed Specimen:  Blood from Line, Central Updated:  04/10/20 2212     Blood Culture, Routine No Growth after 4 days.     IV catheter culture [[id number]]  (Abnormal) Collected:   04/06/20 1455    Order Status:  Completed Specimen:  Catheter Tip, Intrajugular Updated:  04/09/20 1300     Aerobic Culture - Cath tip STAPHYLOCOCCUS EPIDERMIDIS  < 15 colonies      IV catheter culture [[id number]] Collected:  04/06/20 1615    Order Status:  Completed Specimen:  Catheter Tip, Dialysis Updated:  04/09/20 1121     Aerobic Culture - Cath tip No growth    Blood culture [[id number]]     Order Status:  Canceled Specimen:  Blood     Blood culture [[id number]]  (Abnormal)  (Susceptibility) Collected:  04/05/20 0813    Order Status:  Completed Specimen:  Blood from Peripheral, Left  Hand Updated:  04/08/20 1236     Blood Culture, Routine Gram stain peds bottle: Gram positive cocci in clusters resembling Staph       Results called to and read back by: Lynn Sawyer RN 04/06/2020  09:57      STAPHYLOCOCCUS EPIDERMIDIS    Blood culture [[id number]]  (Abnormal)  (Susceptibility) Collected:  04/05/20 0813    Order Status:  Completed Specimen:  Blood from Line, Central Left  Neck Updated:  04/08/20 1234     Blood Culture, Routine Gram stain tank bottle: Gram positive cocci in clusters resembling Staph       Results called to and read back by: Lynn Sawyer RN 04/06/2020  09:57      Gram stain aer bottle: Gram positive cocci in clusters resembling Staph       Positive results previously called 04/06/2020  15:52      STAPHYLOCOCCUS EPIDERMIDIS    Culture, Respiratory with Gram Stain [[id number]]  (Abnormal) Collected:  04/05/20 0050    Order Status:  Completed Specimen:  Respiratory from Endotracheal Aspirate Updated:  04/08/20 1046     Respiratory Culture No S aureus or Pseudomonas isolated.      CANDIDA ALBICANS  Few  Normal respiratory anamaria also present       Gram Stain (Respiratory) <10 epithelial cells per low power field.     Gram Stain (Respiratory) Many WBC's     Gram Stain (Respiratory) Rare Gram positive cocci     Gram Stain (Respiratory) Rare yeast        Imaging Reviewed:  KUB 0409/2020NG tube present with  tip overlying the stomach in the left abdomen.  No evidence of bowel obstruction.  No radiographic mass.  CXR 04/06/2020Support devices as above.  No pneumothorax.  Moderate pulmonary airspace disease without significant change.  CXR 04/05/2020 No significant interval change in the bilateral airspace disease.  Support devices in stable position.    Cardiology:  Sinus rhythm  Repeat limited ECHO 04/13/2020  ECHO on 03/27/2020  · Mild left ventricular enlargement.  · Mildly decreased left ventricular systolic function. The estimated ejection fraction is 45%.  · Grade I (mild) left ventricular diastolic dysfunction consistent with impaired relaxation.  · Normal right ventricular systolic function.  · Mild left atrial enlargement.  · Moderate mitral regurgitation.  · Mild tricuspid regurgitation.  · Mild pulmonary hypertension present. PASP 40 mm of Hg.     IMPRESSION & PLAN     Staphylococcus epidermidis bacteremia, line infection.  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  C diff PCR is negative.  Will treat with vanc p.o.  Respiratory failure, ARDS, COVID19- completed treatment.  Intubated 03/25/2020-  Extubated 04/08/2020.  HTN, CHF with EF of 45%  This patient is high risk for life-threatening deterioration and death secondary to above comorbidities and need for IV treatment Critical care 35 min    Ferritin 1752--1538--1897---1865  --215---190--174.6--186.5---139  Procalcitonin 3.67---5.34    Recommendations:  --  Limited ECHO ---- ro vegetation  --- Continue vancomycin for S epidermidis (count 14 days from 04/07, assuming echocardiogram shows no vegetation.)  --  Continue micafungin because of fungemia.   Eventually may need retinal exam.  We changed  Lines  on 04/06 in afternoon.   BCx drawn on the am of 04/06 were positive for C albicans!  Repeat blood cultures were negative after that.  Duration of micafungin will depend  on results of EC HR.  -- start vancomycin p.o.

## 2020-04-13 NOTE — NURSING
X-ray accidentally pulled NGT out and have attempted to replace x 2. BVecame very anxious and resp rate increased and she was resistant to tube replacement. Obtained order for Ativan 0.5mg IV and was given to help relax patient.

## 2020-04-13 NOTE — PLAN OF CARE
Pt suctioned of copious amounts of bloody secretions. Yanker given to pt to assist with oral suctioning. Mouth cleaned with sponge swabs and ice water. Frandyker to get extra water out of pt's mouth. Awaiting results of CXR for NG placement. PT and ST at bedside for evaluation. Will update pt's daughter on pt's progress. Cuellar draining yellow urine. Catheter dressings changed.

## 2020-04-13 NOTE — NURSING
Dr Arriaza came to bedside and exchanged over guide wire a new trialysis catheter. Pt tolerated well.

## 2020-04-13 NOTE — SUBJECTIVE & OBJECTIVE
Interval History: Continues to be Encephalopathic. As per the family and nurse, patient more responsive and oriented than yesterday. Currently saturating well on 4 L NC  Review of Systems   Unable to perform ROS: Mental status change     Objective:     Vital Signs (Most Recent):  Temp: 99.5 °F (37.5 °C) (04/13/20 1130)  Pulse: (!) 112 (04/13/20 1300)  Resp: (!) 40 (04/13/20 1300)  BP: (!) 149/87 (04/13/20 1130)  SpO2: (!) 94 % (04/13/20 1300) Vital Signs (24h Range):  Temp:  [98.4 °F (36.9 °C)-99.5 °F (37.5 °C)] 99.5 °F (37.5 °C)  Pulse:  [] 112  Resp:  [21-64] 40  SpO2:  [77 %-100 %] 94 %  BP: (137-176)/() 149/87     Weight: 113.4 kg (250 lb)  Body mass index is 47.24 kg/m².    Intake/Output Summary (Last 24 hours) at 4/13/2020 1332  Last data filed at 4/13/2020 0932  Gross per 24 hour   Intake 1510 ml   Output 2662 ml   Net -1152 ml      Physical Exam   Nursing note and vitals reviewed.  PATIENT WAS SEEN AS A VIDEO VISIT WITH NURSE IN PATIENT ROOM WITH PATIENT TO ASSIST DURING THE VISIT. PHYSICAL EXAM FINDINGS ARE AS VIEWED BY MYSELF VIA VIDEO OR AS REPORTED BY NURSE IF SPECIFIED AS SUCH, EXAM NOT DONE PERSONALLY BY MYSELF AT BEDSIDE.      Significant Labs:   BMP:   Recent Labs   Lab 04/13/20  0312   *      K 4.6      CO2 28   BUN 80*   CREATININE 5.5*   CALCIUM 10.7*     CBC:   Recent Labs   Lab 04/12/20  0355 04/13/20  0312   WBC 12.06 9.65   HGB 8.2* 8.2*   HCT 25.9* 26.6*   * 475*       Significant Imaging: I have reviewed all pertinent imaging results/findings within the past 24 hours.

## 2020-04-13 NOTE — SUBJECTIVE & OBJECTIVE
Interval History:Patient seen and examined via telemed with nursing staff   Visit type: Virtual visit with synchronous audio and video  Via Vidyo-patient in hospital  Provider at home   Nurse -at bedside-Jonathon    Review of Systems   Constitutional: Positive for fatigue and fever. Negative for chills.   Respiratory: Positive for cough and shortness of breath.    Cardiovascular: Negative for leg swelling.   Neurological: Positive for dizziness and weakness.     Objective:     Vital Signs (Most Recent):  Temp: 99 °F (37.2 °C) (04/12/20 2045)  Pulse: 104 (04/12/20 2030)  Resp: (!) 31 (04/12/20 2030)  BP: (!) 163/74 (04/12/20 2030)  SpO2: 97 % (04/12/20 2030) Vital Signs (24h Range):  Temp:  [98.2 °F (36.8 °C)-100 °F (37.8 °C)] 99 °F (37.2 °C)  Pulse:  [] 104  Resp:  [21-49] 31  SpO2:  [77 %-100 %] 97 %  BP: (137-171)/(69-99) 163/74  Arterial Line BP: (123-189)/() 172/74     Weight: 113.4 kg (250 lb)  Body mass index is 47.24 kg/m².    Intake/Output Summary (Last 24 hours) at 4/12/2020 2107  Last data filed at 4/12/2020 2000  Gross per 24 hour   Intake 2123 ml   Output 2406 ml   Net -283 ml      Physical Exam  PATIENT WAS SEEN AS A VIDEO VISIT WITH NURSE ASSIST DURING THE VISIT. PHYSICAL EXAM FINDINGS ARE AS VIEWED BY MYSELF VIA VIDEO OR AS REPORTED BY NURSE IF SPECIFIED AS SUCH, EXAM NOT DONE PERSONALLY BY MYSELF AT BEDSIDE.  Significant Labs:   CBC:   Recent Labs   Lab 04/11/20 0351 04/12/20 0355   WBC 12.43 12.06   HGB 8.2* 8.2*   HCT 26.3* 25.9*   * 518*     CMP:   Recent Labs   Lab 04/11/20 0351 04/12/20 0355    139   K 4.0 4.6    101   CO2 23 22*   * 132*   BUN 43* 61*   CREATININE 4.3* 5.0*   CALCIUM 9.7 10.0   PROT 8.0 8.3   ALBUMIN 2.2* 2.3*   BILITOT 1.4* 1.0   ALKPHOS 255* 238*   AST 26 32   ALT 36 37   ANIONGAP 13 16   EGFRNONAA 11* 9*       Significant Imaging: I have reviewed and interpreted all pertinent imaging results/findings within the past 24 hours.

## 2020-04-13 NOTE — PROGRESS NOTES
Net uf 12 mls     04/13/20 0932   Post-Hemodialysis Assessment   Rinseback Volume (mL) 250 mL   Blood Volume Processed (Liters) 10.3 L   Dialyzer Clearance Clotted   Duration of Treatment (minutes) 32 minutes   Hemodialysis Intake (mL) 500 mL   Total UF (mL) 512 mL   Net Fluid Removal 12   Patient Response to Treatment tolerated well   Post-Treatment Weight 113.4 kg (250 lb)   Treatment Weight Change 0   Post-Hemodialysis Comments terminated early.      Treatment terminated early, related to access issues keeping machine from operating properly. Dr. Moeller notified and will address access.

## 2020-04-13 NOTE — PROCEDURES
"Maria Victoria Hernandez is a 54 y.o. female patient.    Temp: 99 °F (37.2 °C) (04/13/20 1615)  Pulse: 110 (04/13/20 1630)  Resp: (!) 30 (04/13/20 1600)  BP: (!) 152/99 (04/13/20 1615)  SpO2: 96 % (04/13/20 1630)  Weight: 113.4 kg (250 lb) (04/13/20 1300)  Height: 5' 1" (154.9 cm) (04/13/20 1300)       Central Line  Date/Time: 4/13/2020 6:06 PM  Performed by: Erica Arriaza MD  Pre-operative Diagnosis: renal failure  Post-operative diagnosis: same  Consent Done: Emergent Situation  Time out: Immediately prior to procedure a "time out" was called to verify the correct patient, procedure, equipment, support staff and site/side marked as required.  Indications: hemodialysis  Anesthesia: local infiltration  Description of findings: line exchange for clotting   Preparation: skin prepped with ChloraPrep  Location details: right internal jugular  Catheter type: triple lumen  Catheter size: 12 Fr  Ultrasound guidance: no  Manometry: No   Number of attempts: 1  Assessment: successful placement  Technical procedures used: change out of dialysis catheter over wire  Complications: none  Estimated blood loss (mL): 1  Specimens: No  Implants: No  Post-procedure: line sutured,  chlorhexidine patch,  sterile dressing applied and blood return through all ports  Complications: No  Comments: Line was exchanged easily over wire.  Clot noted at tip of old dialysis catheter          Erica Arriaza  4/13/2020  "

## 2020-04-13 NOTE — PT/OT/SLP PROGRESS
Physical Therapy Treatment    Patient Name:  Maria Victoria Hernandez   MRN:  [id number]    Recommendations:     Discharge Recommendations:  LTACH (long-term acute care hospital), nursing facility, skilled   Discharge Equipment Recommendations: other (see comments)(unclear at this time)   Barriers to discharge: decreased functional mobility    Assessment:     Maria Victoria Hernandez is a 54 y.o. female admitted with a medical diagnosis of Acute respiratory failure with hypoxia.  She presents with the following impairments/functional limitations:  weakness, impaired functional mobilty, impaired balance, decreased upper extremity function, decreased safety awareness, impaired cardiopulmonary response to activity, impaired endurance, impaired self care skills, decreased lower extremity function. Patient lying with HOB elevated suctioning her mouth upon entering the room. Some blood noted while suctioning related to NG placement. She has restraints on, but they are not attached to the bed. She is agreeable to participate with PT. She communicates by nodding yes/no. Patient able to perform  on L hand, but no muscle activation noted on the right. Slight activation of elbow flexion and knee flexion. Patient appears to have some involuntary movement/tremors of L UE and LE during PT session possibly related to exertion or anxiety? O2 sats remained WNL. She agrees to sitting EOB, but once legs dangling over EOB and PT assisting her to sit her eyes got big. PT asked if she needed to lie back down and she nodded yes. Patient repositioned in bed. RN aware.     Rehab Prognosis: Fair; patient would benefit from acute skilled PT services to address these deficits and reach maximum level of function.    Recent Surgery: * No surgery found *      Plan:     During this hospitalization, patient to be seen 5 x/week to address the identified rehab impairments via gait training, therapeutic activities, therapeutic exercises and  progress toward the following goals:    · Plan of Care Expires:  20    Subjective     Chief Complaint: none verbalized  Patient/Family Comments/goals: none verbalized  Pain/Comfort:  · Pain Rating 1: (no pain verbalized)      Objective:     Communicated with REZA Barrios prior to session.  Patient found HOB elevated with oxygen, central line, telemetry, mueller catheter, pulse ox (continuous), restraints, blood pressure cuff, NG tube upon PT entry to room.     General Precautions: Standard, airborne, contact, droplet, respiratory, fall, NPO   Orthopedic Precautions:N/A   Braces: N/A     Functional Mobility:  · Bed Mobility:     · Supine to Sit: maximal assistance and of 2 persons; ~25 percent of transfer complete before patient needed to return to supine  · Sit to Supine: maximal assistance and of 2 persons      AM-PAC 6 CLICK MOBILITY          Therapeutic Activities and Exercises:   Patient was educated on the importance of OOB activity during hospital stay, safe transfers and ambulation    Patient left HOB elevated with all lines intact and RN notified..    GOALS:   Multidisciplinary Problems     Physical Therapy Goals        Problem: Physical Therapy Goal    Goal Priority Disciplines Outcome Goal Variances Interventions   Physical Therapy Goal     PT, PT/OT Ongoing, Progressing     Description:  Goals to be met by: 2020    Patient will increase functional independence with mobility by performin. Supine to sit with MInimal Assistance  2. Sit to supine with MInimal Assistance  3. Sit to stand transfer with Minimal Assistance  4. Bed to chair transfer with Minimal Assistance using Rolling Walker  5. Gait  x 25 feet with Minimal Assistance using Rolling Walker.                       Time Tracking:     PT Received On: 20  PT Start Time: 1406     PT Stop Time: 1426  PT Total Time (min): 20 min     Billable Minutes: Therapeutic Activity 20    Treatment Type: Treatment  PT/PTA: PT     PTA Visit Number:  0     Pippa Luna, PT  04/13/2020

## 2020-04-13 NOTE — NURSING
NG tube placed. Pt did not tolerate very well. Bloody drainage after NG tube in. Pt attempted to pull out NG while RN's were putting NG tube in. Pt kept grabbing at the tube. Wrist restraints made tighter to keep pt from pulling out tube.

## 2020-04-13 NOTE — ASSESSMENT & PLAN NOTE
Patient's anemia is currently controlled. Trending down  Will send for stool [facility]    Has recieved 1 units of PRBCs on 4/3.      Etiology likely d/t Anemia of chronic disease/blood loss/acute inflammatory process  Current CBC reviewed-   Lab Results   Component Value Date    HGB 8.2 (L) 04/13/2020    HCT 26.6 (L) 04/13/2020     Monitor serial CBC and transfuse if patient becomes hemodynamically unstable, symptomatic or H/H drops below 8/24

## 2020-04-13 NOTE — ASSESSMENT & PLAN NOTE
AMS:: Hypoxia vs. Infection vs. TIA/stroke vs. Metabolic/Toxic. /ICU stay  -prolonged paralytic?/narcotics/benzos    -Acute, improving/occasional hallucination     Likely multifactorial -unable to get  /MRI until stable   Ct head neg  Haldol/anxiolytics prn -required intermittent precedex once off sedating medications-acute delirium precautions /transf    Lab Results   Component Value Date    WBC 12.06 04/12/2020     MRI not done    focal findings on physical exam  No early signs of stroke on Head CT, no hemorrhage or acute findings.  EEG: EEG 30 min awake and drowsy results noted no seizures  Continue supportive care

## 2020-04-13 NOTE — ASSESSMENT & PLAN NOTE
Patient's anemia is currently controlled. Trending down  Will send for stool [facility]    Has recieved 1 units of PRBCs on 4/3.      Etiology likely d/t Anemia of chronic disease/blood loss/acute inflammatory process  Current CBC reviewed-   Lab Results   Component Value Date    HGB 8.2 (L) 04/12/2020    HCT 25.9 (L) 04/12/2020     Monitor serial CBC and transfuse if patient becomes hemodynamically unstable, symptomatic or H/H drops below 8/24

## 2020-04-13 NOTE — ASSESSMENT & PLAN NOTE
Chronic -noted on echo ; strict I/O   No ACE/ARB 2/2 renal failure  Asa/statin-when able to tolerate po   Strict I/O-volume control with HD

## 2020-04-13 NOTE — ASSESSMENT & PLAN NOTE
Patient with Hypoxic Respiratory failure which is Acute.  she is not on home oxygen.   Treatment for Covid/ARDS - supportive care.   4/8 extubated to [place] oxygen -stable    Pulmonary consultation. /ID consutltation   IV antibiotics - ceftriaxone and azithromycin. And Plaquenil 400 mg daily x 5  Vanc/zosyn initiated 4/5 for staph Epi  micafungin for yeast +blood/sputum 4/6 /ID consulted       Microbiology Results (last 7 days)     Procedure Component Value Units Date/Time    C Diff Toxin by PCR [[id number]] Collected:  04/12/20 2315    Order Status:  No result Updated:  04/13/20 1325    Clostridium difficile EIA [[id number]]  (Abnormal) Collected:  04/12/20 2315    Order Status:  Completed Specimen:  Stool Updated:  04/13/20 1324     C. diff Antigen Positive     C difficile Toxins A+B, EIA Negative     Comment: Testing not recommended for children <24 months old.       Blood culture [[id number]]  (Abnormal) Collected:  04/04/20 0912    Order Status:  Completed Specimen:  Blood from Line, Central Updated:  04/13/20 1303     Blood Culture, Routine Gram stain peds bottle: budding yeast      Results called to and read back by: Carri Pryor RN  04/06/2020  03:04      AJ ALBICANS  ID consult required at [facility].[place] and [facility].      Blood culture [[id number]] Collected:  04/08/20 1739    Order Status:  Completed Specimen:  Blood from Line, Central Updated:  04/12/20 2212     Blood Culture, Routine No Growth after 4 days.     Blood culture [[id number]] Collected:  04/10/20 0453    Order Status:  Completed Specimen:  Blood Updated:  04/12/20 1412     Blood Culture, Routine No Growth to date      No Growth to date      No Growth to date    Blood culture [[id number]] Collected:  04/08/20 0745    Order Status:  Completed Specimen:  Blood Updated:  04/12/20 1412     Blood Culture, Routine No Growth after 4 days.     Blood culture [[id number]]  (Abnormal) Collected:  04/06/20 0806    Order Status:  Completed  Specimen:  Blood from Antecubital, Left Updated:  04/12/20 1136     Blood Culture, Routine Gram stain peds bottle: yeast       Results called to and read back by: Tania Nolan RN 04/08/2020        11:20      CANDIDA ALBICANS  Susceptibility pending  ID consult required at [facility],[place] and [facility] locations.      Blood culture [[id number]] Collected:  04/07/20 1730    Order Status:  Completed Specimen:  Blood from Line, Central Updated:  04/11/20 2212     Blood Culture, Routine No Growth after 4 days.     Blood culture [[id number]] Collected:  04/07/20 0907    Order Status:  Completed Specimen:  Blood Updated:  04/11/20 2212     Blood Culture, Routine No Growth after 4 days.     Clostridium difficile EIA [[id number]]     Order Status:  Canceled Specimen:  Stool     Blood culture [[id number]] Collected:  04/06/20 1802    Order Status:  Completed Specimen:  Blood from Line, Central Updated:  04/10/20 2212     Blood Culture, Routine No Growth after 4 days.     IV catheter culture [[id number]]  (Abnormal) Collected:  04/06/20 1455    Order Status:  Completed Specimen:  Catheter Tip, Intrajugular Updated:  04/09/20 1300     Aerobic Culture - Cath tip STAPHYLOCOCCUS EPIDERMIDIS  < 15 colonies      IV catheter culture [[id number]] Collected:  04/06/20 1615    Order Status:  Completed Specimen:  Catheter Tip, Dialysis Updated:  04/09/20 1121     Aerobic Culture - Cath tip No growth    Blood culture [[id number]]     Order Status:  Canceled Specimen:  Blood     Blood culture [[id number]]  (Abnormal)  (Susceptibility) Collected:  04/05/20 0813    Order Status:  Completed Specimen:  Blood from Peripheral, Left  Hand Updated:  04/08/20 1236     Blood Culture, Routine Gram stain peds bottle: Gram positive cocci in clusters resembling Staph       Results called to and read back by: Lynn Sawyer RN 04/06/2020  09:57      STAPHYLOCOCCUS EPIDERMIDIS    Blood culture [[id number]]  (Abnormal)  (Susceptibility) Collected:  04/05/20  0813    Order Status:  Completed Specimen:  Blood from Line, Central Left  Neck Updated:  04/08/20 1234     Blood Culture, Routine Gram stain tank bottle: Gram positive cocci in clusters resembling Staph       Results called to and read back by: Lynn Sawyer RN 04/06/2020  09:57      Gram stain aer bottle: Gram positive cocci in clusters resembling Staph       Positive results previously called 04/06/2020  15:52      STAPHYLOCOCCUS EPIDERMIDIS    Culture, Respiratory with Gram Stain [[id number]]  (Abnormal) Collected:  04/05/20 0050    Order Status:  Completed Specimen:  Respiratory from Endotracheal Aspirate Updated:  04/08/20 1046     Respiratory Culture No S aureus or Pseudomonas isolated.      CANDIDA ALBICANS  Few  Normal respiratory anamaria also present       Gram Stain (Respiratory) <10 epithelial cells per low power field.     Gram Stain (Respiratory) Many WBC's     Gram Stain (Respiratory) Rare Gram positive cocci     Gram Stain (Respiratory) Rare yeast          Continue routine medications as before.   Follow airborne/droplet precautions.

## 2020-04-13 NOTE — NURSING
Called consult in to Dr Gauthier, he can not come until tomorrow. Called Dr Arriaza. He will put catheter in. Called Dr Gauthier and made him aware that Dr Arriaza will put cath in. MD wants pt to stay here until he can put cath in this evening.

## 2020-04-14 ENCOUNTER — OUTSIDE PLACE OF SERVICE (OUTPATIENT)
Dept: INFECTIOUS DISEASES | Facility: CLINIC | Age: 55
End: 2020-04-14
Payer: COMMERCIAL

## 2020-04-14 LAB
ALBUMIN SERPL BCP-MCNC: 2.8 G/DL (ref 3.5–5.2)
ALP SERPL-CCNC: 182 U/L (ref 55–135)
ALT SERPL W/O P-5'-P-CCNC: 23 U/L (ref 10–44)
ANION GAP SERPL CALC-SCNC: 18 MMOL/L (ref 8–16)
AST SERPL-CCNC: 18 U/L (ref 10–40)
BACTERIA BLD CULT: NORMAL
BASOPHILS # BLD AUTO: 0.11 K/UL (ref 0–0.2)
BASOPHILS NFR BLD: 1.2 % (ref 0–1.9)
BILIRUB SERPL-MCNC: 1 MG/DL (ref 0.1–1)
BUN SERPL-MCNC: 81 MG/DL (ref 6–20)
C DIFF TOX GENS STL QL NAA+PROBE: POSITIVE
CALCIUM SERPL-MCNC: 10 MG/DL (ref 8.7–10.5)
CHLORIDE SERPL-SCNC: 100 MMOL/L (ref 95–110)
CO2 SERPL-SCNC: 25 MMOL/L (ref 23–29)
CREAT SERPL-MCNC: 5 MG/DL (ref 0.5–1.4)
DIFFERENTIAL METHOD: ABNORMAL
EOSINOPHIL # BLD AUTO: 0.2 K/UL (ref 0–0.5)
EOSINOPHIL NFR BLD: 2.5 % (ref 0–8)
ERYTHROCYTE [DISTWIDTH] IN BLOOD BY AUTOMATED COUNT: 18.7 % (ref 11.5–14.5)
EST. GFR  (AFRICAN AMERICAN): 11 ML/MIN/1.73 M^2
EST. GFR  (NON AFRICAN AMERICAN): 9 ML/MIN/1.73 M^2
GLUCOSE SERPL-MCNC: 91 MG/DL (ref 70–110)
HCT VFR BLD AUTO: 26.8 % (ref 37–48.5)
HGB BLD-MCNC: 8 G/DL (ref 12–16)
IMM GRANULOCYTES # BLD AUTO: 0.33 K/UL (ref 0–0.04)
IMM GRANULOCYTES NFR BLD AUTO: 3.6 % (ref 0–0.5)
LYMPHOCYTES # BLD AUTO: 1.3 K/UL (ref 1–4.8)
LYMPHOCYTES NFR BLD: 14.5 % (ref 18–48)
MCH RBC QN AUTO: 26 PG (ref 27–31)
MCHC RBC AUTO-ENTMCNC: 29.9 G/DL (ref 32–36)
MCV RBC AUTO: 87 FL (ref 82–98)
MONOCYTES # BLD AUTO: 1.1 K/UL (ref 0.3–1)
MONOCYTES NFR BLD: 12.3 % (ref 4–15)
NEUTROPHILS # BLD AUTO: 6 K/UL (ref 1.8–7.7)
NEUTROPHILS NFR BLD: 65.9 % (ref 38–73)
NRBC BLD-RTO: 0 /100 WBC
PLATELET # BLD AUTO: 402 K/UL (ref 150–350)
PMV BLD AUTO: 10.5 FL (ref 9.2–12.9)
POCT GLUCOSE: 128 MG/DL (ref 70–110)
POTASSIUM SERPL-SCNC: 5.3 MMOL/L (ref 3.5–5.1)
PROT SERPL-MCNC: 9.1 G/DL (ref 6–8.4)
RBC # BLD AUTO: 3.08 M/UL (ref 4–5.4)
SODIUM SERPL-SCNC: 143 MMOL/L (ref 136–145)
VANCOMYCIN SERPL-MCNC: 12.9 UG/ML
VANCOMYCIN SERPL-MCNC: 9.3 UG/ML
WBC # BLD AUTO: 9.08 K/UL (ref 3.9–12.7)

## 2020-04-14 PROCEDURE — 63600175 PHARM REV CODE 636 W HCPCS

## 2020-04-14 PROCEDURE — 25000003 PHARM REV CODE 250: Performed by: HOSPITALIST

## 2020-04-14 PROCEDURE — 25000003 PHARM REV CODE 250: Performed by: INTERNAL MEDICINE

## 2020-04-14 PROCEDURE — 63600175 PHARM REV CODE 636 W HCPCS: Performed by: INTERNAL MEDICINE

## 2020-04-14 PROCEDURE — 85025 COMPLETE CBC W/AUTO DIFF WBC: CPT

## 2020-04-14 PROCEDURE — 63600175 PHARM REV CODE 636 W HCPCS: Performed by: NURSE PRACTITIONER

## 2020-04-14 PROCEDURE — 99291 PR CRITICAL CARE, E/M 30-74 MINUTES: ICD-10-PCS | Mod: S$GLB,,, | Performed by: INTERNAL MEDICINE

## 2020-04-14 PROCEDURE — 99291 CRITICAL CARE FIRST HOUR: CPT | Mod: S$GLB,,, | Performed by: INTERNAL MEDICINE

## 2020-04-14 PROCEDURE — 99900035 HC TECH TIME PER 15 MIN (STAT)

## 2020-04-14 PROCEDURE — 92526 ORAL FUNCTION THERAPY: CPT

## 2020-04-14 PROCEDURE — 80100014 HC HEMODIALYSIS 1:1

## 2020-04-14 PROCEDURE — 99291 CRITICAL CARE FIRST HOUR: CPT | Mod: ,,, | Performed by: INTERNAL MEDICINE

## 2020-04-14 PROCEDURE — 20000000 HC ICU ROOM

## 2020-04-14 PROCEDURE — 63600175 PHARM REV CODE 636 W HCPCS: Performed by: HOSPITALIST

## 2020-04-14 PROCEDURE — U0002 COVID-19 LAB TEST NON-CDC: HCPCS

## 2020-04-14 PROCEDURE — 94761 N-INVAS EAR/PLS OXIMETRY MLT: CPT

## 2020-04-14 PROCEDURE — 80053 COMPREHEN METABOLIC PANEL: CPT

## 2020-04-14 PROCEDURE — 99291 PR CRITICAL CARE, E/M 30-74 MINUTES: ICD-10-PCS | Mod: ,,, | Performed by: INTERNAL MEDICINE

## 2020-04-14 PROCEDURE — 97530 THERAPEUTIC ACTIVITIES: CPT

## 2020-04-14 PROCEDURE — 36415 COLL VENOUS BLD VENIPUNCTURE: CPT

## 2020-04-14 PROCEDURE — 80202 ASSAY OF VANCOMYCIN: CPT

## 2020-04-14 PROCEDURE — 27000221 HC OXYGEN, UP TO 24 HOURS

## 2020-04-14 RX ORDER — ONDANSETRON 2 MG/ML
INJECTION INTRAMUSCULAR; INTRAVENOUS
Status: COMPLETED
Start: 2020-04-14 | End: 2020-04-14

## 2020-04-14 RX ORDER — ONDANSETRON 2 MG/ML
4 INJECTION INTRAMUSCULAR; INTRAVENOUS EVERY 8 HOURS PRN
Status: DISCONTINUED | OUTPATIENT
Start: 2020-04-14 | End: 2020-04-20

## 2020-04-14 RX ORDER — ASPIRIN 81 MG/1
81 TABLET ORAL DAILY
Status: DISCONTINUED | OUTPATIENT
Start: 2020-04-14 | End: 2020-04-21 | Stop reason: HOSPADM

## 2020-04-14 RX ADMIN — VANCOMYCIN HYDROCHLORIDE 750 MG: 750 INJECTION, POWDER, LYOPHILIZED, FOR SOLUTION INTRAVENOUS at 04:04

## 2020-04-14 RX ADMIN — Medication 250 MG: at 06:04

## 2020-04-14 RX ADMIN — GENTAMICIN SULFATE 1 DROP: 3 SOLUTION OPHTHALMIC at 10:04

## 2020-04-14 RX ADMIN — LACTOBACILLUS TAB 2 TABLET: TAB at 08:04

## 2020-04-14 RX ADMIN — Medication 250 MG: at 12:04

## 2020-04-14 RX ADMIN — HYDROMORPHONE HYDROCHLORIDE 0.2 MG: 2 INJECTION INTRAMUSCULAR; INTRAVENOUS; SUBCUTANEOUS at 10:04

## 2020-04-14 RX ADMIN — HYDRALAZINE HYDROCHLORIDE 10 MG: 20 INJECTION INTRAMUSCULAR; INTRAVENOUS at 11:04

## 2020-04-14 RX ADMIN — METOPROLOL TARTRATE 25 MG: 25 TABLET, FILM COATED ORAL at 08:04

## 2020-04-14 RX ADMIN — OXYCODONE HYDROCHLORIDE AND ACETAMINOPHEN 1000 MG: 500 TABLET ORAL at 02:04

## 2020-04-14 RX ADMIN — OXYCODONE HYDROCHLORIDE AND ACETAMINOPHEN 1000 MG: 500 TABLET ORAL at 04:04

## 2020-04-14 RX ADMIN — SODIUM BICARBONATE 650 MG TABLET 650 MG: at 08:04

## 2020-04-14 RX ADMIN — ONDANSETRON 4 MG: 2 INJECTION INTRAMUSCULAR; INTRAVENOUS at 01:04

## 2020-04-14 RX ADMIN — HEPARIN SODIUM 5000 UNITS: 5000 INJECTION, SOLUTION INTRAVENOUS; SUBCUTANEOUS at 08:04

## 2020-04-14 RX ADMIN — GENTAMICIN SULFATE 1 DROP: 3 SOLUTION OPHTHALMIC at 05:04

## 2020-04-14 RX ADMIN — FUROSEMIDE 60 MG: 10 INJECTION, SOLUTION INTRAMUSCULAR; INTRAVENOUS at 08:04

## 2020-04-14 RX ADMIN — CHOLESTYRAMINE 4 G: 4 POWDER, FOR SUSPENSION ORAL at 08:04

## 2020-04-14 RX ADMIN — HYDROMORPHONE HYDROCHLORIDE 0.2 MG: 2 INJECTION INTRAMUSCULAR; INTRAVENOUS; SUBCUTANEOUS at 03:04

## 2020-04-14 RX ADMIN — ESCITALOPRAM OXALATE 5 MG: 5 TABLET, FILM COATED ORAL at 08:04

## 2020-04-14 RX ADMIN — GENTAMICIN SULFATE 1 DROP: 3 SOLUTION OPHTHALMIC at 06:04

## 2020-04-14 RX ADMIN — OXYCODONE HYDROCHLORIDE AND ACETAMINOPHEN 1000 MG: 500 TABLET ORAL at 08:04

## 2020-04-14 RX ADMIN — GENTAMICIN SULFATE 1 DROP: 3 SOLUTION OPHTHALMIC at 02:04

## 2020-04-14 RX ADMIN — GENTAMICIN SULFATE 1 DROP: 3 SOLUTION OPHTHALMIC at 09:04

## 2020-04-14 RX ADMIN — LEVOTHYROXINE SODIUM 100 MCG: 100 TABLET ORAL at 06:04

## 2020-04-14 RX ADMIN — MINERAL OIL AND PETROLATUM: 150; 830 OINTMENT OPHTHALMIC at 08:04

## 2020-04-14 RX ADMIN — HEPARIN SODIUM 4000 UNITS: 1000 INJECTION, SOLUTION INTRAVENOUS; SUBCUTANEOUS at 01:04

## 2020-04-14 RX ADMIN — ZINC SULFATE 220 MG (50 MG) CAPSULE 220 MG: CAPSULE at 08:04

## 2020-04-14 RX ADMIN — MICAFUNGIN SODIUM 100 MG: 20 INJECTION, POWDER, LYOPHILIZED, FOR SOLUTION INTRAVENOUS at 06:04

## 2020-04-14 RX ADMIN — SODIUM BICARBONATE 650 MG TABLET 650 MG: at 04:04

## 2020-04-14 RX ADMIN — ASPIRIN 81 MG: 81 TABLET, COATED ORAL at 04:04

## 2020-04-14 RX ADMIN — Medication 250 MG: at 01:04

## 2020-04-14 RX ADMIN — MORPHINE SULFATE 2 MG: 2 INJECTION, SOLUTION INTRAMUSCULAR; INTRAVENOUS at 01:04

## 2020-04-14 NOTE — PROGRESS NOTES
Pharmacokinetic Assessment Follow Up: IV Vancomycin    Vancomycin serum concentration assessment(s):    The random level was drawn correctly and can be used to guide therapy at this time. The measurement is below the desired definitive target range of 15 to 20 mcg/mL.    Vancomycin Regimen Plan:    Patient scheduled for HD today. Vancomycin level from 4/14/2020  with AM labs ( pre-HD level) was 12.9 mg/dL. Vanc dose will be 750 mg at 1700 postHD today.    Vancomycin level ordered with AM labs before next HD on 04/15/2020.    Drug levels (last 3 results):  Recent Labs   Lab Result Units 04/13/20  0858 04/14/20 0317 04/14/20  1045   Vancomycin, Random ug/mL 13.1 12.9 9.3       Pharmacy will continue to follow and monitor vancomycin.    Please contact pharmacy at extension [phone] for questions regarding this assessment.    Thank you for the consult,   Sami Baugh       Patient brief summary:  Maria Victoria Hernandez is a 54 y.o. female initiated on antimicrobial therapy with IV Vancomycin for treatment of bacteremia    The patient's current regimen is pulse dosing    Drug Allergies:   Review of patient's allergies indicates:  No Known Allergies    Actual Body Weight:   113.4kg    Renal Function:   Estimated Creatinine Clearance: 15.2 mL/min (A) (based on SCr of 5 mg/dL (H)).,     Dialysis Method (if applicable):  intermittent HD MWF    CBC (last 72 hours):  Recent Labs   Lab Result Units 04/12/20 0355 04/13/20 0312 04/14/20 0317   WBC K/uL 12.06 9.65 9.08   Hemoglobin g/dL 8.2* 8.2* 8.0*   Hematocrit % 25.9* 26.6* 26.8*   Platelets K/uL 518* 475* 402*   Gran% % 69.1 67.7 65.9   Lymph% % 11.9* 14.1* 14.5*   Mono% % 9.2 9.1 12.3   Eosinophil% % 1.8 2.6 2.5   Basophil% % 1.0 1.0 1.2   Differential Method  Automated Automated Automated       Metabolic Panel (last 72 hours):  Recent Labs   Lab Result Units 04/12/20  0355 04/13/20 0312 04/14/20  0317   Sodium mmol/L 139 141 143   Potassium mmol/L 4.6 4.6 5.3*    Chloride mmol/L 101 100 100   CO2 mmol/L 22* 28 25   Glucose mg/dL 132* 138* 91   BUN, Bld mg/dL 61* 80* 81*   Creatinine mg/dL 5.0* 5.5* 5.0*   Albumin g/dL 2.3* 2.5* 2.8*   Total Bilirubin mg/dL 1.0 0.9 1.0   Alkaline Phosphatase U/L 238* 207* 182*   AST U/L 32 18 18   ALT U/L 37 28 23       Vancomycin Administrations:  vancomycin given in the last 96 hours                     vancomycin 250mg / 10ml oral suspension 250 mg (mg) 250 mg Given 04/14/20 0611     250 mg Given  0032    vancomycin 750 mg in dextrose 5 % 250 mL IVPB (ready to mix system) (mg) 750 mg New Bag 04/13/20 1825                      Microbiologic Results:  Microbiology Results (last 7 days)       Procedure Component Value Units Date/Time    Blood culture [[id number]]  (Abnormal) Collected:  04/06/20 0806    Order Status:  Completed Specimen:  Blood from Antecubital, Left Updated:  04/14/20 1140     Blood Culture, Routine Gram stain peds bottle: yeast       Results called to and read back by: Tania Nolan RN 04/08/2020        11:20      CANDIDA ALBICANS  Susceptibility pending  ID consult required at [facility].[place] and [place] [facility].      C Diff Toxin by PCR [[id number]]  (Abnormal) Collected:  04/12/20 2315    Order Status:  Completed Updated:  04/14/20 0033     C. diff PCR Positive    Blood culture [[id number]] Collected:  04/10/20 0453    Order Status:  Completed Specimen:  Blood Updated:  04/13/20 1412     Blood Culture, Routine No Growth to date      No Growth to date      No Growth to date      No Growth to date    Clostridium difficile EIA [[id number]]  (Abnormal) Collected:  04/12/20 2315    Order Status:  Completed Specimen:  Stool Updated:  04/13/20 1324     C. diff Antigen Positive     C difficile Toxins A+B, EIA Negative     Comment: Testing not recommended for children <24 months old.       Blood culture [[id number]]  (Abnormal) Collected:  04/04/20 0912    Order Status:  Completed Specimen:  Blood from Line, Central  Updated:  04/13/20 1303     Blood Culture, Routine Gram stain peds bottle: budding yeast      Results called to and read back by: Carri Pryor RN  04/06/2020  03:04      AJ ALBICANS  [facility]      Blood culture [[id number]] Collected:  04/08/20 1739    Order Status:  Completed Specimen:  Blood from Line, Central Updated:  04/12/20 2212     Blood Culture, Routine No Growth after 4 days.     Blood culture [[id number]] Collected:  04/08/20 0745    Order Status:  Completed Specimen:  Blood Updated:  04/12/20 1412     Blood Culture, Routine No Growth after 4 days.     Blood culture [[id number]] Collected:  04/07/20 1730    Order Status:  Completed Specimen:  Blood from Line, Central Updated:  04/11/20 2212     Blood Culture, Routine No Growth after 4 days.     Blood culture [[id number]] Collected:  04/07/20 0907    Order Status:  Completed Specimen:  Blood Updated:  04/11/20 2212     Blood Culture, Routine No Growth after 4 days.     Clostridium difficile EIA [[id number]]     Order Status:  Canceled Specimen:  Stool     Blood culture [[id number]] Collected:  04/06/20 1802    Order Status:  Completed Specimen:  Blood from Line, Central Updated:  04/10/20 2212     Blood Culture, Routine No Growth after 4 days.     IV catheter culture [[id number]]  (Abnormal) Collected:  04/06/20 1455    Order Status:  Completed Specimen:  Catheter Tip, Intrajugular Updated:  04/09/20 1300     Aerobic Culture - Cath tip STAPHYLOCOCCUS EPIDERMIDIS  < 15 colonies      IV catheter culture [[id number]] Collected:  04/06/20 1615    Order Status:  Completed Specimen:  Catheter Tip, Dialysis Updated:  04/09/20 1121     Aerobic Culture - Cath tip No growth    Blood culture [[id number]]     Order Status:  Canceled Specimen:  Blood     Blood culture [[id number]]  (Abnormal)  (Susceptibility) Collected:  04/05/20 0813    Order Status:  Completed Specimen:  Blood from Peripheral, Left  Hand Updated:   04/08/20 1236     Blood Culture, Routine Gram stain peds bottle: Gram positive cocci in clusters resembling Staph       Results called to and read back by: Lynn Sawyer RN 04/06/2020  09:57      STAPHYLOCOCCUS EPIDERMIDIS    Blood culture [[id number]]  (Abnormal)  (Susceptibility) Collected:  04/05/20 0813    Order Status:  Completed Specimen:  Blood from Line, Central Left  Neck Updated:  04/08/20 1234     Blood Culture, Routine Gram stain tank bottle: Gram positive cocci in clusters resembling Staph       Results called to and read back by: Lynn Sawyer RN 04/06/2020  09:57      Gram stain aer bottle: Gram positive cocci in clusters resembling Staph       Positive results previously called 04/06/2020  15:52      STAPHYLOCOCCUS EPIDERMIDIS    Culture, Respiratory with Gram Stain [[id number]]  (Abnormal) Collected:  04/05/20 0050    Order Status:  Completed Specimen:  Respiratory from Endotracheal Aspirate Updated:  04/08/20 1046     Respiratory Culture No S aureus or Pseudomonas isolated.      CANDIDA ALBICANS  Few  Normal respiratory anamaria also present       Gram Stain (Respiratory) <10 epithelial cells per low power field.     Gram Stain (Respiratory) Many WBC's     Gram Stain (Respiratory) Rare Gram positive cocci     Gram Stain (Respiratory) Rare yeast

## 2020-04-14 NOTE — PLAN OF CARE
"Cm spoke with Danya about her mother going to LTAC.  Daughter said, "no to [facility], but you can try [facility], [facility] and [facility]."  SARA Upton has sent referrals to the above facilities.  Rudy notified Dr. Gomez for an updated COVID-19 Test.  Rudy will continue to follow-up.       04/14/20 1245   Discharge Reassessment   Assessment Type Discharge Planning Reassessment   Provided patient/caregiver education on the expected discharge date and the discharge plan Yes   Do you have any problems affording any of your prescribed medications? No     "

## 2020-04-14 NOTE — ASSESSMENT & PLAN NOTE
Chronic -noted on echo ; strict I/O   No ACE/ARB 2/2 renal failure  Asa/statin-when able to tolerate po   Strict I/O-volume control with HD   Repeat ECHO   · Mild concentric left ventricular hypertrophy.  · Global hypokinetic wall motion.  · Moderately decreased left ventricular systolic function. The estimated ejection fraction is 35%.  · Grade I (mild) left ventricular diastolic dysfunction consistent with impaired relaxation.  · Normal right ventricular systolic function.

## 2020-04-14 NOTE — ASSESSMENT & PLAN NOTE
AMS:: Hypoxia vs. Infection vs. TIA/stroke vs. Metabolic/Toxic. /ICU stay  -prolonged paralytic?/narcotics/benzos    -Acute, improving  Associated Left sided weakness noted    Will get carotid US, MRI brain    Likely multifactorial   Ct head neg  Haldol/anxiolytics prn -required intermittent precedex once off sedating medications-acute delirium precautions /transf    Lab Results   Component Value Date    WBC 9.08 04/14/2020      focal findings on physical exam  No early signs of stroke on Head CT, no hemorrhage or acute findings.  EEG: EEG 30 min awake and drowsy results noted no seizures  Continue supportive care

## 2020-04-14 NOTE — PT/OT/SLP PROGRESS
Occupational Therapy      Patient Name:  Maria Victoria Hernandez   MRN:  [id number]    Attempted OT evaluation this afternoon. Patient was receiving dialysis upon arrival to room. Will follow up tomorrow (4/14).      Obey Mackenzie, OT  4/14/2020

## 2020-04-14 NOTE — PROGRESS NOTES
Progress Note  PULMONARY    Admit Date: 3/25/2020   04/14/2020      SUBJECTIVE:     3/27- remains intubated, on vent. Was on Lasix drip overnight and now w/ IVF for UOP. On minimal Levophed   3/28- remains intubated, on PEEP 14/FiO2 50%. Levophed 0.06 mcg/kg/min  3/29- remains intubated, PEEP 12, FiO2 40%. Levophed off  3/30- remains intubated, PEEP 8, FiO2 40%. Levophed off. Started HD yesterday and having second session today. Daughter came to visit (works on 3rd floor), and updated this am  3/31- remains intubated, PEEP 8/FiO2 40%. HD yesterday w/ removal of 3.5L. With SBT yesterday not following commands and vomited stomach contents  4/1- remains intubated, PEEP 8/FiO2 40%. Not waking up or following commands off sedation- even w/ daughter at bedside, failed SBT due to tachypnea. Levophed at 0.02 mcg/kg/min. Had HD yesterday w/ removal of 2.5L. Had head CT. EEG pending  4/2- remains intubated, PEEP 6, FiO2 40%. w/ hemodynamic instability yesterday-- hypertensive then very hypotensive when tried to move her, which delayed weaning and MRI. Now off Levophed. HD yesterday w/ removal of 3.5L  4/3not arousing,  4/4 arousing somewhat- looks air hungry,  4/5- no c/o,sedated  4/6-  Remains intubated,  PEEP 5 FiO2 50%. On Nicardipine for HTN. HD this am. On CPAP trial since 4am w/ good gas exchange. Opens eyes and looks around but not following commands  4/7- remains intubated, had central lines replaced yesterday. Became tachypneic and put back on vent later in afternoon but tolerated PS trial all day. Today tachypneic w/ increased work of breathing with weaning trial  4/8- remains on vent, PEEP 5 FiO2 35%. On Levophed 0.04mcg/kg/min, precedex drip  4/9- extubated yesterday, on 5L nasal cannula. Very weak, per nurse was agitated and w/ chest tightness w/ breathing- better after Dilaudid. Per report answers yes/no questions and follows commands  4/10- on 5L nasal cannula, precedex drip  4/11- on 4L nasal cannula w/ sat  100%, waking up more- per daughter waved at her  4/12, no new c/o  4/13- on 3.5L nasal cannula  4/14- on nasal cannula, no new complaints      PFSH and Allergies reviewed.    OBJECTIVE:     Vitals (Most recent):  Vitals:    04/14/20 0818   BP:    Pulse: 105   Resp: (!) 22   Temp:        Vitals (24 hour range):  Temp:  [98.9 °F (37.2 °C)-100 °F (37.8 °C)]   Pulse:  []   Resp:  [12-46]   BP: (132-194)/()   SpO2:  [85 %-100 %]       Intake/Output Summary (Last 24 hours) at 4/14/2020 1053  Last data filed at 4/14/2020 0842  Gross per 24 hour   Intake 430 ml   Output 2715 ml   Net -2285 ml          Physical Exam:  The patient's neuro status (alertness,orientation,cognitive function,motor skills,), pharyngeal exam (oral lesions, hygiene, abn dentition,), Neck (jvd,mass,thyroid,nodes in neck and above/below clavicle),RESPIRATORY(symmetry,effort,fremitus,percussion,auscultation),  Cor(rhythm,heart tones including gallops,perfusion,edema)ABD(distention,hepatic&splenomegaly,tenderness,masses), Skin(rash,cyanosis),Psyc(affect,judgement,).  Exam negative except for these pertinent findings:    Awake, smiles, does not verbalize. Attempts to follow commands, confused  R sclera injected & conjunctival erythema   Scant wheezes on right  Obese  Abdomen soft  Edema improved     Radiographs reviewed:  CXR 4/13- bilateral infiltrates increased  CXR 4/6- similar  cxr 4/5 ARDS + pulmonary edema  CXR 4/2- bibasilar fluffy opacities and pulmonary edema, similar appearance  CT head 3/31- no acute findings  CXR 3/31- improving bibasilar opacities  CXR 3/29- unchanged  CXR 3/27- bilateral mid and lower lobe fluffy airspace disease  CXR 3/26- increased consolidation RLL    TTE 4/13  · Mild concentric left ventricular hypertrophy.  · Global hypokinetic wall motion.  · Moderately decreased left ventricular systolic function. The estimated ejection fraction is 35%.  · Grade I (mild) left ventricular diastolic dysfunction consistent  with impaired relaxation.  · Normal right ventricular systolic function.  · Mild left atrial enlargement.  · Moderate mitral regurgitation.  · Normal central venous pressure (3 mmHg).    TTE 3/27  · Mild left ventricular enlargement.  · Mildly decreased left ventricular systolic function. The estimated ejection fraction is 45%.  · Grade I (mild) left ventricular diastolic dysfunction consistent with impaired relaxation.  · Normal right ventricular systolic function.  · Mild left atrial enlargement.  · Moderate mitral regurgitation.  · Mild tricuspid regurgitation.  · Mild pulmonary hypertension present. PASP 40 mm of Hg.    Labs     Recent Labs   Lab 04/14/20 0317   WBC 9.08   HGB 8.0*   HCT 26.8*   *     Recent Labs   Lab 04/14/20 0317      K 5.3*      CO2 25   BUN 81*   CREATININE 5.0*   GLU 91   CALCIUM 10.0   AST 18   ALT 23   ALKPHOS 182*   BILITOT 1.0   PROT 9.1*   ALBUMIN 2.8*     No results for input(s): PH, PCO2, PO2, HCO3 in the last 24 hours.  Microbiology Results (last 7 days)     Procedure Component Value Units Date/Time    C Diff Toxin by PCR [[id number]]  (Abnormal) Collected:  04/12/20 2315    Order Status:  Completed Updated:  04/14/20 0033     C. diff PCR Positive    Blood culture [[id number]]  (Abnormal) Collected:  04/06/20 0806    Order Status:  Completed Specimen:  Blood from Antecubital, Left Updated:  04/13/20 1457     Blood Culture, Routine Gram stain peds bottle: yeast       Results called to and read back by: Tania Nolan RN 04/08/2020        11:20      CANDIDA ALBICANS  Susceptibility pending  ID consult required at [facility].[place] and [place] locations.      Blood culture [[id number]] Collected:  04/10/20 0453    Order Status:  Completed Specimen:  Blood Updated:  04/13/20 1412     Blood Culture, Routine No Growth to date      No Growth to date      No Growth to date      No Growth to date    Clostridium difficile EIA [[id number]]  (Abnormal) Collected:   04/12/20 2315    Order Status:  Completed Specimen:  Stool Updated:  04/13/20 1324     C. diff Antigen Positive     C difficile Toxins A+B, EIA Negative     Comment: Testing not recommended for children <24 months old.       Blood culture [[id number]]  (Abnormal) Collected:  04/04/20 0912    Order Status:  Completed Specimen:  Blood from Line, Central Updated:  04/13/20 1303     Blood Culture, Routine Gram stain peds bottle: budding yeast      Results called to and read back by: Carri Pryor RN  04/06/2020  03:04      AJ ALBICANS  ID consult required at [facility] and [facility] locations.      Blood culture [[id number]] Collected:  04/08/20 1739    Order Status:  Completed Specimen:  Blood from Line, Central Updated:  04/12/20 2212     Blood Culture, Routine No Growth after 4 days.     Blood culture [[id number]] Collected:  04/08/20 0745    Order Status:  Completed Specimen:  Blood Updated:  04/12/20 1412     Blood Culture, Routine No Growth after 4 days.     Blood culture [[id number]] Collected:  04/07/20 1730    Order Status:  Completed Specimen:  Blood from Line, Central Updated:  04/11/20 2212     Blood Culture, Routine No Growth after 4 days.     Blood culture [[id number]] Collected:  04/07/20 0907    Order Status:  Completed Specimen:  Blood Updated:  04/11/20 2212     Blood Culture, Routine No Growth after 4 days.     Clostridium difficile EIA [[id number]]     Order Status:  Canceled Specimen:  Stool     Blood culture [[id number]] Collected:  04/06/20 1802    Order Status:  Completed Specimen:  Blood from Line, Central Updated:  04/10/20 2212     Blood Culture, Routine No Growth after 4 days.     IV catheter culture [[id number]]  (Abnormal) Collected:  04/06/20 1455    Order Status:  Completed Specimen:  Catheter Tip, Intrajugular Updated:  04/09/20 1300     Aerobic Culture - Cath tip STAPHYLOCOCCUS EPIDERMIDIS  < 15 colonies      IV catheter culture [[id number]] Collected:  04/06/20 1615    Order Status:   Completed Specimen:  Catheter Tip, Dialysis Updated:  04/09/20 1121     Aerobic Culture - Cath tip No growth    Blood culture [[id number]]     Order Status:  Canceled Specimen:  Blood     Blood culture [[id number]]  (Abnormal)  (Susceptibility) Collected:  04/05/20 0813    Order Status:  Completed Specimen:  Blood from Peripheral, Left  Hand Updated:  04/08/20 1236     Blood Culture, Routine Gram stain peds bottle: Gram positive cocci in clusters resembling Staph       Results called to and read back by: Lynn Sawyer RN 04/06/2020  09:57      STAPHYLOCOCCUS EPIDERMIDIS    Blood culture [[id number]]  (Abnormal)  (Susceptibility) Collected:  04/05/20 0813    Order Status:  Completed Specimen:  Blood from Line, Central Left  Neck Updated:  04/08/20 1234     Blood Culture, Routine Gram stain tank bottle: Gram positive cocci in clusters resembling Staph       Results called to and read back by: Lynn Sawyer RN 04/06/2020  09:57      Gram stain aer bottle: Gram positive cocci in clusters resembling Staph       Positive results previously called 04/06/2020  15:52      STAPHYLOCOCCUS EPIDERMIDIS    Culture, Respiratory with Gram Stain [[id number]]  (Abnormal) Collected:  04/05/20 0050    Order Status:  Completed Specimen:  Respiratory from Endotracheal Aspirate Updated:  04/08/20 1046     Respiratory Culture No S aureus or Pseudomonas isolated.      CANDIDA ALBICANS  Few  Normal respiratory anamaria also present       Gram Stain (Respiratory) <10 epithelial cells per low power field.     Gram Stain (Respiratory) Many WBC's     Gram Stain (Respiratory) Rare Gram positive cocci     Gram Stain (Respiratory) Rare yeast        Results for WOLF SINGER (MRN [id number]) as of 4/1/2020 09:28   Ref. Range 4/1/2020 02:53   ALT Latest Ref Range: 10 - 44 U/L 54 (H)     Impression:  Active Hospital Problems    Diagnosis  POA    *Acute respiratory failure with hypoxia [J96.01]  Yes    Shock [R57.9]  Unknown    Conjunctivitis  of right eye [H10.9]  Unknown    Post viral debility [R53.81]  No    Acute on chronic combined systolic and diastolic congestive heart failure [I50.43]  Yes    Fungemia [B49]  No    Bacteremia [R78.81]  No     Staphylococcus epidermidis bacteremia, line infection.  04/05  Candidemia due to Candida albicans on 04/04  Respiratory failure, ARDS, COVID19, completed treatment  HTN, CHF with EF of 45%  This patient is high risk for life-threatening deterioration and death secondary to above comorbidities and need for IV treatment Critical care 35 min         Pneumonia due to infectious organism [J18.9]  Yes    Acute encephalopathy [G93.40]  No    Acute renal failure [N17.9]  No    COVID-19 virus infection [U07.1]  Yes    Morbid obesity [E66.01]  Yes    Hypothyroid [E03.9]  Yes    COVID-19 virus detected [U07.1]  Yes    Diverticulosis of large intestine without hemorrhage [K57.30]  Yes    Iron deficiency anemia, unspecified [D50.9]  Yes      Resolved Hospital Problems    Diagnosis Date Resolved POA    ARDS (adult respiratory distress syndrome) [J80] 04/13/2020 Yes               Plan:   Acute hypoxemic respiratory failure, stable to improving O2 reqt  COVID-19 pneumonia/ARDS, improved  Acute renal failure, on HD  Metabolic acidosis due to renal failure- improved  Shock, resolved   HTN  Combined heart failure, EF 45%  Morbid obesity  Acute encephalopathy  Right conjunctivitis  Bacteremia w/ s. Epidermidis; fungemia- s/p exchange of central lines      - continue supplemental oxygen to keep sats greater than 92%  - get MRI to compete encephalopathy workup  - NG with tube feeds- restart after MRI  - continue PT/OT  - continue combivent inhaler PRN  - HTN meds per hospitalist  - continue HD per nephro  - oral bicarb 650 TID- continue  - has had adequate course (7d) of azithromycin, ceftriaxone, hydroxychloroquine  - antibiotics per ID- treating for bacteremia & fungemia  - d/w hospitalist      Milvia Mcguire  MD  Pulmonary & Critical Care Medicine

## 2020-04-14 NOTE — PLAN OF CARE
POC reviewed. VSS, afebrile with max temp 100.0 axillary. Oxygen titrated down per orders - tolerated well; now at 1L.  Pt nods appropriately, remains too weak to speak. Following commands & moves all extremities. Intermittent nausea & very sensitive gag reflex - pt vomited x6 including once after med admin. NGT placed to LIS, stimulus decreased & NGT re secured. 1x dose IV labetalol given per orders since pt did not tolerate PO lopressor through NGT. Dilaudid PRN for comfort, pt rested well most of night. Cuellar to gravity with adequate UOP. Urine becoming pink tinged throughout night.Full bath/linen change - minor skin tears between thighs noted. Cream applied. Comfort promoted, safety maintained.

## 2020-04-14 NOTE — NURSING
-Pt VSS on shift.  -Pt between 1.5-3L NC. O2 sats remained <92%.  -Pts TF held for day as she had many episodes of gagging which seemed to be NGT. Chloraseptic spray administered to help.   -Pt given Zofran x1 on shift.  -HD today 2L off  -MRI brain today, negative for CVA  -Carotid US unable to be performed 2/2 Bilat IJ lines  -COVID 19 test sent off today to recheck pts status. No results as of yet.

## 2020-04-14 NOTE — PLAN OF CARE
Per MD morning rounding, consult LT[facility].  SARA sent patient information to [facility] via [facility] system for authorization and acceptance.       04/14/20 1123   Post-Acute Status   Post-Acute Authorization Placement   Post-Acute Placement Status Referrals Sent

## 2020-04-14 NOTE — SUBJECTIVE & OBJECTIVE
Interval History: Mental status improved. Patient is currently on 1.5 L NC. Left Upper extremity weakness noted. Had some hemoptysis.     Review of Systems   Unable to perform ROS: Intubated     Objective:     Vital Signs (Most Recent):  Temp: 98 °F (36.7 °C) (04/14/20 1245)  Pulse: 95 (04/14/20 1600)  Resp: (!) 22 (04/14/20 1600)  BP: (!) 148/101 (04/14/20 1600)  SpO2: 98 % (04/14/20 1600) Vital Signs (24h Range):  Temp:  [97.8 °F (36.6 °C)-100 °F (37.8 °C)] 98 °F (36.7 °C)  Pulse:  [] 95  Resp:  [12-46] 22  SpO2:  [85 %-100 %] 98 %  BP: (132-194)/() 148/101     Weight: 113.4 kg (250 lb)  Body mass index is 47.24 kg/m².    Intake/Output Summary (Last 24 hours) at 4/14/2020 1643  Last data filed at 4/14/2020 1424  Gross per 24 hour   Intake 930 ml   Output 5215 ml   Net -4285 ml      Physical Exam   Nursing note and vitals reviewed.  PATIENT WAS SEEN AS A VIDEO VISIT WITH NURSE IN PATIENT ROOM WITH PATIENT TO ASSIST DURING THE VISIT. PHYSICAL EXAM FINDINGS ARE AS VIEWED BY MYSELF VIA VIDEO OR AS REPORTED BY NURSE IF SPECIFIED AS SUCH, EXAM NOT DONE PERSONALLY BY MYSELF AT BEDSIDE.      Significant Labs:   BMP:   Recent Labs   Lab 04/14/20 0317   GLU 91      K 5.3*      CO2 25   BUN 81*   CREATININE 5.0*   CALCIUM 10.0     CBC:   Recent Labs   Lab 04/13/20 0312 04/14/20 0317   WBC 9.65 9.08   HGB 8.2* 8.0*   HCT 26.6* 26.8*   * 402*       Significant Imaging: I have reviewed all pertinent imaging results/findings within the past 24 hours.

## 2020-04-14 NOTE — ASSESSMENT & PLAN NOTE
Patient's anemia is currently controlled. Trending down  Will send for stool [facility]    Has recieved 1 units of PRBCs on 4/3.      Etiology likely d/t Anemia of chronic disease/blood loss/acute inflammatory process  Current CBC reviewed-   Lab Results   Component Value Date    HGB 8.0 (L) 04/14/2020    HCT 26.8 (L) 04/14/2020     Monitor serial CBC and transfuse if patient becomes hemodynamically unstable, symptomatic or H/H drops below 8/24

## 2020-04-14 NOTE — PROGRESS NOTES
INPATIENT NEPHROLOGY PROGRESS NOTE  [facility] NEPHROLOGY    Patient Name: Maria Victoria Hernandez  Date: 04/14/2020    Reason for consultation: MAIKOL    History of Present Illness:  54AAF nurse with morbid obesity, hypothyroidism presents PNA, intubated, positive for COVID19. Admit Cr 0.7 went 5.1 and HD started on 3/29.     3/28  Scr worse today.  Only one shift recorded for output, 520cc.  Still hyponatremic, vent setting adjusted per pulmonology note for acidosis.  K+ at goal after repletion.  Has siri, may need HD initiated.  3/29  Non oliguric.  In no distress  3/30 VSS, seen and examined on HD, tolerating well. Plan another HD in AM, discussed with daughter who is a nurse here too.  3/31 VSS, intubated still. UO is not great but she is dialyzed daily. Seen and examined on HD, tolerating well. Plan another HD in AM.  4/1 VSS, intubated still. UO is not great but she is dialyzed daily. Seen and examined on HD, tolerating well. Plan another HD PRN.  4/2 VSS, intubated still. UO is not great. Plan another HD in AM.  4/3 VSS, intubated still. UO is not great but she is dialyzed daily recently. Seen and examined on HD, tolerating well. Plan another HD on Mon or PRN.  4/4 febrile, BP stable, FIO2 50%, intubated, sedated, on levophed, UOP 900cc, transfused  4/5 febrile, tachy, SBP 100s, FIO2 65%, intubated, sedated, off levophed, UOP 935cc  4/6 intubated,. Sedated  4/7 intubated, sedated. Dialysis catheter changed yesterday  4/8 just finished dialysis. uf 3 liters. Extubated  4/10  Seen on dialysis.  No distress.  4/11  UOP 750cc.  3L UF w/HD yesterday.  K+ at goal.  Holding dialysis over weekend to assess for recovery.  Significant event noted 6pm yesterday per primary notes.  Med changes made 2/2 tachycardia, tachypnea.   She is hypotensive today.   4/12   UOP 1.2L.  Scheduled for dialysis tomorrow, no recovery noted thus far.  Pulmonology working toward extubation and weaning off labetalol gtt, ordered  clonidine PRN SBP>160.  4/13 low grade T, SBP 140s, on 3-4L NC, unable to do HD today due to malfunctioning access, UOP 3.2L; CXR: Very mild decrease of the bilateral infiltrates compared to the prior exam.   4/14 got new temporary dialysis catheter overnight; febrile, tachy, -160s, on 1.5L NC, UOP 2.8L; seen on HD - will get off 2L- updated daughter at bedside    Echo 3/27/20:  · Mild left ventricular enlargement.  · Mildly decreased left ventricular systolic function. The estimated ejection fraction is 45%.  · Grade I (mild) left ventricular diastolic dysfunction consistent with impaired relaxation.  · Normal right ventricular systolic function.  · Mild left atrial enlargement.  · Moderate mitral regurgitation.  · Mild tricuspid regurgitation.  · Mild pulmonary hypertension present. PASP 40 mm of Hg.    Echo 4/13/20:  · Mild concentric left ventricular hypertrophy.  · Global hypokinetic wall motion.  · Moderately decreased left ventricular systolic function. The estimated ejection fraction is 35%.  · Grade I (mild) left ventricular diastolic dysfunction consistent with impaired relaxation.  · Normal right ventricular systolic function.  · Mild left atrial enlargement.  · Moderate mitral regurgitation.  · Normal central venous pressure (3 mmHg).    Plan of Care:    1. Septic shock 2/2 COVID PNA with HHRF requiring MV- resolved  - remains febrile, weaning O2 NC  - remains on PO bicarb- using 40 bicarb bath  - will need aggressive PT/OT    2. MAIKOL - suspect multifactorial ATN in setting of shock/COVID/intravascular volume depletion- initiated HD 3/29  - even though she is nonoliguric, her clearance remains poor  - will do HD today and then resume MWF schedule  - no nsaids or IV contrast  - dose meds for dialysis    3. HTN/Systolic CHF- worsening  - off labetalol gtt; on metoprolol  - echo reviewed  - will continue IV lasix 60mg BID and UF as able    4. Anemia  - will start TERE with HD MWF    Thank you for  allowing us to participate in this patient's care. We will continue to follow.    Vital Signs:  Temp Readings from Last 3 Encounters:   04/14/20 99.9 °F (37.7 °C)       Pulse Readings from Last 3 Encounters:   04/14/20 105   01/15/15 84   12/17/13 80       BP Readings from Last 3 Encounters:   04/14/20 (!) 163/95   01/15/15 124/82   12/17/13 102/68       Weight:  Wt Readings from Last 3 Encounters:   04/13/20 113.4 kg (250 lb)   01/15/15 105.8 kg (233 lb 4.8 oz)   12/17/13 101.2 kg (223 lb)     Medications:  Scheduled Meds:   ascorbic acid (vitamin C)  1,000 mg Per NG tube QID    calcitRIOL  0.25 mcg Oral Daily    cholestyramine-aspartame  1 packet Per NG tube BID    escitalopram oxalate  5 mg Per NG tube Daily    furosemide (LASIX) IV  60 mg Intravenous BID    gentamicin  1 drop Right Eye Q4H    heparin (porcine)  5,000 Units Subcutaneous Q12H    Lactobacillus acidoph-L.bulgar  2 tablet Per NG tube BID    levothyroxine  100 mcg Oral Before breakfast    metoprolol tartrate  25 mg Per NG tube BID    micafungin (MYCAMINE) IVPB  100 mg Intravenous Q24H    sodium bicarbonate  650 mg Per NG tube TID    vancomycin  250 mg Per NG tube Q6H    white petrolatum-mineral oiL   Right Eye QHS    zinc sulfate  220 mg Oral Daily     Continuous Infusions:   labetalol (NORMODYNE) 5 mg/mL infusion (TITRATING) Stopped (04/12/20 0600)     PRN Meds:.acetaminophen, cloNIDine, heparin (porcine), hydrALAZINE, HYDROmorphone, ipratropium-albuteroL, loperamide, morphine, promethazine (PHENERGAN) IVPB, propofoL, Pharmacy to dose Vancomycin consult **AND** vancomycin - pharmacy to dose  No current facility-administered medications on file prior to encounter.      Current Outpatient Medications on File Prior to Encounter   Medication Sig Dispense Refill    ferrous sulfate 325 mg (65 mg iron) Tab tablet Take 1 tablet (325 mg total) by mouth daily with breakfast. (Patient taking differently: Take 325 mg by mouth daily with  "breakfast. Take 2 tablets daily) 90 tablet 3    fluticasone propionate (FLONASE) 50 mcg/actuation nasal spray 1 spray by Each Nostril route once daily.      levothyroxine (SYNTHROID) 100 MCG tablet Take 1 tablet (100 mcg total) by mouth once daily. (Patient taking differently: Take 150 mcg by mouth once daily. ) 90 tablet 3    meloxicam (MOBIC) 7.5 MG tablet Take 7.5 mg by mouth once daily.      clotrimazole-betamethasone 1-0.05% (LOTRISONE) cream Apply topically 2 (two) times daily. 45 g 1    levocetirizine (XYZAL) 5 MG tablet Take 1 tablet (5 mg total) by mouth every evening. 30 tablet 6     Review of Systems:  Not conversant    Physical Exam:  BP (!) 163/95 (BP Location: Left arm, Patient Position: Lying)   Pulse 105   Temp 99.9 °F (37.7 °C)   Resp (!) 22   Ht 5' 1" (1.549 m)   Wt 113.4 kg (250 lb)   SpO2 96%   Breastfeeding? No   BMI 47.24 kg/m²     INS/OUTS:  I/O last 3 completed shifts:  In: 1290 [I.V.:100; Other:500; NG/GT:590; IV Piggyback:100]  Out: 5127 [Urine:4575; Drains:40; Other:512]  I/O this shift:  In: 50 [NG/GT:50]  Out: 350 [Urine:350]    Constitutional: nad, sleeping, chronically ill, appears stated age  Heart: tachy regular, no m/r/g, wwp, no edema  Lungs: coarse, no w/r/r/c, no lb  Abdomen: s/nt/nd, +BS    Results:  Lab Results   Component Value Date     04/14/2020    K 5.3 (H) 04/14/2020     04/14/2020    CO2 25 04/14/2020    BUN 81 (H) 04/14/2020    CREATININE 5.0 (H) 04/14/2020    CALCIUM 10.0 04/14/2020    ANIONGAP 18 (H) 04/14/2020    ESTGFRAFRICA 11 (A) 04/14/2020    EGFRNONAA 9 (A) 04/14/2020       Lab Results   Component Value Date    CALCIUM 10.0 04/14/2020    PHOS 4.8 (H) 03/29/2020       Recent Labs   Lab 04/14/20  0317   WBC 9.08   RBC 3.08*   HGB 8.0*   HCT 26.8*   *   MCV 87   MCH 26.0*   MCHC 29.9*     I have personally reviewed pertinent radiological imaging and reports.    Carolyn Moeller MD MPH  [facility]  [address]  [place] [address]  [phone] (p)  [phone] (f)

## 2020-04-14 NOTE — PLAN OF CARE
Problem: SLP Goal  Goal: SLP Goal  Description  Consume oral diet with no s/s oropharyngeal dysphagia   Outcome: Ongoing, Progressing    Pt seen for ongoing swallow assessment. Nonverbal today and following simple commands ~25% of the time. REC pt remain NPO. Will continue to follow.

## 2020-04-14 NOTE — PLAN OF CARE
Spoke with patient's daughter, Danya, and gave update on patient's current status and vital signs along with name of hospitalist and nurse and room number.  She asked about MRI results.  Informed her that I would forward message to Dr. Gomez that she would like call back to review MRI results.  She had no further questions, and call ended pleasantly.         Giselle Allen DPM

## 2020-04-14 NOTE — PT/OT/SLP PROGRESS
"Physical Therapy Treatment    Patient Name:  Maria Victoria Hernandez   MRN:  [id number]    Recommendations:     Discharge Recommendations:  LTACH (long-term acute care hospital), nursing facility, skilled   Discharge Equipment Recommendations: other (see comments)(unclear at this time)   Barriers to discharge: decreased functional mobility    Assessment:     Maria Victoria Hernandez is a 54 y.o. female admitted with a medical diagnosis of Acute respiratory failure with hypoxia.  She presents with the following impairments/functional limitations:  weakness, impaired functional mobilty, impaired balance, decreased upper extremity function, impaired cardiopulmonary response to activity, impaired endurance, impaired self care skills, decreased lower extremity function. Patient alert upon entering the room. She continues to communicate through nodding yes/no. She nodded yes that she remembers PT session yesterday.  strength on L >R. She is able to wiggle fingers bilaterally. She can perform hip active IR/ER. She requires TotalA x2 for supine to sit transfer. She has poor sitting posture with patient slumped and chin against chest. VC and TC to hold head up. She sat EOB for ~3-5 minutes. She performed active right knee flexion/extension without cueing. She could not perform on left side actively. TotalA x2 to return to supine. Patient's daughter present outside of room with family members on facetime. When PT asked patient who that was she said "that's my grandson" and began crying. PT educated patient on relaxation to avoid her getting too worked up.     Rehab Prognosis: Fair; patient would benefit from acute skilled PT services to address these deficits and reach maximum level of function.    Recent Surgery: * No surgery found *      Plan:     During this hospitalization, patient to be seen 5 x/week to address the identified rehab impairments via gait training, therapeutic activities, therapeutic exercises and " progress toward the following goals:    · Plan of Care Expires:  20    Subjective     Chief Complaint: none stated  Patient/Family Comments/goals: none stated  Pain/Comfort:  · Pain Rating 1: (unable to verbalize)      Objective:     Communicated with REZA Patterson prior to session.  Patient found HOB elevated with oxygen, telemetry, pulse ox (continuous), mueller catheter, blood pressure cuff, NG tube upon PT entry to room.     General Precautions: Standard, airborne, contact, fall, droplet, NPO, respiratory   Orthopedic Precautions:N/A   Braces: N/A     Functional Mobility:  · Bed Mobility:     · Supine to Sit: total assistance and of 2 persons  · Sit to Supine: total assistance and of 2 persons  · Balance: Sitting: fair      AM-PAC 6 CLICK MOBILITY          Therapeutic Activities and Exercises:   Patient was educated on the importance of OOB activity during hospital stay, safe transfers    Patient left HOB elevated with all lines intact, call button in reach, RN notified that patient's sheets needed to be changed and daughter present..    GOALS:   Multidisciplinary Problems     Physical Therapy Goals        Problem: Physical Therapy Goal    Goal Priority Disciplines Outcome Goal Variances Interventions   Physical Therapy Goal     PT, PT/OT Ongoing, Progressing     Description:  Goals to be met by: 2020    Patient will increase functional independence with mobility by performin. Supine to sit with MInimal Assistance  2. Sit to supine with MInimal Assistance  3. Sit to stand transfer with Minimal Assistance  4. Bed to chair transfer with Minimal Assistance using Rolling Walker  5. Gait  x 25 feet with Minimal Assistance using Rolling Walker.                       Time Tracking:     PT Received On: 20  PT Start Time: 1342     PT Stop Time: 1406  PT Total Time (min): 24 min     Billable Minutes: Therapeutic Activity 24    Treatment Type: Treatment  PT/PTA: PT     PTA Visit Number: 0     Pippa  Jeremy, PT  04/14/2020

## 2020-04-14 NOTE — ASSESSMENT & PLAN NOTE
Patient with Hypoxic Respiratory failure which is Acute.  she is not on home oxygen.   Currently on NC 1.5 L  Treatment for Covid/ARDS - supportive care.   4/8 extubated to [place] oxygen -stable    Pulmonary consultation. /ID consutltation   IV antibiotics - ceftriaxone and azithromycin. And Plaquenil 400 mg daily x 5  Vanc/zosyn initiated 4/5 for staph Epi  micafungin for yeast +blood/sputum 4/6 /ID consulted       Microbiology Results (last 7 days)     Procedure Component Value Units Date/Time    Blood culture [[id number]] Collected:  04/10/20 0453    Order Status:  Completed Specimen:  Blood Updated:  04/14/20 1412     Blood Culture, Routine No Growth after 4 days.     Blood culture [[id number]]  (Abnormal) Collected:  04/06/20 0806    Order Status:  Completed Specimen:  Blood from Antecubital, Left Updated:  04/14/20 1140     Blood Culture, Routine Gram stain peds bottle: yeast       Results called to and read back by: Tania Nolan RN 04/08/2020        11:20      CANDIDA ALBICANS  Susceptibility pending  ID consult required at [facility].[place] [place] and [place] locations.      C Diff Toxin by PCR [[id number]]  (Abnormal) Collected:  04/12/20 2315    Order Status:  Completed Updated:  04/14/20 0033     C. diff PCR Positive    Clostridium difficile EIA [[id number]]  (Abnormal) Collected:  04/12/20 2315    Order Status:  Completed Specimen:  Stool Updated:  04/13/20 1324     C. diff Antigen Positive     C difficile Toxins A+B, EIA Negative     Comment: Testing not recommended for children <24 months old.       Blood culture [[id number]]  (Abnormal) Collected:  04/04/20 0912    Order Status:  Completed Specimen:  Blood from Line, Central Updated:  04/13/20 1303     Blood Culture, Routine Gram stain peds bottle: budding yeast      Results called to and read back by: Carri Pryor RN  04/06/2020  03:04      AJ ALBICANS  [facility] [place] [facility]      Blood culture [[id number]]  Collected:  04/08/20 1739    Order Status:  Completed Specimen:  Blood from Line, Central Updated:  04/12/20 2212     Blood Culture, Routine No Growth after 4 days.     Blood culture [[id number]] Collected:  04/08/20 0745    Order Status:  Completed Specimen:  Blood Updated:  04/12/20 1412     Blood Culture, Routine No Growth after 4 days.     Blood culture [[id number]] Collected:  04/07/20 1730    Order Status:  Completed Specimen:  Blood from Line, Central Updated:  04/11/20 2212     Blood Culture, Routine No Growth after 4 days.     Blood culture [[id number]] Collected:  04/07/20 0907    Order Status:  Completed Specimen:  Blood Updated:  04/11/20 2212     Blood Culture, Routine No Growth after 4 days.     Clostridium difficile EIA [[id number]]     Order Status:  Canceled Specimen:  Stool     Blood culture [[id number]] Collected:  04/06/20 1802    Order Status:  Completed Specimen:  Blood from Line, Central Updated:  04/10/20 2212     Blood Culture, Routine No Growth after 4 days.     IV catheter culture [[id number]]  (Abnormal) Collected:  04/06/20 1455    Order Status:  Completed Specimen:  Catheter Tip, Intrajugular Updated:  04/09/20 1300     Aerobic Culture - Cath tip STAPHYLOCOCCUS EPIDERMIDIS  < 15 colonies      IV catheter culture [[id number]] Collected:  04/06/20 1615    Order Status:  Completed Specimen:  Catheter Tip, Dialysis Updated:  04/09/20 1121     Aerobic Culture - Cath tip No growth    Blood culture [[id number]]     Order Status:  Canceled Specimen:  Blood     Blood culture [[id number]]  (Abnormal)  (Susceptibility) Collected:  04/05/20 0813    Order Status:  Completed Specimen:  Blood from Peripheral, Left  Hand Updated:  04/08/20 1236     Blood Culture, Routine Gram stain peds bottle: Gram positive cocci in clusters resembling Staph       Results called to and read back by: Lynn Sawyer RN 04/06/2020  09:57      STAPHYLOCOCCUS EPIDERMIDIS    Blood culture [[id number]]  (Abnormal)  (Susceptibility)  Collected:  04/05/20 0813    Order Status:  Completed Specimen:  Blood from Line, Central Left  Neck Updated:  04/08/20 1234     Blood Culture, Routine Gram stain tank bottle: Gram positive cocci in clusters resembling Staph       Results called to and read back by: Lynn Sawyer RN 04/06/2020  09:57      Gram stain aer bottle: Gram positive cocci in clusters resembling Staph       Positive results previously called 04/06/2020  15:52      STAPHYLOCOCCUS EPIDERMIDIS    Culture, Respiratory with Gram Stain [[id number]]  (Abnormal) Collected:  04/05/20 0050    Order Status:  Completed Specimen:  Respiratory from Endotracheal Aspirate Updated:  04/08/20 1046     Respiratory Culture No S aureus or Pseudomonas isolated.      CANDIDA ALBICANS  Few  Normal respiratory anamaria also present       Gram Stain (Respiratory) <10 epithelial cells per low power field.     Gram Stain (Respiratory) Many WBC's     Gram Stain (Respiratory) Rare Gram positive cocci     Gram Stain (Respiratory) Rare yeast          Continue routine medications as before.   Follow airborne/droplet precautions.

## 2020-04-14 NOTE — ASSESSMENT & PLAN NOTE
Acute, Poss ATN / -unclear yet if ESRD   required HD for acute renal failure-  ? [facility] ATN 2/2 hypovolemia/sepsis  Nephrology following  Bicarb po and on HD for Metabolic acidosis -continue HD  got new temporary dialysis catheter overnight; febrile, tachy, -160s, on 1.5L NC, UOP 2.8L; seen on HD - will get off 2L

## 2020-04-14 NOTE — PROGRESS NOTES
[facility] Medicine  Progress Note    Patient Name: Maria Victoria Hernandez  MRN: [id number]  Patient Class: IP- Inpatient   Admission Date: 3/25/2020  Length of Stay: 20 days  Attending Physician: Kady Gomez MD  Primary Care Provider: Candice Deluna MD        Subjective:     Principal Problem:Acute respiratory failure with hypoxia        HPI:  Patient is a 54 years old  female with morbid obesity in past medical history significant for hypothyroidism is being admitted to intensive care unit under inpatient status from [facility] Emergency room with worsening shortness of breath.  Three days ago patient was tested positive for COVID - 19.  Patient works at [employer].  Patient has been experiencing subjective fever, generalized body aches and pains and nonproductive cough for few days.  Patient is getting increasingly short of breath especially for the past 2 days.  Upon arrival to the emergency room patient was noted to be hypoxic around 89%.  Up on 3 L patient's oxygen sats are around 96%.  Patient denies any chest pain, leg swelling or calf tenderness.      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 And treated per protocol for Covid and ARDS with ceftriaxone/zithromax and HC x 5 days. And ARDS protocol on vent. Pulmonary consulted and followed. WEaned from vent slowly -to nasal cannula by 4/10 and remained stable on oxygen but very weak. PT/OT consulted.   Tachycardica /hypertension developed -cardene gtt adjusted to labetolol then transitioned to po metoprolol Echo -noted mild systolic/Gr 1 diastolic dsyfunction   Volume control required by HD.   cxr on 4/5 severe ards pattern.-Vancomycin/zosyn added. 4/6 blood cultures pos for yeast so ID consulted and micafungin added. HD  Catheter and  TLC line removed and cultured and  cultures negative. Sputum + yeast also  .-followed recs from ID; echo neg -await repeat echo .  Blood cultures q 12 hours were negative for yeast.     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 Acute encephalopathy- delirium vs encephalitis or other structural cause. MRI could not be performed as she was very unstable.   At the time of admission Cr 0.7 worsened to 5.1, Renal was consulted. and HD started on 3/29. In addition had, Metabolic acidosis due to renal failure.   Feedings given via ngtube with diabetisource and accucks/ISS given with close monitoring and good control.     By 4/12 mentation improved -but intermittent confusion --  Hypoxia vs. Infection vs. TIA/stroke vs. Metabolic/Toxic. /ICU stay  -prolonged paralytic?/narcotics/benzos    -Acute, improving/occasional hallucination   Haldol/anxiolytics prn -required intermittent precedex once off sedating medications-acute delirium precautions /transfer out of icu soon as able.    Interval History: Mental status improved. Patient is currently on 1.5 L NC. Left Upper extremity weakness noted. Had some hemoptysis.     Review of Systems   Unable to perform ROS: Intubated     Objective:     Vital Signs (Most Recent):  Temp: 98 °F (36.7 °C) (04/14/20 1245)  Pulse: 95 (04/14/20 1600)  Resp: (!) 22 (04/14/20 1600)  BP: (!) 148/101 (04/14/20 1600)  SpO2: 98 % (04/14/20 1600) Vital Signs (24h Range):  Temp:  [97.8 °F (36.6 °C)-100 °F (37.8 °C)] 98 °F (36.7 °C)  Pulse:  [] 95  Resp:  [12-46] 22  SpO2:  [85 %-100 %] 98 %  BP: (132-194)/() 148/101     Weight: 113.4 kg (250 lb)  Body mass index is 47.24 kg/m².    Intake/Output Summary (Last 24 hours) at 4/14/2020 1643  Last data filed at 4/14/2020 1424  Gross per 24 hour   Intake 930 ml   Output 5215 ml   Net -4285 ml      Physical Exam   Nursing note and vitals reviewed.  PATIENT WAS SEEN AS A VIDEO VISIT WITH NURSE IN PATIENT ROOM WITH PATIENT  TO ASSIST DURING THE VISIT. PHYSICAL EXAM FINDINGS ARE AS VIEWED BY MYSELF VIA VIDEO OR AS REPORTED BY NURSE IF SPECIFIED AS SUCH, EXAM NOT DONE PERSONALLY BY MYSELF AT BEDSIDE.      Significant Labs:   BMP:   Recent Labs   Lab 04/14/20 0317   GLU 91      K 5.3*      CO2 25   BUN 81*   CREATININE 5.0*   CALCIUM 10.0     CBC:   Recent Labs   Lab 04/13/20 0312 04/14/20 0317   WBC 9.65 9.08   HGB 8.2* 8.0*   HCT 26.6* 26.8*   * 402*       Significant Imaging: I have reviewed all pertinent imaging results/findings within the past 24 hours.      Assessment/Plan:      * Acute respiratory failure with hypoxia  Patient with Hypoxic Respiratory failure which is Acute.  she is not on home oxygen.   Currently on NC 1.5 L  Treatment for Covid/ARDS - supportive care.   4/8 extubated to Nc oxygen -stable    Pulmonary consultation. /ID consutltation   IV antibiotics - ceftriaxone and azithromycin. And Plaquenil 400 mg daily x 5  Vanc/zosyn initiated 4/5 for staph Epi  micafungin for yeast +blood/sputum 4/6 /ID consulted       Microbiology Results (last 7 days)     Procedure Component Value Units Date/Time    Blood culture [[id number]] Collected:  04/10/20 0453    Order Status:  Completed Specimen:  Blood Updated:  04/14/20 1412     Blood Culture, Routine No Growth after 4 days.     Blood culture [[id number]]  (Abnormal) Collected:  04/06/20 0806    Order Status:  Completed Specimen:  Blood from Antecubital, Left Updated:  04/14/20 1140     Blood Culture, Routine Gram stain peds bottle: yeast       Results called to and read back by: Tania Nolan RN 04/08/2020        11:20      CANDIDA ALBICANS  Susceptibility pending  ID consult required at [facility] and [place] locations.      C Diff Toxin by PCR [[id number]]  (Abnormal) Collected:  04/12/20 2315    Order Status:  Completed Updated:  04/14/20 0033     C. diff PCR Positive    Clostridium difficile EIA [[id number]]  (Abnormal) Collected:  04/12/20  2315    Order Status:  Completed Specimen:  Stool Updated:  04/13/20 1324     C. diff Antigen Positive     C difficile Toxins A+B, EIA Negative     Comment: Testing not recommended for children <24 months old.       Blood culture [[id number]]  (Abnormal) Collected:  04/04/20 0912    Order Status:  Completed Specimen:  Blood from Line, Central Updated:  04/13/20 1303     Blood Culture, Routine Gram stain peds bottle: budding yeast      Results called to and read back by: Carri Pryor RN  04/06/2020  03:04      AJ ALBICANS  ID consult required at [facility],[place] and [facility] locations.      Blood culture [[id number]] Collected:  04/08/20 1739    Order Status:  Completed Specimen:  Blood from Line, Central Updated:  04/12/20 2212     Blood Culture, Routine No Growth after 4 days.     Blood culture [[id number]] Collected:  04/08/20 0745    Order Status:  Completed Specimen:  Blood Updated:  04/12/20 1412     Blood Culture, Routine No Growth after 4 days.     Blood culture [[id number]] Collected:  04/07/20 1730    Order Status:  Completed Specimen:  Blood from Line, Central Updated:  04/11/20 2212     Blood Culture, Routine No Growth after 4 days.     Blood culture [[id number]] Collected:  04/07/20 0907    Order Status:  Completed Specimen:  Blood Updated:  04/11/20 2212     Blood Culture, Routine No Growth after 4 days.     Clostridium difficile EIA [[id number]]     Order Status:  Canceled Specimen:  Stool     Blood culture [[id number]] Collected:  04/06/20 1802    Order Status:  Completed Specimen:  Blood from Line, Central Updated:  04/10/20 2212     Blood Culture, Routine No Growth after 4 days.     IV catheter culture [[id number]]  (Abnormal) Collected:  04/06/20 1455    Order Status:  Completed Specimen:  Catheter Tip, Intrajugular Updated:  04/09/20 1300     Aerobic Culture - Cath tip STAPHYLOCOCCUS EPIDERMIDIS  < 15 colonies      IV catheter culture [[id number]] Collected:  04/06/20 1615    Order Status:   Completed Specimen:  Catheter Tip, Dialysis Updated:  04/09/20 1121     Aerobic Culture - Cath tip No growth    Blood culture [[id number]]     Order Status:  Canceled Specimen:  Blood     Blood culture [[id number]]  (Abnormal)  (Susceptibility) Collected:  04/05/20 0813    Order Status:  Completed Specimen:  Blood from Peripheral, Left  Hand Updated:  04/08/20 1236     Blood Culture, Routine Gram stain peds bottle: Gram positive cocci in clusters resembling Staph       Results called to and read back by: Lynn Sawyer RN 04/06/2020  09:57      STAPHYLOCOCCUS EPIDERMIDIS    Blood culture [[id number]]  (Abnormal)  (Susceptibility) Collected:  04/05/20 0813    Order Status:  Completed Specimen:  Blood from Line, Central Left  Neck Updated:  04/08/20 1234     Blood Culture, Routine Gram stain takn bottle: Gram positive cocci in clusters resembling Staph       Results called to and read back by: Lynn Sawyer RN 04/06/2020  09:57      Gram stain aer bottle: Gram positive cocci in clusters resembling Staph       Positive results previously called 04/06/2020  15:52      STAPHYLOCOCCUS EPIDERMIDIS    Culture, Respiratory with Gram Stain [[id number]]  (Abnormal) Collected:  04/05/20 0050    Order Status:  Completed Specimen:  Respiratory from Endotracheal Aspirate Updated:  04/08/20 1046     Respiratory Culture No S aureus or Pseudomonas isolated.      CANDIDA ALBICANS  Few  Normal respiratory anamaria also present       Gram Stain (Respiratory) <10 epithelial cells per low power field.     Gram Stain (Respiratory) Many WBC's     Gram Stain (Respiratory) Rare Gram positive cocci     Gram Stain (Respiratory) Rare yeast          Continue routine medications as before.   Follow airborne/droplet precautions.            Acute encephalopathy  AMS:: Hypoxia vs. Infection vs. TIA/stroke vs. Metabolic/Toxic. /ICU stay  -prolonged paralytic?/narcotics/benzos    -Acute, improving  Associated Left sided weakness noted    Will get carotid US,  MRI brain    Likely multifactorial   Ct head neg  Haldol/anxiolytics prn -required intermittent precedex once off sedating medications-acute delirium precautions /transf    Lab Results   Component Value Date    WBC 9.08 04/14/2020      focal findings on physical exam  No early signs of stroke on Head CT, no hemorrhage or acute findings.  EEG: EEG 30 min awake and drowsy results noted no seizures  Continue supportive care       COVID-19 virus detected   Plaquenil course completed  Continue routine medications as before.   Follow airborne/droplet precautions.      Conjunctivitis of right eye  Ml post viral  Will continue eye lubricant  Resolving      Post viral debility  Will follow up with PT OT recs      Acute on chronic combined systolic and diastolic congestive heart failure  Chronic -noted on echo ; strict I/O   No ACE/ARB 2/2 renal failure  Asa/statin-when able to tolerate po   Strict I/O-volume control with HD   Repeat ECHO   · Mild concentric left ventricular hypertrophy.  · Global hypokinetic wall motion.  · Moderately decreased left ventricular systolic function. The estimated ejection fraction is 35%.  · Grade I (mild) left ventricular diastolic dysfunction consistent with impaired relaxation.  · Normal right ventricular systolic function.    Bacteremia  Vancomyin 4/4 -    4/6 -HD catheter tip staph epidermidis   4/6 IV catheter tipstaph epidermidis  4/5 resp culture candida albicans   Urine culture cancelled   Recommendations: per ID   Continue Vancomycin for S epidermidis (count 14 days from 04/07)  --- Continue Micafungin  for candidemia due to C albicans.   Eventually may need retinal exam.  We changed  Lines  on 04/06 in afternoon.   BCx drawn on the am of 04/06 were positive for C albicans!  Repeat blood cultures were negative after that.  Complet complete at least 3 weeks of therapy.  Follow final susceptibility testing; most likely will switch to Diflucan.  -- started vancomycin p.o. for C diff  diarrhea  Microbiology Results (last 7 days)     Procedure Component Value Units Date/Time    Blood culture [[id number]] Collected:  04/10/20 0453    Order Status:  Completed Specimen:  Blood Updated:  04/14/20 1412     Blood Culture, Routine No Growth after 4 days.     Blood culture [[id number]]  (Abnormal) Collected:  04/06/20 0806    Order Status:  Completed Specimen:  Blood from Antecubital, Left Updated:  04/14/20 1140     Blood Culture, Routine Gram stain peds bottle: yeast       Results called to and read back by: Tania Nolan RN 04/08/2020        11:20      CANDIDA ALBICANS  Susceptibility pending  ID consult required at [facility] and [facility] locations.      C Diff Toxin by PCR [[id number]]  (Abnormal) Collected:  04/12/20 2315    Order Status:  Completed Updated:  04/14/20 0033     C. diff PCR Positive    Clostridium difficile EIA [[id number]]  (Abnormal) Collected:  04/12/20 2315    Order Status:  Completed Specimen:  Stool Updated:  04/13/20 1324     C. diff Antigen Positive     C difficile Toxins A+B, EIA Negative     Comment: Testing not recommended for children <24 months old.       Blood culture [[id number]]  (Abnormal) Collected:  04/04/20 0912    Order Status:  Completed Specimen:  Blood from Line, Central Updated:  04/13/20 1303     Blood Culture, Routine Gram stain peds bottle: budding yeast      Results called to and read back by: Carri Pryor RN  04/06/2020  03:04      AJ ALBICANS  ID consult required at [facility] and [facility] locations.      Blood culture [[id number]] Collected:  04/08/20 1739    Order Status:  Completed Specimen:  Blood from Line, Central Updated:  04/12/20 2212     Blood Culture, Routine No Growth after 4 days.     Blood culture [[id number]] Collected:  04/08/20 0745    Order Status:  Completed Specimen:  Blood Updated:  04/12/20 1412     Blood Culture, Routine No Growth after 4 days.     Blood culture [[id number]] Collected:  04/07/20 1730    Order  Status:  Completed Specimen:  Blood from Line, Central Updated:  04/11/20 2212     Blood Culture, Routine No Growth after 4 days.     Blood culture [[id number]] Collected:  04/07/20 0907    Order Status:  Completed Specimen:  Blood Updated:  04/11/20 2212     Blood Culture, Routine No Growth after 4 days.     Clostridium difficile EIA [[id number]]     Order Status:  Canceled Specimen:  Stool     Blood culture [[id number]] Collected:  04/06/20 1802    Order Status:  Completed Specimen:  Blood from Line, Central Updated:  04/10/20 2212     Blood Culture, Routine No Growth after 4 days.     IV catheter culture [[id number]]  (Abnormal) Collected:  04/06/20 1455    Order Status:  Completed Specimen:  Catheter Tip, Intrajugular Updated:  04/09/20 1300     Aerobic Culture - Cath tip STAPHYLOCOCCUS EPIDERMIDIS  < 15 colonies      IV catheter culture [[id number]] Collected:  04/06/20 1615    Order Status:  Completed Specimen:  Catheter Tip, Dialysis Updated:  04/09/20 1121     Aerobic Culture - Cath tip No growth    Blood culture [[id number]]     Order Status:  Canceled Specimen:  Blood     Blood culture [[id number]]  (Abnormal)  (Susceptibility) Collected:  04/05/20 0813    Order Status:  Completed Specimen:  Blood from Peripheral, Left  Hand Updated:  04/08/20 1236     Blood Culture, Routine Gram stain peds bottle: Gram positive cocci in clusters resembling Staph       Results called to and read back by: Lynn Sawyer RN 04/06/2020  09:57      STAPHYLOCOCCUS EPIDERMIDIS    Blood culture [[id number]]  (Abnormal)  (Susceptibility) Collected:  04/05/20 0813    Order Status:  Completed Specimen:  Blood from Line, Central Left  Neck Updated:  04/08/20 1234     Blood Culture, Routine Gram stain tank bottle: Gram positive cocci in clusters resembling Staph       Results called to and read back by: Lynn Sawyer RN 04/06/2020  09:57      Gram stain aer bottle: Gram positive cocci in clusters resembling Staph       Positive results  previously called 04/06/2020  15:52      STAPHYLOCOCCUS EPIDERMIDIS    Culture, Respiratory with Gram Stain [[id number]]  (Abnormal) Collected:  04/05/20 0050    Order Status:  Completed Specimen:  Respiratory from Endotracheal Aspirate Updated:  04/08/20 1046     Respiratory Culture No S aureus or Pseudomonas isolated.      CANDIDA ALBICANS  Few  Normal respiratory anamaria also present       Gram Stain (Respiratory) <10 epithelial cells per low power field.     Gram Stain (Respiratory) Many WBC's     Gram Stain (Respiratory) Rare Gram positive cocci     Gram Stain (Respiratory) Rare yeast            Fungemia  Acute-ID consulted-cultures from 4/4   micafungin per ID  HD and TLC removed and cultured          Pneumonia due to infectious organism  Acute -post viral --see resp failure/covid   Pulm/ID consulted /followed    Iron deficiency anemia, unspecified  Patient's anemia is currently controlled. Trending down  Will send for stool guaic    Has recieved 1 units of PRBCs on 4/3.      Etiology likely d/t Anemia of chronic disease/blood loss/acute inflammatory process  Current CBC reviewed-   Lab Results   Component Value Date    HGB 8.0 (L) 04/14/2020    HCT 26.8 (L) 04/14/2020     Monitor serial CBC and transfuse if patient becomes hemodynamically unstable, symptomatic or H/H drops below 8/24        Diverticulosis of large intestine without hemorrhage  Patient's anemia is currently controlled. Has recieved 1 units of PRBCs on 4/3.   Will need to Monitor for GI bleed  Lab Results   Component Value Date    HGB 8.0 (L) 04/14/2020    HCT 26.8 (L) 04/14/2020     Monitor serial CBC and transfuse if patient becomes hemodynamically unstable, symptomatic or H/H drops below 7/21.   Will continue to monitor        Acute renal failure  Acute, Poss ATN / -unclear yet if ESRD   required HD for acute renal failure-  ? Element ATN 2/2 hypovolemia/sepsis  Nephrology following  Bicarb po and on HD for Metabolic acidosis -continue HD  got  new temporary dialysis catheter overnight; febrile, tachy, -160s, on 1.5L NC, UOP 2.8L; seen on HD - will get off 2L                COVID-19 virus infection  Completed Plaquenil    Covid-19 Virus Infection  - Infection Control notified    - Isolation:   - Airborne and Droplet Precautions  - N95 masks must be fit tested, wear eye protection  - 20 second hand hygiene   - Limit visitors per hospital policy   - Consolidating lab draws, nursing care, and interventions    - Diagnostics: (rising CRP, persistent lymphopenia, hyponatremia, hyperferritinemia, elevated troponin, elevated d-dimer, age, and comorbidities are significant predictors of poor clinical outcome)   - CBC:   trend Q48hrs  - CMP:        trend Q48hrs  - Procalcitonin:  - D-dimer:  trend Q48hrs  - Ferritin:  repeat prior to discharge  - CRP:        trend Q48hrs  - LDH:  - BNP:  - Troponin:    - ECG:   - rapid Flu:   - RIP only if BMT/solid transplant:   - Legionella antigen:   - Blood culture x2:   - Sputum culture:   - CXR:   - UA and culture:      - Management:   - Bundle care as able to minimize in/out of room   - Supplemental O2 to maintain SpO2 >92%,   if requiring 6L NC or higher, place on nonrebreather and discuss case with MICU   - Telemetry & continuous Pulse Ox   - albuterol INHALER PRN 4puff Q6hr approximates a nebulizer (avoid nebulization of secretions)   - apap PRN fever   - Avoiding NIPPV to prevent aerosolization   (including home CPAP/BiPAP unless on a case-by-case basis and only in negative pressure room)   - Cautious use of NSAIDS for fever per WHO recommendations (3/16/2020)   - No new ACEi/ARB start or discontinuation of chronic med unless hypotensive (Esler et al. Journal of Hypertension 2020, 38:000-000)   - Careful use of steroids in the absence of other indications   - unless septic shock due to increased viral replication   - Fluid sparing resuscitation   - Empiric antibiotics per likely source & patient allergies    - CAP:  x 5 day course  Ceftriaxone 1g IV Q24hrs            Azithromycin 500mg IV day #1, then 250mg PO daily x4 days                 If MRSA risk factors, add Vancomycin IV (PharmD consult)   - If patient meets criteria per Hospital Protocol    - start statin (if CPK WNL)    - start HCQ 400mg PO BID x1 day, then 400mg PO daily x 4 days (check G6PD, ECG, and start Qshift POCT glucose)    Goals of care, counseling/discussion  - Reviewed the typical clinical course of ADDY with the daughter Danya (patient name or relationship to patient), including the potential for acute decompensation requiring intubation and mechanical ventilation  - Discussed again as part of routine daily evaluation, patient/POA maintains code status of Full code    VTE High Risk Prophylaxis: enoxaparin 40mg sq QHS @ 2100 (bundled care) if GFR >30    Patient's chronic/stable medical conditions noted in the problem list above will be managed with the patient's home medications as tolerated.           Hypothyroid  Chronic problem.  Lab Results   Component Value Date    TSH 2.082 03/31/2020     Not on any medication            Morbid obesity  Body mass index is 47.24 kg/m². Morbid obesity complicates all aspects of disease management from diagnostic modalities to treatment. Weight loss encouraged and health benefits explained to patient.            VTE Risk Mitigation (From admission, onward)         Ordered     heparin (porcine) injection 5,000 Units  Every 12 hours      04/07/20 1349     heparin (porcine) 1,000 unit/mL injection      04/06/20 1138     heparin (porcine) injection 4,000 Units  As needed (PRN)      03/29/20 2001     IP VTE HIGH RISK PATIENT  Once      03/25/20 2626                Critical care time spent on the evaluation and treatment of severe organ dysfunction, review of pertinent labs and imaging studies, discussions with consulting providers and discussions with patient/family: 60 minutes.      Kady Gomez MD  Department of  Hospital Medicine [facility]

## 2020-04-14 NOTE — NURSING
Pt transferred to  521 (PICU) via bed - monitor on, RA, appropriate PPE on. Pt oriented to new room. Monitor connected, placed on 1.5 L NC, bed alarm on, call bell in reach.

## 2020-04-14 NOTE — ASSESSMENT & PLAN NOTE
Patient's anemia is currently controlled. Has recieved 1 units of PRBCs on 4/3.   Will need to Monitor for GI bleed  Lab Results   Component Value Date    HGB 8.0 (L) 04/14/2020    HCT 26.8 (L) 04/14/2020     Monitor serial CBC and transfuse if patient becomes hemodynamically unstable, symptomatic or H/H drops below 7/21.   Will continue to monitor

## 2020-04-14 NOTE — PROGRESS NOTES
Progress Note  Infectious Disease    Reason for Consult:      HPI: Maria Victoria Hernandez is a   54 y.o. female employee of [employer].  with past medical history of morbid obesity, BMI of 48, diabetes, diet controlled, anemia, B12 deficiency, vitamin-D deficiency, hypothyroidism, was admitted on 03/25/2020 due to shortness of breath, diagnosed with COVID 19 is positive.  Patient has been intubated.  She went into renal failure and has been receiving HD by nephrologist.  Intensivist has been managing the vent.    Patient has completed 10 days of hydroxychloroquine and about 8 days of Zithromax and ceftriaxone.  She started spiking fever of  103 on 04/04.  White count jumped to 17.8.  She was started on vancomycin and Zosyn and blood cultures were sent.    ID consult called for g positives and yeast in blood cultures.  Patient is afebrile at the time.  WBC has improved.  Discussed with nurse.  Will discontinue lines.  Continue vancomycin, Daptomycin x1.  Add micafungin daily.    04/07/2020  WBC improved 17--12--10  Ferritin 1752--1538--1897  Intubated Sedated.  FiO2 of 35 people 5.  T-max 101.7°  Dialysis catheter changed yesterday  Leukocytosis improved 12/17/2010 04/08/2020  T-max 99.9°  Intubated sedated.  FiO2 35, peep of 5.  Fail CPAP trial.  Tolerating vancomycin, Zosyn, Micafungin.   Lines are fresh.  Nurse is trying to protect them.  Discussed with nurse:  Her daughter who is a nurse came and saw mother today, she agrees right eye looks better.    04/09/2020  Patient is extubated today, Really weak, Does not breath in deep, I advised her on deep breathing  Continues to need Cardene drip for BP  HAd loose stool-- flexiseal was placed    04/10/2020.  She is very weak.  She tries to opens her eyes and interact with me.  Right eye is improved.  Dialysis nurse is about to start dialysis.    04/11/2020  T max 100.3 yesterday.   She looks tired, but better than yesterday. Today she is  more  "awake and slighty stronger  She was tachycardic yesterday -- Cardene was switched to labetalol  WBC is about the same. ESR 50;  procal is still elveated. vanc level today is 14  Hospitalist ": Requested pulmonary to review possible  bipap -for Covid must have extra filter for machines which are limited "  As per renal :   " UOP 750cc.  3L UF w/HD yesterday.  K+ at goal.  Holding dialysis over weekend to assess for recovery"    04/12/2020 chart review    04/13/2020  Tmax 99.9, WBC normal, platelet still high  Pulled out ngt, It was replaced  Patient is tolerating vancomycin and micafungin.  She continues to have diarrhea.  C diff antigen is positive.    04/14/2020.  T-max a 100°. She follows commands.  She looks anxious.  She is extremely weak.  She is getting dialysis at this time.    Antibiotics (From admission, onward)    Start     Stop Route Frequency Ordered    04/13/20 1800  vancomycin 250mg / 10ml oral suspension 250 mg      04/23 1759 PER NG TUBE Every 6 hours 04/13/20 1424    04/05/20 0848  vancomycin - pharmacy to dose  (vancomycin IVPB)      -- IV pharmacy to manage frequency 04/05/20 0748    04/02/20 2315  gentamicin 0.3 % ophthalmic solution 1 drop      -- RIGHT EYE Every 4 hours 04/02/20 2310        Antifungals (From admission, onward)    Start     Stop Route Frequency Ordered    04/06/20 1100  micafungin 100 mg in sodium chloride 0.9 % 100 mL IVPB (ready to mix system)      -- IV Every 24 hours (non-standard times) 04/06/20 0959        Antivirals (From admission, onward)    None          EXAM & DIAGNOSTICS REVIEWED:   Vitals:     Temp:  [97.8 °F (36.6 °C)-100 °F (37.8 °C)]   Temp: 97.8 °F (36.6 °C) (04/14/20 0945)  Pulse: 103 (04/14/20 1200)  Resp: (!) 24 (04/14/20 1200)  BP: (!) 148/90 (04/14/20 1200)  SpO2: 96 % (04/14/20 1200)    Intake/Output Summary (Last 24 hours) at 4/14/2020 1207  Last data filed at 4/14/2020 0842  Gross per 24 hour   Intake 430 ml   Output 2715 ml   Net -2285 ml     Oxygen " Concentration (%):  [32] 32    General:  In NAD she looks anxious.   Eyes:  Anicteric,  ENT:  Lines on bilateral neck dressed appropriately.   Neck:  supple, no masses or adenopathy appreciated  Lungs: Does not breathe all the weight deep.  Clear otherwise.  Heart:  RRR, no gallop/murmur/rub noted  Abd:  Soft, NT, ND, normal BS, no masses or organomegaly appreciated.  Rectal tube in place.  :  Cuellar  Musc:  Joints without effusion, swelling, erythema, synovitis, muscle wasting.   Skin:  No rashes. No palmar or plantar lesions. No subungual petechiae  Wound:   Neuro: Follows commands.  Very weak, especially in lower extremities.  Psych:  anxious.  Lymphatic:     No cervical, supraclavicular, axillary, or inguinal nodes  Extrem: No edema, erythema, phlebitis, cellulitis, warm and well perfused  VAD:       Isolation:      Lines/Tubes/Drains:  Right IJ 04/06  Left IJ 04/06  Cuellar 03/25  OT 03/25    General Labs reviewed:  Recent Labs   Lab 04/12/20  0355 04/13/20 0312 04/14/20 0317   WBC 12.06 9.65 9.08   HGB 8.2* 8.2* 8.0*   HCT 25.9* 26.6* 26.8*   * 475* 402*       Recent Labs   Lab 04/12/20  0355 04/13/20 0312 04/14/20 0317    141 143   K 4.6 4.6 5.3*    100 100   CO2 22* 28 25   BUN 61* 80* 81*   CREATININE 5.0* 5.5* 5.0*   CALCIUM 10.0 10.7* 10.0   PROT 8.3 8.5* 9.1*   BILITOT 1.0 0.9 1.0   ALKPHOS 238* 207* 182*   ALT 37 28 23   AST 32 18 18     Micro:  Microbiology Results (last 7 days)     Procedure Component Value Units Date/Time    Blood culture [[id number]]  (Abnormal) Collected:  04/06/20 0806    Order Status:  Completed Specimen:  Blood from Antecubital, Left Updated:  04/14/20 1140     Blood Culture, Routine Gram stain peds bottle: yeast       Results called to and read back by: Tania Nolan RN 04/08/2020        11:20      CANDIDA ALBICANS  Susceptibility pending  ID consult required at [facility] [place].      C Diff Toxin by PCR [[id number]]  (Abnormal)  Collected:  04/12/20 2315    Order Status:  Completed Updated:  04/14/20 0033     C. diff PCR Positive    Blood culture [[id number]] Collected:  04/10/20 0453    Order Status:  Completed Specimen:  Blood Updated:  04/13/20 1412     Blood Culture, Routine No Growth to date      No Growth to date      No Growth to date      No Growth to date    Clostridium difficile EIA [[id number]]  (Abnormal) Collected:  04/12/20 2315    Order Status:  Completed Specimen:  Stool Updated:  04/13/20 1324     C. diff Antigen Positive     C difficile Toxins A+B, EIA Negative     Comment: Testing not recommended for children <24 months old.       Blood culture [[id number]]  (Abnormal) Collected:  04/04/20 0912    Order Status:  Completed Specimen:  Blood from Line, Central Updated:  04/13/20 1303     Blood Culture, Routine Gram stain peds bottle: budding yeast      Results called to and read back by: Carri Pryor RN  04/06/2020  03:04      AJ ALBICANS  ID consult required at [facility] [facility].      Blood culture [[id number]] Collected:  04/08/20 1739    Order Status:  Completed Specimen:  Blood from Line, Central Updated:  04/12/20 2212     Blood Culture, Routine No Growth after 4 days.     Blood culture [[id number]] Collected:  04/08/20 0745    Order Status:  Completed Specimen:  Blood Updated:  04/12/20 1412     Blood Culture, Routine No Growth after 4 days.     Blood culture [[id number]] Collected:  04/07/20 1730    Order Status:  Completed Specimen:  Blood from Line, Central Updated:  04/11/20 2212     Blood Culture, Routine No Growth after 4 days.     Blood culture [[id number]] Collected:  04/07/20 0907    Order Status:  Completed Specimen:  Blood Updated:  04/11/20 2212     Blood Culture, Routine No Growth after 4 days.     Clostridium difficile EIA [[id number]]     Order Status:  Canceled Specimen:  Stool     Blood culture [[id number]] Collected:  04/06/20 1802    Order Status:  Completed Specimen:  Blood from  Line, Central Updated:  04/10/20 2212     Blood Culture, Routine No Growth after 4 days.     IV catheter culture [[id number]]  (Abnormal) Collected:  04/06/20 1455    Order Status:  Completed Specimen:  Catheter Tip, Intrajugular Updated:  04/09/20 1300     Aerobic Culture - Cath tip STAPHYLOCOCCUS EPIDERMIDIS  < 15 colonies      IV catheter culture [[id number]] Collected:  04/06/20 1615    Order Status:  Completed Specimen:  Catheter Tip, Dialysis Updated:  04/09/20 1121     Aerobic Culture - Cath tip No growth    Blood culture [[id number]]     Order Status:  Canceled Specimen:  Blood     Blood culture [[id number]]  (Abnormal)  (Susceptibility) Collected:  04/05/20 0813    Order Status:  Completed Specimen:  Blood from Peripheral, Left  Hand Updated:  04/08/20 1236     Blood Culture, Routine Gram stain peds bottle: Gram positive cocci in clusters resembling Staph       Results called to and read back by: Lynn Sawyer RN 04/06/2020  09:57      STAPHYLOCOCCUS EPIDERMIDIS    Blood culture [[id number]]  (Abnormal)  (Susceptibility) Collected:  04/05/20 0813    Order Status:  Completed Specimen:  Blood from Line, Central Left  Neck Updated:  04/08/20 1234     Blood Culture, Routine Gram stain tank bottle: Gram positive cocci in clusters resembling Staph       Results called to and read back by: Lynn Sawyer RN 04/06/2020  09:57      Gram stain aer bottle: Gram positive cocci in clusters resembling Staph       Positive results previously called 04/06/2020  15:52      STAPHYLOCOCCUS EPIDERMIDIS    Culture, Respiratory with Gram Stain [[id number]]  (Abnormal) Collected:  04/05/20 0050    Order Status:  Completed Specimen:  Respiratory from Endotracheal Aspirate Updated:  04/08/20 1046     Respiratory Culture No S aureus or Pseudomonas isolated.      CANDIDA ALBICANS  Few  Normal respiratory anamaria also present       Gram Stain (Respiratory) <10 epithelial cells per low power field.     Gram Stain (Respiratory) Many WBC's      Gram Stain (Respiratory) Rare Gram positive cocci     Gram Stain (Respiratory) Rare yeast        Imaging Reviewed:  Chest x-ray 04/13/2020 Very mild decrease of the bilateral infiltrates compared to the prior exam.  Interval insertion of NG tube extending beneath the level of the diaphragm.  Central venous lines remain in place  KUB 0409/2020NG tube present with tip overlying the stomach in the left abdomen.  No evidence of bowel obstruction.  No radiographic mass.  CXR 04/06/2020Support devices as above.  No pneumothorax.  Moderate pulmonary airspace disease without significant change.  CXR 04/05/2020 No significant interval change in the bilateral airspace disease.  Support devices in stable position.    Cardiology:  Sinus rhythm  Repeat limited ECHO 04/13/2020   · Mild concentric left ventricular hypertrophy.  · Global hypokinetic wall motion.  · Moderately decreased left ventricular systolic function. The estimated ejection fraction is 35%.  · Grade I (mild) left ventricular diastolic dysfunction consistent with impaired relaxation.  · Normal right ventricular systolic function.  · Mild left atrial enlargement.  · Moderate mitral regurgitation.  · Normal central venous pressure (3 mmHg).  ·   ECHO on 03/27/2020  · Mild left ventricular enlargement.  · Mildly decreased left ventricular systolic function. The estimated ejection fraction is 45%.  · Grade I (mild) left ventricular diastolic dysfunction consistent with impaired relaxation.  · Normal right ventricular systolic function.  · Mild left atrial enlargement.  · Moderate mitral regurgitation.  · Mild tricuspid regurgitation.  · Mild pulmonary hypertension present. PASP 40 mm of Hg.     IMPRESSION & PLAN     Staphylococcus epidermidis bacteremia, line infection.  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  C diff PCR is negative.  Will treat with vanc p.o.  Respiratory failure,  ARDS, COVID19- completed treatment.  Intubated 03/25/2020-  Extubated 04/08/2020.  HTN, CHF with EF of 45%--35%.  Diastolic dysfunction grade 1.  Moderate MR.  This patient is high risk for life-threatening deterioration and death secondary to above comorbidities and need for IV treatment Critical care 35 min    Ferritin 1752--1538--1897---1865  --215---190--174.6--186.5---139  Procalcitonin 3.67---5.34    Recommendations:  --  echocardiogram is reviewed.  EF is slightly worse, 45--35.  Same grade 1 diastolic dysfunction.  Moderate MR.  No mention of vegetations.  --- Continue Vancomycin for S epidermidis (count 14 days from 04/07)  --- Continue Micafungin  for candidemia due to C albicans.   Eventually may need retinal exam.  We changed  Lines  on 04/06 in afternoon.   BCx drawn on the am of 04/06 were positive for C albicans!  Repeat blood cultures were negative after that.  Complet complete at least 3 weeks of therapy.  Follow final susceptibility testing; most likely will switch to Diflucan.  -- start vancomycin p.o. for C diff diarrhea

## 2020-04-14 NOTE — CARE UPDATE
04/13/20 2012   Patient Assessment/Suction   Level of Consciousness (AVPU) alert   Respiratory Effort Normal;Unlabored   PRE-TX-O2   O2 Device (Oxygen Therapy) nasal cannula   $ Is the patient on Low Flow Oxygen? Yes   Flow (L/min) 3   Oxygen Concentration (%) 32   SpO2 98 %   Pulse Oximetry Type Continuous   Pulse 106   Resp (!) 25   Inhaler   $ Inhaler Charges PRN treatment not required   Respiratory Treatment Status (Inhaler) PRN treatment not required   Ready to Wean/Extubation Screen   FIO2<=50 (chart decimal) 0.32

## 2020-04-14 NOTE — PLAN OF CARE
· /14/2020 12:36:39 PM Accepted: She will need a negative COVID result.  Nadira [email or web address]  · 4/14/2020 11:32:36 AM Under Review: Remote - Local Review  Nadira [email or web address]--[facility]      · 4/14/2020 12:21:57 PM Under Review: Remote - Out of Area Review  Shelli [email or web address]--MILLY      · 4/14/2020 11:29:51 AM Under Review: Remote - Local Review  Orly [email or web address]  · 4/14/2020 11:29:45 AM Note: Thank you for the referral, we do accept this insurance. I will begin working this referral. My contact is April Miracle [phone]  Orly [email or web address]---[facility] RUIZ received call from April with [employer] [phone] who states patient would need negative COVID results.  SARA informed [place] and MD aware     04/14/20 1242   Post-Acute Status   Post-Acute Authorization Placement   Post-Acute Placement Status Pending Post-Acute Clinical Review     · 4/14/2020 1:01:08 PM Note: under review, please call Roseann at [phone].  [email or web address] [email or web address]

## 2020-04-14 NOTE — ASSESSMENT & PLAN NOTE
Vancomyin 4/4 -    4/6 -HD catheter tip staph epidermidis   4/6 IV catheter tipstaph epidermidis  4/5 resp culture candida albicans   Urine culture cancelled   Recommendations: per ID   Continue Vancomycin for S epidermidis (count 14 days from 04/07)  --- Continue Micafungin  for candidemia due to C albicans.   Eventually may need retinal exam.  We changed  Lines  on 04/06 in afternoon.   BCx drawn on the am of 04/06 were positive for C albicans!  Repeat blood cultures were negative after that.  Complet complete at least 3 weeks of therapy.  Follow final susceptibility testing; most likely will switch to Diflucan.  -- started vancomycin p.o. for C diff diarrhea  Microbiology Results (last 7 days)     Procedure Component Value Units Date/Time    Blood culture [[id number]] Collected:  04/10/20 0453    Order Status:  Completed Specimen:  Blood Updated:  04/14/20 1412     Blood Culture, Routine No Growth after 4 days.     Blood culture [[id number]]  (Abnormal) Collected:  04/06/20 0806    Order Status:  Completed Specimen:  Blood from Antecubital, Left Updated:  04/14/20 1140     Blood Culture, Routine Gram stain peds bottle: yeast       Results called to and read back by: Tania Nolan RN 04/08/2020        11:20      CANDIDA ALBICANS  Susceptibility pending  ID consult required at [facility] [place] [facility].      C Diff Toxin by PCR [[id number]]  (Abnormal) Collected:  04/12/20 2315    Order Status:  Completed Updated:  04/14/20 0033     C. diff PCR Positive    Clostridium difficile EIA [[id number]]  (Abnormal) Collected:  04/12/20 2315    Order Status:  Completed Specimen:  Stool Updated:  04/13/20 1324     C. diff Antigen Positive     C difficile Toxins A+B, EIA Negative     Comment: Testing not recommended for children <24 months old.       Blood culture [[id number]]  (Abnormal) Collected:  04/04/20 0912    Order Status:  Completed Specimen:  Blood from Line, Central Updated:  04/13/20 1303     Blood  Culture, Routine Gram stain peds bottle: budding yeast      Results called to and read back by: Carri Pryor RN  04/06/2020  03:04      AJ ALBICANS  ID consult required at [facility] [facility] [facility].      Blood culture [[id number]] Collected:  04/08/20 1739    Order Status:  Completed Specimen:  Blood from Line, Central Updated:  04/12/20 2212     Blood Culture, Routine No Growth after 4 days.     Blood culture [[id number]] Collected:  04/08/20 0745    Order Status:  Completed Specimen:  Blood Updated:  04/12/20 1412     Blood Culture, Routine No Growth after 4 days.     Blood culture [[id number]] Collected:  04/07/20 1730    Order Status:  Completed Specimen:  Blood from Line, Central Updated:  04/11/20 2212     Blood Culture, Routine No Growth after 4 days.     Blood culture [[id number]] Collected:  04/07/20 0907    Order Status:  Completed Specimen:  Blood Updated:  04/11/20 2212     Blood Culture, Routine No Growth after 4 days.     Clostridium difficile EIA [[id number]]     Order Status:  Canceled Specimen:  Stool     Blood culture [[id number]] Collected:  04/06/20 1802    Order Status:  Completed Specimen:  Blood from Line, Central Updated:  04/10/20 2212     Blood Culture, Routine No Growth after 4 days.     IV catheter culture [[id number]]  (Abnormal) Collected:  04/06/20 1455    Order Status:  Completed Specimen:  Catheter Tip, Intrajugular Updated:  04/09/20 1300     Aerobic Culture - Cath tip STAPHYLOCOCCUS EPIDERMIDIS  < 15 colonies      IV catheter culture [[id number]] Collected:  04/06/20 1615    Order Status:  Completed Specimen:  Catheter Tip, Dialysis Updated:  04/09/20 1121     Aerobic Culture - Cath tip No growth    Blood culture [[id number]]     Order Status:  Canceled Specimen:  Blood     Blood culture [[id number]]  (Abnormal)  (Susceptibility) Collected:  04/05/20 0813    Order Status:  Completed Specimen:  Blood from Peripheral, Left  Hand Updated:  04/08/20 1236     Blood Culture,  Routine Gram stain peds bottle: Gram positive cocci in clusters resembling Staph       Results called to and read back by: Lynn Sawyer RN 04/06/2020  09:57      STAPHYLOCOCCUS EPIDERMIDIS    Blood culture [[id number]]  (Abnormal)  (Susceptibility) Collected:  04/05/20 0813    Order Status:  Completed Specimen:  Blood from Line, Central Left  Neck Updated:  04/08/20 1234     Blood Culture, Routine Gram stain tank bottle: Gram positive cocci in clusters resembling Staph       Results called to and read back by: Lynn Sawyer RN 04/06/2020  09:57      Gram stain aer bottle: Gram positive cocci in clusters resembling Staph       Positive results previously called 04/06/2020  15:52      STAPHYLOCOCCUS EPIDERMIDIS    Culture, Respiratory with Gram Stain [[id number]]  (Abnormal) Collected:  04/05/20 0050    Order Status:  Completed Specimen:  Respiratory from Endotracheal Aspirate Updated:  04/08/20 1046     Respiratory Culture No S aureus or Pseudomonas isolated.      CANDIDA ALBICANS  Few  Normal respiratory anamaria also present       Gram Stain (Respiratory) <10 epithelial cells per low power field.     Gram Stain (Respiratory) Many WBC's     Gram Stain (Respiratory) Rare Gram positive cocci     Gram Stain (Respiratory) Rare yeast

## 2020-04-14 NOTE — PT/OT/SLP PROGRESS
"Speech Language Pathology Treatment    Patient Name:  Maria Victoria Hernandez   MRN:  [id number]  Admitting Diagnosis: Acute respiratory failure with hypoxia    Recommendations:                 General Recommendations:  Dysphagia therapy  Diet recommendations:  NPO, Liquid Diet Level: NPO   Aspiration Precautions: Frequent oral care   General Precautions: Standard, aspiration, airborne, droplet, contact, NPO, respiratory  Communication strategies:  yes/no questions only and provide increased time to answer    Subjective   Nonverbal    Objective:   Pt seen in ICU for ongoing swallow assessment. Pt just completed dialysis and given Morphine. She opens eyes to voice. Flat affect. Initially pt not following any verbal commands but towards end of session she followed simple commands with ~25% accuracy. Nonverbal t/o session; unable to elicit any vocalizations.  Raised eyebrows and reached for ice chips. Unable to palpate pharyngeal swallow, across 2 of 2 ice chip trials with eventual cough noted. Oral suctioning provided. Daughter, sunil Hagan. She reports baseline as independent and healthy. Pt seen by PT upon completion of ST session. Per PT, pt verbalized "that's my grandson." (daughter on facetime with pt grandson outside of room)    Has the patient been evaluated by SLP for swallowing?   Yes  Keep patient NPO? Yes   Current Respiratory Status: nasal cannula      Assessment:     Maria Victoria Hernandez is a 54 y.o. female with an SLP diagnosis of Dysphagia and AMS. Pt seen for ongoing swallow assessment. Nonverbal today and following simple commands ~25% of the time. REC pt remain NPO. Will continue to follow.     Goals:   Multidisciplinary Problems     SLP Goals        Problem: SLP Goal    Goal Priority Disciplines Outcome   SLP Goal     SLP Ongoing, Progressing   Description:  Consume oral diet with no s/s oropharyngeal dysphagia                    Plan:     · Patient to be seen:  5 x/week   · Plan of Care " expires:     · Plan of Care reviewed with:  patient, daughter   · SLP Follow-Up:  Yes      Time Tracking:     SLP Treatment Date:   04/14/20  Speech Start Time:  1315  Speech Stop Time:  1341     Speech Total Time (min):  26 min    Billable Minutes: Treatment Swallowing Dysfunction 26 and Total Time 26    Cinda Pereira CCC-SLP  04/14/2020

## 2020-04-14 NOTE — PLAN OF CARE
04/14/20 0818   Patient Assessment/Suction   Level of Consciousness (AVPU) alert   Respiratory Effort Unlabored   Expansion/Accessory Muscles/Retractions no use of accessory muscles   Rhythm/Pattern, Respiratory no shortness of breath reported;unlabored   PRE-TX-O2   O2 Device (Oxygen Therapy) nasal cannula   $ Is the patient on Low Flow Oxygen? Yes   Flow (L/min) 1.5   SpO2 96 %   Pulse Oximetry Type Continuous   $ Pulse Oximetry - Multiple Charge Pulse Oximetry - Multiple   Pulse 105   Resp (!) 22   Aerosol Therapy   $ Aerosol Therapy Charges PRN treatment not required

## 2020-04-15 LAB
ALBUMIN SERPL BCP-MCNC: 3.2 G/DL (ref 3.5–5.2)
ALP SERPL-CCNC: 183 U/L (ref 55–135)
ALT SERPL W/O P-5'-P-CCNC: 24 U/L (ref 10–44)
ANION GAP SERPL CALC-SCNC: 19 MMOL/L (ref 8–16)
AST SERPL-CCNC: 19 U/L (ref 10–40)
BASOPHILS # BLD AUTO: 0.16 K/UL (ref 0–0.2)
BASOPHILS NFR BLD: 1.9 % (ref 0–1.9)
BILIRUB SERPL-MCNC: 1 MG/DL (ref 0.1–1)
BUN SERPL-MCNC: 65 MG/DL (ref 6–20)
CALCIUM SERPL-MCNC: 9.8 MG/DL (ref 8.7–10.5)
CHLORIDE SERPL-SCNC: 95 MMOL/L (ref 95–110)
CO2 SERPL-SCNC: 26 MMOL/L (ref 23–29)
CREAT SERPL-MCNC: 4.1 MG/DL (ref 0.5–1.4)
DIFFERENTIAL METHOD: ABNORMAL
EOSINOPHIL # BLD AUTO: 0.3 K/UL (ref 0–0.5)
EOSINOPHIL NFR BLD: 3.9 % (ref 0–8)
ERYTHROCYTE [DISTWIDTH] IN BLOOD BY AUTOMATED COUNT: 18.9 % (ref 11.5–14.5)
EST. GFR  (AFRICAN AMERICAN): 13 ML/MIN/1.73 M^2
EST. GFR  (NON AFRICAN AMERICAN): 12 ML/MIN/1.73 M^2
GLUCOSE SERPL-MCNC: 121 MG/DL (ref 70–110)
HCT VFR BLD AUTO: 29.5 % (ref 37–48.5)
HGB BLD-MCNC: 9 G/DL (ref 12–16)
IMM GRANULOCYTES # BLD AUTO: 0.27 K/UL (ref 0–0.04)
IMM GRANULOCYTES NFR BLD AUTO: 3.1 % (ref 0–0.5)
LYMPHOCYTES # BLD AUTO: 1.6 K/UL (ref 1–4.8)
LYMPHOCYTES NFR BLD: 18 % (ref 18–48)
MCH RBC QN AUTO: 26.7 PG (ref 27–31)
MCHC RBC AUTO-ENTMCNC: 30.5 G/DL (ref 32–36)
MCV RBC AUTO: 88 FL (ref 82–98)
MONOCYTES # BLD AUTO: 1.3 K/UL (ref 0.3–1)
MONOCYTES NFR BLD: 15.4 % (ref 4–15)
NEUTROPHILS # BLD AUTO: 5 K/UL (ref 1.8–7.7)
NEUTROPHILS NFR BLD: 57.7 % (ref 38–73)
NRBC BLD-RTO: 0 /100 WBC
PLATELET # BLD AUTO: 389 K/UL (ref 150–350)
PMV BLD AUTO: 10.3 FL (ref 9.2–12.9)
POTASSIUM SERPL-SCNC: 4.3 MMOL/L (ref 3.5–5.1)
PROT SERPL-MCNC: 10 G/DL (ref 6–8.4)
RBC # BLD AUTO: 3.37 M/UL (ref 4–5.4)
SARS-COV-2 RNA RESP QL NAA+PROBE: NOT DETECTED
SODIUM SERPL-SCNC: 140 MMOL/L (ref 136–145)
VANCOMYCIN SERPL-MCNC: 25.3 UG/ML
WBC # BLD AUTO: 8.61 K/UL (ref 3.9–12.7)

## 2020-04-15 PROCEDURE — 99900035 HC TECH TIME PER 15 MIN (STAT)

## 2020-04-15 PROCEDURE — 97110 THERAPEUTIC EXERCISES: CPT

## 2020-04-15 PROCEDURE — 20000000 HC ICU ROOM

## 2020-04-15 PROCEDURE — 80100014 HC HEMODIALYSIS 1:1

## 2020-04-15 PROCEDURE — 25000003 PHARM REV CODE 250: Performed by: INTERNAL MEDICINE

## 2020-04-15 PROCEDURE — 99223 PR INITIAL HOSPITAL CARE,LEVL III: ICD-10-PCS | Mod: ,,, | Performed by: INTERNAL MEDICINE

## 2020-04-15 PROCEDURE — 63600175 PHARM REV CODE 636 W HCPCS: Performed by: INTERNAL MEDICINE

## 2020-04-15 PROCEDURE — 97165 OT EVAL LOW COMPLEX 30 MIN: CPT

## 2020-04-15 PROCEDURE — U0002 COVID-19 LAB TEST NON-CDC: HCPCS

## 2020-04-15 PROCEDURE — 97535 SELF CARE MNGMENT TRAINING: CPT

## 2020-04-15 PROCEDURE — 97530 THERAPEUTIC ACTIVITIES: CPT

## 2020-04-15 PROCEDURE — 99291 PR CRITICAL CARE, E/M 30-74 MINUTES: ICD-10-PCS | Mod: S$GLB,,, | Performed by: INTERNAL MEDICINE

## 2020-04-15 PROCEDURE — 25000003 PHARM REV CODE 250: Performed by: HOSPITALIST

## 2020-04-15 PROCEDURE — 97803 MED NUTRITION INDIV SUBSEQ: CPT

## 2020-04-15 PROCEDURE — 92526 ORAL FUNCTION THERAPY: CPT

## 2020-04-15 PROCEDURE — 94761 N-INVAS EAR/PLS OXIMETRY MLT: CPT

## 2020-04-15 PROCEDURE — 99223 1ST HOSP IP/OBS HIGH 75: CPT | Mod: ,,, | Performed by: INTERNAL MEDICINE

## 2020-04-15 PROCEDURE — 80202 ASSAY OF VANCOMYCIN: CPT

## 2020-04-15 PROCEDURE — 80053 COMPREHEN METABOLIC PANEL: CPT

## 2020-04-15 PROCEDURE — 36415 COLL VENOUS BLD VENIPUNCTURE: CPT

## 2020-04-15 PROCEDURE — 85025 COMPLETE CBC W/AUTO DIFF WBC: CPT

## 2020-04-15 PROCEDURE — 63600175 PHARM REV CODE 636 W HCPCS: Performed by: HOSPITALIST

## 2020-04-15 PROCEDURE — 27000221 HC OXYGEN, UP TO 24 HOURS

## 2020-04-15 PROCEDURE — 99291 CRITICAL CARE FIRST HOUR: CPT | Mod: S$GLB,,, | Performed by: INTERNAL MEDICINE

## 2020-04-15 RX ORDER — HYDRALAZINE HYDROCHLORIDE 10 MG/1
10 TABLET, FILM COATED ORAL EVERY 8 HOURS
Status: DISCONTINUED | OUTPATIENT
Start: 2020-04-15 | End: 2020-04-16

## 2020-04-15 RX ORDER — FUROSEMIDE 40 MG/1
40 TABLET ORAL 2 TIMES DAILY
Status: DISCONTINUED | OUTPATIENT
Start: 2020-04-15 | End: 2020-04-17

## 2020-04-15 RX ADMIN — SODIUM BICARBONATE 650 MG TABLET 650 MG: at 08:04

## 2020-04-15 RX ADMIN — OXYCODONE HYDROCHLORIDE AND ACETAMINOPHEN 1000 MG: 500 TABLET ORAL at 06:04

## 2020-04-15 RX ADMIN — CHOLESTYRAMINE 4 G: 4 POWDER, FOR SUSPENSION ORAL at 08:04

## 2020-04-15 RX ADMIN — METOPROLOL TARTRATE 25 MG: 25 TABLET, FILM COATED ORAL at 08:04

## 2020-04-15 RX ADMIN — OXYCODONE HYDROCHLORIDE AND ACETAMINOPHEN 1000 MG: 500 TABLET ORAL at 08:04

## 2020-04-15 RX ADMIN — LACTOBACILLUS TAB 2 TABLET: TAB at 08:04

## 2020-04-15 RX ADMIN — LEVOTHYROXINE SODIUM 100 MCG: 100 TABLET ORAL at 05:04

## 2020-04-15 RX ADMIN — SODIUM BICARBONATE 650 MG TABLET 650 MG: at 06:04

## 2020-04-15 RX ADMIN — Medication 250 MG: at 05:04

## 2020-04-15 RX ADMIN — GENTAMICIN SULFATE 1 DROP: 3 SOLUTION OPHTHALMIC at 09:04

## 2020-04-15 RX ADMIN — HEPARIN SODIUM 5000 UNITS: 5000 INJECTION, SOLUTION INTRAVENOUS; SUBCUTANEOUS at 08:04

## 2020-04-15 RX ADMIN — GENTAMICIN SULFATE 1 DROP: 3 SOLUTION OPHTHALMIC at 02:04

## 2020-04-15 RX ADMIN — OXYCODONE HYDROCHLORIDE AND ACETAMINOPHEN 1000 MG: 500 TABLET ORAL at 01:04

## 2020-04-15 RX ADMIN — MICAFUNGIN SODIUM 100 MG: 20 INJECTION, POWDER, LYOPHILIZED, FOR SOLUTION INTRAVENOUS at 11:04

## 2020-04-15 RX ADMIN — ONDANSETRON 4 MG: 2 INJECTION INTRAMUSCULAR; INTRAVENOUS at 12:04

## 2020-04-15 RX ADMIN — HYDRALAZINE HYDROCHLORIDE 10 MG: 10 TABLET ORAL at 11:04

## 2020-04-15 RX ADMIN — Medication 250 MG: at 11:04

## 2020-04-15 RX ADMIN — EPOETIN ALFA-EPBX 5000 UNITS: 10000 INJECTION, SOLUTION INTRAVENOUS; SUBCUTANEOUS at 11:04

## 2020-04-15 RX ADMIN — Medication 250 MG: at 06:04

## 2020-04-15 RX ADMIN — FUROSEMIDE 60 MG: 10 INJECTION, SOLUTION INTRAMUSCULAR; INTRAVENOUS at 08:04

## 2020-04-15 RX ADMIN — ZINC SULFATE 220 MG (50 MG) CAPSULE 220 MG: CAPSULE at 08:04

## 2020-04-15 RX ADMIN — ONDANSETRON 4 MG: 2 INJECTION INTRAMUSCULAR; INTRAVENOUS at 02:04

## 2020-04-15 RX ADMIN — HEPARIN SODIUM 4000 UNITS: 1000 INJECTION, SOLUTION INTRAVENOUS; SUBCUTANEOUS at 06:04

## 2020-04-15 RX ADMIN — GENTAMICIN SULFATE 1 DROP: 3 SOLUTION OPHTHALMIC at 06:04

## 2020-04-15 RX ADMIN — GENTAMICIN SULFATE 1 DROP: 3 SOLUTION OPHTHALMIC at 05:04

## 2020-04-15 RX ADMIN — MINERAL OIL AND PETROLATUM: 150; 830 OINTMENT OPHTHALMIC at 08:04

## 2020-04-15 RX ADMIN — HYDRALAZINE HYDROCHLORIDE 10 MG: 10 TABLET ORAL at 09:04

## 2020-04-15 RX ADMIN — ASPIRIN 81 MG: 81 TABLET, COATED ORAL at 09:04

## 2020-04-15 RX ADMIN — Medication 250 MG: at 12:04

## 2020-04-15 RX ADMIN — ONDANSETRON 4 MG: 2 INJECTION INTRAMUSCULAR; INTRAVENOUS at 11:04

## 2020-04-15 RX ADMIN — ESCITALOPRAM OXALATE 5 MG: 5 TABLET, FILM COATED ORAL at 08:04

## 2020-04-15 NOTE — PLAN OF CARE
Problem: Occupational Therapy Goal  Goal: Occupational Therapy Goal  Description  Goals to be met by: 5/5/20    Patient will increase functional independence with ADLs by performing:    Feeding with Modified [place] and Assistive Devices as needed.  UE Dressing with Set-up Assistance and Minimal Assistance.  Grooming while seated with Supervision.  Sitting at edge of bed x10 minutes with Stand-by Assistance and use of upper extremity support.  Toilet transfer to bedside commode with Moderate Assistance.  Upper extremity exercise program x10 reps per handout, with assistance as needed.     Outcome: Ongoing, Progressing   OT evaluation completed today. Goals & care plan established.  NORMAN Joseph  4/15/2020

## 2020-04-15 NOTE — NURSING
-This AM pt more alert able to speak softly, oriented.   -PTworked with patient, sat on side of bed.   -Pt had hypotensive episode which made pt have right sided gaze stare. Pt layed flat and legs elevated. Pts mentation came back to, SBP 70s increased to 115.     -Speech cleared pt for diet.    -Pt will have food trial now the HD has gone. If greater than 50% eaten will DC NGT.    -

## 2020-04-15 NOTE — PLAN OF CARE
Spoke with patient's daughter, Danya, and gave update on patient's current status and vital signs along with name of hospitalist and nurse and room number.  She had no further questions, and call ended pleasantly.         Giselle Allen DPM

## 2020-04-15 NOTE — PROGRESS NOTES
Pharmacokinetic Assessment Follow Up: IV Vancomycin    Vancomycin serum concentration assessment(s):    The random level was drawn correctly and can be used to guide therapy at this time. The measurement is above the desired definitive target range of 15 to 20 mcg/mL.    Vancomycin Regimen Plan:    1. Patient's random level was 25.3 mcg/mL and above the therapeutic range of 15-20 mcg/mL.  2. Patient will not be given vancomycin following dialysis today.   3. A random level will be drawn on 04/17/20 with AM labs.      Drug levels (last 3 results):  Recent Labs   Lab Result Units 04/14/20 0317 04/14/20  1045 04/15/20  0319   Vancomycin, Random ug/mL 12.9 9.3 25.3       Pharmacy will continue to follow and monitor vancomycin.    Please contact pharmacy at extension [phone] for questions regarding this assessment.    Thank you for the consult,   Jose R Francis       Patient brief summary:  Maria Victoria Hernandez is a 55 y.o. female initiated on antimicrobial therapy with IV Vancomycin for treatment of bacteremia    The patient is being dosed by level.     Drug Allergies:   Review of patient's allergies indicates:  No Known Allergies    Actual Body Weight:   99.8 kg (220 lb 0.3 oz)    Renal Function:   Estimated Creatinine Clearance: 17 mL/min (A) (based on SCr of 4.1 mg/dL (H)).,     Dialysis Method (if applicable):  intermittent HD. Patient scheduled to receive HD on MWF schedule.     CBC (last 72 hours):  Recent Labs   Lab Result Units 04/13/20  0312 04/14/20  0317 04/15/20  0319   WBC K/uL 9.65 9.08 8.61   Hemoglobin g/dL 8.2* 8.0* 9.0*   Hematocrit % 26.6* 26.8* 29.5*   Platelets K/uL 475* 402* 389*   Gran% % 67.7 65.9 57.7   Lymph% % 14.1* 14.5* 18.0   Mono% % 9.1 12.3 15.4*   Eosinophil% % 2.6 2.5 3.9   Basophil% % 1.0 1.2 1.9   Differential Method  Automated Automated Automated       Metabolic Panel (last 72 hours):  Recent Labs   Lab Result Units 04/13/20  0312 04/14/20  0317 04/15/20  0319   Sodium mmol/L  141 143 140   Potassium mmol/L 4.6 5.3* 4.3   Chloride mmol/L 100 100 95   CO2 mmol/L 28 25 26   Glucose mg/dL 138* 91 121*   BUN, Bld mg/dL 80* 81* 65*   Creatinine mg/dL 5.5* 5.0* 4.1*   Albumin g/dL 2.5* 2.8* 3.2*   Total Bilirubin mg/dL 0.9 1.0 1.0   Alkaline Phosphatase U/L 207* 182* 183*   AST U/L 18 18 19   ALT U/L 28 23 24       Vancomycin Administrations:  vancomycin given in the last 96 hours                     vancomycin 250mg / 10ml oral suspension 250 mg (mg) 250 mg Given 04/15/20 0514     250 mg Given  0000     250 mg Given 04/14/20 1822     250 mg Given  1300     250 mg Given  0611     250 mg Given  0032    vancomycin 750 mg in dextrose 5 % 250 mL IVPB (ready to mix system) (mg) 750 mg New Bag 04/14/20 1640    vancomycin 750 mg in dextrose 5 % 250 mL IVPB (ready to mix system) (mg) 750 mg New Bag 04/13/20 1825                      Microbiologic Results:  Microbiology Results (last 7 days)       Procedure Component Value Units Date/Time    Blood culture [[id number]] Collected:  04/10/20 0453    Order Status:  Completed Specimen:  Blood Updated:  04/14/20 1412     Blood Culture, Routine No Growth after 4 days.     Blood culture [[id number]]  (Abnormal) Collected:  04/06/20 0806    Order Status:  Completed Specimen:  Blood from Antecubital, Left Updated:  04/14/20 1140     Blood Culture, Routine Gram stain peds bottle: yeast       Results called to and read back by: Tania Nolan RN 04/08/2020        11:20      CANDIDA ALBICANS  Susceptibility pending  ID consult required at [facility] [place] and [place] locations.      C Diff Toxin by PCR [[id number]]  (Abnormal) Collected:  04/12/20 2315    Order Status:  Completed Updated:  04/14/20 0033     C. diff PCR Positive    Clostridium difficile EIA [[id number]]  (Abnormal) Collected:  04/12/20 2315    Order Status:  Completed Specimen:  Stool Updated:  04/13/20 1324     C. diff Antigen Positive     C difficile Toxins A+B, EIA Negative     Comment: Testing  not recommended for children <24 months old.       Blood culture [[id number]]  (Abnormal) Collected:  04/04/20 0912    Order Status:  Completed Specimen:  Blood from Line, Central Updated:  04/13/20 1303     Blood Culture, Routine Gram stain peds bottle: budding yeast      Results called to and read back by: Carri Pryor RN  04/06/2020  03:04      AJ ALBICANS  ID consult required at [facility],[place] and [facility].      Blood culture [[id number]] Collected:  04/08/20 1739    Order Status:  Completed Specimen:  Blood from Line, Central Updated:  04/12/20 2212     Blood Culture, Routine No Growth after 4 days.     Blood culture [[id number]] Collected:  04/08/20 0745    Order Status:  Completed Specimen:  Blood Updated:  04/12/20 1412     Blood Culture, Routine No Growth after 4 days.     Blood culture [[id number]] Collected:  04/07/20 1730    Order Status:  Completed Specimen:  Blood from Line, Central Updated:  04/11/20 2212     Blood Culture, Routine No Growth after 4 days.     Blood culture [[id number]] Collected:  04/07/20 0907    Order Status:  Completed Specimen:  Blood Updated:  04/11/20 2212     Blood Culture, Routine No Growth after 4 days.     Clostridium difficile EIA [[id number]]     Order Status:  Canceled Specimen:  Stool     Blood culture [[id number]] Collected:  04/06/20 1802    Order Status:  Completed Specimen:  Blood from Line, Central Updated:  04/10/20 2212     Blood Culture, Routine No Growth after 4 days.     IV catheter culture [[id number]]  (Abnormal) Collected:  04/06/20 1455    Order Status:  Completed Specimen:  Catheter Tip, Intrajugular Updated:  04/09/20 1300     Aerobic Culture - Cath tip STAPHYLOCOCCUS EPIDERMIDIS  < 15 colonies      IV catheter culture [[id number]] Collected:  04/06/20 1615    Order Status:  Completed Specimen:  Catheter Tip, Dialysis Updated:  04/09/20 1121     Aerobic Culture - Cath tip No growth    Blood culture [[id number]]     Order Status:  Canceled  Specimen:  Blood     Blood culture [[id number]]  (Abnormal)  (Susceptibility) Collected:  04/05/20 0813    Order Status:  Completed Specimen:  Blood from Peripheral, Left  Hand Updated:  04/08/20 1236     Blood Culture, Routine Gram stain peds bottle: Gram positive cocci in clusters resembling Staph       Results called to and read back by: Lynn Sawyer RN 04/06/2020  09:57      STAPHYLOCOCCUS EPIDERMIDIS    Blood culture [[id number]]  (Abnormal)  (Susceptibility) Collected:  04/05/20 0813    Order Status:  Completed Specimen:  Blood from Line, Central Left  Neck Updated:  04/08/20 1234     Blood Culture, Routine Gram stain tank bottle: Gram positive cocci in clusters resembling Staph       Results called to and read back by: Lynn Sawyer RN 04/06/2020  09:57      Gram stain aer bottle: Gram positive cocci in clusters resembling Staph       Positive results previously called 04/06/2020  15:52      STAPHYLOCOCCUS EPIDERMIDIS    Culture, Respiratory with Gram Stain [[id number]]  (Abnormal) Collected:  04/05/20 0050    Order Status:  Completed Specimen:  Respiratory from Endotracheal Aspirate Updated:  04/08/20 1046     Respiratory Culture No S aureus or Pseudomonas isolated.      CANDIDA ALBICANS  Few  Normal respiratory anamaria also present       Gram Stain (Respiratory) <10 epithelial cells per low power field.     Gram Stain (Respiratory) Many WBC's     Gram Stain (Respiratory) Rare Gram positive cocci     Gram Stain (Respiratory) Rare yeast

## 2020-04-15 NOTE — PLAN OF CARE
Problem: SLP Goal  Goal: SLP Goal  Description  1) Consume regular diet and thin liquids no s/s oropharyngeal dysphagia--Revised   Outcome: Ongoing, Progressing    Pt seen for ongoing swallow assessment. Significant improvement in mental status. Pt alert, following commands and answering simple questions appropriately (mildly delayed responses). Voice clear with reduced intensity. REC initiating Dental soft diet (IDDSI 6) with thin liquids. Meds whole, one at a time. Will follow to monitor tolerance and advance as tolerated.

## 2020-04-15 NOTE — PROGRESS NOTES
INPATIENT NEPHROLOGY PROGRESS NOTE  [facility] NEPHROLOGY    Patient Name: Maria Victoria Hernandez  Date: 04/15/2020    Reason for consultation: MAIKOL    History of Present Illness:  54AAF nurse with morbid obesity, hypothyroidism presents PNA, intubated, positive for COVID19. Admit Cr 0.7 went 5.1 and HD started on 3/29.     3/28  Scr worse today.  Only one shift recorded for output, 520cc.  Still hyponatremic, vent setting adjusted per pulmonology note for acidosis.  K+ at goal after repletion.  Has siri, may need HD initiated.  3/29  Non oliguric.  In no distress  3/30 VSS, seen and examined on HD, tolerating well. Plan another HD in AM, discussed with daughter who is a nurse here too.  3/31 VSS, intubated still. UO is not great but she is dialyzed daily. Seen and examined on HD, tolerating well. Plan another HD in AM.  4/1 VSS, intubated still. UO is not great but she is dialyzed daily. Seen and examined on HD, tolerating well. Plan another HD PRN.  4/2 VSS, intubated still. UO is not great. Plan another HD in AM.  4/3 VSS, intubated still. UO is not great but she is dialyzed daily recently. Seen and examined on HD, tolerating well. Plan another HD on Mon or PRN.  4/4 febrile, BP stable, FIO2 50%, intubated, sedated, on levophed, UOP 900cc, transfused  4/5 febrile, tachy, SBP 100s, FIO2 65%, intubated, sedated, off levophed, UOP 935cc  4/6 intubated,. Sedated  4/7 intubated, sedated. Dialysis catheter changed yesterday  4/8 just finished dialysis. uf 3 liters. Extubated  4/10  Seen on dialysis.  No distress.  4/11  UOP 750cc.  3L UF w/HD yesterday.  K+ at goal.  Holding dialysis over weekend to assess for recovery.  Significant event noted 6pm yesterday per primary notes.  Med changes made 2/2 tachycardia, tachypnea.   She is hypotensive today.   4/12   UOP 1.2L.  Scheduled for dialysis tomorrow, no recovery noted thus far.  Pulmonology working toward extubation and weaning off labetalol gtt, ordered  clonidine PRN SBP>160.  4/13 low grade T, SBP 140s, on 3-4L NC, unable to do HD today due to malfunctioning access, UOP 3.2L; CXR: Very mild decrease of the bilateral infiltrates compared to the prior exam.   4/14 got new temporary dialysis catheter overnight; febrile, tachy, -160s, on 1.5L NC, UOP 2.8L; seen on HD - will get off 2L- updated daughter at bedside  4/15 low grade temp, SBP > 130, on 2L NC, UOP 1.6L; MRI brain yest neg; tolerating TFs; worked with therapy today- sitting upright in bed- able to follow commands and nods head when I'm speaking with her    Echo 3/27/20:  · Mild left ventricular enlargement.  · Mildly decreased left ventricular systolic function. The estimated ejection fraction is 45%.  · Grade I (mild) left ventricular diastolic dysfunction consistent with impaired relaxation.  · Normal right ventricular systolic function.  · Mild left atrial enlargement.  · Moderate mitral regurgitation.  · Mild tricuspid regurgitation.  · Mild pulmonary hypertension present. PASP 40 mm of Hg.    Echo 4/13/20:  · Mild concentric left ventricular hypertrophy.  · Global hypokinetic wall motion.  · Moderately decreased left ventricular systolic function. The estimated ejection fraction is 35%.  · Grade I (mild) left ventricular diastolic dysfunction consistent with impaired relaxation.  · Normal right ventricular systolic function.  · Mild left atrial enlargement.  · Moderate mitral regurgitation.  · Normal central venous pressure (3 mmHg).    Plan of Care:    1. Septic shock 2/2 COVID PNA with HHRF requiring MV- resolved  - weaning O2 NC  - remains on PO bicarb- using 40 bicarb bath  - will need aggressive PT/OT  - dose antbx for dialysis (on therapy for Staph Epi bacteremia, Candida Alb fungemia, C diff)  - 4/14 repeat COVID testing negative    2. MAIKOL - suspect multifactorial ATN in setting of shock/COVID/intravascular volume depletion- initiated HD 3/29  - even though she is nonoliguric, her  clearance remains poor  - transition to HD MWF schedule  - no nsaids or IV contrast  - dose meds for dialysis    3. HTN/Systolic CHF- worsening based on echo  - BP is still above goal, net -11L for admission  - continue metoprolol  - switch IV lasix to enteral lasix 40mg BID  - start hydralazine 10mg TID  - continue UF as able    4. Anemia  - Hb is better  - will start TERE with HD MWF (today)    Thank you for allowing us to participate in this patient's care. We will continue to follow.    Vital Signs:  Temp Readings from Last 3 Encounters:   04/15/20 99 °F (37.2 °C) (Oral)       Pulse Readings from Last 3 Encounters:   04/15/20 99   01/15/15 84   12/17/13 80       BP Readings from Last 3 Encounters:   04/15/20 134/73   01/15/15 124/82   12/17/13 102/68       Weight:  Wt Readings from Last 3 Encounters:   04/15/20 99.8 kg (220 lb 0.3 oz)   01/15/15 105.8 kg (233 lb 4.8 oz)   12/17/13 101.2 kg (223 lb)     Medications:  Scheduled Meds:   ascorbic acid (vitamin C)  1,000 mg Per NG tube QID    aspirin  81 mg Oral Daily    calcitRIOL  0.25 mcg Oral Daily    cholestyramine-aspartame  1 packet Per NG tube BID    epoetin raquel-ebpx (RETACRIT) injection  5,000 Units Intravenous Every Mon, Wed, Fri    escitalopram oxalate  5 mg Per NG tube Daily    furosemide (LASIX) IV  60 mg Intravenous BID    gentamicin  1 drop Right Eye Q4H    heparin (porcine)  5,000 Units Subcutaneous Q12H    Lactobacillus acidoph-L.bulgar  2 tablet Per NG tube BID    levothyroxine  100 mcg Oral Before breakfast    metoprolol tartrate  25 mg Per NG tube BID    micafungin (MYCAMINE) IVPB  100 mg Intravenous Q24H    sodium bicarbonate  650 mg Per NG tube TID    vancomycin  250 mg Per NG tube Q6H    white petrolatum-mineral oiL   Right Eye QHS    zinc sulfate  220 mg Oral Daily     Continuous Infusions:   labetalol (NORMODYNE) 5 mg/mL infusion (TITRATING) Stopped (04/12/20 0600)     PRN Meds:.acetaminophen, cloNIDine, heparin (porcine),  "hydrALAZINE, HYDROmorphone, ipratropium-albuteroL, loperamide, morphine, ondansetron, promethazine (PHENERGAN) IVPB, propofoL, Pharmacy to dose Vancomycin consult **AND** vancomycin - pharmacy to dose  No current facility-administered medications on file prior to encounter.      Current Outpatient Medications on File Prior to Encounter   Medication Sig Dispense Refill    ferrous sulfate 325 mg (65 mg iron) Tab tablet Take 1 tablet (325 mg total) by mouth daily with breakfast. (Patient taking differently: Take 325 mg by mouth daily with breakfast. Take 2 tablets daily) 90 tablet 3    fluticasone propionate (FLONASE) 50 mcg/actuation nasal spray 1 spray by Each Nostril route once daily.      levothyroxine (SYNTHROID) 100 MCG tablet Take 1 tablet (100 mcg total) by mouth once daily. (Patient taking differently: Take 150 mcg by mouth once daily. ) 90 tablet 3    meloxicam (MOBIC) 7.5 MG tablet Take 7.5 mg by mouth once daily.      clotrimazole-betamethasone 1-0.05% (LOTRISONE) cream Apply topically 2 (two) times daily. 45 g 1    levocetirizine (XYZAL) 5 MG tablet Take 1 tablet (5 mg total) by mouth every evening. 30 tablet 6     Review of Systems:  Not conversant    Physical Exam:  /73   Pulse 99   Temp 99 °F (37.2 °C) (Oral)   Resp 20   Ht 5' 1" (1.549 m)   Wt 99.8 kg (220 lb 0.3 oz)   SpO2 96%   Breastfeeding? No   BMI 41.57 kg/m²     INS/OUTS:  I/O last 3 completed shifts:  In: 1750 [I.V.:160; Other:500; NG/GT:740; IV Piggyback:350]  Out: 5700 [Urine:3160; Drains:40; Other:2500]  No intake/output data recorded.    Constitutional: nad, aao x 3  Heart: tachy regular, no m/r/g, wwp, no edema  Lungs: ant ausc clear, no w/r/r/c, no lb  Abdomen: s/nt/nd, +BS    Results:  Lab Results   Component Value Date     04/15/2020    K 4.3 04/15/2020    CL 95 04/15/2020    CO2 26 04/15/2020    BUN 65 (H) 04/15/2020    CREATININE 4.1 (H) 04/15/2020    CALCIUM 9.8 04/15/2020    ANIONGAP 19 (H) 04/15/2020    " ESTGFRAFRICA 13 (A) 04/15/2020    EGFRNONAA 12 (A) 04/15/2020       Lab Results   Component Value Date    CALCIUM 9.8 04/15/2020    PHOS 4.8 (H) 03/29/2020       Recent Labs   Lab 04/15/20  0319   WBC 8.61   RBC 3.37*   HGB 9.0*   HCT 29.5*   *   MCV 88   MCH 26.7*   MCHC 30.5*     I have personally reviewed pertinent radiological imaging and reports.    Carolyn Moeller MD MPH  [facility]  [address] [place] [id number]  [phone] (p)  [phone] (f)

## 2020-04-15 NOTE — PLAN OF CARE
Problem: Physical Therapy Goal  Goal: Physical Therapy Goal  Description  Goals to be met by: 2020    Patient will increase functional independence with mobility by performin. Supine to sit with MInimal Assistance  2. Sit to supine with MInimal Assistance  3. Sit to stand transfer with Minimal Assistance  4. Bed to chair transfer with Minimal Assistance using Rolling Walker  5. Gait  x 25 feet with Minimal Assistance using Rolling Walker.      Outcome: Ongoing, Progressing   Pt seen for thera ex/EOB sitting with max assist x2. Easily fatigued- attempting to verbalize

## 2020-04-15 NOTE — PT/OT/SLP PROGRESS
"Speech Language Pathology Treatment    Patient Name:  Maria Victoria Hernandez   MRN:  [id number]  Admitting Diagnosis: Acute respiratory failure with hypoxia    Recommendations:                 General Recommendations:  Dysphagia therapy  Diet recommendations:  Dental Soft(IDDSI 6), Liquid Diet Level: Thin   Aspiration Precautions: Assistance with meals, HOB to 90 degrees, Meds whole 1 at a time, Monitor for s/s of aspiration, Small bites/sips, Standard aspiration precautions and Wear oxygen during intake   General Precautions: Standard, aspiration, airborne, droplet, fall, contact  Communication strategies:  provide increased time to answer    Subjective     "They spoiling me." (Today is her birthday)  "That's my daughter, Danya."  "I can do it myself." (feed herself)    Objective:   Pt seen in ICU for ongoing swallow assessment. +NGT. Daughter presents. She is awake, alert and oriented to self, place and year. She is following simple commands and answering  simple open ended questions with mild delays noted. Vocal quality clear but with reduced intensity.  Smiling and motivated to do for herself. She consumed vanilla pudding, diced peaches and thin liquids via tsp, cup and straw with no overt s/s penetration/aspiration. Single, productive cough noted x1 following peaches which pt related to reflux; pt able to use suctioning herself.     Has the patient been evaluated by SLP for swallowing?   Yes  Keep patient NPO? No   Current Respiratory Status: nasal cannula      Assessment:   Pt seen for ongoing swallow assessment. Significant improvement in mental status. Pt alert, following commands and answering simple questions appropriately (mildly delayed responses). Voice clear with reduced intensity. REC initiating Dental soft diet (IDDSI 6) with thin liquids. Meds whole, one at a time. Will follow to monitor tolerance and advance as tolerated.     Goals:   Multidisciplinary Problems     SLP Goals        Problem: SLP " Goal    Goal Priority Disciplines Outcome   SLP Goal     SLP Ongoing, Progressing   Description:  1) Consume regular diet and thin liquids no s/s oropharyngeal dysphagia--Revised                    Plan:     · Patient to be seen:  5 x/week   · Plan of Care expires:     · Plan of Care reviewed with:  patient, daughter   · SLP Follow-Up:  Yes       Discharge recommendations:  nursing facility, skilled, rehabilitation facility       Time Tracking:     SLP Treatment Date:   04/15/20  Speech Start Time:  1318  Speech Stop Time:  1350     Speech Total Time (min):  32 min    Billable Minutes: Speech Therapy Individual 32 and Total Time 32    Cinda Pereira CCC-SLP  04/15/2020

## 2020-04-15 NOTE — ASSESSMENT & PLAN NOTE
Patient's anemia is currently controlled. Trending down  Will send for stool [facility]    Has recieved 1 units of PRBCs on 4/3.      Etiology likely d/t Anemia of chronic disease/blood loss/acute inflammatory process  Current CBC reviewed-   Lab Results   Component Value Date    HGB 9.0 (L) 04/15/2020    HCT 29.5 (L) 04/15/2020     Monitor serial CBC and transfuse if patient becomes hemodynamically unstable, symptomatic or H/H drops below 8/24

## 2020-04-15 NOTE — PROGRESS NOTES
[facility] Medicine  Progress Note    Patient Name: Maria Victoria Hernandez  MRN: [id number]  Patient Class: IP- Inpatient   Admission Date: 3/25/2020  Length of Stay: 21 days  Attending Physician: Kady Gomez MD  Primary Care Provider: Candice Deluna MD        Subjective:     Principal Problem:Acute respiratory failure with hypoxia        HPI:  Patient is a 54 years old  female with morbid obesity in past medical history significant for hypothyroidism is being admitted to intensive care unit under inpatient status from [facility] Emergency room with worsening shortness of breath.  Three days ago patient was tested positive for COVID - 19.  Patient works at [employer].  Patient has been experiencing subjective fever, generalized body aches and pains and nonproductive cough for few days.  Patient is getting increasingly short of breath especially for the past 2 days.  Upon arrival to the emergency room patient was noted to be hypoxic around 89%.  Up on 3 L patient's oxygen sats are around 96%.  Patient denies any chest pain, leg swelling or calf tenderness.      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 And treated per protocol for Covid and ARDS with ceftriaxone/zithromax and HC x 5 days. And ARDS protocol on vent. Pulmonary consulted and followed. WEaned from vent slowly -to nasal cannula by 4/10 and remained stable on oxygen but very weak. PT/OT consulted.   Tachycardica /hypertension developed -cardene gtt adjusted to labetolol then transitioned to po metoprolol Echo -noted mild systolic/Gr 1 diastolic dsyfunction   Volume control required by HD.   cxr on 4/5 severe ards pattern.-Vancomycin/zosyn added. 4/6 blood cultures pos for yeast so ID consulted and micafungin added. HD  Catheter and  TLC line removed and cultured and  cultures negative. Sputum + yeast also  .-followed recs from ID; echo neg -await repeat echo .  Blood cultures q 12 hours were negative for yeast.     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 Acute encephalopathy- delirium vs encephalitis or other structural cause. MRI could not be performed as she was very unstable.   At the time of admission Cr 0.7 worsened to 5.1, Renal was consulted. and HD started on 3/29. In addition had, Metabolic acidosis due to renal failure.   Feedings given via ngtube with diabetisource and accucks/ISS given with close monitoring and good control.     By 4/12 mentation improved -but intermittent confusion --  Hypoxia vs. Infection vs. TIA/stroke vs. Metabolic/Toxic. /ICU stay  -prolonged paralytic?/narcotics/benzos    -Acute, improving/occasional hallucination   Haldol/anxiolytics prn -required intermittent precedex once off sedating medications-acute delirium precautions /transfer out of icu soon as able.    Interval History: Patient improved significantly. on 2L NC, UOP 1.6L; MRI brain yest neg; tolerating TFs; worked with therapy     Review of Systems   Unable to perform ROS: Mental status change     Objective:     Vital Signs (Most Recent):  Temp: 99 °F (37.2 °C) (04/15/20 0900)  Pulse: 105 (04/15/20 0900)  Resp: 20 (04/15/20 0900)  BP: (!) 156/72 (04/15/20 0900)  SpO2: 95 % (04/15/20 0900) Vital Signs (24h Range):  Temp:  [98.6 °F (37 °C)-99.1 °F (37.3 °C)] 99 °F (37.2 °C)  Pulse:  [] 105  Resp:  [16-24] 20  SpO2:  [94 %-98 %] 95 %  BP: (129-181)/() 156/72     Weight: 99.8 kg (220 lb 0.3 oz)  Body mass index is 41.57 kg/m².    Intake/Output Summary (Last 24 hours) at 4/15/2020 1540  Last data filed at 4/15/2020 0900  Gross per 24 hour   Intake 1070 ml   Output 810 ml   Net 260 ml      Physical Exam   Nursing note and vitals reviewed.  PATIENT WAS SEEN AS A VIDEO VISIT WITH NURSE IN PATIENT ROOM WITH PATIENT  TO ASSIST DURING THE VISIT. PHYSICAL EXAM FINDINGS ARE AS VIEWED BY MYSELF VIA VIDEO OR AS REPORTED BY NURSE IF SPECIFIED AS SUCH, EXAM NOT DONE PERSONALLY BY MYSELF AT BEDSIDE.      Significant Labs:   BMP:   Recent Labs   Lab 04/15/20  0319   *      K 4.3   CL 95   CO2 26   BUN 65*   CREATININE 4.1*   CALCIUM 9.8     CBC:   Recent Labs   Lab 04/14/20  0317 04/15/20  0319   WBC 9.08 8.61   HGB 8.0* 9.0*   HCT 26.8* 29.5*   * 389*       Significant Imaging: I have reviewed all pertinent imaging results/findings within the past 24 hours.      Assessment/Plan:      * Acute respiratory failure with hypoxia  Patient with Hypoxic Respiratory failure which is Acute.  she is not on home oxygen.   Currently on NC 1.5 L  Treatment for Covid/ARDS - supportive care.   4/8 extubated to Nc oxygen -stable    Pulmonary consultation. /ID consutltation   IV antibiotics - ceftriaxone and azithromycin. And Plaquenil 400 mg daily x 5  Vanc/zosyn initiated 4/5 for staph Epi  micafungin for yeast +blood/sputum 4/6 /ID consulted       Microbiology Results (last 7 days)     Procedure Component Value Units Date/Time    Blood culture [[id number]]  (Abnormal) Collected:  04/06/20 0806    Order Status:  Completed Specimen:  Blood from Antecubital, Left Updated:  04/15/20 1233     Blood Culture, Routine Gram stain peds bottle: yeast       Results called to and read back by: Tania Nolan RN 04/08/2020        11:20      CANDIDA ALBICANS  Susceptibility pending  [facility].      Blood culture [[id number]] Collected:  04/10/20 0453    Order Status:  Completed Specimen:  Blood Updated:  04/14/20 1412     Blood Culture, Routine No Growth after 4 days.     C Diff Toxin by PCR [[id number]]  (Abnormal) Collected:  04/12/20 2315    Order Status:  Completed Updated:  04/14/20 0033     C. diff PCR Positive    Clostridium difficile EIA [[id number]]  (Abnormal) Collected:  04/12/20  2315    Order Status:  Completed Specimen:  Stool Updated:  04/13/20 1324     C. diff Antigen Positive     C difficile Toxins A+B, EIA Negative     Comment: Testing not recommended for children <24 months old.       Blood culture [[id number]]  (Abnormal) Collected:  04/04/20 0912    Order Status:  Completed Specimen:  Blood from Line, Central Updated:  04/13/20 1303     Blood Culture, Routine Gram stain peds bottle: budding yeast      Results called to and read back by: Carri Pryor RN  04/06/2020  03:04      AJ ALBICANS  ID consult required at [facility] and [facility] locations.      Blood culture [[id number]] Collected:  04/08/20 1739    Order Status:  Completed Specimen:  Blood from Line, Central Updated:  04/12/20 2212     Blood Culture, Routine No Growth after 4 days.     Blood culture [[id number]] Collected:  04/08/20 0745    Order Status:  Completed Specimen:  Blood Updated:  04/12/20 1412     Blood Culture, Routine No Growth after 4 days.     Blood culture [[id number]] Collected:  04/07/20 1730    Order Status:  Completed Specimen:  Blood from Line, Central Updated:  04/11/20 2212     Blood Culture, Routine No Growth after 4 days.     Blood culture [[id number]] Collected:  04/07/20 0907    Order Status:  Completed Specimen:  Blood Updated:  04/11/20 2212     Blood Culture, Routine No Growth after 4 days.     Clostridium difficile EIA [[id number]]     Order Status:  Canceled Specimen:  Stool     Blood culture [[id number]] Collected:  04/06/20 1802    Order Status:  Completed Specimen:  Blood from Line, Central Updated:  04/10/20 2212     Blood Culture, Routine No Growth after 4 days.     IV catheter culture [[id number]]  (Abnormal) Collected:  04/06/20 1455    Order Status:  Completed Specimen:  Catheter Tip, Intrajugular Updated:  04/09/20 1300     Aerobic Culture - Cath tip STAPHYLOCOCCUS EPIDERMIDIS  < 15 colonies      IV catheter culture [[id number]] Collected:  04/06/20 1615    Order Status:   Completed Specimen:  Catheter Tip, Dialysis Updated:  04/09/20 1121     Aerobic Culture - Cath tip No growth    Blood culture [[id number]]     Order Status:  Canceled Specimen:  Blood           Continue routine medications as before.   Follow airborne/droplet precautions.            Acute encephalopathy  ML metabolic  Improved  MRI brain negative  US carotids done pending      Lab Results   Component Value Date    WBC 8.61 04/15/2020      focal findings on physical exam  No early signs of stroke on Head CT, no hemorrhage or acute findings.  EEG: EEG 30 min awake and drowsy results noted no seizures  Continue supportive care       COVID-19 virus detected   Plaquenil course completed  Continue routine medications as before.   Follow airborne/droplet precautions.      Conjunctivitis of right eye  Ml post viral  Will continue eye lubricant  Resolving      Post viral debility  Will follow up with PT OT recs      Acute on chronic combined systolic and diastolic congestive heart failure  Chronic -noted on echo ; strict I/O   No ACE/ARB 2/2 renal failure  Asa/statin-when able to tolerate po   Strict I/O-volume control with HD   Repeat ECHO   · Mild concentric left ventricular hypertrophy.  · Global hypokinetic wall motion.  · Moderately decreased left ventricular systolic function. The estimated ejection fraction is 35%.  · Grade I (mild) left ventricular diastolic dysfunction consistent with impaired relaxation.  · Normal right ventricular systolic function.    Bacteremia  Vancomyin 4/4 -    4/6 -HD catheter tip staph epidermidis   4/6 IV catheter tipstaph epidermidis  4/5 resp culture candida albicans   Urine culture cancelled   Recommendations: per ID   Continue Vancomycin for S epidermidis (count 14 days from 04/07)  --- Continue Micafungin  for candidemia due to C albicans.   Eventually may need retinal exam.  We changed  Lines  on 04/06 in afternoon.   BCx drawn on the am of 04/06 were positive for C albicans!  Repeat  blood cultures were negative after that.  Complet complete at least 3 weeks of therapy.  Follow final susceptibility testing; most likely will switch to Diflucan.  -- started vancomycin p.o. for C diff diarrhea  Microbiology Results (last 7 days)     Procedure Component Value Units Date/Time    Blood culture [[id number]]  (Abnormal) Collected:  04/06/20 0806    Order Status:  Completed Specimen:  Blood from Antecubital, Left Updated:  04/15/20 1233     Blood Culture, Routine Gram stain peds bottle: yeast       Results called to and read back by: Tania Nolan RN 04/08/2020        11:20      CANDIDA ALBICANS  Susceptibility pending  ID consult required at [facility] and [facility].      Blood culture [[id number]] Collected:  04/10/20 0453    Order Status:  Completed Specimen:  Blood Updated:  04/14/20 1412     Blood Culture, Routine No Growth after 4 days.     C Diff Toxin by PCR [[id number]]  (Abnormal) Collected:  04/12/20 2315    Order Status:  Completed Updated:  04/14/20 0033     C. diff PCR Positive    Clostridium difficile EIA [[id number]]  (Abnormal) Collected:  04/12/20 2315    Order Status:  Completed Specimen:  Stool Updated:  04/13/20 1324     C. diff Antigen Positive     C difficile Toxins A+B, EIA Negative     Comment: Testing not recommended for children <24 months old.       Blood culture [[id number]]  (Abnormal) Collected:  04/04/20 0912    Order Status:  Completed Specimen:  Blood from Line, Central Updated:  04/13/20 1303     Blood Culture, Routine Gram stain peds bottle: budding yeast      Results called to and read back by: Carri Pryor RN  04/06/2020  03:04      AJ ALBICANS  ID consult required at [facility] and [facility] locations.      Blood culture [[id number]] Collected:  04/08/20 1739    Order Status:  Completed Specimen:  Blood from Line, Central Updated:  04/12/20 2212     Blood Culture, Routine No Growth after 4 days.     Blood culture [[id number]] Collected:   04/08/20 0745    Order Status:  Completed Specimen:  Blood Updated:  04/12/20 1412     Blood Culture, Routine No Growth after 4 days.     Blood culture [[id number]] Collected:  04/07/20 1730    Order Status:  Completed Specimen:  Blood from Line, Central Updated:  04/11/20 2212     Blood Culture, Routine No Growth after 4 days.     Blood culture [[id number]] Collected:  04/07/20 0907    Order Status:  Completed Specimen:  Blood Updated:  04/11/20 2212     Blood Culture, Routine No Growth after 4 days.     Clostridium difficile EIA [[id number]]     Order Status:  Canceled Specimen:  Stool     Blood culture [[id number]] Collected:  04/06/20 1802    Order Status:  Completed Specimen:  Blood from Line, Central Updated:  04/10/20 2212     Blood Culture, Routine No Growth after 4 days.     IV catheter culture [[id number]]  (Abnormal) Collected:  04/06/20 1455    Order Status:  Completed Specimen:  Catheter Tip, Intrajugular Updated:  04/09/20 1300     Aerobic Culture - Cath tip STAPHYLOCOCCUS EPIDERMIDIS  < 15 colonies      IV catheter culture [[id number]] Collected:  04/06/20 1615    Order Status:  Completed Specimen:  Catheter Tip, Dialysis Updated:  04/09/20 1121     Aerobic Culture - Cath tip No growth    Blood culture [[id number]]     Order Status:  Canceled Specimen:  Blood             Fungemia  Acute-ID consulted-cultures from 4/4   micafungin per ID  HD and TLC removed and cultured          Pneumonia due to infectious organism  Acute -post viral --see resp failure/covid   Pulm/ID consulted /followed    Iron deficiency anemia, unspecified  Patient's anemia is currently controlled. Trending down  Will send for stool guaic    Has recieved 1 units of PRBCs on 4/3.      Etiology likely d/t Anemia of chronic disease/blood loss/acute inflammatory process  Current CBC reviewed-   Lab Results   Component Value Date    HGB 9.0 (L) 04/15/2020    HCT 29.5 (L) 04/15/2020     Monitor serial CBC and transfuse if patient becomes  hemodynamically unstable, symptomatic or H/H drops below 8/24        Diverticulosis of large intestine without hemorrhage  Patient's anemia is currently controlled. Has recieved 1 units of PRBCs on 4/3.   Will need to Monitor for GI bleed  Lab Results   Component Value Date    HGB 9.0 (L) 04/15/2020    HCT 29.5 (L) 04/15/2020     Monitor serial CBC and transfuse if patient becomes hemodynamically unstable, symptomatic or H/H drops below 7/21.   Will continue to monitor        Acute renal failure  Acute, Poss ATN / -unclear yet if ESRD   required HD for acute renal failure-  ? Element ATN 2/2 hypovolemia/sepsis  Nephrology following  Bicarb po and on HD for Metabolic acidosis -continue HD  got new temporary dialysis catheter overnight; febrile, tachy, -160s, on 1.5L NC, UOP 2.8L; seen on HD - will get off 2L                COVID-19 virus infection  Completed Plaquenil    Covid-19 Virus Infection  - Infection Control notified    - Isolation:   - Airborne and Droplet Precautions  - N95 masks must be fit tested, wear eye protection  - 20 second hand hygiene   - Limit visitors per hospital policy   - Consolidating lab draws, nursing care, and interventions    - Diagnostics: (rising CRP, persistent lymphopenia, hyponatremia, hyperferritinemia, elevated troponin, elevated d-dimer, age, and comorbidities are significant predictors of poor clinical outcome)   - CBC:   trend Q48hrs  - CMP:        trend Q48hrs  - Procalcitonin:  - D-dimer:  trend Q48hrs  - Ferritin:  repeat prior to discharge  - CRP:        trend Q48hrs  - LDH:  - BNP:  - Troponin:    - ECG:   - rapid Flu:   - RIP only if BMT/solid transplant:   - Legionella antigen:   - Blood culture x2:   - Sputum culture:   - CXR:   - UA and culture:      - Management:   - Bundle care as able to minimize in/out of room   - Supplemental O2 to maintain SpO2 >92%,   if requiring 6L NC or higher, place on nonrebreather and discuss case with MICU   - Telemetry &  continuous Pulse Ox   - albuterol INHALER PRN 4puff Q6hr approximates a nebulizer (avoid nebulization of secretions)   - apap PRN fever   - Avoiding NIPPV to prevent aerosolization   (including home CPAP/BiPAP unless on a case-by-case basis and only in negative pressure room)   - Cautious use of NSAIDS for fever per WHO recommendations (3/16/2020)   - No new ACEi/ARB start or discontinuation of chronic med unless hypotensive (Esler et al. Journal of Hypertension 2020, 38:000-000)   - Careful use of steroids in the absence of other indications   - unless septic shock due to increased viral replication   - Fluid sparing resuscitation   - Empiric antibiotics per likely source & patient allergies    - CAP: x 5 day course  Ceftriaxone 1g IV Q24hrs            Azithromycin 500mg IV day #1, then 250mg PO daily x4 days                 If MRSA risk factors, add Vancomycin IV (PharmD consult)   - If patient meets criteria per Hospital Protocol    - start statin (if CPK WNL)    - start HCQ 400mg PO BID x1 day, then 400mg PO daily x 4 days (check G6PD, ECG, and start Qshift POCT glucose)    Goals of care, counseling/discussion  - Reviewed the typical clinical course of [id number] with the daughter Danya (patient name or relationship to patient), including the potential for acute decompensation requiring intubation and mechanical ventilation  - Discussed again as part of routine daily evaluation, patient/POA maintains code status of Full code    VTE High Risk Prophylaxis: enoxaparin 40mg sq QHS @ 2100 (bundled care) if GFR >30    Patient's chronic/stable medical conditions noted in the problem list above will be managed with the patient's home medications as tolerated.           Hypothyroid  Chronic problem.  Lab Results   Component Value Date    TSH 2.082 03/31/2020     Not on any medication            Morbid obesity  Body mass index is 41.57 kg/m². Morbid obesity complicates all aspects of disease management from diagnostic  modalities to treatment. Weight loss encouraged and health benefits explained to patient.            VTE Risk Mitigation (From admission, onward)         Ordered     heparin (porcine) injection 5,000 Units  Every 12 hours      04/07/20 1349     heparin (porcine) 1,000 unit/mL injection      04/06/20 1138     heparin (porcine) injection 4,000 Units  As needed (PRN)      03/29/20 2001     IP VTE HIGH RISK PATIENT  Once      03/25/20 5853                Critical care time spent on the evaluation and treatment of severe organ dysfunction, review of pertinent labs and imaging studies, discussions with consulting providers and discussions with patient/family: 60 minutes.      Kady Gomez MD  Department of Hospital Medicine   [facility]

## 2020-04-15 NOTE — ASSESSMENT & PLAN NOTE
ML metabolic  Improved  MRI brain negative  US carotids done pending      Lab Results   Component Value Date    WBC 8.61 04/15/2020      focal findings on physical exam  No early signs of stroke on Head CT, no hemorrhage or acute findings.  EEG: EEG 30 min awake and drowsy results noted no seizures  Continue supportive care

## 2020-04-15 NOTE — PT/OT/SLP PROGRESS
"Physical Therapy Treatment    Patient Name:  Maria Victoria Hernandez   MRN:  [id number]    Recommendations:     Discharge Recommendations:  LTACH (long-term acute care hospital), nursing facility, skilled   Discharge Equipment Recommendations: (TBD)   Barriers to discharge: Decreased caregiver support    Assessment:     Maria Victoria Hernandez is a 55 y.o. female admitted with a medical diagnosis of Acute respiratory failure with hypoxia.  She presents with the following impairments/functional limitations:  weakness, impaired endurance, impaired functional mobilty, impaired self care skills, impaired balance, decreased lower extremity function, decreased safety awareness, impaired cardiopulmonary response to activity, impaired cognition . Pt awake and attempting to verbalize although slow,whispering. Pt tolerated EOB sitting with mod/max assist x2. Pt to benefit from continued therapies.    Rehab Prognosis: Fair; patient would benefit from acute skilled PT services to address these deficits and reach maximum level of function.    Recent Surgery: * No surgery found *      Plan:     During this hospitalization, patient to be seen 5 x/week to address the identified rehab impairments via therapeutic activities, therapeutic exercises, gait training and progress toward the following goals:    · Plan of Care Expires:  05/13/20    Subjective   Pt awake, making eye contact well- voice low  Chief Complaint: " don't want to pass out"- pt encouraged  Patient/Family Comments/goals: none stated  Pain/Comfort:  · Pain Rating 1: 0/10      Objective:     Communicated with nurse Michael prior to session.  Patient found HOB elevated with oxygen, central line, telemetry, blood pressure cuff, mueller catheter, NG tube, pulse ox (continuous) upon PT entry to room.     General Precautions: Standard, fall, respiratory, airborne, droplet   Orthopedic Precautions:N/A   Braces: N/A     Functional Mobility:  · Bed Mobility:     · Rolling " Right: moderate assistance  · Scooting: maximal assistance and of 2 persons  · Supine to Sit: maximal assistance and of 2 persons  · Sit to Supine: maximal assistance and of 2 persons      AM-PAC 6 CLICK MOBILITY          Therapeutic Activities and Exercises:   thera ex with AP, assisted SLR  EOB sitting assist x2 with lots of encouragement/ pacified that she is ok- SAT and BP stable  Still maintaining forward head posture but attempting to use UE's more by using suction  Back to bed and repositioned after PT    Patient left HOB elevated with all lines intact, call button in reach and nurse Alec  and nephrologist present..    GOALS:   Multidisciplinary Problems     Physical Therapy Goals        Problem: Physical Therapy Goal    Goal Priority Disciplines Outcome Goal Variances Interventions   Physical Therapy Goal     PT, PT/OT Ongoing, Progressing     Description:  Goals to be met by: 2020    Patient will increase functional independence with mobility by performin. Supine to sit with MInimal Assistance  2. Sit to supine with MInimal Assistance  3. Sit to stand transfer with Minimal Assistance  4. Bed to chair transfer with Minimal Assistance using Rolling Walker  5. Gait  x 25 feet with Minimal Assistance using Rolling Walker.                       Time Tracking:     PT Received On: 04/15/20  PT Start Time: 1041     PT Stop Time: 1108  PT Total Time (min): 27 min     Billable Minutes: Therapeutic Activity 27    Treatment Type: Treatment  PT/PTA: PT     PTA Visit Number: 0     Leah Ribeiro, PT  04/15/2020

## 2020-04-15 NOTE — PROGRESS NOTES
Progress Note  Infectious Disease    Reason for Consult:      HPI: Maria Victoria Hernandez is a   55 y.o. female employee of [employer].  with past medical history of morbid obesity, BMI of 48, diabetes, diet controlled, anemia, B12 deficiency, vitamin-D deficiency, hypothyroidism, was admitted on 03/25/2020 due to shortness of breath, diagnosed with COVID 19 is positive.  Patient has been intubated.  She went into renal failure and has been receiving HD by nephrologist.  Intensivist has been managing the vent.    Patient has completed 10 days of hydroxychloroquine and about 8 days of Zithromax and ceftriaxone.  She started spiking fever of  103 on 04/04.  White count jumped to 17.8.  She was started on vancomycin and Zosyn and blood cultures were sent.    ID consult called for g positives and yeast in blood cultures.  Patient is afebrile at the time.  WBC has improved.  Discussed with nurse.  Will discontinue lines.  Continue vancomycin, Daptomycin x1.  Add micafungin daily.    04/07/2020  WBC improved 17--12--10  Ferritin 1752--1538--1897  Intubated Sedated.  FiO2 of 35 people 5.  T-max 101.7°  Dialysis catheter changed yesterday  Leukocytosis improved 12/17/2010 04/08/2020  T-max 99.9°  Intubated sedated.  FiO2 35, peep of 5.  Fail CPAP trial.  Tolerating vancomycin, Zosyn, Micafungin.   Lines are fresh.  Nurse is trying to protect them.  Discussed with nurse:  Her daughter who is a nurse came and saw mother today, she agrees right eye looks better.    04/09/2020  Patient is extubated today, Really weak, Does not breath in deep, I advised her on deep breathing  Continues to need Cardene drip for BP  HAd loose stool-- flexiseal was placed    04/10/2020.  She is very weak.  She tries to opens her eyes and interact with me.  Right eye is improved.  Dialysis nurse is about to start dialysis.    04/11/2020  T max 100.3 yesterday.   She looks tired, but better than yesterday. Today she is  more  "awake and slighty stronger  She was tachycardic yesterday -- Cardene was switched to labetalol  WBC is about the same. ESR 50;  procal is still elveated. vanc level today is 14  Hospitalist ": Requested pulmonary to review possible  bipap -for Covid must have extra filter for machines which are limited "  As per renal :   " UOP 750cc.  3L UF w/HD yesterday.  K+ at goal.  Holding dialysis over weekend to assess for recovery"    04/12/2020 chart review    04/13/2020  Tmax 99.9, WBC normal, platelet still high  Pulled out ngt, It was replaced  Patient is tolerating vancomycin and micafungin.  She continues to have diarrhea.  C diff antigen is positive.    04/14/2020.  T-max a 100°. She follows commands.  She looks anxious.  She is extremely weak.  She is getting dialysis at this time.    04/15/2020.  Patient's voice is louder, she is quite interactive and moves her arms.  She has suctioned her sputum herself earlier today.  She was getting speech evaluation today.  Daughter at bedside pleased with her progression.  T-max 99.1°.  Discussed the line and bacteremia issue with daughter.    Antibiotics (From admission, onward)    Start     Stop Route Frequency Ordered    04/13/20 1800  vancomycin 250mg / 10ml oral suspension 250 mg      04/23 1759 PER NG TUBE Every 6 hours 04/13/20 1424    04/05/20 0848  vancomycin - pharmacy to dose  (vancomycin IVPB)      -- IV pharmacy to manage frequency 04/05/20 0748    04/02/20 2315  gentamicin 0.3 % ophthalmic solution 1 drop      -- RIGHT EYE Every 4 hours 04/02/20 2310        Antifungals (From admission, onward)    Start     Stop Route Frequency Ordered    04/06/20 1100  micafungin 100 mg in sodium chloride 0.9 % 100 mL IVPB (ready to mix system)      -- IV Every 24 hours (non-standard times) 04/06/20 0959        Antivirals (From admission, onward)    None          EXAM & DIAGNOSTICS REVIEWED:   Vitals:     Temp:  [97.8 °F (36.6 °C)-99.1 °F (37.3 °C)]   Temp: 98.6 °F (37 °C) " (04/15/20 0300)  Pulse: 95 (04/15/20 0600)  Resp: 16 (04/15/20 0600)  BP: 134/73 (04/15/20 0600)  SpO2: 95 % (04/15/20 0600)    Intake/Output Summary (Last 24 hours) at 4/15/2020 0820  Last data filed at 4/15/2020 0600  Gross per 24 hour   Intake 1620 ml   Output 4185 ml   Net -2565 ml          General:  In NAD more happy less anxious today   Eyes:  Anicteric,  ENT:  Lines on bilateral neck dressed appropriately.   Neck:  supple, no masses or adenopathy appreciated  Lungs: Does not breathe all the weight deep.  Clear otherwise.  Heart:  RRR, no gallop/murmur/rub noted  Abd:  Soft, NT, ND, normal BS, no masses or organomegaly appreciated  :  Cuellar  Musc:  Joints without effusion, swelling, erythema, synovitis, muscle wasting.   Skin:  No rashes. No palmar or plantar lesions. No subungual petechiae  Wound:   Neuro: Follows commands.  Very weak, especially in lower extremities.  Psych:  anxious.  Lymphatic:     No cervical, supraclavicular, axillary, or inguinal nodes  Extrem: No edema, erythema, phlebitis, cellulitis, warm and well perfused  VAD:       Isolation:      Lines/Tubes/Drains:  Right IJ 04/06  Left IJ 04/06  Cuellar 03/25  OT 03/25    General Labs reviewed:  Recent Labs   Lab 04/13/20  0312 04/14/20  0317 04/15/20  0319   WBC 9.65 9.08 8.61   HGB 8.2* 8.0* 9.0*   HCT 26.6* 26.8* 29.5*   * 402* 389*       Recent Labs   Lab 04/13/20  0312 04/14/20  0317 04/15/20  0319    143 140   K 4.6 5.3* 4.3    100 95   CO2 28 25 26   BUN 80* 81* 65*   CREATININE 5.5* 5.0* 4.1*   CALCIUM 10.7* 10.0 9.8   PROT 8.5* 9.1* 10.0*   BILITOT 0.9 1.0 1.0   ALKPHOS 207* 182* 183*   ALT 28 23 24   AST 18 18 19     Micro:  Microbiology Results (last 7 days)     Procedure Component Value Units Date/Time    Blood culture [[id number]] Collected:  04/10/20 0453    Order Status:  Completed Specimen:  Blood Updated:  04/14/20 1412     Blood Culture, Routine No Growth after 4 days.     Blood culture [[id number]]   (Abnormal) Collected:  04/06/20 0806    Order Status:  Completed Specimen:  Blood from Antecubital, Left Updated:  04/14/20 1140     Blood Culture, Routine Gram stain peds bottle: yeast       Results called to and read back by: Tania Nolan RN 04/08/2020        11:20      CANDIDA ALBICANS  Susceptibility pending  ID consult required at [facility].      C Diff Toxin by PCR [[id number]]  (Abnormal) Collected:  04/12/20 2315    Order Status:  Completed Updated:  04/14/20 0033     C. diff PCR Positive    Clostridium difficile EIA [[id number]]  (Abnormal) Collected:  04/12/20 2315    Order Status:  Completed Specimen:  Stool Updated:  04/13/20 1324     C. diff Antigen Positive     C difficile Toxins A+B, EIA Negative     Comment: Testing not recommended for children <24 months old.       Blood culture [[id number]]  (Abnormal) Collected:  04/04/20 0912    Order Status:  Completed Specimen:  Blood from Line, Central Updated:  04/13/20 1303     Blood Culture, Routine Gram stain peds bottle: budding yeast      Results called to and read back by: Carri Pryor RN  04/06/2020  03:04      AJ ALBICANS  ID consult required at [facility] and [facility] locations.      Blood culture [[id number]] Collected:  04/08/20 1739    Order Status:  Completed Specimen:  Blood from Line, Central Updated:  04/12/20 2212     Blood Culture, Routine No Growth after 4 days.     Blood culture [[id number]] Collected:  04/08/20 0745    Order Status:  Completed Specimen:  Blood Updated:  04/12/20 1412     Blood Culture, Routine No Growth after 4 days.     Blood culture [[id number]] Collected:  04/07/20 1730    Order Status:  Completed Specimen:  Blood from Line, Central Updated:  04/11/20 2212     Blood Culture, Routine No Growth after 4 days.     Blood culture [[id number]] Collected:  04/07/20 0907    Order Status:  Completed Specimen:  Blood Updated:  04/11/20 2212     Blood Culture, Routine No Growth after 4  days.     Clostridium difficile EIA [[id number]]     Order Status:  Canceled Specimen:  Stool     Blood culture [[id number]] Collected:  04/06/20 1802    Order Status:  Completed Specimen:  Blood from Line, Central Updated:  04/10/20 2212     Blood Culture, Routine No Growth after 4 days.     IV catheter culture [[id number]]  (Abnormal) Collected:  04/06/20 1455    Order Status:  Completed Specimen:  Catheter Tip, Intrajugular Updated:  04/09/20 1300     Aerobic Culture - Cath tip STAPHYLOCOCCUS EPIDERMIDIS  < 15 colonies      IV catheter culture [[id number]] Collected:  04/06/20 1615    Order Status:  Completed Specimen:  Catheter Tip, Dialysis Updated:  04/09/20 1121     Aerobic Culture - Cath tip No growth    Blood culture [[id number]]     Order Status:  Canceled Specimen:  Blood     Blood culture [[id number]]  (Abnormal)  (Susceptibility) Collected:  04/05/20 0813    Order Status:  Completed Specimen:  Blood from Peripheral, Left  Hand Updated:  04/08/20 1236     Blood Culture, Routine Gram stain peds bottle: Gram positive cocci in clusters resembling Staph       Results called to and read back by: Lynn Sawyer RN 04/06/2020  09:57      STAPHYLOCOCCUS EPIDERMIDIS    Blood culture [[id number]]  (Abnormal)  (Susceptibility) Collected:  04/05/20 0813    Order Status:  Completed Specimen:  Blood from Line, Central Left  Neck Updated:  04/08/20 1234     Blood Culture, Routine Gram stain tank bottle: Gram positive cocci in clusters resembling Staph       Results called to and read back by: Lynn Sawyer RN 04/06/2020  09:57      Gram stain aer bottle: Gram positive cocci in clusters resembling Staph       Positive results previously called 04/06/2020  15:52      STAPHYLOCOCCUS EPIDERMIDIS    Culture, Respiratory with Gram Stain [[id number]]  (Abnormal) Collected:  04/05/20 0050    Order Status:  Completed Specimen:  Respiratory from Endotracheal Aspirate Updated:  04/08/20 1046     Respiratory Culture No S aureus or  Pseudomonas isolated.      AJ ALBICANS  Few  Normal respiratory anamaria also present       Gram Stain (Respiratory) <10 epithelial cells per low power field.     Gram Stain (Respiratory) Many WBC's     Gram Stain (Respiratory) Rare Gram positive cocci     Gram Stain (Respiratory) Rare yeast        Imaging Reviewed:  Chest x-ray 04/13/2020 Very mild decrease of the bilateral infiltrates compared to the prior exam.  Interval insertion of NG tube extending beneath the level of the diaphragm.  Central venous lines remain in place  KUB 0409/2020NG tube present with tip overlying the stomach in the left abdomen.  No evidence of bowel obstruction.  No radiographic mass.  CXR 04/06/2020Support devices as above.  No pneumothorax.  Moderate pulmonary airspace disease without significant change.  CXR 04/05/2020 No significant interval change in the bilateral airspace disease.  Support devices in stable position.    Cardiology:  Sinus rhythm  Repeat limited ECHO 04/13/2020   · Mild concentric left ventricular hypertrophy.  · Global hypokinetic wall motion.  · Moderately decreased left ventricular systolic function. The estimated ejection fraction is 35%.  · Grade I (mild) left ventricular diastolic dysfunction consistent with impaired relaxation.  · Normal right ventricular systolic function.  · Mild left atrial enlargement.  · Moderate mitral regurgitation.  · Normal central venous pressure (3 mmHg).  ·   ECHO on 03/27/2020  · Mild left ventricular enlargement.  · Mildly decreased left ventricular systolic function. The estimated ejection fraction is 45%.  · Grade I (mild) left ventricular diastolic dysfunction consistent with impaired relaxation.  · Normal right ventricular systolic function.  · Mild left atrial enlargement.  · Moderate mitral regurgitation.  · Mild tricuspid regurgitation.  · Mild pulmonary hypertension present. PASP 40 mm of Hg.     IMPRESSION & PLAN     Staphylococcus epidermidis bacteremia, line  infection.  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  C diff PCR is negative.  Will treat with vanc p.o.  Respiratory failure, ARDS, COVID19- completed treatment.  Intubated 03/25/2020-  Extubated 04/08/2020.  HTN, CHF with EF of 45%--35%.  Diastolic dysfunction grade 1.  Moderate MR.  This patient is high risk for life-threatening deterioration and death secondary to above comorbidities and need for IV treatment Critical care 35 min    Ferritin 1752--1538--1897---1865  --215---190--174.6--186.5---139  Procalcitonin 3.67---5.34    Recommendations:  --- Continue Vancomycin for S epidermidis (count 14 days from 04/07)  --- Continue Micafungin  for candidemia due to C albicans.   Eventually may need retinal exam.  We changed  Lines  on 04/06 in afternoon.   BCx drawn on the am of 04/06 were positive for C albicans!  Repeat blood cultures were negative after that.  Complete at least 3 weeks of therapy.  Follow final susceptibility testing; most likely will switch to Diflucan.  --  vancomycin p.o. for C diff diarrhea, improving  --follow  crp procal ferritin

## 2020-04-15 NOTE — ASSESSMENT & PLAN NOTE
Patient with Hypoxic Respiratory failure which is Acute.  she is not on home oxygen.   Currently on NC 1.5 L  Treatment for Covid/ARDS - supportive care.   4/8 extubated to [place] oxygen -stable    Pulmonary consultation. /ID consutltation   IV antibiotics - ceftriaxone and azithromycin. And Plaquenil 400 mg daily x 5  Vanc/zosyn initiated 4/5 for staph Epi  micafungin for yeast +blood/sputum 4/6 /ID consulted       Microbiology Results (last 7 days)     Procedure Component Value Units Date/Time    Blood culture [[id number]]  (Abnormal) Collected:  04/06/20 0806    Order Status:  Completed Specimen:  Blood from Antecubital, Left Updated:  04/15/20 1233     Blood Culture, Routine Gram stain peds bottle: yeast       Results called to and read back by: Tania Nolan RN 04/08/2020        11:20      CANDIDA ALBICANS  Susceptibility pending  ID consult required at [facility] and [facility] locations.      Blood culture [[id number]] Collected:  04/10/20 0453    Order Status:  Completed Specimen:  Blood Updated:  04/14/20 1412     Blood Culture, Routine No Growth after 4 days.     C Diff Toxin by PCR [[id number]]  (Abnormal) Collected:  04/12/20 2315    Order Status:  Completed Updated:  04/14/20 0033     C. diff PCR Positive    Clostridium difficile EIA [[id number]]  (Abnormal) Collected:  04/12/20 2315    Order Status:  Completed Specimen:  Stool Updated:  04/13/20 1324     C. diff Antigen Positive     C difficile Toxins A+B, EIA Negative     Comment: Testing not recommended for children <24 months old.       Blood culture [[id number]]  (Abnormal) Collected:  04/04/20 0912    Order Status:  Completed Specimen:  Blood from Line, Central Updated:  04/13/20 1303     Blood Culture, Routine Gram stain peds bottle: budding yeast      Results called to and read back by: Carri Pryor RN  04/06/2020  03:04      AJ ALBICANS  [facility].      Blood culture [[id number]]  Collected:  04/08/20 1739    Order Status:  Completed Specimen:  Blood from Line, Central Updated:  04/12/20 2212     Blood Culture, Routine No Growth after 4 days.     Blood culture [[id number]] Collected:  04/08/20 0745    Order Status:  Completed Specimen:  Blood Updated:  04/12/20 1412     Blood Culture, Routine No Growth after 4 days.     Blood culture [[id number]] Collected:  04/07/20 1730    Order Status:  Completed Specimen:  Blood from Line, Central Updated:  04/11/20 2212     Blood Culture, Routine No Growth after 4 days.     Blood culture [[id number]] Collected:  04/07/20 0907    Order Status:  Completed Specimen:  Blood Updated:  04/11/20 2212     Blood Culture, Routine No Growth after 4 days.     Clostridium difficile EIA [[id number]]     Order Status:  Canceled Specimen:  Stool     Blood culture [[id number]] Collected:  04/06/20 1802    Order Status:  Completed Specimen:  Blood from Line, Central Updated:  04/10/20 2212     Blood Culture, Routine No Growth after 4 days.     IV catheter culture [[id number]]  (Abnormal) Collected:  04/06/20 1455    Order Status:  Completed Specimen:  Catheter Tip, Intrajugular Updated:  04/09/20 1300     Aerobic Culture - Cath tip STAPHYLOCOCCUS EPIDERMIDIS  < 15 colonies      IV catheter culture [[id number]] Collected:  04/06/20 1615    Order Status:  Completed Specimen:  Catheter Tip, Dialysis Updated:  04/09/20 1121     Aerobic Culture - Cath tip No growth    Blood culture [[id number]]     Order Status:  Canceled Specimen:  Blood           Continue routine medications as before.   Follow airborne/droplet precautions.

## 2020-04-15 NOTE — SUBJECTIVE & OBJECTIVE
Interval History: Patient improved significantly. on 2L NC, UOP 1.6L; MRI brain yest neg; tolerating TFs; worked with therapy     Review of Systems   Unable to perform ROS: Mental status change     Objective:     Vital Signs (Most Recent):  Temp: 99 °F (37.2 °C) (04/15/20 0900)  Pulse: 105 (04/15/20 0900)  Resp: 20 (04/15/20 0900)  BP: (!) 156/72 (04/15/20 0900)  SpO2: 95 % (04/15/20 0900) Vital Signs (24h Range):  Temp:  [98.6 °F (37 °C)-99.1 °F (37.3 °C)] 99 °F (37.2 °C)  Pulse:  [] 105  Resp:  [16-24] 20  SpO2:  [94 %-98 %] 95 %  BP: (129-181)/() 156/72     Weight: 99.8 kg (220 lb 0.3 oz)  Body mass index is 41.57 kg/m².    Intake/Output Summary (Last 24 hours) at 4/15/2020 1540  Last data filed at 4/15/2020 0900  Gross per 24 hour   Intake 1070 ml   Output 810 ml   Net 260 ml      Physical Exam   Nursing note and vitals reviewed.  PATIENT WAS SEEN AS A VIDEO VISIT WITH NURSE IN PATIENT ROOM WITH PATIENT TO ASSIST DURING THE VISIT. PHYSICAL EXAM FINDINGS ARE AS VIEWED BY MYSELF VIA VIDEO OR AS REPORTED BY NURSE IF SPECIFIED AS SUCH, EXAM NOT DONE PERSONALLY BY MYSELF AT BEDSIDE.      Significant Labs:   BMP:   Recent Labs   Lab 04/15/20  0319   *      K 4.3   CL 95   CO2 26   BUN 65*   CREATININE 4.1*   CALCIUM 9.8     CBC:   Recent Labs   Lab 04/14/20  0317 04/15/20  0319   WBC 9.08 8.61   HGB 8.0* 9.0*   HCT 26.8* 29.5*   * 389*       Significant Imaging: I have reviewed all pertinent imaging results/findings within the past 24 hours.

## 2020-04-15 NOTE — PROGRESS NOTES
[facility]  Adult Nutrition  Progress Note    SUMMARY      Intervention: enteral nutrition therapy   current intake:   Nutren 1.5 @ 30 ml/hr + promod TID + 130 ml flush a 4 hr  1380 kcal with promod ( 85% EEN), 78 g protein (82% EPN), and 547 ml free water)     Recommendations     1. Continue regular diet, texture per SLP + boost plus BID  - cardiac when eating well    2. Continue NGT  Until eating 50%  TF Nutren 1.5 @30 ml/hr advancing to 45 ml/ hr + Promod 30 ml TID + 130 ml flush q 4 hr   ( provides 1620 kcal ( 79% EEN), 73 g protein + promod (86% EPN), and 820 ml free water, Phos 1296 mg)  -If necessary can switch to Novasource renal @ 30 ml/hr + promod 30 ml TID     3. Weigh pt s/p HD     Goals: 1.) Meet >85% EEN/EPN by RD f/u 2) nutrition support to meet > 75% EEN at f/u 3) continue TF at goal or PO diet advanced at f/u 4) Po intakes 50% of meals/supplements or TF at goal at f/u  Nutrition Goal Status: 1) was meeting- not met 4/4-4/8 2) meeting 3) Met 4) new  Communication of RD Recs: (POC, sticky note, reviewed with MD/RN)     1. Covid-19 Virus Infection    2. Acute respiratory failure    3. Intubation of airway performed without difficulty    4. Encounter for nasogastric (NG) tube placement    5. Ruled out for myocardial infarction    6. At risk for long QT syndrome    7. Acute respiratory failure with hypoxia    8. ARDS (adult respiratory distress syndrome)    9. Morbid obesity    10. Pneumonia due to Covid-19 Virus    11. Shock               Past Medical History:   Diagnosis Date    Colon polyp      Diverticulosis large intestine w/o perforation or abscess w/bleeding      Iron deficiency anemia      Prediabetes      Thyroid disease      Vitamin B 12 deficiency      Vitamin D deficiency           Reason for Assessment     Reason For Assessment: RD follow up  Rounds: attended ( did not enter pt room)     General Information Comments: Pt is intubated in ICU. NFPE not  "performed,patient is noted as being positive for COVID-19. Per epic records, no evidence of weight loss.  3/31: Not tolerating TF per RN. ngt in place. Dialysis today.   4/3/20 Pt was tolerating TF at goal, propofol high, decreased rate to 35 ml/hr yesterday. Tolerating today. Getting HD. + vent.   4/8/20 4/3 later in the day TF stopped ? Reason and restarted slowly advanced but 4/4 TF stopped r/t emesis and TPN started ( 46% EEN/ 53% EPN). Received TPN 4-4/4/6. TPN d/c'd yesterday and TF restarted @ 10 ml/hr now. Had small loose stool today. Continues on HD.   4/10/20 Pt tolerating TF at goal via NGT. Extubated 4/8. Continues on HD. Awaiting SLP eval.   4/15/20 TF held yesterday as pt with multiple episodes of gagging. TF restarted today and tolerating now at 30 ml/hr. Per SLP diet just advanced to Dyspahgia 6, thin liquids. 0% intakes today. Discussed plan to d/c TF when pt tolerating 50% of meals PO.     Nutrition Discharge Planning: pending medical course- cardiac, texture per SLP      Nutrition Risk Screen     Nutrition Risk Screen: no indicators present     Nutrition/Diet History     Spiritual, Cultural Beliefs, Evangelical Practices, Values that Affect Care: no   factors affecting PO intakes: decreased appetite, trouble swallowing     Anthropometrics  Height Method: Estimated  Height: 5' 1"  Height (inches): 61 in  Weight Method: Bed Scale  Weight: 99.8 kg 4/15 noted on lasix  Weight (lb): 220 lb ( 30 lb loss x 1 week unlikely)   Ideal Body Weight (IBW), Female: 105 lb  % Ideal Body Weight, Female (lb): 225.71 %  BMI (Calculated): 41 kg/m2  BMI Grade: greater than 40 - morbid obesity  115.3 kg (3/30/20), 113.4 kg 4/7/20     Lab/Procedures/Meds     Pertinent Labs Reviewed: reviewed  BMP  Lab Results   Component Value Date     04/15/2020    K 4.3 04/15/2020    CL 95 04/15/2020    CO2 26 04/15/2020    BUN 65 (H) 04/15/2020    CREATININE 4.1 (H) 04/15/2020    CALCIUM 9.8 04/15/2020    ANIONGAP 19 (H) " 04/15/2020    ESTGFRAFRICA 13 (A) 04/15/2020    EGFRNONAA 12 (A) 04/15/2020     Lab Results   Component Value Date    ALBUMIN 3.2 (L) 04/15/2020     Lab Results   Component Value Date    CALCIUM 9.8 04/15/2020    PHOS 4.8 (H) 03/29/2020     Lab Results   Component Value Date    .8 (H) 04/12/2020       Pertinent Medications Reviewed: reviewed  Vitamin C, epoetin, probiotic, zinc, phenergan, zofran, lasix, calcitriol     Estimated/Assessed Needs   modified  Weight Used For Calorie Calculations: 107.5 kg (236 lb 15.9 oz)  Energy Need Method: 1615 kcal ( mifflin st Jeor  no activity factor)  Protein Requirements: 95 g ( 2.0 g protein/kg HD vs BMI > 47)  Weight Used For Protein Calculations: 47.6 kg (105 lb)(ideal body weight)  RDA: urine output + 1000 ml  CHO Requirement: 147g        Nutrition Prescription Ordered  NPO + TF above     Evaluation of Received Nutrient/Fluid Intake   energy need: meeting  Protein needs: meeting  Fluid needs: meeting  % Intake of Estimated Energy Needs: 85%  % Meal Intake: 0% + TF     Nutrition Risk     2x week moderate     Assessment and Plan     Nutrition Problem  Inadequate energy intake     Related to (etiology):   No diet order     Signs and Symptoms (as evidenced by):   Intake of <85% EEN/EPN     Interventions/Recommendations (treatment strategy):  Collaboration with other providers  Enteral nutrition     Nutrition Diagnosis Status:   Continues    Morbid obesity  Contributing Nutrition Diagnosis  Obesity stage 3    Related to (etiology):   Hx. Of excessive energy intakes    Signs and Symptoms (as evidenced by):   BMI > 40 kg/m2    Interventions/Recommendations (treatment strategy):  Above    Nutrition Diagnosis Status:   continues     Malnutrition:  Edema: 1+  Robe: 13  NFPE not done r/t restrictions to prevent spread of COVID-19, to be done at a later date. Noted to appear well nourished.      Monitor and Evaluation     Food and Nutrient Intake: energy intake  Food and  Nutrient Adminstration: enteral and parenteral nutrition administration, diet order  Anthropometric Measurements: weight  Biochemical Data, Medical Tests and Procedures: electrolyte and renal panel, glucose/endocrine profile , gastrointestinal, CRP     Nutrition Follow-Up     RD Follow-up?: Yes

## 2020-04-15 NOTE — ASSESSMENT & PLAN NOTE
Vancomyin 4/4 -    4/6 -HD catheter tip staph epidermidis   4/6 IV catheter tipstaph epidermidis  4/5 resp culture candida albicans   Urine culture cancelled   Recommendations: per ID   Continue Vancomycin for S epidermidis (count 14 days from 04/07)  --- Continue Micafungin  for candidemia due to C albicans.   Eventually may need retinal exam.  We changed  Lines  on 04/06 in afternoon.   BCx drawn on the am of 04/06 were positive for C albicans!  Repeat blood cultures were negative after that.  Complet complete at least 3 weeks of therapy.  Follow final susceptibility testing; most likely will switch to Diflucan.  -- started vancomycin p.o. for C diff diarrhea  Microbiology Results (last 7 days)     Procedure Component Value Units Date/Time    Blood culture [[id number]]  (Abnormal) Collected:  04/06/20 0806    Order Status:  Completed Specimen:  Blood from Antecubital, Left Updated:  04/15/20 1233     Blood Culture, Routine Gram stain peds bottle: yeast       Results called to and read back by: Tania Nolan RN 04/08/2020        11:20      CANDIDA ALBICANS  Susceptibility pending  ID consult required at [facility].      Blood culture [[id number]] Collected:  04/10/20 0453    Order Status:  Completed Specimen:  Blood Updated:  04/14/20 1412     Blood Culture, Routine No Growth after 4 days.     C Diff Toxin by PCR [[id number]]  (Abnormal) Collected:  04/12/20 2315    Order Status:  Completed Updated:  04/14/20 0033     C. diff PCR Positive    Clostridium difficile EIA [[id number]]  (Abnormal) Collected:  04/12/20 2315    Order Status:  Completed Specimen:  Stool Updated:  04/13/20 1324     C. diff Antigen Positive     C difficile Toxins A+B, EIA Negative     Comment: Testing not recommended for children <24 months old.       Blood culture [[id number]]  (Abnormal) Collected:  04/04/20 0912    Order Status:  Completed Specimen:  Blood from Line, Central Updated:  04/13/20 1303     Blood  Culture, Routine Gram stain peds bottle: budding yeast      Results called to and read back by: Carri Pryor RN  04/06/2020  03:04      AJ ALBICANS  ID consult required at [facility].[place] [place] and [facility].      Blood culture [[id number]] Collected:  04/08/20 1739    Order Status:  Completed Specimen:  Blood from Line, Central Updated:  04/12/20 2212     Blood Culture, Routine No Growth after 4 days.     Blood culture [[id number]] Collected:  04/08/20 0745    Order Status:  Completed Specimen:  Blood Updated:  04/12/20 1412     Blood Culture, Routine No Growth after 4 days.     Blood culture [[id number]] Collected:  04/07/20 1730    Order Status:  Completed Specimen:  Blood from Line, Central Updated:  04/11/20 2212     Blood Culture, Routine No Growth after 4 days.     Blood culture [[id number]] Collected:  04/07/20 0907    Order Status:  Completed Specimen:  Blood Updated:  04/11/20 2212     Blood Culture, Routine No Growth after 4 days.     Clostridium difficile EIA [[id number]]     Order Status:  Canceled Specimen:  Stool     Blood culture [[id number]] Collected:  04/06/20 1802    Order Status:  Completed Specimen:  Blood from Line, Central Updated:  04/10/20 2212     Blood Culture, Routine No Growth after 4 days.     IV catheter culture [[id number]]  (Abnormal) Collected:  04/06/20 1455    Order Status:  Completed Specimen:  Catheter Tip, Intrajugular Updated:  04/09/20 1300     Aerobic Culture - Cath tip STAPHYLOCOCCUS EPIDERMIDIS  < 15 colonies      IV catheter culture [[id number]] Collected:  04/06/20 1615    Order Status:  Completed Specimen:  Catheter Tip, Dialysis Updated:  04/09/20 1121     Aerobic Culture - Cath tip No growth    Blood culture [[id number]]     Order Status:  Canceled Specimen:  Blood

## 2020-04-15 NOTE — RESPIRATORY THERAPY
04/15/20 0820   Patient Assessment/Suction   Level of Consciousness (AVPU) alert   Respiratory Effort Normal;Unlabored   Expansion/Accessory Muscles/Retractions no retractions;no use of accessory muscles;expansion symmetric   All Lung Fields Breath Sounds diminished   Cough Frequency no cough   PRE-TX-O2   O2 Device (Oxygen Therapy) nasal cannula   $ Is the patient on Low Flow Oxygen? Yes   Flow (L/min) 2   SpO2 96 %   Pulse Oximetry Type Continuous   $ Pulse Oximetry - Multiple Charge Pulse Oximetry - Multiple   Pulse 99   Resp 20   Positioning HOB elevated 30 degrees

## 2020-04-15 NOTE — NURSING
More awake and alert. Did attempt to orally suction herself. Aware that she's in the hospital. Follows commands. Still soft spoken. Tolerating tube feeding increase to 30ml/hour. No residual noted. Resting at this time. Safety maintain.

## 2020-04-15 NOTE — ASSESSMENT & PLAN NOTE
Patient's anemia is currently controlled. Has recieved 1 units of PRBCs on 4/3.   Will need to Monitor for GI bleed  Lab Results   Component Value Date    HGB 9.0 (L) 04/15/2020    HCT 29.5 (L) 04/15/2020     Monitor serial CBC and transfuse if patient becomes hemodynamically unstable, symptomatic or H/H drops below 7/21.   Will continue to monitor

## 2020-04-15 NOTE — PLAN OF CARE
Per Nadira with [facility], she will not have available bed till possibly Friday but she has submitted for authorization.    · 4/15/2020 3:47:21 PM Note: Patient is being sent to our Medical review board, once accepted she will be submitted for insurance authorization. I will let you know. Thank you!  [email or web address]  · 4/15/2020 10:27:02 AM Note: COVID results attached. any updates?  Haley Panda     04/15/20 3243   Post-Acute Status   Post-Acute Authorization Placement   Post-Acute Placement Status Pending Payor Review

## 2020-04-15 NOTE — NURSING
No Residual noted from NGT. Tube feeding of nutren 1.5 started at 20ml/hour. Abdomen soft , hypoactive bowel sound. HOB up.  Pt did complaint of nausea was medicated with zofran. More awake and alert able to follow commands Moves all extremities. Did stated that she's miserable.Denies any pain

## 2020-04-15 NOTE — RESPIRATORY THERAPY
04/14/20 2030   Patient Assessment/Suction   Level of Consciousness (AVPU) alert   Respiratory Effort Normal;Unlabored   Expansion/Accessory Muscles/Retractions expansion symmetric;no retractions;no use of accessory muscles   PRE-TX-O2   O2 Device (Oxygen Therapy) nasal cannula   Flow (L/min) 3   SpO2 95 %   Pulse Oximetry Type Continuous   Pulse 102   Resp (!) 22   BP (!) 152/79   Aerosol Therapy   $ Aerosol Therapy Charges PRN treatment not required   Respiratory Treatment Status (SVN) PRN treatment not required

## 2020-04-15 NOTE — PT/OT/SLP EVAL
Occupational Therapy   Evaluation    Name: Maria Victoria Hernandez  MRN: [id number]  Admitting Diagnosis:  Acute respiratory failure with hypoxia      Recommendations:     Discharge Recommendations: rehabilitation facility, nursing facility, skilled  Discharge Equipment Recommendations:  (TBD)  Barriers to discharge:       Assessment:     Maria Victoria Hernandez is a 55 y.o. female with a medical diagnosis of Acute respiratory failure with hypoxia.  She presents with a decline in functional status due to the listed impairments, impacting ADLs and functional mobility.  Pt was alert and pleasant and oriented x 4. She followed all simple commands with increased time needed for all tasks.    Pt is severely debilitated with impaired activity tolerance and needs total assistance for all self care except for self feeding and simple grooming/hygiene tasks.     B UE AROM is functional with 4/5 strength throughout but her  strength in 3+/5 in B hands. She displayed a preference for using her R arm and attending to her R side with ongoing cues and OT holding her R hand needed to use only her L UE for exercise.      Performance deficits affecting function: impaired self care skills, weakness, impaired endurance, impaired functional mobilty, impaired cognition, impaired cardiopulmonary response to activity, decreased safety awareness.    Pt will benefit from further inpatient therapy to maximize return to prior level of function, due to pt currently needing increased assistance for ADLs and functional mobility.    Rehab Prognosis: Good; patient would benefit from acute skilled OT services to address these deficits and reach maximum level of function.       Plan:     Patient to be seen 6 x/week to address the above listed problems via self-care/home management, therapeutic activities, therapeutic exercises, cognitive retraining  · Plan of Care Expires: 05/05/20  · Plan of Care Reviewed with: patient    Subjective     Chief  Complaint: None stated  Patient/Family Comments/goals: To get better    Occupational Profile:  Living Environment: Pt lives with her  and two dogs.  Previous level of function: Patient was Independent with all I/ADL's at home and in the community and driving.  Roles and Routines: Pt was working at [employer] in [place].  Equipment Used at Home:  none  Assistance upon Discharge: Patient will need 24 hour assistance and supervision for safety.    Pain/Comfort:  · Pain Rating 1: 0/10  · Pain Rating Post-Intervention 1: 0/10    Patients cultural, spiritual, Mu-ism conflicts given the current situation:      Objective:     Communicated with: Robi nurse prior to session.  Patient found HOB at 30* with oxygen, central line, blood pressure cuff, NG tube, pulse ox (continuous), mueller catheter(dialysis machine) upon OT entry to room. Dialysis nurse present and pt was undergoing dialysis.    General Precautions: Standard, aspiration, contact, special contact, airborne, fall, droplet   Orthopedic Precautions:N/A   Braces: N/A     Occupational Performance:    Bed Mobility:    · NT 2* pt receiving dialysis    Activities of Daily Living:  · Grooming: contact guard assistance and and set-up to use a toothette to clean her mouth    · Bathing: dependence    · Upper Body Dressing: dependence    · Lower Body Dressing: dependence    · Toileting: dependence with mueller catheter in place    Cognitive/Visual Perceptual:  Cognitive/Psychosocial Skills:     -       Oriented to: Person, Place, Time and Situation   -       Follows Commands/attention:Follows one-step commands  -       Communication: very quiet voice but intelligible; increased time to respond  -       Safety awareness/insight to disability: to be further assessed   -       Mood/Affect/Coping skills/emotional control: Cooperative and Pleasant  Visual/Perceptual:      -Intact  acuity and apparent right gaze preference      Physical Exam:  Dominant hand:    -        right  Upper Extremity Range of Motion:     -       Right Upper Extremity: WFL  -       Left Upper Extremity: WFL  Upper Extremity Strength:    -       Right Upper Extremity: 4/5  -       Left Upper Extremity: 4/5   Strength:    -       Right Upper Extremity: 3+/5  -       Left Upper Extremity: 3+/5  Fine Motor Coordination:    -       Intact  Left hand, finger to nose, Right hand, finger to nose, Left hand thumb/finger opposition skills, Right hand thumb/finger opposition skills, Left hand, manipulation of objects and Right hand, manipulation of objects    AMPAC 6 Click ADL:  AMPAC Total Score: 8    Treatment & Education:  OT ed pt on OT role & POC as well as discharge recommendations.  OT issued rubber squeeze ball to pt & educated pt on hand resistive gross grasp and pinch HEP with instructions to squeeze ball 10 times every two hours while awake. Pt performed 10 reps each exercise with Min A and cues for proper form. Cues needed to use L hand without assisting with R hand.  OT issued to pt foam cylinder & ed pt on its use for building up handles of feeding utensils to improve grasp for self feeding with demo provided.  Pt verbalized understanding.    Education:    Patient left HOB at 30* with all lines intact, call button in reach, Robi, nurse notified and dialysis nurse present    GOALS:   Multidisciplinary Problems     Occupational Therapy Goals        Problem: Occupational Therapy Goal    Goal Priority Disciplines Outcome Interventions   Occupational Therapy Goal     OT, PT/OT Ongoing, Progressing    Description:  Goals to be met by: 5/5/20    Patient will increase functional independence with ADLs by performing:    Feeding with Modified [place] and Assistive Devices as needed.  UE Dressing with Set-up Assistance and Minimal Assistance.  Grooming while seated with Supervision.  Sitting at edge of bed x10 minutes with Stand-by Assistance and use of upper extremity support.  Toilet transfer to  bedside commode with Moderate Assistance.  Upper extremity exercise program x10 reps per handout, with assistance as needed.                      History:     Past Medical History:   Diagnosis Date    Colon polyp     Diverticulosis large intestine w/o perforation or abscess w/bleeding     Iron deficiency anemia     Prediabetes     Thyroid disease     Vitamin B 12 deficiency     Vitamin D deficiency        Past Surgical History:   Procedure Laterality Date    TUBAL LIGATION         Time Tracking:     OT Date of Treatment: 04/15/20  OT Start Time: 1540  OT Stop Time: 1611  OT Total Time (min): 31 min    Billable Minutes:Evaluation 15  Therapeutic Exercise 16    NORMAN Aguilar  4/15/2020

## 2020-04-15 NOTE — PROGRESS NOTES
Progress Note  PULMONARY    Admit Date: 3/25/2020   04/15/2020      SUBJECTIVE:     3/27- remains intubated, on vent. Was on Lasix drip overnight and now w/ IVF for UOP. On minimal Levophed   3/28- remains intubated, on PEEP 14/FiO2 50%. Levophed 0.06 mcg/kg/min  3/29- remains intubated, PEEP 12, FiO2 40%. Levophed off  3/30- remains intubated, PEEP 8, FiO2 40%. Levophed off. Started HD yesterday and having second session today. Daughter came to visit (works on 3rd floor), and updated this am  3/31- remains intubated, PEEP 8/FiO2 40%. HD yesterday w/ removal of 3.5L. With SBT yesterday not following commands and vomited stomach contents  4/1- remains intubated, PEEP 8/FiO2 40%. Not waking up or following commands off sedation- even w/ daughter at bedside, failed SBT due to tachypnea. Levophed at 0.02 mcg/kg/min. Had HD yesterday w/ removal of 2.5L. Had head CT. EEG pending  4/2- remains intubated, PEEP 6, FiO2 40%. w/ hemodynamic instability yesterday-- hypertensive then very hypotensive when tried to move her, which delayed weaning and MRI. Now off Levophed. HD yesterday w/ removal of 3.5L  4/3not arousing,  4/4 arousing somewhat- looks air hungry,  4/5- no c/o,sedated  4/6-  Remains intubated,  PEEP 5 FiO2 50%. On Nicardipine for HTN. HD this am. On CPAP trial since 4am w/ good gas exchange. Opens eyes and looks around but not following commands  4/7- remains intubated, had central lines replaced yesterday. Became tachypneic and put back on vent later in afternoon but tolerated PS trial all day. Today tachypneic w/ increased work of breathing with weaning trial  4/8- remains on vent, PEEP 5 FiO2 35%. On Levophed 0.04mcg/kg/min, precedex drip  4/9- extubated yesterday, on 5L nasal cannula. Very weak, per nurse was agitated and w/ chest tightness w/ breathing- better after Dilaudid. Per report answers yes/no questions and follows commands  4/10- on 5L nasal cannula, precedex drip  4/11- on 4L nasal cannula w/ sat  100%, waking up more- per daughter waved at her  4/12, no new c/o  4/13- on 3.5L nasal cannula  4/14- on nasal cannula, no new complaints  4/15- no new complaints    PFSH and Allergies reviewed.    OBJECTIVE:     Vitals (Most recent):  Vitals:    04/15/20 0820   BP:    Pulse: 99   Resp: 20   Temp:        Vitals (24 hour range):  Temp:  [97.8 °F (36.6 °C)-99.1 °F (37.3 °C)]   Pulse:  []   Resp:  [16-28]   BP: (129-181)/()   SpO2:  [94 %-98 %]       Intake/Output Summary (Last 24 hours) at 4/15/2020 0839  Last data filed at 4/15/2020 0600  Gross per 24 hour   Intake 1620 ml   Output 4185 ml   Net -2565 ml          Physical Exam:  The patient's neuro status (alertness,orientation,cognitive function,motor skills,), pharyngeal exam (oral lesions, hygiene, abn dentition,), Neck (jvd,mass,thyroid,nodes in neck and above/below clavicle),RESPIRATORY(symmetry,effort,fremitus,percussion,auscultation),  Cor(rhythm,heart tones including gallops,perfusion,edema)ABD(distention,hepatic&splenomegaly,tenderness,masses), Skin(rash,cyanosis),Psyc(affect,judgement,).  Exam negative except for these pertinent findings:    Awake, smiles, speaks softly  R sclera injected & conjunctival erythema - improving  Obese  Abdomen soft  Edema improved   Follows commands- much more awake & alert today, mentation improving    Radiographs reviewed: - view by direct vision  MRI 4/14-   No evidence of an acute intracranial abnormality.  Few scattered small areas of T2/FLAIR hyperintensity within the supratentorial white matter, which are nonspecific and not out of proportion for the patient's age, possibly reflecting chronic microvascular ischemic change.    CXR 4/13- bilateral infiltrates increased  CXR 4/6- similar  cxr 4/5 ARDS + pulmonary edema  CXR 4/2- bibasilar fluffy opacities and pulmonary edema, similar appearance  CT head 3/31- no acute findings  CXR 3/31- improving bibasilar opacities  CXR 3/29- unchanged  CXR 3/27- bilateral mid  and lower lobe fluffy airspace disease  CXR 3/26- increased consolidation RLL    TTE 4/13  · Mild concentric left ventricular hypertrophy.  · Global hypokinetic wall motion.  · Moderately decreased left ventricular systolic function. The estimated ejection fraction is 35%.  · Grade I (mild) left ventricular diastolic dysfunction consistent with impaired relaxation.  · Normal right ventricular systolic function.  · Mild left atrial enlargement.  · Moderate mitral regurgitation.  · Normal central venous pressure (3 mmHg).    TTE 3/27  · Mild left ventricular enlargement.  · Mildly decreased left ventricular systolic function. The estimated ejection fraction is 45%.  · Grade I (mild) left ventricular diastolic dysfunction consistent with impaired relaxation.  · Normal right ventricular systolic function.  · Mild left atrial enlargement.  · Moderate mitral regurgitation.  · Mild tricuspid regurgitation.  · Mild pulmonary hypertension present. PASP 40 mm of Hg.    Labs     Recent Labs   Lab 04/15/20  0319   WBC 8.61   HGB 9.0*   HCT 29.5*   *     Recent Labs   Lab 04/15/20  0319      K 4.3   CL 95   CO2 26   BUN 65*   CREATININE 4.1*   *   CALCIUM 9.8   AST 19   ALT 24   ALKPHOS 183*   BILITOT 1.0   PROT 10.0*   ALBUMIN 3.2*     No results for input(s): PH, PCO2, PO2, HCO3 in the last 24 hours.  Microbiology Results (last 7 days)     Procedure Component Value Units Date/Time    Blood culture [[id number]] Collected:  04/10/20 0453    Order Status:  Completed Specimen:  Blood Updated:  04/14/20 1412     Blood Culture, Routine No Growth after 4 days.     Blood culture [[id number]]  (Abnormal) Collected:  04/06/20 0806    Order Status:  Completed Specimen:  Blood from Antecubital, Left Updated:  04/14/20 1140     Blood Culture, Routine Gram stain peds bottle: yeast       Results called to and read back by: Tania Nolan RN 04/08/2020        11:20      CANDIDA ALBICANS  Susceptibility pending  ID consult  required at [facility].      C Diff Toxin by PCR [[id number]]  (Abnormal) Collected:  04/12/20 2315    Order Status:  Completed Updated:  04/14/20 0033     C. diff PCR Positive    Clostridium difficile EIA [[id number]]  (Abnormal) Collected:  04/12/20 2315    Order Status:  Completed Specimen:  Stool Updated:  04/13/20 1324     C. diff Antigen Positive     C difficile Toxins A+B, EIA Negative     Comment: Testing not recommended for children <24 months old.       Blood culture [[id number]]  (Abnormal) Collected:  04/04/20 0912    Order Status:  Completed Specimen:  Blood from Line, Central Updated:  04/13/20 1303     Blood Culture, Routine Gram stain peds bottle: budding yeast      Results called to and read back by: Carri Pryor RN  04/06/2020  03:04      AJ ALBICANS  ID consult required at [facility].      Blood culture [[id number]] Collected:  04/08/20 1739    Order Status:  Completed Specimen:  Blood from Line, Central Updated:  04/12/20 2212     Blood Culture, Routine No Growth after 4 days.     Blood culture [[id number]] Collected:  04/08/20 0745    Order Status:  Completed Specimen:  Blood Updated:  04/12/20 1412     Blood Culture, Routine No Growth after 4 days.     Blood culture [[id number]] Collected:  04/07/20 1730    Order Status:  Completed Specimen:  Blood from Line, Central Updated:  04/11/20 2212     Blood Culture, Routine No Growth after 4 days.     Blood culture [[id number]] Collected:  04/07/20 0907    Order Status:  Completed Specimen:  Blood Updated:  04/11/20 2212     Blood Culture, Routine No Growth after 4 days.     Clostridium difficile EIA [[id number]]     Order Status:  Canceled Specimen:  Stool     Blood culture [[id number]] Collected:  04/06/20 1802    Order Status:  Completed Specimen:  Blood from Line, Central Updated:  04/10/20 2212     Blood Culture, Routine No Growth after 4 days.     IV catheter culture [[id number]]   (Abnormal) Collected:  04/06/20 1455    Order Status:  Completed Specimen:  Catheter Tip, Intrajugular Updated:  04/09/20 1300     Aerobic Culture - Cath tip STAPHYLOCOCCUS EPIDERMIDIS  < 15 colonies      IV catheter culture [[id number]] Collected:  04/06/20 1615    Order Status:  Completed Specimen:  Catheter Tip, Dialysis Updated:  04/09/20 1121     Aerobic Culture - Cath tip No growth    Blood culture [[id number]]     Order Status:  Canceled Specimen:  Blood     Blood culture [[id number]]  (Abnormal)  (Susceptibility) Collected:  04/05/20 0813    Order Status:  Completed Specimen:  Blood from Peripheral, Left  Hand Updated:  04/08/20 1236     Blood Culture, Routine Gram stain peds bottle: Gram positive cocci in clusters resembling Staph       Results called to and read back by: Lynn Sawyer RN 04/06/2020  09:57      STAPHYLOCOCCUS EPIDERMIDIS    Blood culture [[id number]]  (Abnormal)  (Susceptibility) Collected:  04/05/20 0813    Order Status:  Completed Specimen:  Blood from Line, Central Left  Neck Updated:  04/08/20 1234     Blood Culture, Routine Gram stain tank bottle: Gram positive cocci in clusters resembling Staph       Results called to and read back by: Lynn Sawyer RN 04/06/2020  09:57      Gram stain aer bottle: Gram positive cocci in clusters resembling Staph       Positive results previously called 04/06/2020  15:52      STAPHYLOCOCCUS EPIDERMIDIS    Culture, Respiratory with Gram Stain [[id number]]  (Abnormal) Collected:  04/05/20 0050    Order Status:  Completed Specimen:  Respiratory from Endotracheal Aspirate Updated:  04/08/20 1046     Respiratory Culture No S aureus or Pseudomonas isolated.      CANDIDA ALBICANS  Few  Normal respiratory anamaria also present       Gram Stain (Respiratory) <10 epithelial cells per low power field.     Gram Stain (Respiratory) Many WBC's     Gram Stain (Respiratory) Rare Gram positive cocci     Gram Stain (Respiratory) Rare yeast        Results for LUCRECIA  WOLF LOW (MRN [id number]) as of 4/1/2020 09:28   Ref. Range 4/1/2020 02:53   ALT Latest Ref Range: 10 - 44 U/L 54 (H)     Impression:  Active Hospital Problems    Diagnosis  POA    *Acute respiratory failure with hypoxia [J96.01]  Yes    Conjunctivitis of right eye [H10.9]  No    Post viral debility [R53.81]  No    Acute on chronic combined systolic and diastolic congestive heart failure [I50.43]  Yes    Fungemia [B49]  No    Bacteremia [R78.81]  No     Staphylococcus epidermidis bacteremia, line infection.  04/05  Candidemia due to Candida albicans on 04/04  Respiratory failure, ARDS, COVID19, completed treatment  HTN, CHF with EF of 45%  This patient is high risk for life-threatening deterioration and death secondary to above comorbidities and need for IV treatment Critical care 35 min         Pneumonia due to infectious organism [J18.9]  Yes    Acute encephalopathy [G93.40]  No    Acute renal failure [N17.9]  No    COVID-19 virus infection [U07.1]  Yes    Morbid obesity [E66.01]  Yes    Hypothyroid [E03.9]  Yes    COVID-19 virus detected [U07.1]  Yes    Diverticulosis of large intestine without hemorrhage [K57.30]  Yes    Iron deficiency anemia, unspecified [D50.9]  Yes      Resolved Hospital Problems    Diagnosis Date Resolved POA    Shock [R57.9] 04/14/2020 Unknown    ARDS (adult respiratory distress syndrome) [J80] 04/13/2020 Yes               Plan:   Acute hypoxemic respiratory failure, stable to improving O2 reqt  COVID-19 pneumonia/ARDS, improved  Acute renal failure, on HD  Metabolic acidosis due to renal failure- improved  Shock, resolved   HTN  Combined heart failure, EF 45%  Morbid obesity  Acute encephalopathy  Right conjunctivitis  Bacteremia w/ s. Epidermidis; fungemia- s/p exchange of central lines      - continue supplemental oxygen to keep sats greater than 92%  - MRI completed- w/ microvascular ischemic changes, otherwise normal  - NG with tube feeds  - continue PT/OT and  speech therapy  - continue combivent inhaler PRN  - HTN meds per hospitalist  - continue HD per nephro  - oral bicarb 650 TID- continue and assess whether to stop later when renal function improves  - has had adequate course (7d) of azithromycin, ceftriaxone, hydroxychloroquine  - antibiotics per ID- treating for bacteremia & fungemia  - d/w hospitalist  - can transfer to floor    Milvia Mcguire MD  Pulmonary & Critical Care Medicine

## 2020-04-15 NOTE — ASSESSMENT & PLAN NOTE
Body mass index is 41.57 kg/m². Morbid obesity complicates all aspects of disease management from diagnostic modalities to treatment. Weight loss encouraged and health benefits explained to patient.

## 2020-04-16 LAB
ALBUMIN SERPL BCP-MCNC: 3.4 G/DL (ref 3.5–5.2)
ALP SERPL-CCNC: 176 U/L (ref 55–135)
ALT SERPL W/O P-5'-P-CCNC: 25 U/L (ref 10–44)
ANION GAP SERPL CALC-SCNC: 18 MMOL/L (ref 8–16)
ANISOCYTOSIS BLD QL SMEAR: SLIGHT
AST SERPL-CCNC: 23 U/L (ref 10–40)
BASOPHILS # BLD AUTO: 0.19 K/UL (ref 0–0.2)
BASOPHILS NFR BLD: 1.6 % (ref 0–1.9)
BILIRUB SERPL-MCNC: 0.9 MG/DL (ref 0.1–1)
BUN SERPL-MCNC: 52 MG/DL (ref 6–20)
CALCIUM SERPL-MCNC: 10.2 MG/DL (ref 8.7–10.5)
CHLORIDE SERPL-SCNC: 98 MMOL/L (ref 95–110)
CO2 SERPL-SCNC: 25 MMOL/L (ref 23–29)
CREAT SERPL-MCNC: 3.6 MG/DL (ref 0.5–1.4)
CRP SERPL-MCNC: 36.9 MG/L (ref 0–8.2)
DACRYOCYTES BLD QL SMEAR: ABNORMAL
DIFFERENTIAL METHOD: ABNORMAL
EOSINOPHIL # BLD AUTO: 0.3 K/UL (ref 0–0.5)
EOSINOPHIL NFR BLD: 2.9 % (ref 0–8)
ERYTHROCYTE [DISTWIDTH] IN BLOOD BY AUTOMATED COUNT: 18.6 % (ref 11.5–14.5)
EST. GFR  (AFRICAN AMERICAN): 16 ML/MIN/1.73 M^2
EST. GFR  (NON AFRICAN AMERICAN): 14 ML/MIN/1.73 M^2
FERRITIN SERPL-MCNC: 1203 NG/ML (ref 20–300)
FUNGAL SUSC PNL ISLT: ABNORMAL
GLUCOSE SERPL-MCNC: 101 MG/DL (ref 70–110)
HCT VFR BLD AUTO: 28.8 % (ref 37–48.5)
HGB BLD-MCNC: 8.7 G/DL (ref 12–16)
HYPOCHROMIA BLD QL SMEAR: ABNORMAL
IMM GRANULOCYTES # BLD AUTO: 0.6 K/UL (ref 0–0.04)
IMM GRANULOCYTES NFR BLD AUTO: 5.1 % (ref 0–0.5)
LYMPHOCYTES # BLD AUTO: 2.1 K/UL (ref 1–4.8)
LYMPHOCYTES NFR BLD: 18.2 % (ref 18–48)
MCH RBC QN AUTO: 26.7 PG (ref 27–31)
MCHC RBC AUTO-ENTMCNC: 30.2 G/DL (ref 32–36)
MCV RBC AUTO: 88 FL (ref 82–98)
MONOCYTES # BLD AUTO: 1.4 K/UL (ref 0.3–1)
MONOCYTES NFR BLD: 11.6 % (ref 4–15)
NEUTROPHILS # BLD AUTO: 7.1 K/UL (ref 1.8–7.7)
NEUTROPHILS NFR BLD: 60.6 % (ref 38–73)
NRBC BLD-RTO: 0 /100 WBC
PLATELET # BLD AUTO: 329 K/UL (ref 150–350)
PLATELET BLD QL SMEAR: ABNORMAL
PMV BLD AUTO: 10.5 FL (ref 9.2–12.9)
POIKILOCYTOSIS BLD QL SMEAR: SLIGHT
POTASSIUM SERPL-SCNC: 4.5 MMOL/L (ref 3.5–5.1)
PROCALCITONIN SERPL IA-MCNC: 2.69 NG/ML
PROT SERPL-MCNC: 9.9 G/DL (ref 6–8.4)
RBC # BLD AUTO: 3.26 M/UL (ref 4–5.4)
SARS-COV-2 RNA RESP QL NAA+PROBE: NOT DETECTED
SODIUM SERPL-SCNC: 141 MMOL/L (ref 136–145)
SPECIMEN SOURCE AND ORGANISM ID: ABNORMAL
VANCOMYCIN SERPL-MCNC: 14.6 UG/ML
WBC # BLD AUTO: 11.75 K/UL (ref 3.9–12.7)

## 2020-04-16 PROCEDURE — 25000003 PHARM REV CODE 250: Performed by: INTERNAL MEDICINE

## 2020-04-16 PROCEDURE — 92526 ORAL FUNCTION THERAPY: CPT

## 2020-04-16 PROCEDURE — 63600175 PHARM REV CODE 636 W HCPCS: Performed by: INTERNAL MEDICINE

## 2020-04-16 PROCEDURE — 25000003 PHARM REV CODE 250: Performed by: HOSPITALIST

## 2020-04-16 PROCEDURE — 99232 SBSQ HOSP IP/OBS MODERATE 35: CPT | Mod: ,,, | Performed by: INTERNAL MEDICINE

## 2020-04-16 PROCEDURE — 12000002 HC ACUTE/MED SURGE SEMI-PRIVATE ROOM

## 2020-04-16 PROCEDURE — 63600175 PHARM REV CODE 636 W HCPCS: Performed by: HOSPITALIST

## 2020-04-16 PROCEDURE — 80202 ASSAY OF VANCOMYCIN: CPT

## 2020-04-16 PROCEDURE — 97110 THERAPEUTIC EXERCISES: CPT

## 2020-04-16 PROCEDURE — 36415 COLL VENOUS BLD VENIPUNCTURE: CPT

## 2020-04-16 PROCEDURE — 99231 SBSQ HOSP IP/OBS SF/LOW 25: CPT | Mod: S$GLB,,, | Performed by: INTERNAL MEDICINE

## 2020-04-16 PROCEDURE — 85025 COMPLETE CBC W/AUTO DIFF WBC: CPT

## 2020-04-16 PROCEDURE — 84145 PROCALCITONIN (PCT): CPT

## 2020-04-16 PROCEDURE — 99231 PR SUBSEQUENT HOSPITAL CARE,LEVL I: ICD-10-PCS | Mod: S$GLB,,, | Performed by: INTERNAL MEDICINE

## 2020-04-16 PROCEDURE — 27000221 HC OXYGEN, UP TO 24 HOURS

## 2020-04-16 PROCEDURE — 97530 THERAPEUTIC ACTIVITIES: CPT

## 2020-04-16 PROCEDURE — 97535 SELF CARE MNGMENT TRAINING: CPT | Mod: CO

## 2020-04-16 PROCEDURE — 99232 PR SUBSEQUENT HOSPITAL CARE,LEVL II: ICD-10-PCS | Mod: ,,, | Performed by: INTERNAL MEDICINE

## 2020-04-16 PROCEDURE — 82728 ASSAY OF FERRITIN: CPT

## 2020-04-16 PROCEDURE — 94761 N-INVAS EAR/PLS OXIMETRY MLT: CPT

## 2020-04-16 PROCEDURE — 99900035 HC TECH TIME PER 15 MIN (STAT)

## 2020-04-16 PROCEDURE — 80053 COMPREHEN METABOLIC PANEL: CPT

## 2020-04-16 PROCEDURE — 86140 C-REACTIVE PROTEIN: CPT

## 2020-04-16 RX ORDER — HYDRALAZINE HYDROCHLORIDE 10 MG/1
10 TABLET, FILM COATED ORAL EVERY 12 HOURS
Status: DISCONTINUED | OUTPATIENT
Start: 2020-04-16 | End: 2020-04-17

## 2020-04-16 RX ADMIN — SODIUM BICARBONATE 650 MG TABLET 650 MG: at 08:04

## 2020-04-16 RX ADMIN — GENTAMICIN SULFATE 1 DROP: 3 SOLUTION OPHTHALMIC at 02:04

## 2020-04-16 RX ADMIN — ESCITALOPRAM OXALATE 5 MG: 5 TABLET, FILM COATED ORAL at 08:04

## 2020-04-16 RX ADMIN — ONDANSETRON 4 MG: 2 INJECTION INTRAMUSCULAR; INTRAVENOUS at 02:04

## 2020-04-16 RX ADMIN — Medication 250 MG: at 05:04

## 2020-04-16 RX ADMIN — GENTAMICIN SULFATE 1 DROP: 3 SOLUTION OPHTHALMIC at 05:04

## 2020-04-16 RX ADMIN — MICAFUNGIN SODIUM 100 MG: 20 INJECTION, POWDER, LYOPHILIZED, FOR SOLUTION INTRAVENOUS at 10:04

## 2020-04-16 RX ADMIN — Medication 250 MG: at 12:04

## 2020-04-16 RX ADMIN — LACTOBACILLUS TAB 2 TABLET: TAB at 09:04

## 2020-04-16 RX ADMIN — OXYCODONE HYDROCHLORIDE AND ACETAMINOPHEN 1000 MG: 500 TABLET ORAL at 08:04

## 2020-04-16 RX ADMIN — OXYCODONE HYDROCHLORIDE AND ACETAMINOPHEN 1000 MG: 500 TABLET ORAL at 09:04

## 2020-04-16 RX ADMIN — HYDRALAZINE HYDROCHLORIDE 10 MG: 10 TABLET, FILM COATED ORAL at 09:04

## 2020-04-16 RX ADMIN — LACTOBACILLUS TAB 2 TABLET: TAB at 08:04

## 2020-04-16 RX ADMIN — OXYCODONE HYDROCHLORIDE AND ACETAMINOPHEN 1000 MG: 500 TABLET ORAL at 12:04

## 2020-04-16 RX ADMIN — GENTAMICIN SULFATE 1 DROP: 3 SOLUTION OPHTHALMIC at 09:04

## 2020-04-16 RX ADMIN — GENTAMICIN SULFATE 1 DROP: 3 SOLUTION OPHTHALMIC at 03:04

## 2020-04-16 RX ADMIN — SODIUM BICARBONATE 650 MG TABLET 650 MG: at 09:04

## 2020-04-16 RX ADMIN — FUROSEMIDE 40 MG: 40 TABLET ORAL at 08:04

## 2020-04-16 RX ADMIN — METOPROLOL TARTRATE 25 MG: 25 TABLET, FILM COATED ORAL at 09:04

## 2020-04-16 RX ADMIN — FUROSEMIDE 40 MG: 40 TABLET ORAL at 05:04

## 2020-04-16 RX ADMIN — ASPIRIN 81 MG: 81 TABLET, COATED ORAL at 08:04

## 2020-04-16 RX ADMIN — HEPARIN SODIUM 5000 UNITS: 5000 INJECTION, SOLUTION INTRAVENOUS; SUBCUTANEOUS at 09:04

## 2020-04-16 RX ADMIN — ZINC SULFATE 220 MG (50 MG) CAPSULE 220 MG: CAPSULE at 08:04

## 2020-04-16 RX ADMIN — MINERAL OIL AND PETROLATUM: 150; 830 OINTMENT OPHTHALMIC at 09:04

## 2020-04-16 RX ADMIN — OXYCODONE HYDROCHLORIDE AND ACETAMINOPHEN 1000 MG: 500 TABLET ORAL at 05:04

## 2020-04-16 RX ADMIN — CALCITRIOL CAPSULES 0.25 MCG 0.25 MCG: 0.25 CAPSULE ORAL at 08:04

## 2020-04-16 RX ADMIN — VANCOMYCIN HYDROCHLORIDE 500 MG: 500 INJECTION, POWDER, LYOPHILIZED, FOR SOLUTION INTRAVENOUS at 12:04

## 2020-04-16 RX ADMIN — LEVOTHYROXINE SODIUM 100 MCG: 100 TABLET ORAL at 05:04

## 2020-04-16 RX ADMIN — SODIUM BICARBONATE 650 MG TABLET 650 MG: at 05:04

## 2020-04-16 RX ADMIN — HYDRALAZINE HYDROCHLORIDE 10 MG: 10 TABLET ORAL at 05:04

## 2020-04-16 RX ADMIN — CHOLESTYRAMINE 4 G: 4 POWDER, FOR SUSPENSION ORAL at 11:04

## 2020-04-16 RX ADMIN — METOPROLOL TARTRATE 25 MG: 25 TABLET, FILM COATED ORAL at 08:04

## 2020-04-16 RX ADMIN — HEPARIN SODIUM 5000 UNITS: 5000 INJECTION, SOLUTION INTRAVENOUS; SUBCUTANEOUS at 08:04

## 2020-04-16 RX ADMIN — CHOLESTYRAMINE 4 G: 4 POWDER, FOR SUSPENSION ORAL at 08:04

## 2020-04-16 NOTE — PLAN OF CARE
Problem: Occupational Therapy Goal  Goal: Occupational Therapy Goal  Description  Goals to be met by: 5/5/20    Patient will increase functional independence with ADLs by performing:    Feeding with Modified [place] and Assistive Devices as needed.  UE Dressing with Set-up Assistance and Minimal Assistance.  Grooming while seated with Supervision.  Sitting at edge of bed x10 minutes with Stand-by Assistance and use of upper extremity support.  Toilet transfer to bedside commode with Moderate Assistance.  Upper extremity exercise program x10 reps per handout, with assistance as needed.     Outcome: Ongoing, Progressing; pt moved from ICU to 3rd floor this afternoon.

## 2020-04-16 NOTE — PROGRESS NOTES
[facility] Medicine  Progress Note    Patient Name: Maria Victoria Hernandez  MRN: [id number]  Patient Class: IP- Inpatient   Admission Date: 3/25/2020  Length of Stay: 22 days  Attending Physician: Kady Gomez MD  Primary Care Provider: Candice Deluna MD        Subjective:     Principal Problem:Acute respiratory failure with hypoxia        HPI:  Patient is a 54 years old  female with morbid obesity in past medical history significant for hypothyroidism is being admitted to intensive care unit under inpatient status from [facility] Emergency room with worsening shortness of breath.  Three days ago patient was tested positive for COVID - 19.  Patient works at [employer].  Patient has been experiencing subjective fever, generalized body aches and pains and nonproductive cough for few days.  Patient is getting increasingly short of breath especially for the past 2 days.  Upon arrival to the emergency room patient was noted to be hypoxic around 89%.  Up on 3 L patient's oxygen sats are around 96%.  Patient denies any chest pain, leg swelling or calf tenderness.      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 And treated per protocol for Covid and ARDS with ceftriaxone/zithromax and HC x 5 days. And ARDS protocol on vent. Pulmonary consulted and followed. WEaned from vent slowly -to nasal cannula by 4/10 and remained stable on oxygen but very weak. PT/OT consulted.   Tachycardica /hypertension developed -cardene gtt adjusted to labetolol then transitioned to po metoprolol Echo -noted mild systolic/Gr 1 diastolic dsyfunction   Volume control required by HD.   cxr on 4/5 severe ards pattern.-Vancomycin/zosyn added. 4/6 blood cultures pos for yeast so ID consulted and micafungin added. HD  Catheter and  TLC line removed and cultured and  cultures negative. Sputum + yeast also  .-followed recs from ID; echo neg -await repeat echo .  Blood cultures q 12 hours were negative for yeast.     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 Acute encephalopathy- delirium vs encephalitis or other structural cause. MRI could not be performed as she was very unstable.   At the time of admission Cr 0.7 worsened to 5.1, Renal was consulted. and HD started on 3/29. In addition had, Metabolic acidosis due to renal failure.   Feedings given via ngtube with diabetisource and accucks/ISS given with close monitoring and good control.     By 4/12 mentation improved -but intermittent confusion --  Hypoxia vs. Infection vs. TIA/stroke vs. Metabolic/Toxic. /ICU stay  -prolonged paralytic?/narcotics/benzos    -Acute, improving/occasional hallucination   Haldol/anxiolytics prn -required intermittent precedex once off sedating medications-acute delirium precautions /transfer out of icu soon as able.    Interval History: Patient doing well. afebrile, overall BP readings better, on 2 L NC, UOP 850cc- patient dropped BP during dialysis    Review of Systems   Unable to perform ROS: Mental status change   Constitutional: Positive for activity change, appetite change and fatigue.   HENT: Positive for sore throat.    Respiratory: Negative for chest tightness and shortness of breath.    Neurological: Positive for weakness.     Objective:     Vital Signs (Most Recent):  Temp: 97 °F (36.1 °C) (04/16/20 1644)  Pulse: 93 (04/16/20 1644)  Resp: 18 (04/16/20 1644)  BP: 139/78 (04/16/20 1644)  SpO2: 96 % (04/16/20 1644) Vital Signs (24h Range):  Temp:  [97 °F (36.1 °C)-99.1 °F (37.3 °C)] 97 °F (36.1 °C)  Pulse:  [] 93  Resp:  [10-58] 18  SpO2:  [92 %-100 %] 96 %  BP: (106-160)/() 139/78     Weight: 97.8 kg (215 lb 9.8 oz)  Body mass index is 40.74 kg/m².    Intake/Output Summary (Last 24 hours) at 4/16/2020 1716  Last  data filed at 4/16/2020 1445  Gross per 24 hour   Intake 2050 ml   Output 2560 ml   Net -510 ml      Physical Exam   Nursing note and vitals reviewed.  PATIENT WAS SEEN AS A VIDEO VISIT WITH NURSE IN PATIENT ROOM WITH PATIENT TO ASSIST DURING THE VISIT. PHYSICAL EXAM FINDINGS ARE AS VIEWED BY MYSELF VIA VIDEO OR AS REPORTED BY NURSE IF SPECIFIED AS SUCH, EXAM NOT DONE PERSONALLY BY MYSELF AT BEDSIDE.      Significant Labs:   BMP:   Recent Labs   Lab 04/16/20  0335         K 4.5   CL 98   CO2 25   BUN 52*   CREATININE 3.6*   CALCIUM 10.2     CBC:   Recent Labs   Lab 04/15/20  0319 04/16/20  0335   WBC 8.61 11.75   HGB 9.0* 8.7*   HCT 29.5* 28.8*   * 329       Significant Imaging: I have reviewed all pertinent imaging results/findings within the past 24 hours.      Assessment/Plan:      * Acute respiratory failure with hypoxia  Patient with Hypoxic Respiratory failure which is Acute.  she is not on home oxygen.   Currently on NC 1.5 L  Treatment for Covid/ARDS - supportive care.   4/8 extubated to Nc oxygen -stable    Pulmonary consultation. /ID consutltation   IV antibiotics - ceftriaxone and azithromycin. And Plaquenil 400 mg daily x 5  Vanc/zosyn initiated 4/5 for staph Epi  micafungin for yeast +blood/sputum 4/6 /ID consulted       Microbiology Results (last 7 days)     Procedure Component Value Units Date/Time    Blood culture [[id number]]  (Abnormal) Collected:  04/06/20 0806    Order Status:  Completed Specimen:  Blood from Antecubital, Left Updated:  04/16/20 1438     Blood Culture, Routine Gram stain peds bottle: yeast       Results called to and read back by: Tania Nolan RN 04/08/2020        11:20      CANDIDA ALBICANS  Susceptibility pending  ID consult required at [facility] [place] and [place].      Blood culture [[id number]] Collected:  04/10/20 0453    Order Status:  Completed Specimen:  Blood Updated:  04/14/20 1412     Blood Culture, Routine No Growth after 4 days.      C Diff Toxin by PCR [[id number]]  (Abnormal) Collected:  04/12/20 2315    Order Status:  Completed Updated:  04/14/20 0033     C. diff PCR Positive    Clostridium difficile EIA [[id number]]  (Abnormal) Collected:  04/12/20 2315    Order Status:  Completed Specimen:  Stool Updated:  04/13/20 1324     C. diff Antigen Positive     C difficile Toxins A+B, EIA Negative     Comment: Testing not recommended for children <24 months old.       Blood culture [[id number]]  (Abnormal) Collected:  04/04/20 0912    Order Status:  Completed Specimen:  Blood from Line, Central Updated:  04/13/20 1303     Blood Culture, Routine Gram stain peds bottle: budding yeast      Results called to and read back by: Carri Pryor RN  04/06/2020  03:04      AJ ALBICANS  ID consult required at [facility],[place] and [facility] locations.      Blood culture [[id number]] Collected:  04/08/20 1739    Order Status:  Completed Specimen:  Blood from Line, Central Updated:  04/12/20 2212     Blood Culture, Routine No Growth after 4 days.     Blood culture [[id number]] Collected:  04/08/20 0745    Order Status:  Completed Specimen:  Blood Updated:  04/12/20 1412     Blood Culture, Routine No Growth after 4 days.     Blood culture [[id number]] Collected:  04/07/20 1730    Order Status:  Completed Specimen:  Blood from Line, Central Updated:  04/11/20 2212     Blood Culture, Routine No Growth after 4 days.     Blood culture [[id number]] Collected:  04/07/20 0907    Order Status:  Completed Specimen:  Blood Updated:  04/11/20 2212     Blood Culture, Routine No Growth after 4 days.     Clostridium difficile EIA [[id number]]     Order Status:  Canceled Specimen:  Stool     Blood culture [[id number]] Collected:  04/06/20 1802    Order Status:  Completed Specimen:  Blood from Line, Central Updated:  04/10/20 2212     Blood Culture, Routine No Growth after 4 days.           Continue routine medications as before.   Follow airborne/droplet  precautions.            Acute encephalopathy  ML metabolic  Improved  MRI brain negative  US carotids done pending      Lab Results   Component Value Date    WBC 11.75 04/16/2020      focal findings on physical exam  No early signs of stroke on Head CT, no hemorrhage or acute findings.  EEG: EEG 30 min awake and drowsy results noted no seizures  Continue supportive care       COVID-19 virus detected   Plaquenil course completed  Continue routine medications as before.   Follow airborne/droplet precautions.      Conjunctivitis of right eye  Ml post viral  Will continue eye lubricant  Resolving      Post viral debility  Will follow up with PT OT recs      Acute on chronic combined systolic and diastolic congestive heart failure  Chronic -noted on echo ; strict I/O   No ACE/ARB 2/2 renal failure  Asa/statin-when able to tolerate po   Strict I/O-volume control with HD   Repeat ECHO   · Mild concentric left ventricular hypertrophy.  · Global hypokinetic wall motion.  · Moderately decreased left ventricular systolic function. The estimated ejection fraction is 35%.  · Grade I (mild) left ventricular diastolic dysfunction consistent with impaired relaxation.  · Normal right ventricular systolic function.    Bacteremia  Vancomyin 4/4 -    4/6 -HD catheter tip staph epidermidis   4/6 IV catheter tipstaph epidermidis  4/5 resp culture candida albicans   Urine culture cancelled   Recommendations: per ID   Continue Vancomycin for S epidermidis (count 14 days from 04/07)  --- Continue Micafungin  for candidemia due to C albicans.   Eventually may need retinal exam.  We changed  Lines  on 04/06 in afternoon.   BCx drawn on the am of 04/06 were positive for C albicans!  Repeat blood cultures were negative after that.  Complet complete at least 3 weeks of therapy.  Follow final susceptibility testing; most likely will switch to Diflucan.  -- started vancomycin p.o. for C diff diarrhea  Microbiology Results (last 7 days)      Procedure Component Value Units Date/Time    Blood culture [[id number]]  (Abnormal) Collected:  04/06/20 0806    Order Status:  Completed Specimen:  Blood from Antecubital, Left Updated:  04/16/20 1438     Blood Culture, Routine Gram stain peds bottle: yeast       Results called to and read back by: Tania Nolan RN 04/08/2020        11:20      CANDIDA ALBICANS  Susceptibility pending  ID consult required at [facility].      Blood culture [[id number]] Collected:  04/10/20 0453    Order Status:  Completed Specimen:  Blood Updated:  04/14/20 1412     Blood Culture, Routine No Growth after 4 days.     C Diff Toxin by PCR [[id number]]  (Abnormal) Collected:  04/12/20 2315    Order Status:  Completed Updated:  04/14/20 0033     C. diff PCR Positive    Clostridium difficile EIA [[id number]]  (Abnormal) Collected:  04/12/20 2315    Order Status:  Completed Specimen:  Stool Updated:  04/13/20 1324     C. diff Antigen Positive     C difficile Toxins A+B, EIA Negative     Comment: Testing not recommended for children <24 months old.       Blood culture [[id number]]  (Abnormal) Collected:  04/04/20 0912    Order Status:  Completed Specimen:  Blood from Line, Central Updated:  04/13/20 1303     Blood Culture, Routine Gram stain peds bottle: budding yeast      Results called to and read back by: Carri Pryor RN  04/06/2020  03:04      AJ ALBICANS  ID consult required at [facility] and [facility] locations.      Blood culture [[id number]] Collected:  04/08/20 1739    Order Status:  Completed Specimen:  Blood from Line, Central Updated:  04/12/20 2212     Blood Culture, Routine No Growth after 4 days.     Blood culture [[id number]] Collected:  04/08/20 0745    Order Status:  Completed Specimen:  Blood Updated:  04/12/20 1412     Blood Culture, Routine No Growth after 4 days.     Blood culture [[id number]] Collected:  04/07/20 1730    Order Status:  Completed Specimen:  Blood from Line, Central  Updated:  04/11/20 2212     Blood Culture, Routine No Growth after 4 days.     Blood culture [[id number]] Collected:  04/07/20 0907    Order Status:  Completed Specimen:  Blood Updated:  04/11/20 2212     Blood Culture, Routine No Growth after 4 days.     Clostridium difficile EIA [[id number]]     Order Status:  Canceled Specimen:  Stool     Blood culture [[id number]] Collected:  04/06/20 1802    Order Status:  Completed Specimen:  Blood from Line, Central Updated:  04/10/20 2212     Blood Culture, Routine No Growth after 4 days.             Fungemia  Acute-ID consulted-cultures from 4/4   micafungin per ID  HD and TLC removed and cultured          Pneumonia due to infectious organism  Acute -post viral --see resp failure/covid   Pulm/ID consulted /followed    Iron deficiency anemia, unspecified  Patient's anemia is currently controlled. Trending down  Will send for stool guaic    Has recieved 1 units of PRBCs on 4/3.      Etiology likely d/t Anemia of chronic disease/blood loss/acute inflammatory process  Current CBC reviewed-   Lab Results   Component Value Date    HGB 9.0 (L) 04/15/2020    HCT 29.5 (L) 04/15/2020     Monitor serial CBC and transfuse if patient becomes hemodynamically unstable, symptomatic or H/H drops below 8/24        Diverticulosis of large intestine without hemorrhage  Patient's anemia is currently controlled. Has recieved 1 units of PRBCs on 4/3.   Will need to Monitor for GI bleed  Lab Results   Component Value Date    HGB 8.7 (L) 04/16/2020    HCT 28.8 (L) 04/16/2020     Monitor serial CBC and transfuse if patient becomes hemodynamically unstable, symptomatic or H/H drops below 7/21.   Will continue to monitor        Acute renal failure  Acute, Poss ATN / -unclear yet if ESRD   required HD for acute renal failure-  Dialysis held yesterday due to episode of hypotension  ? Element ATN 2/2 hypovolemia/sepsis  Nephrology following                  COVID-19 virus infection  Completed  Plaquenil    Covid-19 Virus Infection  - Infection Control notified    - Isolation:   - Airborne and Droplet Precautions  - N95 masks must be fit tested, wear eye protection  - 20 second hand hygiene   - Limit visitors per hospital policy   - Consolidating lab draws, nursing care, and interventions    - Diagnostics: (rising CRP, persistent lymphopenia, hyponatremia, hyperferritinemia, elevated troponin, elevated d-dimer, age, and comorbidities are significant predictors of poor clinical outcome)   - CBC:   trend Q48hrs  - CMP:        trend Q48hrs  - Procalcitonin:  - D-dimer:  trend Q48hrs  - Ferritin:  repeat prior to discharge  - CRP:        trend Q48hrs  - LDH:  - BNP:  - Troponin:    - ECG:   - rapid Flu:   - RIP only if BMT/solid transplant:   - Legionella antigen:   - Blood culture x2:   - Sputum culture:   - CXR:   - UA and culture:      - Management:   - Bundle care as able to minimize in/out of room   - Supplemental O2 to maintain SpO2 >92%,   if requiring 6L NC or higher, place on nonrebreather and discuss case with MICU   - Telemetry & continuous Pulse Ox   - albuterol INHALER PRN 4puff Q6hr approximates a nebulizer (avoid nebulization of secretions)   - apap PRN fever   - Avoiding NIPPV to prevent aerosolization   (including home CPAP/BiPAP unless on a case-by-case basis and only in negative pressure room)   - Cautious use of NSAIDS for fever per WHO recommendations (3/16/2020)   - No new ACEi/ARB start or discontinuation of chronic med unless hypotensive (Esler et al. Journal of Hypertension 2020, 38:000-000)   - Careful use of steroids in the absence of other indications   - unless septic shock due to increased viral replication   - Fluid sparing resuscitation   - Empiric antibiotics per likely source & patient allergies    - CAP: x 5 day course  Ceftriaxone 1g IV Q24hrs            Azithromycin 500mg IV day #1, then 250mg PO daily x4 days                 If MRSA risk factors, add Vancomycin IV (PharmD  consult)   - If patient meets criteria per Hospital Protocol    - start statin (if CPK WNL)    - start HCQ 400mg PO BID x1 day, then 400mg PO daily x 4 days (check G6PD, ECG, and start Qshift POCT glucose)    Goals of care, counseling/discussion  - Reviewed the typical clinical course of ADDY with the daughter Danya (patient name or relationship to patient), including the potential for acute decompensation requiring intubation and mechanical ventilation  - Discussed again as part of routine daily evaluation, patient/POA maintains code status of Full code    VTE High Risk Prophylaxis: enoxaparin 40mg sq QHS @ 2100 (bundled care) if GFR >30    Patient's chronic/stable medical conditions noted in the problem list above will be managed with the patient's home medications as tolerated.           Hypothyroid  Chronic problem.  Lab Results   Component Value Date    TSH 2.082 03/31/2020     Not on any medication            Morbid obesity  Body mass index is 40.74 kg/m². Morbid obesity complicates all aspects of disease management from diagnostic modalities to treatment. Weight loss encouraged and health benefits explained to patient.            VTE Risk Mitigation (From admission, onward)         Ordered     heparin (porcine) injection 5,000 Units  Every 12 hours      04/07/20 1349     heparin (porcine) 1,000 unit/mL injection      04/06/20 1138     heparin (porcine) injection 4,000 Units  As needed (PRN)      03/29/20 2001     IP VTE HIGH RISK PATIENT  Once      03/25/20 231              Critical care time spent on the evaluation and treatment of severe organ dysfunction, review of pertinent labs and imaging studies, discussions with consulting providers and discussions with patient/family 60 minutes            Kady Gomez MD  Department of Hospital Medicine   [facility]

## 2020-04-16 NOTE — ASSESSMENT & PLAN NOTE
ML metabolic  Improved  MRI brain negative  US carotids done pending      Lab Results   Component Value Date    WBC 11.75 04/16/2020      focal findings on physical exam  No early signs of stroke on Head CT, no hemorrhage or acute findings.  EEG: EEG 30 min awake and drowsy results noted no seizures  Continue supportive care

## 2020-04-16 NOTE — PT/OT/SLP PROGRESS
Physical Therapy Treatment    Patient Name:  Maria Victoria Hernandez   MRN:  [id number]    Recommendations:     Discharge Recommendations:  nursing facility, skilled, rehabilitation facility   Discharge Equipment Recommendations: none   Barriers to discharge: Decreased caregiver support    Assessment:     Maria Victoria Hernandez is a 55 y.o. female admitted with a medical diagnosis of Acute respiratory failure with hypoxia.  She presents with the following impairments/functional limitations:  weakness, impaired endurance, impaired functional mobilty, impaired self care skills, decreased lower extremity function, decreased safety awareness, impaired cardiopulmonary response to activity, decreased upper extremity function . Pt transferred to [facility] from ICU this pm. Pt seen for thera ex and progressed to OOB to chair. Pt tolerated brief standing with assist x2. Pt to benefit from rehab vs SNF.    Rehab Prognosis: Good; patient would benefit from acute skilled PT services to address these deficits and reach maximum level of function.    Recent Surgery: * No surgery found *      Plan:     During this hospitalization, patient to be seen daily to address the identified rehab impairments via therapeutic activities, therapeutic exercises and progress toward the following goals:    · Plan of Care Expires:  05/13/20    Subjective   Pt awake, alert, and following directions well  Pt eager to get OOB  Aissatou Hagan at the bedside encouraging pt  Chief Complaint: wanted NG tube out  Patient/Family Comments/goals: get stronger  Pain/Comfort:  · Pain Rating 1: 0/10      Objective:     Communicated with nurse Mirta and daughter Danya prior to session.  Patient found HOB elevated with telemetry, oxygen, NG tube, peripheral IV, mueller catheter upon PT entry to room.     General Precautions: Standard, airborne, droplet, fall   Orthopedic Precautions:N/A   Braces: N/A     Functional Mobility:  · Bed Mobility:     · Rolling Left:   moderate assistance  · Scooting: maximal assistance  · Supine to Sit: maximal assistance  · Transfers:     · Sit to Stand:  maximal assistance and of 2 persons with hand-held assist  · Bed to Chair: maximal assistance and of 2 persons with  hand-held assist  using  Stand Pivot   · Attempting to step with max assist x2      AM-PAC 6 CLICK MOBILITY          Therapeutic Activities and Exercises:   thera ex with AP,LAQ,marches  Pt repositioned in chair with max assist x2  Chair alarm active    Patient left up in chair with all lines intact, call button in reach, chair alarm on and nurses Mirta and daughter Danya present..    GOALS:   Multidisciplinary Problems     Physical Therapy Goals        Problem: Physical Therapy Goal    Goal Priority Disciplines Outcome Goal Variances Interventions   Physical Therapy Goal     PT, PT/OT Ongoing, Progressing     Description:  Goals to be met by: 2020    Patient will increase functional independence with mobility by performin. Supine to sit with MInimal Assistance  2. Sit to supine with MInimal Assistance  3. Sit to stand transfer with Minimal Assistance  4. Bed to chair transfer with Minimal Assistance using Rolling Walker  5. Gait  x 25 feet with Minimal Assistance using Rolling Walker.                       Time Tracking:     PT Received On: 20  PT Start Time: 1542     PT Stop Time: 1609  PT Total Time (min): 27 min     Billable Minutes: Therapeutic Activity 15 and Therapeutic Exercise 12    Treatment Type: Treatment  PT/PTA: PT     PTA Visit Number: 0     Leah Ribeiro, PT  2020

## 2020-04-16 NOTE — RESPIRATORY THERAPY
04/15/20 1923   Patient Assessment/Suction   Level of Consciousness (AVPU) alert   Respiratory Effort Normal;Unlabored   Expansion/Accessory Muscles/Retractions expansion symmetric;no retractions;no use of accessory muscles   All Lung Fields Breath Sounds diminished   PRE-TX-O2   O2 Device (Oxygen Therapy) nasal cannula   Flow (L/min) 2   Oxygen Concentration (%) 28   SpO2 99 %   Pulse Oximetry Type Continuous   Pulse (!) 114   Resp (!) 29   Inhaler   $ Inhaler Charges PRN treatment not required   Respiratory Treatment Status (Inhaler) PRN treatment not required   Ready to Wean/Extubation Screen   FIO2<=50 (chart decimal) 0.28

## 2020-04-16 NOTE — PT/OT/SLP PROGRESS
"Speech Language Pathology Treatment    Patient Name:  Maria Victoria Hernandez   MRN:  [id number]  Admitting Diagnosis: Acute respiratory failure with hypoxia    Recommendations:                 General Recommendations:  Ongoing swallow evaluation  Diet recommendations:  (Clears ??), Liquid Diet Level: Thin   Aspiration Precautions: 1 bite/sip at a time, Assistance with meals, HOB to 90 degrees, Meds whole buried in puree, Remain upright 30 minutes post meal, Small bites/sips and Standard aspiration precautions   General Precautions: Standard, airborne, droplet, contact, aspiration, fall  Communication strategies:  provide increased time to answer    Subjective     "I'm alright."  "I got gagged, nauseated." (When asked about N/V yesterday evening)    Objective:   Pt seen in ICU for ongoing wallow assessment. She is awake, cooperative and following simple commands. Continues with mildly delayed responses to questions. Not as interactive today as yesterday. Still has NGT. Nurse reports N/V following dinner yesterday evening. She was offered breakfast. Consumed ~ 50% pudding with no overt s/s penetration/aspiration; able to feed herself with only a little assistance 2' NGT. Juice was consumed without difficulty. She refused eggs, sausage and oatmeal. Secure chat sent to dietitian, MD and nurse re: poor intake and N/V. May need clear liquids (?)    Has the patient been evaluated by SLP for swallowing?   Yes  Keep patient NPO? No   Current Respiratory Status: nasal cannula      Assessment:   Per chart review and Nurse pt with poor tolerance of PO; N/V and poor intake. She consumed ~ 50% pudding this AM with no overt s/s penetration/aspiration. Refused other items on tray. Secure chat sent to MD and dietitian; may need clears ?? Will continue to follow.     Goals:   Multidisciplinary Problems     SLP Goals        Problem: SLP Goal    Goal Priority Disciplines Outcome   SLP Goal     SLP Ongoing, Progressing   Description:  " 1) Consume regular diet and thin liquids no s/s oropharyngeal dysphagia--Revised                    Plan:     · Patient to be seen:  5 x/week   · Plan of Care expires:     · Plan of Care reviewed with:  patient   · SLP Follow-Up:  Yes       Discharge recommendations:  nursing facility, skilled, rehabilitation facility   Barriers to Discharge:  None    Time Tracking:     SLP Treatment Date:   04/16/20  Speech Start Time:  0931  Speech Stop Time:  0945     Speech Total Time (min):  14 min    Billable Minutes: Treatment Swallowing Dysfunction 14 and Total Time 14    Cinda Pereira CCC-SLP  04/16/2020

## 2020-04-16 NOTE — NURSING
Tolerating juice, lemonade and broth.  Offered mashed sweet potatoes, apple sauce and pudding but uninterested in those choices at this time.  Sitting up in bed.  More awake and alert and moving around bed easier.

## 2020-04-16 NOTE — NURSING
Pt requesting NGT to be removed.  Discussed that unable to remove until she is able to tolerate at least 50% food.  Vomited last night and only ate appr half of yogurt this morning.  Too weak to eat and tolerate? Hasn't had solid food in appr 3wks so transitioned to feedings too quickly? Discussed with ST verbally and RD via secure chat.  Pt does ok with water, so will do clear liquids right now for safety.

## 2020-04-16 NOTE — PROGRESS NOTES
Progress Note  Infectious Disease    Reason for Consult:      HPI: Maria Victoria Hernandez is a   55 y.o. female employee of [employer].  with past medical history of morbid obesity, BMI of 48, diabetes, diet controlled, anemia, B12 deficiency, vitamin-D deficiency, hypothyroidism, was admitted on 03/25/2020 due to shortness of breath, diagnosed with COVID 19 is positive.  Patient has been intubated.  She went into renal failure and has been receiving HD by nephrologist.  Intensivist has been managing the vent.    Patient has completed 10 days of hydroxychloroquine and about 8 days of Zithromax and ceftriaxone.  She started spiking fever of  103 on 04/04.  White count jumped to 17.8.  She was started on vancomycin and Zosyn and blood cultures were sent.    ID consult called for g positives and yeast in blood cultures.  Patient is afebrile at the time.  WBC has improved.  Discussed with nurse.  Will discontinue lines.  Continue vancomycin, Daptomycin x1.  Add micafungin daily.    04/07/2020  WBC improved 17--12--10  Ferritin 1752--1538--1897  Intubated Sedated.  FiO2 of 35 people 5.  T-max 101.7°  Dialysis catheter changed yesterday  Leukocytosis improved 12/17/2010 04/08/2020  T-max 99.9°  Intubated sedated.  FiO2 35, peep of 5.  Fail CPAP trial.  Tolerating vancomycin, Zosyn, Micafungin.   Lines are fresh.  Nurse is trying to protect them.  Discussed with nurse:  Her daughter who is a nurse came and saw mother today, she agrees right eye looks better.    04/09/2020  Patient is extubated today, Really weak, Does not breath in deep, I advised her on deep breathing  Continues to need Cardene drip for BP  HAd loose stool-- flexiseal was placed    04/10/2020.  She is very weak.  She tries to opens her eyes and interact with me.  Right eye is improved.  Dialysis nurse is about to start dialysis.    04/11/2020  T max 100.3 yesterday.   She looks tired, but better than yesterday. Today she is  more  "awake and slighty stronger  She was tachycardic yesterday -- Cardene was switched to labetalol  WBC is about the same. ESR 50;  procal is still elveated. vanc level today is 14  Hospitalist ": Requested pulmonary to review possible  bipap -for Covid must have extra filter for machines which are limited "  As per renal :   " UOP 750cc.  3L UF w/HD yesterday.  K+ at goal.  Holding dialysis over weekend to assess for recovery"    04/12/2020 chart review    04/13/2020  Tmax 99.9, WBC normal, platelet still high  Pulled out ngt, It was replaced  Patient is tolerating vancomycin and micafungin.  She continues to have diarrhea.  C diff antigen is positive.    04/14/2020.  T-max a 100°. She follows commands.  She looks anxious.  She is extremely weak.  She is getting dialysis at this time.    04/15/2020.  Patient's voice is louder, she is quite interactive and moves her arms.  She has suctioned her sputum herself earlier today.  She was getting speech evaluation today.  Daughter at bedside pleased with her progression.  T-max 99.1°.  Discussed the line and bacteremia issue with daughter.    04/16/2020   patient is doing well.  She is afebrile.  She is  On 2 L nasal cannula. Patient is moving to regular  Med [facility] room. crp improved.  ferr 1200    Antibiotics (From admission, onward)    Start     Stop Route Frequency Ordered    04/16/20 1100  vancomycin 500 mg in dextrose 5 % 100 mL IVPB (ready to mix system)      -- IV Once 04/16/20 1102    04/13/20 1800  vancomycin 250mg / 10ml oral suspension 250 mg      04/23 1759 PER NG TUBE Every 6 hours 04/13/20 1424    04/05/20 0848  vancomycin - pharmacy to dose  (vancomycin IVPB)      -- IV pharmacy to manage frequency 04/05/20 0748    04/02/20 2315  gentamicin 0.3 % ophthalmic solution 1 drop      -- RIGHT EYE Every 4 hours 04/02/20 2310        Antifungals (From admission, onward)    Start     Stop Route Frequency Ordered    04/06/20 1100  micafungin 100 mg in sodium chloride 0.9 " % 100 mL IVPB (ready to mix system)      -- IV Every 24 hours (non-standard times) 04/06/20 0959        Antivirals (From admission, onward)    None          EXAM & DIAGNOSTICS REVIEWED:   Vitals:     Temp:  [98.1 °F (36.7 °C)-98.9 °F (37.2 °C)]   Temp: 98.8 °F (37.1 °C) (04/16/20 0900)  Pulse: 93 (04/16/20 1000)  Resp: (!) 23 (04/16/20 1000)  BP: 120/73 (04/16/20 1000)  SpO2: 99 % (04/16/20 1000)    Intake/Output Summary (Last 24 hours) at 4/16/2020 1124  Last data filed at 4/16/2020 0900  Gross per 24 hour   Intake 1850 ml   Output 2360 ml   Net -510 ml     Oxygen Concentration (%):  [28] 28    General:  In NAD more happy less anxious today   Eyes:  Anicteric,  ENT:  Lines on bilateral neck dressed appropriately.   Neck:  supple, no masses or adenopathy appreciated  Lungs: Does not breathe all the weight deep.  Clear otherwise.  Heart:  RRR, no gallop/murmur/rub noted  Abd:  Soft, NT, ND, normal BS, no masses or organomegaly appreciated  :  Cuellar  Musc:  Joints without effusion, swelling, erythema, synovitis, muscle wasting.   Skin:  No rashes. No palmar or plantar lesions. No subungual petechiae  Wound:   Neuro: Follows commands.  Very weak, especially in lower extremities.  Psych:  anxious.  Lymphatic:     No cervical, supraclavicular, axillary, or inguinal nodes  Extrem: No edema, erythema, phlebitis, cellulitis, warm and well perfused  VAD:       Isolation:      Lines/Tubes/Drains:  Right IJ 04/06  Left IJ 04/06  Cuellar 03/25  OT 03/25    General Labs reviewed:  Recent Labs   Lab 04/14/20  0317 04/15/20  0319 04/16/20  0335   WBC 9.08 8.61 11.75   HGB 8.0* 9.0* 8.7*   HCT 26.8* 29.5* 28.8*   * 389* 329       Recent Labs   Lab 04/14/20  0317 04/15/20  0319 04/16/20  0335    140 141   K 5.3* 4.3 4.5    95 98   CO2 25 26 25   BUN 81* 65* 52*   CREATININE 5.0* 4.1* 3.6*   CALCIUM 10.0 9.8 10.2   PROT 9.1* 10.0* 9.9*   BILITOT 1.0 1.0 0.9   ALKPHOS 182* 183* 176*   ALT 23 24 25   AST 18 19 23      Micro:  Microbiology Results (last 7 days)     Procedure Component Value Units Date/Time    Blood culture [[id number]]  (Abnormal) Collected:  04/06/20 0806    Order Status:  Completed Specimen:  Blood from Antecubital, Left Updated:  04/15/20 1233     Blood Culture, Routine Gram stain peds bottle: yeast       Results called to and read back by: Tania Nolan RN 04/08/2020        11:20      CANDIDA ALBICANS  Susceptibility pending  [facility].      Blood culture [[id number]] Collected:  04/10/20 0453    Order Status:  Completed Specimen:  Blood Updated:  04/14/20 1412     Blood Culture, Routine No Growth after 4 days.     C Diff Toxin by PCR [[id number]]  (Abnormal) Collected:  04/12/20 2315    Order Status:  Completed Updated:  04/14/20 0033     C. diff PCR Positive    Clostridium difficile EIA [[id number]]  (Abnormal) Collected:  04/12/20 2315    Order Status:  Completed Specimen:  Stool Updated:  04/13/20 1324     C. diff Antigen Positive     C difficile Toxins A+B, EIA Negative     Comment: Testing not recommended for children <24 months old.       Blood culture [[id number]]  (Abnormal) Collected:  04/04/20 0912    Order Status:  Completed Specimen:  Blood from Line, Central Updated:  04/13/20 1303     Blood Culture, Routine Gram stain peds bottle: budding yeast      Results called to and read back by: Carri Pryor RN  04/06/2020  03:04      AJ ALBICANS  [facility].      Blood culture [[id number]] Collected:  04/08/20 1739    Order Status:  Completed Specimen:  Blood from Line, Central Updated:  04/12/20 2212     Blood Culture, Routine No Growth after 4 days.     Blood culture [[id number]] Collected:  04/08/20 0745    Order Status:  Completed Specimen:  Blood Updated:  04/12/20 1412     Blood Culture, Routine No Growth after 4 days.     Blood culture [[id number]] Collected:  04/07/20 1730    Order  Status:  Completed Specimen:  Blood from Line, Central Updated:  04/11/20 2212     Blood Culture, Routine No Growth after 4 days.     Blood culture [[id number]] Collected:  04/07/20 0907    Order Status:  Completed Specimen:  Blood Updated:  04/11/20 2212     Blood Culture, Routine No Growth after 4 days.     Clostridium difficile EIA [[id number]]     Order Status:  Canceled Specimen:  Stool     Blood culture [[id number]] Collected:  04/06/20 1802    Order Status:  Completed Specimen:  Blood from Line, Central Updated:  04/10/20 2212     Blood Culture, Routine No Growth after 4 days.     IV catheter culture [[id number]]  (Abnormal) Collected:  04/06/20 1455    Order Status:  Completed Specimen:  Catheter Tip, Intrajugular Updated:  04/09/20 1300     Aerobic Culture - Cath tip STAPHYLOCOCCUS EPIDERMIDIS  < 15 colonies          Imaging Reviewed:  Chest x-ray 04/13/2020 Very mild decrease of the bilateral infiltrates compared to the prior exam.  Interval insertion of NG tube extending beneath the level of the diaphragm.  Central venous lines remain in place  KUB 0409/2020NG tube present with tip overlying the stomach in the left abdomen.  No evidence of bowel obstruction.  No radiographic mass.  CXR 04/06/2020Support devices as above.  No pneumothorax.  Moderate pulmonary airspace disease without significant change.  CXR 04/05/2020 No significant interval change in the bilateral airspace disease.  Support devices in stable position.    Cardiology:  Sinus rhythm  Repeat limited ECHO 04/13/2020   · Mild concentric left ventricular hypertrophy.  · Global hypokinetic wall motion.  · Moderately decreased left ventricular systolic function. The estimated ejection fraction is 35%.  · Grade I (mild) left ventricular diastolic dysfunction consistent with impaired relaxation.  · Normal right ventricular systolic function.  · Mild left atrial enlargement.  · Moderate mitral regurgitation.  · Normal central venous pressure (3  mmHg).  ·   ECHO on 03/27/2020  · Mild left ventricular enlargement.  · Mildly decreased left ventricular systolic function. The estimated ejection fraction is 45%.  · Grade I (mild) left ventricular diastolic dysfunction consistent with impaired relaxation.  · Normal right ventricular systolic function.  · Mild left atrial enlargement.  · Moderate mitral regurgitation.  · Mild tricuspid regurgitation.  · Mild pulmonary hypertension present. PASP 40 mm of Hg.     IMPRESSION & PLAN     Staphylococcus epidermidis bacteremia, line infection.  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  C diff PCR is negative.  Will treat with vanc p.o.  Respiratory failure, ARDS, COVID19- completed treatment.  Intubated 03/25/2020-  Extubated 04/08/2020.  HTN, CHF with EF of 45%--35%.  Diastolic dysfunction grade 1.  Moderate MR.  This patient is high risk for life-threatening deterioration and death secondary to above comorbidities and need for IV treatment Critical care 35 min    Ferritin 1752--1538--1897---1865  --215---190--174.6--186.5---139  Procalcitonin 3.67---5.34--2.69    Recommendations:  --- Continue Vancomycin iv  for S epidermidis (count 14 days from 04/07)  --- Continue Micafungin  for candidemia due to C albicans.     Follow final susceptibilities.  Most likely  will be able to  switch to Diflucan 400 mg either IV or p.o.    End date 0/05/2020.  - Eventually may need retinal exam.  We changed  Lines  on 04/06 in afternoon.   BCx drawn on the a.m . of 04/06 were positive for C albicans!  Repeat blood cultures were negative after that  --  vancomycin p.o. for C diff diarrhea .  At least 10 days of treatment.     will follow from periphery  Dr. Ameya reed will be on ID service tomorrow

## 2020-04-16 NOTE — PLAN OF CARE
Patient continues in ICU. On dysphagia diet. Note patient not tolerating with nausea and vomiting episode 2 hours following eating 10% of pudding only. States she does not like the regular food just wants the pudding. Encourage oral fluids this shift related to minimal urine output. Tolerates thin liquids. [place] to self only at this time. Encouraged independence. Holds cup and feeds self  Ice and water. SR/ST this shift. NG tube to low intermittent suction following vomiting episode. 100 output noted, yellow in color. Continues on 2L NC. Tolerating well at this time. Continues on CDiff precautions/Droplet and airborne precautions. Note no BM this shift. Pt. Daughter updated on current plan of care. Safety intact. Cuellar to gravity. Safety intact.

## 2020-04-16 NOTE — PROGRESS NOTES
Pharmacokinetic Assessment Follow Up: IV Vancomycin    Vancomycin serum concentration assessment(s):    The random level was drawn correctly and can be used to guide therapy at this time. The measurement is below the desired definitive target range of 15 to 20 mcg/mL.    Vancomycin Regimen Plan:    Patient's random level was 14.6 mcg/mL and slightly below the therapeutic range of 15-20 mcg/mL.  Patient will be given vancomycin 500 mg.   A random level will be drawn on 04/17/20 with AM labs.     Drug levels (last 3 results):  Recent Labs   Lab Result Units 04/14/20  1045 04/15/20  0319 04/16/20  0335   Vancomycin, Random ug/mL 9.3 25.3 14.6       Pharmacy will continue to follow and monitor vancomycin.    Please contact pharmacy at extension [phone] for questions regarding this assessment.    Thank you for the consult,   Jose R Francis       Patient brief summary:  Maria Victoria Hernandez is a 55 y.o. female initiated on antimicrobial therapy with IV Vancomycin for treatment of bacteremia    The patient is being dosed by level.    Drug Allergies:   Review of patient's allergies indicates:  No Known Allergies    Actual Body Weight:   97.8 kg (215 lb 9.8 oz)    Renal Function:   Estimated Creatinine Clearance: 19.1 mL/min (A) (based on SCr of 3.6 mg/dL (H)).,     Dialysis Method (if applicable):  intermittent HD. Patient is scheduled to receive HD MWF but has been receiving more frequently.     CBC (last 72 hours):  Recent Labs   Lab Result Units 04/14/20  0317 04/15/20  0319 04/16/20  0335   WBC K/uL 9.08 8.61 11.75   Hemoglobin g/dL 8.0* 9.0* 8.7*   Hematocrit % 26.8* 29.5* 28.8*   Platelets K/uL 402* 389* 329   Gran% % 65.9 57.7 60.6   Lymph% % 14.5* 18.0 18.2   Mono% % 12.3 15.4* 11.6   Eosinophil% % 2.5 3.9 2.9   Basophil% % 1.2 1.9 1.6   Differential Method  Automated Automated Automated       Metabolic Panel (last 72 hours):  Recent Labs   Lab Result Units 04/14/20  0317 04/15/20  0319 04/16/20  0335   Sodium  mmol/L 143 140 141   Potassium mmol/L 5.3* 4.3 4.5   Chloride mmol/L 100 95 98   CO2 mmol/L 25 26 25   Glucose mg/dL 91 121* 101   BUN, Bld mg/dL 81* 65* 52*   Creatinine mg/dL 5.0* 4.1* 3.6*   Albumin g/dL 2.8* 3.2* 3.4*   Total Bilirubin mg/dL 1.0 1.0 0.9   Alkaline Phosphatase U/L 182* 183* 176*   AST U/L 18 19 23   ALT U/L 23 24 25       Vancomycin Administrations:  vancomycin given in the last 96 hours                     vancomycin 250mg / 10ml oral suspension 250 mg (mg) 250 mg Given 04/16/20 0533     250 mg Given 04/15/20 2318     250 mg Given  1839     250 mg Given  1125     250 mg Given  0514     250 mg Given  0000     250 mg Given 04/14/20 1822     250 mg Given  1300     250 mg Given  0611     250 mg Given  0032    vancomycin 750 mg in dextrose 5 % 250 mL IVPB (ready to mix system) (mg) 750 mg New Bag 04/14/20 1640                      Microbiologic Results:  Microbiology Results (last 7 days)       Procedure Component Value Units Date/Time    Blood culture [[id number]]  (Abnormal) Collected:  04/06/20 0806    Order Status:  Completed Specimen:  Blood from Antecubital, Left Updated:  04/15/20 1233     Blood Culture, Routine Gram stain peds bottle: yeast       Results called to and read back by: Tania Nolan RN 04/08/2020        11:20      CANDIDA ALBICANS  Susceptibility pending  ID consult required at [facility] [facility] locations.      Blood culture [[id number]] Collected:  04/10/20 0453    Order Status:  Completed Specimen:  Blood Updated:  04/14/20 1412     Blood Culture, Routine No Growth after 4 days.     C Diff Toxin by PCR [[id number]]  (Abnormal) Collected:  04/12/20 2315    Order Status:  Completed Updated:  04/14/20 0033     C. diff PCR Positive    Clostridium difficile EIA [[id number]]  (Abnormal) Collected:  04/12/20 2315    Order Status:  Completed Specimen:  Stool Updated:  04/13/20 1324     C. diff Antigen Positive     C difficile Toxins A+B, EIA Negative     Comment: Testing  not recommended for children <24 months old.       Blood culture [[id number]]  (Abnormal) Collected:  04/04/20 0912    Order Status:  Completed Specimen:  Blood from Line, Central Updated:  04/13/20 1303     Blood Culture, Routine Gram stain peds bottle: budding yeast      Results called to and read back by: Carri Pryor RN  04/06/2020  03:04      AJ ALBICANS  ID consult required at [facility] and [facility].      Blood culture [[id number]] Collected:  04/08/20 1739    Order Status:  Completed Specimen:  Blood from Line, Central Updated:  04/12/20 2212     Blood Culture, Routine No Growth after 4 days.     Blood culture [[id number]] Collected:  04/08/20 0745    Order Status:  Completed Specimen:  Blood Updated:  04/12/20 1412     Blood Culture, Routine No Growth after 4 days.     Blood culture [[id number]] Collected:  04/07/20 1730    Order Status:  Completed Specimen:  Blood from Line, Central Updated:  04/11/20 2212     Blood Culture, Routine No Growth after 4 days.     Blood culture [[id number]] Collected:  04/07/20 0907    Order Status:  Completed Specimen:  Blood Updated:  04/11/20 2212     Blood Culture, Routine No Growth after 4 days.     Clostridium difficile EIA [[id number]]     Order Status:  Canceled Specimen:  Stool     Blood culture [[id number]] Collected:  04/06/20 1802    Order Status:  Completed Specimen:  Blood from Line, Central Updated:  04/10/20 2212     Blood Culture, Routine No Growth after 4 days.     IV catheter culture [[id number]]  (Abnormal) Collected:  04/06/20 1455    Order Status:  Completed Specimen:  Catheter Tip, Intrajugular Updated:  04/09/20 1300     Aerobic Culture - Cath tip STAPHYLOCOCCUS EPIDERMIDIS  < 15 colonies      IV catheter culture [[id number]] Collected:  04/06/20 1615    Order Status:  Completed Specimen:  Catheter Tip, Dialysis Updated:  04/09/20 1121     Aerobic Culture - Cath tip No growth

## 2020-04-16 NOTE — PROGRESS NOTES
INPATIENT NEPHROLOGY PROGRESS NOTE  [facility] NEPHROLOGY    Patient Name: Maria Victoria Hernandez  Date: 04/16/2020    Reason for consultation: MAIKOL    History of Present Illness:  54AAF nurse with morbid obesity, hypothyroidism presents PNA, intubated, positive for COVID19. Admit Cr 0.7 went 5.1 and HD started on 3/29.     3/28  Scr worse today.  Only one shift recorded for output, 520cc.  Still hyponatremic, vent setting adjusted per pulmonology note for acidosis.  K+ at goal after repletion.  Has siri, may need HD initiated.  3/29  Non oliguric.  In no distress  3/30 VSS, seen and examined on HD, tolerating well. Plan another HD in AM, discussed with daughter who is a nurse here too.  3/31 VSS, intubated still. UO is not great but she is dialyzed daily. Seen and examined on HD, tolerating well. Plan another HD in AM.  4/1 VSS, intubated still. UO is not great but she is dialyzed daily. Seen and examined on HD, tolerating well. Plan another HD PRN.  4/2 VSS, intubated still. UO is not great. Plan another HD in AM.  4/3 VSS, intubated still. UO is not great but she is dialyzed daily recently. Seen and examined on HD, tolerating well. Plan another HD on Mon or PRN.  4/4 febrile, BP stable, FIO2 50%, intubated, sedated, on levophed, UOP 900cc, transfused  4/5 febrile, tachy, SBP 100s, FIO2 65%, intubated, sedated, off levophed, UOP 935cc  4/6 intubated,. Sedated  4/7 intubated, sedated. Dialysis catheter changed yesterday  4/8 just finished dialysis. uf 3 liters. Extubated  4/10  Seen on dialysis.  No distress.  4/11  UOP 750cc.  3L UF w/HD yesterday.  K+ at goal.  Holding dialysis over weekend to assess for recovery.  Significant event noted 6pm yesterday per primary notes.  Med changes made 2/2 tachycardia, tachypnea.   She is hypotensive today.   4/12   UOP 1.2L.  Scheduled for dialysis tomorrow, no recovery noted thus far.  Pulmonology working toward extubation and weaning off labetalol gtt, ordered  clonidine PRN SBP>160.  4/13 low grade T, SBP 140s, on 3-4L NC, unable to do HD today due to malfunctioning access, UOP 3.2L; CXR: Very mild decrease of the bilateral infiltrates compared to the prior exam.   4/14 got new temporary dialysis catheter overnight; febrile, tachy, -160s, on 1.5L NC, UOP 2.8L; seen on HD - will get off 2L- updated daughter at bedside  4/15 low grade temp, SBP > 130, on 2L NC, UOP 1.6L; MRI brain yest neg; tolerating TFs; worked with therapy today- sitting upright in bed- able to follow commands and nods head when I'm speaking with her  4/16 afebrile, overall BP readings better, on 2-3L NC, UOP 850cc- patient dropped BP during treatment and developed blank stare- UF turned off- mental status returned- completed treatment, net even; laying in bed today- able to whisper a few words, able to follow commands and follow my conversation, she even smiles    Echo 3/27/20:  · Mild left ventricular enlargement.  · Mildly decreased left ventricular systolic function. The estimated ejection fraction is 45%.  · Grade I (mild) left ventricular diastolic dysfunction consistent with impaired relaxation.  · Normal right ventricular systolic function.  · Mild left atrial enlargement.  · Moderate mitral regurgitation.  · Mild tricuspid regurgitation.  · Mild pulmonary hypertension present. PASP 40 mm of Hg.    Echo 4/13/20:  · Mild concentric left ventricular hypertrophy.  · Global hypokinetic wall motion.  · Moderately decreased left ventricular systolic function. The estimated ejection fraction is 35%.  · Grade I (mild) left ventricular diastolic dysfunction consistent with impaired relaxation.  · Normal right ventricular systolic function.  · Mild left atrial enlargement.  · Moderate mitral regurgitation.  · Normal central venous pressure (3 mmHg).    Plan of Care:    1. Septic shock 2/2 COVID PNA with HHRF requiring MV- resolved  - weaning O2 NC  - remains on PO bicarb- using 40 bicarb bath  -  continue aggressive PT/OT  - dose antbx for dialysis (on therapy for Staph Epi bacteremia, Candida Alb fungemia, C diff)  - 4/14 and 4/15 COVID testing negative    2. MAIKOL - suspect multifactorial ATN in setting of shock/COVID/intravascular volume depletion- initiated HD 3/29  - her Cr trends are actually improving- she is nonoliguric- will do HD Friday and then hold the weekend to assess for recovery  - no nsaids or IV contrast  - dose meds for dialysis    3. HTN/Systolic CHF- worsening based on echo- net -10L for admission  - BP is much improved; we have reached her dry weight  - continue PO lasix at current dose, cut back hydralazine to 10mg BID; plan for UF even on Friday    4. Anemia  - Hb is stable  - continue TERE with HD MWF- if we stop HD, will transition to SQ weekly    Thank you for allowing us to participate in this patient's care. We will continue to follow.    Vital Signs:  Temp Readings from Last 3 Encounters:   04/16/20 98.8 °F (37.1 °C) (Oral)       Pulse Readings from Last 3 Encounters:   04/16/20 90   01/15/15 84   12/17/13 80       BP Readings from Last 3 Encounters:   04/16/20 (!) 140/98   01/15/15 124/82   12/17/13 102/68       Weight:  Wt Readings from Last 3 Encounters:   04/16/20 97.8 kg (215 lb 9.8 oz)   01/15/15 105.8 kg (233 lb 4.8 oz)   12/17/13 101.2 kg (223 lb)     Medications:  Scheduled Meds:   ascorbic acid (vitamin C)  1,000 mg Per NG tube QID    aspirin  81 mg Oral Daily    calcitRIOL  0.25 mcg Oral Daily    cholestyramine-aspartame  1 packet Per NG tube BID    epoetin raquel-ebpx (RETACRIT) injection  5,000 Units Intravenous Every Mon, Wed, Fri    escitalopram oxalate  5 mg Per NG tube Daily    furosemide  40 mg Per NG tube BID    gentamicin  1 drop Right Eye Q4H    heparin (porcine)  5,000 Units Subcutaneous Q12H    hydrALAZINE  10 mg Per NG tube Q8H    Lactobacillus acidoph-L.bulgar  2 tablet Per NG tube BID    levothyroxine  100 mcg Oral Before breakfast    metoprolol  "tartrate  25 mg Per NG tube BID    micafungin (MYCAMINE) IVPB  100 mg Intravenous Q24H    sodium bicarbonate  650 mg Per NG tube TID    vancomycin  250 mg Per NG tube Q6H    white petrolatum-mineral oiL   Right Eye QHS    zinc sulfate  220 mg Oral Daily     Continuous Infusions:    PRN Meds:.acetaminophen, cloNIDine, heparin (porcine), hydrALAZINE, HYDROmorphone, ipratropium-albuteroL, loperamide, morphine, ondansetron, promethazine (PHENERGAN) IVPB, propofoL, Pharmacy to dose Vancomycin consult **AND** vancomycin - pharmacy to dose  No current facility-administered medications on file prior to encounter.      Current Outpatient Medications on File Prior to Encounter   Medication Sig Dispense Refill    ferrous sulfate 325 mg (65 mg iron) Tab tablet Take 1 tablet (325 mg total) by mouth daily with breakfast. (Patient taking differently: Take 325 mg by mouth daily with breakfast. Take 2 tablets daily) 90 tablet 3    fluticasone propionate (FLONASE) 50 mcg/actuation nasal spray 1 spray by Each Nostril route once daily.      levothyroxine (SYNTHROID) 100 MCG tablet Take 1 tablet (100 mcg total) by mouth once daily. (Patient taking differently: Take 150 mcg by mouth once daily. ) 90 tablet 3    meloxicam (MOBIC) 7.5 MG tablet Take 7.5 mg by mouth once daily.      clotrimazole-betamethasone 1-0.05% (LOTRISONE) cream Apply topically 2 (two) times daily. 45 g 1    levocetirizine (XYZAL) 5 MG tablet Take 1 tablet (5 mg total) by mouth every evening. 30 tablet 6     Review of Systems:  Not conversant    Physical Exam:  BP (!) 140/98   Pulse 90   Temp 98.8 °F (37.1 °C) (Oral)   Resp 16   Ht 5' 1" (1.549 m)   Wt 97.8 kg (215 lb 9.8 oz)   SpO2 99%   Breastfeeding? No   BMI 40.74 kg/m²     INS/OUTS:  I/O last 3 completed shifts:  In: 2570 [P.O.:370; I.V.:120; Other:1200; NG/GT:780; IV Piggyback:100]  Out: 2965 [Urine:1535; Drains:110; Other:1320]  No intake/output data recorded.    Constitutional: nad, aao x " 3  Heart: rrr, no m/r/g, wwp, no edema  Lungs: ant ausc clear, no w/r/r/c, no lb  Abdomen: s/nt/nd, +BS    Results:  Lab Results   Component Value Date     04/16/2020    K 4.5 04/16/2020    CL 98 04/16/2020    CO2 25 04/16/2020    BUN 52 (H) 04/16/2020    CREATININE 3.6 (H) 04/16/2020    CALCIUM 10.2 04/16/2020    ANIONGAP 18 (H) 04/16/2020    ESTGFRAFRICA 16 (A) 04/16/2020    EGFRNONAA 14 (A) 04/16/2020       Lab Results   Component Value Date    CALCIUM 10.2 04/16/2020    PHOS 4.8 (H) 03/29/2020       Recent Labs   Lab 04/16/20  0335   WBC 11.75   RBC 3.26*   HGB 8.7*   HCT 28.8*      MCV 88   MCH 26.7*   MCHC 30.2*     I have personally reviewed pertinent radiological imaging and reports.    Carolyn Moeller MD MPH  [facility]  [address] [place] [address]  MARGARET Evans [address]  [phone] (p)  [phone] (f)

## 2020-04-16 NOTE — ASSESSMENT & PLAN NOTE
Body mass index is 40.74 kg/m². Morbid obesity complicates all aspects of disease management from diagnostic modalities to treatment. Weight loss encouraged and health benefits explained to patient.

## 2020-04-16 NOTE — RESPIRATORY THERAPY
04/16/20 0732   PRE-TX-O2   O2 Device (Oxygen Therapy) nasal cannula   $ Is the patient on Low Flow Oxygen? Yes   Flow (L/min) 3   SpO2 99 %   Pulse Oximetry Type Continuous   $ Pulse Oximetry - Multiple Charge Pulse Oximetry - Multiple   Pulse 90   Resp 16   BP (!) 140/98

## 2020-04-16 NOTE — PLAN OF CARE
Pt transferred to 323.  Progressing.  Strength increased but still weak.  Urine output improved.  Decreased diet to clear/full liquid at this time until able to tolerate PO intake more.  Medicated with zofran x1 today prior to transfer.  Pt able to turn self some in bed for repositioning.  NGT remains although pt requesting removal.  Right eye improved.  Contact precautions now for Cdiff.  Last 2 covid results negative.

## 2020-04-16 NOTE — ASSESSMENT & PLAN NOTE
Patient with Hypoxic Respiratory failure which is Acute.  she is not on home oxygen.   Currently on NC 1.5 L  Treatment for Covid/ARDS - supportive care.   4/8 extubated to Nc oxygen -stable    Pulmonary consultation. /ID consutltation   IV antibiotics - ceftriaxone and azithromycin. And Plaquenil 400 mg daily x 5  Vanc/zosyn initiated 4/5 for staph Epi  micafungin for yeast +blood/sputum 4/6 /ID consulted       Microbiology Results (last 7 days)     Procedure Component Value Units Date/Time    Blood culture [[id number]]  (Abnormal) Collected:  04/06/20 0806    Order Status:  Completed Specimen:  Blood from Antecubital, Left Updated:  04/16/20 1438     Blood Culture, Routine Gram stain peds bottle: yeast       Results called to and read back by: Tania Nolan RN 04/08/2020        11:20      CANDIDA ALBICANS  Susceptibility pending  ID consult required at [facility].[place] and [facility] locations.      Blood culture [[id number]] Collected:  04/10/20 0453    Order Status:  Completed Specimen:  Blood Updated:  04/14/20 1412     Blood Culture, Routine No Growth after 4 days.     C Diff Toxin by PCR [[id number]]  (Abnormal) Collected:  04/12/20 2315    Order Status:  Completed Updated:  04/14/20 0033     C. diff PCR Positive    Clostridium difficile EIA [[id number]]  (Abnormal) Collected:  04/12/20 2315    Order Status:  Completed Specimen:  Stool Updated:  04/13/20 1324     C. diff Antigen Positive     C difficile Toxins A+B, EIA Negative     Comment: Testing not recommended for children <24 months old.       Blood culture [[id number]]  (Abnormal) Collected:  04/04/20 0912    Order Status:  Completed Specimen:  Blood from Line, Central Updated:  04/13/20 1303     Blood Culture, Routine Gram stain peds bottle: budding yeast      Results called to and read back by: Carri Pryor RN  04/06/2020  03:04      AJ ALBICANS  [facility].      Blood culture [[id number]]  Collected:  04/08/20 1739    Order Status:  Completed Specimen:  Blood from Line, Central Updated:  04/12/20 2212     Blood Culture, Routine No Growth after 4 days.     Blood culture [[id number]] Collected:  04/08/20 0745    Order Status:  Completed Specimen:  Blood Updated:  04/12/20 1412     Blood Culture, Routine No Growth after 4 days.     Blood culture [[id number]] Collected:  04/07/20 1730    Order Status:  Completed Specimen:  Blood from Line, Central Updated:  04/11/20 2212     Blood Culture, Routine No Growth after 4 days.     Blood culture [[id number]] Collected:  04/07/20 0907    Order Status:  Completed Specimen:  Blood Updated:  04/11/20 2212     Blood Culture, Routine No Growth after 4 days.     Clostridium difficile EIA [[id number]]     Order Status:  Canceled Specimen:  Stool     Blood culture [[id number]] Collected:  04/06/20 1802    Order Status:  Completed Specimen:  Blood from Line, Central Updated:  04/10/20 2212     Blood Culture, Routine No Growth after 4 days.           Continue routine medications as before.   Follow airborne/droplet precautions.

## 2020-04-16 NOTE — ASSESSMENT & PLAN NOTE
Vancomyin 4/4 -    4/6 -HD catheter tip staph epidermidis   4/6 IV catheter tipstaph epidermidis  4/5 resp culture candida albicans   Urine culture cancelled   Recommendations: per ID   Continue Vancomycin for S epidermidis (count 14 days from 04/07)  --- Continue Micafungin  for candidemia due to C albicans.   Eventually may need retinal exam.  We changed  Lines  on 04/06 in afternoon.   BCx drawn on the am of 04/06 were positive for C albicans!  Repeat blood cultures were negative after that.  Complet complete at least 3 weeks of therapy.  Follow final susceptibility testing; most likely will switch to Diflucan.  -- started vancomycin p.o. for C diff diarrhea  Microbiology Results (last 7 days)     Procedure Component Value Units Date/Time    Blood culture [[id number]]  (Abnormal) Collected:  04/06/20 0806    Order Status:  Completed Specimen:  Blood from Antecubital, Left Updated:  04/16/20 1438     Blood Culture, Routine Gram stain peds bottle: yeast       Results called to and read back by: Tania Nolan RN 04/08/2020        11:20      CANDIDA ALBICANS  Susceptibility pending  ID consult required at [facility] [facility].      Blood culture [[id number]] Collected:  04/10/20 0453    Order Status:  Completed Specimen:  Blood Updated:  04/14/20 1412     Blood Culture, Routine No Growth after 4 days.     C Diff Toxin by PCR [[id number]]  (Abnormal) Collected:  04/12/20 2315    Order Status:  Completed Updated:  04/14/20 0033     C. diff PCR Positive    Clostridium difficile EIA [[id number]]  (Abnormal) Collected:  04/12/20 2315    Order Status:  Completed Specimen:  Stool Updated:  04/13/20 1324     C. diff Antigen Positive     C difficile Toxins A+B, EIA Negative     Comment: Testing not recommended for children <24 months old.       Blood culture [[id number]]  (Abnormal) Collected:  04/04/20 0912    Order Status:  Completed Specimen:  Blood from Line, Central Updated:  04/13/20 1303     Blood  Culture, Routine Gram stain peds bottle: budding yeast      Results called to and read back by: Carri Pryor RN  04/06/2020  03:04      AJ ALBICANS  ID consult required at [facility].[place] and [facility].      Blood culture [[id number]] Collected:  04/08/20 1739    Order Status:  Completed Specimen:  Blood from Line, Central Updated:  04/12/20 2212     Blood Culture, Routine No Growth after 4 days.     Blood culture [[id number]] Collected:  04/08/20 0745    Order Status:  Completed Specimen:  Blood Updated:  04/12/20 1412     Blood Culture, Routine No Growth after 4 days.     Blood culture [[id number]] Collected:  04/07/20 1730    Order Status:  Completed Specimen:  Blood from Line, Central Updated:  04/11/20 2212     Blood Culture, Routine No Growth after 4 days.     Blood culture [[id number]] Collected:  04/07/20 0907    Order Status:  Completed Specimen:  Blood Updated:  04/11/20 2212     Blood Culture, Routine No Growth after 4 days.     Clostridium difficile EIA [[id number]]     Order Status:  Canceled Specimen:  Stool     Blood culture [[id number]] Collected:  04/06/20 1802    Order Status:  Completed Specimen:  Blood from Line, Central Updated:  04/10/20 2212     Blood Culture, Routine No Growth after 4 days.

## 2020-04-16 NOTE — NURSING
Report received from Suni. VS stable. Patient denies any pain at this time. NG to to right nares clamped. Patient able to turn with some assistance. Call light within reach , will monitor.

## 2020-04-16 NOTE — PT/OT/SLP PROGRESS
"Occupational Therapy   Treatment    Name: Maria Victoria Hernandez  MRN: [id number]  Admitting Diagnosis:  Acute respiratory failure with hypoxia       Recommendations:     Discharge Recommendations: rehabilitation facility, nursing facility, skilled  Discharge Equipment Recommendations:  (TBD)  Barriers to discharge:  Decreased caregiver support    Assessment:     Maria Victoria Hernandez is a 55 y.o. female with a medical diagnosis of Acute respiratory failure with hypoxia.  She presents with minor confusion and decreased coordination 2* several weeks of intubation/sedation. Pt requires increased time for ADLs and is very happy to be out of the ICU and on to the 3rd floor.  Performance deficits affecting function are weakness, impaired endurance, impaired self care skills, impaired functional mobilty, gait instability, impaired balance, impaired cognition, decreased coordination, decreased upper extremity function, decreased lower extremity function, impaired cardiopulmonary response to activity.     Rehab Prognosis:  Good; patient would benefit from acute skilled OT services to address these deficits and reach maximum level of function.       Plan:     Patient to be seen 6 x/week to address the above listed problems via self-care/home management, therapeutic activities, therapeutic exercises, cognitive retraining  · Plan of Care Expires: 05/05/20  · Plan of Care Reviewed with: patient    Subjective   " Oh Lord, it feels so good to brush my teeth!"    Pain/Comfort:  · Pain Rating 1: 0/10    Objective:     Communicated with: RN, Mirta prior to session.  Patient found up in chair with mueller catheter, oxygen, PICC line, telemetry, NG tube upon OT entry to room.    General Precautions: Standard, fall, contact   Orthopedic Precautions:N/A   Braces: N/A     Occupational Performance:     Bed Mobility:    · Refer to PT note     Functional Mobility/Transfers:  · Refer to PT note  · Functional Mobility: good AROM of B UE and " "WFL.    Activities of Daily Living:  · Grooming: minimum assistance seated in chair with set up at tray table. pt requires assist to navigate around NG tube and to counteract confusion and coordination deficits.    [facility] 6 Click ADL: 14    Treatment & Education:  -HOLMAN ed pt to say commands out loud while she is attempting them to assist in completion. Ie: "I am putting the cap on the toothpaste"     Patient left up in chair with all lines intact, call button in reach, chair alarm on and RN, Mirta notifiedEducation:      GOALS:   Multidisciplinary Problems     Occupational Therapy Goals        Problem: Occupational Therapy Goal    Goal Priority Disciplines Outcome Interventions   Occupational Therapy Goal     OT, PT/OT Ongoing, Progressing    Description:  Goals to be met by: 5/5/20    Patient will increase functional independence with ADLs by performing:    Feeding with Modified [place] and Assistive Devices as needed.  UE Dressing with Set-up Assistance and Minimal Assistance.  Grooming while seated with Supervision.  Sitting at edge of bed x10 minutes with Stand-by Assistance and use of upper extremity support.  Toilet transfer to bedside commode with Moderate Assistance.  Upper extremity exercise program x10 reps per handout, with assistance as needed.                      Time Tracking:     OT Date of Treatment: 04/16/20  OT Start Time: 1609  OT Stop Time: 1620  OT Total Time (min): 11 min    Billable Minutes:Self Care/Home Management 11 min    ABBY Gregory  4/16/2020    "

## 2020-04-16 NOTE — PROGRESS NOTES
Progress Note  PULMONARY    Admit Date: 3/25/2020   04/16/2020      SUBJECTIVE:     3/27- remains intubated, on vent. Was on Lasix drip overnight and now w/ IVF for UOP. On minimal Levophed   3/28- remains intubated, on PEEP 14/FiO2 50%. Levophed 0.06 mcg/kg/min  3/29- remains intubated, PEEP 12, FiO2 40%. Levophed off  3/30- remains intubated, PEEP 8, FiO2 40%. Levophed off. Started HD yesterday and having second session today. Daughter came to visit (works on 3rd floor), and updated this am  3/31- remains intubated, PEEP 8/FiO2 40%. HD yesterday w/ removal of 3.5L. With SBT yesterday not following commands and vomited stomach contents  4/1- remains intubated, PEEP 8/FiO2 40%. Not waking up or following commands off sedation- even w/ daughter at bedside, failed SBT due to tachypnea. Levophed at 0.02 mcg/kg/min. Had HD yesterday w/ removal of 2.5L. Had head CT. EEG pending  4/2- remains intubated, PEEP 6, FiO2 40%. w/ hemodynamic instability yesterday-- hypertensive then very hypotensive when tried to move her, which delayed weaning and MRI. Now off Levophed. HD yesterday w/ removal of 3.5L  4/3not arousing,  4/4 arousing somewhat- looks air hungry,  4/5- no c/o,sedated  4/6-  Remains intubated,  PEEP 5 FiO2 50%. On Nicardipine for HTN. HD this am. On CPAP trial since 4am w/ good gas exchange. Opens eyes and looks around but not following commands  4/7- remains intubated, had central lines replaced yesterday. Became tachypneic and put back on vent later in afternoon but tolerated PS trial all day. Today tachypneic w/ increased work of breathing with weaning trial  4/8- remains on vent, PEEP 5 FiO2 35%. On Levophed 0.04mcg/kg/min, precedex drip  4/9- extubated yesterday, on 5L nasal cannula. Very weak, per nurse was agitated and w/ chest tightness w/ breathing- better after Dilaudid. Per report answers yes/no questions and follows commands  4/10- on 5L nasal cannula, precedex drip  4/11- on 4L nasal cannula w/ sat  100%, waking up more- per daughter waved at her  4/12, no new c/o  4/13- on 3.5L nasal cannula  4/14- on nasal cannula, no new complaints  4/15- no new complaints  4/16- no new complaints    PFSH and Allergies reviewed.    OBJECTIVE:     Vitals (Most recent):  Vitals:    04/16/20 0900   BP: (!) 154/96   Pulse: 88   Resp: 15   Temp: 98.8 °F (37.1 °C)       Vitals (24 hour range):  Temp:  [98.1 °F (36.7 °C)-98.9 °F (37.2 °C)]   Pulse:  []   Resp:  [14-58]   BP: (101-158)/()   SpO2:  [92 %-100 %]       Intake/Output Summary (Last 24 hours) at 4/16/2020 0937  Last data filed at 4/16/2020 0900  Gross per 24 hour   Intake 1850 ml   Output 2360 ml   Net -510 ml          Physical Exam:  The patient's neuro status (alertness,orientation,cognitive function,motor skills,), pharyngeal exam (oral lesions, hygiene, abn dentition,), Neck (jvd,mass,thyroid,nodes in neck and above/below clavicle),RESPIRATORY(symmetry,effort,fremitus,percussion,auscultation),  Cor(rhythm,heart tones including gallops,perfusion,edema)ABD(distention,hepatic&splenomegaly,tenderness,masses), Skin(rash,cyanosis),Psyc(affect,judgement,).  Exam negative except for these pertinent findings:    Awake, smiles, speaks softly, appropriate responses  R sclera injected & conjunctival erythema - improving  Obese  Abdomen soft  Edema improved   Follows commands w/ improved strength in extremities    Radiographs reviewed: - view by direct vision  MRI 4/14-   No evidence of an acute intracranial abnormality.  Few scattered small areas of T2/FLAIR hyperintensity within the supratentorial white matter, which are nonspecific and not out of proportion for the patient's age, possibly reflecting chronic microvascular ischemic change.    CXR 4/13- bilateral infiltrates increased  CXR 4/6- similar  cxr 4/5 ARDS + pulmonary edema  CXR 4/2- bibasilar fluffy opacities and pulmonary edema, similar appearance  CT head 3/31- no acute findings  CXR 3/31- improving  bibasilar opacities  CXR 3/29- unchanged  CXR 3/27- bilateral mid and lower lobe fluffy airspace disease  CXR 3/26- increased consolidation RLL    TTE 4/13  · Mild concentric left ventricular hypertrophy.  · Global hypokinetic wall motion.  · Moderately decreased left ventricular systolic function. The estimated ejection fraction is 35%.  · Grade I (mild) left ventricular diastolic dysfunction consistent with impaired relaxation.  · Normal right ventricular systolic function.  · Mild left atrial enlargement.  · Moderate mitral regurgitation.  · Normal central venous pressure (3 mmHg).    TTE 3/27  · Mild left ventricular enlargement.  · Mildly decreased left ventricular systolic function. The estimated ejection fraction is 45%.  · Grade I (mild) left ventricular diastolic dysfunction consistent with impaired relaxation.  · Normal right ventricular systolic function.  · Mild left atrial enlargement.  · Moderate mitral regurgitation.  · Mild tricuspid regurgitation.  · Mild pulmonary hypertension present. PASP 40 mm of Hg.    Labs     Recent Labs   Lab 04/16/20  0335   WBC 11.75   HGB 8.7*   HCT 28.8*        Recent Labs   Lab 04/16/20  0335      K 4.5   CL 98   CO2 25   BUN 52*   CREATININE 3.6*      CALCIUM 10.2   AST 23   ALT 25   ALKPHOS 176*   BILITOT 0.9   PROT 9.9*   ALBUMIN 3.4*   CRP 36.9*   PROCAL 2.69*     No results for input(s): PH, PCO2, PO2, HCO3 in the last 24 hours.  Microbiology Results (last 7 days)     Procedure Component Value Units Date/Time    Blood culture [[id number]]  (Abnormal) Collected:  04/06/20 0806    Order Status:  Completed Specimen:  Blood from Antecubital, Left Updated:  04/15/20 1233     Blood Culture, Routine Gram stain peds bottle: yeast       Results called to and read back by: Tania Nolan RN 04/08/2020        11:20      CANDIDA ALBICANS  Susceptibility pending  ID consult required at [facility] [place] [facility]ona and Mel[place] jimenez.      Blood culture  [[id number]] Collected:  04/10/20 0453    Order Status:  Completed Specimen:  Blood Updated:  04/14/20 1412     Blood Culture, Routine No Growth after 4 days.     C Diff Toxin by PCR [[id number]]  (Abnormal) Collected:  04/12/20 2315    Order Status:  Completed Updated:  04/14/20 0033     C. diff PCR Positive    Clostridium difficile EIA [[id number]]  (Abnormal) Collected:  04/12/20 2315    Order Status:  Completed Specimen:  Stool Updated:  04/13/20 1324     C. diff Antigen Positive     C difficile Toxins A+B, EIA Negative     Comment: Testing not recommended for children <24 months old.       Blood culture [[id number]]  (Abnormal) Collected:  04/04/20 0912    Order Status:  Completed Specimen:  Blood from Line, Central Updated:  04/13/20 1303     Blood Culture, Routine Gram stain peds bottle: budding yeast      Results called to and read back by: Carri Pryor RN  04/06/2020  03:04      AJ ALBICANS  ID consult required at [facility] [place] [facility].      Blood culture [[id number]] Collected:  04/08/20 1739    Order Status:  Completed Specimen:  Blood from Line, Central Updated:  04/12/20 2212     Blood Culture, Routine No Growth after 4 days.     Blood culture [[id number]] Collected:  04/08/20 0745    Order Status:  Completed Specimen:  Blood Updated:  04/12/20 1412     Blood Culture, Routine No Growth after 4 days.     Blood culture [[id number]] Collected:  04/07/20 1730    Order Status:  Completed Specimen:  Blood from Line, Central Updated:  04/11/20 2212     Blood Culture, Routine No Growth after 4 days.     Blood culture [[id number]] Collected:  04/07/20 0907    Order Status:  Completed Specimen:  Blood Updated:  04/11/20 2212     Blood Culture, Routine No Growth after 4 days.     Clostridium difficile EIA [[id number]]     Order Status:  Canceled Specimen:  Stool     Blood culture [[id number]] Collected:  04/06/20 1802    Order Status:  Completed Specimen:  Blood from Line, Central Updated:  04/10/20  2212     Blood Culture, Routine No Growth after 4 days.     IV catheter culture [[id number]]  (Abnormal) Collected:  04/06/20 1455    Order Status:  Completed Specimen:  Catheter Tip, Intrajugular Updated:  04/09/20 1300     Aerobic Culture - Cath tip STAPHYLOCOCCUS EPIDERMIDIS  < 15 colonies      IV catheter culture [[id number]] Collected:  04/06/20 1615    Order Status:  Completed Specimen:  Catheter Tip, Dialysis Updated:  04/09/20 1121     Aerobic Culture - Cath tip No growth        Results for WOLF SINGER (MRN [id number]) as of 4/1/2020 09:28   Ref. Range 4/1/2020 02:53   ALT Latest Ref Range: 10 - 44 U/L 54 (H)     Impression:  Active Hospital Problems    Diagnosis  POA    *Acute respiratory failure with hypoxia [J96.01]  Yes    Conjunctivitis of right eye [H10.9]  No    Post viral debility [R53.81]  No    Acute on chronic combined systolic and diastolic congestive heart failure [I50.43]  Yes    Fungemia [B49]  No    Bacteremia [R78.81]  No     Staphylococcus epidermidis bacteremia, line infection.  04/05  Candidemia due to Candida albicans on 04/04  Respiratory failure, ARDS, COVID19, completed treatment  HTN, CHF with EF of 45%  This patient is high risk for life-threatening deterioration and death secondary to above comorbidities and need for IV treatment Critical care 35 min         Pneumonia due to infectious organism [J18.9]  Yes    Acute encephalopathy [G93.40]  No    Acute renal failure [N17.9]  No    COVID-19 virus infection [U07.1]  Yes    Morbid obesity [E66.01]  Yes    Hypothyroid [E03.9]  Yes    COVID-19 virus detected [U07.1]  Yes    Diverticulosis of large intestine without hemorrhage [K57.30]  Yes    Iron deficiency anemia, unspecified [D50.9]  Yes      Resolved Hospital Problems    Diagnosis Date Resolved POA    Shock [R57.9] 04/14/2020 Unknown    ARDS (adult respiratory distress syndrome) [J80] 04/13/2020 Yes               Plan:   Acute hypoxemic respiratory  failure, stable to improving O2 reqt  COVID-19 pneumonia/ARDS, improved  Acute renal failure, on HD  Metabolic acidosis due to renal failure- improved  Shock, resolved   HTN  Combined heart failure, EF 45%  Morbid obesity  Acute encephalopathy  Right conjunctivitis  Bacteremia w/ s. Epidermidis; fungemia- s/p exchange of central lines      - continue supplemental oxygen to keep sats greater than 92%  - MRI completed- w/ microvascular ischemic changes, otherwise normal  - continue PT/OT and speech therapy  - up to chair today  - started diet per speech  - continue combivent inhaler PRN  - HTN meds per hospitalist  - continue HD per nephro  - oral bicarb 650 TID- continue and assess whether to stop later when renal function improves  - has had adequate course (7d) of azithromycin, ceftriaxone, hydroxychloroquine  - antibiotics per ID- treating for bacteremia & fungemia  - d/w hospitalist  - can transfer to floor  - dc COVID precautions, w/ 2 negative tests now  - CM working on dc to LTACH, this is appropriate for continued care  - pulmonary will sign off; please call if questions    Milvia Mcguire MD  Pulmonary & Critical Care Medicine

## 2020-04-16 NOTE — NURSING
HD complete. Total uf 120cc. Pt did not tolerate hd well, Dr. Moeller aware. Report given to Robi Bach RN.

## 2020-04-16 NOTE — ASSESSMENT & PLAN NOTE
Acute, Poss ATN / -unclear yet if ESRD   required HD for acute renal failure-  Dialysis held yesterday due to episode of hypotension  ? Element ATN 2/2 hypovolemia/sepsis  Nephrology following

## 2020-04-16 NOTE — SUBJECTIVE & OBJECTIVE
Interval History: Patient doing well. afebrile, overall BP readings better, on 2 L NC, UOP 850cc- patient dropped BP during dialysis    Review of Systems   Unable to perform ROS: Mental status change   Constitutional: Positive for activity change, appetite change and fatigue.   HENT: Positive for sore throat.    Respiratory: Negative for chest tightness and shortness of breath.    Neurological: Positive for weakness.     Objective:     Vital Signs (Most Recent):  Temp: 97 °F (36.1 °C) (04/16/20 1644)  Pulse: 93 (04/16/20 1644)  Resp: 18 (04/16/20 1644)  BP: 139/78 (04/16/20 1644)  SpO2: 96 % (04/16/20 1644) Vital Signs (24h Range):  Temp:  [97 °F (36.1 °C)-99.1 °F (37.3 °C)] 97 °F (36.1 °C)  Pulse:  [] 93  Resp:  [10-58] 18  SpO2:  [92 %-100 %] 96 %  BP: (106-160)/() 139/78     Weight: 97.8 kg (215 lb 9.8 oz)  Body mass index is 40.74 kg/m².    Intake/Output Summary (Last 24 hours) at 4/16/2020 1716  Last data filed at 4/16/2020 1445  Gross per 24 hour   Intake 2050 ml   Output 2560 ml   Net -510 ml      Physical Exam   Nursing note and vitals reviewed.  PATIENT WAS SEEN AS A VIDEO VISIT WITH NURSE IN PATIENT ROOM WITH PATIENT TO ASSIST DURING THE VISIT. PHYSICAL EXAM FINDINGS ARE AS VIEWED BY MYSELF VIA VIDEO OR AS REPORTED BY NURSE IF SPECIFIED AS SUCH, EXAM NOT DONE PERSONALLY BY MYSELF AT BEDSIDE.      Significant Labs:   BMP:   Recent Labs   Lab 04/16/20  0335         K 4.5   CL 98   CO2 25   BUN 52*   CREATININE 3.6*   CALCIUM 10.2     CBC:   Recent Labs   Lab 04/15/20  0319 04/16/20 0335   WBC 8.61 11.75   HGB 9.0* 8.7*   HCT 29.5* 28.8*   * 329       Significant Imaging: I have reviewed all pertinent imaging results/findings within the past 24 hours.

## 2020-04-16 NOTE — PLAN OF CARE
Problem: SLP Goal  Goal: SLP Goal  Description  1) Consume regular diet and thin liquids no s/s oropharyngeal dysphagia--Revised   Outcome: Ongoing, Progressing    Per chart review, and Nurse,  pt with poor tolerance of PO; N/V and poor intake. She consume d~ 50% pudding this AM with no overt s/s penetration/aspiration. Refused other items on tray. Secure chat sent to MD and dietitian; may need clears ?? Will continue to follow.

## 2020-04-16 NOTE — ASSESSMENT & PLAN NOTE
Patient's anemia is currently controlled. Has recieved 1 units of PRBCs on 4/3.   Will need to Monitor for GI bleed  Lab Results   Component Value Date    HGB 8.7 (L) 04/16/2020    HCT 28.8 (L) 04/16/2020     Monitor serial CBC and transfuse if patient becomes hemodynamically unstable, symptomatic or H/H drops below 7/21.   Will continue to monitor

## 2020-04-16 NOTE — PLAN OF CARE
Problem: Physical Therapy Goal  Goal: Physical Therapy Goal  Description  Goals to be met by: 2020    Patient will increase functional independence with mobility by performin. Supine to sit with MInimal Assistance  2. Sit to supine with MInimal Assistance  3. Sit to stand transfer with Minimal Assistance  4. Bed to chair transfer with Minimal Assistance using Rolling Walker  5. Gait  x 25 feet with Minimal Assistance using Rolling Walker.      Outcome: Ongoing, Progressing   Pt seen in 323. Pt progressed to sitting eob and standing with assist x2. OOB to chair. Completed LE's thera ex, pt to benefit from SNF vs rehab

## 2020-04-17 PROBLEM — J96.01 ACUTE RESPIRATORY FAILURE WITH HYPOXIA: Status: RESOLVED | Noted: 2020-03-25 | Resolved: 2020-04-17

## 2020-04-17 PROBLEM — R78.81 BACTEREMIA: Status: RESOLVED | Noted: 2020-04-06 | Resolved: 2020-04-17

## 2020-04-17 PROBLEM — B49 FUNGEMIA: Status: RESOLVED | Noted: 2020-04-06 | Resolved: 2020-04-17

## 2020-04-17 LAB
ALBUMIN SERPL BCP-MCNC: 3.5 G/DL (ref 3.5–5.2)
ALP SERPL-CCNC: 167 U/L (ref 55–135)
ALT SERPL W/O P-5'-P-CCNC: 25 U/L (ref 10–44)
ANION GAP SERPL CALC-SCNC: 18 MMOL/L (ref 8–16)
AST SERPL-CCNC: 22 U/L (ref 10–40)
BASOPHILS # BLD AUTO: 0.17 K/UL (ref 0–0.2)
BASOPHILS # BLD AUTO: 0.19 K/UL (ref 0–0.2)
BASOPHILS NFR BLD: 1.6 % (ref 0–1.9)
BASOPHILS NFR BLD: 1.7 % (ref 0–1.9)
BILIRUB SERPL-MCNC: 0.8 MG/DL (ref 0.1–1)
BUN SERPL-MCNC: 69 MG/DL (ref 6–20)
CALCIUM SERPL-MCNC: 10.3 MG/DL (ref 8.7–10.5)
CHLORIDE SERPL-SCNC: 96 MMOL/L (ref 95–110)
CO2 SERPL-SCNC: 24 MMOL/L (ref 23–29)
CREAT SERPL-MCNC: 4 MG/DL (ref 0.5–1.4)
DIFFERENTIAL METHOD: ABNORMAL
DIFFERENTIAL METHOD: ABNORMAL
EOSINOPHIL # BLD AUTO: 0.5 K/UL (ref 0–0.5)
EOSINOPHIL # BLD AUTO: 0.6 K/UL (ref 0–0.5)
EOSINOPHIL NFR BLD: 4.7 % (ref 0–8)
EOSINOPHIL NFR BLD: 5.2 % (ref 0–8)
ERYTHROCYTE [DISTWIDTH] IN BLOOD BY AUTOMATED COUNT: 18.8 % (ref 11.5–14.5)
ERYTHROCYTE [DISTWIDTH] IN BLOOD BY AUTOMATED COUNT: 18.9 % (ref 11.5–14.5)
EST. GFR  (AFRICAN AMERICAN): 14 ML/MIN/1.73 M^2
EST. GFR  (NON AFRICAN AMERICAN): 12 ML/MIN/1.73 M^2
GLUCOSE SERPL-MCNC: 93 MG/DL (ref 70–110)
HCT VFR BLD AUTO: 27.5 % (ref 37–48.5)
HCT VFR BLD AUTO: 28.2 % (ref 37–48.5)
HGB BLD-MCNC: 8.4 G/DL (ref 12–16)
HGB BLD-MCNC: 8.6 G/DL (ref 12–16)
IMM GRANULOCYTES # BLD AUTO: 0.32 K/UL (ref 0–0.04)
IMM GRANULOCYTES # BLD AUTO: 0.44 K/UL (ref 0–0.04)
IMM GRANULOCYTES NFR BLD AUTO: 2.9 % (ref 0–0.5)
IMM GRANULOCYTES NFR BLD AUTO: 4 % (ref 0–0.5)
LYMPHOCYTES # BLD AUTO: 1.7 K/UL (ref 1–4.8)
LYMPHOCYTES # BLD AUTO: 2 K/UL (ref 1–4.8)
LYMPHOCYTES NFR BLD: 15.7 % (ref 18–48)
LYMPHOCYTES NFR BLD: 18.3 % (ref 18–48)
MCH RBC QN AUTO: 26.7 PG (ref 27–31)
MCH RBC QN AUTO: 26.8 PG (ref 27–31)
MCHC RBC AUTO-ENTMCNC: 30.5 G/DL (ref 32–36)
MCHC RBC AUTO-ENTMCNC: 30.5 G/DL (ref 32–36)
MCV RBC AUTO: 88 FL (ref 82–98)
MCV RBC AUTO: 88 FL (ref 82–98)
MONOCYTES # BLD AUTO: 0.9 K/UL (ref 0.3–1)
MONOCYTES # BLD AUTO: 1.1 K/UL (ref 0.3–1)
MONOCYTES NFR BLD: 8.5 % (ref 4–15)
MONOCYTES NFR BLD: 9.9 % (ref 4–15)
NEUTROPHILS # BLD AUTO: 6.9 K/UL (ref 1.8–7.7)
NEUTROPHILS # BLD AUTO: 7.1 K/UL (ref 1.8–7.7)
NEUTROPHILS NFR BLD: 62.9 % (ref 38–73)
NEUTROPHILS NFR BLD: 64.6 % (ref 38–73)
NRBC BLD-RTO: 1 /100 WBC
NRBC BLD-RTO: 1 /100 WBC
PLATELET # BLD AUTO: 279 K/UL (ref 150–350)
PLATELET # BLD AUTO: 309 K/UL (ref 150–350)
PMV BLD AUTO: 10.6 FL (ref 9.2–12.9)
PMV BLD AUTO: 10.7 FL (ref 9.2–12.9)
POTASSIUM SERPL-SCNC: 4.5 MMOL/L (ref 3.5–5.1)
PROT SERPL-MCNC: 9.6 G/DL (ref 6–8.4)
RBC # BLD AUTO: 3.14 M/UL (ref 4–5.4)
RBC # BLD AUTO: 3.22 M/UL (ref 4–5.4)
SODIUM SERPL-SCNC: 138 MMOL/L (ref 136–145)
VANCOMYCIN SERPL-MCNC: 18.3 UG/ML
WBC # BLD AUTO: 10.92 K/UL (ref 3.9–12.7)
WBC # BLD AUTO: 10.96 K/UL (ref 3.9–12.7)

## 2020-04-17 PROCEDURE — 99900035 HC TECH TIME PER 15 MIN (STAT)

## 2020-04-17 PROCEDURE — 63600175 PHARM REV CODE 636 W HCPCS: Performed by: INTERNAL MEDICINE

## 2020-04-17 PROCEDURE — 25000003 PHARM REV CODE 250: Performed by: INTERNAL MEDICINE

## 2020-04-17 PROCEDURE — 30200315 PPD INTRADERMAL TEST REV CODE 302: Performed by: INTERNAL MEDICINE

## 2020-04-17 PROCEDURE — 86580 TB INTRADERMAL TEST: CPT | Performed by: INTERNAL MEDICINE

## 2020-04-17 PROCEDURE — 97530 THERAPEUTIC ACTIVITIES: CPT

## 2020-04-17 PROCEDURE — 92526 ORAL FUNCTION THERAPY: CPT

## 2020-04-17 PROCEDURE — 85025 COMPLETE CBC W/AUTO DIFF WBC: CPT

## 2020-04-17 PROCEDURE — 80053 COMPREHEN METABOLIC PANEL: CPT

## 2020-04-17 PROCEDURE — 94761 N-INVAS EAR/PLS OXIMETRY MLT: CPT

## 2020-04-17 PROCEDURE — 12000002 HC ACUTE/MED SURGE SEMI-PRIVATE ROOM

## 2020-04-17 PROCEDURE — 80202 ASSAY OF VANCOMYCIN: CPT

## 2020-04-17 PROCEDURE — 36415 COLL VENOUS BLD VENIPUNCTURE: CPT

## 2020-04-17 PROCEDURE — 80100014 HC HEMODIALYSIS 1:1

## 2020-04-17 PROCEDURE — 97535 SELF CARE MNGMENT TRAINING: CPT | Mod: CO

## 2020-04-17 RX ORDER — ASPIRIN 81 MG/1
81 TABLET ORAL DAILY
Refills: 0
Start: 2020-04-17 | End: 2021-04-17

## 2020-04-17 RX ORDER — ACETAMINOPHEN 325 MG/1
650 TABLET ORAL EVERY 6 HOURS PRN
Refills: 0
Start: 2020-04-17

## 2020-04-17 RX ORDER — HEPARIN SODIUM 1000 [USP'U]/ML
4000 INJECTION, SOLUTION INTRAVENOUS; SUBCUTANEOUS
Start: 2020-04-17

## 2020-04-17 RX ORDER — HYDRALAZINE HYDROCHLORIDE 10 MG/1
10 TABLET, FILM COATED ORAL EVERY 8 HOURS
Status: DISCONTINUED | OUTPATIENT
Start: 2020-04-17 | End: 2020-04-20

## 2020-04-17 RX ORDER — HEPARIN SODIUM 5000 [USP'U]/ML
5000 INJECTION, SOLUTION INTRAVENOUS; SUBCUTANEOUS EVERY 12 HOURS
Status: ON HOLD
Start: 2020-04-17 | End: 2020-05-01 | Stop reason: HOSPADM

## 2020-04-17 RX ORDER — LOPERAMIDE HYDROCHLORIDE 2 MG/1
2 CAPSULE ORAL 4 TIMES DAILY PRN
Refills: 0
Start: 2020-04-17 | End: 2020-04-21 | Stop reason: HOSPADM

## 2020-04-17 RX ORDER — ESCITALOPRAM OXALATE 5 MG/1
5 TABLET ORAL DAILY
Status: DISCONTINUED | OUTPATIENT
Start: 2020-04-18 | End: 2020-04-20

## 2020-04-17 RX ORDER — SODIUM BICARBONATE 650 MG/1
650 TABLET ORAL 3 TIMES DAILY
Status: DISCONTINUED | OUTPATIENT
Start: 2020-04-17 | End: 2020-04-21 | Stop reason: HOSPADM

## 2020-04-17 RX ORDER — HYDRALAZINE HYDROCHLORIDE 10 MG/1
10 TABLET, FILM COATED ORAL EVERY 8 HOURS
Status: DISCONTINUED | OUTPATIENT
Start: 2020-04-17 | End: 2020-04-17

## 2020-04-17 RX ORDER — ZINC SULFATE 50(220)MG
220 CAPSULE ORAL DAILY
COMMUNITY
Start: 2020-04-17

## 2020-04-17 RX ORDER — ESCITALOPRAM OXALATE 5 MG/1
5 TABLET ORAL DAILY
Qty: 30 TABLET | Refills: 11 | Status: SHIPPED | OUTPATIENT
Start: 2020-04-17 | End: 2020-04-21

## 2020-04-17 RX ORDER — FUROSEMIDE 40 MG/1
40 TABLET ORAL 2 TIMES DAILY
Status: DISCONTINUED | OUTPATIENT
Start: 2020-04-17 | End: 2020-04-21 | Stop reason: HOSPADM

## 2020-04-17 RX ORDER — CHOLESTYRAMINE 4 G/4.8G
1 POWDER, FOR SUSPENSION ORAL 2 TIMES DAILY
Status: DISCONTINUED | OUTPATIENT
Start: 2020-04-17 | End: 2020-04-21 | Stop reason: HOSPADM

## 2020-04-17 RX ORDER — CALCITRIOL 0.25 UG/1
0.25 CAPSULE ORAL DAILY
Start: 2020-04-17

## 2020-04-17 RX ORDER — METOPROLOL TARTRATE 25 MG/1
25 TABLET, FILM COATED ORAL 2 TIMES DAILY
Status: DISCONTINUED | OUTPATIENT
Start: 2020-04-17 | End: 2020-04-21 | Stop reason: HOSPADM

## 2020-04-17 RX ORDER — ONDANSETRON 2 MG/ML
4 INJECTION INTRAMUSCULAR; INTRAVENOUS EVERY 8 HOURS PRN
Start: 2020-04-17

## 2020-04-17 RX ORDER — GENTAMICIN SULFATE 3 MG/ML
1 SOLUTION/ DROPS OPHTHALMIC EVERY 4 HOURS
Start: 2020-04-17 | End: 2020-04-21 | Stop reason: HOSPADM

## 2020-04-17 RX ORDER — IBUPROFEN 100 MG/5ML
1000 SUSPENSION, ORAL (FINAL DOSE FORM) ORAL 4 TIMES DAILY
Start: 2020-04-17

## 2020-04-17 RX ORDER — CHOLESTYRAMINE 4 G/4.8G
1 POWDER, FOR SUSPENSION ORAL 2 TIMES DAILY
Qty: 180 PACKET | Refills: 3 | Status: SHIPPED | OUTPATIENT
Start: 2020-04-17 | End: 2020-04-21 | Stop reason: HOSPADM

## 2020-04-17 RX ORDER — SODIUM BICARBONATE 650 MG/1
650 TABLET ORAL 3 TIMES DAILY
Qty: 90 TABLET | Refills: 11
Start: 2020-04-17 | End: 2020-04-21 | Stop reason: HOSPADM

## 2020-04-17 RX ORDER — FUROSEMIDE 40 MG/1
40 TABLET ORAL 2 TIMES DAILY
Qty: 60 TABLET | Refills: 11 | Status: ON HOLD
Start: 2020-04-17 | End: 2020-05-01 | Stop reason: HOSPADM

## 2020-04-17 RX ORDER — METOPROLOL TARTRATE 25 MG/1
25 TABLET, FILM COATED ORAL 2 TIMES DAILY
Qty: 60 TABLET | Refills: 11
Start: 2020-04-17 | End: 2021-04-17

## 2020-04-17 RX ORDER — HYDRALAZINE HYDROCHLORIDE 20 MG/ML
10 INJECTION INTRAMUSCULAR; INTRAVENOUS EVERY 8 HOURS PRN
Status: ON HOLD
Start: 2020-04-17 | End: 2020-05-01 | Stop reason: HOSPADM

## 2020-04-17 RX ORDER — HYDRALAZINE HYDROCHLORIDE 50 MG/1
50 TABLET, FILM COATED ORAL EVERY 8 HOURS
Qty: 90 TABLET | Refills: 11 | Status: ON HOLD
Start: 2020-04-17 | End: 2020-05-01 | Stop reason: HOSPADM

## 2020-04-17 RX ORDER — HYDRALAZINE HYDROCHLORIDE 25 MG/1
50 TABLET, FILM COATED ORAL EVERY 8 HOURS
Status: DISCONTINUED | OUTPATIENT
Start: 2020-04-17 | End: 2020-04-17

## 2020-04-17 RX ORDER — HYDRALAZINE HYDROCHLORIDE 10 MG/1
10 TABLET, FILM COATED ORAL EVERY 12 HOURS
Qty: 60 TABLET | Refills: 11 | Status: CANCELLED | OUTPATIENT
Start: 2020-04-17 | End: 2021-04-17

## 2020-04-17 RX ORDER — ASCORBIC ACID 500 MG
1000 TABLET ORAL 4 TIMES DAILY
Status: DISCONTINUED | OUTPATIENT
Start: 2020-04-17 | End: 2020-04-21 | Stop reason: HOSPADM

## 2020-04-17 RX ADMIN — HEPARIN SODIUM 5000 UNITS: 5000 INJECTION, SOLUTION INTRAVENOUS; SUBCUTANEOUS at 01:04

## 2020-04-17 RX ADMIN — ESCITALOPRAM OXALATE 5 MG: 5 TABLET, FILM COATED ORAL at 01:04

## 2020-04-17 RX ADMIN — HYDRALAZINE HYDROCHLORIDE 10 MG: 10 TABLET, FILM COATED ORAL at 01:04

## 2020-04-17 RX ADMIN — METOPROLOL TARTRATE 25 MG: 25 TABLET, FILM COATED ORAL at 10:04

## 2020-04-17 RX ADMIN — Medication 250 MG: at 06:04

## 2020-04-17 RX ADMIN — MINERAL OIL AND PETROLATUM: 150; 830 OINTMENT OPHTHALMIC at 10:04

## 2020-04-17 RX ADMIN — Medication 250 MG: at 01:04

## 2020-04-17 RX ADMIN — CALCITRIOL CAPSULES 0.25 MCG 0.25 MCG: 0.25 CAPSULE ORAL at 01:04

## 2020-04-17 RX ADMIN — LACTOBACILLUS TAB 2 TABLET: TAB at 01:04

## 2020-04-17 RX ADMIN — ZINC SULFATE 220 MG (50 MG) CAPSULE 220 MG: CAPSULE at 01:04

## 2020-04-17 RX ADMIN — TUBERCULIN PURIFIED PROTEIN DERIVATIVE 5 UNITS: 5 INJECTION, SOLUTION INTRADERMAL at 01:04

## 2020-04-17 RX ADMIN — HEPARIN SODIUM 4000 UNITS: 1000 INJECTION, SOLUTION INTRAVENOUS; SUBCUTANEOUS at 11:04

## 2020-04-17 RX ADMIN — LACTOBACILLUS TAB 2 TABLET: TAB at 10:04

## 2020-04-17 RX ADMIN — OXYCODONE HYDROCHLORIDE AND ACETAMINOPHEN 1000 MG: 500 TABLET ORAL at 01:04

## 2020-04-17 RX ADMIN — Medication 250 MG: at 05:04

## 2020-04-17 RX ADMIN — HYDRALAZINE HYDROCHLORIDE 10 MG: 10 TABLET ORAL at 10:04

## 2020-04-17 RX ADMIN — FUROSEMIDE 40 MG: 40 TABLET ORAL at 06:04

## 2020-04-17 RX ADMIN — SODIUM BICARBONATE 650 MG TABLET 650 MG: at 10:04

## 2020-04-17 RX ADMIN — VANCOMYCIN HYDROCHLORIDE 500 MG: 500 INJECTION, POWDER, LYOPHILIZED, FOR SOLUTION INTRAVENOUS at 04:04

## 2020-04-17 RX ADMIN — MICAFUNGIN SODIUM 100 MG: 20 INJECTION, POWDER, LYOPHILIZED, FOR SOLUTION INTRAVENOUS at 01:04

## 2020-04-17 RX ADMIN — GENTAMICIN SULFATE 1 DROP: 3 SOLUTION OPHTHALMIC at 10:04

## 2020-04-17 RX ADMIN — METOPROLOL TARTRATE 25 MG: 25 TABLET, FILM COATED ORAL at 01:04

## 2020-04-17 RX ADMIN — FUROSEMIDE 40 MG: 40 TABLET ORAL at 01:04

## 2020-04-17 RX ADMIN — LEVOTHYROXINE SODIUM 100 MCG: 100 TABLET ORAL at 05:04

## 2020-04-17 RX ADMIN — GENTAMICIN SULFATE 1 DROP: 3 SOLUTION OPHTHALMIC at 06:04

## 2020-04-17 RX ADMIN — OXYCODONE HYDROCHLORIDE AND ACETAMINOPHEN 1000 MG: 500 TABLET ORAL at 10:04

## 2020-04-17 RX ADMIN — EPOETIN ALFA-EPBX 5000 UNITS: 10000 INJECTION, SOLUTION INTRAVENOUS; SUBCUTANEOUS at 11:04

## 2020-04-17 RX ADMIN — GENTAMICIN SULFATE 1 DROP: 3 SOLUTION OPHTHALMIC at 05:04

## 2020-04-17 RX ADMIN — CHOLESTYRAMINE 4 G: 4 POWDER, FOR SUSPENSION ORAL at 10:04

## 2020-04-17 RX ADMIN — GENTAMICIN SULFATE 1 DROP: 3 SOLUTION OPHTHALMIC at 01:04

## 2020-04-17 RX ADMIN — HEPARIN SODIUM 5000 UNITS: 5000 INJECTION, SOLUTION INTRAVENOUS; SUBCUTANEOUS at 10:04

## 2020-04-17 RX ADMIN — SODIUM BICARBONATE 650 MG TABLET 650 MG: at 02:04

## 2020-04-17 RX ADMIN — ASPIRIN 81 MG: 81 TABLET, COATED ORAL at 01:04

## 2020-04-17 RX ADMIN — OXYCODONE HYDROCHLORIDE AND ACETAMINOPHEN 1000 MG: 500 TABLET ORAL at 04:04

## 2020-04-17 RX ADMIN — GENTAMICIN SULFATE 1 DROP: 3 SOLUTION OPHTHALMIC at 03:04

## 2020-04-17 RX ADMIN — Medication 250 MG: at 12:04

## 2020-04-17 NOTE — NURSING
Cuellar catheter dc'ed. Tolerated well. Pt instructed to call when she needs to use the bathroom. Verbalized understanding.

## 2020-04-17 NOTE — PT/OT/SLP PROGRESS
"Speech Language Pathology Treatment    Patient Name:  Maria Victoria Hernandez   MRN:  [id number]  Admitting Diagnosis: Acute respiratory failure with hypoxia    Recommendations:     Diet recommendations:  Dental Soft(IDDSI 6), Liquid Diet Level: Thin   Aspiration Precautions: Standard aspiration precautions   General Precautions: Standard, contact, aspiration, fall  Communication strategies:  provide increased time to answer    Subjective     "I was talking to my grandpa () and he told me to take it (NGT) out."    Objective:   Pt seen with lunch to monitor tolerance with current diet and upgraded textures. She has just completed dialysis. Daughter reports mental status at baseline (?)  Nurse denies any N/V today and none last night per chart review. She is alert and cooperative. Mildly delayed responses to questions. Motivated to do things herself. She was able to feed herself lunch without difficulty. She consumed fried fish/gravy, ice cream, and firm zucchini/squash without overt s/s swallow dysfunction.     Has the patient been evaluated by SLP for swallowing?   Yes  Keep patient NPO? No   Current Respiratory Status: nasal cannula      Assessment:     Maria Victoria Hernandez is a 55 y.o. female with an SLP diagnosis of resolving Dysphagia.  She presents with tolerance of current diet, as well as, upgraded textures. Continue current diet. No N/V per nurse and pt. Plan to advance to regular tomorrow if she continues to tolerate without difficulty.   Goals:   Multidisciplinary Problems     SLP Goals        Problem: SLP Goal    Goal Priority Disciplines Outcome   SLP Goal     SLP Ongoing, Progressing   Description:  1) Consume regular diet and thin liquids no s/s oropharyngeal dysphagia--Revised                    Plan:     · Patient to be seen:  5 x/week   · Plan of Care expires:     · Plan of Care reviewed with:  patient, daughter   · SLP Follow-Up:  Yes       Discharge recommendations:  nursing " facility, skilled, rehabilitation facility   Barriers to Discharge:  None    Time Tracking:     SLP Treatment Date:   04/17/20  Speech Start Time:  1218  Speech Stop Time:  1229     Speech Total Time (min):  11 min    Billable Minutes: Treatment Swallowing Dysfunction 11 and Total Time 11    Cinda Pereira CCC-SLP  04/17/2020

## 2020-04-17 NOTE — SUBJECTIVE & OBJECTIVE
Interval History: Patient doing much better. Oriented X 3. Vitals stable. On HD at the time of evaluation. Dialysis nurse states that access is not working as well    Review of Systems   Unable to perform ROS: Mental status change   Constitutional: Positive for activity change, appetite change and fatigue. Negative for chills and fever.   HENT: Negative for congestion, hearing loss, rhinorrhea, sore throat, trouble swallowing and voice change.    Respiratory: Negative for cough, chest tightness, shortness of breath and wheezing.    Cardiovascular: Negative for chest pain, palpitations and leg swelling.   Gastrointestinal: Negative for abdominal pain, blood in stool, diarrhea, nausea and vomiting.   Genitourinary: Negative for difficulty urinating, frequency, hematuria and urgency.   Musculoskeletal: Negative for back pain, joint swelling and neck stiffness.   Skin: Negative for pallor and rash.   Neurological: Positive for weakness. Negative for tremors, seizures, syncope, speech difficulty, numbness and headaches.   Hematological: Negative for adenopathy.   Psychiatric/Behavioral: Negative for agitation, behavioral problems, confusion and sleep disturbance.     Objective:     Vital Signs (Most Recent):  Temp: 98.9 °F (37.2 °C) (04/17/20 0850)  Pulse: 102 (04/17/20 1130)  Resp: 20 (04/17/20 0850)  BP: (!) 145/96 (04/17/20 1130)  SpO2: 96 % (04/17/20 0850) Vital Signs (24h Range):  Temp:  [97 °F (36.1 °C)-98.9 °F (37.2 °C)] 98.9 °F (37.2 °C)  Pulse:  [] 102  Resp:  [16-31] 20  SpO2:  [95 %-99 %] 96 %  BP: (123-176)/(69-98) 145/96     Weight: 97.8 kg (215 lb 9.8 oz)  Body mass index is 40.74 kg/m².    Intake/Output Summary (Last 24 hours) at 4/17/2020 1224  Last data filed at 4/17/2020 0635  Gross per 24 hour   Intake --   Output 1025 ml   Net -1025 ml      Physical Exam   Nursing note and vitals reviewed.  PATIENT WAS SEEN AS A VIDEO VISIT WITH NURSE IN PATIENT ROOM WITH PATIENT TO ASSIST DURING THE VISIT.  PHYSICAL EXAM FINDINGS ARE AS VIEWED BY MYSELF VIA VIDEO OR AS REPORTED BY NURSE IF SPECIFIED AS SUCH, EXAM NOT DONE PERSONALLY BY MYSELF AT BEDSIDE.      Significant Labs:   BMP:   Recent Labs   Lab 04/17/20  0533   GLU 93      K 4.5   CL 96   CO2 24   BUN 69*   CREATININE 4.0*   CALCIUM 10.3     CBC:   Recent Labs   Lab 04/16/20  0335 04/17/20  0533   WBC 11.75 10.96   HGB 8.7* 8.4*   HCT 28.8* 27.5*    309       Significant Imaging: I have reviewed all pertinent imaging results/findings within the past 24 hours.

## 2020-04-17 NOTE — PLAN OF CARE
04/17/20 0757   Patient Assessment/Suction   Respiratory Effort Unlabored   Expansion/Accessory Muscles/Retractions no retractions;no use of accessory muscles   All Lung Fields Breath Sounds Anterior:;Lateral:;diminished   Rhythm/Pattern, Respiratory unlabored   PRE-TX-O2   O2 Device (Oxygen Therapy) room air   SpO2 96 %   Pulse Oximetry Type Intermittent   $ Pulse Oximetry - Multiple Charge Pulse Oximetry - Multiple   Inhaler   $ Inhaler Charges PRN treatment not required

## 2020-04-17 NOTE — NURSING
Pt is awake, alert and oriented x4 thus far this shift. Pt currently having hemodialysis at her bedside.

## 2020-04-17 NOTE — PLAN OF CARE
Problem: SLP Goal  Goal: SLP Goal  Description  1) Consume regular diet and thin liquids no s/s oropharyngeal dysphagia--Revised   Outcome: Ongoing, Progressing    Tolerating current diet without overt s/s penetration/aspiration. No N/V reported. Continue current diet.

## 2020-04-17 NOTE — PLAN OF CARE
Rudy spoke with Nadira at [facility], she has the authorization.  She will take pt tomorrow (Friday).  Rudy will inform Dr. Gomez.       04/16/20 8479   Discharge Reassessment   Assessment Type Discharge Planning Reassessment   Provided patient/caregiver education on the expected discharge date and the discharge plan Yes   Do you have any problems affording any of your prescribed medications? No   Discharge Plan A Long-term acute care facility (LTAC)

## 2020-04-17 NOTE — CARE UPDATE
Called daughter to give her an update on her mother, had to leave a message, was not able to provide any information on the vmail.

## 2020-04-17 NOTE — PROGRESS NOTES
INPATIENT NEPHROLOGY PROGRESS NOTE  [facility] NEPHROLOGY    Patient Name: Maria Victoria Hernandez  Date: 04/17/2020    Reason for consultation: MAIKOL    History of Present Illness:  54AAF nurse with morbid obesity, hypothyroidism presents PNA, intubated, positive for COVID19. Admit Cr 0.7 went 5.1 and HD started on 3/29.     3/28  Scr worse today.  Only one shift recorded for output, 520cc.  Still hyponatremic, vent setting adjusted per pulmonology note for acidosis.  K+ at goal after repletion.  Has siri, may need HD initiated.  3/29  Non oliguric.  In no distress  3/30 VSS, seen and examined on HD, tolerating well. Plan another HD in AM, discussed with daughter who is a nurse here too.  3/31 VSS, intubated still. UO is not great but she is dialyzed daily. Seen and examined on HD, tolerating well. Plan another HD in AM.  4/1 VSS, intubated still. UO is not great but she is dialyzed daily. Seen and examined on HD, tolerating well. Plan another HD PRN.  4/2 VSS, intubated still. UO is not great. Plan another HD in AM.  4/3 VSS, intubated still. UO is not great but she is dialyzed daily recently. Seen and examined on HD, tolerating well. Plan another HD on Mon or PRN.  4/4 febrile, BP stable, FIO2 50%, intubated, sedated, on levophed, UOP 900cc, transfused  4/5 febrile, tachy, SBP 100s, FIO2 65%, intubated, sedated, off levophed, UOP 935cc  4/6 intubated,. Sedated  4/7 intubated, sedated. Dialysis catheter changed yesterday  4/8 just finished dialysis. uf 3 liters. Extubated  4/10  Seen on dialysis.  No distress.  4/11  UOP 750cc.  3L UF w/HD yesterday.  K+ at goal.  Holding dialysis over weekend to assess for recovery.  Significant event noted 6pm yesterday per primary notes.  Med changes made 2/2 tachycardia, tachypnea.   She is hypotensive today.   4/12   UOP 1.2L.  Scheduled for dialysis tomorrow, no recovery noted thus far.  Pulmonology working toward extubation and weaning off labetalol gtt, ordered  clonidine PRN SBP>160.  4/13 low grade T, SBP 140s, on 3-4L NC, unable to do HD today due to malfunctioning access, UOP 3.2L; CXR: Very mild decrease of the bilateral infiltrates compared to the prior exam.   4/14 got new temporary dialysis catheter overnight; febrile, tachy, -160s, on 1.5L NC, UOP 2.8L; seen on HD - will get off 2L- updated daughter at bedside  4/15 low grade temp, SBP > 130, on 2L NC, UOP 1.6L; MRI brain yest neg; tolerating TFs; worked with therapy today- sitting upright in bed- able to follow commands and nods head when I'm speaking with her  4/16 afebrile, overall BP readings better, on 2-3L NC, UOP 850cc- patient dropped BP during treatment and developed blank stare- UF turned off- mental status returned- completed treatment, net even; laying in bed today- able to whisper a few words, able to follow commands and follow my conversation, she even smiles  4/17 HD catheter very positional today and only able do - if we continue HD, will need hemosplit next week; BP is high this AM, on RA, UOP 1.2L- seen on HD, no complaints, looks great- updated daughter at bedside about plan    Echo 3/27/20:  · Mild left ventricular enlargement.  · Mildly decreased left ventricular systolic function. The estimated ejection fraction is 45%.  · Grade I (mild) left ventricular diastolic dysfunction consistent with impaired relaxation.  · Normal right ventricular systolic function.  · Mild left atrial enlargement.  · Moderate mitral regurgitation.  · Mild tricuspid regurgitation.  · Mild pulmonary hypertension present. PASP 40 mm of Hg.    Echo 4/13/20:  · Mild concentric left ventricular hypertrophy.  · Global hypokinetic wall motion.  · Moderately decreased left ventricular systolic function. The estimated ejection fraction is 35%.  · Grade I (mild) left ventricular diastolic dysfunction consistent with impaired relaxation.  · Normal right ventricular systolic function.  · Mild left atrial  enlargement.  · Moderate mitral regurgitation.  · Normal central venous pressure (3 mmHg).    Plan of Care:    1. Septic shock 2/2 COVID PNA with HHRF requiring MV- resolved  - she is now on RA  - remains on PO bicarb- using 40 bicarb bath with dialysis  - continue aggressive PT/OT  - dose antbx for dialysis (on therapy for Staph Epi bacteremia, Candida Alb fungemia, C diff)  - 4/14 and 4/15 COVID testing negative    2. MAIKOL - suspect multifactorial ATN in setting of shock/COVID/intravascular volume depletion- initiated HD 3/29  - her Cr trends are actually improving- she remains nonoliguric- will do HD Friday and then hold the weekend to assess for recovery  - her HD catheter isn't working well- if we end up deciding on Monday that she needs outpatient HD, will need a hemosplit along with outpatient HD unit placement  - no nsaids or IV contrast  - dose meds for dialysis    3. HTN/Systolic CHF- worsening based on echo- net -12L for admission  - BP is high today after cutting back hydralazine yest from TID to BID- will go back to TID and leave 10mg dose the same  - will UF even today (she became hypotensive during last HD treatment with UF- we have reached her dry weight); continue PO lasix at current dose    4. Anemia  - Hb is stable  - continue TERE with HD MWF- if we stop HD, will transition to SQ weekly    5. SHPT  - she is on calcitriol  - calcium is stable  - check phos, PTH in AM    Spoke with Dr. Gomez- will keep inpatient over weekend and assess for renal recovery- if needs to resume HD Monday, will get hemosplit with Dr. Gauthier and then use to make sure it works- can then be discharged to LTAC.     Thank you for allowing us to participate in this patient's care. We will continue to follow.    Vital Signs:  Temp Readings from Last 3 Encounters:   04/17/20 98.9 °F (37.2 °C)       Pulse Readings from Last 3 Encounters:   04/17/20 102   01/15/15 84   12/17/13 80       BP Readings from Last 3 Encounters:    04/17/20 (!) 154/90   01/15/15 124/82   12/17/13 102/68       Weight:  Wt Readings from Last 3 Encounters:   04/16/20 97.8 kg (215 lb 9.8 oz)   01/15/15 105.8 kg (233 lb 4.8 oz)   12/17/13 101.2 kg (223 lb)     Medications:  Scheduled Meds:   ascorbic acid (vitamin C)  1,000 mg Per NG tube QID    aspirin  81 mg Oral Daily    calcitRIOL  0.25 mcg Oral Daily    cholestyramine-aspartame  1 packet Per NG tube BID    epoetin raquel-ebpx (RETACRIT) injection  5,000 Units Intravenous Every Mon, Wed, Fri    escitalopram oxalate  5 mg Per NG tube Daily    furosemide  40 mg Per NG tube BID    gentamicin  1 drop Right Eye Q4H    heparin (porcine)  5,000 Units Subcutaneous Q12H    hydrALAZINE  50 mg Per NG tube Q8H    Lactobacillus acidoph-L.bulgar  2 tablet Per NG tube BID    levothyroxine  100 mcg Oral Before breakfast    metoprolol tartrate  25 mg Per NG tube BID    micafungin (MYCAMINE) IVPB  100 mg Intravenous Q24H    sodium bicarbonate  650 mg Per NG tube TID    vancomycin  250 mg Per NG tube Q6H    vancomycin (VANCOCIN) IVPB  500 mg Intravenous Once    white petrolatum-mineral oiL   Right Eye QHS    zinc sulfate  220 mg Oral Daily     Continuous Infusions:    PRN Meds:.acetaminophen, heparin (porcine), hydrALAZINE, ipratropium-albuteroL, loperamide, ondansetron, promethazine (PHENERGAN) IVPB, Pharmacy to dose Vancomycin consult **AND** vancomycin - pharmacy to dose  No current facility-administered medications on file prior to encounter.      Current Outpatient Medications on File Prior to Encounter   Medication Sig Dispense Refill    ferrous sulfate 325 mg (65 mg iron) Tab tablet Take 1 tablet (325 mg total) by mouth daily with breakfast. (Patient taking differently: Take 325 mg by mouth daily with breakfast. Take 2 tablets daily) 90 tablet 3    levothyroxine (SYNTHROID) 100 MCG tablet Take 1 tablet (100 mcg total) by mouth once daily. (Patient taking differently: Take 150 mcg by mouth once daily. )  "90 tablet 3    [DISCONTINUED] fluticasone propionate (FLONASE) 50 mcg/actuation nasal spray 1 spray by Each Nostril route once daily.      [DISCONTINUED] meloxicam (MOBIC) 7.5 MG tablet Take 7.5 mg by mouth once daily.      [DISCONTINUED] clotrimazole-betamethasone 1-0.05% (LOTRISONE) cream Apply topically 2 (two) times daily. 45 g 1    [DISCONTINUED] levocetirizine (XYZAL) 5 MG tablet Take 1 tablet (5 mg total) by mouth every evening. 30 tablet 6     Review of Systems:  Not conversant    Physical Exam:  BP (!) 154/90   Pulse 102   Temp 98.9 °F (37.2 °C)   Resp 20   Ht 5' 1" (1.549 m)   Wt 97.8 kg (215 lb 9.8 oz)   SpO2 96%   Breastfeeding? No   BMI 40.74 kg/m²     INS/OUTS:  I/O last 3 completed shifts:  In: 850 [P.O.:670; I.V.:60; NG/GT:120]  Out: 1415 [Urine:1305; Drains:110]  No intake/output data recorded.    Constitutional: nad, aao x 3, has her voice back, smiling- looks great!  Heart: rrr, no m/r/g, wwp, no edema  Lungs: ant ausc clear, no w/r/r/c, no lb  Abdomen: s/nt/nd, +BS    Results:  Lab Results   Component Value Date     04/17/2020    K 4.5 04/17/2020    CL 96 04/17/2020    CO2 24 04/17/2020    BUN 69 (H) 04/17/2020    CREATININE 4.0 (H) 04/17/2020    CALCIUM 10.3 04/17/2020    ANIONGAP 18 (H) 04/17/2020    ESTGFRAFRICA 14 (A) 04/17/2020    EGFRNONAA 12 (A) 04/17/2020       Lab Results   Component Value Date    CALCIUM 10.3 04/17/2020    PHOS 4.8 (H) 03/29/2020       Recent Labs   Lab 04/17/20  0533   WBC 10.96   RBC 3.14*   HGB 8.4*   HCT 27.5*      MCV 88   MCH 26.8*   MCHC 30.5*     I have personally reviewed pertinent radiological imaging and reports.    Carolyn Moeller MD MPH  [facility] [address] [address]  [phone] (p)  [phone] (f)    "

## 2020-04-17 NOTE — PLAN OF CARE
Problem: Occupational Therapy Goal  Goal: Occupational Therapy Goal  Description  Goals to be met by: 5/5/20    Patient will increase functional independence with ADLs by performing:    Feeding with Modified [place] and Assistive Devices as needed.  UE Dressing with Set-up Assistance and Minimal Assistance.  Grooming while seated with Supervision.  Sitting at edge of bed x10 minutes with Stand-by Assistance and use of upper extremity support.  Toilet transfer to bedside commode with Moderate Assistance.  Upper extremity exercise program x10 reps per handout, with assistance as needed.     Outcome: Ongoing, Progressing; minor confusion persists.

## 2020-04-17 NOTE — ASSESSMENT & PLAN NOTE
Patient's anemia is currently controlled. Trending down  Will send for stool [facility]    Has recieved 1 units of PRBCs on 4/3.      Etiology likely d/t Anemia of chronic disease/blood loss/acute inflammatory process  Current CBC reviewed-   Lab Results   Component Value Date    HGB 8.4 (L) 04/17/2020    HCT 27.5 (L) 04/17/2020     Monitor serial CBC and transfuse if patient becomes hemodynamically unstable, symptomatic or H/H drops below 8/24

## 2020-04-17 NOTE — PROGRESS NOTES
[facility] Medicine  Progress Note    Patient Name: Maria Victoria Hernandez  MRN: [id number]  Patient Class: IP- Inpatient   Admission Date: 3/25/2020  Length of Stay: 23 days  Attending Physician: Kady Gomez MD  Primary Care Provider: Candice Deluna MD        Subjective:     Principal Problem:Acute respiratory failure with hypoxia        HPI:  Patient is a 54 years old  female with morbid obesity in past medical history significant for hypothyroidism is being admitted to intensive care unit under inpatient status from [facility] Emergency room with worsening shortness of breath.  Three days ago patient was tested positive for COVID - 19.  Patient works at [employer].  Patient has been experiencing subjective fever, generalized body aches and pains and nonproductive cough for few days.  Patient is getting increasingly short of breath especially for the past 2 days.  Upon arrival to the emergency room patient was noted to be hypoxic around 89%.  Up on 3 L patient's oxygen sats are around 96%.  Patient denies any chest pain, leg swelling or calf tenderness.      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 And treated per protocol for Covid and ARDS with ceftriaxone/zithromax and HC x 5 days. And ARDS protocol on vent. Pulmonary consulted and followed. WEaned from vent slowly -to nasal cannula by 4/10 and remained stable on oxygen but very weak. PT/OT consulted.   Tachycardica /hypertension developed -cardene gtt adjusted to labetolol then transitioned to po metoprolol Echo -noted mild systolic/Gr 1 diastolic dsyfunction. cxr on 4/5 severe ards pattern.-Vancomycin/zosyn added. 4/6 blood cultures pos for yeast so ID consulted and micafungin added. HD  Catheter and  TLC line removed and cultured and cultures negative. Sputum + yeast also   .-followed recs from ID; echo neg -await repeat echo. Repeat Blood cultures  were negative for yeast. 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 Acute encephalopathy- delirium vs encephalitis or other structural cause. MRI could not be performed as she was very unstable.   At the time of admission Cr 0.7 worsened to 5.1, Renal was consulted. and HD started on 3/29. In addition had, Metabolic acidosis due to renal failure. Feedings given via ngtube with diabetisource and accucks/ISS given with close monitoring and good control. On 04/09/2020 Patient was extubated. Continued to need Cardene drip for BP. HAd loose stool-- flexiseal was placed. On 04/11/2020 mental status improved was  more awake and slighty stronger. She was tachycardic-- Cardene was switched to labetalol   04/13/2020 patient was tolerating vancomycin and micafungin. She continued to have diarrhea.  C diff antigen was positive.  On 04/15/2020.  Patient's voice was louder, she is quite interactive and was moving her arms.   on 04/16/2020  patient  was doing well.  She  was afebrile.   Her oxygen requirement improved. Was titrated down to 2 L nasal cannula. Patient transferred out of ICU to regular Med surge room. crp improved.  ferr 1200. Nephrology continued to follow patient. Her MAIKOL was suspected to be multifactorial ATN in setting of shock/COVID/intravascular volume depletion- initiated on  HD 3/29.  her Cr trending down  improving- she is nonoliguric-plan to do HD Friday and then nephrology wants to hold HD on the weekend to assess for recovery. Stable for transfer to LTAC      Interval History: Patient doing much better. Oriented X 3. Vitals stable. On HD at the time of evaluation. Dialysis nurse states that access is not working as well    Review of Systems   Unable to perform ROS: Mental status change   Constitutional: Positive for activity change, appetite change and fatigue.  Negative for chills and fever.   HENT: Negative for congestion, hearing loss, rhinorrhea, sore throat, trouble swallowing and voice change.    Respiratory: Negative for cough, chest tightness, shortness of breath and wheezing.    Cardiovascular: Negative for chest pain, palpitations and leg swelling.   Gastrointestinal: Negative for abdominal pain, blood in stool, diarrhea, nausea and vomiting.   Genitourinary: Negative for difficulty urinating, frequency, hematuria and urgency.   Musculoskeletal: Negative for back pain, joint swelling and neck stiffness.   Skin: Negative for pallor and rash.   Neurological: Positive for weakness. Negative for tremors, seizures, syncope, speech difficulty, numbness and headaches.   Hematological: Negative for adenopathy.   Psychiatric/Behavioral: Negative for agitation, behavioral problems, confusion and sleep disturbance.     Objective:     Vital Signs (Most Recent):  Temp: 98.9 °F (37.2 °C) (04/17/20 0850)  Pulse: 102 (04/17/20 1130)  Resp: 20 (04/17/20 0850)  BP: (!) 145/96 (04/17/20 1130)  SpO2: 96 % (04/17/20 0850) Vital Signs (24h Range):  Temp:  [97 °F (36.1 °C)-98.9 °F (37.2 °C)] 98.9 °F (37.2 °C)  Pulse:  [] 102  Resp:  [16-31] 20  SpO2:  [95 %-99 %] 96 %  BP: (123-176)/(69-98) 145/96     Weight: 97.8 kg (215 lb 9.8 oz)  Body mass index is 40.74 kg/m².    Intake/Output Summary (Last 24 hours) at 4/17/2020 1224  Last data filed at 4/17/2020 0635  Gross per 24 hour   Intake --   Output 1025 ml   Net -1025 ml      Physical Exam   Nursing note and vitals reviewed.  PATIENT WAS SEEN AS A VIDEO VISIT WITH NURSE IN PATIENT ROOM WITH PATIENT TO ASSIST DURING THE VISIT. PHYSICAL EXAM FINDINGS ARE AS VIEWED BY MYSELF VIA VIDEO OR AS REPORTED BY NURSE IF SPECIFIED AS SUCH, EXAM NOT DONE PERSONALLY BY MYSELF AT BEDSIDE.      Significant Labs:   BMP:   Recent Labs   Lab 04/17/20  0533   GLU 93      K 4.5   CL 96   CO2 24   BUN 69*   CREATININE 4.0*   CALCIUM 10.3     CBC:    Recent Labs   Lab 04/16/20  0335 04/17/20  0533   WBC 11.75 10.96   HGB 8.7* 8.4*   HCT 28.8* 27.5*    309       Significant Imaging: I have reviewed all pertinent imaging results/findings within the past 24 hours.      Assessment/Plan:      Acute encephalopathy  ML metabolic  resolved  MRI brain negative  US carotids done pending      Lab Results   Component Value Date    WBC 10.96 04/17/2020      focal findings on physical exam  No early signs of stroke on Head CT, no hemorrhage or acute findings.  EEG: EEG 30 min awake and drowsy results noted no seizures  Continue supportive care       COVID-19 virus detected   Plaquenil course completed  Continue routine medications as before.   Follow airborne/droplet precautions.      Conjunctivitis of right eye  Ml post viral  Will continue eye lubricant  Resolving      Post viral debility  Will follow up with PT OT recs  PT OT recs SNF      Acute on chronic combined systolic and diastolic congestive heart failure  Chronic -noted on echo ; strict I/O   No ACE/ARB 2/2 renal failure  Asa/statin-when able to tolerate po   Strict I/O-volume control with HD   Repeat ECHO   · Mild concentric left ventricular hypertrophy.  · Global hypokinetic wall motion.  · Moderately decreased left ventricular systolic function. The estimated ejection fraction is 35%.  · Grade I (mild) left ventricular diastolic dysfunction consistent with impaired relaxation.  · Normal right ventricular systolic function.    Pneumonia due to infectious organism  Acute -post viral --see resp failure/covid   Pulm/ID consulted /followed    Obesity, morbid, BMI 40.0-49.9  Body mass index is 40.74 kg/m². Morbid obesity complicates all aspects of disease management from diagnostic modalities to treatment. Weight loss encouraged and health benefits explained to patient.        Iron deficiency anemia, unspecified  Patient's anemia is currently controlled. Trending down  Will send for stool gujosé luis    Has  recieved 1 units of PRBCs on 4/3.      Etiology likely d/t Anemia of chronic disease/blood loss/acute inflammatory process  Current CBC reviewed-   Lab Results   Component Value Date    HGB 8.4 (L) 04/17/2020    HCT 27.5 (L) 04/17/2020     Monitor serial CBC and transfuse if patient becomes hemodynamically unstable, symptomatic or H/H drops below 8/24        Diverticulosis of large intestine without hemorrhage  Patient's anemia is currently controlled. Has recieved 1 units of PRBCs on 4/3.   Will need to Monitor for GI bleed  Lab Results   Component Value Date    HGB 8.7 (L) 04/16/2020    HCT 28.8 (L) 04/16/2020     Monitor serial CBC and transfuse if patient becomes hemodynamically unstable, symptomatic or H/H drops below 7/21.   Will continue to monitor        Acute renal failure  Acute, Poss ATN / -unclear yet if ESRD   required HD for acute renal failure-  Dialysis held yesterday due to episode of hypotension  ? Element ATN 2/2 hypovolemia/sepsis  Nephrology following                  COVID-19 virus infection  Completed Plaquenil    Covid-19 Virus Infection  - Infection Control notified    - Isolation:   - Airborne and Droplet Precautions  - N95 masks must be fit tested, wear eye protection  - 20 second hand hygiene   - Limit visitors per hospital policy   - Consolidating lab draws, nursing care, and interventions    - Diagnostics: (rising CRP, persistent lymphopenia, hyponatremia, hyperferritinemia, elevated troponin, elevated d-dimer, age, and comorbidities are significant predictors of poor clinical outcome)   - CBC:   trend Q48hrs  - CMP:        trend Q48hrs  - Procalcitonin:  - D-dimer:  trend Q48hrs  - Ferritin:  repeat prior to discharge  - CRP:        trend Q48hrs  - LDH:  - BNP:  - Troponin:    - ECG:   - rapid Flu:   - RIP only if BMT/solid transplant:   - Legionella antigen:   - Blood culture x2:   - Sputum culture:   - CXR:   - UA and culture:      - Management:   - Bundle care as able to minimize  in/out of room   - Supplemental O2 to maintain SpO2 >92%,   if requiring 6L NC or higher, place on nonrebreather and discuss case with MICU   - Telemetry & continuous Pulse Ox   - albuterol INHALER PRN 4puff Q6hr approximates a nebulizer (avoid nebulization of secretions)   - apap PRN fever   - Avoiding NIPPV to prevent aerosolization   (including home CPAP/BiPAP unless on a case-by-case basis and only in negative pressure room)   - Cautious use of NSAIDS for fever per WHO recommendations (3/16/2020)   - No new ACEi/ARB start or discontinuation of chronic med unless hypotensive (Esler et al. Journal of Hypertension 2020, 38:000-000)   - Careful use of steroids in the absence of other indications   - unless septic shock due to increased viral replication   - Fluid sparing resuscitation   - Empiric antibiotics per likely source & patient allergies    - CAP: x 5 day course  Ceftriaxone 1g IV Q24hrs            Azithromycin 500mg IV day #1, then 250mg PO daily x4 days                 If MRSA risk factors, add Vancomycin IV (PharmD consult)   - If patient meets criteria per Hospital Protocol    - start statin (if CPK WNL)    - start HCQ 400mg PO BID x1 day, then 400mg PO daily x 4 days (check G6PD, ECG, and start Qshift POCT glucose)    Goals of care, counseling/discussion  - Reviewed the typical clinical course of COVID19 with the daughter Danya (patient name or relationship to patient), including the potential for acute decompensation requiring intubation and mechanical ventilation  - Discussed again as part of routine daily evaluation, patient/POA maintains code status of Full code    VTE High Risk Prophylaxis: enoxaparin 40mg sq QHS @ 2100 (bundled care) if GFR >30    Patient's chronic/stable medical conditions noted in the problem list above will be managed with the patient's home medications as tolerated.           Hypothyroid  Chronic problem.  Lab Results   Component Value Date    TSH 2.082 03/31/2020     Not on  any medication            Morbid obesity  Body mass index is 40.74 kg/m². Morbid obesity complicates all aspects of disease management from diagnostic modalities to treatment. Weight loss encouraged and health benefits explained to patient.            VTE Risk Mitigation (From admission, onward)         Ordered     heparin (porcine) injection 5,000 Units  Every 12 hours      04/07/20 1349     heparin (porcine) 1,000 unit/mL injection      04/06/20 1138     heparin (porcine) injection 4,000 Units  As needed (PRN)      03/29/20 2001     IP VTE HIGH RISK PATIENT  Once      03/25/20 2308                      Kady Gomez MD  Department of Hospital Medicine   [facility]

## 2020-04-17 NOTE — DISCHARGE SUMMARY
[facility] Hospital Medicine  Discharge Summary      Patient Name: Maria Victoria Hernandez  MRN: [id number]  Admission Date: 3/25/2020  Hospital Length of Stay: 23 days  Discharge Date and Time:  04/17/2020 9:59 AM  Attending Physician: Kady Gomez MD   Discharging Provider: Kady Gomez MD  Primary Care Provider: Candice Deluna MD      HPI:   Patient is a 54 years old  female with morbid obesity in past medical history significant for hypothyroidism is being admitted to intensive care unit under inpatient status from [facility] Emergency room with worsening shortness of breath.  Three days ago patient was tested positive for COVID - 19.  Patient works at [employer].  Patient has been experiencing subjective fever, generalized body aches and pains and nonproductive cough for few days.  Patient is getting increasingly short of breath especially for the past 2 days.  Upon arrival to the emergency room patient was noted to be hypoxic around 89%.  Up on 3 L patient's oxygen sats are around 96%.  Patient denies any chest pain, leg swelling or calf tenderness.      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 And treated per protocol for Covid and ARDS with ceftriaxone/zithromax and HC x 5 days. And ARDS protocol on vent. Pulmonary consulted and followed. WEaned from vent slowly -to nasal cannula by 4/10 and remained stable on oxygen but very weak. PT/OT consulted.   Tachycardica /hypertension developed -cardene gtt adjusted to labetolol then transitioned to po metoprolol Echo -noted mild systolic/Gr 1 diastolic dsyfunction. cxr on 4/5 severe ards pattern.-Vancomycin/zosyn added. 4/6 blood cultures pos for yeast so ID consulted and micafungin added. HD  Catheter and  TLC line removed and cultured and cultures negative. Sputum +  yeast also  .-followed recs from ID; echo neg -await repeat echo. Repeat Blood cultures  were negative for yeast. 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 Acute encephalopathy- delirium vs encephalitis or other structural cause. MRI could not be performed as she was very unstable.   At the time of admission Cr 0.7 worsened to 5.1, Renal was consulted. and HD started on 3/29. In addition had, Metabolic acidosis due to renal failure. Feedings given via ngtube with diabetisource and accucks/ISS given with close monitoring and good control. On 04/09/2020 Patient was extubated. Continued to need Cardene drip for BP. HAd loose stool-- flexiseal was placed. On 04/11/2020 mental status improved was  more awake and slighty stronger. She was tachycardic-- Cardene was switched to labetalol   04/13/2020 patient was tolerating vancomycin and micafungin. She continued to have diarrhea.  C diff antigen was positive.  On 04/15/2020.  Patient's voice was louder, she is quite interactive and was moving her arms.   on 04/16/2020  patient  was doing well.  She  was afebrile.   Her oxygen requirement improved. Was titrated down to 2 L nasal cannula. Patient transferred out of ICU to regular Med surge room. crp improved.  ferr 1200. Nephrology continued to follow patient. Her MAIKOL was suspected to be multifactorial ATN in setting of shock/COVID/intravascular volume depletion- initiated on  HD 3/29.  her Cr trending down  improving- she is nonoliguric-plan to do HD Friday and then nephrology wants to hold HD on the weekend to assess for recovery. Stable for transfer to LTAC       Consults:   Consults (From admission, onward)        Status Ordering Provider     Inpatient consult to Anesthesiology  Once     Provider:  Aidan Irving MD    Acknowledged JOHN ROMANO     Inpatient consult to Infectious Diseases  Once     Provider:  Daija Newman MD    Completed ANDI MAYS  E.     Inpatient consult to LTAC  Once     Provider:  (Not yet assigned)    Acknowledged BHANDARY, SIDDARTHA     Inpatient consult to Nephrology  Once     Provider:  Carolyn Moeller MD    Acknowledged BHANDARY, SIDDARTHA     Inpatient consult to Neurology  Once     Provider:  Seth Monge MD    Completed ORIANA SERNA     Inpatient consult to Pulmonology  Once     Provider:  David Laird MD    Completed SULTAN, AQIB     Inpatient consult to Registered Dietitian/Nutritionist  Once     Provider:  (Not yet assigned)    Completed BHANDARY, SIDDARTHA     Inpatient consult to Vascular Surgery  Once     Provider:  Jacques Gauthier MD    Acknowledged BHANDARY, SIDDARTHA     Inpatient consult to Vascular Surgery  Once     Provider:  Jacques Gauthier MD    Acknowledged BHANDARY, SIDDARTHA     Pharmacy to dose Vancomycin consult  Once     Provider:  (Not yet assigned)    Acknowledged BHRITA SIDDARTHA          No new Assessment & Plan notes have been filed under this hospital service since the last note was generated.  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] 04/12/2020 No    Acute on chronic combined systolic and diastolic congestive heart failure [I50.43] 04/10/2020 Yes    Pneumonia due to infectious organism [J18.9] 04/05/2020 Yes    Acute renal failure [N17.9] 03/27/2020 No    COVID-19 virus infection [U07.1] 03/26/2020 Yes    Morbid obesity [E66.01] 03/25/2020 Yes    Hypothyroid [E03.9] 03/25/2020 Yes    Diverticulosis of large intestine without hemorrhage [K57.30] 07/03/2018 Yes    Morbid obesity with BMI of 45.0-49.9, adult [E66.01, Z68.42] 02/08/2017 Not Applicable    Iron deficiency anemia, unspecified [D50.9] 12/30/2015 Yes      Problems Resolved During this Admission:     Diagnosis Date Noted Date Resolved POA    PRINCIPAL PROBLEM:  Acute respiratory failure with hypoxia [J96.01] 03/25/2020 04/17/2020 Yes    Shock [R57.9]  04/14/2020 Unknown    Fungemia [B49] 04/06/2020 04/17/2020 No    Bacteremia [R78.81] 04/06/2020 04/17/2020 No    ARDS (adult respiratory distress syndrome) [J80] 03/26/2020 04/13/2020 Yes       Discharged Condition: stable    Disposition: Long Term Care    Follow Up:    Patient Instructions:      Notify your health care provider if you experience any of the following:  temperature >100.4     Notify your health care provider if you experience any of the following:  severe uncontrolled pain     Activity as tolerated       Significant Diagnostic Studies: Labs:   BMP:   Recent Labs   Lab 04/16/20 0335 04/17/20  0533    93    138   K 4.5 4.5   CL 98 96   CO2 25 24   BUN 52* 69*   CREATININE 3.6* 4.0*   CALCIUM 10.2 10.3   , CBC   Recent Labs   Lab 04/16/20 0335 04/17/20  0533   WBC 11.75 10.96   HGB 8.7* 8.4*   HCT 28.8* 27.5*    309    and Troponin No results for input(s): TROPONINI in the last 168 hours.  Microbiology:   Blood Culture   Lab Results   Component Value Date    LABBLOO No Growth after 4 days.  04/10/2020   , Sputum Culture   Lab Results   Component Value Date    GSRESP <10 epithelial cells per low power field. 04/05/2020    GSRESP Many WBC's 04/05/2020    GSRESP Rare Gram positive cocci 04/05/2020    GSRESP Rare yeast 04/05/2020    RESPIRATORYC No S aureus or Pseudomonas isolated. 04/05/2020    RESPIRATORYC (A) 04/05/2020     CANDIDA ALBICANS  Few  Normal respiratory anamaria also present      and Urine Culture    Lab Results   Component Value Date    LABURIN  12/17/2013     Multiple organisms isolated. None in predominance.  Repeat if    LABURIN clinically necessary. 12/17/2013       Pending Diagnostic Studies:     Procedure Component Value Units Date/Time    EKG 12-lead [[id number]]     Order Status:  Sent Lab Status:  No result      EKG 12-lead [[id number]]     Order Status:  Sent Lab Status:  No result     EKG 12-lead [[id number]]     Order Status:  Sent Lab Status:  No result     US Carotid Bilateral [[id number]] Resulted:  04/14/20 1333    Order Status:  Sent Lab Status:  No result Updated:  04/14/20 2465         Medications:  Reconciled Home Medications:      Medication List      START taking these medications    acetaminophen 325 MG tablet  Commonly known as:  TYLENOL  Take 2 tablets (650 mg total) by mouth every 6 (six) hours as needed.     ascorbic acid (vitamin C) 1000 MG tablet  Commonly known as:  VITAMIN C  Take 1 tablet (1,000 mg total) by mouth 4 (four) times daily.     aspirin 81 MG EC tablet  Commonly known as:  ECOTRIN  Take 1 tablet (81 mg total) by mouth once daily.     calcitRIOL 0.25 MCG Cap  Commonly known as:  ROCALTROL  Take 1 capsule (0.25 mcg total) by mouth once daily.     cholestyramine-aspartame 4 gram Pwpk  Commonly known as:  QUESTRAN LIGHT  1 packet (4 g total) by Per NG tube route 2 (two) times daily.     dextrose 5 % SolP 50 mL with promethazine 25 mg/mL Soln 12.5 mg  Inject 12.5 mg into the vein every 6 (six) hours as needed.     epoetin raquel-epbx 10,000 unit/mL imjection  Commonly known as:  RETACRIT  Inject 0.5 mLs (5,000 Units total) into the skin every Mon, Wed, Fri.     escitalopram oxalate 5 MG Tab  Commonly known as:  LEXAPRO  1 tablet (5 mg total) by Per NG tube route once daily.     furosemide 40 MG tablet  Commonly known as:  LASIX  Take 1 tablet (40 mg total) by mouth 2 (two) times daily.     gentamicin 0.3 % ophthalmic solution  Commonly known as:  GARAMYCIN  Place 1 drop into the right eye every 4 (four) hours.     * heparin (porcine) 1,000 unit/mL injection  Inject 4 mLs (4,000 Units total) into the vein as needed (Lock ports after every HD).     * heparin (porcine) 5,000 unit/mL injection  Inject 1 mL (5,000 Units total) into the skin every 12 (twelve) hours.     * hydrALAZINE 20 mg/mL  injection  Commonly known as:  APRESOLINE  Inject 0.5 mLs (10 mg total) into the vein every 8 (eight) hours as needed (165).     * hydrALAZINE 50 MG tablet  Commonly known as:  APRESOLINE  Take 1 tablet (50 mg total) by mouth every 8 (eight) hours.     ipratropium-albuteroL  mcg/actuation inhaler  Commonly known as:  COMBIVENT  Inhale 1 puff into the lungs every 6 (six) hours as needed for Wheezing or Shortness of Breath. Rescue     loperamide 2 mg capsule  Commonly known as:  IMODIUM  Take 1 capsule (2 mg total) by mouth 4 (four) times daily as needed for Diarrhea.     metoprolol tartrate 25 MG tablet  Commonly known as:  LOPRESSOR  Take 1 tablet (25 mg total) by mouth 2 (two) times daily.     MICAFUNGIN 100 MG/100 ML NS (READY TO MIX SYSTEM)  Inject 100 mLs (100 mg total) into the vein once daily.     ondansetron 4 mg/2 mL Soln  Inject 4 mg into the vein every 8 (eight) hours as needed.     sodium bicarbonate 650 MG tablet  Take 1 tablet (650 mg total) by mouth 3 (three) times daily.     * vancomycin 250mg / 10ml Susp  10 mLs (250 mg total) by Per NG tube route every 6 (six) hours.     * VANCOMYCIN 500 MG/100 ML D5W (READY TO MIX SYSTEM)  Inject 100 mLs (500 mg total) into the vein once. for 1 dose     * VANCOMYCIN 1 G/250 ML D5W (READY TO MIX SYSTEM)  Inject 250 mLs (1 g total) into the vein once daily.     white petrolatum-mineral oiL 83-15 % Oint  Place into the right eye every evening.     zinc sulfate 220 (50) mg capsule  Commonly known as:  ZINCATE  Take 1 capsule (220 mg total) by mouth once daily.         * This list has 7 medication(s) that are the same as other medications prescribed for you. Read the directions carefully, and ask your doctor or other care provider to review them with you.            CHANGE how you take these medications    ferrous sulfate 325 mg (65 mg iron) Tab tablet  Commonly known as:  FEOSOL  Take 1 tablet (325 mg total) by mouth daily with breakfast.  What changed:   additional instructions     levothyroxine 100 MCG tablet  Commonly known as:  SYNTHROID  Take 1 tablet (100 mcg total) by mouth once daily.  What changed:  how much to take        STOP taking these medications    clotrimazole-betamethasone 1-0.05% cream  Commonly known as:  LOTRISONE     fluticasone propionate 50 mcg/actuation nasal spray  Commonly known as:  FLONASE     levocetirizine 5 MG tablet  Commonly known as:  XYZAL     meloxicam 7.5 MG tablet  Commonly known as:  MOBIC            Indwelling Lines/Drains at time of discharge:   Lines/Drains/Airways     Central Venous Catheter Line            Percutaneous Central Line Insertion/Assessment - Quad Lumen  04/06/20 1611 left internal jugular 10 days    Trialysis (Dialysis) Catheter 04/13/20 1812 right internal jugular 3 days          Drain                 Urethral Catheter 03/25/20 2143 Straight-tip 22 days                Time spent on the discharge of patient: 60 minutes  Patient was seen and examined on the date of discharge and determined to be suitable for discharge.         Kady Gomez MD  Department of Hospital Medicine  [facility]

## 2020-04-17 NOTE — NURSING
Pt inadvertently pull NG tube out. Attempted to reinsert, pt unable to tolerate. Refused at this time.

## 2020-04-17 NOTE — DISCHARGE INSTRUCTIONS
[facility] Facility Transfer Orders        Admit to: [facility]    Diagnoses:   Active Hospital Problems    Diagnosis  POA    Acute encephalopathy [G93.40]  No     Priority: 2     COVID-19 virus detected [U07.1]  Yes     Priority: 3     Conjunctivitis of right eye [H10.9]  No    Post viral debility [R53.81]  No    Acute on chronic combined systolic and diastolic congestive heart failure [I50.43]  Yes    Pneumonia due to infectious organism [J18.9]  Yes    Acute renal failure [N17.9]  No    COVID-19 virus infection [U07.1]  Yes    Morbid obesity [E66.01]  Yes    Hypothyroid [E03.9]  Yes    Diverticulosis of large intestine without hemorrhage [K57.30]  Yes    Morbid obesity with BMI of 45.0-49.9, adult [E66.01, Z68.42]  Not Applicable    Iron deficiency anemia, unspecified [D50.9]  Yes      Resolved Hospital Problems    Diagnosis Date Resolved POA    *Acute respiratory failure with hypoxia [J96.01] 04/17/2020 Yes     Priority: 1 - High    Shock [R57.9] 04/14/2020 Unknown    Fungemia [B49] 04/17/2020 No    Bacteremia [R78.81] 04/17/2020 No     Staphylococcus epidermidis bacteremia, line infection.  04/05  Candidemia due to Candida albicans on 04/04  Respiratory failure, ARDS, COVID19, completed treatment  HTN, CHF with EF of 45%  This patient is high risk for life-threatening deterioration and death secondary to above comorbidities and need for IV treatment Critical care 35 min         ARDS (adult respiratory distress syndrome) [J80] 04/13/2020 Yes     Allergies: Review of patient's allergies indicates:  No Known Allergies    Code Status: Full code    Vitals: Routine       Diet: cardiac diet    Activity: Activity as tolerated    Nursing Precautions: Aspiration , Fall and Pressure ulcer prevention    Bed/Surface: Low Air Loss    Consults: PT to evaluate and treat-  OT to evaluate and treat, ST to evaluate and treat and Nutrition to evaluate and recommend diet    Oxygen: room  air    Dialysis: Patient is  on dialysis.   her Cr trending down  improving- she is nonoliguric-plan to do HD Friday and then nephrology wants to hold HD on the weekend to assess for recovery.  - no nsaids or IV contrast  - dose meds for dialysis        Pending Diagnostic Studies:     Procedure Component Value Units Date/Time    EKG 12-lead [[id number]]     Order Status:  Sent Lab Status:  No result     EKG 12-lead [[id number]]     Order Status:  Sent Lab Status:  No result     EKG 12-lead [[id number]]     Order Status:  Sent Lab Status:  No result     US Carotid Bilateral [[id number]] Resulted:  04/14/20 1333    Order Status:  Sent Lab Status:  No result Updated:  04/14/20 1540            Miscellaneous Care:   Routine Skin for Bedridden Patients:  Apply moisture barrier cream to all    IV Access: PICC     Medications: Discontinue all previous medication orders, if any. See new list below.  Current Discharge Medication List      START taking these medications    Details   acetaminophen (TYLENOL) 325 MG tablet Take 2 tablets (650 mg total) by mouth every 6 (six) hours as needed.  Refills: 0      ascorbic acid, vitamin C, (VITAMIN C) 1000 MG tablet Take 1 tablet (1,000 mg total) by mouth 4 (four) times daily.      aspirin (ECOTRIN) 81 MG EC tablet Take 1 tablet (81 mg total) by mouth once daily.  Refills: 0      calcitRIOL (ROCALTROL) 0.25 MCG Cap Take 1 capsule (0.25 mcg total) by mouth once daily.      cholestyramine-aspartame (QUESTRAN LIGHT) 4 gram PwPk 1 packet (4 g total) by Per NG tube route 2 (two) times daily.  Qty: 180 packet, Refills: 3      dextrose 5 % SolP 50 mL with promethazine 25 mg/mL Soln 12.5 mg Inject 12.5 mg into the vein every 6 (six) hours as needed.      epoetin raquel-epbx (RETACRIT) 10,000 unit/mL imjection Inject 0.5 mLs (5,000 Units total) into the skin every Mon, Wed, Fri.      escitalopram oxalate (LEXAPRO) 5 MG Tab 1 tablet (5 mg total) by Per NG tube route once daily.  Qty: 30 tablet,  Refills: 11      furosemide (LASIX) 40 MG tablet Take 1 tablet (40 mg total) by mouth 2 (two) times daily.  Qty: 60 tablet, Refills: 11      gentamicin (GARAMYCIN) 0.3 % ophthalmic solution Place 1 drop into the right eye every 4 (four) hours.      heparin sodium,porcine (HEPARIN, PORCINE,) 1,000 unit/mL injection Inject 4 mLs (4,000 Units total) into the vein as needed (Lock ports after every HD).      heparin sodium,porcine (HEPARIN, PORCINE,) 5,000 unit/mL injection Inject 1 mL (5,000 Units total) into the skin every 12 (twelve) hours.      hydrALAZINE (APRESOLINE) 20 mg/mL injection Inject 0.5 mLs (10 mg total) into the vein every 8 (eight) hours as needed (165).      hydrALAZINE (APRESOLINE) 50 MG tablet Take 1 tablet (50 mg total) by mouth every 8 (eight) hours.  Qty: 90 tablet, Refills: 11      ipratropium-albuteroL (COMBIVENT)  mcg/actuation inhaler Inhale 1 puff into the lungs every 6 (six) hours as needed for Wheezing or Shortness of Breath. Rescue      loperamide (IMODIUM) 2 mg capsule Take 1 capsule (2 mg total) by mouth 4 (four) times daily as needed for Diarrhea.  Refills: 0      metoprolol tartrate (LOPRESSOR) 25 MG tablet Take 1 tablet (25 mg total) by mouth 2 (two) times daily.  Qty: 60 tablet, Refills: 11      micafungin sodium (MICAFUNGIN 100 MG/100 ML NS, READY TO MIX SYSTEM,) Inject 100 mLs (100 mg total) into the vein once daily.      ondansetron 4 mg/2 mL Soln Inject 4 mg into the vein every 8 (eight) hours as needed.      sodium bicarbonate 650 MG tablet Take 1 tablet (650 mg total) by mouth 3 (three) times daily.  Qty: 90 tablet, Refills: 11      vancomycin 250mg / 10ml Susp 10 mLs (250 mg total) by Per NG tube route every 6 (six) hours.      vancomycin HCl (VANCOMYCIN 1 G/250 ML D5W, READY TO MIX SYSTEM,) Inject 250 mLs (1 g total) into the vein once daily.      vancomycin HCl (VANCOMYCIN 500 MG/100 ML D5W, READY TO MIX SYSTEM,) Inject 100 mLs (500 mg total) into the vein once. for 1  dose  Qty: 100 mL, Refills: 0      white petrolatum-mineral oiL 83-15 % Oint Place into the right eye every evening.      zinc sulfate (ZINCATE) 220 (50) mg capsule Take 1 capsule (220 mg total) by mouth once daily.         CONTINUE these medications which have NOT CHANGED    Details   ferrous sulfate 325 mg (65 mg iron) Tab tablet Take 1 tablet (325 mg total) by mouth daily with breakfast.  Qty: 90 tablet, Refills: 3      levothyroxine (SYNTHROID) 100 MCG tablet Take 1 tablet (100 mcg total) by mouth once daily.  Qty: 90 tablet, Refills: 3         STOP taking these medications       fluticasone propionate (FLONASE) 50 mcg/actuation nasal spray Comments:   Reason for Stopping:         meloxicam (MOBIC) 7.5 MG tablet Comments:   Reason for Stopping:         clotrimazole-betamethasone 1-0.05% (LOTRISONE) cream Comments:   Reason for Stopping:         levocetirizine (XYZAL) 5 MG tablet Comments:   Reason for Stopping:             --- Continue Vancomycin iv  for S epidermidis (count 14 days from 04/07)  --- Continue Micafungin  for candidemia due to C albicans.     Follow final susceptibilities.  Most likely  will be able to  switch to Diflucan 400 mg either IV or p.o.    End date 05/05/2020.  - Eventually may need retinal exam.  We changed  Lines  on 04/06 in afternoon.   BCx drawn on the a.m . of 04/06 were positive for C albicans!  Repeat blood cultures were negative after that  --  vancomycin p.o. for C diff diarrhea .  At least 10 days of treatment.

## 2020-04-17 NOTE — ASSESSMENT & PLAN NOTE
ML metabolic  resolved  MRI brain negative  US carotids done pending      Lab Results   Component Value Date    WBC 10.96 04/17/2020      focal findings on physical exam  No early signs of stroke on Head CT, no hemorrhage or acute findings.  EEG: EEG 30 min awake and drowsy results noted no seizures  Continue supportive care

## 2020-04-17 NOTE — PROGRESS NOTES
Pharmacokinetic Assessment Follow Up: IV Vancomycin    Vancomycin serum concentration assessment(s):    The random level was drawn correctly and can be used to guide therapy at this time. The measurement is within the desired definitive target range of 15 to 20 mcg/mL.    Vancomycin Regimen Plan:    Patient scheduled for HD today. Vanc dose will be 500 mg at 1700 postHD.  Vancomycin level ordered with AM labs on 04/18/2020.  Target goal is 15-20 mg/dL.       Drug levels (last 3 results):  Recent Labs   Lab Result Units 04/15/20  0319 04/16/20  0335 04/17/20  0533   Vancomycin, Random ug/mL 25.3 14.6 18.3       Pharmacy will continue to follow and monitor vancomycin.    Please contact pharmacy at extension [phone] for questions regarding this assessment.    Thank you for the consult,   Sami Baugh       Patient brief summary:  Maria Victoria Hernandez is a 55 y.o. female initiated on antimicrobial therapy with IV Vancomycin for treatment of bacteremia    The patient's current regimen is pulse dosing    Drug Allergies:   Review of patient's allergies indicates:  No Known Allergies    Actual Body Weight:   97.8kg    Renal Function:   Estimated Creatinine Clearance: 17.2 mL/min (A) (based on SCr of 4 mg/dL (H)).,     Dialysis Method (if applicable):  intermittent HD PRN    CBC (last 72 hours):  Recent Labs   Lab Result Units 04/15/20  0319 04/16/20  0335 04/17/20  0533   WBC K/uL 8.61 11.75 10.96   Hemoglobin g/dL 9.0* 8.7* 8.4*   Hematocrit % 29.5* 28.8* 27.5*   Platelets K/uL 389* 329 309   Gran% % 57.7 60.6 64.6   Lymph% % 18.0 18.2 15.7*   Mono% % 15.4* 11.6 9.9   Eosinophil% % 3.9 2.9 5.2   Basophil% % 1.9 1.6 1.7   Differential Method  Automated Automated Automated       Metabolic Panel (last 72 hours):  Recent Labs   Lab Result Units 04/15/20  0319 04/16/20  0335 04/17/20  0533   Sodium mmol/L 140 141 138   Potassium mmol/L 4.3 4.5 4.5   Chloride mmol/L 95 98 96   CO2 mmol/L 26 25 24   Glucose mg/dL 121* 101 93    BUN, Bld mg/dL 65* 52* 69*   Creatinine mg/dL 4.1* 3.6* 4.0*   Albumin g/dL 3.2* 3.4* 3.5   Total Bilirubin mg/dL 1.0 0.9 0.8   Alkaline Phosphatase U/L 183* 176* 167*   AST U/L 19 23 22   ALT U/L 24 25 25       Vancomycin Administrations:  vancomycin given in the last 96 hours                     vancomycin 250mg / 10ml oral suspension 250 mg (mg) 250 mg Given 04/17/20 0537     250 mg Given  0020     250 mg Given 04/16/20 1717     250 mg Given  1249     250 mg Given  0533     250 mg Given 04/15/20 2318     250 mg Given  1839     250 mg Given  1125     250 mg Given  0514     250 mg Given  0000     250 mg Given 04/14/20 1822     250 mg Given  1300     250 mg Given  0611     250 mg Given  0032    vancomycin 500 mg in dextrose 5 % 100 mL IVPB (ready to mix system) (mg) 500 mg New Bag 04/16/20 1249                      Microbiologic Results:  Microbiology Results (last 7 days)       Procedure Component Value Units Date/Time    Blood culture [[id number]]  (Abnormal) Collected:  04/06/20 0806    Order Status:  Completed Specimen:  Blood from Antecubital, Left Updated:  04/16/20 1438     Blood Culture, Routine Gram stain peds bottle: yeast       Results called to and read back by: Tania Nolan RN 04/08/2020        11:20      CANDIDA ALBICANS  Susceptibility pending  ID consult required at [facility],[place] and [facility] locations.      Blood culture [[id number]] Collected:  04/10/20 0453    Order Status:  Completed Specimen:  Blood Updated:  04/14/20 1412     Blood Culture, Routine No Growth after 4 days.     C Diff Toxin by PCR [[id number]]  (Abnormal) Collected:  04/12/20 2315    Order Status:  Completed Updated:  04/14/20 0033     C. diff PCR Positive    Clostridium difficile EIA [[id number]]  (Abnormal) Collected:  04/12/20 2315    Order Status:  Completed Specimen:  Stool Updated:  04/13/20 1324     C. diff Antigen Positive     C difficile Toxins A+B, EIA Negative     Comment: Testing not recommended for children  <24 months old.       Blood culture [[id number]]  (Abnormal) Collected:  04/04/20 0912    Order Status:  Completed Specimen:  Blood from Line, Central Updated:  04/13/20 1303     Blood Culture, Routine Gram stain peds bottle: budding yeast      Results called to and read back by: Carri Pryor RN  04/06/2020  03:04      AJ ALBICANS  ID consult required at [facility],[place] and [facility] locations.      Blood culture [[id number]] Collected:  04/08/20 1739    Order Status:  Completed Specimen:  Blood from Line, Central Updated:  04/12/20 2212     Blood Culture, Routine No Growth after 4 days.     Blood culture [[id number]] Collected:  04/08/20 0745    Order Status:  Completed Specimen:  Blood Updated:  04/12/20 1412     Blood Culture, Routine No Growth after 4 days.     Blood culture [[id number]] Collected:  04/07/20 1730    Order Status:  Completed Specimen:  Blood from Line, Central Updated:  04/11/20 2212     Blood Culture, Routine No Growth after 4 days.     Blood culture [[id number]] Collected:  04/07/20 0907    Order Status:  Completed Specimen:  Blood Updated:  04/11/20 2212     Blood Culture, Routine No Growth after 4 days.     Clostridium difficile EIA [[id number]]     Order Status:  Canceled Specimen:  Stool     Blood culture [[id number]] Collected:  04/06/20 1802    Order Status:  Completed Specimen:  Blood from Line, Central Updated:  04/10/20 2212     Blood Culture, Routine No Growth after 4 days.

## 2020-04-17 NOTE — PLAN OF CARE
"Plan of care reviewed with pt. States "understanding." Pt is alert and oriented with delayed responses. Right IJ Trialysis and Left IJ quad lumen are without redness and swelling. VSS. Cuellar to gravity. O2 at 2L. SR on tele. Contact precautions maintained as per orders. Bed in low position, bed alarm set, call light in reach. Instructed to call staff for assistance. Will continue to monitor, observe and note any changes. Safety maintained.   "

## 2020-04-17 NOTE — PROGRESS NOTES
HD:  tx complete, lines reinfused, catheter capped and clamped, dressing CDI.  Pt tolerated tx well.  High arterial pressures throughout tx, Dr. Moeller aware.    NET UF: 0 mL

## 2020-04-17 NOTE — PT/OT/SLP PROGRESS
Occupational Therapy   Treatment    Name: Maria Victoria Hernandez  MRN: [id number]  Admitting Diagnosis:  Acute respiratory failure with hypoxia       Recommendations:     Discharge Recommendations: home health PT, home health OT, home with home health  Discharge Equipment Recommendations:  walker, rolling, 3-in-1 commode, bath bench  Barriers to discharge:  None, Other (Comment)(profound weakness)    Assessment:     Maria Victoria Hernandez is a 55 y.o. female with a medical diagnosis of Acute respiratory failure with hypoxia.  She presents with recent extubation and CDiff,  increased ROM over yesterday, no NG tube or Cuellar, great participation, minor confusion, and poor insight. Performance deficits affecting function are weakness, impaired endurance, impaired self care skills, impaired functional mobilty, gait instability, impaired balance, impaired cognition, impaired coordination, decreased upper extremity function, decreased lower extremity function, impaired fine motor.     Rehab Prognosis:  Good; patient would benefit from acute skilled OT services to address these deficits and reach maximum level of function.       Plan:     Patient to be seen 6 x/week to address the above listed problems via self-care/home management, therapeutic activities, therapeutic exercises, cognitive retraining  · Plan of Care Expires: 05/05/20  · Plan of Care Reviewed with: patient    Subjective     Pain/Comfort:  · Pain Rating 1: 0/10    Objective:     Communicated with: RNSharonda prior to session.  Patient found HOB elevated with central line, telemetry, pulse ox (continuous) upon OT entry to room.    General Precautions: Standard, aspiration, contact, fall   Orthopedic Precautions:N/A   Braces: N/A     Occupational Performance:     Bed Mobility:  Pt is oriented to her bed controls and uses them independently.  · Patient completed Rolling/Turning to Left with  stand by assistance  · Patient completed Scooting/Bridging with stand  by assistance and VC and demo  · Patient completed Supine to Sit with minimum assistance, with side rail and VC for sequencing.     Functional Mobility/Transfers:  · Patient completed Sit <> Stand Transfer with maximal assistance and of 2 persons  with  rolling walker and max VC for hand placement and sequencing. HOLMAN providing demo prior to transfer.   · Patient completed Bed <> Chair Transfer using Squat Pivot technique with moderate assistance and of 2 persons with no assistive device and max VC, and physical foot placement.  · Functional Mobility: POOR; pt continues to display limited ROM of B LE AND B UE and requires sequencing cues during all activity.    Activities of Daily Living:  · Grooming: minimum assistance for oral hygiene with setup at tray table and seated in chair.    [facility] 6 Click ADL: 14    Treatment & Education:  -pt requires max VC, demo, and explanation prior to transfers to complete safety and facilitate optimal participation. Pt is very motivated.    Patient left up in chair with all lines intact, call button in reach, chair alarm on and RNSharonda presentEducation:      GOALS:   Multidisciplinary Problems     Occupational Therapy Goals        Problem: Occupational Therapy Goal    Goal Priority Disciplines Outcome Interventions   Occupational Therapy Goal     OT, PT/OT Ongoing, Progressing    Description:  Goals to be met by: 5/5/20    Patient will increase functional independence with ADLs by performing:    Feeding with Modified [place] and Assistive Devices as needed.  UE Dressing with Set-up Assistance and Minimal Assistance.  Grooming while seated with Supervision.  Sitting at edge of bed x10 minutes with Stand-by Assistance and use of upper extremity support.  Toilet transfer to bedside commode with Moderate Assistance.  Upper extremity exercise program x10 reps per handout, with assistance as needed.                      Time Tracking:     OT Date of Treatment: 04/17/20  OT Start  Time: 1330  OT Stop Time: 1409  OT Total Time (min): 39 min    Billable Minutes:Self Care/Home Management 39 min    MANDA Gregory/EVA  4/17/2020

## 2020-04-17 NOTE — PT/OT/SLP PROGRESS
Physical Therapy Treatment    Patient Name:  Maria Victoria Hernandez   MRN:  [id number]    Recommendations:     Discharge Recommendations:  (SNF vs HHPT/OT)   Discharge Equipment Recommendations: none   Barriers to discharge: None    Assessment:     Maria Victoria Hernandez is a 55 y.o. female admitted with a medical diagnosis of Acute respiratory failure with hypoxia.  She presents with the following impairments/functional limitations:  weakness, impaired functional mobilty, impaired balance, impaired cognition, impaired cardiopulmonary response to activity, impaired endurance, impaired self care skills, gait instability. Patient sitting up in chair upon entering the room. She is agreeable to PT treatment. She requires SBA for scooting to the edge of the chair. Demonstration provided for sit to stand transfer. She requires MinAx2 for first trial of sit to stand and ModAx2 for second trial. She took 3 steps towards the bed, but her knees began buckling so she returned to sitting in the chair. She requests to stay up in the chair because she is not ready to get back in bed. RN informed and agreeable. Patient's daughter would like patient to D/C home if she is able to transfer with 1 person assist. She will have 24 hour supervision. Patient still needs placement at this time, but will update recommendations if this changes.     Rehab Prognosis: Fair; patient would benefit from acute skilled PT services to address these deficits and reach maximum level of function.    Recent Surgery: * No surgery found *      Plan:     During this hospitalization, patient to be seen daily to address the identified rehab impairments via gait training, therapeutic activities, therapeutic exercises and progress toward the following goals:    · Plan of Care Expires:  05/13/20    Subjective     Chief Complaint: not ready to get back in bed  Patient/Family Comments/goals: go home  Pain/Comfort:  · Pain Rating 1: 0/10      Objective:      Communicated with RN Sharonda prior to session.  Patient found up in chair with central line, telemetry, pulse ox (continuous) upon PT entry to room.     General Precautions: Standard, special contact, aspiration, fall   Orthopedic Precautions:N/A   Braces: N/A     Functional Mobility:  · Transfers:     · Sit to Stand:  minimum assistance, moderate assistance and of 2 persons with rolling walker  · Gait: 3 steps RW Zan  · Balance: Fair      AM-PAC 6 CLICK MOBILITY  Turning over in bed (including adjusting bedclothes, sheets and blankets)?: 3  Sitting down on and standing up from a chair with arms (e.g., wheelchair, bedside commode, etc.): 3  Moving from lying on back to sitting on the side of the bed?: 3  Moving to and from a bed to a chair (including a wheelchair)?: 2  Need to walk in hospital room?: 2  Climbing 3-5 steps with a railing?: 1  Basic Mobility Total Score: 14       Therapeutic Activities and Exercises:   Patient was educated on the importance of OOB activity during hospital stay, safe transfers and ambulation, D/C planning    Patient left up in chair with all lines intact, call button in reach, chair alarm on and RN notified..    GOALS:   Multidisciplinary Problems     Physical Therapy Goals        Problem: Physical Therapy Goal    Goal Priority Disciplines Outcome Goal Variances Interventions   Physical Therapy Goal     PT, PT/OT Ongoing, Progressing     Description:  Goals to be met by: 2020    Patient will increase functional independence with mobility by performin. Supine to sit with MInimal Assistance  2. Sit to supine with MInimal Assistance  3. Sit to stand transfer with Minimal Assistance  4. Bed to chair transfer with Minimal Assistance using Rolling Walker  5. Gait  x 25 feet with Minimal Assistance using Rolling Walker.                       Time Tracking:     PT Received On: 20  PT Start Time: 1424     PT Stop Time: 1450  PT Total Time (min): 26 min     Billable Minutes:  Therapeutic Activity 26    Treatment Type: Treatment  PT/PTA: PT     PTA Visit Number: 0     Pippa Luna, PT  04/17/2020

## 2020-04-18 PROBLEM — R04.0 EPISTAXIS: Status: ACTIVE | Noted: 2020-04-18

## 2020-04-18 LAB
ALBUMIN SERPL BCP-MCNC: 3.6 G/DL (ref 3.5–5.2)
ALP SERPL-CCNC: 159 U/L (ref 55–135)
ALT SERPL W/O P-5'-P-CCNC: 25 U/L (ref 10–44)
ANION GAP SERPL CALC-SCNC: 16 MMOL/L (ref 8–16)
APTT BLDCRRT: 32.3 SEC (ref 21–32)
AST SERPL-CCNC: 24 U/L (ref 10–40)
BASOPHILS # BLD AUTO: 0.18 K/UL (ref 0–0.2)
BASOPHILS NFR BLD: 1.8 % (ref 0–1.9)
BILIRUB SERPL-MCNC: 0.8 MG/DL (ref 0.1–1)
BUN SERPL-MCNC: 43 MG/DL (ref 6–20)
CALCIUM SERPL-MCNC: 10.2 MG/DL (ref 8.7–10.5)
CHLORIDE SERPL-SCNC: 97 MMOL/L (ref 95–110)
CO2 SERPL-SCNC: 21 MMOL/L (ref 23–29)
CREAT SERPL-MCNC: 3 MG/DL (ref 0.5–1.4)
DIFFERENTIAL METHOD: ABNORMAL
EOSINOPHIL # BLD AUTO: 0.5 K/UL (ref 0–0.5)
EOSINOPHIL NFR BLD: 5.4 % (ref 0–8)
ERYTHROCYTE [DISTWIDTH] IN BLOOD BY AUTOMATED COUNT: 19.3 % (ref 11.5–14.5)
EST. GFR  (AFRICAN AMERICAN): 20 ML/MIN/1.73 M^2
EST. GFR  (NON AFRICAN AMERICAN): 17 ML/MIN/1.73 M^2
GLUCOSE SERPL-MCNC: 94 MG/DL (ref 70–110)
HCT VFR BLD AUTO: 28.6 % (ref 37–48.5)
HCT VFR BLD AUTO: 28.8 % (ref 37–48.5)
HGB BLD-MCNC: 8.6 G/DL (ref 12–16)
HGB BLD-MCNC: 8.7 G/DL (ref 12–16)
IMM GRANULOCYTES # BLD AUTO: 0.32 K/UL (ref 0–0.04)
IMM GRANULOCYTES NFR BLD AUTO: 3.2 % (ref 0–0.5)
INR PPP: 1.1 (ref 0.8–1.2)
LYMPHOCYTES # BLD AUTO: 1.7 K/UL (ref 1–4.8)
LYMPHOCYTES NFR BLD: 16.7 % (ref 18–48)
MCH RBC QN AUTO: 26.7 PG (ref 27–31)
MCHC RBC AUTO-ENTMCNC: 30.4 G/DL (ref 32–36)
MCV RBC AUTO: 88 FL (ref 82–98)
MONOCYTES # BLD AUTO: 0.9 K/UL (ref 0.3–1)
MONOCYTES NFR BLD: 9.3 % (ref 4–15)
NEUTROPHILS # BLD AUTO: 6.4 K/UL (ref 1.8–7.7)
NEUTROPHILS NFR BLD: 63.6 % (ref 38–73)
NRBC BLD-RTO: 1 /100 WBC
PHOSPHATE SERPL-MCNC: 5.4 MG/DL (ref 2.7–4.5)
PLATELET # BLD AUTO: 289 K/UL (ref 150–350)
PMV BLD AUTO: 11.1 FL (ref 9.2–12.9)
POTASSIUM SERPL-SCNC: 4 MMOL/L (ref 3.5–5.1)
PROT SERPL-MCNC: 9.6 G/DL (ref 6–8.4)
PROTHROMBIN TIME: 11.9 SEC (ref 9–12.5)
PTH-INTACT SERPL-MCNC: 255.2 PG/ML (ref 9–77)
RBC # BLD AUTO: 3.26 M/UL (ref 4–5.4)
SODIUM SERPL-SCNC: 134 MMOL/L (ref 136–145)
VANCOMYCIN SERPL-MCNC: 18.9 UG/ML
WBC # BLD AUTO: 10.04 K/UL (ref 3.9–12.7)

## 2020-04-18 PROCEDURE — 63600175 PHARM REV CODE 636 W HCPCS: Performed by: INTERNAL MEDICINE

## 2020-04-18 PROCEDURE — 80053 COMPREHEN METABOLIC PANEL: CPT

## 2020-04-18 PROCEDURE — 92526 ORAL FUNCTION THERAPY: CPT

## 2020-04-18 PROCEDURE — 99900035 HC TECH TIME PER 15 MIN (STAT)

## 2020-04-18 PROCEDURE — 25000003 PHARM REV CODE 250: Performed by: NURSE PRACTITIONER

## 2020-04-18 PROCEDURE — 25000003 PHARM REV CODE 250: Performed by: INTERNAL MEDICINE

## 2020-04-18 PROCEDURE — 80202 ASSAY OF VANCOMYCIN: CPT

## 2020-04-18 PROCEDURE — 36415 COLL VENOUS BLD VENIPUNCTURE: CPT

## 2020-04-18 PROCEDURE — 84100 ASSAY OF PHOSPHORUS: CPT

## 2020-04-18 PROCEDURE — 85730 THROMBOPLASTIN TIME PARTIAL: CPT

## 2020-04-18 PROCEDURE — 97530 THERAPEUTIC ACTIVITIES: CPT

## 2020-04-18 PROCEDURE — 97110 THERAPEUTIC EXERCISES: CPT

## 2020-04-18 PROCEDURE — 85018 HEMOGLOBIN: CPT

## 2020-04-18 PROCEDURE — 94761 N-INVAS EAR/PLS OXIMETRY MLT: CPT

## 2020-04-18 PROCEDURE — 85025 COMPLETE CBC W/AUTO DIFF WBC: CPT | Mod: 91

## 2020-04-18 PROCEDURE — 85014 HEMATOCRIT: CPT

## 2020-04-18 PROCEDURE — 83970 ASSAY OF PARATHORMONE: CPT

## 2020-04-18 PROCEDURE — 11000001 HC ACUTE MED/SURG PRIVATE ROOM

## 2020-04-18 PROCEDURE — 85610 PROTHROMBIN TIME: CPT

## 2020-04-18 RX ORDER — LORAZEPAM 1 MG/1
1 TABLET ORAL EVERY 6 HOURS PRN
Status: DISCONTINUED | OUTPATIENT
Start: 2020-04-18 | End: 2020-04-21 | Stop reason: HOSPADM

## 2020-04-18 RX ADMIN — CIPROFLOXACIN HYDROCHLORIDE: 3 OINTMENT OPHTHALMIC at 03:04

## 2020-04-18 RX ADMIN — ONDANSETRON 4 MG: 2 INJECTION INTRAMUSCULAR; INTRAVENOUS at 08:04

## 2020-04-18 RX ADMIN — FUROSEMIDE 40 MG: 40 TABLET ORAL at 05:04

## 2020-04-18 RX ADMIN — SODIUM BICARBONATE 650 MG TABLET 650 MG: at 04:04

## 2020-04-18 RX ADMIN — Medication 250 MG: at 05:04

## 2020-04-18 RX ADMIN — HYDRALAZINE HYDROCHLORIDE 10 MG: 10 TABLET ORAL at 05:04

## 2020-04-18 RX ADMIN — LOPERAMIDE HYDROCHLORIDE 2 MG: 2 CAPSULE ORAL at 05:04

## 2020-04-18 RX ADMIN — Medication 250 MG: at 01:04

## 2020-04-18 RX ADMIN — METOPROLOL TARTRATE 25 MG: 25 TABLET, FILM COATED ORAL at 08:04

## 2020-04-18 RX ADMIN — OXYCODONE HYDROCHLORIDE AND ACETAMINOPHEN 1000 MG: 500 TABLET ORAL at 08:04

## 2020-04-18 RX ADMIN — LACTOBACILLUS TAB 2 TABLET: TAB at 08:04

## 2020-04-18 RX ADMIN — HYDRALAZINE HYDROCHLORIDE 10 MG: 10 TABLET ORAL at 09:04

## 2020-04-18 RX ADMIN — FUROSEMIDE 40 MG: 40 TABLET ORAL at 08:04

## 2020-04-18 RX ADMIN — LORAZEPAM 1 MG: 1 TABLET ORAL at 10:04

## 2020-04-18 RX ADMIN — GENTAMICIN SULFATE 1 DROP: 3 SOLUTION OPHTHALMIC at 05:04

## 2020-04-18 RX ADMIN — ESCITALOPRAM OXALATE 5 MG: 5 TABLET, FILM COATED ORAL at 08:04

## 2020-04-18 RX ADMIN — OXYCODONE HYDROCHLORIDE AND ACETAMINOPHEN 1000 MG: 500 TABLET ORAL at 01:04

## 2020-04-18 RX ADMIN — HYDRALAZINE HYDROCHLORIDE 10 MG: 10 TABLET ORAL at 01:04

## 2020-04-18 RX ADMIN — CALCITRIOL CAPSULES 0.25 MCG 0.25 MCG: 0.25 CAPSULE ORAL at 08:04

## 2020-04-18 RX ADMIN — OXYCODONE HYDROCHLORIDE AND ACETAMINOPHEN 1000 MG: 500 TABLET ORAL at 04:04

## 2020-04-18 RX ADMIN — CIPROFLOXACIN HYDROCHLORIDE: 3 OINTMENT OPHTHALMIC at 09:04

## 2020-04-18 RX ADMIN — LEVOTHYROXINE SODIUM 100 MCG: 100 TABLET ORAL at 05:04

## 2020-04-18 RX ADMIN — CIPROFLOXACIN HYDROCHLORIDE: 3 OINTMENT OPHTHALMIC at 08:04

## 2020-04-18 RX ADMIN — SODIUM BICARBONATE 650 MG TABLET 650 MG: at 08:04

## 2020-04-18 RX ADMIN — Medication 250 MG: at 12:04

## 2020-04-18 RX ADMIN — ASPIRIN 81 MG: 81 TABLET, COATED ORAL at 08:04

## 2020-04-18 RX ADMIN — MINERAL OIL AND PETROLATUM: 150; 830 OINTMENT OPHTHALMIC at 08:04

## 2020-04-18 RX ADMIN — MICAFUNGIN SODIUM 100 MG: 20 INJECTION, POWDER, LYOPHILIZED, FOR SOLUTION INTRAVENOUS at 01:04

## 2020-04-18 RX ADMIN — ZINC SULFATE 220 MG (50 MG) CAPSULE 220 MG: CAPSULE at 08:04

## 2020-04-18 RX ADMIN — CHOLESTYRAMINE 4 G: 4 POWDER, FOR SUSPENSION ORAL at 08:04

## 2020-04-18 RX ADMIN — LOPERAMIDE HYDROCHLORIDE 2 MG: 2 CAPSULE ORAL at 09:04

## 2020-04-18 NOTE — CARE UPDATE
04/17/20 2000   Patient Assessment/Suction   Level of Consciousness (AVPU) alert   Respiratory Effort Unlabored   Expansion/Accessory Muscles/Retractions no use of accessory muscles;no retractions   Rhythm/Pattern, Respiratory unlabored;no shortness of breath reported   Cough Frequency infrequent   Cough Type nonproductive   PRE-TX-O2   O2 Device (Oxygen Therapy) room air   SpO2 96 %   Pulse Oximetry Type Intermittent   $ Pulse Oximetry - Multiple Charge Pulse Oximetry - Multiple   Pulse 89   Resp 20   Inhaler   $ Inhaler Charges PRN treatment not required   Respiratory Treatment Status (Inhaler) PRN treatment not required

## 2020-04-18 NOTE — PLAN OF CARE
Problem: SLP Goal  Goal: SLP Goal  Description  1) Consume regular diet and thin liquids no s/s oropharyngeal dysphagia--Revised   Outcome: Ongoing, Progressing

## 2020-04-18 NOTE — PLAN OF CARE
Plan of care reviewed with patient. SR on telemetry. RIJ Trialysis catheter intact. L IJ Quad  intact & patent. Remains free from falls/injury.instructed to call for assistance as needed during night, verbalized understanding. Call light in reach, bed alarm on. Contact isolation maintained.

## 2020-04-18 NOTE — ASSESSMENT & PLAN NOTE
Patient's anemia is currently controlled.     Current CBC reviewed-   Lab Results   Component Value Date    HGB 8.6 (L) 04/18/2020    HCT 28.8 (L) 04/18/2020     Monitor serial CBC and transfuse if patient becomes hemodynamically unstable, symptomatic or H/H drops below 8/24

## 2020-04-18 NOTE — PROGRESS NOTES
Pharmacokinetic Assessment Follow Up: IV Vancomycin    Vancomycin serum concentration assessment(s):    The random level was drawn correctly and can be used to guide therapy at this time. The measurement is within the desired definitive target range of 15 to 20 mcg/mL.    Vancomycin Regimen Plan:    Discontinue the scheduled vancomycin regimen and re-dose when the random level is less than 20 mcg/mL, next level to be drawn at am labs on 4/19.    Drug levels (last 3 results):  Recent Labs   Lab Result Units 04/16/20 0335 04/17/20  0533 04/18/20  0525   Vancomycin, Random ug/mL 14.6 18.3 18.9       Pharmacy will continue to follow and monitor vancomycin.    Please contact pharmacy at extension [phone] for questions regarding this assessment.    Thank you for the consult,   Carisa Scales       Patient brief summary:  Maria Victoria Hernandez is a 55 y.o. female initiated on antimicrobial therapy with IV Vancomycin for treatment of lower respiratory infection    The patient's current regimen is 5095    Drug Allergies:   Review of patient's allergies indicates:  No Known Allergies    Actual Body Weight:   97.8    Renal Function:   Estimated Creatinine Clearance: 22.9 mL/min (A) (based on SCr of 3 mg/dL (H)).,     Dialysis Method (if applicable):  intermittent HD    CBC (last 72 hours):  Recent Labs   Lab Result Units 04/16/20  0335 04/17/20  0533 04/17/20  2042 04/18/20  0525 04/18/20  1147   WBC K/uL 11.75 10.96 10.92 10.04  --    Hemoglobin g/dL 8.7* 8.4* 8.6* 8.7* 8.6*   Hematocrit % 28.8* 27.5* 28.2* 28.6* 28.8*   Platelets K/uL 329 309 279 289  --    Gran% % 60.6 64.6 62.9 63.6  --    Lymph% % 18.2 15.7* 18.3 16.7*  --    Mono% % 11.6 9.9 8.5 9.3  --    Eosinophil% % 2.9 5.2 4.7 5.4  --    Basophil% % 1.6 1.7 1.6 1.8  --    Differential Method  Automated Automated Automated Automated  --        Metabolic Panel (last 72 hours):  Recent Labs   Lab Result Units 04/16/20  0335 04/17/20  0533 04/18/20  0525   Sodium  mmol/L 141 138 134*   Potassium mmol/L 4.5 4.5 4.0   Chloride mmol/L 98 96 97   CO2 mmol/L 25 24 21*   Glucose mg/dL 101 93 94   BUN, Bld mg/dL 52* 69* 43*   Creatinine mg/dL 3.6* 4.0* 3.0*   Albumin g/dL 3.4* 3.5 3.6   Total Bilirubin mg/dL 0.9 0.8 0.8   Alkaline Phosphatase U/L 176* 167* 159*   AST U/L 23 22 24   ALT U/L 25 25 25   Phosphorus mg/dL  --   --  5.4*       Vancomycin Administrations:  vancomycin given in the last 96 hours                     vancomycin 250mg / 10ml oral suspension 250 mg (mg) 250 mg Given 04/18/20 1753     250 mg Given  1300     250 mg Given  0544     250 mg Given  0030     250 mg Given 04/17/20 1843    vancomycin 500 mg in dextrose 5 % 100 mL IVPB (ready to mix system) (mg) 500 mg New Bag 04/17/20 1643    vancomycin 250mg / 10ml oral suspension 250 mg (mg) 250 mg Given 04/17/20 1320     250 mg Given  0537     250 mg Given  0020     250 mg Given 04/16/20 1717     250 mg Given  1249     250 mg Given  0533     250 mg Given 04/15/20 2318     250 mg Given  1839     250 mg Given  1125     250 mg Given  0514     250 mg Given  0000                      Microbiologic Results:  Microbiology Results (last 7 days)       Procedure Component Value Units Date/Time    Blood culture [[id number]]  (Abnormal) Collected:  04/06/20 0806    Order Status:  Completed Specimen:  Blood from Antecubital, Left Updated:  04/18/20 1221     Blood Culture, Routine Gram stain peds bottle: yeast       Results called to and read back by: Tania Nolan RN 04/08/2020        11:20      CANDIDA ALBICANS  Susceptibility pending  ID consult required at [facility].[place] and [place] [facility].      Blood culture [[id number]] Collected:  04/10/20 0453    Order Status:  Completed Specimen:  Blood Updated:  04/14/20 1412     Blood Culture, Routine No Growth after 4 days.     C Diff Toxin by PCR [[id number]]  (Abnormal) Collected:  04/12/20 2315    Order Status:  Completed Updated:  04/14/20 0033     C. diff PCR Positive     Clostridium difficile EIA [[id number]]  (Abnormal) Collected:  04/12/20 2315    Order Status:  Completed Specimen:  Stool Updated:  04/13/20 1324     C. diff Antigen Positive     C difficile Toxins A+B, EIA Negative     Comment: Testing not recommended for children <24 months old.       Blood culture [[id number]]  (Abnormal) Collected:  04/04/20 0912    Order Status:  Completed Specimen:  Blood from Line, Central Updated:  04/13/20 1303     Blood Culture, Routine Gram stain peds bottle: budding yeast      Results called to and read back by: Carri Pryor RN  04/06/2020  03:04      AJ ALBICANS  ID consult required at [facility],[place] and [facility] locations.      Blood culture [[id number]] Collected:  04/08/20 1739    Order Status:  Completed Specimen:  Blood from Line, Central Updated:  04/12/20 2212     Blood Culture, Routine No Growth after 4 days.     Blood culture [[id number]] Collected:  04/08/20 0745    Order Status:  Completed Specimen:  Blood Updated:  04/12/20 1412     Blood Culture, Routine No Growth after 4 days.     Blood culture [[id number]] Collected:  04/07/20 1730    Order Status:  Completed Specimen:  Blood from Line, Central Updated:  04/11/20 2212     Blood Culture, Routine No Growth after 4 days.     Blood culture [[id number]] Collected:  04/07/20 0907    Order Status:  Completed Specimen:  Blood Updated:  04/11/20 2212     Blood Culture, Routine No Growth after 4 days.     Clostridium difficile EIA [[id number]]     Order Status:  Canceled Specimen:  Stool

## 2020-04-18 NOTE — ASSESSMENT & PLAN NOTE
Patient's anemia is currently controlled. Has recieved 1 units of PRBCs on 4/3.   Will need to Monitor for GI bleed  Lab Results   Component Value Date    HGB 8.6 (L) 04/18/2020    HCT 28.8 (L) 04/18/2020     Monitor serial CBC and transfuse if patient becomes hemodynamically unstable, symptomatic or H/H drops below 7/21.   Will continue to monitor

## 2020-04-18 NOTE — PLAN OF CARE
Problem: Occupational Therapy Goal  Goal: Occupational Therapy Goal  Description  Goals to be met by: 5/5/20    Patient will increase functional independence with ADLs by performing:    Feeding with Modified [place] and Assistive Devices as needed.  UE Dressing with Set-up Assistance and Minimal Assistance.  Grooming while seated with Supervision.  Sitting at edge of bed x10 minutes with Stand-by Assistance and use of upper extremity support.  Toilet transfer to bedside commode with Moderate Assistance.  Upper extremity exercise program x10 reps per handout, with assistance as needed.     Outcome: Ongoing, Progressing      Check labs

## 2020-04-18 NOTE — RESPIRATORY THERAPY
04/18/20 0715   Patient Assessment/Suction   Level of Consciousness (AVPU) alert   Respiratory Effort Unlabored   Expansion/Accessory Muscles/Retractions expansion symmetric;no use of accessory muscles   All Lung Fields Breath Sounds clear;diminished   Rhythm/Pattern, Respiratory pattern regular   Cough Frequency infrequent   Cough Type nonproductive   PRE-TX-O2   O2 Device (Oxygen Therapy) room air   SpO2 99 %   Pulse Oximetry Type Intermittent   $ Pulse Oximetry - Multiple Charge Pulse Oximetry - Multiple   Pulse 87   Resp 18   Inhaler   $ Inhaler Charges PRN treatment not required   Respiratory Treatment Status (Inhaler) PRN treatment not required

## 2020-04-18 NOTE — NURSING
Pt unable to void post mueller catheter removal. Bladder scan with 189cc noted in bladder. In and out catheter done per sterile technique and pt noted to have pain with insertion of 14fr catheter and red urine with small blood clots noted at 250cc output. Upon removal of this catheter noted spasms which would subside then return immediately. Slowly removed the catheter between spasms. Pt also noted to have excessive oral secretions which she uses a suction yanker to remove from her mouth. Pt denies nausea but has a poor appetite. Pt did eat her yogurt with meals today. Call light in easy reach. Updated pt daughter on her status.

## 2020-04-18 NOTE — PT/OT/SLP PROGRESS
"Speech Language Pathology Treatment    Patient Name:  Maria Victoria Hernandez   MRN:  [id number]  Admitting Diagnosis: Acute respiratory failure with hypoxia    Recommendations:                 General Recommendations:  ongoing eval to tolerate regular textures  Diet recommendations:  Dental Soft, Liquid Diet Level: Thin   Aspiration Precautions: Standard aspiration precautions   General Precautions: Standard, aspiration, contact, fall  Communication strategies:  none    Subjective     Talking to my   Patient goals: Wait for my daughter to bring my food       Objective:     Has the patient been evaluated by SLP for swallowing?   Yes  Keep patient NPO? No   Current Respiratory Status: nasal cannula      Pt seen for tolerance of diet upgrade.  Presented with a 1/2" pc chicken breast she repeatedly refused to try to chew it.  Not amenable to any negotiation as she awaits food her daughter will bring.      Assessment:     Maria Victoria Hernandez is a 55 y.o. female with an SLP diagnosis of Dysphagia. Cannot recommend diet upgrade as pt not cooperative.  Will follow.    Goals:   Multidisciplinary Problems     SLP Goals        Problem: SLP Goal    Goal Priority Disciplines Outcome   SLP Goal     SLP Ongoing, Progressing   Description:  1) Consume regular diet and thin liquids no s/s oropharyngeal dysphagia--Revised                    Plan:     · Patient to be seen:  5 x/week   · Plan of Care expires:     · Plan of Care reviewed with:  patient, daughter   · SLP Follow-Up:  Yes       Discharge recommendations:  nursing facility, skilled, rehabilitation facility   Barriers to Discharge:  None    Time Tracking:     SLP Treatment Date:   04/18/20  Speech Start Time:  1248  Speech Stop Time:  1256     Speech Total Time (min):  8 min    Billable Minutes: Treatment Swallowing Dysfunction 8    Trina Ochoa CCC-SLP/A  04/18/2020  "

## 2020-04-18 NOTE — PROGRESS NOTES
[facility] Medicine  Progress Note    Patient Name: Maria Victoria Hernandez  MRN: [id number]  Patient Class: IP- Inpatient   Admission Date: 3/25/2020  Length of Stay: 24 days  Attending Physician: Kady Gomez MD  Primary Care Provider: Candice Deluna MD        Subjective:     Principal Problem:Acute respiratory failure with hypoxia        HPI:  Patient is a 54 years old  female with morbid obesity in past medical history significant for hypothyroidism is being admitted to intensive care unit under inpatient status from [facility] Emergency room with worsening shortness of breath.  Three days ago patient was tested positive for COVID - 19.  Patient works at [employer].  Patient has been experiencing subjective fever, generalized body aches and pains and nonproductive cough for few days.  Patient is getting increasingly short of breath especially for the past 2 days.  Upon arrival to the emergency room patient was noted to be hypoxic around 89%.  Up on 3 L patient's oxygen sats are around 96%.  Patient denies any chest pain, leg swelling or calf tenderness.      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 And treated per protocol for Covid and ARDS with ceftriaxone/zithromax and HC x 5 days. And ARDS protocol on vent. Pulmonary consulted and followed. WEaned from vent slowly -to nasal cannula by 4/10 and remained stable on oxygen but very weak. PT/OT consulted.   Tachycardica /hypertension developed -cardene gtt adjusted to labetolol then transitioned to po metoprolol Echo -noted mild systolic/Gr 1 diastolic dsyfunction. cxr on 4/5 severe ards pattern.-Vancomycin/zosyn added. 4/6 blood cultures pos for yeast so ID consulted and micafungin added. HD  Catheter and  TLC line removed and cultured and cultures negative. Sputum + yeast also   .-followed recs from ID; echo neg -await repeat echo. Repeat Blood cultures  were negative for yeast. 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 Acute encephalopathy- delirium vs encephalitis or other structural cause. MRI could not be performed as she was very unstable.   At the time of admission Cr 0.7 worsened to 5.1, Renal was consulted. and HD started on 3/29. In addition had, Metabolic acidosis due to renal failure. Feedings given via ngtube with diabetisource and accucks/ISS given with close monitoring and good control. On 04/09/2020 Patient was extubated. Continued to need Cardene drip for BP. HAd loose stool-- flexiseal was placed. On 04/11/2020 mental status improved was  more awake and slighty stronger. She was tachycardic-- Cardene was switched to labetalol   04/13/2020 patient was tolerating vancomycin and micafungin. She continued to have diarrhea.  C diff antigen was positive.  On 04/15/2020.  Patient's voice was louder, she is quite interactive and was moving her arms.   on 04/16/2020  patient  was doing well.  She  was afebrile.   Her oxygen requirement improved. Was titrated down to 2 L nasal cannula. Patient transferred out of ICU to regular Med surge room. crp improved.  ferr 1200. Nephrology continued to follow patient. Her MAIKOL was suspected to be multifactorial ATN in setting of shock/COVID/intravascular volume depletion- initiated on  HD 3/29.  her Cr trending down  improving- she is nonoliguric-plan to do HD Friday and then nephrology wants to hold HD on the weekend to assess for recovery. Stable for transfer to LTAC      Interval History: Patient had epistaxis this am    Review of Systems   Constitutional: Positive for activity change, appetite change and fatigue. Negative for chills and fever.   HENT: Positive for nosebleeds. Negative for congestion, hearing loss, rhinorrhea, sore throat, trouble swallowing and voice change.     Respiratory: Negative for cough, chest tightness, shortness of breath and wheezing.    Cardiovascular: Negative for chest pain, palpitations and leg swelling.   Gastrointestinal: Negative for abdominal pain, blood in stool, diarrhea, nausea and vomiting.   Genitourinary: Negative for difficulty urinating, frequency, hematuria and urgency.   Musculoskeletal: Negative for back pain, joint swelling and neck stiffness.   Skin: Negative for pallor and rash.   Neurological: Positive for weakness. Negative for tremors, seizures, syncope, speech difficulty, numbness and headaches.   Hematological: Negative for adenopathy.   Psychiatric/Behavioral: Negative for agitation, behavioral problems, confusion and sleep disturbance.     Objective:     Vital Signs (Most Recent):  Temp: 98.2 °F (36.8 °C) (04/18/20 0803)  Pulse: 94 (04/18/20 0803)  Resp: 18 (04/18/20 0803)  BP: 137/67 (04/18/20 0803)  SpO2: 98 % (04/18/20 0803) Vital Signs (24h Range):  Temp:  [96.7 °F (35.9 °C)-98.6 °F (37 °C)] 98.2 °F (36.8 °C)  Pulse:  [86-94] 94  Resp:  [18-20] 18  SpO2:  [96 %-99 %] 98 %  BP: (137-185)/(63-83) 137/67     Weight: 97.8 kg (215 lb 9.8 oz)  Body mass index is 40.74 kg/m².    Intake/Output Summary (Last 24 hours) at 4/18/2020 1215  Last data filed at 4/18/2020 0600  Gross per 24 hour   Intake 540 ml   Output 250 ml   Net 290 ml      Physical Exam   Nursing note and vitals reviewed.  PATIENT WAS SEEN AS A VIDEO VISIT WITH NURSE IN PATIENT ROOM WITH PATIENT TO ASSIST DURING THE VISIT. PHYSICAL EXAM FINDINGS ARE AS VIEWED BY MYSELF VIA VIDEO OR AS REPORTED BY NURSE IF SPECIFIED AS SUCH, EXAM NOT DONE PERSONALLY BY MYSELF AT BEDSIDE.      Significant Labs:   BMP:   Recent Labs   Lab 04/18/20  0525   GLU 94   *   K 4.0   CL 97   CO2 21*   BUN 43*   CREATININE 3.0*   CALCIUM 10.2     CBC:   Recent Labs   Lab 04/17/20  0533 04/17/20  2042 04/18/20  0525 04/18/20  1147   WBC 10.96 10.92 10.04  --    HGB 8.4* 8.6* 8.7* 8.6*   HCT 27.5*  28.2* 28.6* 28.8*    279 289  --        Significant Imaging: I have reviewed all pertinent imaging results/findings within the past 24 hours.      Assessment/Plan:      Acute encephalopathy  ML metabolic  resolved  MRI brain negative  US carotids done pending      Lab Results   Component Value Date    WBC 10.04 04/18/2020      focal findings on physical exam  No early signs of stroke on Head CT, no hemorrhage or acute findings.  EEG: EEG 30 min awake and drowsy results noted no seizures  Continue supportive care       COVID-19 virus detected   Plaquenil course completed  Continue routine medications as before.   Follow airborne/droplet precautions.      Epistaxis  ML traumatic  Stopped   hnh stable  Will monitor      Conjunctivitis of right eye  Ml post viral  Appears worse  Will cover for secondary bacterial infection  Will start ciprofloxacin ointmet  Will continue eye lubricant        Post viral debility  Will follow up with PT OT recs  PT OT recs SNF      Acute on chronic combined systolic and diastolic congestive heart failure  Chronic -noted on echo ; strict I/O   No ACE/ARB 2/2 renal failure  Asa/statin-when able to tolerate po   Strict I/O-volume control with HD   Repeat ECHO   · Mild concentric left ventricular hypertrophy.  · Global hypokinetic wall motion.  · Moderately decreased left ventricular systolic function. The estimated ejection fraction is 35%.  · Grade I (mild) left ventricular diastolic dysfunction consistent with impaired relaxation.  · Normal right ventricular systolic function.    Pneumonia due to infectious organism  Acute -post viral --see resp failure/covid   Pulm/ID consulted /followed    Obesity, morbid, BMI 40.0-49.9  Body mass index is 40.74 kg/m². Morbid obesity complicates all aspects of disease management from diagnostic modalities to treatment. Weight loss encouraged and health benefits explained to patient.        Iron deficiency anemia, unspecified  Patient's anemia is  currently controlled.     Current CBC reviewed-   Lab Results   Component Value Date    HGB 8.6 (L) 04/18/2020    HCT 28.8 (L) 04/18/2020     Monitor serial CBC and transfuse if patient becomes hemodynamically unstable, symptomatic or H/H drops below 8/24        Diverticulosis of large intestine without hemorrhage  Patient's anemia is currently controlled. Has recieved 1 units of PRBCs on 4/3.   Will need to Monitor for GI bleed  Lab Results   Component Value Date    HGB 8.6 (L) 04/18/2020    HCT 28.8 (L) 04/18/2020     Monitor serial CBC and transfuse if patient becomes hemodynamically unstable, symptomatic or H/H drops below 7/21.   Will continue to monitor        Acute renal failure  Acute, Poss ATN / -unclear yet if ESRD   required HD for acute renal failure-  Dialysis held yesterday due to episode of hypotension  ? Element ATN 2/2 hypovolemia/sepsis  Nephrology following                  COVID-19 virus infection  Completed Plaquenil    Covid-19 Virus Infection  - Infection Control notified    - Isolation:   - Airborne and Droplet Precautions  - N95 masks must be fit tested, wear eye protection  - 20 second hand hygiene   - Limit visitors per hospital policy   - Consolidating lab draws, nursing care, and interventions    - Diagnostics: (rising CRP, persistent lymphopenia, hyponatremia, hyperferritinemia, elevated troponin, elevated d-dimer, age, and comorbidities are significant predictors of poor clinical outcome)   - CBC:   trend Q48hrs  - CMP:        trend Q48hrs  - Procalcitonin:  - D-dimer:  trend Q48hrs  - Ferritin:  repeat prior to discharge  - CRP:        trend Q48hrs  - LDH:  - BNP:  - Troponin:    - ECG:   - rapid Flu:   - RIP only if BMT/solid transplant:   - Legionella antigen:   - Blood culture x2:   - Sputum culture:   - CXR:   - UA and culture:      - Management:   - Bundle care as able to minimize in/out of room   - Supplemental O2 to maintain SpO2 >92%,   if requiring 6L NC or higher, place on  nonrebreather and discuss case with MICU   - Telemetry & continuous Pulse Ox   - albuterol INHALER PRN 4puff Q6hr approximates a nebulizer (avoid nebulization of secretions)   - apap PRN fever   - Avoiding NIPPV to prevent aerosolization   (including home CPAP/BiPAP unless on a case-by-case basis and only in negative pressure room)   - Cautious use of NSAIDS for fever per WHO recommendations (3/16/2020)   - No new ACEi/ARB start or discontinuation of chronic med unless hypotensive (Esler et al. Journal of Hypertension 2020, 38:000-000)   - Careful use of steroids in the absence of other indications   - unless septic shock due to increased viral replication   - Fluid sparing resuscitation   - Empiric antibiotics per likely source & patient allergies    - CAP: x 5 day course  Ceftriaxone 1g IV Q24hrs            Azithromycin 500mg IV day #1, then 250mg PO daily x4 days                 If MRSA risk factors, add Vancomycin IV (PharmD consult)   - If patient meets criteria per Hospital Protocol    - start statin (if CPK WNL)    - start HCQ 400mg PO BID x1 day, then 400mg PO daily x 4 days (check G6PD, ECG, and start Qshift POCT glucose)    Goals of care, counseling/discussion  - Reviewed the typical clinical course of [id number] with the daughter Danya (patient name or relationship to patient), including the potential for acute decompensation requiring intubation and mechanical ventilation  - Discussed again as part of routine daily evaluation, patient/POA maintains code status of Full code    VTE High Risk Prophylaxis: enoxaparin 40mg sq QHS @ 2100 (bundled care) if GFR >30    Patient's chronic/stable medical conditions noted in the problem list above will be managed with the patient's home medications as tolerated.           Hypothyroid  Chronic problem.  Lab Results   Component Value Date    TSH 2.082 03/31/2020     Not on any medication            Morbid obesity  Body mass index is 40.74 kg/m². Morbid obesity  complicates all aspects of disease management from diagnostic modalities to treatment. Weight loss encouraged and health benefits explained to patient.            VTE Risk Mitigation (From admission, onward)         Ordered     heparin (porcine) injection 5,000 Units  Every 12 hours      04/07/20 1349     heparin (porcine) 1,000 unit/mL injection      04/06/20 1138     heparin (porcine) injection 4,000 Units  As needed (PRN)      03/29/20 2001     IP VTE HIGH RISK PATIENT  Once      03/25/20 6336                      Kady Gomez MD  Department of Hospital Medicine   [facility]

## 2020-04-18 NOTE — ASSESSMENT & PLAN NOTE
Ml post viral  Appears worse  Will cover for secondary bacterial infection  Will start ciprofloxacin ointmet  Will continue eye lubricant

## 2020-04-18 NOTE — CARE UPDATE
S/w daughter  - Danya; She has gotten multiple updates from pts provider. Daughter works as a nurse at [employer] and is able to get updates from the healthcare team. No further questions

## 2020-04-18 NOTE — NURSING
TIFF Perez NP notified of bladder scan 250cc.. Order received if patient unable to void scan bladder & follow nursing communication. 0600- patient voided this am , patient incontinent. .

## 2020-04-18 NOTE — PT/OT/SLP PROGRESS
Physical Therapy Treatment    Patient Name:  Maria Victoria Hernandez   MRN:  [id number]    Recommendations:     Discharge Recommendations:  home health PT, home health OT   Discharge Equipment Recommendations: walker, rolling, wheelchair, 3-in-1 commode(tub transfer bench)   Barriers to discharge: None    Assessment:     Maria Victoria Hernandez is a 55 y.o. female admitted with a medical diagnosis of Acute respiratory failure with hypoxia.  She presents with the following impairments/functional limitations:  weakness, impaired functional mobilty, impaired balance, decreased lower extremity function, gait instability, impaired endurance. Patient is agreeable to PT session. She requires SBA for supine to sit transfer and Zan x2 for sit to stand transfer. She took 5 steps to the chair with RW and Zan. She performed 20 reps of LE therex in chair. Her daughter who is an RN requested that if patient is able to transfer with 1 person assist that the patient discharge to daughter's house. Plan for D/C to daughter Danya's [facility]. Patient will need RW for transfers and WC d/t limited ambulatory distance. She will also need a 3-in-1 commode and a tub transfer bench. Patient reports that the beds at daughter's house are high so this may need to be addressed as well. Patient's speech is much better today. She was given 2 copies of LE therex with pictures and written instructions to perform 3 times a day. RN updated on patient progress.    Rehab Prognosis: Good; patient would benefit from acute skilled PT services to address these deficits and reach maximum level of function.    Recent Surgery: * No surgery found *      Plan:     During this hospitalization, patient to be seen daily to address the identified rehab impairments via gait training, therapeutic activities, therapeutic exercises and progress toward the following goals:    · Plan of Care Expires:  05/13/20    Subjective     Chief Complaint: she does not like the  food  Patient/Family Comments/goals: go home to see her children and grandchildren  Pain/Comfort:  · Pain Rating 1: 0/10      Objective:     Communicated with RN Sharonda prior to session.  Patient found HOB elevated with central line, telemetry, bed alarm upon PT entry to room.     General Precautions: Standard, special contact, aspiration, fall   Orthopedic Precautions:N/A   Braces: N/A     Functional Mobility:  · Bed Mobility:     · Supine to Sit: stand by assistance  · Transfers:     · Sit to Stand:  minimum assistance and of 2 persons with rolling walker  · Gait: 5 steps to chair with RW Zan  · Balance: Fair      AM-PAC 6 CLICK MOBILITY  Turning over in bed (including adjusting bedclothes, sheets and blankets)?: 4  Sitting down on and standing up from a chair with arms (e.g., wheelchair, bedside commode, etc.): 3  Moving from lying on back to sitting on the side of the bed?: 4  Moving to and from a bed to a chair (including a wheelchair)?: 3  Need to walk in hospital room?: 2  Climbing 3-5 steps with a railing?: 1  Basic Mobility Total Score: 17       Therapeutic Activities and Exercises:   Patient was educated on the importance of OOB activity during hospital stay, safe transfers and ambulation, D/C planning, equipment needs, fall prevention    LE therex: bilateral AP, GS, QS, SLR, HS, LAQ, marches x20    Patient left up in chair with call button in reach, chair alarm on and RN notified..    GOALS:   Multidisciplinary Problems     Physical Therapy Goals        Problem: Physical Therapy Goal    Goal Priority Disciplines Outcome Goal Variances Interventions   Physical Therapy Goal     PT, PT/OT Ongoing, Progressing     Description:  Goals to be met by: 2020    Patient will increase functional independence with mobility by performin. Supine to sit with MInimal Assistance  2. Sit to supine with MInimal Assistance  3. Sit to stand transfer with Minimal Assistance  4. Bed to chair transfer with Minimal  Assistance using Rolling Walker  5. Gait  x 25 feet with Minimal Assistance using Rolling Walker.                       Time Tracking:     PT Received On: 04/18/20  PT Start Time: 0940     PT Stop Time: 1023  PT Total Time (min): 43 min     Billable Minutes: Therapeutic Activity 10 and Therapeutic Exercise 33    Treatment Type: Treatment  PT/PTA: PT     PTA Visit Number: 0     Pippa Luna, PT  04/18/2020

## 2020-04-18 NOTE — NURSING
Patient had a coughing episode which yielded her coughing up blood and a nose bleed. She started blowing her nose which worsened her nose bleed. Remained with patient to monitor that she would not blow her nose and her nose stopped bleeding. Pt has refused her heparin this am with concerns of further bleeding. Her H&H are stable as well as her platelets this am. Her appetite remains poor but she has a copious amount of oral secretions which she uses a yanker oral suction to remove herself. She is complaining of nausea this morning so I administered zofran as ordered. She said she is a very picky eater and just does not like the food. She will eat yogurt so it has been ordered with each meal.

## 2020-04-18 NOTE — SUBJECTIVE & OBJECTIVE
Interval History: Patient had epistaxis this am    Review of Systems   Constitutional: Positive for activity change, appetite change and fatigue. Negative for chills and fever.   HENT: Positive for nosebleeds. Negative for congestion, hearing loss, rhinorrhea, sore throat, trouble swallowing and voice change.    Respiratory: Negative for cough, chest tightness, shortness of breath and wheezing.    Cardiovascular: Negative for chest pain, palpitations and leg swelling.   Gastrointestinal: Negative for abdominal pain, blood in stool, diarrhea, nausea and vomiting.   Genitourinary: Negative for difficulty urinating, frequency, hematuria and urgency.   Musculoskeletal: Negative for back pain, joint swelling and neck stiffness.   Skin: Negative for pallor and rash.   Neurological: Positive for weakness. Negative for tremors, seizures, syncope, speech difficulty, numbness and headaches.   Hematological: Negative for adenopathy.   Psychiatric/Behavioral: Negative for agitation, behavioral problems, confusion and sleep disturbance.     Objective:     Vital Signs (Most Recent):  Temp: 98.2 °F (36.8 °C) (04/18/20 0803)  Pulse: 94 (04/18/20 0803)  Resp: 18 (04/18/20 0803)  BP: 137/67 (04/18/20 0803)  SpO2: 98 % (04/18/20 0803) Vital Signs (24h Range):  Temp:  [96.7 °F (35.9 °C)-98.6 °F (37 °C)] 98.2 °F (36.8 °C)  Pulse:  [86-94] 94  Resp:  [18-20] 18  SpO2:  [96 %-99 %] 98 %  BP: (137-185)/(63-83) 137/67     Weight: 97.8 kg (215 lb 9.8 oz)  Body mass index is 40.74 kg/m².    Intake/Output Summary (Last 24 hours) at 4/18/2020 1215  Last data filed at 4/18/2020 0600  Gross per 24 hour   Intake 540 ml   Output 250 ml   Net 290 ml      Physical Exam   Nursing note and vitals reviewed.  PATIENT WAS SEEN AS A VIDEO VISIT WITH NURSE IN PATIENT ROOM WITH PATIENT TO ASSIST DURING THE VISIT. PHYSICAL EXAM FINDINGS ARE AS VIEWED BY MYSELF VIA VIDEO OR AS REPORTED BY NURSE IF SPECIFIED AS SUCH, EXAM NOT DONE PERSONALLY BY MYSELF AT  BEDSIDE.      Significant Labs:   BMP:   Recent Labs   Lab 04/18/20  0525   GLU 94   *   K 4.0   CL 97   CO2 21*   BUN 43*   CREATININE 3.0*   CALCIUM 10.2     CBC:   Recent Labs   Lab 04/17/20  0533 04/17/20  2042 04/18/20  0525 04/18/20  1147   WBC 10.96 10.92 10.04  --    HGB 8.4* 8.6* 8.7* 8.6*   HCT 27.5* 28.2* 28.6* 28.8*    279 289  --        Significant Imaging: I have reviewed all pertinent imaging results/findings within the past 24 hours.

## 2020-04-18 NOTE — PROGRESS NOTES
INPATIENT NEPHROLOGY PROGRESS NOTE  [facility] NEPHROLOGY    Patient Name: Maria Victoria Hernandez  Date: 04/18/2020    Reason for consultation: MAIKOL    History of Present Illness:  54AAF nurse with morbid obesity, hypothyroidism presents PNA, intubated, positive for COVID19. Admit Cr 0.7 went 5.1 and HD started on 3/29.     3/28  Scr worse today.  Only one shift recorded for output, 520cc.  Still hyponatremic, vent setting adjusted per pulmonology note for acidosis.  K+ at goal after repletion.  Has siri, may need HD initiated.  3/29  Non oliguric.  In no distress  3/30 VSS, seen and examined on HD, tolerating well. Plan another HD in AM, discussed with daughter who is a nurse here too.  3/31 VSS, intubated still. UO is not great but she is dialyzed daily. Seen and examined on HD, tolerating well. Plan another HD in AM.  4/1 VSS, intubated still. UO is not great but she is dialyzed daily. Seen and examined on HD, tolerating well. Plan another HD PRN.  4/2 VSS, intubated still. UO is not great. Plan another HD in AM.  4/3 VSS, intubated still. UO is not great but she is dialyzed daily recently. Seen and examined on HD, tolerating well. Plan another HD on Mon or PRN.  4/4 febrile, BP stable, FIO2 50%, intubated, sedated, on levophed, UOP 900cc, transfused  4/5 febrile, tachy, SBP 100s, FIO2 65%, intubated, sedated, off levophed, UOP 935cc  4/6 intubated,. Sedated  4/7 intubated, sedated. Dialysis catheter changed yesterday  4/8 just finished dialysis. uf 3 liters. Extubated  4/10  Seen on dialysis.  No distress.  4/11  UOP 750cc.  3L UF w/HD yesterday.  K+ at goal.  Holding dialysis over weekend to assess for recovery.  Significant event noted 6pm yesterday per primary notes.  Med changes made 2/2 tachycardia, tachypnea.   She is hypotensive today.   4/12   UOP 1.2L.  Scheduled for dialysis tomorrow, no recovery noted thus far.  Pulmonology working toward extubation and weaning off labetalol gtt, ordered  clonidine PRN SBP>160.  4/13 low grade T, SBP 140s, on 3-4L NC, unable to do HD today due to malfunctioning access, UOP 3.2L; CXR: Very mild decrease of the bilateral infiltrates compared to the prior exam.   4/14 got new temporary dialysis catheter overnight; febrile, tachy, -160s, on 1.5L NC, UOP 2.8L; seen on HD - will get off 2L- updated daughter at bedside  4/15 low grade temp, SBP > 130, on 2L NC, UOP 1.6L; MRI brain yest neg; tolerating TFs; worked with therapy today- sitting upright in bed- able to follow commands and nods head when I'm speaking with her  4/16 afebrile, overall BP readings better, on 2-3L NC, UOP 850cc- patient dropped BP during treatment and developed blank stare- UF turned off- mental status returned- completed treatment, net even; laying in bed today- able to whisper a few words, able to follow commands and follow my conversation, she even smiles  4/17 HD catheter very positional today and only able do - if we continue HD, will need hemosplit next week; BP is high this AM, on RA, UOP 1.2L- seen on HD, no complaints, looks great- updated daughter at bedside about plan  4/18 not in her room.  I/o not recorded    Echo 3/27/20:  · Mild left ventricular enlargement.  · Mildly decreased left ventricular systolic function. The estimated ejection fraction is 45%.  · Grade I (mild) left ventricular diastolic dysfunction consistent with impaired relaxation.  · Normal right ventricular systolic function.  · Mild left atrial enlargement.  · Moderate mitral regurgitation.  · Mild tricuspid regurgitation.  · Mild pulmonary hypertension present. PASP 40 mm of Hg.    Echo 4/13/20:  · Mild concentric left ventricular hypertrophy.  · Global hypokinetic wall motion.  · Moderately decreased left ventricular systolic function. The estimated ejection fraction is 35%.  · Grade I (mild) left ventricular diastolic dysfunction consistent with impaired relaxation.  · Normal right ventricular systolic  function.  · Mild left atrial enlargement.  · Moderate mitral regurgitation.  · Normal central venous pressure (3 mmHg).    Plan of Care:    1. Septic shock 2/2 COVID PNA with HHRF requiring MV- resolved  - she is now on RA  - remains on PO bicarb- using 40 bicarb bath with dialysis  - continue aggressive PT/OT  - dose antbx for dialysis (on therapy for Staph Epi bacteremia, Candida Alb fungemia, C diff)  - 4/14 and 4/15 COVID testing negative    2. MAIKOL - suspect multifactorial ATN in setting of shock/COVID/intravascular volume depletion- initiated HD 3/29  - her Cr trends are actually improving- she remains nonoliguric- - her HD catheter isn't working well- if we end up deciding on Monday that she needs outpatient HD, will need a hemosplit along with outpatient HD unit placement  - no nsaids or IV contrast  - dose meds for dialysis    3. HTN/Systolic CHF- worsening based on echo- net -12L for admission  - BP is high today after cutting back hydralazine yest from TID to BID- will go back to TID and leave 10mg dose the same  - will UF even today (she became hypotensive during last HD treatment with UF- we have reached her dry weight); continue PO lasix at current dose    4. Anemia  - Hb is stable  - continue TERE with HD MWF- if we stop HD, will transition to SQ weekly    5. SHPT  - she is on calcitriol  - calcium is stable  - check phos, PTH in AM    Spoke with Dr. Gomez- will keep inpatient over weekend and assess for renal recovery- if needs to resume HD Monday, will get hemosplit with Dr. Gauthier and then use to make sure it works- can then be discharged to LTAC.     Thank you for allowing us to participate in this patient's care. We will continue to follow.    Vital Signs:  Temp Readings from Last 3 Encounters:   04/18/20 98.2 °F (36.8 °C) (Oral)       Pulse Readings from Last 3 Encounters:   04/18/20 94   01/15/15 84   12/17/13 80       BP Readings from Last 3 Encounters:   04/18/20 137/67   01/15/15 124/82    12/17/13 102/68       Weight:  Wt Readings from Last 3 Encounters:   04/16/20 97.8 kg (215 lb 9.8 oz)   01/15/15 105.8 kg (233 lb 4.8 oz)   12/17/13 101.2 kg (223 lb)     Medications:  Scheduled Meds:   ascorbic acid (vitamin C)  1,000 mg Oral QID    aspirin  81 mg Oral Daily    calcitRIOL  0.25 mcg Oral Daily    cholestyramine-aspartame  1 packet Oral BID    ciprofloxacin HCl   Both Eyes TID    epoetin raquel-ebpx (RETACRIT) injection  5,000 Units Intravenous Every Mon, Wed, Fri    escitalopram oxalate  5 mg Oral Daily    furosemide  40 mg Oral BID    heparin (porcine)  5,000 Units Subcutaneous Q12H    hydrALAZINE  10 mg Oral Q8H    Lactobacillus acidoph-L.bulgar  2 tablet Oral BID    levothyroxine  100 mcg Oral Before breakfast    metoprolol tartrate  25 mg Oral BID    micafungin (MYCAMINE) IVPB  100 mg Intravenous Q24H    sodium bicarbonate  650 mg Oral TID    vancomycin  250 mg Oral Q6H    white petrolatum-mineral oiL   Right Eye QHS    zinc sulfate  220 mg Oral Daily     Continuous Infusions:    PRN Meds:.acetaminophen, heparin (porcine), hydrALAZINE, ipratropium-albuteroL, loperamide, ondansetron, promethazine (PHENERGAN) IVPB, Pharmacy to dose Vancomycin consult **AND** vancomycin - pharmacy to dose  No current facility-administered medications on file prior to encounter.      Current Outpatient Medications on File Prior to Encounter   Medication Sig Dispense Refill    ferrous sulfate 325 mg (65 mg iron) Tab tablet Take 1 tablet (325 mg total) by mouth daily with breakfast. (Patient taking differently: Take 325 mg by mouth daily with breakfast. Take 2 tablets daily) 90 tablet 3    levothyroxine (SYNTHROID) 100 MCG tablet Take 1 tablet (100 mcg total) by mouth once daily. (Patient taking differently: Take 150 mcg by mouth once daily. ) 90 tablet 3     Review of Systems:  Not conversant    Physical Exam:  /67 (Patient Position: Lying)   Pulse 94   Temp 98.2 °F (36.8 °C) (Oral)    "Resp 18   Ht 5' 1" (1.549 m)   Wt 97.8 kg (215 lb 9.8 oz)   SpO2 98%   Breastfeeding? No   BMI 40.74 kg/m²     INS/OUTS:  I/O last 3 completed shifts:  In: 1040 [P.O.:240; Other:500; IV Piggyback:300]  Out: 1650 [Urine:1150; Other:500]  No intake/output data recorded.  (4/17)  Constitutional: nad, aao x 3, has her voice back, smiling- looks great!  Heart: rrr, no m/r/g, wwp, no edema  Lungs: ant ausc clear, no w/r/r/c, no lb  Abdomen: s/nt/nd, +BS    Results:  Lab Results   Component Value Date     (L) 04/18/2020    K 4.0 04/18/2020    CL 97 04/18/2020    CO2 21 (L) 04/18/2020    BUN 43 (H) 04/18/2020    CREATININE 3.0 (H) 04/18/2020    CALCIUM 10.2 04/18/2020    ANIONGAP 16 04/18/2020    ESTGFRAFRICA 20 (A) 04/18/2020    EGFRNONAA 17 (A) 04/18/2020       Lab Results   Component Value Date    CALCIUM 10.2 04/18/2020    PHOS 5.4 (H) 04/18/2020       Recent Labs   Lab 04/18/20  0525   WBC 10.04   RBC 3.26*   HGB 8.7*   HCT 28.6*      MCV 88   MCH 26.7*   MCHC 30.4*     I have personally reviewed pertinent radiological imaging and reports.    Don Collins MD  Nephrology  [facility] [place]  [phone]      "

## 2020-04-18 NOTE — PLAN OF CARE
Problem: Physical Therapy Goal  Goal: Physical Therapy Goal  Description  Goals to be met by: 2020    Patient will increase functional independence with mobility by performin. Supine to sit with MInimal Assistance  2. Sit to supine with MInimal Assistance  3. Sit to stand transfer with Minimal Assistance  4. Bed to chair transfer with Minimal Assistance using Rolling Walker  5. Gait  x 25 feet with Minimal Assistance using Rolling Walker.      Outcome: Ongoing, Progressing      Yes

## 2020-04-18 NOTE — PT/OT/SLP PROGRESS
"Occupational Therapy  Treatment    Maria Victoria Hernandez   MRN: [id number]   Admitting Diagnosis: Acute respiratory failure with hypoxia    OT Date of Treatment: 04/18/20   OT Start Time: 1030  OT Stop Time: 1105  OT Total Time (min): 35 min    Billable Minutes:  Therapeutic Exercise 35    General Precautions: Standard, aspiration, contact, fall    Subjective:  Communicated with nurse Mcdonough prior to session. Patient reported she just got in the chair.    Pain/Comfort  Pain Rating 1: 0/10    Objective:  Patient found with: telemetry   Patient seated in chair upon entry.       Balance:   Static Sit: GOOD: Takes MODERATE challenges from all directions  Dynamic Sit: GOOD: Maintains balance through MODERATE excursions of active trunk movement      Therapeutic Activities and Exercises:  Patient was given instruction and demonstration on UE strengthening exercises to improve her UE function for self care skills and mobility. She returned demonstration with max verbal cues for full ROM.    - B hand strengthening  Ball squeezes  3 x 10  - B shoulder IR/ER  medium resistance theraband  3 x 10   With verbal cues for technique and full ROM  - B shoulder abduction  Medium resistance theraband  3 x 10  - B elbow flexion/extension  2 lbs. weight   3 x 10        AM-PAC 6 CLICK ADL   How much help from another person does this patient currently need?   1 = Unable, Total/Dependent Assistance  2 = A lot, Maximum/Moderate Assistance  3 = A little, Minimum/Contact Guard/Supervision  4 = None, Modified [place]/Independent    Putting on and taking off regular lower body clothing? : 2  Bathing (including washing, rinsing, drying)?: 2  Toileting, which includes using toilet, bedpan, or urinal? : 2  Putting on and taking off regular upper body clothing?: 2  Taking care of personal grooming such as brushing teeth?: 3  Eating meals?: 3  Daily Activity Total Score: 14     AM-PAC Raw Score CMS "G-Code Modifier Level of Impairment " Assistance   6 % Total / Unable   7 - 8 CM 80 - 100% Maximal Assist   9-13 CL 60 - 80% Moderate Assist   14 - 19 CK 40 - 60% Moderate Assist   20 - 22 CJ 20 - 40% Minimal Assist   23 CI 1-20% SBA / CGA   24 CH 0% Independent/ Mod I       Patient left up in chair with all lines intact and call button in reach    ASSESSMENT:  Maria Victoria Hernandez is a 55 y.o. female with a medical diagnosis of Acute respiratory failure with hypoxia. She presents with weakness, impaired endurance, impaired self care skills, impaired functional mobility, gait instability, impaired balance, impaired coordination, decreased upper extremity function, decreased lower extremity function and impaired fine motor coordination. Patient required frequent verbal cues for UE exercise technique and full ROM. Patient required frequent rest breaks. Patient demonstrates 3/5 MMT L shoulder flexion and L elbow flexion/extension and 3+/5 R shoulder flexion and elbow flexion/extension.    Rehab identified problem list/impairments: Rehab identified problem list/impairments: weakness, impaired functional mobilty, impaired balance, impaired endurance, impaired self care skills, gait instability, impaired cardiopulmonary response to activity    Rehab potential is good.    Activity tolerance: Fair    Equipment recommendations: 3-in-1 commode, bath bench, walker, rolling     GOALS:   Multidisciplinary Problems     Occupational Therapy Goals        Problem: Occupational Therapy Goal    Goal Priority Disciplines Outcome Interventions   Occupational Therapy Goal     OT, PT/OT Ongoing, Progressing    Description:  Goals to be met by: 5/5/20    Patient will increase functional independence with ADLs by performing:    Feeding with Modified [place] and Assistive Devices as needed.  UE Dressing with Set-up Assistance and Minimal Assistance.  Grooming while seated with Supervision.  Sitting at edge of bed x10 minutes with Stand-by Assistance and use of  upper extremity support.  Toilet transfer to bedside commode with Moderate Assistance.  Upper extremity exercise program x10 reps per handout, with assistance as needed.                      Plan:  Patient to be seen 6 x/week to address the above listed problems via self-care/home management, therapeutic activities, therapeutic exercises, cognitive retraining  Plan of Care expires: 05/05/20  Plan of Care reviewed with: patient         Pari Vickers, OTR  04/18/2020

## 2020-04-18 NOTE — ASSESSMENT & PLAN NOTE
ML metabolic  resolved  MRI brain negative  US carotids done pending      Lab Results   Component Value Date    WBC 10.04 04/18/2020      focal findings on physical exam  No early signs of stroke on Head CT, no hemorrhage or acute findings.  EEG: EEG 30 min awake and drowsy results noted no seizures  Continue supportive care

## 2020-04-19 LAB
ALBUMIN SERPL BCP-MCNC: 3.6 G/DL (ref 3.5–5.2)
ALP SERPL-CCNC: 139 U/L (ref 55–135)
ALT SERPL W/O P-5'-P-CCNC: 25 U/L (ref 10–44)
ANION GAP SERPL CALC-SCNC: 17 MMOL/L (ref 8–16)
AST SERPL-CCNC: 21 U/L (ref 10–40)
BASOPHILS # BLD AUTO: 0.13 K/UL (ref 0–0.2)
BASOPHILS # BLD AUTO: 0.21 K/UL (ref 0–0.2)
BASOPHILS NFR BLD: 1.3 % (ref 0–1.9)
BASOPHILS NFR BLD: 1.8 % (ref 0–1.9)
BILIRUB SERPL-MCNC: 0.7 MG/DL (ref 0.1–1)
BUN SERPL-MCNC: 55 MG/DL (ref 6–20)
CALCIUM SERPL-MCNC: 9.9 MG/DL (ref 8.7–10.5)
CHLORIDE SERPL-SCNC: 99 MMOL/L (ref 95–110)
CO2 SERPL-SCNC: 21 MMOL/L (ref 23–29)
CREAT SERPL-MCNC: 3.2 MG/DL (ref 0.5–1.4)
DIFFERENTIAL METHOD: ABNORMAL
DIFFERENTIAL METHOD: ABNORMAL
EOSINOPHIL # BLD AUTO: 0.5 K/UL (ref 0–0.5)
EOSINOPHIL # BLD AUTO: 0.5 K/UL (ref 0–0.5)
EOSINOPHIL NFR BLD: 4.3 % (ref 0–8)
EOSINOPHIL NFR BLD: 4.4 % (ref 0–8)
ERYTHROCYTE [DISTWIDTH] IN BLOOD BY AUTOMATED COUNT: 19.7 % (ref 11.5–14.5)
ERYTHROCYTE [DISTWIDTH] IN BLOOD BY AUTOMATED COUNT: 19.9 % (ref 11.5–14.5)
EST. GFR  (AFRICAN AMERICAN): 18 ML/MIN/1.73 M^2
EST. GFR  (NON AFRICAN AMERICAN): 16 ML/MIN/1.73 M^2
GLUCOSE SERPL-MCNC: 93 MG/DL (ref 70–110)
HCT VFR BLD AUTO: 27.3 % (ref 37–48.5)
HCT VFR BLD AUTO: 27.4 % (ref 37–48.5)
HGB BLD-MCNC: 8 G/DL (ref 12–16)
HGB BLD-MCNC: 8.2 G/DL (ref 12–16)
IMM GRANULOCYTES # BLD AUTO: 0.19 K/UL (ref 0–0.04)
IMM GRANULOCYTES # BLD AUTO: 0.26 K/UL (ref 0–0.04)
IMM GRANULOCYTES NFR BLD AUTO: 1.8 % (ref 0–0.5)
IMM GRANULOCYTES NFR BLD AUTO: 2.2 % (ref 0–0.5)
LYMPHOCYTES # BLD AUTO: 1.5 K/UL (ref 1–4.8)
LYMPHOCYTES # BLD AUTO: 1.8 K/UL (ref 1–4.8)
LYMPHOCYTES NFR BLD: 14.4 % (ref 18–48)
LYMPHOCYTES NFR BLD: 15 % (ref 18–48)
MCH RBC QN AUTO: 26.8 PG (ref 27–31)
MCH RBC QN AUTO: 26.9 PG (ref 27–31)
MCHC RBC AUTO-ENTMCNC: 29.3 G/DL (ref 32–36)
MCHC RBC AUTO-ENTMCNC: 29.9 G/DL (ref 32–36)
MCV RBC AUTO: 90 FL (ref 82–98)
MCV RBC AUTO: 92 FL (ref 82–98)
MONOCYTES # BLD AUTO: 0.9 K/UL (ref 0.3–1)
MONOCYTES # BLD AUTO: 1.2 K/UL (ref 0.3–1)
MONOCYTES NFR BLD: 8.5 % (ref 4–15)
MONOCYTES NFR BLD: 9.8 % (ref 4–15)
NEUTROPHILS # BLD AUTO: 7.2 K/UL (ref 1.8–7.7)
NEUTROPHILS # BLD AUTO: 7.9 K/UL (ref 1.8–7.7)
NEUTROPHILS NFR BLD: 66.8 % (ref 38–73)
NEUTROPHILS NFR BLD: 69.7 % (ref 38–73)
NRBC BLD-RTO: 0 /100 WBC
NRBC BLD-RTO: 0 /100 WBC
PLATELET # BLD AUTO: 317 K/UL (ref 150–350)
PLATELET # BLD AUTO: 337 K/UL (ref 150–350)
PMV BLD AUTO: 11.8 FL (ref 9.2–12.9)
PMV BLD AUTO: 11.9 FL (ref 9.2–12.9)
POTASSIUM SERPL-SCNC: 4 MMOL/L (ref 3.5–5.1)
PROT SERPL-MCNC: 9 G/DL (ref 6–8.4)
RBC # BLD AUTO: 2.98 M/UL (ref 4–5.4)
RBC # BLD AUTO: 3.05 M/UL (ref 4–5.4)
SODIUM SERPL-SCNC: 137 MMOL/L (ref 136–145)
TB INDURATION 48 - 72 HR READ: 0 MM
VANCOMYCIN TROUGH SERPL-MCNC: 15 UG/ML (ref 10–22)
WBC # BLD AUTO: 10.36 K/UL (ref 3.9–12.7)
WBC # BLD AUTO: 11.77 K/UL (ref 3.9–12.7)

## 2020-04-19 PROCEDURE — 80202 ASSAY OF VANCOMYCIN: CPT

## 2020-04-19 PROCEDURE — 36415 COLL VENOUS BLD VENIPUNCTURE: CPT

## 2020-04-19 PROCEDURE — 25000003 PHARM REV CODE 250: Performed by: INTERNAL MEDICINE

## 2020-04-19 PROCEDURE — 63600175 PHARM REV CODE 636 W HCPCS: Performed by: INTERNAL MEDICINE

## 2020-04-19 PROCEDURE — 85025 COMPLETE CBC W/AUTO DIFF WBC: CPT

## 2020-04-19 PROCEDURE — 25000003 PHARM REV CODE 250: Performed by: NURSE PRACTITIONER

## 2020-04-19 PROCEDURE — 99900035 HC TECH TIME PER 15 MIN (STAT)

## 2020-04-19 PROCEDURE — 11000001 HC ACUTE MED/SURG PRIVATE ROOM

## 2020-04-19 PROCEDURE — 94761 N-INVAS EAR/PLS OXIMETRY MLT: CPT

## 2020-04-19 PROCEDURE — 80053 COMPREHEN METABOLIC PANEL: CPT

## 2020-04-19 RX ORDER — VANCOMYCIN HYDROCHLORIDE 500 MG/100ML
500 INJECTION, SOLUTION INTRAVENOUS ONCE
Status: COMPLETED | OUTPATIENT
Start: 2020-04-19 | End: 2020-04-19

## 2020-04-19 RX ADMIN — CHOLESTYRAMINE 4 G: 4 POWDER, FOR SUSPENSION ORAL at 10:04

## 2020-04-19 RX ADMIN — MICAFUNGIN SODIUM 100 MG: 20 INJECTION, POWDER, LYOPHILIZED, FOR SOLUTION INTRAVENOUS at 11:04

## 2020-04-19 RX ADMIN — Medication 250 MG: at 12:04

## 2020-04-19 RX ADMIN — HYDRALAZINE HYDROCHLORIDE 10 MG: 10 TABLET ORAL at 09:04

## 2020-04-19 RX ADMIN — Medication 250 MG: at 05:04

## 2020-04-19 RX ADMIN — CIPROFLOXACIN HYDROCHLORIDE: 3 OINTMENT OPHTHALMIC at 09:04

## 2020-04-19 RX ADMIN — SODIUM BICARBONATE 650 MG TABLET 650 MG: at 09:04

## 2020-04-19 RX ADMIN — CIPROFLOXACIN HYDROCHLORIDE: 3 OINTMENT OPHTHALMIC at 02:04

## 2020-04-19 RX ADMIN — Medication 1 SPRAY: at 12:04

## 2020-04-19 RX ADMIN — Medication 250 MG: at 06:04

## 2020-04-19 RX ADMIN — LEVOTHYROXINE SODIUM 100 MCG: 100 TABLET ORAL at 06:04

## 2020-04-19 RX ADMIN — OXYCODONE HYDROCHLORIDE AND ACETAMINOPHEN 1000 MG: 500 TABLET ORAL at 09:04

## 2020-04-19 RX ADMIN — VANCOMYCIN HYDROCHLORIDE 500 MG: 500 INJECTION, SOLUTION INTRAVENOUS at 02:04

## 2020-04-19 RX ADMIN — HYDRALAZINE HYDROCHLORIDE 10 MG: 10 TABLET ORAL at 02:04

## 2020-04-19 RX ADMIN — CALCITRIOL CAPSULES 0.25 MCG 0.25 MCG: 0.25 CAPSULE ORAL at 09:04

## 2020-04-19 RX ADMIN — FUROSEMIDE 40 MG: 40 TABLET ORAL at 05:04

## 2020-04-19 RX ADMIN — Medication 250 MG: at 11:04

## 2020-04-19 RX ADMIN — ESCITALOPRAM OXALATE 5 MG: 5 TABLET, FILM COATED ORAL at 09:04

## 2020-04-19 RX ADMIN — FUROSEMIDE 40 MG: 40 TABLET ORAL at 09:04

## 2020-04-19 RX ADMIN — METOPROLOL TARTRATE 25 MG: 25 TABLET, FILM COATED ORAL at 09:04

## 2020-04-19 RX ADMIN — ZINC SULFATE 220 MG (50 MG) CAPSULE 220 MG: CAPSULE at 09:04

## 2020-04-19 RX ADMIN — OXYCODONE HYDROCHLORIDE AND ACETAMINOPHEN 1000 MG: 500 TABLET ORAL at 11:04

## 2020-04-19 RX ADMIN — HYDRALAZINE HYDROCHLORIDE 10 MG: 10 TABLET ORAL at 06:04

## 2020-04-19 RX ADMIN — ONDANSETRON 4 MG: 2 INJECTION INTRAMUSCULAR; INTRAVENOUS at 07:04

## 2020-04-19 RX ADMIN — SODIUM BICARBONATE 650 MG TABLET 650 MG: at 02:04

## 2020-04-19 RX ADMIN — MINERAL OIL AND PETROLATUM: 150; 830 OINTMENT OPHTHALMIC at 09:04

## 2020-04-19 RX ADMIN — LACTOBACILLUS TAB 2 TABLET: TAB at 09:04

## 2020-04-19 RX ADMIN — OXYCODONE HYDROCHLORIDE AND ACETAMINOPHEN 1000 MG: 500 TABLET ORAL at 05:04

## 2020-04-19 NOTE — ASSESSMENT & PLAN NOTE
ML metabolic  resolved  MRI brain negative  US carotids done pending      Lab Results   Component Value Date    WBC 10.36 04/19/2020      focal findings on physical exam  No early signs of stroke on Head CT, no hemorrhage or acute findings.  EEG: EEG 30 min awake and drowsy results noted no seizures  Continue supportive care

## 2020-04-19 NOTE — PLAN OF CARE
Rudy spoke with Patricia, waiting on Monday to see if pt will need dialysis.  If pt will need out pt dialysis, pt will go home with daughter. Cm will continue to follow-up.       04/19/20 0913   Discharge Reassessment   Assessment Type Discharge Planning Reassessment   Provided patient/caregiver education on the expected discharge date and the discharge plan Yes   Do you have any problems affording any of your prescribed medications? No

## 2020-04-19 NOTE — PT/OT/SLP PROGRESS
Physical Therapy      Patient Name:  Maria Victoria Hernandez   MRN:  [id number]    Patient not seen today secondary to not prepared (bathed, dressed) to patient's satisfaction despite 4 attempts: 9:10, 9:45, 10:45 (REZA Victoria informed), and 11:15. Layne follow-up 4/20.    Carlos Mariee, PT

## 2020-04-19 NOTE — NURSING
Pt appetite remains poor despite family bringing her some salmon to eat. She only ate 25% of the food brought in. Pt continues with oral secretions which she either spits into a tissue or she uses the oral suction to remove these secretions. Pt denies nausea at this time. Pt swallows pills whole with no problems. Encouraged oral liquids and pt does like ice water. Kept ice water within pt reach at her bedside.

## 2020-04-19 NOTE — ASSESSMENT & PLAN NOTE
Patient's anemia is currently controlled.     Current CBC reviewed-   Lab Results   Component Value Date    HGB 8.0 (L) 04/19/2020    HCT 27.3 (L) 04/19/2020     Monitor serial CBC and transfuse if patient becomes hemodynamically unstable, symptomatic or H/H drops below 8/24

## 2020-04-19 NOTE — SIGNIFICANT EVENT
Hospital Medicine Significant Event  Note      Admit Date: 3/25/2020  LOS: 25  Code Status: Full Code   CC: Acute respiratory failure with hypoxia  Date of Consult: 04/19/2020  RRT Indication(s): Nasal Bleeding.  Attending Physician: Kady Gomez MD  Primary Service: Networked reference to record PCT     SUBJECTIVE:     Interval history:  55-year-old female admitted for COVID, acute respiratory failure, and now C diff.  Call to the patient's room by nursing for evaluation of epistaxis from the left nare.      OBJECTIVE:     Vital Signs (Most Recent):  Temp: 97.3 °F (36.3 °C) (04/18/20 2054)  Pulse: 89 (04/18/20 2342)  Resp: 18 (04/18/20 2342)  BP: (!) 143/68 (04/18/20 2342)  SpO2: 97 % (04/18/20 2342) Vital Signs (24h Range):  Temp:  [97.3 °F (36.3 °C)-98.4 °F (36.9 °C)] 97.3 °F (36.3 °C)  Pulse:  [] 89  Resp:  [18] 18  SpO2:  [96 %-99 %] 97 %  BP: (137-143)/(63-98) 143/68     I & O (24h):    Intake/Output Summary (Last 24 hours) at 4/19/2020 0030  Last data filed at 4/18/2020 1800  Gross per 24 hour   Intake 1300 ml   Output 0 ml   Net 1300 ml        Physical Exam: Physical Exam   Awake alert oriented person place time and situation  Respirations unlabored  Heart regular rate rhythm  Steady venous bleeding from left near.    Lines/Drains/Airway:  Percutaneous Central Line Insertion/Assessment - Quad Lumen  04/06/20 1611 left internal jugular (Active)   Verification by X-ray Yes 4/17/2020 10:25 PM   Site Assessment No drainage;No redness;No swelling;No warmth 4/18/2020  8:54 PM   Line Securement Device Secured with sutures 4/18/2020  8:54 PM   Dressing Type Biopatch in place 4/18/2020  8:54 PM   Dressing Status Clean;Dry;Intact 4/18/2020  8:54 PM   Dressing Intervention Integrity maintained 4/18/2020  8:54 PM   Date on Dressing 04/18/20 4/18/2020  8:54 PM   Dressing Due to be Changed 04/25/20 4/18/2020  8:54 PM   Distal Patency/Care flushed w/o difficulty;normal saline locked 4/18/2020  6:00 PM   Medial  1 Patency/Care flushed w/o difficulty;normal saline lock 4/18/2020  6:00 PM   Medial 2 Patency/Care flushed w/o difficulty;normal saline lock 4/18/2020  6:00 PM   Proximal 1 Patency/Care flushed w/o difficulty;normal saline locked 4/18/2020  6:00 PM   Line Necessity Review Poor venous access 4/18/2020  6:00 PM       Trialysis (Dialysis) Catheter 04/13/20 1812 right internal jugular (Active)   Verification by X-ray Yes 4/16/2020  8:00 PM   Site Assessment No drainage;No redness;No swelling;No warmth 4/18/2020  8:54 PM   Line Securement Device Secured with sutures 4/18/2020  8:54 PM   Dressing Type Central line dressing with pants 4/18/2020  8:54 PM   Dressing Status Clean;Dry;Intact 4/18/2020  8:54 PM   Dressing Intervention Integrity maintained 4/18/2020  8:54 PM   Date on Dressing 04/15/20 4/18/2020  8:54 PM   Dressing Due to be Changed 04/22/20 4/18/2020  8:54 PM   Venous Patency/Care flushed w/o difficulty;heparin locked 4/17/2020 12:10 PM   Arterial Patency/Care flushed w/o difficulty;heparin locked 4/17/2020 12:10 PM   Distal Patency/Care flushed w/o difficulty;blood return present;normal saline locked 4/18/2020  6:00 PM   Flows Other (Comment) 4/17/2020  8:50 AM   Line Necessity Review CRRT/HD 4/17/2020  9:00 AM     Trialysis (Dialysis) Catheter 04/13/20 1812 right internal jugular (Active)   Verification by X-ray Yes 4/16/2020  8:00 PM   Site Assessment No drainage;No redness;No swelling;No warmth 4/18/2020  8:54 PM   Line Securement Device Secured with sutures 4/18/2020  8:54 PM   Dressing Type Central line dressing with pants 4/18/2020  8:54 PM   Dressing Status Clean;Dry;Intact 4/18/2020  8:54 PM   Dressing Intervention Integrity maintained 4/18/2020  8:54 PM   Date on Dressing 04/15/20 4/18/2020  8:54 PM   Dressing Due to be Changed 04/22/20 4/18/2020  8:54 PM   Venous Patency/Care flushed w/o difficulty;heparin locked 4/17/2020 12:10 PM   Arterial Patency/Care flushed w/o difficulty;heparin locked  4/17/2020 12:10 PM   Distal Patency/Care flushed w/o difficulty;blood return present;normal saline locked 4/18/2020  6:00 PM   Flows Other (Comment) 4/17/2020  8:50 AM   Line Necessity Review CRRT/HD 4/17/2020  9:00 AM        Nutrition:  Current Diet Order   Procedures    Diet Adult Regular (IDDSI Level 7)     2 g sodium diet         Labs/Imaging-   CBC:   Recent Labs   Lab 04/17/20  2042 04/18/20  0525 04/18/20  1147 04/18/20  2352   WBC 10.92 10.04  --  11.77   HGB 8.6* 8.7* 8.6* 8.2*   HCT 28.2* 28.6* 28.8* 27.4*    289  --  337       Diagnostics/Interventions during Rapid response     Direct pressure was applied without success  5.5 cm anterior rhino rocket was inserted into the left Nare with hemostasis.  Approximately 20-30 minutes after insertion patient removed rhino rocket due to discomfort  Again called to the room for continued epistaxis.  Vern-Synephrine nasal spray was used with again direct pressure with improvement.  I strongly recommended to the patient to have another rhino rocket inserted which she adamantly refuses.  Patient was then instructed to use direct pressure or return to bleeding.    ASSESSMENT/PLAN:     Active Hospital Problems    Diagnosis    Epistaxis    Conjunctivitis of right eye    Post viral debility    Acute on chronic combined systolic and diastolic congestive heart failure    Pneumonia due to infectious organism    Acute encephalopathy    Acute renal failure    COVID-19 virus infection    Morbid obesity    Hypothyroid    COVID-19 virus detected    Diverticulosis of large intestine without hemorrhage    Obesity, morbid, BMI 40.0-49.9    Iron deficiency anemia, unspecified                Uninterrupted Critical Care time (not including procedures): 45

## 2020-04-19 NOTE — ASSESSMENT & PLAN NOTE
Patient's anemia is currently controlled. Has recieved 1 units of PRBCs on 4/3.   Will need to Monitor for GI bleed  Lab Results   Component Value Date    HGB 8.0 (L) 04/19/2020    HCT 27.3 (L) 04/19/2020     Monitor serial CBC and transfuse if patient becomes hemodynamically unstable, symptomatic or H/H drops below 7/21.    Will continue to monitor

## 2020-04-19 NOTE — ASSESSMENT & PLAN NOTE
ML traumatic  Had episode overnight from left nare- advised rhino insert which she refused . Advised to continue pressure.   hnh stable   Will monitor

## 2020-04-19 NOTE — SUBJECTIVE & OBJECTIVE
Interval History: Patient had epistaxis last night.   Review of Systems  Objective:     Vital Signs (Most Recent):  Temp: 97.4 °F (36.3 °C) (04/19/20 0800)  Pulse: 101 (04/19/20 0800)  Resp: 20 (04/19/20 0800)  BP: 138/72 (04/19/20 0800)  SpO2: 96 % (04/19/20 0800) Vital Signs (24h Range):  Temp:  [97.3 °F (36.3 °C)-97.7 °F (36.5 °C)] 97.4 °F (36.3 °C)  Pulse:  [] 101  Resp:  [18-20] 20  SpO2:  [95 %-97 %] 96 %  BP: (138-143)/(68-98) 138/72     Weight: 95.3 kg (210 lb 1.6 oz)  Body mass index is 39.7 kg/m².    Intake/Output Summary (Last 24 hours) at 4/19/2020 1225  Last data filed at 4/18/2020 2054  Gross per 24 hour   Intake 730 ml   Output 0 ml   Net 730 ml      Physical Exam  Constitutional: nad, aao x 3, has her voice back, smiling- looks great!  Heart: rrr, no m/r/g, wwp, no edema  Lungs: ant ausc clear, no w/r/r/c, no lb  Abdomen: s/nt/nd, +BS  Significant Labs:   Recent Lab Results       04/19/20  0441   04/18/20  2352        Albumin 3.6       Alkaline Phosphatase 139       ALT 25       Anion Gap 17       AST 21       Baso # 0.13 0.21     Basophil% 1.3 1.8     BILIRUBIN TOTAL 0.7  Comment:  For infants and newborns, interpretation of results should be based  on gestational age, weight and in agreement with clinical  observations.  Premature Infant recommended reference ranges:  Up to 24 hours.............<8.0 mg/dL  Up to 48 hours............<12.0 mg/dL  3-5 days..................<15.0 mg/dL  6-29 days.................<15.0 mg/dL         BUN, Bld 55       Calcium 9.9       Chloride 99       CO2 21       Creatinine 3.2       Differential Method Automated Automated     eGFR if  18       eGFR if non  16  Comment:  Calculation used to obtain the estimated glomerular filtration  rate (eGFR) is the CKD-EPI equation.          Eos # 0.5 0.5     Eosinophil% 4.3 4.4     Glucose 93       Gran # (ANC) 7.2 7.9     Gran% 69.7 66.8     Hematocrit 27.3 27.4     Hemoglobin 8.0 8.2      Immature Grans (Abs) 0.19  Comment:  Mild elevation in immature granulocytes is non specific and   can be seen in a variety of conditions including stress response,   acute inflammation, trauma and pregnancy. Correlation with other   laboratory and clinical findings is essential.   0.26  Comment:  Mild elevation in immature granulocytes is non specific and   can be seen in a variety of conditions including stress response,   acute inflammation, trauma and pregnancy. Correlation with other   laboratory and clinical findings is essential.       Immature Granulocytes 1.8 2.2     Lymph # 1.5 1.8     Lymph% 14.4 15.0     MCH 26.8 26.9     MCHC 29.3 29.9     MCV 92 90     Mono # 0.9 1.2     Mono% 8.5 9.8     MPV 11.8 11.9     nRBC 0 0     Platelets 317 337     Potassium 4.0       PROTEIN TOTAL 9.0       RBC 2.98 3.05     RDW 19.9 19.7     Sodium 137       Vancomycin-Trough 15.0       WBC 10.36 11.77           Significant Imaging: I have reviewed all pertinent imaging results/findings within the past 24 hours.

## 2020-04-19 NOTE — PLAN OF CARE
POC reviewed with patient, verbalizes understanding. VSS. Cardiac monitor in place. Contact precautions maintained. Active nose bleed. Rhino rocket inserted per [place] NP removed by patient. Refuses reinsertion due to c/o discomfort. Daughter Danya at bedside. Safety maintained, bed in lowest position, wheels locked, call light in reach. Will continue to monitor.

## 2020-04-19 NOTE — RESPIRATORY THERAPY
04/18/20 2025   Patient Assessment/Suction   Level of Consciousness (AVPU) alert   Respiratory Effort Unlabored   PRE-TX-O2   O2 Device (Oxygen Therapy) room air   SpO2 96 %   Pulse Oximetry Type Intermittent   $ Pulse Oximetry - Multiple Charge Pulse Oximetry - Multiple   Aerosol Therapy   $ Aerosol Therapy Charges PRN treatment not required   Respiratory Treatment Status (SVN) PRN treatment not required

## 2020-04-19 NOTE — PROGRESS NOTES
INPATIENT NEPHROLOGY PROGRESS NOTE  [facility] NEPHROLOGY    Patient Name: Maria Victoria Hernandez  Date: 04/19/2020    Reason for consultation: MAIKOL    History of Present Illness:  54AAF nurse with morbid obesity, hypothyroidism presents PNA, intubated, positive for COVID19. Admit Cr 0.7 went 5.1 and HD started on 3/29.     3/28  Scr worse today.  Only one shift recorded for output, 520cc.  Still hyponatremic, vent setting adjusted per pulmonology note for acidosis.  K+ at goal after repletion.  Has siri, may need HD initiated.  3/29  Non oliguric.  In no distress  3/30 VSS, seen and examined on HD, tolerating well. Plan another HD in AM, discussed with daughter who is a nurse here too.  3/31 VSS, intubated still. UO is not great but she is dialyzed daily. Seen and examined on HD, tolerating well. Plan another HD in AM.  4/1 VSS, intubated still. UO is not great but she is dialyzed daily. Seen and examined on HD, tolerating well. Plan another HD PRN.  4/2 VSS, intubated still. UO is not great. Plan another HD in AM.  4/3 VSS, intubated still. UO is not great but she is dialyzed daily recently. Seen and examined on HD, tolerating well. Plan another HD on Mon or PRN.  4/4 febrile, BP stable, FIO2 50%, intubated, sedated, on levophed, UOP 900cc, transfused  4/5 febrile, tachy, SBP 100s, FIO2 65%, intubated, sedated, off levophed, UOP 935cc  4/6 intubated,. Sedated  4/7 intubated, sedated. Dialysis catheter changed yesterday  4/8 just finished dialysis. uf 3 liters. Extubated  4/10  Seen on dialysis.  No distress.  4/11  UOP 750cc.  3L UF w/HD yesterday.  K+ at goal.  Holding dialysis over weekend to assess for recovery.  Significant event noted 6pm yesterday per primary notes.  Med changes made 2/2 tachycardia, tachypnea.   She is hypotensive today.   4/12   UOP 1.2L.  Scheduled for dialysis tomorrow, no recovery noted thus far.  Pulmonology working toward extubation and weaning off labetalol gtt, ordered  clonidine PRN SBP>160.  4/13 low grade T, SBP 140s, on 3-4L NC, unable to do HD today due to malfunctioning access, UOP 3.2L; CXR: Very mild decrease of the bilateral infiltrates compared to the prior exam.   4/14 got new temporary dialysis catheter overnight; febrile, tachy, -160s, on 1.5L NC, UOP 2.8L; seen on HD - will get off 2L- updated daughter at bedside  4/15 low grade temp, SBP > 130, on 2L NC, UOP 1.6L; MRI brain yest neg; tolerating TFs; worked with therapy today- sitting upright in bed- able to follow commands and nods head when I'm speaking with her  4/16 afebrile, overall BP readings better, on 2-3L NC, UOP 850cc- patient dropped BP during treatment and developed blank stare- UF turned off- mental status returned- completed treatment, net even; laying in bed today- able to whisper a few words, able to follow commands and follow my conversation, she even smiles  4/17 HD catheter very positional today and only able do - if we continue HD, will need hemosplit next week; BP is high this AM, on RA, UOP 1.2L- seen on HD, no complaints, looks great- updated daughter at bedside about plan  4/18 not in her room.  I/o not recorded  4/19 No nausea, chest pain, sob, fever, urinary or bowel complaint, new neurologic symptoms, new joint pain,      Echo 3/27/20:  · Mild left ventricular enlargement.  · Mildly decreased left ventricular systolic function. The estimated ejection fraction is 45%.  · Grade I (mild) left ventricular diastolic dysfunction consistent with impaired relaxation.  · Normal right ventricular systolic function.  · Mild left atrial enlargement.  · Moderate mitral regurgitation.  · Mild tricuspid regurgitation.  · Mild pulmonary hypertension present. PASP 40 mm of Hg.    Echo 4/13/20:  · Mild concentric left ventricular hypertrophy.  · Global hypokinetic wall motion.  · Moderately decreased left ventricular systolic function. The estimated ejection fraction is 35%.  · Grade I (mild)  left ventricular diastolic dysfunction consistent with impaired relaxation.  · Normal right ventricular systolic function.  · Mild left atrial enlargement.  · Moderate mitral regurgitation.  · Normal central venous pressure (3 mmHg).    Plan of Care:    1. Septic shock 2/2 COVID PNA with HHRF requiring MV- resolved  - she is now on RA  - remains on PO bicarb-    - continue aggressive PT/OT  -   - 4/14 and 4/15 COVID testing negative    2. MAIKOL - suspect multifactorial ATN in setting of shock/COVID/intravascular volume depletion- initiated HD 3/29  - her Cr trends are actually improving- she remains nonoliguric- - her HD catheter isn't working well- if we end up deciding on Monday that she needs outpatient HD, will need a hemosplit along with outpatient HD unit placement  - no nsaids or IV contrast  - dose meds for dialysis  --hold dialysis today  --reassess in am    3. HTN/Systolic CHF- worsening based on echo- net -12L for admission  - BP is high today after cutting back hydralazine yest from TID to BID- will go back to TID and leave 10mg dose the same       4. Anemia  - Hb is stable  - continue TERE with HD MWF- if we stop HD, will transition to SQ weekly    5. SHPT  - she is on calcitriol  - calcium is stable  - check phos, PTH in AM    Spoke with Dr. Gomez- will keep inpatient over weekend and assess for renal recovery- if needs to resume HD Monday, will get hemosplit with Dr. Gauthier and then use to make sure it works- can then be discharged to LTAC.     Thank you for allowing us to participate in this patient's care. We will continue to follow.    Vital Signs:  Temp Readings from Last 3 Encounters:   04/19/20 97.4 °F (36.3 °C) (Oral)       Pulse Readings from Last 3 Encounters:   04/19/20 101   01/15/15 84   12/17/13 80       BP Readings from Last 3 Encounters:   04/19/20 138/72   01/15/15 124/82   12/17/13 102/68       Weight:  Wt Readings from Last 3 Encounters:   04/19/20 95.3 kg (210 lb 1.6 oz)   01/15/15  "105.8 kg (233 lb 4.8 oz)   12/17/13 101.2 kg (223 lb)     Medications:  Scheduled Meds:   ascorbic acid (vitamin C)  1,000 mg Oral QID    aspirin  81 mg Oral Daily    calcitRIOL  0.25 mcg Oral Daily    cholestyramine-aspartame  1 packet Oral BID    ciprofloxacin HCl   Both Eyes TID    epoetin raquel-ebpx (RETACRIT) injection  5,000 Units Intravenous Every Mon, Wed, Fri    escitalopram oxalate  5 mg Oral Daily    furosemide  40 mg Oral BID    heparin (porcine)  5,000 Units Subcutaneous Q12H    hydrALAZINE  10 mg Oral Q8H    Lactobacillus acidoph-L.bulgar  2 tablet Oral BID    levothyroxine  100 mcg Oral Before breakfast    metoprolol tartrate  25 mg Oral BID    micafungin (MYCAMINE) IVPB  100 mg Intravenous Q24H    sodium bicarbonate  650 mg Oral TID    vancomycin  250 mg Oral Q6H    white petrolatum-mineral oiL   Right Eye QHS    zinc sulfate  220 mg Oral Daily     Continuous Infusions:    PRN Meds:.acetaminophen, heparin (porcine), hydrALAZINE, ipratropium-albuteroL, loperamide, LORazepam, ondansetron, promethazine (PHENERGAN) IVPB, Pharmacy to dose Vancomycin consult **AND** vancomycin - pharmacy to dose  No current facility-administered medications on file prior to encounter.      Current Outpatient Medications on File Prior to Encounter   Medication Sig Dispense Refill    ferrous sulfate 325 mg (65 mg iron) Tab tablet Take 1 tablet (325 mg total) by mouth daily with breakfast. (Patient taking differently: Take 325 mg by mouth daily with breakfast. Take 2 tablets daily) 90 tablet 3    levothyroxine (SYNTHROID) 100 MCG tablet Take 1 tablet (100 mcg total) by mouth once daily. (Patient taking differently: Take 150 mcg by mouth once daily. ) 90 tablet 3     Review of Systems:  Not conversant    Physical Exam:  /72   Pulse 101   Temp 97.4 °F (36.3 °C) (Oral)   Resp 20   Ht 5' 1" (1.549 m)   Wt 95.3 kg (210 lb 1.6 oz)   SpO2 96%   Breastfeeding? No   BMI 39.70 kg/m²     INS/OUTS:  I/O " last 3 completed shifts:  In: 1450 [P.O.:1350; IV Piggyback:100]  Out: 0   No intake/output data recorded.  (4/17)  Constitutional: nad, aao x 3, has her voice back, smiling- looks great!  Heart: rrr, no m/r/g, wwp, no edema  Lungs: ant ausc clear, no w/r/r/c, no lb  Abdomen: s/nt/nd, +BS    Results:  Lab Results   Component Value Date     04/19/2020    K 4.0 04/19/2020    CL 99 04/19/2020    CO2 21 (L) 04/19/2020    BUN 55 (H) 04/19/2020    CREATININE 3.2 (H) 04/19/2020    CALCIUM 9.9 04/19/2020    ANIONGAP 17 (H) 04/19/2020    ESTGFRAFRICA 18 (A) 04/19/2020    EGFRNONAA 16 (A) 04/19/2020       Lab Results   Component Value Date    CALCIUM 9.9 04/19/2020    PHOS 5.4 (H) 04/18/2020       Recent Labs   Lab 04/19/20  0441   WBC 10.36   RBC 2.98*   HGB 8.0*   HCT 27.3*      MCV 92   MCH 26.8*   MCHC 29.3*     I have personally reviewed pertinent radiological imaging and reports.    Don Collins MD  Nephrology  [facility] [place]  [phone]

## 2020-04-19 NOTE — ASSESSMENT & PLAN NOTE
Body mass index is 39.7 kg/m². Morbid obesity complicates all aspects of disease management from diagnostic modalities to treatment. Weight loss encouraged and health benefits explained to patient.

## 2020-04-19 NOTE — CARE UPDATE
04/19/20 0745   Patient Assessment/Suction   Level of Consciousness (AVPU) alert   Respiratory Effort Normal;Unlabored   Expansion/Accessory Muscles/Retractions no use of accessory muscles   Rhythm/Pattern, Respiratory unlabored;pattern regular   PRE-TX-O2   O2 Device (Oxygen Therapy) room air   SpO2 95 %   Pulse Oximetry Type Intermittent   $ Pulse Oximetry - Multiple Charge Pulse Oximetry - Multiple   Pulse (!) 115   Resp 20   Inhaler   $ Inhaler Charges PRN treatment not required

## 2020-04-19 NOTE — PROGRESS NOTES
harmacokinetic Assessment Follow Up: IV Vancomycin    Vancomycin serum concentration assessment(s):    The random level was drawn correctly and can be used to guide therapy at this time. The measurement is within the desired definitive target range of 15 to 20 mcg/mL.    Vancomycin Regimen Plan:    Continue regimen to Vancomycin 500 mg IV every 24 hours with next serum trough concentration measured at AM Labs prior to next dose on 04/20       Drug levels (last 3 results):  Recent Labs   Lab Result Units 04/17/20  0533 04/18/20  0525 04/19/20  0441   Vancomycin, Random ug/mL 18.3 18.9  --    Vancomycin-Trough ug/mL  --   --  15.0       Pharmacy will continue to follow and monitor vancomycin.    Please contact pharmacy at extension [phone]   for questions regarding this assessment.    Thank you for the consult,   Aidan Elizabeth

## 2020-04-19 NOTE — ASSESSMENT & PLAN NOTE
Acute, Poss ATN / -unclear yet if ESRD   required HD for acute renal failure-  Dialysis held yesterday due to episode of hypotension  ? Element ATN 2/2 hypovolemia/sepsis  Nephrology following- plan to keep inpatient over weekend to assess if patient needs dialysis in am- further decision of outpatient dialysis based on that.

## 2020-04-19 NOTE — PROGRESS NOTES
[facility] Medicine  Progress Note    Patient Name: Maria Victoria Hernandez  MRN: [id number]  Patient Class: IP- Inpatient   Admission Date: 3/25/2020  Length of Stay: 25 days  Attending Physician: Kiran Bullock MD  Primary Care Provider: Candice Deluna MD        Subjective:     Principal Problem:Acute respiratory failure with hypoxia        HPI:  Patient is a 54 years old  female with morbid obesity in past medical history significant for hypothyroidism is being admitted to intensive care unit under inpatient status from [facility] Emergency room with worsening shortness of breath.  Three days ago patient was tested positive for COVID - 19.  Patient works at [employer].  Patient has been experiencing subjective fever, generalized body aches and pains and nonproductive cough for few days.  Patient is getting increasingly short of breath especially for the past 2 days.  Upon arrival to the emergency room patient was noted to be hypoxic around 89%.  Up on 3 L patient's oxygen sats are around 96%.  Patient denies any chest pain, leg swelling or calf tenderness.      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 And treated per protocol for Covid and ARDS with ceftriaxone/zithromax and HC x 5 days. And ARDS protocol on vent. Pulmonary consulted and followed. WEaned from vent slowly -to nasal cannula by 4/10 and remained stable on oxygen but very weak. PT/OT consulted.   Tachycardica /hypertension developed -cardene gtt adjusted to labetolol then transitioned to po metoprolol Echo -noted mild systolic/Gr 1 diastolic dsyfunction. cxr on 4/5 severe ards pattern.-Vancomycin/zosyn added. 4/6 blood cultures pos for yeast so ID consulted and micafungin added. HD  Catheter and  TLC line removed and cultured and cultures negative. Sputum + yeast also   .-followed recs from ID; echo neg -await repeat echo. Repeat Blood cultures  were negative for yeast. 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 Acute encephalopathy- delirium vs encephalitis or other structural cause. MRI could not be performed as she was very unstable.   At the time of admission Cr 0.7 worsened to 5.1, Renal was consulted. and HD started on 3/29. In addition had, Metabolic acidosis due to renal failure. Feedings given via ngtube with diabetisource and accucks/ISS given with close monitoring and good control. On 04/09/2020 Patient was extubated. Continued to need Cardene drip for BP. HAd loose stool-- flexiseal was placed. On 04/11/2020 mental status improved was  more awake and slighty stronger. She was tachycardic-- Cardene was switched to labetalol   04/13/2020 patient was tolerating vancomycin and micafungin. She continued to have diarrhea.  C diff antigen was positive.  On 04/15/2020.  Patient's voice was louder, she is quite interactive and was moving her arms.   on 04/16/2020  patient  was doing well.  She  was afebrile.   Her oxygen requirement improved. Was titrated down to 2 L nasal cannula. Patient transferred out of ICU to regular Med surge room. crp improved.  ferr 1200. Nephrology continued to follow patient. Her MAIKOL was suspected to be multifactorial ATN in setting of shock/COVID/intravascular volume depletion- initiated on  HD 3/29.  her Cr trending down  improving- she is nonoliguric-plan to do HD Friday and then nephrology wants to hold HD on the weekend to assess for recovery. Stable for transfer to LTAC      Interval History: Patient had epistaxis last night.   Review of Systems  Objective:     Vital Signs (Most Recent):  Temp: 97.4 °F (36.3 °C) (04/19/20 0800)  Pulse: 101 (04/19/20 0800)  Resp: 20 (04/19/20 0800)  BP: 138/72 (04/19/20 0800)  SpO2: 96 % (04/19/20 0800) Vital Signs (24h Range):  Temp:  [97.3 °F (36.3  °C)-97.7 °F (36.5 °C)] 97.4 °F (36.3 °C)  Pulse:  [] 101  Resp:  [18-20] 20  SpO2:  [95 %-97 %] 96 %  BP: (138-143)/(68-98) 138/72     Weight: 95.3 kg (210 lb 1.6 oz)  Body mass index is 39.7 kg/m².    Intake/Output Summary (Last 24 hours) at 4/19/2020 1225  Last data filed at 4/18/2020 2054  Gross per 24 hour   Intake 730 ml   Output 0 ml   Net 730 ml      Physical Exam  Constitutional: nad, aao x 3, has her voice back, smiling- looks great!  Heart: rrr, no m/r/g, wwp, no edema  Lungs: ant ausc clear, no w/r/r/c, no lb  Abdomen: s/nt/nd, +BS  Significant Labs:   Recent Lab Results       04/19/20  0441   04/18/20  2352        Albumin 3.6       Alkaline Phosphatase 139       ALT 25       Anion Gap 17       AST 21       Baso # 0.13 0.21     Basophil% 1.3 1.8     BILIRUBIN TOTAL 0.7  Comment:  For infants and newborns, interpretation of results should be based  on gestational age, weight and in agreement with clinical  observations.  Premature Infant recommended reference ranges:  Up to 24 hours.............<8.0 mg/dL  Up to 48 hours............<12.0 mg/dL  3-5 days..................<15.0 mg/dL  6-29 days.................<15.0 mg/dL         BUN, Bld 55       Calcium 9.9       Chloride 99       CO2 21       Creatinine 3.2       Differential Method Automated Automated     eGFR if  18       eGFR if non  16  Comment:  Calculation used to obtain the estimated glomerular filtration  rate (eGFR) is the CKD-EPI equation.          Eos # 0.5 0.5     Eosinophil% 4.3 4.4     Glucose 93       Gran # (ANC) 7.2 7.9     Gran% 69.7 66.8     Hematocrit 27.3 27.4     Hemoglobin 8.0 8.2     Immature Grans (Abs) 0.19  Comment:  Mild elevation in immature granulocytes is non specific and   can be seen in a variety of conditions including stress response,   acute inflammation, trauma and pregnancy. Correlation with other   laboratory and clinical findings is essential.   0.26  Comment:  Mild elevation in  immature granulocytes is non specific and   can be seen in a variety of conditions including stress response,   acute inflammation, trauma and pregnancy. Correlation with other   laboratory and clinical findings is essential.       Immature Granulocytes 1.8 2.2     Lymph # 1.5 1.8     Lymph% 14.4 15.0     MCH 26.8 26.9     MCHC 29.3 29.9     MCV 92 90     Mono # 0.9 1.2     Mono% 8.5 9.8     MPV 11.8 11.9     nRBC 0 0     Platelets 317 337     Potassium 4.0       PROTEIN TOTAL 9.0       RBC 2.98 3.05     RDW 19.9 19.7     Sodium 137       Vancomycin-Trough 15.0       WBC 10.36 11.77           Significant Imaging: I have reviewed all pertinent imaging results/findings within the past 24 hours.      Assessment/Plan:      Epistaxis  ML traumatic  Had episode overnight from left nare- advised rhino insert which she refused . Advised to continue pressure.   hnh stable   Will monitor      Conjunctivitis of right eye  Ml post viral  Appears worse  Will cover for secondary bacterial infection  Will start ciprofloxacin ointmet  Will continue eye lubricant         Post viral debility  Will follow up with PT OT recs  PT OT recs SNF       Acute on chronic combined systolic and diastolic congestive heart failure  Chronic -noted on echo ; strict I/O   No ACE/ARB 2/2 renal failure  Asa/statin-when able to tolerate po   Strict I/O-volume control with HD   Repeat ECHO   · Mild concentric left ventricular hypertrophy.  · Global hypokinetic wall motion.  · Moderately decreased left ventricular systolic function. The estimated ejection fraction is 35%.  · Grade I (mild) left ventricular diastolic dysfunction consistent with impaired relaxation.  · Normal right ventricular systolic function.     Pneumonia due to infectious organism  Acute -post viral --see resp failure/covid   Pulm/ID consulted /followed     Acute encephalopathy  ML metabolic  resolved  MRI brain negative  US carotids done pending      Lab Results   Component Value Date     WBC 10.36 04/19/2020      focal findings on physical exam  No early signs of stroke on Head CT, no hemorrhage or acute findings.  EEG: EEG 30 min awake and drowsy results noted no seizures  Continue supportive care        Obesity, morbid, BMI 40.0-49.9  Body mass index is 40.74 kg/m². Morbid obesity complicates all aspects of disease management from diagnostic modalities to treatment. Weight loss encouraged and health benefits explained to patient.        Iron deficiency anemia, unspecified  Patient's anemia is currently controlled.     Current CBC reviewed-   Lab Results   Component Value Date    HGB 8.0 (L) 04/19/2020    HCT 27.3 (L) 04/19/2020     Monitor serial CBC and transfuse if patient becomes hemodynamically unstable, symptomatic or H/H drops below 8/24         Diverticulosis of large intestine without hemorrhage  Patient's anemia is currently controlled. Has recieved 1 units of PRBCs on 4/3.   Will need to Monitor for GI bleed  Lab Results   Component Value Date    HGB 8.0 (L) 04/19/2020    HCT 27.3 (L) 04/19/2020     Monitor serial CBC and transfuse if patient becomes hemodynamically unstable, symptomatic or H/H drops below 7/21.    Will continue to monitor        Acute renal failure  Acute, Poss ATN / -unclear yet if ESRD   required HD for acute renal failure-  Dialysis held yesterday due to episode of hypotension  ? Element ATN 2/2 hypovolemia/sepsis  Nephrology following- plan to keep inpatient over weekend to assess if patient needs dialysis in am- further decision of outpatient dialysis based on that.                   COVID-19 virus infection  Completed Plaquenil    Covid-19 Virus Infection  - Infection Control notified    - Isolation:   - Airborne and Droplet Precautions  - N95 masks must be fit tested, wear eye protection  - 20 second hand hygiene   - Limit visitors per hospital policy   - Consolidating lab draws, nursing care, and interventions    - Diagnostics: (rising CRP, persistent  lymphopenia, hyponatremia, hyperferritinemia, elevated troponin, elevated d-dimer, age, and comorbidities are significant predictors of poor clinical outcome)   - CBC:   trend Q48hrs  - CMP:        trend Q48hrs  - Procalcitonin:  - D-dimer:  trend Q48hrs  - Ferritin:  repeat prior to discharge  - CRP:        trend Q48hrs  - LDH:  - BNP:  - Troponin:    - ECG:   - rapid Flu:   - RIP only if BMT/solid transplant:   - Legionella antigen:   - Blood culture x2:   - Sputum culture:   - CXR:   - UA and culture:      - Management:   - Bundle care as able to minimize in/out of room   - Supplemental O2 to maintain SpO2 >92%,   if requiring 6L NC or higher, place on nonrebreather and discuss case with MICU   - Telemetry & continuous Pulse Ox   - albuterol INHALER PRN 4puff Q6hr approximates a nebulizer (avoid nebulization of secretions)   - apap PRN fever   - Avoiding NIPPV to prevent aerosolization   (including home CPAP/BiPAP unless on a case-by-case basis and only in negative pressure room)   - Cautious use of NSAIDS for fever per WHO recommendations (3/16/2020)   - No new ACEi/ARB start or discontinuation of chronic med unless hypotensive (Esler et al. Journal of Hypertension 2020, 38:000-000)   - Careful use of steroids in the absence of other indications   - unless septic shock due to increased viral replication   - Fluid sparing resuscitation   - Empiric antibiotics per likely source & patient allergies    - CAP: x 5 day course  Ceftriaxone 1g IV Q24hrs            Azithromycin 500mg IV day #1, then 250mg PO daily x4 days                 If MRSA risk factors, add Vancomycin IV (PharmD consult)   - If patient meets criteria per Hospital Protocol    - start statin (if CPK WNL)    - start HCQ 400mg PO BID x1 day, then 400mg PO daily x 4 days (check G6PD, ECG, and start Qshift POCT glucose)    Goals of care, counseling/discussion  - Reviewed the typical clinical course of COVID19 with the daughter Danya (patient name or  relationship to patient), including the potential for acute decompensation requiring intubation and mechanical ventilation  - Discussed again as part of routine daily evaluation, patient/POA maintains code status of Full code    VTE High Risk Prophylaxis: enoxaparin 40mg sq QHS @ 2100 (bundled care) if GFR >30    Patient's chronic/stable medical conditions noted in the problem list above will be managed with the patient's home medications as tolerated.            COVID-19 virus detected   Plaquenil course completed  Continue routine medications as before.   Follow airborne/droplet precautions.       Hypothyroid  Chronic problem.  Lab Results   Component Value Date    TSH 2.082 03/31/2020     Not on any medication             Morbid obesity  Body mass index is 39.7 kg/m². Morbid obesity complicates all aspects of disease management from diagnostic modalities to treatment. Weight loss encouraged and health benefits explained to patient.             VTE Risk Mitigation (From admission, onward)         Ordered     heparin (porcine) injection 5,000 Units  Every 12 hours      04/07/20 1349     heparin (porcine) 1,000 unit/mL injection      04/06/20 1138     heparin (porcine) injection 4,000 Units  As needed (PRN)      03/29/20 2001     IP VTE HIGH RISK PATIENT  Once      03/25/20 4544                      Kiran Bullock MD  Department of Hospital Medicine   [facility]

## 2020-04-20 LAB
ALBUMIN SERPL BCP-MCNC: 3.6 G/DL (ref 3.5–5.2)
ALP SERPL-CCNC: 129 U/L (ref 55–135)
ALT SERPL W/O P-5'-P-CCNC: 23 U/L (ref 10–44)
ANION GAP SERPL CALC-SCNC: 14 MMOL/L (ref 8–16)
AST SERPL-CCNC: 21 U/L (ref 10–40)
BASOPHILS # BLD AUTO: 0.12 K/UL (ref 0–0.2)
BASOPHILS NFR BLD: 1.3 % (ref 0–1.9)
BILIRUB SERPL-MCNC: 0.7 MG/DL (ref 0.1–1)
BUN SERPL-MCNC: 60 MG/DL (ref 6–20)
CALCIUM SERPL-MCNC: 9.7 MG/DL (ref 8.7–10.5)
CHLORIDE SERPL-SCNC: 101 MMOL/L (ref 95–110)
CO2 SERPL-SCNC: 22 MMOL/L (ref 23–29)
CREAT SERPL-MCNC: 3 MG/DL (ref 0.5–1.4)
DIFFERENTIAL METHOD: ABNORMAL
EOSINOPHIL # BLD AUTO: 0.5 K/UL (ref 0–0.5)
EOSINOPHIL NFR BLD: 5.2 % (ref 0–8)
ERYTHROCYTE [DISTWIDTH] IN BLOOD BY AUTOMATED COUNT: 20.1 % (ref 11.5–14.5)
EST. GFR  (AFRICAN AMERICAN): 20 ML/MIN/1.73 M^2
EST. GFR  (NON AFRICAN AMERICAN): 17 ML/MIN/1.73 M^2
GLUCOSE SERPL-MCNC: 97 MG/DL (ref 70–110)
HCT VFR BLD AUTO: 25.4 % (ref 37–48.5)
HGB BLD-MCNC: 7.5 G/DL (ref 12–16)
IMM GRANULOCYTES # BLD AUTO: 0.1 K/UL (ref 0–0.04)
IMM GRANULOCYTES NFR BLD AUTO: 1.1 % (ref 0–0.5)
LYMPHOCYTES # BLD AUTO: 1.3 K/UL (ref 1–4.8)
LYMPHOCYTES NFR BLD: 14.2 % (ref 18–48)
MCH RBC QN AUTO: 27 PG (ref 27–31)
MCHC RBC AUTO-ENTMCNC: 29.5 G/DL (ref 32–36)
MCV RBC AUTO: 91 FL (ref 82–98)
MONOCYTES # BLD AUTO: 0.9 K/UL (ref 0.3–1)
MONOCYTES NFR BLD: 9.5 % (ref 4–15)
NEUTROPHILS # BLD AUTO: 6.3 K/UL (ref 1.8–7.7)
NEUTROPHILS NFR BLD: 68.7 % (ref 38–73)
NRBC BLD-RTO: 0 /100 WBC
PLATELET # BLD AUTO: 362 K/UL (ref 150–350)
PMV BLD AUTO: 11.8 FL (ref 9.2–12.9)
POTASSIUM SERPL-SCNC: 3.8 MMOL/L (ref 3.5–5.1)
PROT SERPL-MCNC: 8.9 G/DL (ref 6–8.4)
RBC # BLD AUTO: 2.78 M/UL (ref 4–5.4)
SODIUM SERPL-SCNC: 137 MMOL/L (ref 136–145)
VANCOMYCIN SERPL-MCNC: 20.8 UG/ML
WBC # BLD AUTO: 9.22 K/UL (ref 3.9–12.7)

## 2020-04-20 PROCEDURE — 63600175 PHARM REV CODE 636 W HCPCS: Performed by: INTERNAL MEDICINE

## 2020-04-20 PROCEDURE — 85025 COMPLETE CBC W/AUTO DIFF WBC: CPT

## 2020-04-20 PROCEDURE — 86704 HEP B CORE ANTIBODY TOTAL: CPT

## 2020-04-20 PROCEDURE — 25000003 PHARM REV CODE 250: Performed by: INTERNAL MEDICINE

## 2020-04-20 PROCEDURE — 99900035 HC TECH TIME PER 15 MIN (STAT)

## 2020-04-20 PROCEDURE — 80053 COMPREHEN METABOLIC PANEL: CPT

## 2020-04-20 PROCEDURE — 99231 SBSQ HOSP IP/OBS SF/LOW 25: CPT | Mod: S$GLB,,, | Performed by: INTERNAL MEDICINE

## 2020-04-20 PROCEDURE — 94761 N-INVAS EAR/PLS OXIMETRY MLT: CPT

## 2020-04-20 PROCEDURE — 97116 GAIT TRAINING THERAPY: CPT

## 2020-04-20 PROCEDURE — 11000001 HC ACUTE MED/SURG PRIVATE ROOM

## 2020-04-20 PROCEDURE — 97110 THERAPEUTIC EXERCISES: CPT

## 2020-04-20 PROCEDURE — 36415 COLL VENOUS BLD VENIPUNCTURE: CPT

## 2020-04-20 PROCEDURE — 97535 SELF CARE MNGMENT TRAINING: CPT | Mod: CO

## 2020-04-20 PROCEDURE — 80202 ASSAY OF VANCOMYCIN: CPT

## 2020-04-20 PROCEDURE — 99231 PR SUBSEQUENT HOSPITAL CARE,LEVL I: ICD-10-PCS | Mod: S$GLB,,, | Performed by: INTERNAL MEDICINE

## 2020-04-20 RX ORDER — FLUCONAZOLE 100 MG/1
300 TABLET ORAL DAILY
Status: DISCONTINUED | OUTPATIENT
Start: 2020-04-20 | End: 2020-04-21 | Stop reason: HOSPADM

## 2020-04-20 RX ORDER — ONDANSETRON 2 MG/ML
4 INJECTION INTRAMUSCULAR; INTRAVENOUS EVERY 6 HOURS PRN
Status: DISCONTINUED | OUTPATIENT
Start: 2020-04-20 | End: 2020-04-21 | Stop reason: HOSPADM

## 2020-04-20 RX ADMIN — FUROSEMIDE 40 MG: 40 TABLET ORAL at 05:04

## 2020-04-20 RX ADMIN — ONDANSETRON 4 MG: 2 INJECTION INTRAMUSCULAR; INTRAVENOUS at 05:04

## 2020-04-20 RX ADMIN — CIPROFLOXACIN HYDROCHLORIDE: 3 OINTMENT OPHTHALMIC at 09:04

## 2020-04-20 RX ADMIN — FUROSEMIDE 40 MG: 40 TABLET ORAL at 09:04

## 2020-04-20 RX ADMIN — SODIUM BICARBONATE 650 MG TABLET 650 MG: at 09:04

## 2020-04-20 RX ADMIN — CHOLESTYRAMINE 4 G: 4 POWDER, FOR SUSPENSION ORAL at 09:04

## 2020-04-20 RX ADMIN — Medication 250 MG: at 12:04

## 2020-04-20 RX ADMIN — OXYCODONE HYDROCHLORIDE AND ACETAMINOPHEN 1000 MG: 500 TABLET ORAL at 12:04

## 2020-04-20 RX ADMIN — METOPROLOL TARTRATE 25 MG: 25 TABLET, FILM COATED ORAL at 09:04

## 2020-04-20 RX ADMIN — HEPARIN SODIUM 5000 UNITS: 5000 INJECTION, SOLUTION INTRAVENOUS; SUBCUTANEOUS at 09:04

## 2020-04-20 RX ADMIN — LACTOBACILLUS TAB 2 TABLET: TAB at 09:04

## 2020-04-20 RX ADMIN — MICAFUNGIN SODIUM 100 MG: 20 INJECTION, POWDER, LYOPHILIZED, FOR SOLUTION INTRAVENOUS at 12:04

## 2020-04-20 RX ADMIN — ESCITALOPRAM OXALATE 5 MG: 5 TABLET, FILM COATED ORAL at 09:04

## 2020-04-20 RX ADMIN — OXYCODONE HYDROCHLORIDE AND ACETAMINOPHEN 1000 MG: 500 TABLET ORAL at 09:04

## 2020-04-20 RX ADMIN — ONDANSETRON 4 MG: 2 INJECTION INTRAMUSCULAR; INTRAVENOUS at 09:04

## 2020-04-20 RX ADMIN — CALCITRIOL CAPSULES 0.25 MCG 0.25 MCG: 0.25 CAPSULE ORAL at 09:04

## 2020-04-20 RX ADMIN — ZINC SULFATE 220 MG (50 MG) CAPSULE 220 MG: CAPSULE at 09:04

## 2020-04-20 RX ADMIN — MINERAL OIL AND PETROLATUM: 150; 830 OINTMENT OPHTHALMIC at 09:04

## 2020-04-20 NOTE — CARE UPDATE
04/20/20 0832   Patient Assessment/Suction   Level of Consciousness (AVPU) alert   Respiratory Effort Normal   Expansion/Accessory Muscles/Retractions expansion symmetric   All Lung Fields Breath Sounds Posterior:   LLL Breath Sounds diminished   RLL Breath Sounds diminished   Rhythm/Pattern, Respiratory pattern regular   Cough Frequency no cough   PRE-TX-O2   O2 Device (Oxygen Therapy) room air   Oxygen Concentration (%) 21   SpO2 99 %   Pulse Oximetry Type Intermittent   $ Pulse Oximetry - Multiple Charge Pulse Oximetry - Multiple   Probe Placed On (Pulse Ox) finger   Pulse (!) 114   Resp 15   Positioning HOB elevated 30 degrees   Inhaler   $ Inhaler Charges PRN treatment not required   MD PatyI prn

## 2020-04-20 NOTE — ASSESSMENT & PLAN NOTE
Acute, Poss ATN / -unclear yet if ESRD   required HD for acute renal failure-  Dialysis held yesterday due to episode of hypotension  ? Element ATN 2/2 hypovolemia/sepsis  Nephrology following- nephrology team trying to assess long-term hemodialysis needs.

## 2020-04-20 NOTE — PT/OT/SLP PROGRESS
Occupational Therapy   Treatment    Name: Maria Victoria Hernandez  MRN: [id number]  Admitting Diagnosis:  Acute respiratory failure with hypoxia       Recommendations:     Discharge Recommendations: rehabilitation facility, nursing facility, skilled, LTACH (long-term acute care hospital)  Discharge Equipment Recommendations:  bath bench, 3-in-1 commode, walker, rolling  Barriers to discharge:  Other (Comment)(IV antibiotics needed & severe weakness)    Assessment:     Maria Victoria Hernandez is a 55 y.o. female with a medical diagnosis of Acute respiratory failure with hypoxia.  She presents with increased activity tolerance and very motivated; requires encouragement to try, and simple commands are best as she remains easily confused 2* sedation while intubated. Fine motor and B UE strength is returning very slowly. Pt requires VC and demo to push from start surface during all transfers and needs this technique for safety and to engage B UE strengthening. Performance deficits affecting function are weakness, impaired endurance, impaired self care skills, impaired functional mobilty, decreased coordination, decreased safety awareness, decreased upper extremity function, decreased lower extremity function, decreased ROM, impaired fine motor, impaired coordination.     Rehab Prognosis:  Good; patient would benefit from acute skilled OT services to address these deficits and reach maximum level of function.       Plan:     Patient to be seen 6 x/week to address the above listed problems via self-care/home management, therapeutic activities, therapeutic exercises, cognitive retraining  · Plan of Care Expires: 05/05/20  · Plan of Care Reviewed with: patient    Subjective     Pain/Comfort:  · Pain Rating 1: 0/10    Objective:     Communicated with: Esme COBB prior to session.  Patient found up in chair with recliner in use and daughter seated at side with telemetry, central line(chair alarm) upon HOLMAN  entry to  room.    General Precautions: Standard, special contact, fall   Orthopedic Precautions:N/A   Braces: N/A     Occupational Performance:     Functional Mobility/Transfers:  · Patient completed Sit <> Stand Transfer with contact guard assistance and minimum assistance  with  rolling walker and and VC to push from start surface, even requiring HOHA to place hands at arm rests before transfer.   · Patient completed chair<>bsc transfer w/ Step Transfer technique with contact guard assistance and minimum assistance with  rolling walker, bedside commode and VC for B UE sequencing to rise from chair and to lower to BSC.  · Functional Mobility: FAIR; pt is easily fatigued and easily confused. Requires encouragement to affirm her progress.    Activities of Daily Living:  · Grooming: stand by assistance and intermittent contact guard assistance for hand and face washing while standing at sink ~6mins. BSC behind pt for seated rest break and then stood again another 2 mins to finish grooming.    [facility] 6 Click ADL: 15    Treatment & Education:  -pt completed chair>BSC>ambulate 9ft to sink>rest at sink>stand at sink>chair (with RW) this session, all with Min A or less (CGA) and with VC for B UE sequencing. Pt performs transfer better when demo is first given.  - pt goal for the day is to NOT use diaper for voiding, but to call staff for assist to BSC 2* this transfer going so well with therapy today. Pt v/u and agreement.  -pt performed L/R hand strengthening with rubber squeeze ball. L hand increased deficit.  -pt requires more strengthening than HHPT/HHOT will offer. Recommend SNF/LTACH.    Patient left up in chair with recliner in use with all lines intact, call button in reach, chair alarm on and RNEsme and Breanna SMALL notifiedEducation:      GOALS:   Multidisciplinary Problems     Occupational Therapy Goals        Problem: Occupational Therapy Goal    Goal Priority Disciplines Outcome Interventions   Occupational Therapy  Goal     OT, PT/OT Ongoing, Progressing    Description:  Goals to be met by: 5/5/20    Patient will increase functional independence with ADLs by performing:    Feeding with Modified [place] and Assistive Devices as needed.  UE Dressing with Set-up Assistance and Minimal Assistance.  Grooming while seated with Supervision.  Sitting at edge of bed x10 minutes with Stand-by Assistance and use of upper extremity support.  Toilet transfer to bedside commode with Moderate Assistance.  Upper extremity exercise program x10 reps per handout, with assistance as needed.                      Time Tracking:     OT Date of Treatment: 04/20/20  OT Start Time: 1050  OT Stop Time: 1129  OT Total Time (min): 39 min    Billable Minutes:Self Care/Home Management 39 min    ABBY Gregory  4/20/2020     I certify that I completed a client care conference with the LIZA prior to treatment and provided close and direct client care supervision during this treatment.   NORMAN Joseph  4/21/2020

## 2020-04-20 NOTE — ASSESSMENT & PLAN NOTE
Body mass index is 39.57 kg/m². Morbid obesity complicates all aspects of disease management from diagnostic modalities to treatment. Weight loss encouraged and health benefits explained to patient.

## 2020-04-20 NOTE — PLAN OF CARE
Rudy received a call from REZA Hagan (daughter), she would [facility] her [facility].  She spoke with her mom and she realized it would be better for her.  Rudy informed REZA Robledo/SARA Weston.  Rudy notified Dr. Porter.  Rudy [facility] with Nadira at [facility], she has the authorization from [facility].  She will get the date changed for Tomorrow admission.       04/20/20 112   Discharge Reassessment   Assessment Type Discharge Planning Reassessment   Provided patient/caregiver education on the expected discharge date and the discharge plan Yes   Do you have any problems affording any of your prescribed medications? No   Discharge Plan A Long-term acute care facility (LTAC)

## 2020-04-20 NOTE — PLAN OF CARE
"POC reviewed with patient, verbalizes understanding. VSS. Cardiac monitor in place. Administered antiemetics PRN. Pt refused oral medications due to c/o "upset stomach."  Contact precautions maintained. Safety maintained, bed in lowest position, wheels locked, call light in reach.    "

## 2020-04-20 NOTE — PLAN OF CARE
POC reviewed with pt, understanding verbalized. ST on tele.  Pt took multiple steps with therapy today. Poor appetite. Refusing some medications, despite a lot of education from nurse. Safety maintained. Will monitor.

## 2020-04-20 NOTE — ASSESSMENT & PLAN NOTE
Patient's anemia is currently controlled.     Current CBC reviewed-   Lab Results   Component Value Date    HGB 7.5 (L) 04/20/2020    HCT 25.4 (L) 04/20/2020     Monitor serial CBC and transfuse if patient becomes hemodynamically unstable, symptomatic or H/H drops below 8/24

## 2020-04-20 NOTE — PROGRESS NOTES
Progress Note  Infectious Disease    Reason for Consult:      HPI: Maria Victoria Hernandez is a   55 y.o. female employee of [employer].  with past medical history of morbid obesity, BMI of 48, diabetes, diet controlled, anemia, B12 deficiency, vitamin-D deficiency, hypothyroidism, was admitted on 03/25/2020 due to shortness of breath, diagnosed with COVID 19 is positive.  Patient has been intubated.  She went into renal failure and has been receiving HD by nephrologist.  Intensivist has been managing the vent.    Patient has completed 10 days of hydroxychloroquine and about 8 days of Zithromax and ceftriaxone.  She started spiking fever of  103 on 04/04.  White count jumped to 17.8.  She was started on vancomycin and Zosyn and blood cultures were sent.    ID consult called for g positives and yeast in blood cultures.  Patient is afebrile at the time.  WBC has improved.  Discussed with nurse.  Will discontinue lines.  Continue vancomycin, Daptomycin x1.  Add micafungin daily.    04/07/2020  WBC improved 17--12--10  Ferritin 1752--1538--1897  Intubated Sedated.  FiO2 of 35 people 5.  T-max 101.7°  Dialysis catheter changed yesterday  Leukocytosis improved 12/17/2010 04/08/2020  T-max 99.9°  Intubated sedated.  FiO2 35, peep of 5.  Fail CPAP trial.  Tolerating vancomycin, Zosyn, Micafungin.   Lines are fresh.  Nurse is trying to protect them.  Discussed with nurse:  Her daughter who is a nurse came and saw mother today, she agrees right eye looks better.    04/09/2020  Patient is extubated today, Really weak, Does not breath in deep, I advised her on deep breathing  Continues to need Cardene drip for BP  HAd loose stool-- flexiseal was placed    04/10/2020.  She is very weak.  She tries to opens her eyes and interact with me.  Right eye is improved.  Dialysis nurse is about to start dialysis.    04/11/2020  T max 100.3 yesterday.   She looks tired, but better than yesterday. Today she is  more  "awake and slighty stronger  She was tachycardic yesterday -- Cardene was switched to labetalol  WBC is about the same. ESR 50;  procal is still elveated. vanc level today is 14  Hospitalist ": Requested pulmonary to review possible  bipap -for Covid must have extra filter for machines which are limited "  As per renal :   " UOP 750cc.  3L UF w/HD yesterday.  K+ at goal.  Holding dialysis over weekend to assess for recovery"    2020 chart review    2020  Tmax 99.9, WBC normal, platelet still high  Pulled out ngt, It was replaced  Patient is tolerating vancomycin and micafungin.  She continues to have diarrhea.  C diff antigen is positive.    2020.  T-max a 100°. She follows commands.  She looks anxious.  She is extremely weak.  She is getting dialysis at this time.    04/15/2020.  Patient's voice is louder, she is quite interactive and moves her arms.  She has suctioned her sputum herself earlier today.  She was getting speech evaluation today.  Daughter at bedside pleased with her progression.  T-max 99.1°.  Discussed the line and bacteremia issue with daughter.    2020   patient is doing well.  She is afebrile.  She is  On 2 L nasal cannula. Patient is moving to regular  Med surge room. crp improved.  ferr 1200    2020.  I have reviewed the chart.  Patient is afebrile.  T-max 98.6°.  She is sitting up in a chair.  Activity is improving. She had some nausea yesterday.  Average appetite.  She is in good spirits.  She makes jokes.  She is appreciative of care.  I congratulated her for doing so well so far.  She tells me that she is aware that other people have .  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   She had some nausea yesterday.    Antibiotics (From admission, onward)    Start     Stop " Route Frequency Ordered    04/18/20 0930  ciprofloxacin HCl 0.3 % ophthalmic ointment      -- Both Eyes 3 times daily 04/18/20 0928    04/17/20 1800  vancomycin 250mg / 10ml oral suspension 250 mg      04/23 1759 Oral Every 6 hours 04/17/20 1405    04/05/20 0848  vancomycin - pharmacy to dose  (vancomycin IVPB)      -- IV pharmacy to manage frequency 04/05/20 0748        Antifungals (From admission, onward)    Start     Stop Route Frequency Ordered    04/06/20 1100  micafungin 100 mg in sodium chloride 0.9 % 100 mL IVPB (ready to mix system)      -- IV Every 24 hours (non-standard times) 04/06/20 0959        Antivirals (From admission, onward)    None          EXAM & DIAGNOSTICS REVIEWED:   Vitals:     Temp:  [97.5 °F (36.4 °C)-98.6 °F (37 °C)]   Temp: 98 °F (36.7 °C) (04/20/20 0845)  Pulse: 105 (04/20/20 0845)  Resp: 18 (04/20/20 0845)  BP: 128/72 (04/20/20 0845)  SpO2: 99 % (04/20/20 0845)    Intake/Output Summary (Last 24 hours) at 4/20/2020 1008  Last data filed at 4/19/2020 1949  Gross per 24 hour   Intake 350 ml   Output --   Net 350 ml     Oxygen Concentration (%):  [21] 21    General:  In NAD   Eyes:  Anicteric,  ENT:  Lines on bilateral neck dressed appropriately.   Neck:  supple, no masses or adenopathy appreciated  Lungs: Does not breathe all the weight deep.  Clear otherwise.  Heart:  RRR, no gallop/murmur/rub noted  Abd:  Soft, NT, ND, normal BS, no masses or organomegaly appreciated  :   Musc:  Joints without effusion, swelling, erythema, synovitis, muscle wasting.   Skin:  No rashes. No palmar or plantar lesions. No subungual petechiae  Wound:   Neuro: Follows commands.  Very weak, especially in lower extremities.  Psych:  Pleasant.  Lymphatic:     No cervical, supraclavicular, axillary, or inguinal nodes  Extrem: No edema, erythema, phlebitis, cellulitis, warm and well perfused  VAD:       Isolation:      Lines/Tubes/Drains:  Right IJ 04/06  Left IJ 04/06    General Labs reviewed:  Recent Labs    Lab 04/18/20  2352 04/19/20  0441 04/20/20  0405   WBC 11.77 10.36 9.22   HGB 8.2* 8.0* 7.5*   HCT 27.4* 27.3* 25.4*    317 362*       Recent Labs   Lab 04/18/20  0525 04/19/20  0441 04/20/20  0405   * 137 137   K 4.0 4.0 3.8   CL 97 99 101   CO2 21* 21* 22*   BUN 43* 55* 60*   CREATININE 3.0* 3.2* 3.0*   CALCIUM 10.2 9.9 9.7   PROT 9.6* 9.0* 8.9*   BILITOT 0.8 0.7 0.7   ALKPHOS 159* 139* 129   ALT 25 25 23   AST 24 21 21     Micro:  Microbiology Results (last 7 days)     Procedure Component Value Units Date/Time    Blood culture [[id number]]  (Abnormal) Collected:  04/06/20 0806    Order Status:  Completed Specimen:  Blood from Antecubital, Left Updated:  04/19/20 1220     Blood Culture, Routine Gram stain peds bottle: yeast       Results called to and read back by: Tania Nolan RN 04/08/2020        11:20      CANDIDA ALBICANS  Susceptibility pending  [facility] consult required at [facility] and [facility].      Blood culture [[id number]] Collected:  04/10/20 0453    Order Status:  Completed Specimen:  Blood Updated:  04/14/20 1412     Blood Culture, Routine No Growth after 4 days.     C Diff Toxin by PCR [[id number]]  (Abnormal) Collected:  04/12/20 2315    Order Status:  Completed Updated:  04/14/20 0033     C. diff PCR Positive    Clostridium difficile EIA [[id number]]  (Abnormal) Collected:  04/12/20 2315    Order Status:  Completed Specimen:  Stool Updated:  04/13/20 1324     C. diff Antigen Positive     C difficile Toxins A+B, EIA Negative     Comment: Testing not recommended for children <24 months old.       Blood culture [[id number]]  (Abnormal) Collected:  04/04/20 0912    Order Status:  Completed Specimen:  Blood from Line, Central Updated:  04/13/20 1303     Blood Culture, Routine Gram stain peds bottle: budding yeast      Results called to and read back by: Carri Pryor RN  04/06/2020  03:04      AJ ALBICANS  ID consult required at [facility] [address] and [place]  locations.          Imaging Reviewed:  Chest x-ray 04/13/2020 Very mild decrease of the bilateral infiltrates compared to the prior exam.  Interval insertion of NG tube extending beneath the level of the diaphragm.  Central venous lines remain in place  KUB 0409/2020NG tube present with tip overlying the stomach in the left abdomen.  No evidence of bowel obstruction.  No radiographic mass.  CXR 04/06/2020Support devices as above.  No pneumothorax.  Moderate pulmonary airspace disease without significant change.  CXR 04/05/2020 No significant interval change in the bilateral airspace disease.  Support devices in stable position.    Cardiology:  Sinus rhythm  Repeat limited ECHO 04/13/2020   · Mild concentric left ventricular hypertrophy.  · Global hypokinetic wall motion.  · Moderately decreased left ventricular systolic function. The estimated ejection fraction is 35%.  · Grade I (mild) left ventricular diastolic dysfunction consistent with impaired relaxation.  · Normal right ventricular systolic function.  · Mild left atrial enlargement.  · Moderate mitral regurgitation.  · Normal central venous pressure (3 mmHg).  ·   ECHO on 03/27/2020  · Mild left ventricular enlargement.  · Mildly decreased left ventricular systolic function. The estimated ejection fraction is 45%.  · Grade I (mild) left ventricular diastolic dysfunction consistent with impaired relaxation.  · Normal right ventricular systolic function.  · Mild left atrial enlargement.  · Moderate mitral regurgitation.  · Mild tricuspid regurgitation.  · Mild pulmonary hypertension present. PASP 40 mm of Hg.     IMPRESSION & PLAN     Staphylococcus epidermidis bacteremia, line infection.  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  C diff PCR is negative.  Will treat with vanc p.o.  Respiratory failure, ARDS, COVID19- completed treatment.  Intubated 03/25/2020-  Extubated  04/08/2020.  HTN, CHF with EF of 45%--35%.  Diastolic dysfunction grade 1.  Moderate MR.  This patient is high risk for life-threatening deterioration and death secondary to above comorbidities and need for IV treatment Critical care 35 min    Ferritin 1752--1538--1897---1865  --215---190--174.6--186.5---139  Procalcitonin 3.67---5.34--2.69    Recommendations:  --- Continue Vancomycin iv  for S epidermidis (count 14 days from 04/07---end date 04/21/2020)  --- switch to Diflucan 300 mg p.o.    End date 05/05/2020.  ---I had to discontinue Lexapro due to interactions with Diflucan.  --- retinal exam before completion of Diflucan  --- Try discontinue lines as soon as possible.  If no more need for HD, hopefully we will be able to DC that line.  Patient will complete vancomycin IV tomorrow, for Staph epidermidis.  --  vancomycin p.o. for C diff diarrhea .  At least 10 days of treatment.  Continue PT   will follow from periphery

## 2020-04-20 NOTE — SUBJECTIVE & OBJECTIVE
Interval History:  Patient recently transferred to medical floor from prolonged intensive care hospitalization where patient was intubated with COVID pneumonia.  Patient is afebrile with mild sinus tachycardia.  Patient is requiring hemodialysis treatment for multifactorial acute tubular necrosis.  Recently patient is tested negative for repeat COVID -19.  Patient is requiring treatment for C diff colitis since April 12, 2020.  Infectious disease team managing patient for sepsis related to Staph epidermidis and Candida species.    Review of Systems   Constitutional: Positive for fatigue and fever. Negative for chills.   Respiratory: Positive for cough and shortness of breath.    Cardiovascular: Negative for leg swelling.   Neurological: Positive for dizziness and weakness.     Objective:     Vital Signs (Most Recent):  Temp: 98 °F (36.7 °C) (04/20/20 0845)  Pulse: 105 (04/20/20 0845)  Resp: 18 (04/20/20 0845)  BP: 128/72 (04/20/20 0845)  SpO2: 99 % (04/20/20 0845) Vital Signs (24h Range):  Temp:  [97.5 °F (36.4 °C)-98.6 °F (37 °C)] 98 °F (36.7 °C)  Pulse:  [] 105  Resp:  [15-18] 18  SpO2:  [96 %-99 %] 99 %  BP: (128-142)/(69-82) 128/72     Weight: 95 kg (209 lb 7 oz)  Body mass index is 39.57 kg/m².    Intake/Output Summary (Last 24 hours) at 4/20/2020 0915  Last data filed at 4/19/2020 1949  Gross per 24 hour   Intake 350 ml   Output --   Net 350 ml      Physical Exam   Constitutional: She is oriented to person, place, and time. She appears well-developed.   Anxious and ill-appearing, morbidly obese   HENT:   Head: Normocephalic and atraumatic.   Eyes: Pupils are equal, round, and reactive to light. Conjunctivae are normal.   Neck: Neck supple. No JVD present. No thyromegaly present.   Cardiovascular: Normal rate and regular rhythm. Exam reveals no gallop and no friction rub.   No murmur heard.  Pulmonary/Chest: Breath sounds normal.   Abdominal: Soft. Bowel sounds are normal. She exhibits no distension and  no mass. There is no tenderness.   Musculoskeletal: Normal range of motion. She exhibits no edema.   Neurological: She is oriented to person, place, and time. No cranial nerve deficit.   Skin: Skin is warm and dry.   Psychiatric: Her behavior is normal.       Significant Labs:   CBC:   Recent Labs   Lab 04/18/20  2352 04/19/20  0441 04/20/20  0405   WBC 11.77 10.36 9.22   HGB 8.2* 8.0* 7.5*   HCT 27.4* 27.3* 25.4*    317 362*     CMP:   Recent Labs   Lab 04/19/20  0441 04/20/20  0405    137   K 4.0 3.8   CL 99 101   CO2 21* 22*   GLU 93 97   BUN 55* 60*   CREATININE 3.2* 3.0*   CALCIUM 9.9 9.7   PROT 9.0* 8.9*   ALBUMIN 3.6 3.6   BILITOT 0.7 0.7   ALKPHOS 139* 129   AST 21 21   ALT 25 23   ANIONGAP 17* 14   EGFRNONAA 16* 17*     Microbiology Results (last 7 days)     Procedure Component Value Units Date/Time    Blood culture [[id number]]  (Abnormal) Collected:  04/06/20 0806    Order Status:  Completed Specimen:  Blood from Antecubital, Left Updated:  04/19/20 1220     Blood Culture, Routine Gram stain peds bottle: yeast       Results called to and read back by: Tania Nolan RN 04/08/2020        11:20      CANDIDA ALBICANS  Susceptibility pending  ID consult required at [facility],[place] and [facility] locations.      Blood culture [[id number]] Collected:  04/10/20 0453    Order Status:  Completed Specimen:  Blood Updated:  04/14/20 1412     Blood Culture, Routine No Growth after 4 days.     C Diff Toxin by PCR [[id number]]  (Abnormal) Collected:  04/12/20 2315    Order Status:  Completed Updated:  04/14/20 0033     C. diff PCR Positive    Clostridium difficile EIA [[id number]]  (Abnormal) Collected:  04/12/20 2315    Order Status:  Completed Specimen:  Stool Updated:  04/13/20 1324     C. diff Antigen Positive     C difficile Toxins A+B, EIA Negative     Comment: Testing not recommended for children <24 months old.       Blood culture [[id number]]  (Abnormal) Collected:  04/04/20 0912    Order Status:   Completed Specimen:  Blood from Line, Central Updated:  04/13/20 1303     Blood Culture, Routine Gram stain peds bottle: budding yeast      Results called to and read back by: Carri Pryor RN  04/06/2020  03:04      AJ ALBICANS  ID consult required at [facility] [address] [facility].          Significant Imaging:  CXR: New bilateral infiltrates    CXR: Continued bilateral infiltrates mildly increased right lower lung field compared to the prior exam.  Positions of the lines and tubes described.    CT head:  1.  There are slight limitations related to moderate patient tilted in the scanner and mild patient motion but there is no obvious acute abnormality.  There is no hemorrhage, mass, mass effect or obvious acute edema or ischemia.  It should be noted that MRI is more sensitive in the detection of subtle or acute nonhemorrhagic ischemic disease.  2.  Support tubing is seen in the left nares.  Bilateral mastoid and middle ear effusions are likely related to support tubing and/or intubation.  Correlate clinically.  The same is true for changes throughout the paranasal sinuses.    CXR; Support devices as above.  No pneumothorax.  Moderate pulmonary airspace disease without significant change.    CXR: Very mild decrease of the bilateral infiltrates compared to the prior exam.  Interval insertion of NG tube extending beneath the level of the diaphragm.  Central venous lines remain in place.    MRI brain without contrast:  No evidence of an acute intracranial abnormality.  Few scattered small areas of T2/FLAIR hyperintensity within the supratentorial white matter, which are nonspecific and not out of proportion for the patient's age, possibly reflecting chronic microvascular ischemic change.    EEG (03-):  This routine EEG done under sedation (Propofol 35mKm) is abnormal   due to the moderate to severe generalized slowing seen,   suggestive of severe diffuse or multifocal cerebral dysfunction.  No  epileptiform activity or electrographic seizures seen.    ECHO (03-):  · Mild left ventricular enlargement.  · Mildly decreased left ventricular systolic function. The estimated ejection fraction is 45%.  · Grade I (mild) left ventricular diastolic dysfunction consistent with impaired relaxation.  · Normal right ventricular systolic function.  · Mild left atrial enlargement.  · Moderate mitral regurgitation.  · Mild tricuspid regurgitation.  · Mild pulmonary hypertension present. PASP 40 mm of Hg.    ECHO (04-):  · Mild concentric left ventricular hypertrophy.  · Global hypokinetic wall motion.  · Moderately decreased left ventricular systolic function. The estimated ejection fraction is 35%.  · Grade I (mild) left ventricular diastolic dysfunction consistent with impaired relaxation.  · Normal right ventricular systolic function.  · Mild left atrial enlargement.  · Moderate mitral regurgitation.  · Normal central venous pressure (3 mmHg).

## 2020-04-20 NOTE — RESPIRATORY THERAPY
04/19/20 2112   Patient Assessment/Suction   Level of Consciousness (AVPU) alert   Respiratory Effort Unlabored   PRE-TX-O2   O2 Device (Oxygen Therapy) room air   SpO2 96 %   Pulse Oximetry Type Intermittent   $ Pulse Oximetry - Multiple Charge Pulse Oximetry - Multiple

## 2020-04-20 NOTE — ASSESSMENT & PLAN NOTE
Continue physical therapy and occupational therapy.  Patient will likely benefit with skilled nursing facility placement for further care.

## 2020-04-20 NOTE — PLAN OF CARE
Problem: Occupational Therapy Goal  Goal: Occupational Therapy Goal  Description  Goals to be met by: 5/5/20    Patient will increase functional independence with ADLs by performing:    Feeding with Modified Ethan and Assistive Devices as needed.  UE Dressing with Set-up Assistance and Minimal Assistance.  Grooming while seated with Supervision.  Sitting at edge of bed x10 minutes with Stand-by Assistance and use of upper extremity support.  Toilet transfer to bedside commode with Moderate Assistance.  Upper extremity exercise program x10 reps per handout, with assistance as needed.     Outcome: Ongoing, Progressing

## 2020-04-20 NOTE — PLAN OF CARE
Problem: SLP Goal  Goal: SLP Goal  Description  1) Consume regular diet and thin liquids no s/s oropharyngeal dysphagia--Revised   Outcome: Met    Seen with lunch. Refused PO today. Continues with poor intake and complaint of N/V. Nursing and pt deny dysphagia. No follow up indicated at this time. Please re consult PRN.

## 2020-04-20 NOTE — ASSESSMENT & PLAN NOTE
Metabolic encephalopathy has resolved.  MRI brain negative  US carotids done pending      Lab Results   Component Value Date    WBC 9.22 04/20/2020      focal findings on physical exam  No early signs of stroke on Head CT, no hemorrhage or acute findings.  EEG: EEG 30 min awake and drowsy results noted no seizures  Continue supportive care

## 2020-04-20 NOTE — PROGRESS NOTES
INPATIENT NEPHROLOGY PROGRESS NOTE  [facility] NEPHROLOGY    Patient Name: Maria Victoria Hernandez  Date: 04/20/2020    Reason for consultation: MAIKOL    History of Present Illness:  54AAF nurse with morbid obesity, hypothyroidism presents PNA, intubated, positive for COVID19. Admit Cr 0.7 went 5.1 and HD started on 3/29.     3/28  Scr worse today.  Only one shift recorded for output, 520cc.  Still hyponatremic, vent setting adjusted per pulmonology note for acidosis.  K+ at goal after repletion.  Has siri, may need HD initiated.  3/29  Non oliguric.  In no distress  3/30 VSS, seen and examined on HD, tolerating well. Plan another HD in AM, discussed with daughter who is a nurse here too.  3/31 VSS, intubated still. UO is not great but she is dialyzed daily. Seen and examined on HD, tolerating well. Plan another HD in AM.  4/1 VSS, intubated still. UO is not great but she is dialyzed daily. Seen and examined on HD, tolerating well. Plan another HD PRN.  4/2 VSS, intubated still. UO is not great. Plan another HD in AM.  4/3 VSS, intubated still. UO is not great but she is dialyzed daily recently. Seen and examined on HD, tolerating well. Plan another HD on Mon or PRN.  4/4 febrile, BP stable, FIO2 50%, intubated, sedated, on levophed, UOP 900cc, transfused  4/5 febrile, tachy, SBP 100s, FIO2 65%, intubated, sedated, off levophed, UOP 935cc  4/6 intubated,. Sedated  4/7 intubated, sedated. Dialysis catheter changed yesterday  4/8 just finished dialysis. uf 3 liters. Extubated  4/10  Seen on dialysis.  No distress.  4/11  UOP 750cc.  3L UF w/HD yesterday.  K+ at goal.  Holding dialysis over weekend to assess for recovery.  Significant event noted 6pm yesterday per primary notes.  Med changes made 2/2 tachycardia, tachypnea.   She is hypotensive today.   4/12   UOP 1.2L.  Scheduled for dialysis tomorrow, no recovery noted thus far.  Pulmonology working toward extubation and weaning off labetalol gtt, ordered  clonidine PRN SBP>160.  4/13 low grade T, SBP 140s, on 3-4L NC, unable to do HD today due to malfunctioning access, UOP 3.2L; CXR: Very mild decrease of the bilateral infiltrates compared to the prior exam.   4/14 got new temporary dialysis catheter overnight; febrile, tachy, -160s, on 1.5L NC, UOP 2.8L; seen on HD - will get off 2L- updated daughter at bedside  4/15 low grade temp, SBP > 130, on 2L NC, UOP 1.6L; MRI brain yest neg; tolerating TFs; worked with therapy today- sitting upright in bed- able to follow commands and nods head when I'm speaking with her  4/16 afebrile, overall BP readings better, on 2-3L NC, UOP 850cc- patient dropped BP during treatment and developed blank stare- UF turned off- mental status returned- completed treatment, net even; laying in bed today- able to whisper a few words, able to follow commands and follow my conversation, she even smiles  4/17 HD catheter very positional today and only able do - if we continue HD, will need hemosplit next week; BP is high this AM, on RA, UOP 1.2L- seen on HD, no complaints, looks great- updated daughter at bedside about plan  4/18 not in her room.  I/o not recorded  4/19 No nausea, chest pain, sob, fever, urinary or bowel complaint, new neurologic symptoms, new joint pain,  4/20 VSS, no new complains. UO?. Hold HD today.    Echo 3/27/20:  · Mild left ventricular enlargement.  · Mildly decreased left ventricular systolic function. The estimated ejection fraction is 45%.  · Grade I (mild) left ventricular diastolic dysfunction consistent with impaired relaxation.  · Normal right ventricular systolic function.  · Mild left atrial enlargement.  · Moderate mitral regurgitation.  · Mild tricuspid regurgitation.  · Mild pulmonary hypertension present. PASP 40 mm of Hg.    Echo 4/13/20:  · Mild concentric left ventricular hypertrophy.  · Global hypokinetic wall motion.  · Moderately decreased left ventricular systolic function. The  estimated ejection fraction is 35%.  · Grade I (mild) left ventricular diastolic dysfunction consistent with impaired relaxation.  · Normal right ventricular systolic function.  · Mild left atrial enlargement.  · Moderate mitral regurgitation.  · Normal central venous pressure (3 mmHg).    Plan of Care:    1. Septic shock (resolved) 2/2 COVID PNA (treated) with HHRF (resolved) requiring MV- resolved  - she is now on RA  - remains on PO bicarb - stop if sCO2 > 24.  - continue aggressive PT/OT  - 4/14 and 4/15 COVID testing negative    2. MAIKOL, non-oliguric, multifactorial ATN from shock/COVID, initiated HD 3/29  - sCr better, nonoliguric  - her HD catheter isn't working well- if we end up deciding in the coming days that she needs outpatient HD, will need a hemosplit along with outpatient HD unit placement  - no nsaids or IV contrast  - dose meds for dialysis  - hold dialysis today, last HD 4/17  - reassess in am    3. HTN/Systolic CHF  - BP is great - stop Hydrlazine and keep diuretics..     4. Anemia  - Hgb is stable  - continue TERE with HD MWF- if we stop HD, will transition to SQ weekly    5. SHPT  - she is on calcitriol  - calcium is stable  - check phos, PTH in AM    If her s Cr remains stable for 1-2 more days without HD, we will remove HD cath and sent to LTAC.    Thank you for allowing us to participate in this patient's care. We will continue to follow.    Vital Signs:  Temp Readings from Last 3 Encounters:   04/20/20 97.9 °F (36.6 °C)       Pulse Readings from Last 3 Encounters:   04/20/20 (!) 114   01/15/15 84   12/17/13 80       BP Readings from Last 3 Encounters:   04/20/20 134/76   01/15/15 124/82   12/17/13 102/68       Weight:  Wt Readings from Last 3 Encounters:   04/20/20 95 kg (209 lb 7 oz)   01/15/15 105.8 kg (233 lb 4.8 oz)   12/17/13 101.2 kg (223 lb)     Medications:  Scheduled Meds:   ascorbic acid (vitamin C)  1,000 mg Oral QID    aspirin  81 mg Oral Daily    calcitRIOL  0.25 mcg Oral  "Daily    cholestyramine-aspartame  1 packet Oral BID    ciprofloxacin HCl   Both Eyes TID    epoetin raquel-ebpx (RETACRIT) injection  5,000 Units Intravenous Every Mon, Wed, Fri    escitalopram oxalate  5 mg Oral Daily    furosemide  40 mg Oral BID    heparin (porcine)  5,000 Units Subcutaneous Q12H    hydrALAZINE  10 mg Oral Q8H    Lactobacillus acidoph-L.bulgar  2 tablet Oral BID    levothyroxine  100 mcg Oral Before breakfast    metoprolol tartrate  25 mg Oral BID    micafungin (MYCAMINE) IVPB  100 mg Intravenous Q24H    sodium bicarbonate  650 mg Oral TID    vancomycin  250 mg Oral Q6H    white petrolatum-mineral oiL   Right Eye QHS    zinc sulfate  220 mg Oral Daily     Continuous Infusions:    PRN Meds:.acetaminophen, heparin (porcine), hydrALAZINE, ipratropium-albuteroL, loperamide, LORazepam, ondansetron, promethazine (PHENERGAN) IVPB, Pharmacy to dose Vancomycin consult **AND** vancomycin - pharmacy to dose  No current facility-administered medications on file prior to encounter.      Current Outpatient Medications on File Prior to Encounter   Medication Sig Dispense Refill    ferrous sulfate 325 mg (65 mg iron) Tab tablet Take 1 tablet (325 mg total) by mouth daily with breakfast. (Patient taking differently: Take 325 mg by mouth daily with breakfast. Take 2 tablets daily) 90 tablet 3    levothyroxine (SYNTHROID) 100 MCG tablet Take 1 tablet (100 mcg total) by mouth once daily. (Patient taking differently: Take 150 mcg by mouth once daily. ) 90 tablet 3     Review of Systems:  Not conversant    Physical Exam:  /76   Pulse (!) 114   Temp 97.9 °F (36.6 °C)   Resp 15   Ht 5' 1" (1.549 m)   Wt 95 kg (209 lb 7 oz)   SpO2 99%   Breastfeeding? No   BMI 39.57 kg/m²     INS/OUTS:  I/O last 3 completed shifts:  In: 500 [P.O.:300; IV Piggyback:200]  Out: -   No intake/output data recorded.  (4/17)  Constitutional: nad, aao x 3, has her voice back, smiling- looks great!  Heart: rrr, no " m/r/g, wwp, no edema  Lungs: ant ausc clear, no w/r/r/c, no lb  Abdomen: s/nt/nd, +BS    Results:  Lab Results   Component Value Date     04/20/2020    K 3.8 04/20/2020     04/20/2020    CO2 22 (L) 04/20/2020    BUN 60 (H) 04/20/2020    CREATININE 3.0 (H) 04/20/2020    CALCIUM 9.7 04/20/2020    ANIONGAP 14 04/20/2020    ESTGFRAFRICA 20 (A) 04/20/2020    EGFRNONAA 17 (A) 04/20/2020       Lab Results   Component Value Date    CALCIUM 9.7 04/20/2020    PHOS 5.4 (H) 04/18/2020       Recent Labs   Lab 04/20/20  0405   WBC 9.22   RBC 2.78*   HGB 7.5*   HCT 25.4*   *   MCV 91   MCH 27.0   MCHC 29.5*     I have personally reviewed pertinent radiological imaging and reports.    Camilo Hastings MD  Nephrology  [facility]  [phone]

## 2020-04-20 NOTE — PT/OT/SLP PROGRESS
Physical Therapy Treatment    Patient Name:  Maria Victoria Hernandez   MRN:  [id number]    Recommendations:     Discharge Recommendations:  rehabilitation facility, nursing facility, skilled, LTACH (long-term acute care hospital)   Discharge Equipment Recommendations: walker, rolling, 3-in-1 commode(TTB)   Barriers to discharge: None    Assessment:     Maria Victoria Hernandez is a 55 y.o. female admitted with a medical diagnosis of Acute respiratory failure with hypoxia.  She presents with the following impairments/functional limitations:  weakness, impaired functional mobilty, impaired balance, decreased safety awareness, impaired endurance, gait instability. Patient is agreeable to PT treatment. Her daughter Danya is present for session. Patient requires SBA for supine to sit and CGA for sit to stand. She took 5 steps to chair with CGA. After a seated rest break she took 20 steps with RW and Zan. She performed 20 reps of seated therex. PT discussed with daughter the plan for D/C. Initially Danya wanted her mother to D/C to daughter's house upon D/C if patient can transfer with 1 person assist. Danya now states that she would like her mom to have placement to allow more aggressive PT versus HH that would only come 2-3 times a week.     Rehab Prognosis: Good; patient would benefit from acute skilled PT services to address these deficits and reach maximum level of function.    Recent Surgery: * No surgery found *      Plan:     During this hospitalization, patient to be seen daily to address the identified rehab impairments via gait training, therapeutic activities, therapeutic exercises and progress toward the following goals:    · Plan of Care Expires:  05/13/20    Subjective     Chief Complaint: vomiting yesterday   Patient/Family Comments/goals: home  Pain/Comfort:  · Pain Rating 1: 0/10      Objective:     Communicated with REZA Estrada prior to session.  Patient found HOB elevated with bed alarm, telemetry  upon PT entry to room.     General Precautions: Standard, aspiration, fall, special contact   Orthopedic Precautions:N/A   Braces: N/A     Functional Mobility:  · Bed Mobility:     · Supine to Sit: stand by assistance  · Transfers:     · Sit to Stand:  contact guard assistance with rolling walker  · Gait: 5 steps to chair, 20 steps with RW Zan  · Balance: Fair      AM-PAC 6 CLICK MOBILITY  Turning over in bed (including adjusting bedclothes, sheets and blankets)?: 4  Sitting down on and standing up from a chair with arms (e.g., wheelchair, bedside commode, etc.): 4  Moving from lying on back to sitting on the side of the bed?: 4  Moving to and from a bed to a chair (including a wheelchair)?: 3  Need to walk in hospital room?: 3  Climbing 3-5 steps with a railing?: 2  Basic Mobility Total Score: 20       Therapeutic Activities and Exercises:   Patient was educated on the importance of OOB activity during hospital stay, safe transfers and ambulation, D/C planning    Seated therex: bilateral AP, LAQ, marches x20     Patient left up in chair with all lines intact, call button in reach, chair alarm on and daughter present..    GOALS:   Multidisciplinary Problems     Physical Therapy Goals        Problem: Physical Therapy Goal    Goal Priority Disciplines Outcome Goal Variances Interventions   Physical Therapy Goal     PT, PT/OT Ongoing, Progressing     Description:  Goals to be met by: 2020    Patient will increase functional independence with mobility by performin. Supine to sit with MInimal Assistance  2. Sit to supine with MInimal Assistance  3. Sit to stand transfer with Minimal Assistance  4. Bed to chair transfer with Minimal Assistance using Rolling Walker  5. Gait  x 25 feet with Minimal Assistance using Rolling Walker.                       Time Tracking:     PT Received On: 20  PT Start Time: 1007     PT Stop Time: 1042  PT Total Time (min): 35 min     Billable Minutes: Gait Training 20 and  Therapeutic Exercise 15    Treatment Type: Treatment  PT/PTA: PT     PTA Visit Number: 0     Pippa Luna, PT  04/20/2020

## 2020-04-21 VITALS
DIASTOLIC BLOOD PRESSURE: 69 MMHG | SYSTOLIC BLOOD PRESSURE: 145 MMHG | TEMPERATURE: 97 F | OXYGEN SATURATION: 99 % | BODY MASS INDEX: 39.54 KG/M2 | RESPIRATION RATE: 16 BRPM | HEART RATE: 84 BPM | HEIGHT: 61 IN | WEIGHT: 209.44 LBS

## 2020-04-21 PROBLEM — J96.90 RESPIRATORY FAILURE: Status: ACTIVE | Noted: 2020-04-21

## 2020-04-21 PROBLEM — A41.9 SEPSIS: Status: ACTIVE | Noted: 2020-04-21

## 2020-04-21 LAB
ALBUMIN SERPL BCP-MCNC: 3.8 G/DL (ref 3.5–5.2)
ALP SERPL-CCNC: 132 U/L (ref 55–135)
ALT SERPL W/O P-5'-P-CCNC: 25 U/L (ref 10–44)
ANION GAP SERPL CALC-SCNC: 15 MMOL/L (ref 8–16)
AST SERPL-CCNC: 23 U/L (ref 10–40)
BASOPHILS # BLD AUTO: 0.18 K/UL (ref 0–0.2)
BASOPHILS NFR BLD: 1.8 % (ref 0–1.9)
BILIRUB SERPL-MCNC: 0.7 MG/DL (ref 0.1–1)
BUN SERPL-MCNC: 54 MG/DL (ref 6–20)
CALCIUM SERPL-MCNC: 10 MG/DL (ref 8.7–10.5)
CHLORIDE SERPL-SCNC: 100 MMOL/L (ref 95–110)
CO2 SERPL-SCNC: 22 MMOL/L (ref 23–29)
CREAT SERPL-MCNC: 2.8 MG/DL (ref 0.5–1.4)
DIFFERENTIAL METHOD: ABNORMAL
EOSINOPHIL # BLD AUTO: 0.4 K/UL (ref 0–0.5)
EOSINOPHIL NFR BLD: 4.3 % (ref 0–8)
ERYTHROCYTE [DISTWIDTH] IN BLOOD BY AUTOMATED COUNT: 20.2 % (ref 11.5–14.5)
EST. GFR  (AFRICAN AMERICAN): 21 ML/MIN/1.73 M^2
EST. GFR  (NON AFRICAN AMERICAN): 18 ML/MIN/1.73 M^2
GLUCOSE SERPL-MCNC: 99 MG/DL (ref 70–110)
HBV CORE AB SERPL QL IA: NEGATIVE
HCT VFR BLD AUTO: 24.5 % (ref 37–48.5)
HGB BLD-MCNC: 7.4 G/DL (ref 12–16)
IMM GRANULOCYTES # BLD AUTO: 0.08 K/UL (ref 0–0.04)
IMM GRANULOCYTES NFR BLD AUTO: 0.8 % (ref 0–0.5)
LYMPHOCYTES # BLD AUTO: 1.3 K/UL (ref 1–4.8)
LYMPHOCYTES NFR BLD: 12.6 % (ref 18–48)
MCH RBC QN AUTO: 27.1 PG (ref 27–31)
MCHC RBC AUTO-ENTMCNC: 30.2 G/DL (ref 32–36)
MCV RBC AUTO: 90 FL (ref 82–98)
MONOCYTES # BLD AUTO: 0.7 K/UL (ref 0.3–1)
MONOCYTES NFR BLD: 6.8 % (ref 4–15)
NEUTROPHILS # BLD AUTO: 7.4 K/UL (ref 1.8–7.7)
NEUTROPHILS NFR BLD: 73.7 % (ref 38–73)
NRBC BLD-RTO: 0 /100 WBC
PLATELET # BLD AUTO: 424 K/UL (ref 150–350)
PMV BLD AUTO: 11.6 FL (ref 9.2–12.9)
POTASSIUM SERPL-SCNC: 4 MMOL/L (ref 3.5–5.1)
PROT SERPL-MCNC: 9.2 G/DL (ref 6–8.4)
RBC # BLD AUTO: 2.73 M/UL (ref 4–5.4)
SODIUM SERPL-SCNC: 137 MMOL/L (ref 136–145)
VANCOMYCIN SERPL-MCNC: 14.6 UG/ML
WBC # BLD AUTO: 10.05 K/UL (ref 3.9–12.7)

## 2020-04-21 PROCEDURE — 63600175 PHARM REV CODE 636 W HCPCS: Performed by: INTERNAL MEDICINE

## 2020-04-21 PROCEDURE — 80202 ASSAY OF VANCOMYCIN: CPT

## 2020-04-21 PROCEDURE — 99231 PR SUBSEQUENT HOSPITAL CARE,LEVL I: ICD-10-PCS | Mod: S$GLB,,, | Performed by: INTERNAL MEDICINE

## 2020-04-21 PROCEDURE — 99900035 HC TECH TIME PER 15 MIN (STAT)

## 2020-04-21 PROCEDURE — 94761 N-INVAS EAR/PLS OXIMETRY MLT: CPT

## 2020-04-21 PROCEDURE — 97530 THERAPEUTIC ACTIVITIES: CPT

## 2020-04-21 PROCEDURE — 63700000 PHARM REV CODE 250 ALT 637 W/O HCPCS: Performed by: INTERNAL MEDICINE

## 2020-04-21 PROCEDURE — 97803 MED NUTRITION INDIV SUBSEQ: CPT

## 2020-04-21 PROCEDURE — 25000003 PHARM REV CODE 250: Performed by: INTERNAL MEDICINE

## 2020-04-21 PROCEDURE — 80053 COMPREHEN METABOLIC PANEL: CPT

## 2020-04-21 PROCEDURE — 97116 GAIT TRAINING THERAPY: CPT

## 2020-04-21 PROCEDURE — 85025 COMPLETE CBC W/AUTO DIFF WBC: CPT

## 2020-04-21 PROCEDURE — 99231 SBSQ HOSP IP/OBS SF/LOW 25: CPT | Mod: S$GLB,,, | Performed by: INTERNAL MEDICINE

## 2020-04-21 RX ORDER — SODIUM BICARBONATE 650 MG/1
650 TABLET ORAL 3 TIMES DAILY
Qty: 90 TABLET | Refills: 11 | COMMUNITY
Start: 2020-04-21 | End: 2021-04-21

## 2020-04-21 RX ORDER — ESCITALOPRAM OXALATE 5 MG/1
5 TABLET ORAL DAILY
Qty: 30 TABLET | Refills: 11 | Status: SHIPPED | OUTPATIENT
Start: 2020-05-06 | End: 2021-05-06

## 2020-04-21 RX ORDER — FLUCONAZOLE 150 MG/1
300 TABLET ORAL DAILY
Qty: 26 TABLET | Refills: 0 | Status: ON HOLD | OUTPATIENT
Start: 2020-04-22 | End: 2020-05-01 | Stop reason: SDUPTHER

## 2020-04-21 RX ADMIN — CIPROFLOXACIN HYDROCHLORIDE: 3 OINTMENT OPHTHALMIC at 09:04

## 2020-04-21 RX ADMIN — ONDANSETRON 4 MG: 2 INJECTION INTRAMUSCULAR; INTRAVENOUS at 12:04

## 2020-04-21 RX ADMIN — LACTOBACILLUS TAB 2 TABLET: TAB at 09:04

## 2020-04-21 RX ADMIN — VANCOMYCIN HYDROCHLORIDE 500 MG: 500 INJECTION, POWDER, LYOPHILIZED, FOR SOLUTION INTRAVENOUS at 09:04

## 2020-04-21 RX ADMIN — CALCITRIOL CAPSULES 0.25 MCG 0.25 MCG: 0.25 CAPSULE ORAL at 09:04

## 2020-04-21 RX ADMIN — CHOLESTYRAMINE 4 G: 4 POWDER, FOR SUSPENSION ORAL at 09:04

## 2020-04-21 RX ADMIN — ASPIRIN 81 MG: 81 TABLET, COATED ORAL at 09:04

## 2020-04-21 RX ADMIN — Medication 250 MG: at 05:04

## 2020-04-21 RX ADMIN — OXYCODONE HYDROCHLORIDE AND ACETAMINOPHEN 1000 MG: 500 TABLET ORAL at 12:04

## 2020-04-21 RX ADMIN — METOPROLOL TARTRATE 25 MG: 25 TABLET, FILM COATED ORAL at 09:04

## 2020-04-21 RX ADMIN — Medication 250 MG: at 12:04

## 2020-04-21 RX ADMIN — OXYCODONE HYDROCHLORIDE AND ACETAMINOPHEN 1000 MG: 500 TABLET ORAL at 09:04

## 2020-04-21 RX ADMIN — FUROSEMIDE 40 MG: 40 TABLET ORAL at 09:04

## 2020-04-21 RX ADMIN — ZINC SULFATE 220 MG (50 MG) CAPSULE 220 MG: CAPSULE at 09:04

## 2020-04-21 RX ADMIN — SODIUM BICARBONATE 650 MG TABLET 650 MG: at 09:04

## 2020-04-21 RX ADMIN — FLUCONAZOLE 300 MG: 100 TABLET ORAL at 09:04

## 2020-04-21 RX ADMIN — LEVOTHYROXINE SODIUM 100 MCG: 100 TABLET ORAL at 05:04

## 2020-04-21 RX ADMIN — HEPARIN SODIUM 5000 UNITS: 5000 INJECTION, SOLUTION INTRAVENOUS; SUBCUTANEOUS at 09:04

## 2020-04-21 NOTE — PROGRESS NOTES
Progress Note  Infectious Disease    Reason for Consult:      HPI: Maria Victoria Hernandez is a   55 y.o. female employee of [employer].  with past medical history of morbid obesity, BMI of 48, diabetes, diet controlled, anemia, B12 deficiency, vitamin-D deficiency, hypothyroidism, was admitted on 03/25/2020 due to shortness of breath, diagnosed with COVID 19 is positive.  Patient has been intubated.  She went into renal failure and has been receiving HD by nephrologist.  Intensivist has been managing the vent.    Patient has completed 10 days of hydroxychloroquine and about 8 days of Zithromax and ceftriaxone.  She started spiking fever of  103 on 04/04.  White count jumped to 17.8.  She was started on vancomycin and Zosyn and blood cultures were sent.    ID consult called for g positives and yeast in blood cultures.  Patient is afebrile at the time.  WBC has improved.  Discussed with nurse.  Will discontinue lines.  Continue vancomycin, Daptomycin x1.  Add micafungin daily.    04/07/2020  WBC improved 17--12--10  Ferritin 1752--1538--1897  Intubated Sedated.  FiO2 of 35 people 5.  T-max 101.7°  Dialysis catheter changed yesterday  Leukocytosis improved 12/17/2010 04/08/2020  T-max 99.9°  Intubated sedated.  FiO2 35, peep of 5.  Fail CPAP trial.  Tolerating vancomycin, Zosyn, Micafungin.   Lines are fresh.  Nurse is trying to protect them.  Discussed with nurse:  Her daughter who is a nurse came and saw mother today, she agrees right eye looks better.    04/09/2020  Patient is extubated today, Really weak, Does not breath in deep, I advised her on deep breathing  Continues to need Cardene drip for BP  HAd loose stool-- flexiseal was placed    04/10/2020.  She is very weak.  She tries to opens her eyes and interact with me.  Right eye is improved.  Dialysis nurse is about to start dialysis.    04/11/2020  T max 100.3 yesterday.   She looks tired, but better than yesterday. Today she is  more  "awake and slighty stronger  She was tachycardic yesterday -- Cardene was switched to labetalol  WBC is about the same. ESR 50;  procal is still elveated. vanc level today is 14  Hospitalist ": Requested pulmonary to review possible  bipap -for Covid must have extra filter for machines which are limited "  As per renal :   " UOP 750cc.  3L UF w/HD yesterday.  K+ at goal.  Holding dialysis over weekend to assess for recovery"    2020 chart review    2020  Tmax 99.9, WBC normal, platelet still high  Pulled out ngt, It was replaced  Patient is tolerating vancomycin and micafungin.  She continues to have diarrhea.  C diff antigen is positive.    2020.  T-max a 100°. She follows commands.  She looks anxious.  She is extremely weak.  She is getting dialysis at this time.    04/15/2020.  Patient's voice is louder, she is quite interactive and moves her arms.  She has suctioned her sputum herself earlier today.  She was getting speech evaluation today.  Daughter at bedside pleased with her progression.  T-max 99.1°.  Discussed the line and bacteremia issue with daughter.    2020   patient is doing well.  She is afebrile.  She is  On 2 L nasal cannula. Patient is moving to regular  Med surge room. crp improved.  ferr 1200    2020.  I have reviewed the chart.  Patient is afebrile.  T-max 98.6°.  She is sitting up in a chair.  Activity is improving. She had some nausea yesterday.  Average appetite.  She is in good spirits.  She makes jokes.  She is appreciative of care.  I congratulated her for doing so well so far.  She tells me that she is aware that other people have .  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   She had some nausea yesterday.    2020.  Chart reviewed.  Patient is already " discharged    Antibiotics (From admission, onward)    Start     Stop Route Frequency Ordered    04/20/20 1351  vancomycin - pharmacy to dose  (vancomycin IVPB)      -- IV pharmacy to manage frequency 04/20/20 1352    04/18/20 0930  ciprofloxacin HCl 0.3 % ophthalmic ointment      -- Both Eyes 3 times daily 04/18/20 0928    04/17/20 1800  vancomycin 250mg / 10ml oral suspension 250 mg      04/23 1759 Oral Every 6 hours 04/17/20 1405        Antifungals (From admission, onward)    Start     Stop Route Frequency Ordered    04/20/20 1500  fluconazole tablet 300 mg      -- Oral Daily 04/20/20 1354        Antivirals (From admission, onward)    None          EXAM & DIAGNOSTICS REVIEWED:   Vitals:     Temp:  [97.2 °F (36.2 °C)-98.7 °F (37.1 °C)]   Temp: 97.2 °F (36.2 °C) (04/21/20 1223)  Pulse: 84 (04/21/20 1223)  Resp: 16 (04/21/20 1223)  BP: (!) 145/69 (04/21/20 1223)  SpO2: 99 % (04/21/20 1223)    Intake/Output Summary (Last 24 hours) at 4/21/2020 1254  Last data filed at 4/21/2020 0500  Gross per 24 hour   Intake 125 ml   Output --   Net 125 ml        Lines/Tubes/Drains:  Right IJ 04/06  Left IJ 04/06    General Labs reviewed:  Recent Labs   Lab 04/19/20  0441 04/20/20  0405 04/21/20  0442   WBC 10.36 9.22 10.05   HGB 8.0* 7.5* 7.4*   HCT 27.3* 25.4* 24.5*    362* 424*       Recent Labs   Lab 04/19/20  0441 04/20/20  0405 04/21/20  0442    137 137   K 4.0 3.8 4.0   CL 99 101 100   CO2 21* 22* 22*   BUN 55* 60* 54*   CREATININE 3.2* 3.0* 2.8*   CALCIUM 9.9 9.7 10.0   PROT 9.0* 8.9* 9.2*   BILITOT 0.7 0.7 0.7   ALKPHOS 139* 129 132   ALT 25 23 25   AST 21 21 23     Micro:  Microbiology Results (last 7 days)     Procedure Component Value Units Date/Time    Blood culture [[id number]]  (Abnormal) Collected:  04/06/20 0806    Order Status:  Completed Specimen:  Blood from Antecubital, Left Updated:  04/21/20 1047     Blood Culture, Routine Gram stain peds bottle: yeast       Results called to and read back by:  Tania Nolan RN 04/08/2020        11:20      CANDIDA ALBICANS  Susceptibility pending  ID consult required at [facility] [facility].      Blood culture [[id number]] Collected:  04/10/20 0453    Order Status:  Completed Specimen:  Blood Updated:  04/14/20 1412     Blood Culture, Routine No Growth after 4 days.         Imaging Reviewed:  Chest x-ray 04/13/2020 Very mild decrease of the bilateral infiltrates compared to the prior exam.  Interval insertion of NG tube extending beneath the level of the diaphragm.  Central venous lines remain in place  KUB 0409/2020NG tube present with tip overlying the stomach in the left abdomen.  No evidence of bowel obstruction.  No radiographic mass.  CXR 04/06/2020Support devices as above.  No pneumothorax.  Moderate pulmonary airspace disease without significant change.  CXR 04/05/2020 No significant interval change in the bilateral airspace disease.  Support devices in stable position.    Cardiology:  Sinus rhythm  Repeat limited ECHO 04/13/2020   · Mild concentric left ventricular hypertrophy.  · Global hypokinetic wall motion.  · Moderately decreased left ventricular systolic function. The estimated ejection fraction is 35%.  · Grade I (mild) left ventricular diastolic dysfunction consistent with impaired relaxation.  · Normal right ventricular systolic function.  · Mild left atrial enlargement.  · Moderate mitral regurgitation.  · Normal central venous pressure (3 mmHg).  ·   ECHO on 03/27/2020  · Mild left ventricular enlargement.  · Mildly decreased left ventricular systolic function. The estimated ejection fraction is 45%.  · Grade I (mild) left ventricular diastolic dysfunction consistent with impaired relaxation.  · Normal right ventricular systolic function.  · Mild left atrial enlargement.  · Moderate mitral regurgitation.  · Mild tricuspid regurgitation.  · Mild pulmonary hypertension present. PASP 40 mm of Hg.     IMPRESSION & PLAN      Staphylococcus epidermidis bacteremia, line infection.  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  C diff PCR is negative.  Will treat with vanc p.o.  Respiratory failure, ARDS, COVID19- completed treatment.  Intubated 03/25/2020-  Extubated 04/08/2020.  HTN, CHF with EF of 45%--35%.  Diastolic dysfunction grade 1.  Moderate MR.  This is a remote consult, evaluation    Ferritin 1752--1538--1897---1865  --215---190--174.6--186.5---139  Procalcitonin 3.67---5.34--2.69    Recommendations:  Are the same  --- Continue Vancomycin iv  for S epidermidis (count 14 days from 04/07---end date 04/21/2020)  --- switch to Diflucan 300 mg p.o.       End date 05/05/2020.  ---I had to discontinue Lexapro due to interactions with Diflucan.  --- retinal exam before completion of Diflucan  --- Try discontinue lines as soon as possible.  If no more need for HD, hopefully we will be able to DC that line as well  --  vancomycin p.o. for C diff diarrhea .  At least 10 days of treatment.  Continue PT

## 2020-04-21 NOTE — PLAN OF CARE
Intervention: sodium modified diet      Recommendations     1. Continue low sodium diet, texture per SLP   -renal when appetite improves  2. Add supplements / snacks per pt preference:  Milk on trays 2%, yogurt BID, and Boost breeze/ Arginad BID     3. Rec. Consider appetite stimulant or restart NGT TF Until eating 50% or meals  TF Nutren 1.5 @30 ml/hr advancing to 45 ml/ hr + Promod 30 ml TID + 130 ml flush q 4 hr   ( provides 1620 kcal ( 79% EEN), 73 g protein + promod (86% EPN), and 820 ml free water, Phos 1296 mg)  -If necessary can switch to Novasource renal @ 30 ml/hr + promod 30 ml TID     4. Weigh pt s/p HD     Goals: 1.) Meet >85% EEN/EPN by RD f/u 2) nutrition support to meet > 75% EEN at f/u 3) continue TF at goal or PO diet advanced at f/u 4) Po intakes 50% of meals/supplements or TF at goal at f/u  Nutrition Goal Status: 1) was meeting- not met 4/4-4/8 2) meeting 3) Met 4) not met, continues  Communication of RD Recs: (POC, sticky note, reviewed with RN)

## 2020-04-21 NOTE — PLAN OF CARE
POC reviewed with pt, pt verbalized understanding. IJ cdi. Dialysis catheter cdi. Contact precautions maintained. Sinus tach on tele. Pt was able to roll in bed with no problems. Pt did complain of nausea during this shift, medicated with zofran, 1 episode of a moderate amount of vomiting. Pt blood pressure ran a little higher but never greater than 165, so hydralazine was not given. Pt rested in bed for the rest of shift. Call light in reach, safety maintained. No complaints or requests at this time, will continue to monitor.

## 2020-04-21 NOTE — RESPIRATORY THERAPY
04/20/20 2027   Patient Assessment/Suction   Level of Consciousness (AVPU) alert   PRE-TX-O2   O2 Device (Oxygen Therapy) room air   SpO2 96 %   Pulse Oximetry Type Intermittent   Inhaler   $ Inhaler Charges Refused

## 2020-04-21 NOTE — PLAN OF CARE
I sent the pts discharge orders to [facility] via [facility]. CM following. Trista Ceballos, [facility]     04/21/20 0943   Post-Acute Status   Post-Acute Authorization Placement   Post-Acute Placement Status Pending Post-Acute Medical Review

## 2020-04-21 NOTE — PROGRESS NOTES
Pharmacokinetic Assessment Follow Up: IV Vancomycin    Vancomycin serum concentration assessment(s):    The random level was drawn correctly and can be used to guide therapy at this time. The measurement is below the desired definitive target range of 15 to 20 mcg/mL.    Vancomycin Regimen Plan:    Pt's renal function is not stable.  Pharmacy will dose by level.   Pharmacy will dose vancomycin 500 mg x1.  A vancomycin level will be ordered on 04/22 with AM labs.       Drug levels (last 3 results):  Recent Labs   Lab Result Units 04/19/20  0441 04/20/20  0405 04/21/20  0442   Vancomycin, Random ug/mL  --  20.8 14.6   Vancomycin-Trough ug/mL 15.0  --   --        Pharmacy will continue to follow and monitor vancomycin.    Please contact pharmacy at extension [phone] for questions regarding this assessment.    Thank you for the consult,   Sami Baugh       Patient brief summary:  Maria Victoria Hernandez is a 55 y.o. female initiated on antimicrobial therapy with IV Vancomycin for treatment of lower respiratory infection    The patient's current regimen is pulse dosing    Drug Allergies:   Review of patient's allergies indicates:  No Known Allergies    Actual Body Weight:   95kg    Renal Function:   Estimated Creatinine Clearance: 24.2 mL/min (A) (based on SCr of 2.8 mg/dL (H)).,     Dialysis Method (if applicable):  intermittent HD PRN    CBC (last 72 hours):  Recent Labs   Lab Result Units 04/18/20  1147 04/18/20  2352 04/19/20  0441 04/20/20  0405 04/21/20  0442   WBC K/uL  --  11.77 10.36 9.22 10.05   Hemoglobin g/dL 8.6* 8.2* 8.0* 7.5* 7.4*   Hematocrit % 28.8* 27.4* 27.3* 25.4* 24.5*   Platelets K/uL  --  337 317 362* 424*   Gran% %  --  66.8 69.7 68.7 73.7*   Lymph% %  --  15.0* 14.4* 14.2* 12.6*   Mono% %  --  9.8 8.5 9.5 6.8   Eosinophil% %  --  4.4 4.3 5.2 4.3   Basophil% %  --  1.8 1.3 1.3 1.8   Differential Method   --  Automated Automated Automated Automated       Metabolic Panel (last 72 hours):  Recent  Labs   Lab Result Units 04/19/20  0441 04/20/20  0405 04/21/20  0442   Sodium mmol/L 137 137 137   Potassium mmol/L 4.0 3.8 4.0   Chloride mmol/L 99 101 100   CO2 mmol/L 21* 22* 22*   Glucose mg/dL 93 97 99   BUN, Bld mg/dL 55* 60* 54*   Creatinine mg/dL 3.2* 3.0* 2.8*   Albumin g/dL 3.6 3.6 3.8   Total Bilirubin mg/dL 0.7 0.7 0.7   Alkaline Phosphatase U/L 139* 129 132   AST U/L 21 21 23   ALT U/L 25 23 25       Vancomycin Administrations:  vancomycin given in the last 96 hours                     vancomycin 250mg / 10ml oral suspension 250 mg (mg) 250 mg Given 04/21/20 0505     250 mg Given 04/20/20 1205     250 mg Given 04/19/20 1738     250 mg Given  1152     250 mg Given  0620     250 mg Given  0025     250 mg Given 04/18/20 1753     250 mg Given  1300     250 mg Given  0544     250 mg Given  0030     250 mg Given 04/17/20 1843    vancomycin 500 mg/100 mL IVPB 500 mg (mg) 500 mg New Bag 04/19/20 1445                      Microbiologic Results:  Microbiology Results (last 7 days)       Procedure Component Value Units Date/Time    Blood culture [[id number]]  (Abnormal) Collected:  04/06/20 0806    Order Status:  Completed Specimen:  Blood from Antecubital, Left Updated:  04/20/20 1146     Blood Culture, Routine Gram stain peds bottle: yeast       Results called to and read back by: Tania Nolan RN 04/08/2020        11:20      CANDIDA ALBICANS  Susceptibility pending  ID consult required at [facility].Robin Barahona and Meliton jimenez.      Blood culture [[id number]] Collected:  04/10/20 0453    Order Status:  Completed Specimen:  Blood Updated:  04/14/20 1412     Blood Culture, Routine No Growth after 4 days.

## 2020-04-21 NOTE — PLAN OF CARE
04/21/20 1238   Final Note   Assessment Type Final Discharge Note   Anticipated Discharge Disposition LTAC

## 2020-04-21 NOTE — PROGRESS NOTES
INPATIENT NEPHROLOGY PROGRESS NOTE  [facility] NEPHROLOGY    Patient Name: Maria Victoria Hernandez  Date: 04/21/2020    Reason for consultation: MAIKOL    History of Present Illness:  54AAF nurse with morbid obesity, hypothyroidism presents PNA, intubated, positive for COVID19. Admit Cr 0.7 went 5.1 and HD started on 3/29.     3/28  Scr worse today.  Only one shift recorded for output, 520cc.  Still hyponatremic, vent setting adjusted per pulmonology note for acidosis.  K+ at goal after repletion.  Has siri, may need HD initiated.  3/29  Non oliguric.  In no distress  3/30 VSS, seen and examined on HD, tolerating well. Plan another HD in AM, discussed with daughter who is a nurse here too.  3/31 VSS, intubated still. UO is not great but she is dialyzed daily. Seen and examined on HD, tolerating well. Plan another HD in AM.  4/1 VSS, intubated still. UO is not great but she is dialyzed daily. Seen and examined on HD, tolerating well. Plan another HD PRN.  4/2 VSS, intubated still. UO is not great. Plan another HD in AM.  4/3 VSS, intubated still. UO is not great but she is dialyzed daily recently. Seen and examined on HD, tolerating well. Plan another HD on Mon or PRN.  4/4 febrile, BP stable, FIO2 50%, intubated, sedated, on levophed, UOP 900cc, transfused  4/5 febrile, tachy, SBP 100s, FIO2 65%, intubated, sedated, off levophed, UOP 935cc  4/6 intubated,. Sedated  4/7 intubated, sedated. Dialysis catheter changed yesterday  4/8 just finished dialysis. uf 3 liters. Extubated  4/10  Seen on dialysis.  No distress.  4/11  UOP 750cc.  3L UF w/HD yesterday.  K+ at goal.  Holding dialysis over weekend to assess for recovery.  Significant event noted 6pm yesterday per primary notes.  Med changes made 2/2 tachycardia, tachypnea.   She is hypotensive today.   4/12   UOP 1.2L.  Scheduled for dialysis tomorrow, no recovery noted thus far.  Pulmonology working toward extubation and weaning off labetalol gtt, ordered  clonidine PRN SBP>160.  4/13 low grade T, SBP 140s, on 3-4L NC, unable to do HD today due to malfunctioning access, UOP 3.2L; CXR: Very mild decrease of the bilateral infiltrates compared to the prior exam.   4/14 got new temporary dialysis catheter overnight; febrile, tachy, -160s, on 1.5L NC, UOP 2.8L; seen on HD - will get off 2L- updated daughter at bedside  4/15 low grade temp, SBP > 130, on 2L NC, UOP 1.6L; MRI brain yest neg; tolerating TFs; worked with therapy today- sitting upright in bed- able to follow commands and nods head when I'm speaking with her  4/16 afebrile, overall BP readings better, on 2-3L NC, UOP 850cc- patient dropped BP during treatment and developed blank stare- UF turned off- mental status returned- completed treatment, net even; laying in bed today- able to whisper a few words, able to follow commands and follow my conversation, she even smiles  4/17 HD catheter very positional today and only able do - if we continue HD, will need hemosplit next week; BP is high this AM, on RA, UOP 1.2L- seen on HD, no complaints, looks great- updated daughter at bedside about plan  4/18 not in her room.  I/o not recorded  4/19 No nausea, chest pain, sob, fever, urinary or bowel complaint, new neurologic symptoms, new joint pain,  4/20 VSS, no new complains. UO?. Hold HD today.  4/20 VSS, no new complains. UO better, sCr better. Hold HD today.    Echo 3/27/20:  · Mild left ventricular enlargement.  · Mildly decreased left ventricular systolic function. The estimated ejection fraction is 45%.  · Grade I (mild) left ventricular diastolic dysfunction consistent with impaired relaxation.  · Normal right ventricular systolic function.  · Mild left atrial enlargement.  · Moderate mitral regurgitation.  · Mild tricuspid regurgitation.  · Mild pulmonary hypertension present. PASP 40 mm of Hg.    Echo 4/13/20:  · Mild concentric left ventricular hypertrophy.  · Global hypokinetic wall  motion.  · Moderately decreased left ventricular systolic function. The estimated ejection fraction is 35%.  · Grade I (mild) left ventricular diastolic dysfunction consistent with impaired relaxation.  · Normal right ventricular systolic function.  · Mild left atrial enlargement.  · Moderate mitral regurgitation.  · Normal central venous pressure (3 mmHg).    Plan of Care:    1. Septic shock (resolved) 2/2 COVID PNA (treated) with HHRF (resolved) requiring MV- resolved  - remains on PO bicarb - stop if sCO2 > 24  - continue aggressive PT/OT  - 4/14 and 4/15 COVID testing negative    2. MAIKOL, non-oliguric, multifactorial ATN from shock/COVID, initiated HD 3/29  - sCr better, nonoliguric, seem to be recovering  - if she continues to improve, will remove HD cath in the next 1-2 days  - no nsaids or IV contrast  - dose meds for eGFr < 30  - hold dialysis today, last HD on 4/17  - reassess in am    3. HTN/Systolic CHF  - BP is great, keep diuretics     4. Anemia  - Hgb is stable  - continue TERE MWF, will transition to SQ weekly on discharge    5. SHPT  - she is on calcitriol  - monitor labs.    If her s Cr remains stable for 1-2 more days without HD, we will remove HD cath and sent to LTAC.    Thank you for allowing us to participate in this patient's care. We will continue to follow.    Vital Signs:  Temp Readings from Last 3 Encounters:   04/21/20 97.6 °F (36.4 °C)       Pulse Readings from Last 3 Encounters:   04/21/20 99   01/15/15 84   12/17/13 80       BP Readings from Last 3 Encounters:   04/21/20 (!) 145/74   01/15/15 124/82   12/17/13 102/68       Weight:  Wt Readings from Last 3 Encounters:   04/20/20 95 kg (209 lb 7 oz)   01/15/15 105.8 kg (233 lb 4.8 oz)   12/17/13 101.2 kg (223 lb)     Medications:  Scheduled Meds:   ascorbic acid (vitamin C)  1,000 mg Oral QID    aspirin  81 mg Oral Daily    calcitRIOL  0.25 mcg Oral Daily    cholestyramine-aspartame  1 packet Oral BID    ciprofloxacin HCl   Both Eyes  "TID    [START ON 4/22/2020] epoetin raquel-ebpx (RETACRIT) injection  10,000 Units Subcutaneous Every Mon, Wed, Fri    fluconazole  300 mg Oral Daily    furosemide  40 mg Oral BID    heparin (porcine)  5,000 Units Subcutaneous Q12H    Lactobacillus acidoph-L.bulgar  2 tablet Oral BID    levothyroxine  100 mcg Oral Before breakfast    metoprolol tartrate  25 mg Oral BID    sodium bicarbonate  650 mg Oral TID    vancomycin  250 mg Oral Q6H    vancomycin (VANCOCIN) IVPB  500 mg Intravenous Once    white petrolatum-mineral oiL   Right Eye QHS    zinc sulfate  220 mg Oral Daily     Continuous Infusions:    PRN Meds:.acetaminophen, heparin (porcine), hydrALAZINE, ipratropium-albuteroL, loperamide, LORazepam, ondansetron, promethazine (PHENERGAN) IVPB, Pharmacy to dose Vancomycin consult **AND** vancomycin - pharmacy to dose  No current facility-administered medications on file prior to encounter.      Current Outpatient Medications on File Prior to Encounter   Medication Sig Dispense Refill    ferrous sulfate 325 mg (65 mg iron) Tab tablet Take 1 tablet (325 mg total) by mouth daily with breakfast. (Patient taking differently: Take 325 mg by mouth daily with breakfast. Take 2 tablets daily) 90 tablet 3    levothyroxine (SYNTHROID) 100 MCG tablet Take 1 tablet (100 mcg total) by mouth once daily. (Patient taking differently: Take 150 mcg by mouth once daily. ) 90 tablet 3     Review of Systems:  Not conversant    Physical Exam:  BP (!) 145/74   Pulse 99   Temp 97.6 °F (36.4 °C)   Resp 16   Ht 5' 1" (1.549 m)   Wt 95 kg (209 lb 7 oz)   SpO2 100%   Breastfeeding? No   BMI 39.57 kg/m²     INS/OUTS:  I/O last 3 completed shifts:  In: 275 [P.O.:275]  Out: -   No intake/output data recorded.  (4/17)  Constitutional: nad, aao x 3, has her voice back, smiling- looks great!  Heart: rrr, no m/r/g, wwp, no edema  Lungs: ant ausc clear, no w/r/r/c, no lb  Abdomen: s/nt/nd, +BS    Results:  Lab Results   Component " Value Date     04/21/2020    K 4.0 04/21/2020     04/21/2020    CO2 22 (L) 04/21/2020    BUN 54 (H) 04/21/2020    CREATININE 2.8 (H) 04/21/2020    CALCIUM 10.0 04/21/2020    ANIONGAP 15 04/21/2020    ESTGFRAFRICA 21 (A) 04/21/2020    EGFRNONAA 18 (A) 04/21/2020       Lab Results   Component Value Date    CALCIUM 10.0 04/21/2020    PHOS 5.4 (H) 04/18/2020       Recent Labs   Lab 04/21/20  0442   WBC 10.05   RBC 2.73*   HGB 7.4*   HCT 24.5*   *   MCV 90   MCH 27.1   MCHC 30.2*     I have personally reviewed pertinent radiological imaging and reports.    Camlio Hastings MD  Nephrology  [facility]  [phone]

## 2020-04-21 NOTE — RESPIRATORY THERAPY
04/21/20 0730   Patient Assessment/Suction   Level of Consciousness (AVPU) alert   All Lung Fields Breath Sounds diminished   PRE-TX-O2   O2 Device (Oxygen Therapy) room air   SpO2 98 %   Pulse Oximetry Type Intermittent   $ Pulse Oximetry - Multiple Charge Pulse Oximetry - Multiple   Pulse 87   Resp 16   Aerosol Therapy   $ Aerosol Therapy Charges PRN treatment not required

## 2020-04-21 NOTE — NURSING
Pt given discharge instructions. trialysis catheter removed, catheter intact, tolerated well. Tele removed. Quad lumen left in place per md order. Updated daughter, sylivia, on discharge. Left with all belongings via wheelchair

## 2020-04-21 NOTE — SUBJECTIVE & OBJECTIVE
Interval History:  Patient recently transferred to medical floor from prolonged intensive care hospitalization where patient was intubated with COVID pneumonia.  Patient is afebrile with mild sinus tachycardia.  Patient is requiring hemodialysis treatment for multifactorial acute tubular necrosis.  Recently patient is tested negative for repeat COVID -19.  Patient is requiring treatment for C diff colitis since April 12, 2020.  Infectious disease team managing patient for sepsis related to Staph epidermidis and Candida species.    Review of Systems   Constitutional: Positive for fatigue and fever. Negative for chills.   Respiratory: Positive for cough and shortness of breath.    Cardiovascular: Negative for leg swelling.   Neurological: Positive for dizziness and weakness.     Objective:     Vital Signs (Most Recent):  Temp: 97.6 °F (36.4 °C) (04/21/20 0810)  Pulse: 99 (04/21/20 0810)  Resp: 16 (04/21/20 0810)  BP: (!) 145/74 (04/21/20 0810)  SpO2: 100 % (04/21/20 0810) Vital Signs (24h Range):  Temp:  [97.6 °F (36.4 °C)-98.7 °F (37.1 °C)] 97.6 °F (36.4 °C)  Pulse:  [] 99  Resp:  [16-18] 16  SpO2:  [96 %-100 %] 100 %  BP: (140-172)/(68-82) 145/74     Weight: 95 kg (209 lb 7 oz)  Body mass index is 39.57 kg/m².    Intake/Output Summary (Last 24 hours) at 4/21/2020 0904  Last data filed at 4/21/2020 0500  Gross per 24 hour   Intake 125 ml   Output --   Net 125 ml      Physical Exam   Constitutional: She is oriented to person, place, and time. She appears well-developed.   Anxious and ill-appearing, morbidly obese   HENT:   Head: Normocephalic and atraumatic.   Eyes: Pupils are equal, round, and reactive to light. Conjunctivae are normal.   Neck: Neck supple. No JVD present. No thyromegaly present.   Cardiovascular: Normal rate and regular rhythm. Exam reveals no gallop and no friction rub.   No murmur heard.  Pulmonary/Chest: Breath sounds normal.   Abdominal: Soft. Bowel sounds are normal. She exhibits no  distension and no mass. There is no tenderness.   Musculoskeletal: Normal range of motion. She exhibits no edema.   Neurological: She is oriented to person, place, and time. No cranial nerve deficit.   Skin: Skin is warm and dry.   Psychiatric: Her behavior is normal.       Significant Labs:   CBC:   Recent Labs   Lab 04/20/20  0405 04/21/20  0442   WBC 9.22 10.05   HGB 7.5* 7.4*   HCT 25.4* 24.5*   * 424*     CMP:   Recent Labs   Lab 04/20/20  0405 04/21/20  0442    137   K 3.8 4.0    100   CO2 22* 22*   GLU 97 99   BUN 60* 54*   CREATININE 3.0* 2.8*   CALCIUM 9.7 10.0   PROT 8.9* 9.2*   ALBUMIN 3.6 3.8   BILITOT 0.7 0.7   ALKPHOS 129 132   AST 21 23   ALT 23 25   ANIONGAP 14 15   EGFRNONAA 17* 18*     Microbiology Results (last 7 days)     Procedure Component Value Units Date/Time    Blood culture [[id number]]  (Abnormal) Collected:  04/06/20 0806    Order Status:  Completed Specimen:  Blood from Antecubital, Left Updated:  04/20/20 1146     Blood Culture, Routine Gram stain peds bottle: yeast       Results called to and read back by: Tania Nolan RN 04/08/2020        11:20      CANDIDA ALBICANS  Susceptibility pending  ID consult required at [facility].Critical access hospital,[place] and [facility] locations.      Blood culture [[id number]] Collected:  04/10/20 0453    Order Status:  Completed Specimen:  Blood Updated:  04/14/20 1412     Blood Culture, Routine No Growth after 4 days.         Significant Imaging:  CXR: New bilateral infiltrates    CXR: Continued bilateral infiltrates mildly increased right lower lung field compared to the prior exam.  Positions of the lines and tubes described.    CT head:  1.  There are slight limitations related to moderate patient tilted in the scanner and mild patient motion but there is no obvious acute abnormality.  There is no hemorrhage, mass, mass effect or obvious acute edema or ischemia.  It should be noted that MRI is more sensitive in the detection of subtle or acute  nonhemorrhagic ischemic disease.  2.  Support tubing is seen in the left nares.  Bilateral mastoid and middle ear effusions are likely related to support tubing and/or intubation.  Correlate clinically.  The same is true for changes throughout the paranasal sinuses.    CXR; Support devices as above.  No pneumothorax.  Moderate pulmonary airspace disease without significant change.    CXR: Very mild decrease of the bilateral infiltrates compared to the prior exam.  Interval insertion of NG tube extending beneath the level of the diaphragm.  Central venous lines remain in place.    MRI brain without contrast:  No evidence of an acute intracranial abnormality.  Few scattered small areas of T2/FLAIR hyperintensity within the supratentorial white matter, which are nonspecific and not out of proportion for the patient's age, possibly reflecting chronic microvascular ischemic change.    EEG (03-):  This routine EEG done under sedation (Propofol 35mKm) is abnormal   due to the moderate to severe generalized slowing seen,   suggestive of severe diffuse or multifocal cerebral dysfunction.  No epileptiform activity or electrographic seizures seen.    ECHO (03-):  · Mild left ventricular enlargement.  · Mildly decreased left ventricular systolic function. The estimated ejection fraction is 45%.  · Grade I (mild) left ventricular diastolic dysfunction consistent with impaired relaxation.  · Normal right ventricular systolic function.  · Mild left atrial enlargement.  · Moderate mitral regurgitation.  · Mild tricuspid regurgitation.  · Mild pulmonary hypertension present. PASP 40 mm of Hg.    ECHO (04-):  · Mild concentric left ventricular hypertrophy.  · Global hypokinetic wall motion.  · Moderately decreased left ventricular systolic function. The estimated ejection fraction is 35%.  · Grade I (mild) left ventricular diastolic dysfunction consistent with impaired relaxation.  · Normal right ventricular  systolic function.  · Mild left atrial enlargement.  · Moderate mitral regurgitation.  · Normal central venous pressure (3 mmHg).

## 2020-04-21 NOTE — PT/OT/SLP PROGRESS
Physical Therapy Treatment    Patient Name:  Maria Victoria Hernandez   MRN:  [id number]    Recommendations:     Discharge Recommendations:  rehabilitation facility, LTACH (long-term acute care hospital)   Discharge Equipment Recommendations: walker, rolling, 3-in-1 commode(TTB)   Barriers to discharge: None    Assessment:     Maria Victoria Hernandez is a 55 y.o. female admitted with a medical diagnosis of Acute respiratory failure with hypoxia.  She presents with the following impairments/functional limitations:  weakness, impaired functional mobilty, impaired balance, decreased safety awareness, impaired endurance, gait instability. Patient agreeable to PT treatment. She is sitting up in chair after taking a shower. She requires CGA for transfers. She ambulated 10' and requested to use BSC. She had small BM and was able to clean herself with SBA. She ambulated 10' back to chair. She performed 20 reps of LE therex.     Rehab Prognosis: Good; patient would benefit from acute skilled PT services to address these deficits and reach maximum level of function.    Recent Surgery: * No surgery found *      Plan:     During this hospitalization, patient to be seen daily to address the identified rehab impairments via gait training, therapeutic activities, therapeutic exercises and progress toward the following goals:    · Plan of Care Expires:  05/13/20    Subjective     Chief Complaint: none- I feel so good after taking a shower  Patient/Family Comments/goals: keep getting stronger  Pain/Comfort:  · Pain Rating 1: 0/10      Objective:     Communicated with REZA Pulido prior to session.  Patient found up in chair with central line, telemetry upon PT entry to room.     General Precautions: Standard, special contact, fall   Orthopedic Precautions:N/A   Braces: N/A     Functional Mobility:  · Transfers:     · Sit to Stand:  contact guard assistance with rolling walker  · Gait: 10' RW CGA  · Balance: Fair      AM-PAC 6 CLICK  MOBILITY          Therapeutic Activities and Exercises:   Patient was educated on the importance of OOB activity during hospital stay, safe transfers and ambulation     LE therex: marches, LAQ, ankle circles, GS, QS, SLR x20    Patient left up in chair with call button in reach and chair alarm on..    GOALS:   Multidisciplinary Problems     Physical Therapy Goals        Problem: Physical Therapy Goal    Goal Priority Disciplines Outcome Goal Variances Interventions   Physical Therapy Goal     PT, PT/OT Ongoing, Progressing     Description:  Goals to be met by: 2020    Patient will increase functional independence with mobility by performin. Supine to sit with MInimal Assistance  2. Sit to supine with MInimal Assistance  3. Sit to stand transfer with Minimal Assistance  4. Bed to chair transfer with Minimal Assistance using Rolling Walker  5. Gait  x 25 feet with Minimal Assistance using Rolling Walker.                       Time Tracking:     PT Received On: 20  PT Start Time: 1039     PT Stop Time: 1113  PT Total Time (min): 34 min     Billable Minutes: Gait Training 20 and Therapeutic Activity 14    Treatment Type: Treatment  PT/PTA: PT     PTA Visit Number: 0     Pippa Luna, PT  2020

## 2020-04-21 NOTE — NURSING
Per Dr. Hastings, he wants to keep patient another day and keep dialysis catheter in another day. Updated charge nurse, Abiagil.

## 2020-04-21 NOTE — PROGRESS NOTES
[facility]  Adult Nutrition  Progress Note    SUMMARY     Intervention: sodium modified diet      Recommendations     1. Continue low sodium diet, texture per SLP   -renal when appetite improves  2. Add supplements / snacks per pt preference:  Milk on trays 2%, yogurt BID, and Boost breeze/ [place]ginad BID    3. Rec. Consider appetite stimulant or restart NGT TF Until eating 50% or meals  TF Nutren 1.5 @30 ml/hr advancing to 45 ml/ hr + Promod 30 ml TID + 130 ml flush q 4 hr   ( provides 1620 kcal ( 79% EEN), 73 g protein + promod (86% EPN), and 820 ml free water, Phos 1296 mg)  -If necessary can switch to Novasource renal @ 30 ml/hr + promod 30 ml TID     4. Weigh pt s/p HD     Goals: 1.) Meet >85% EEN/EPN by RD f/u 2) nutrition support to meet > 75% EEN at f/u 3) continue TF at goal or PO diet advanced at f/u 4) Po intakes 50% of meals/supplements or TF at goal at f/u  Nutrition Goal Status: 1) was meeting- not met 4/4-4/8 2) meeting 3) Met 4) not met, continues  Communication of RD Recs: (POC, sticky note, reviewed with RN)     1. Covid-19 Virus Infection    2. Acute respiratory failure    3. Intubation of airway performed without difficulty    4. Encounter for nasogastric (NG) tube placement    5. Ruled out for myocardial infarction    6. At risk for long QT syndrome    7. Acute respiratory failure with hypoxia    8. ARDS (adult respiratory distress syndrome)    9. Morbid obesity    10. Pneumonia due to Covid-19 Virus    11. Shock               Past Medical History:   Diagnosis Date    Colon polyp      Diverticulosis large intestine w/o perforation or abscess w/bleeding      Iron deficiency anemia      Prediabetes      Thyroid disease      Vitamin B 12 deficiency      Vitamin D deficiency           Reason for Assessment     Reason For Assessment: RD follow up  Rounds: attended ( visited pt)     General Information Comments: Pt is intubated in ICU. NFPE not performed,patient is noted as  "being positive for COVID-19. Per epic records, no evidence of weight loss.  3/31: Not tolerating TF per RN. ngt in place. Dialysis today.   4/3/20 Pt was tolerating TF at goal, propofol high, decreased rate to 35 ml/hr yesterday. Tolerating today. Getting HD. + vent.   4/8/20 4/3 later in the day TF stopped ? Reason and restarted slowly advanced but 4/4 TF stopped r/t emesis and TPN started ( 46% EEN/ 53% EPN). Received TPN 4-4/4/6. TPN d/c'd yesterday and TF restarted @ 10 ml/hr now. Had small loose stool today. Continues on HD.   4/10/20 Pt tolerating TF at goal via NGT. Extubated 4/8. Continues on HD. Awaiting SLP eval.   4/15/20 TF held yesterday as pt with multiple episodes of gagging. TF restarted today and tolerating now at 30 ml/hr. Per SLP diet just advanced to Dyspahgia 6, thin liquids. 0% intakes today. Discussed plan to d/c TF when pt tolerating 50% of meals PO.  4/20/20 TF d/c'd. Pt still only eating 0-25%. Dislikes Boost took pt's supplement/ food preferences. Nausea vomiting noted, on nausea meds. Held HD today. Plan for d/c to LTAC. Appears well-nourished.     Nutrition Discharge Planning: pending medical course- cardiac, texture per SLP      Nutrition Risk Screen     Nutrition Risk Screen: no indicators present     Nutrition/Diet History     Spiritual, Cultural Beliefs, Holiness Practices, Values that Affect Care: no   factors affecting PO intakes: decreased appetite, N/V     Anthropometrics  Height Method: Estimated  Height: 5' 1"  Height (inches): 61 in  Weight Method: Bed Scale  Weight: 95 kg 4/20/20, 99.8 kg 4/15 noted on lasix  Weight (lb): 209lb  Ideal Body Weight (IBW), Female: 105 lb  % Ideal Body Weight, Female (lb): 225.71 %  BMI (Calculated): 39 kg/m2  BMI Grade: greater than 40 - morbid obesity  115.3 kg (3/30/20), 113.4 kg 4/7/20     Lab/Procedures/Meds     Pertinent Labs Reviewed: reviewed  BMP  Lab Results   Component Value Date     04/21/2020    K 4.0 04/21/2020     " 04/21/2020    CO2 22 (L) 04/21/2020    BUN 54 (H) 04/21/2020    CREATININE 2.8 (H) 04/21/2020    CALCIUM 10.0 04/21/2020    ANIONGAP 15 04/21/2020    ESTGFRAFRICA 21 (A) 04/21/2020    EGFRNONAA 18 (A) 04/21/2020     Lab Results   Component Value Date    CRP 36.9 (H) 04/16/2020     Lab Results   Component Value Date    ALBUMIN 3.8 04/21/2020     Lab Results   Component Value Date    CALCIUM 10.0 04/21/2020    PHOS 5.4 (H) 04/18/2020       Pertinent Medications Reviewed: reviewed   calcitriol, epoetin, probiotic, zinc, phenergan, zofran, lasix     Estimated/Assessed Needs  Weight Used For Calorie Calculations: 107.5 kg (236 lb 15.9 oz)  Energy Need Method: 1615 kcal ( mifflin st Jeor  no activity factor HD vs. obesity)  Protein Requirements: 114 g ( 1.2 g protein/kg HD)  Weight Used For Protein Calculations: 95 kg  RDA: urine output + 1000 ml  CHO Requirement: 147g        Nutrition Prescription Ordered  Low sodium diet     Evaluation of Received Nutrient/Fluid Intake   energy need: not meeting  Protein needs: not meeting  Fluid needs: meeting  % Intake of Estimated Energy Needs: 0-25%  % Meal Intake: 0%-25% x 5-6 days?     Nutrition Risk     2-3x week moderate/ high     Assessment and Plan     Nutrition Problem  Inadequate energy intake     Related to (etiology):   No diet order     Signs and Symptoms (as evidenced by):   Intake of <85% EEN/EPN x 5-6 days     Interventions/Recommendations (treatment strategy):  Above     Nutrition Diagnosis Status:   Continues      Morbid obesity  Contributing Nutrition Diagnosis  Obesity stage 2     Related to (etiology):   Hx. Of excessive energy intakes     Signs and Symptoms (as evidenced by):   BMI > 35 kg/m2     Interventions/Recommendations (treatment strategy):  Above     Nutrition Diagnosis Status:   continues      Malnutrition:  Edema: note noted  Robe: 16  NFPE not done r/t restrictions to prevent spread of COVID-19, to be done at a later date. Noted to appear well  nourished.      Monitor and Evaluation     Food and Nutrient Intake: energy intake  Food and Nutrient Adminstration: enteral and parenteral nutrition administration, diet order  Anthropometric Measurements: weight  Biochemical Data, Medical Tests and Procedures: electrolyte and renal panel, , gastrointestinal, CRP     Nutrition Follow-Up     RD Follow-up?: Yes

## 2020-04-21 NOTE — DISCHARGE SUMMARY
[facility] Medicine  Discharge Summary      Patient Name: Maria Victoria Hernandez  MRN: [id number]  Admission Date: 3/25/2020  Hospital Length of Stay: 27 days  Discharge Date and Time:  04/21/2020 9:29 AM  Attending Physician: Ferny Porter MD   Discharging Provider: Ferny Porter MD  Primary Care Provider: Candice Deluna MD      HPI:   Patient is a 54 years old  female with morbid obesity in past medical history significant for hypothyroidism is being admitted to intensive care unit under inpatient status from [facility] Emergency room with worsening shortness of breath.  Three days ago patient was tested positive for COVID - 19.  Patient works at [employer].  Patient has been experiencing subjective fever, generalized body aches and pains and nonproductive cough for few days.  Patient is getting increasingly short of breath especially for the past 2 days.  Upon arrival to the emergency room patient was noted to be hypoxic around 89%.  Up on 3 L patient's oxygen sats are around 96%.  Patient denies any chest pain, leg swelling or calf tenderness.    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 And treated per protocol for Covid and ARDS with ceftriaxone/zithromax and HC x 5 days. And ARDS protocol on vent. Pulmonary consulted and followed. WEaned from vent slowly -to nasal cannula by 4/10 and remained stable on oxygen but very weak. PT/OT consulted.   Tachycardica /hypertension developed -cardene gtt adjusted to labetolol then transitioned to po metoprolol Echo -noted mild systolic/Gr 1 diastolic dsyfunction. cxr on 4/5 severe ards pattern.-Vancomycin/zosyn added. 4/6 blood cultures pos for yeast so ID consulted and micafungin added. HD  Catheter and  TLC line removed and cultured and cultures negative. Sputum + yeast also  .-followed recs from ID; echo  neg -await repeat echo. Repeat Blood cultures  were negative for yeast. 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 Acute encephalopathy- delirium vs encephalitis or other structural cause. MRI could not be performed as she was very unstable.   At the time of admission Cr 0.7 worsened to 5.1, Renal was consulted. and HD started on 3/29. In addition had, Metabolic acidosis due to renal failure. Feedings given via ngtube with diabetisource and accucks/ISS given with close monitoring and good control. On 04/09/2020 Patient was extubated. Continued to need Cardene drip for BP. HAd loose stool-- flexiseal was placed. On 04/11/2020 mental status improved was  more awake and slighty stronger. She was tachycardic-- Cardene was switched to labetalol   04/13/2020 patient was tolerating vancomycin and micafungin. She continued to have diarrhea.  C diff antigen was positive.  On 04/15/2020.  Patient's voice was louder, she is quite interactive and was moving her arms.   on 04/16/2020  patient  was doing well.  She  was afebrile.   Her oxygen requirement improved. Was titrated down to 2 L nasal cannula. Patient transferred out of ICU to regular Med surge room. crp improved.  ferr 1200. Nephrology continued to follow patient. Her MAIKOL was suspected to be multifactorial ATN in setting of shock/COVID/intravascular volume depletion- initiated on  HD 3/29.  Nephrology team closely followed the patient.  Patient's renal functions are improving day by day.  No further need for hemodialysis noted by Nephrology team.  Hemodialysis catheter has been removed.  Patient is working with PT and OT and is slowly recovering from deconditioning related to prolonged hospitalization and ICU stay.  Patient to complete antibiotic and antifungal therapy recommended by ID specialist for which patient has been accepted at long-term acute care facility for further treatment and care.  Patient  and her daughter are in agreement with plan of care.       Consults:   Consults (From admission, onward)        Status Ordering Provider     Inpatient consult to Anesthesiology  Once     Provider:  Aidan Irving MD    Acknowledged BHMALCOLMRY, SIDDARTBUD     Inpatient consult to Infectious Diseases  Once     Provider:  Daija Fontaine MD    Completed ANDI MAYS     Inpatient consult to LTAC  Once     Provider:  (Not yet assigned)    Acknowledged BHANDARY, SIDDARTHA     Inpatient consult to Nephrology  Once     Provider:  Carolyn Moeller MD    Acknowledged BHANDARY, SIDDARTHA     Inpatient consult to Neurology  Once     Provider:  Seth Monge MD    Completed ORIANA SERNA     Inpatient consult to Pulmonology  Once     Provider:  David Laird MD    Completed DENI ALICEA     Inpatient consult to Registered Dietitian/Nutritionist  Once     Provider:  (Not yet assigned)    Completed BHANDARY, SIDDARTHA     Inpatient consult to Social Work/Case Management  Once     Provider:  (Not yet assigned)    Acknowledged BHANDARY, SIDDARTHA     Inpatient consult to Vascular Surgery  Once     Provider:  Jacques Gauthier MD    Acknowledged BHANDARY, JACKYDARTBUD     Inpatient consult to Vascular Surgery  Once     Provider:  Jacques Gauthier MD    Acknowledged BHANDARY, SIDDARTHA     Pharmacy to dose Vancomycin consult  Once     Provider:  (Not yet assigned)    Acknowledged DAIJA FONTAINE        CXR: New bilateral infiltrates     CXR: Continued bilateral infiltrates mildly increased right lower lung field compared to the prior exam.  Positions of the lines and tubes described.     CT head:  1.  There are slight limitations related to moderate patient tilted in the scanner and mild patient motion but there is no obvious acute abnormality.  There is no hemorrhage, mass, mass effect or obvious acute edema or ischemia.  It should be noted that MRI is more sensitive in the detection of subtle or acute nonhemorrhagic ischemic  disease.  2.  Support tubing is seen in the left nares.  Bilateral mastoid and middle ear effusions are likely related to support tubing and/or intubation.  Correlate clinically.  The same is true for changes throughout the paranasal sinuses.     CXR; Support devices as above.  No pneumothorax.  Moderate pulmonary airspace disease without significant change.     CXR: Very mild decrease of the bilateral infiltrates compared to the prior exam.  Interval insertion of NG tube extending beneath the level of the diaphragm.  Central venous lines remain in place.     MRI brain without contrast:  No evidence of an acute intracranial abnormality.  Few scattered small areas of T2/FLAIR hyperintensity within the supratentorial white matter, which are nonspecific and not out of proportion for the patient's age, possibly reflecting chronic microvascular ischemic change.     EEG (03-):  This routine EEG done under sedation (Propofol 35mKm) is abnormal   due to the moderate to severe generalized slowing seen,   suggestive of severe diffuse or multifocal cerebral dysfunction.  No epileptiform activity or electrographic seizures seen.     ECHO (03-):  · Mild left ventricular enlargement.  · Mildly decreased left ventricular systolic function. The estimated ejection fraction is 45%.  · Grade I (mild) left ventricular diastolic dysfunction consistent with impaired relaxation.  · Normal right ventricular systolic function.  · Mild left atrial enlargement.  · Moderate mitral regurgitation.  · Mild tricuspid regurgitation.  · Mild pulmonary hypertension present. PASP 40 mm of Hg.     ECHO (04-):  · Mild concentric left ventricular hypertrophy.  · Global hypokinetic wall motion.  · Moderately decreased left ventricular systolic function. The estimated ejection fraction is 35%.  · Grade I (mild) left ventricular diastolic dysfunction consistent with impaired relaxation.  · Normal right ventricular systolic  function.  · Mild left atrial enlargement.  · Moderate mitral regurgitation.  · Normal central venous pressure (3 mmHg).     Final Active Diagnoses:    Diagnosis Date Noted POA    Epistaxis [R04.0] 04/18/2020 No    Conjunctivitis of right eye [H10.9]  No    Post viral debility [R53.81] 04/12/2020 No    Acute on chronic combined systolic and diastolic congestive heart failure [I50.43] 04/10/2020 Yes    Pneumonia due to infectious organism [J18.9] 04/05/2020 Yes    Acute encephalopathy [G93.40] 03/31/2020 No    Acute renal failure [N17.9] 03/27/2020 No    COVID-19 virus infection [U07.1] 03/26/2020 Yes    Morbid obesity [E66.01] 03/25/2020 Yes    Hypothyroid [E03.9] 03/25/2020 Yes    COVID-19 virus detected [U07.1] 03/25/2020 Yes    Iron deficiency anemia, unspecified [D50.9] 12/30/2015 Yes      Problems Resolved During this Admission:    Diagnosis Date Noted Date Resolved POA    PRINCIPAL PROBLEM:  Acute respiratory failure with hypoxia [J96.01] 03/25/2020 04/17/2020 Yes    Shock [R57.9]  04/14/2020 Unknown    Fungemia [B49] 04/06/2020 04/17/2020 No    Bacteremia [R78.81] 04/06/2020 04/17/2020 No    ARDS (adult respiratory distress syndrome) [J80] 03/26/2020 04/13/2020 Yes       Discharged Condition: good    Disposition: Long Term Care    Follow Up:  Follow-up Information     Candice Deluna MD.    Specialty:  Family Medicine  Why:  Upon discharge  Contact information:  [address] [facility] [facility]  [address]  [facility] [address]  [phone]                 Patient Instructions:      Diet Cardiac     Diet renal   Order Comments: Nepro can with meals     Notify your health care provider if you experience any of the following:  temperature >100.4     Notify your health care provider if you experience any of the following:  severe uncontrolled pain     Other restrictions (specify):   Order Comments: PLEASE OBSERVE FALL PRECAUTIONS     Call MD for:   Order Comments: For worsening symptoms, chest pain,  shortness of breath, increased abdominal pain, high grade fever, stroke or stroke like symptoms, immediately go to the nearest Emergency Room or call 911 as soon as possible.     Activity as tolerated       Significant Diagnostic Studies: Labs:   CMP   Recent Labs   Lab 04/20/20  0405 04/21/20  0442    137   K 3.8 4.0    100   CO2 22* 22*   GLU 97 99   BUN 60* 54*   CREATININE 3.0* 2.8*   CALCIUM 9.7 10.0   PROT 8.9* 9.2*   ALBUMIN 3.6 3.8   BILITOT 0.7 0.7   ALKPHOS 129 132   AST 21 23   ALT 23 25   ANIONGAP 14 15   ESTGFRAFRICA 20* 21*   EGFRNONAA 17* 18*    and CBC   Recent Labs   Lab 04/20/20  0405 04/21/20 0442   WBC 9.22 10.05   HGB 7.5* 7.4*   HCT 25.4* 24.5*   * 424*       Pending Diagnostic Studies:     Procedure Component Value Units Date/Time    EKG 12-lead [[id number]]     Order Status:  Sent Lab Status:  No result     EKG 12-lead [[id number]]     Order Status:  Sent Lab Status:  No result     EKG 12-lead [[id number]]     Order Status:  Sent Lab Status:  No result     Hepatitis B Core Antibody, Total [[id number]] Collected:  04/20/20 1101    Order Status:  Sent Lab Status:  In process Updated:  04/20/20 1724    Specimen:  Blood     US Carotid Bilateral [[id number]] Resulted:  04/14/20 1333    Order Status:  Sent Lab Status:  No result Updated:  04/14/20 1540         Medications:  Reconciled Home Medications:      Medication List      START taking these medications    acetaminophen 325 MG tablet  Commonly known as:  TYLENOL  Take 2 tablets (650 mg total) by mouth every 6 (six) hours as needed.     ascorbic acid (vitamin C) 1000 MG tablet  Commonly known as:  VITAMIN C  Take 1 tablet (1,000 mg total) by mouth 4 (four) times daily.     aspirin 81 MG EC tablet  Commonly known as:  ECOTRIN  Take 1 tablet (81 mg total) by mouth once daily.     calcitRIOL 0.25 MCG Cap  Commonly known as:  ROCALTROL  Take 1 capsule (0.25 mcg total) by mouth once daily.     ciprofloxacin HCl 0.3 % Oint  Commonly  known as:  CILOXAN  Place into both eyes 3 (three) times daily. for 2 days     dextrose 5 % SolP 50 mL with promethazine 25 mg/mL Soln 12.5 mg  Inject 12.5 mg into the vein every 6 (six) hours as needed.     epoetin raquel-epbx 10,000 unit/mL imjection  Commonly known as:  RETACRIT  Inject 0.5 mLs (5,000 Units total) into the skin every Mon, Wed, Fri.     escitalopram oxalate 5 MG Tab  Commonly known as:  LEXAPRO  1 tablet (5 mg total) by Per NG tube route once daily.  Start taking on:  May 6, 2020     fluconazole 150 MG Tab  Commonly known as:  DIFLUCAN  Take 2 tablets (300 mg total) by mouth once daily. for 13 days  Start taking on:  April 22, 2020     furosemide 40 MG tablet  Commonly known as:  LASIX  Take 1 tablet (40 mg total) by mouth 2 (two) times daily.     * heparin (porcine) 1,000 unit/mL injection  Inject 4 mLs (4,000 Units total) into the vein as needed (Lock ports after every HD).     * heparin (porcine) 5,000 unit/mL injection  Inject 1 mL (5,000 Units total) into the skin every 12 (twelve) hours.     * hydrALAZINE 20 mg/mL injection  Commonly known as:  APRESOLINE  Inject 0.5 mLs (10 mg total) into the vein every 8 (eight) hours as needed (165).     * hydrALAZINE 50 MG tablet  Commonly known as:  APRESOLINE  Take 1 tablet (50 mg total) by mouth every 8 (eight) hours.     ipratropium-albuteroL  mcg/actuation inhaler  Commonly known as:  COMBIVENT  Inhale 1 puff into the lungs every 6 (six) hours as needed for Wheezing or Shortness of Breath. Rescue     metoprolol tartrate 25 MG tablet  Commonly known as:  LOPRESSOR  Take 1 tablet (25 mg total) by mouth 2 (two) times daily.     ondansetron 4 mg/2 mL Soln  Inject 4 mg into the vein every 8 (eight) hours as needed.     sodium bicarbonate 650 MG tablet  Take 1 tablet (650 mg total) by mouth 3 (three) times daily.     * vancomycin 250mg / 10ml Susp  Take 10 mLs (250 mg total) by mouth every 6 (six) hours. for 4 days     * VANCOMYCIN 1 G/250 ML D5W  (READY TO MIX SYSTEM)  Inject 250 mLs (1 g total) into the vein once daily. for 1 day     white petrolatum-mineral oiL 83-15 % Oint  Place into the right eye every evening.     zinc sulfate 220 (50) mg capsule  Commonly known as:  ZINCATE  Take 1 capsule (220 mg total) by mouth once daily.         * This list has 6 medication(s) that are the same as other medications prescribed for you. Read the directions carefully, and ask your doctor or other care provider to review them with you.            CHANGE how you take these medications    ferrous sulfate 325 mg (65 mg iron) Tab tablet  Commonly known as:  FEOSOL  Take 1 tablet (325 mg total) by mouth daily with breakfast.  What changed:  additional instructions     levothyroxine 100 MCG tablet  Commonly known as:  SYNTHROID  Take 1 tablet (100 mcg total) by mouth once daily.  What changed:  how much to take        STOP taking these medications    clotrimazole-betamethasone 1-0.05% cream  Commonly known as:  LOTRISONE     fluticasone propionate 50 mcg/actuation nasal spray  Commonly known as:  FLONASE     levocetirizine 5 MG tablet  Commonly known as:  XYZAL     meloxicam 7.5 MG tablet  Commonly known as:  MOBIC        ASK your doctor about these medications    * VANCOMYCIN 500 MG/100 ML D5W (READY TO MIX SYSTEM)  Inject 100 mLs (500 mg total) into the vein once. for 1 dose  Ask about: Should I take this medication?         * This list has 1 medication(s) that are the same as other medications prescribed for you. Read the directions carefully, and ask your doctor or other care provider to review them with you.                Indwelling Lines/Drains at time of discharge:   Lines/Drains/Airways     Central Venous Catheter Line            Percutaneous Central Line Insertion/Assessment - Quad Lumen  04/06/20 1611 left internal jugular 14 days    Trialysis (Dialysis) Catheter 04/13/20 1812 right internal jugular 7 days                Time spent on the discharge of patient: 36  minutes  Patient was seen and examined on the date of discharge and determined to be suitable for discharge.         Ferny Porter MD  Department of Hospital Medicine  [facility]

## 2020-04-21 NOTE — PLAN OF CARE
Per marisela Ramirez for pt to have dialysis line DC and transfer to [facility] today- will continue to follow pt there. Dr. Porter and pt's nurse updated.      04/21/20 1018   Discharge Assessment   Assessment Type Discharge Planning Reassessment

## 2020-04-21 NOTE — PLAN OF CARE
Per Nadira- transport has been set up for 1:30 pm. I updated Nadira with the pts daughters phone number, Danya [phone]. Report can be called to [facility] at [phone]. I updated the pts nurse Candi and booked the discharge destination. Trista Ceballos, BECK     04/21/20 1054   Post-Acute Status   Post-Acute Authorization Placement   Post-Acute Placement Status Set-up Complete

## 2020-04-22 PROBLEM — A04.72 CLOSTRIDIUM DIFFICILE COLITIS: Status: ACTIVE | Noted: 2020-04-22

## 2020-04-22 PROBLEM — T80.219A CENTRAL LINE INFECTION: Status: ACTIVE | Noted: 2020-04-22

## 2020-04-23 PROBLEM — I50.42 CHRONIC COMBINED SYSTOLIC AND DIASTOLIC HEART FAILURE: Status: ACTIVE | Noted: 2020-04-23

## 2020-04-25 ENCOUNTER — HOSPITAL ENCOUNTER (INPATIENT)
Facility: HOSPITAL | Age: 55
LOS: 6 days | Discharge: HOME | DRG: 673 | End: 2020-05-01
Attending: EMERGENCY MEDICINE | Admitting: INTERNAL MEDICINE
Payer: OTHER MISCELLANEOUS

## 2020-04-25 DIAGNOSIS — R41.0 EPISODIC CONFUSION: Primary | ICD-10-CM

## 2020-04-25 DIAGNOSIS — N17.9 AKI (ACUTE KIDNEY INJURY): ICD-10-CM

## 2020-04-25 DIAGNOSIS — B94.8 POST VIRAL DEBILITY: ICD-10-CM

## 2020-04-25 DIAGNOSIS — E66.01 MORBID OBESITY: ICD-10-CM

## 2020-04-25 DIAGNOSIS — R07.9 CHEST PAIN: ICD-10-CM

## 2020-04-25 DIAGNOSIS — E44.0 MODERATE MALNUTRITION: ICD-10-CM

## 2020-04-25 DIAGNOSIS — N17.9 ACUTE RENAL FAILURE: ICD-10-CM

## 2020-04-25 DIAGNOSIS — T80.219D CENTRAL LINE INFECTION, SUBSEQUENT ENCOUNTER: ICD-10-CM

## 2020-04-25 DIAGNOSIS — E03.9 ACQUIRED HYPOTHYROIDISM: ICD-10-CM

## 2020-04-25 DIAGNOSIS — R53.81 POST VIRAL DEBILITY: ICD-10-CM

## 2020-04-25 PROBLEM — N19 RENAL FAILURE: Status: RESOLVED | Noted: 2020-04-25 | Resolved: 2020-04-25

## 2020-04-25 PROBLEM — N19 RENAL FAILURE: Status: ACTIVE | Noted: 2020-04-25

## 2020-04-25 LAB
ANION GAP SERPL CALC-SCNC: 19 MMOL/L (ref 8–16)
BASOPHILS # BLD AUTO: 0.18 K/UL (ref 0–0.2)
BASOPHILS NFR BLD: 1.9 % (ref 0–1.9)
BILIRUB UR QL STRIP: NEGATIVE
BUN SERPL-MCNC: 63 MG/DL (ref 6–20)
CALCIUM SERPL-MCNC: 10.3 MG/DL (ref 8.7–10.5)
CHLORIDE SERPL-SCNC: 94 MMOL/L (ref 95–110)
CLARITY UR: CLEAR
CO2 SERPL-SCNC: 22 MMOL/L (ref 23–29)
COLOR UR: YELLOW
CREAT SERPL-MCNC: 4 MG/DL (ref 0.5–1.4)
CREAT UR-MCNC: 99 MG/DL (ref 15–325)
DIFFERENTIAL METHOD: ABNORMAL
EOSINOPHIL # BLD AUTO: 0.2 K/UL (ref 0–0.5)
EOSINOPHIL NFR BLD: 2.2 % (ref 0–8)
ERYTHROCYTE [DISTWIDTH] IN BLOOD BY AUTOMATED COUNT: 20.5 % (ref 11.5–14.5)
EST. GFR  (AFRICAN AMERICAN): 14 ML/MIN/1.73 M^2
EST. GFR  (NON AFRICAN AMERICAN): 12 ML/MIN/1.73 M^2
GLUCOSE SERPL-MCNC: 112 MG/DL (ref 70–110)
GLUCOSE UR QL STRIP: NEGATIVE
HCT VFR BLD AUTO: 27.1 % (ref 37–48.5)
HGB BLD-MCNC: 8.1 G/DL (ref 12–16)
HGB UR QL STRIP: NEGATIVE
IMM GRANULOCYTES # BLD AUTO: 0.07 K/UL (ref 0–0.04)
IMM GRANULOCYTES NFR BLD AUTO: 0.7 % (ref 0–0.5)
INR PPP: 1.1 (ref 0.8–1.2)
KETONES UR QL STRIP: NEGATIVE
LEUKOCYTE ESTERASE UR QL STRIP: NEGATIVE
LYMPHOCYTES # BLD AUTO: 1.3 K/UL (ref 1–4.8)
LYMPHOCYTES NFR BLD: 13.5 % (ref 18–48)
MCH RBC QN AUTO: 27.2 PG (ref 27–31)
MCHC RBC AUTO-ENTMCNC: 29.9 G/DL (ref 32–36)
MCV RBC AUTO: 91 FL (ref 82–98)
MONOCYTES # BLD AUTO: 0.9 K/UL (ref 0.3–1)
MONOCYTES NFR BLD: 9.7 % (ref 4–15)
NEUTROPHILS # BLD AUTO: 6.7 K/UL (ref 1.8–7.7)
NEUTROPHILS NFR BLD: 72 % (ref 38–73)
NITRITE UR QL STRIP: NEGATIVE
NRBC BLD-RTO: 0 /100 WBC
PH UR STRIP: 6 [PH] (ref 5–8)
PLATELET # BLD AUTO: 569 K/UL (ref 150–350)
PMV BLD AUTO: 11.3 FL (ref 9.2–12.9)
POTASSIUM SERPL-SCNC: 3.2 MMOL/L (ref 3.5–5.1)
PROT UR QL STRIP: NEGATIVE
PROT UR-MCNC: <7 MG/DL (ref 0–15)
PROTHROMBIN TIME: 12.3 SEC (ref 9–12.5)
RBC # BLD AUTO: 2.98 M/UL (ref 4–5.4)
SARS-COV-2 RDRP RESP QL NAA+PROBE: NEGATIVE
SODIUM SERPL-SCNC: 135 MMOL/L (ref 136–145)
SODIUM UR-SCNC: 48 MMOL/L (ref 20–250)
SP GR UR STRIP: 1.02 (ref 1–1.03)
URN SPEC COLLECT METH UR: NORMAL
UROBILINOGEN UR STRIP-ACNC: NEGATIVE EU/DL
WBC # BLD AUTO: 9.38 K/UL (ref 3.9–12.7)

## 2020-04-25 PROCEDURE — 12000002 HC ACUTE/MED SURGE SEMI-PRIVATE ROOM

## 2020-04-25 PROCEDURE — 85025 COMPLETE CBC W/AUTO DIFF WBC: CPT

## 2020-04-25 PROCEDURE — 25000003 PHARM REV CODE 250: Performed by: THORACIC SURGERY (CARDIOTHORACIC VASCULAR SURGERY)

## 2020-04-25 PROCEDURE — 81003 URINALYSIS AUTO W/O SCOPE: CPT

## 2020-04-25 PROCEDURE — 25000003 PHARM REV CODE 250: Performed by: NURSE PRACTITIONER

## 2020-04-25 PROCEDURE — U0002 COVID-19 LAB TEST NON-CDC: HCPCS

## 2020-04-25 PROCEDURE — 80048 BASIC METABOLIC PNL TOTAL CA: CPT

## 2020-04-25 PROCEDURE — 82570 ASSAY OF URINE CREATININE: CPT

## 2020-04-25 PROCEDURE — 99285 EMERGENCY DEPT VISIT HI MDM: CPT

## 2020-04-25 PROCEDURE — 84156 ASSAY OF PROTEIN URINE: CPT

## 2020-04-25 PROCEDURE — 36415 COLL VENOUS BLD VENIPUNCTURE: CPT

## 2020-04-25 PROCEDURE — 85610 PROTHROMBIN TIME: CPT

## 2020-04-25 PROCEDURE — 84300 ASSAY OF URINE SODIUM: CPT

## 2020-04-25 RX ORDER — ACETAMINOPHEN 325 MG/1
650 TABLET ORAL EVERY 6 HOURS PRN
Status: DISCONTINUED | OUTPATIENT
Start: 2020-04-25 | End: 2020-05-02 | Stop reason: HOSPADM

## 2020-04-25 RX ORDER — GLUCAGON 1 MG
1 KIT INJECTION
Status: DISCONTINUED | OUTPATIENT
Start: 2020-04-25 | End: 2020-05-02 | Stop reason: HOSPADM

## 2020-04-25 RX ORDER — IBUPROFEN 200 MG
24 TABLET ORAL
Status: DISCONTINUED | OUTPATIENT
Start: 2020-04-25 | End: 2020-05-02 | Stop reason: HOSPADM

## 2020-04-25 RX ORDER — HYDRALAZINE HYDROCHLORIDE 25 MG/1
50 TABLET, FILM COATED ORAL EVERY 8 HOURS
Status: DISCONTINUED | OUTPATIENT
Start: 2020-04-25 | End: 2020-04-26

## 2020-04-25 RX ORDER — IPRATROPIUM BROMIDE AND ALBUTEROL SULFATE 2.5; .5 MG/3ML; MG/3ML
3 SOLUTION RESPIRATORY (INHALATION) EVERY 6 HOURS PRN
Status: DISCONTINUED | OUTPATIENT
Start: 2020-04-25 | End: 2020-05-02 | Stop reason: HOSPADM

## 2020-04-25 RX ORDER — HEPARIN SODIUM 5000 [USP'U]/ML
5000 INJECTION, SOLUTION INTRAVENOUS; SUBCUTANEOUS EVERY 12 HOURS
Status: DISCONTINUED | OUTPATIENT
Start: 2020-04-26 | End: 2020-05-02 | Stop reason: HOSPADM

## 2020-04-25 RX ORDER — FERROUS SULFATE 325(65) MG
325 TABLET, DELAYED RELEASE (ENTERIC COATED) ORAL DAILY
Status: DISCONTINUED | OUTPATIENT
Start: 2020-04-26 | End: 2020-05-02 | Stop reason: HOSPADM

## 2020-04-25 RX ORDER — SODIUM BICARBONATE 650 MG/1
650 TABLET ORAL 3 TIMES DAILY
Status: DISCONTINUED | OUTPATIENT
Start: 2020-04-26 | End: 2020-05-02 | Stop reason: HOSPADM

## 2020-04-25 RX ORDER — FLUCONAZOLE 100 MG/1
300 TABLET ORAL DAILY
Status: DISCONTINUED | OUTPATIENT
Start: 2020-04-26 | End: 2020-05-02 | Stop reason: HOSPADM

## 2020-04-25 RX ORDER — ONDANSETRON 2 MG/ML
4 INJECTION INTRAMUSCULAR; INTRAVENOUS EVERY 8 HOURS PRN
Status: DISCONTINUED | OUTPATIENT
Start: 2020-04-25 | End: 2020-05-02 | Stop reason: HOSPADM

## 2020-04-25 RX ORDER — CALCITRIOL 0.25 UG/1
0.25 CAPSULE ORAL DAILY
Status: DISCONTINUED | OUTPATIENT
Start: 2020-04-26 | End: 2020-05-02 | Stop reason: HOSPADM

## 2020-04-25 RX ORDER — METOPROLOL TARTRATE 25 MG/1
25 TABLET, FILM COATED ORAL 2 TIMES DAILY
Status: DISCONTINUED | OUTPATIENT
Start: 2020-04-25 | End: 2020-05-02 | Stop reason: HOSPADM

## 2020-04-25 RX ORDER — ASPIRIN 81 MG/1
81 TABLET ORAL DAILY
Status: DISCONTINUED | OUTPATIENT
Start: 2020-04-26 | End: 2020-05-02 | Stop reason: HOSPADM

## 2020-04-25 RX ORDER — LEVOTHYROXINE SODIUM 150 UG/1
150 TABLET ORAL
Status: DISCONTINUED | OUTPATIENT
Start: 2020-04-26 | End: 2020-05-02 | Stop reason: HOSPADM

## 2020-04-25 RX ORDER — SODIUM CHLORIDE 0.9 % (FLUSH) 0.9 %
10 SYRINGE (ML) INJECTION
Status: DISCONTINUED | OUTPATIENT
Start: 2020-04-25 | End: 2020-05-02 | Stop reason: HOSPADM

## 2020-04-25 RX ORDER — TALC
6 POWDER (GRAM) TOPICAL NIGHTLY PRN
Status: DISCONTINUED | OUTPATIENT
Start: 2020-04-25 | End: 2020-05-02 | Stop reason: HOSPADM

## 2020-04-25 RX ORDER — IBUPROFEN 200 MG
16 TABLET ORAL
Status: DISCONTINUED | OUTPATIENT
Start: 2020-04-25 | End: 2020-05-02 | Stop reason: HOSPADM

## 2020-04-25 RX ADMIN — HYDRALAZINE HYDROCHLORIDE 50 MG: 25 TABLET, FILM COATED ORAL at 11:04

## 2020-04-25 RX ADMIN — Medication 250 MG: at 11:04

## 2020-04-25 RX ADMIN — METOPROLOL TARTRATE 25 MG: 25 TABLET, FILM COATED ORAL at 11:04

## 2020-04-25 NOTE — ED PROVIDER NOTES
Encounter Date: 4/25/2020    SCRIBE #1 NOTE: IAwa, am scribing for, and in the presence of, Luis Angel Malcolm MD.       History     Chief Complaint   Patient presents with    presents for dialysis access       Seen by provider on 04/25/2020    Maria Victoria Hernandez is a 55 y.o. female who presents to the ED for possible emergent dialysis and HD cath replacement. Pt was hospitalized here for COVID-19 for approximately a month. At that time, she was intubated and required dialysis for renal failure. She also had line infection at the time. With improvement of kidney infection she was sent to LTAC and her dialysis access was removed. Creatinine levels have increased from several days ago. Pt was reported to have been confused and experiencing decreased urine output and was referred to ED for possible emergent dialysis from LTAC. Pt denies confusion and decreased urine ouptut; however, she c/o slight weakness and SOB with exertion which is typical since she has been sick. She also denies N/V or any other symptoms at this time. No pertinent PMHx or PSHx.    The history is provided by the patient.     Review of patient's allergies indicates:  No Known Allergies  Past Medical History:   Diagnosis Date    Colon polyp     Diverticulosis large intestine w/o perforation or abscess w/bleeding     Iron deficiency anemia     Prediabetes     Thyroid disease     Vitamin B 12 deficiency     Vitamin D deficiency      Past Surgical History:   Procedure Laterality Date    TUBAL LIGATION       Family History   Problem Relation Age of Onset    Heart disease Mother     Heart disease Maternal Grandmother     Heart disease Maternal Grandfather     Mental illness Paternal Grandmother      Social History     Tobacco Use    Smoking status: Never Smoker    Smokeless tobacco: Never Used   Substance Use Topics    Alcohol use: No    Drug use: Not on file     Review of Systems   Constitutional: Negative for chills  and fever.   Respiratory: Positive for shortness of breath.    Gastrointestinal: Negative for abdominal pain, nausea and vomiting.   Genitourinary: Negative for decreased urine volume (Denies).   Neurological: Positive for weakness.   Psychiatric/Behavioral: Negative for confusion.       Physical Exam     Initial Vitals [04/25/20 1726]   BP Pulse Resp Temp SpO2   138/71 93 20 98.6 °F (37 °C) 100 %      MAP       --         Physical Exam    Nursing note and vitals reviewed.  Constitutional: She appears well-developed and well-nourished. She is not diaphoretic.  Non-toxic appearance. She does not have a sickly appearance. She does not appear ill. No distress.   Well-appearing, no distress   HENT:   Head: Normocephalic and atraumatic.   Eyes: EOM are normal. Right conjunctiva has a hemorrhage.   Slight right conjunctiva hemorrhage.   Neck: Normal range of motion. Neck supple.   Left IJ central line noted.   Cardiovascular: Normal rate, regular rhythm and normal heart sounds. Exam reveals no gallop and no friction rub.    No murmur heard.  Pulmonary/Chest: Breath sounds normal. No respiratory distress. She has no wheezes. She has no rhonchi. She has no rales.   clear breath sounds bilaterally   Musculoskeletal: Normal range of motion.   Neurological: She is alert and oriented to person, place, and time.   Alert oriented to person place situation date.  States her daughter is a nurse here at this facility.  Denies any confusion at all.   Skin: Skin is warm and dry.   Psychiatric: She has a normal mood and affect. Her behavior is normal. Judgment and thought content normal.   No confusion.         ED Course   Procedures  Labs Reviewed   BASIC METABOLIC PANEL          Imaging Results    None          Medical Decision Making:   History:   Old Medical Records: I decided to obtain old medical records.  Old Records Summarized: records from another hospital.  Clinical Tests:   Lab Tests: Ordered and Reviewed  Other:   I have  discussed this case with another health care provider.       <> Summary of the Discussion: Dr guan nephrology, ok to hold off on HD cath at this time            Scribe Attestation:   Scribe #1: I performed the above scribed service and the documentation accurately describes the services I performed. I attest to the accuracy of the note.    Attending Attestation:             Attending ED Notes:   6:27 PM  Spoke with Dr. Guan in nephrology. Repeat metabolic panel will be completed and patient will be admitted to internal medicine. No emergent hemodialysis necessary at this time.  I, Dr. Malcolm, personally performed the services described in this documentation. All medical record entries made by the scribe were at my direction and in my presence.  I have reviewed the chart and agree that the record reflects my personal performance and is accurate and complete.7:37 PM 04/25/2020            ED Course as of Apr 25 1934   Sat Apr 25, 2020   1730 SpO2: 100 % [EF]   1730 Resp: 20 [EF]   1730 Pulse: 93 [EF]   1730 Temp src: Oral [EF]   1730 Temp: 98.6 °F (37 °C) [EF]   1730 BP: 138/71 [EF]   1755 Patient was transferred from [facility] for potential emergent hemodialysis.  At this time she is well-appearing, she is not confused she has no nausea no vomiting.  Metabolic panel from earlier today demonstrates elevated BUN but she shows no signs of uremia clinically.  Creatinine was 4.1.  In my opinion she does not need emergent dialysis access placed at this time.  Patient is also refusing to have a dialysis catheter placed.  I did speak with Dr. Guan [facility]hal[facility] and updated them on the patient's status.  Patient will be admitted to Hospital Medicine.  No IV fluids to be given per Nephrology.    [EF]   1905 Sodium(!): 135 [EF]   1905 Potassium(!): 3.2 [EF]   1905 Chloride(!): 94 [EF]   1905 CO2(!): 22 [EF]   1905 Glucose(!): 112 [EF]   1905 BUN, Bld(!): 63 [EF]   1905 Creatinine(!): 4.0 [EF]   1905 Calcium: 10.3 [EF]   1905  Anion Gap(!): 19 [EF]   1909 Francia to admit    [EF]      ED Course User Index  [EF] Luis Angel Malcolm MD                Clinical Impression:   No diagnosis found.                    55-year-old female status post recent lengthy hospitalization for COVID who required dialysis presents for worsening renal function.  Nephrology spoke with me before arrival of the patient.  There was some concern for confusion and decreased urinary output but the patient adamantly denies these symptoms.  She is pleasant jovial well-appearing in the ER at this time.  No shortness of breath at rest, no signs of uremia at this time.  She does not need emergent hemodialysis and does not needed dialysis catheter placed emergently in the ER.  She will be admitted to Hospital Medicine for monitoring of her mental status urine output and labs.         Luis Angel Malcolm MD  04/25/20 1938       Luis Angel Malcolm MD  04/25/20 1951

## 2020-04-26 PROBLEM — E87.1 HYPONATREMIA: Status: ACTIVE | Noted: 2020-04-26

## 2020-04-26 PROBLEM — Z86.16 HISTORY OF 2019 NOVEL CORONAVIRUS DISEASE (COVID-19): Status: ACTIVE | Noted: 2020-04-26

## 2020-04-26 PROBLEM — E87.6 HYPOKALEMIA: Status: ACTIVE | Noted: 2020-04-26

## 2020-04-26 LAB
ALBUMIN SERPL BCP-MCNC: 4.2 G/DL (ref 3.5–5.2)
ALBUMIN SERPL BCP-MCNC: 4.2 G/DL (ref 3.5–5.2)
ALP SERPL-CCNC: 131 U/L (ref 55–135)
ALP SERPL-CCNC: 133 U/L (ref 55–135)
ALT SERPL W/O P-5'-P-CCNC: 23 U/L (ref 10–44)
ALT SERPL W/O P-5'-P-CCNC: 28 U/L (ref 10–44)
ANION GAP SERPL CALC-SCNC: 20 MMOL/L (ref 8–16)
ANION GAP SERPL CALC-SCNC: 21 MMOL/L (ref 8–16)
AST SERPL-CCNC: 19 U/L (ref 10–40)
AST SERPL-CCNC: 22 U/L (ref 10–40)
BASOPHILS # BLD AUTO: 0.22 K/UL (ref 0–0.2)
BASOPHILS NFR BLD: 2.4 % (ref 0–1.9)
BILIRUB SERPL-MCNC: 0.6 MG/DL (ref 0.1–1)
BILIRUB SERPL-MCNC: 0.6 MG/DL (ref 0.1–1)
BNP SERPL-MCNC: 20 PG/ML (ref 0–99)
BUN SERPL-MCNC: 68 MG/DL (ref 6–20)
BUN SERPL-MCNC: 70 MG/DL (ref 6–20)
CALCIUM SERPL-MCNC: 10.2 MG/DL (ref 8.7–10.5)
CALCIUM SERPL-MCNC: 10.3 MG/DL (ref 8.7–10.5)
CHLORIDE SERPL-SCNC: 94 MMOL/L (ref 95–110)
CHLORIDE SERPL-SCNC: 95 MMOL/L (ref 95–110)
CO2 SERPL-SCNC: 19 MMOL/L (ref 23–29)
CO2 SERPL-SCNC: 19 MMOL/L (ref 23–29)
CREAT SERPL-MCNC: 4.6 MG/DL (ref 0.5–1.4)
CREAT SERPL-MCNC: 4.7 MG/DL (ref 0.5–1.4)
DIFFERENTIAL METHOD: ABNORMAL
EOSINOPHIL # BLD AUTO: 0.3 K/UL (ref 0–0.5)
EOSINOPHIL NFR BLD: 3.2 % (ref 0–8)
ERYTHROCYTE [DISTWIDTH] IN BLOOD BY AUTOMATED COUNT: 20.6 % (ref 11.5–14.5)
EST. GFR  (AFRICAN AMERICAN): 11 ML/MIN/1.73 M^2
EST. GFR  (AFRICAN AMERICAN): 12 ML/MIN/1.73 M^2
EST. GFR  (NON AFRICAN AMERICAN): 10 ML/MIN/1.73 M^2
EST. GFR  (NON AFRICAN AMERICAN): 10 ML/MIN/1.73 M^2
GLUCOSE SERPL-MCNC: 108 MG/DL (ref 70–110)
GLUCOSE SERPL-MCNC: 110 MG/DL (ref 70–110)
HCT VFR BLD AUTO: 27.1 % (ref 37–48.5)
HGB BLD-MCNC: 8.2 G/DL (ref 12–16)
IMM GRANULOCYTES # BLD AUTO: 0.05 K/UL (ref 0–0.04)
IMM GRANULOCYTES NFR BLD AUTO: 0.5 % (ref 0–0.5)
LACTATE SERPL-SCNC: 0.9 MMOL/L (ref 0.5–2.2)
LYMPHOCYTES # BLD AUTO: 1 K/UL (ref 1–4.8)
LYMPHOCYTES NFR BLD: 10.9 % (ref 18–48)
MAGNESIUM SERPL-MCNC: 1.7 MG/DL (ref 1.6–2.6)
MAGNESIUM SERPL-MCNC: 1.8 MG/DL (ref 1.6–2.6)
MCH RBC QN AUTO: 27 PG (ref 27–31)
MCHC RBC AUTO-ENTMCNC: 30.3 G/DL (ref 32–36)
MCV RBC AUTO: 89 FL (ref 82–98)
MONOCYTES # BLD AUTO: 0.9 K/UL (ref 0.3–1)
MONOCYTES NFR BLD: 9.4 % (ref 4–15)
NEUTROPHILS # BLD AUTO: 6.8 K/UL (ref 1.8–7.7)
NEUTROPHILS NFR BLD: 73.6 % (ref 38–73)
NRBC BLD-RTO: 0 /100 WBC
PHOSPHATE SERPL-MCNC: 6.5 MG/DL (ref 2.7–4.5)
PHOSPHATE SERPL-MCNC: 7.3 MG/DL (ref 2.7–4.5)
PLATELET # BLD AUTO: 557 K/UL (ref 150–350)
PMV BLD AUTO: 11.3 FL (ref 9.2–12.9)
POTASSIUM SERPL-SCNC: 3.3 MMOL/L (ref 3.5–5.1)
POTASSIUM SERPL-SCNC: 3.7 MMOL/L (ref 3.5–5.1)
PROT SERPL-MCNC: 9.3 G/DL (ref 6–8.4)
PROT SERPL-MCNC: 9.6 G/DL (ref 6–8.4)
RBC # BLD AUTO: 3.04 M/UL (ref 4–5.4)
SODIUM SERPL-SCNC: 134 MMOL/L (ref 136–145)
SODIUM SERPL-SCNC: 134 MMOL/L (ref 136–145)
TSH SERPL DL<=0.005 MIU/L-ACNC: 3.07 UIU/ML (ref 0.4–4)
WBC # BLD AUTO: 9.19 K/UL (ref 3.9–12.7)

## 2020-04-26 PROCEDURE — 84443 ASSAY THYROID STIM HORMONE: CPT

## 2020-04-26 PROCEDURE — 80053 COMPREHEN METABOLIC PANEL: CPT

## 2020-04-26 PROCEDURE — 25000003 PHARM REV CODE 250: Performed by: THORACIC SURGERY (CARDIOTHORACIC VASCULAR SURGERY)

## 2020-04-26 PROCEDURE — 83880 ASSAY OF NATRIURETIC PEPTIDE: CPT

## 2020-04-26 PROCEDURE — 85025 COMPLETE CBC W/AUTO DIFF WBC: CPT

## 2020-04-26 PROCEDURE — 97162 PT EVAL MOD COMPLEX 30 MIN: CPT

## 2020-04-26 PROCEDURE — 12000002 HC ACUTE/MED SURGE SEMI-PRIVATE ROOM

## 2020-04-26 PROCEDURE — 83735 ASSAY OF MAGNESIUM: CPT | Mod: 91

## 2020-04-26 PROCEDURE — 87070 CULTURE OTHR SPECIMN AEROBIC: CPT

## 2020-04-26 PROCEDURE — 63600175 PHARM REV CODE 636 W HCPCS: Performed by: THORACIC SURGERY (CARDIOTHORACIC VASCULAR SURGERY)

## 2020-04-26 PROCEDURE — 63700000 PHARM REV CODE 250 ALT 637 W/O HCPCS: Performed by: THORACIC SURGERY (CARDIOTHORACIC VASCULAR SURGERY)

## 2020-04-26 PROCEDURE — 97116 GAIT TRAINING THERAPY: CPT

## 2020-04-26 PROCEDURE — 99900035 HC TECH TIME PER 15 MIN (STAT)

## 2020-04-26 PROCEDURE — 63600175 PHARM REV CODE 636 W HCPCS: Performed by: NURSE PRACTITIONER

## 2020-04-26 PROCEDURE — 83605 ASSAY OF LACTIC ACID: CPT

## 2020-04-26 PROCEDURE — 25000003 PHARM REV CODE 250: Performed by: INTERNAL MEDICINE

## 2020-04-26 PROCEDURE — 63700000 PHARM REV CODE 250 ALT 637 W/O HCPCS: Performed by: NURSE PRACTITIONER

## 2020-04-26 PROCEDURE — 87040 BLOOD CULTURE FOR BACTERIA: CPT

## 2020-04-26 PROCEDURE — 25000003 PHARM REV CODE 250: Performed by: NURSE PRACTITIONER

## 2020-04-26 PROCEDURE — 84100 ASSAY OF PHOSPHORUS: CPT

## 2020-04-26 PROCEDURE — 36415 COLL VENOUS BLD VENIPUNCTURE: CPT

## 2020-04-26 RX ORDER — HYDRALAZINE HYDROCHLORIDE 25 MG/1
25 TABLET, FILM COATED ORAL EVERY 8 HOURS
Status: DISCONTINUED | OUTPATIENT
Start: 2020-04-26 | End: 2020-05-02 | Stop reason: HOSPADM

## 2020-04-26 RX ORDER — POTASSIUM CHLORIDE 20 MEQ/1
40 TABLET, EXTENDED RELEASE ORAL ONCE
Status: COMPLETED | OUTPATIENT
Start: 2020-04-26 | End: 2020-04-26

## 2020-04-26 RX ORDER — PANTOPRAZOLE SODIUM 40 MG/1
40 TABLET, DELAYED RELEASE ORAL DAILY
Status: DISCONTINUED | OUTPATIENT
Start: 2020-04-26 | End: 2020-05-02 | Stop reason: HOSPADM

## 2020-04-26 RX ADMIN — PANTOPRAZOLE SODIUM 40 MG: 40 TABLET, DELAYED RELEASE ORAL at 11:04

## 2020-04-26 RX ADMIN — METOPROLOL TARTRATE 25 MG: 25 TABLET, FILM COATED ORAL at 09:04

## 2020-04-26 RX ADMIN — FLUCONAZOLE 300 MG: 100 TABLET ORAL at 11:04

## 2020-04-26 RX ADMIN — HEPARIN SODIUM 5000 UNITS: 5000 INJECTION INTRAVENOUS; SUBCUTANEOUS at 09:04

## 2020-04-26 RX ADMIN — Medication 250 MG: at 06:04

## 2020-04-26 RX ADMIN — HYDRALAZINE HYDROCHLORIDE 25 MG: 25 TABLET, FILM COATED ORAL at 03:04

## 2020-04-26 RX ADMIN — ONDANSETRON 4 MG: 2 INJECTION INTRAMUSCULAR; INTRAVENOUS at 09:04

## 2020-04-26 RX ADMIN — SODIUM BICARBONATE 650 MG TABLET 650 MG: at 09:04

## 2020-04-26 RX ADMIN — METOPROLOL TARTRATE 25 MG: 25 TABLET, FILM COATED ORAL at 11:04

## 2020-04-26 RX ADMIN — CALCITRIOL CAPSULES 0.25 MCG 0.25 MCG: 0.25 CAPSULE ORAL at 11:04

## 2020-04-26 RX ADMIN — ASPIRIN 81 MG: 81 TABLET, COATED ORAL at 11:04

## 2020-04-26 RX ADMIN — SODIUM BICARBONATE 650 MG TABLET 650 MG: at 03:04

## 2020-04-26 RX ADMIN — LEVOTHYROXINE SODIUM 150 MCG: 150 TABLET ORAL at 06:04

## 2020-04-26 RX ADMIN — SODIUM BICARBONATE 650 MG TABLET 650 MG: at 11:04

## 2020-04-26 RX ADMIN — HEPARIN SODIUM 5000 UNITS: 5000 INJECTION INTRAVENOUS; SUBCUTANEOUS at 11:04

## 2020-04-26 RX ADMIN — HYDRALAZINE HYDROCHLORIDE 25 MG: 25 TABLET, FILM COATED ORAL at 09:04

## 2020-04-26 RX ADMIN — FERROUS SULFATE TAB EC 325 MG (65 MG FE EQUIVALENT) 325 MG: 325 (65 FE) TABLET DELAYED RESPONSE at 11:04

## 2020-04-26 RX ADMIN — POTASSIUM CHLORIDE 40 MEQ: 1500 TABLET, EXTENDED RELEASE ORAL at 11:04

## 2020-04-26 RX ADMIN — Medication 250 MG: at 11:04

## 2020-04-26 NOTE — ASSESSMENT & PLAN NOTE
Required HD but has been off since 4/17.   Electrolyte derangements per nephrology   Follow renal panel and electrolytes closely.  Follow renal recommendations.  Renal diet.  Adjust renal dose medications for Estimated Creatinine Clearance: 14.4 mL/min (A) (based on SCr of 4.6 mg/dL (H)).   Avoid NSAIDs, Pace-II inhibitors, ACE-I, Angiotensin Receptor Blockers, or Aminoglycosides.  No indication for emergent HD.

## 2020-04-26 NOTE — ASSESSMENT & PLAN NOTE
Patient has chronic hypothyroidism. TFTs reviewed-   Lab Results   Component Value Date    TSH 3.072 04/26/2020   . Will continue chronic levothyroxine and adjust for and clinical changes.

## 2020-04-26 NOTE — ASSESSMENT & PLAN NOTE
Patient has hypokalemia which is currently uncontrolled. Last electrolytes reviewed-   Recent Labs   Lab 04/24/20  0823 04/25/20  0545 04/25/20  1830   K 3.4* 3.4* 3.2*   . Will replace potassium and monitor electrolytes closely. Continuous telemetry.  Will follow nephrology recommendations given patient's declining renal status.

## 2020-04-26 NOTE — H&P
[facility] Hospital Medicine  History & Physical    Patient Name: Maria Victoria Hernandez  MRN: [id number]  Admission Date: 4/25/2020  Attending Physician: Alverto Alexander MD   Primary Care Provider: Candice Deluna MD         Patient information was obtained from patient, past medical records and ER records.     Subjective:     Principal Problem:Acute renal failure    Chief Complaint:   Chief Complaint   Patient presents with    presents for dialysis access        HPI: Maria Victoria Hernandez is a 55 y.o. female with a PMHx of hypothyroidism, prediabetes, anemia, renal failure, and recent COVID-19 infection who presents to the ED from [facility] for possible dialysis line placement and emergent dialysis.  The patient was recently hospitalized here for COVID-19 for approximately 1 month, during that time she was intubated and required dialysis for renal failure. She also had line infection and is still receiving oral diflucan. Her kidney function improved; she was sent to LTAC and her dialysis access was removed. Creatinine levels have increased from several days ago. Per LTAC report patient had worsening renal function, intermittent confusion, and decreasing urine output. The patient adamantly denies any confusion or decrease in urine output. The patient does endorse slight weakness and SOB with exertion which has been unchanged since discharge. The patient also reports diarrhea secondary to c diff infection, but states her diarrhea has improved over the last few days. She is currently receiving oral vancomycin with her last day of treatment being tomorrow. The patient denies any N/V, abdominal pain, confusion, cough, fever/chills, chest pain, lower leg edema, or syncope. Her ED work up is significant for mild hyponatremia, hypokalemia, increased BUN/creatinine, and anemia. The ED physician discussed the case with Dr. Moeller who agreed that dialysis was not necessary at this point.  The patient will be placed in observation under hospital medicine for further work up and evaluation.     Past Medical History:   Diagnosis Date    Colon polyp     Diverticulosis large intestine w/o perforation or abscess w/bleeding     Iron deficiency anemia     Prediabetes     Thyroid disease     Vitamin B 12 deficiency     Vitamin D deficiency        Past Surgical History:   Procedure Laterality Date    TUBAL LIGATION         Review of patient's allergies indicates:  No Known Allergies    Current Facility-Administered Medications on File Prior to Encounter   Medication    [DISCONTINUED] 0.9%  NaCl infusion    [DISCONTINUED] acetaminophen tablet 650 mg    [DISCONTINUED] albuterol-ipratropium 2.5 mg-0.5 mg/3 mL nebulizer solution 3 mL    [DISCONTINUED] ascorbic acid (vitamin C) tablet 1,000 mg    [DISCONTINUED] aspirin EC tablet 81 mg    [DISCONTINUED] bisacodyL suppository 10 mg    [DISCONTINUED] calcitRIOL capsule 0.25 mcg    [DISCONTINUED] epoetin raquel-epbx injection 5,000 Units    [DISCONTINUED] escitalopram oxalate tablet 5 mg    [DISCONTINUED] ferrous sulfate tablet 650 mg    [DISCONTINUED] fluconazole tablet 300 mg    [DISCONTINUED] HYDROcodone-acetaminophen 5-325 mg per tablet 1 tablet    [DISCONTINUED] L.acidophil,parac-S.therm-Bif. (Risaquad) Cap 1 capsule    [DISCONTINUED] L.acidophil,parac-S.therm-Bif. (Risaquad) Cap 1 capsule    [DISCONTINUED] levothyroxine tablet 100 mcg    [DISCONTINUED] metoprolol tartrate (LOPRESSOR) tablet 25 mg    [DISCONTINUED] mineral oil enema 1 enema    [DISCONTINUED] ondansetron injection 4 mg    [DISCONTINUED] pantoprazole injection 40 mg    [DISCONTINUED] pneumoc 13-syed conj-dip cr(PF) (PREVNAR 13 (PF)) 0.5 mL    [DISCONTINUED] polyethylene glycol packet 17 g    [DISCONTINUED] prochlorperazine injection Soln 5 mg    [DISCONTINUED] sodium chloride 0.9% flush 10 mL    [DISCONTINUED] torsemide tablet 20 mg    [DISCONTINUED] vancomycin 250mg /  10ml oral suspension 250 mg    [DISCONTINUED] zinc sulfate capsule 220 mg     Current Outpatient Medications on File Prior to Encounter   Medication Sig    acetaminophen (TYLENOL) 325 MG tablet Take 2 tablets (650 mg total) by mouth every 6 (six) hours as needed.    ascorbic acid, vitamin C, (VITAMIN C) 1000 MG tablet Take 1 tablet (1,000 mg total) by mouth 4 (four) times daily.    aspirin (ECOTRIN) 81 MG EC tablet Take 1 tablet (81 mg total) by mouth once daily.    calcitRIOL (ROCALTROL) 0.25 MCG Cap Take 1 capsule (0.25 mcg total) by mouth once daily.    dextrose 5 % SolP 50 mL with promethazine 25 mg/mL Soln 12.5 mg Inject 12.5 mg into the vein every 6 (six) hours as needed.    epoetin raquel-epbx (RETACRIT) 10,000 unit/mL imjection Inject 0.5 mLs (5,000 Units total) into the skin every Mon, Wed, Fri.    [START ON 5/6/2020] escitalopram oxalate (LEXAPRO) 5 MG Tab 1 tablet (5 mg total) by Per NG tube route once daily.    ferrous sulfate 325 mg (65 mg iron) Tab tablet Take 1 tablet (325 mg total) by mouth daily with breakfast. (Patient taking differently: Take 325 mg by mouth daily with breakfast. Take 2 tablets daily)    fluconazole (DIFLUCAN) 150 MG Tab Take 2 tablets (300 mg total) by mouth once daily. for 13 days    furosemide (LASIX) 40 MG tablet Take 1 tablet (40 mg total) by mouth 2 (two) times daily.    heparin sodium,porcine (HEPARIN, PORCINE,) 1,000 unit/mL injection Inject 4 mLs (4,000 Units total) into the vein as needed (Lock ports after every HD).    heparin sodium,porcine (HEPARIN, PORCINE,) 5,000 unit/mL injection Inject 1 mL (5,000 Units total) into the skin every 12 (twelve) hours.    hydrALAZINE (APRESOLINE) 20 mg/mL injection Inject 0.5 mLs (10 mg total) into the vein every 8 (eight) hours as needed (165).    hydrALAZINE (APRESOLINE) 50 MG tablet Take 1 tablet (50 mg total) by mouth every 8 (eight) hours.    ipratropium-albuteroL (COMBIVENT)  mcg/actuation inhaler Inhale 1  puff into the lungs every 6 (six) hours as needed for Wheezing or Shortness of Breath. Rescue    levothyroxine (SYNTHROID) 100 MCG tablet Take 1 tablet (100 mcg total) by mouth once daily. (Patient taking differently: Take 150 mcg by mouth once daily. )    metoprolol tartrate (LOPRESSOR) 25 MG tablet Take 1 tablet (25 mg total) by mouth 2 (two) times daily.    ondansetron 4 mg/2 mL Soln Inject 4 mg into the vein every 8 (eight) hours as needed.    sodium bicarbonate 650 MG tablet Take 1 tablet (650 mg total) by mouth 3 (three) times daily.    [] vancomycin 250mg / 10ml Susp Take 10 mLs (250 mg total) by mouth every 6 (six) hours. for 4 days    white petrolatum-mineral oiL 83-15 % Oint Place into the right eye every evening.    zinc sulfate (ZINCATE) 220 (50) mg capsule Take 1 capsule (220 mg total) by mouth once daily.     Family History     Problem Relation (Age of Onset)    Heart disease Mother, Maternal Grandmother, Maternal Grandfather    Mental illness Paternal Grandmother        Tobacco Use    Smoking status: Never Smoker    Smokeless tobacco: Never Used   Substance and Sexual Activity    Alcohol use: No    Drug use: Not on file    Sexual activity: Not on file     Review of Systems   Constitutional: Positive for appetite change (decreased). Negative for activity change, chills, diaphoresis, fatigue and fever.   HENT: Negative for congestion, ear pain, rhinorrhea and sore throat.    Eyes: Negative for photophobia and visual disturbance.   Respiratory: Positive for shortness of breath (on exertion unchanged since discharge from the hospital). Negative for cough, chest tightness and wheezing.    Cardiovascular: Negative for chest pain, palpitations and leg swelling.   Gastrointestinal: Positive for diarrhea (improving, now having about 3 BMs/day). Negative for abdominal distention, abdominal pain, constipation, nausea and vomiting.   Genitourinary: Negative for decreased urine volume,  difficulty urinating, dysuria and frequency.   Musculoskeletal: Negative for arthralgias, gait problem, myalgias and neck pain.   Skin: Negative for color change, pallor, rash and wound.   Neurological: Positive for weakness (generalized). Negative for dizziness, syncope, light-headedness, numbness and headaches.   Psychiatric/Behavioral: Negative for agitation, confusion and hallucinations. The patient is not nervous/anxious.      Objective:     Vital Signs (Most Recent):  Temp: 98.6 °F (37 °C) (04/25/20 2320)  Pulse: 97 (04/25/20 2320)  Resp: 18 (04/25/20 2320)  BP: 133/71 (04/25/20 2320)  SpO2: 99 % (04/25/20 2320) Vital Signs (24h Range):  Temp:  [96 °F (35.6 °C)-98.6 °F (37 °C)] 98.6 °F (37 °C)  Pulse:  [] 97  Resp:  [16-20] 18  SpO2:  [96 %-100 %] 99 %  BP: (127-144)/(63-79) 133/71     Weight: 95.1 kg (209 lb 10.5 oz)  Body mass index is 40.95 kg/m².    Physical Exam   Constitutional: She is oriented to person, place, and time. She appears well-developed and well-nourished. No distress.   HENT:   Head: Normocephalic and atraumatic.   Right Ear: External ear normal.   Left Ear: External ear normal.   Mouth/Throat: Dental caries present.   Eyes: Pupils are equal, round, and reactive to light. Right conjunctiva has a hemorrhage.   Neck: Normal range of motion. No JVD present.   L IJ central line in place without bleeding or swelling to site   Cardiovascular:   No murmur heard.  Pulmonary/Chest: Effort normal and breath sounds normal. No tachypnea. No respiratory distress. She has no wheezes. She has no rales.   Lungs CTA bilaterally, currently on room air   Abdominal: Soft. Bowel sounds are normal. She exhibits no distension. There is no tenderness.   obese   Genitourinary:   Genitourinary Comments: deferred   Musculoskeletal: Normal range of motion.   Generalized weakness   Neurological: She is alert and oriented to person, place, and time. No sensory deficit.   No focal neurological deficits noted.    Skin: Skin is warm and dry. Capillary refill takes less than 2 seconds. She is not diaphoretic.   Psychiatric: She has a normal mood and affect. Her behavior is normal. Thought content normal.   Nursing note and vitals reviewed.        CRANIAL NERVES     CN III, IV, VI   Pupils are equal, round, and reactive to light.       Significant Labs:   CBC:   Recent Labs   Lab 04/24/20  0823 04/25/20  1830   WBC 8.64 9.38   HGB 7.8* 8.1*   HCT 25.8* 27.1*   * 569*     CMP:   Recent Labs   Lab 04/24/20  0823 04/25/20  0545 04/25/20  1830    135* 135*   K 3.4* 3.4* 3.2*   CL 98 94* 94*   CO2 23 21* 22*   GLU 93 134* 112*   BUN 57* 61* 63*   CREATININE 3.1* 4.0* 4.0*   CALCIUM 10.1 10.5 10.3   ALBUMIN 4.2 4.3  --    ANIONGAP 16 20* 19*   EGFRNONAA 16* 12* 12*     Urine Studies:   Recent Labs   Lab 04/25/20  1926   COLORU Yellow   APPEARANCEUA Clear   PHUR 6.0   SPECGRAV 1.020   PROTEINUA Negative   GLUCUA Negative   KETONESU Negative   BILIRUBINUA Negative   OCCULTUA Negative   NITRITE Negative   UROBILINOGEN Negative   LEUKOCYTESUR Negative     All pertinent labs within the past 24 hours have been reviewed.    Significant Imaging: I have reviewed and interpreted all pertinent imaging results/findings within the past 24 hours.    Assessment/Plan:     * Acute renal failure  Required HD but has been off since 4/17.   Electrolyte derangements per nephrology   Follow renal panel and electrolytes closely.  Follow renal recommendations.  Renal diet.  Adjust renal dose medications for Estimated Creatinine Clearance: 16.1 mL/min (A) (based on SCr of 4 mg/dL (H)).   Avoid NSAIDs, Pace-II inhibitors, ACE-I, Angiotensin Receptor Blockers, or Aminoglycosides.  Urinalysis.  Check  Urine Na, Cr, Protein.    History of 2019 novel coronavirus disease (COVID-19)  Recently discharged from hospital for COVID-19 infection with PNA. Completed abx and plaquenil. Currently COVID-19 negative. On room air.    Hypokalemia  Patient has  hypokalemia which is currently uncontrolled. Last electrolytes reviewed-   Recent Labs   Lab 04/24/20  0823 04/25/20  0545 04/25/20  1830   K 3.4* 3.4* 3.2*   . Will replace potassium and monitor electrolytes closely. Continuous telemetry.  Will follow nephrology recommendations given patient's declining renal status.    Hyponatremia  Mild, appears to be ongoing for the past week.  Urine na, urine osm  TSH.   Trend levels    Chronic combined systolic and diastolic heart failure  Last echo 04/13/2020  EF 35% grade 1 diastolic dysfunction  Resume home meds but hold diuretics given worsening renal failure.   Will continue to monitor volume status    Clostridium difficile colitis  - PCR positive on 4/12   - Continue po vanc until 4/26   - Loose stools but no diarrhea. Ok to d/c isolation per ID note on 4/22.    Central line infection, subsequent encounter  Consult ID.  Staph epi and C. Albicans      Blood cultures negative as of 4/6      Vancomycin completed.     Fluconazole 300 mg po qday.        - plan to continue until 5/5 per ID.   New blood cx obtained. Patient currently has L IJ central line in place.    Post viral debility  Improving. Continue PT/OT.    Iron deficiency anemia, unspecified  Patient's anemia is currently controlled. S/p 0 units of PRBCs.   Current CBC reviewed-   Lab Results   Component Value Date    HGB 8.1 (L) 04/25/2020    HCT 27.1 (L) 04/25/2020     Monitor serial CBC and transfuse if patient becomes hemodynamically unstable, symptomatic or H/H drops below 7/21.     Hypothyroid  Patient has chronic hypothyroidism. TFTs reviewed-   Lab Results   Component Value Date    TSH 2.082 03/31/2020   . Will continue chronic levothyroxine and adjust for and clinical changes.    Morbid obesity  Body mass index is 39.06 kg/m². Morbid obesity complicates all aspects of disease management from diagnostic modalities to treatment. Weight loss encouraged and health benefits explained to patient.      VTE Risk  Mitigation (From admission, onward)         Ordered     heparin (porcine) injection 5,000 Units  Every 12 hours      04/25/20 2242     IP VTE HIGH RISK PATIENT  Once      04/25/20 2242     Place sequential compression device  Until discontinued      04/25/20 2242                   Sylvia Feldman NP  Department of Hospital Medicine   [facility]

## 2020-04-26 NOTE — ASSESSMENT & PLAN NOTE
Body mass index is 40.95 kg/m². Morbid obesity complicates all aspects of disease management from diagnostic modalities to treatment. Weight loss encouraged and health benefits explained to patient.

## 2020-04-26 NOTE — CONSULTS
Consult Note  Infectious Disease    Reason for Consult:      HPI: Maria Victoria Hernandez is a 55 y.o. female known to me from prior admission, with past medical history of hypothyroidism, diabetes, diet controlled, vitamin-D deficiency, B12 deficiency, obesity, BMI of 40 (48 prior to covid)mildly diagnosed and treated for Coronavirus infection had then discharged to [facility].    Prior admission was complicated by MAIKOL, needing HD, Staphylococcus epidermidis bacteremia/line infection  (+ Bcx 04/05, + cath Cx on 04/06) and Candida albicans candidemia ( 04/04 - 04/06  neg 04/8, and C diff diarrhea on 04/12, CHF diastolic and systolic.    Patient has completed treatment with vancomycin for Staphylococcus epidermidis  She is still on Diflucan for candidemia till 05/05/2020  She is still on vancomycin by mouth for C diff to complete 10 days of treatment, which is till the end of April.    Patient was brought back because of decreased urine output, worsening kidney function, confusion, need for HD access placement  She continues to have generalized weakness, shortness of breath, worse with exertion  She was seen by nephrologist would like to observe and not start dialysis at this time.    Antibiotics (From admission, onward)    Start     Stop Route Frequency Ordered    04/26/20 0000  vancomycin 250mg / 10ml oral suspension 250 mg      04/26 2359 Oral Every 6 hours 04/25/20 2242        Antifungals (From admission, onward)    Start     Stop Route Frequency Ordered    04/26/20 0900  fluconazole tablet 300 mg      05/06 0859 Oral Daily 04/25/20 2242        Antivirals (From admission, onward)    None          Review of patient's allergies indicates:  No Known Allergies  Past Medical History:   Diagnosis Date    Colon polyp     Diverticulosis large intestine w/o perforation or abscess w/bleeding     Iron deficiency anemia     Prediabetes     Thyroid disease     Vitamin B 12 deficiency     Vitamin D deficiency      Past  Surgical History:   Procedure Laterality Date    TUBAL LIGATION       Social History     Socioeconomic History    Marital status:      Spouse name: Not on file    Number of children: Not on file    Years of education: Not on file    Highest education level: Not on file   Occupational History    Not on file   Social Needs    Financial resource strain: Not on file    Food insecurity:     Worry: Not on file     Inability: Not on file    Transportation needs:     Medical: Not on file     Non-medical: Not on file   Tobacco Use    Smoking status: Never Smoker    Smokeless tobacco: Never Used   Substance and Sexual Activity    Alcohol use: No    Drug use: Not on file    Sexual activity: Not on file   Lifestyle    Physical activity:     Days per week: Not on file     Minutes per session: Not on file    Stress: Not on file   Relationships    Social connections:     Talks on phone: Not on file     Gets together: Not on file     Attends [employer] service: Not on file     Active member of club or organization: Not on file     Attends meetings of clubs or organizations: Not on file     Relationship status: Not on file   Other Topics Concern    Not on file   Social History Narrative    Not on file     Family History   Problem Relation Age of Onset    Heart disease Mother     Heart disease Maternal Grandmother     Heart disease Maternal Grandfather     Mental illness Paternal Grandmother        Pertinent medications noted:     Review of Systems:  12 point review of systems negative.  Especially no fever no chills.  No cough no phlegm.  Her activities markedly improved and she is proud of it, rightfully so.  Stools are getting thicker.  EXAM & DIAGNOSTICS REVIEWED:   Vitals:     Temp:  [96 °F (35.6 °C)-98.6 °F (37 °C)]   Temp: 97.8 °F (36.6 °C) (04/26/20 0811)  Pulse: 95 (04/26/20 0811)  Resp: 16 (04/26/20 0811)  BP: 104/65 (04/26/20 0811)  SpO2: 100 % (04/26/20 0811)  No intake or output data in the  24 hours ending 04/26/20 0855    General:  In NAD. Alert and attentive, cooperative, comfortable  Eyes:  Anicteric, PERRL, EOMI  ENT:  No ulcers, exudates, thrush, nares patent, dentition is fair  Neck:  supple, no masses or adenopathy appreciated  Lungs: Clear, no consolidation, rales, wheezes, rub  Heart:  RRR, no gallop/murmur/rub noted  Abd:  Soft, NT, ND, normal BS, no masses or organomegaly appreciated.  :  Voids/Cuellar, urine clear, no flank tenderness  Musc:  Joints without effusion, swelling, erythema, synovitis, muscle wasting.   Skin:  No rashes. No palmar or plantar lesions. No subungual petechiae  Wound:   Neuro: Alert, attentive, speech fluent, face symmetric, moves all extremities, no focal weakness. Ambulatory  Psych:  Calm, cooperative  Lymphatic:     No cervical, supraclavicular, axillary, or inguinal nodes  Extrem: No edema, erythema, phlebitis, cellulitis, warm and well perfused  VAD:       Isolation:      Lines/Tubes/Drains:  Peripheral IVs only as I discontinued all lines in a.m..    General Labs reviewed:  Recent Labs   Lab 04/24/20  0823 04/25/20  1830 04/26/20  0611   WBC 8.64 9.38 9.19   HGB 7.8* 8.1* 8.2*   HCT 25.8* 27.1* 27.1*   * 569* 557*       Recent Labs   Lab 04/20/20  0405 04/21/20  0442  04/25/20  0545 04/25/20  1830 04/26/20  0611    137   < > 135* 135* 134*   K 3.8 4.0   < > 3.4* 3.2* 3.3*    100   < > 94* 94* 95   CO2 22* 22*   < > 21* 22* 19*   BUN 60* 54*   < > 61* 63* 68*   CREATININE 3.0* 2.8*   < > 4.0* 4.0* 4.6*   CALCIUM 9.7 10.0   < > 10.5 10.3 10.3   PROT 8.9* 9.2*  --   --   --  9.6*   BILITOT 0.7 0.7  --   --   --  0.6   ALKPHOS 129 132  --   --   --  131   ALT 23 25  --   --   --  28   AST 21 23  --   --   --  22    < > = values in this interval not displayed.           Micro:  Microbiology Results (last 7 days)     Procedure Component Value Units Date/Time    Blood culture [[id number]] Collected:  04/26/20 0611    Order Status:  Sent Specimen:   Blood Updated:  04/26/20 0612    Blood culture [[id number]] Collected:  04/26/20 0611    Order Status:  Sent Specimen:  Blood Updated:  04/26/20 0612        Imaging Reviewed:     Cardiology:    · Echocardiogram 04/13/2020  · Mild concentric left ventricular hypertrophy.  · Global hypokinetic wall motion.  · Moderately decreased left ventricular systolic function. The estimated ejection fraction is 35%.  · Grade I (mild) left ventricular diastolic dysfunction consistent with impaired relaxation.  · Normal right ventricular systolic function.  · Mild left atrial enlargement.  · Moderate mitral regurgitation.  · Normal central venous pressure (3 mmHg).       IMPRESSION & PLAN     Staphylococcus epidermidis bacteremia/line infection on prior admission (+ Bcx 04/05, + cath Cx on 04/06)-has completed treatment  Candida albicans candidemia on prior admission ( 04/04 - 04/06  neg 04/8) - she will complete treatment on 05/05.  C diff diarrhea on 04/12, on vancomycin by mouth times total of 10 days  Reason for this admission, transfer from LT[facility] to the hospital, questionable need for HD at this time.     PMHx CHF diastolic and systolic, hypothyroidism, diabetes, diet controlled, vitamin-D deficiency, B12 deficiency, obesity, BMI of 40 (48 prior to covid),  recent Coronavirus infection       Recommendations:  On 04/06/2020.  A.m. blood cultures were positive for C albicans.  Line was placed in the afternoon.  For that reason I recommend that all lines be discontinued and peripheral IV be kept for a day.  Continue Diflucan for treatment of candidemia  Continue vancomycin by mouth for treatment of C diff

## 2020-04-26 NOTE — ASSESSMENT & PLAN NOTE
Required HD but has been off since 4/17.   Electrolyte derangements per nephrology   Follow renal panel and electrolytes closely.  Follow renal recommendations.  Renal diet.  Adjust renal dose medications for Estimated Creatinine Clearance: 16.1 mL/min (A) (based on SCr of 4 mg/dL (H)).   Avoid NSAIDs, Pace-II inhibitors, ACE-I, Angiotensin Receptor Blockers, or Aminoglycosides.  Urinalysis.  Check  Urine Na, Cr, Protein.

## 2020-04-26 NOTE — SUBJECTIVE & OBJECTIVE
Past Medical History:   Diagnosis Date    Colon polyp     Diverticulosis large intestine w/o perforation or abscess w/bleeding     Iron deficiency anemia     Prediabetes     Thyroid disease     Vitamin B 12 deficiency     Vitamin D deficiency        Past Surgical History:   Procedure Laterality Date    TUBAL LIGATION         Review of patient's allergies indicates:  No Known Allergies    Current Facility-Administered Medications on File Prior to Encounter   Medication    [DISCONTINUED] 0.9%  NaCl infusion    [DISCONTINUED] acetaminophen tablet 650 mg    [DISCONTINUED] albuterol-ipratropium 2.5 mg-0.5 mg/3 mL nebulizer solution 3 mL    [DISCONTINUED] ascorbic acid (vitamin C) tablet 1,000 mg    [DISCONTINUED] aspirin EC tablet 81 mg    [DISCONTINUED] bisacodyL suppository 10 mg    [DISCONTINUED] calcitRIOL capsule 0.25 mcg    [DISCONTINUED] epoetin raquel-epbx injection 5,000 Units    [DISCONTINUED] escitalopram oxalate tablet 5 mg    [DISCONTINUED] ferrous sulfate tablet 650 mg    [DISCONTINUED] fluconazole tablet 300 mg    [DISCONTINUED] HYDROcodone-acetaminophen 5-325 mg per tablet 1 tablet    [DISCONTINUED] L.acidophil,parac-S.therm-Bif. (Risaquad) Cap 1 capsule    [DISCONTINUED] L.acidophil,parac-S.therm-Bif. (Risaquad) Cap 1 capsule    [DISCONTINUED] levothyroxine tablet 100 mcg    [DISCONTINUED] metoprolol tartrate (LOPRESSOR) tablet 25 mg    [DISCONTINUED] mineral oil enema 1 enema    [DISCONTINUED] ondansetron injection 4 mg    [DISCONTINUED] pantoprazole injection 40 mg    [DISCONTINUED] pneumoc 13-syed conj-dip cr(PF) (PREVNAR 13 (PF)) 0.5 mL    [DISCONTINUED] polyethylene glycol packet 17 g    [DISCONTINUED] prochlorperazine injection Soln 5 mg    [DISCONTINUED] sodium chloride 0.9% flush 10 mL    [DISCONTINUED] torsemide tablet 20 mg    [DISCONTINUED] vancomycin 250mg / 10ml oral suspension 250 mg    [DISCONTINUED] zinc sulfate capsule 220 mg     Current Outpatient  Medications on File Prior to Encounter   Medication Sig    acetaminophen (TYLENOL) 325 MG tablet Take 2 tablets (650 mg total) by mouth every 6 (six) hours as needed.    ascorbic acid, vitamin C, (VITAMIN C) 1000 MG tablet Take 1 tablet (1,000 mg total) by mouth 4 (four) times daily.    aspirin (ECOTRIN) 81 MG EC tablet Take 1 tablet (81 mg total) by mouth once daily.    calcitRIOL (ROCALTROL) 0.25 MCG Cap Take 1 capsule (0.25 mcg total) by mouth once daily.    dextrose 5 % SolP 50 mL with promethazine 25 mg/mL Soln 12.5 mg Inject 12.5 mg into the vein every 6 (six) hours as needed.    epoetin raquel-epbx (RETACRIT) 10,000 unit/mL imjection Inject 0.5 mLs (5,000 Units total) into the skin every Mon, Wed, Fri.    [START ON 5/6/2020] escitalopram oxalate (LEXAPRO) 5 MG Tab 1 tablet (5 mg total) by Per NG tube route once daily.    ferrous sulfate 325 mg (65 mg iron) Tab tablet Take 1 tablet (325 mg total) by mouth daily with breakfast. (Patient taking differently: Take 325 mg by mouth daily with breakfast. Take 2 tablets daily)    fluconazole (DIFLUCAN) 150 MG Tab Take 2 tablets (300 mg total) by mouth once daily. for 13 days    furosemide (LASIX) 40 MG tablet Take 1 tablet (40 mg total) by mouth 2 (two) times daily.    heparin sodium,porcine (HEPARIN, PORCINE,) 1,000 unit/mL injection Inject 4 mLs (4,000 Units total) into the vein as needed (Lock ports after every HD).    heparin sodium,porcine (HEPARIN, PORCINE,) 5,000 unit/mL injection Inject 1 mL (5,000 Units total) into the skin every 12 (twelve) hours.    hydrALAZINE (APRESOLINE) 20 mg/mL injection Inject 0.5 mLs (10 mg total) into the vein every 8 (eight) hours as needed (165).    hydrALAZINE (APRESOLINE) 50 MG tablet Take 1 tablet (50 mg total) by mouth every 8 (eight) hours.    ipratropium-albuteroL (COMBIVENT)  mcg/actuation inhaler Inhale 1 puff into the lungs every 6 (six) hours as needed for Wheezing or Shortness of Breath. Rescue     levothyroxine (SYNTHROID) 100 MCG tablet Take 1 tablet (100 mcg total) by mouth once daily. (Patient taking differently: Take 150 mcg by mouth once daily. )    metoprolol tartrate (LOPRESSOR) 25 MG tablet Take 1 tablet (25 mg total) by mouth 2 (two) times daily.    ondansetron 4 mg/2 mL Soln Inject 4 mg into the vein every 8 (eight) hours as needed.    sodium bicarbonate 650 MG tablet Take 1 tablet (650 mg total) by mouth 3 (three) times daily.    [] vancomycin 250mg / 10ml Susp Take 10 mLs (250 mg total) by mouth every 6 (six) hours. for 4 days    white petrolatum-mineral oiL 83-15 % Oint Place into the right eye every evening.    zinc sulfate (ZINCATE) 220 (50) mg capsule Take 1 capsule (220 mg total) by mouth once daily.     Family History     Problem Relation (Age of Onset)    Heart disease Mother, Maternal Grandmother, Maternal Grandfather    Mental illness Paternal Grandmother        Tobacco Use    Smoking status: Never Smoker    Smokeless tobacco: Never Used   Substance and Sexual Activity    Alcohol use: No    Drug use: Not on file    Sexual activity: Not on file     Review of Systems   Constitutional: Positive for appetite change (decreased). Negative for activity change, chills, diaphoresis, fatigue and fever.   HENT: Negative for congestion, ear pain, rhinorrhea and sore throat.    Eyes: Negative for photophobia and visual disturbance.   Respiratory: Positive for shortness of breath (on exertion unchanged since discharge from the hospital). Negative for cough, chest tightness and wheezing.    Cardiovascular: Negative for chest pain, palpitations and leg swelling.   Gastrointestinal: Positive for diarrhea (improving, now having about 3 BMs/day). Negative for abdominal distention, abdominal pain, constipation, nausea and vomiting.   Genitourinary: Negative for decreased urine volume, difficulty urinating, dysuria and frequency.   Musculoskeletal: Negative for arthralgias, gait problem,  myalgias and neck pain.   Skin: Negative for color change, pallor, rash and wound.   Neurological: Positive for weakness (generalized). Negative for dizziness, syncope, light-headedness, numbness and headaches.   Psychiatric/Behavioral: Negative for agitation, confusion and hallucinations. The patient is not nervous/anxious.      Objective:     Vital Signs (Most Recent):  Temp: 98.6 °F (37 °C) (04/25/20 2320)  Pulse: 97 (04/25/20 2320)  Resp: 18 (04/25/20 2320)  BP: 133/71 (04/25/20 2320)  SpO2: 99 % (04/25/20 2320) Vital Signs (24h Range):  Temp:  [96 °F (35.6 °C)-98.6 °F (37 °C)] 98.6 °F (37 °C)  Pulse:  [] 97  Resp:  [16-20] 18  SpO2:  [96 %-100 %] 99 %  BP: (127-144)/(63-79) 133/71     Weight: 95.1 kg (209 lb 10.5 oz)  Body mass index is 40.95 kg/m².    Physical Exam   Constitutional: She is oriented to person, place, and time. She appears well-developed and well-nourished. No distress.   HENT:   Head: Normocephalic and atraumatic.   Right Ear: External ear normal.   Left Ear: External ear normal.   Mouth/Throat: Dental caries present.   Eyes: Pupils are equal, round, and reactive to light. Right conjunctiva has a hemorrhage.   Neck: Normal range of motion. No JVD present.   L IJ central line in place without bleeding or swelling to site   Cardiovascular:   No murmur heard.  Pulmonary/Chest: Effort normal and breath sounds normal. No tachypnea. No respiratory distress. She has no wheezes. She has no rales.   Lungs CTA bilaterally, currently on room air   Abdominal: Soft. Bowel sounds are normal. She exhibits no distension. There is no tenderness.   obese   Genitourinary:   Genitourinary Comments: deferred   Musculoskeletal: Normal range of motion.   Generalized weakness   Neurological: She is alert and oriented to person, place, and time. No sensory deficit.   No focal neurological deficits noted.   Skin: Skin is warm and dry. Capillary refill takes less than 2 seconds. She is not diaphoretic.    Psychiatric: She has a normal mood and affect. Her behavior is normal. Thought content normal.   Nursing note and vitals reviewed.        CRANIAL NERVES     CN III, IV, VI   Pupils are equal, round, and reactive to light.       Significant Labs:   CBC:   Recent Labs   Lab 04/24/20  0823 04/25/20  1830   WBC 8.64 9.38   HGB 7.8* 8.1*   HCT 25.8* 27.1*   * 569*     CMP:   Recent Labs   Lab 04/24/20  0823 04/25/20  0545 04/25/20  1830    135* 135*   K 3.4* 3.4* 3.2*   CL 98 94* 94*   CO2 23 21* 22*   GLU 93 134* 112*   BUN 57* 61* 63*   CREATININE 3.1* 4.0* 4.0*   CALCIUM 10.1 10.5 10.3   ALBUMIN 4.2 4.3  --    ANIONGAP 16 20* 19*   EGFRNONAA 16* 12* 12*     Urine Studies:   Recent Labs   Lab 04/25/20  1926   COLORU Yellow   APPEARANCEUA Clear   PHUR 6.0   SPECGRAV 1.020   PROTEINUA Negative   GLUCUA Negative   KETONESU Negative   BILIRUBINUA Negative   OCCULTUA Negative   NITRITE Negative   UROBILINOGEN Negative   LEUKOCYTESUR Negative     All pertinent labs within the past 24 hours have been reviewed.    Significant Imaging: I have reviewed and interpreted all pertinent imaging results/findings within the past 24 hours.

## 2020-04-26 NOTE — PLAN OF CARE
"Plan of care review with pt, pt states "understanding," no redness/swelling noted to IV site, intermittent nausea/vomiting is control with current regimen, +1 edema palpated to BLE with + fortino pedal pulses, pt is encourage to elevate BLE on pillows while at rest, poor appetite noted AEB pt is consuming less than 25% of all meals, pt states "I just don't have an appetite", will continue to monitor, observe and note any changes, safety maintain    "

## 2020-04-26 NOTE — ASSESSMENT & PLAN NOTE
Patient's anemia is currently controlled. S/p 0 units of PRBCs.   Current CBC reviewed-   Lab Results   Component Value Date    HGB 8.2 (L) 04/26/2020    HCT 27.1 (L) 04/26/2020     Monitor serial CBC and transfuse if patient becomes hemodynamically unstable, symptomatic or H/H drops below 7/21.

## 2020-04-26 NOTE — ASSESSMENT & PLAN NOTE
Patient has chronic hypothyroidism. TFTs reviewed-   Lab Results   Component Value Date    TSH 2.082 03/31/2020   . Will continue chronic levothyroxine and adjust for and clinical changes.

## 2020-04-26 NOTE — PROGRESS NOTES
[facility] Hospital Medicine  Progress Note    Patient Name: Maria Victoria Hernandez  MRN: [id number]  Patient Class: IP- Inpatient   Admission Date: 4/25/2020  Length of Stay: 1 days  Attending Physician: Ferny Porter MD  Primary Care Provider: Candice Deluna MD        Subjective:     Principal Problem:Acute renal failure        HPI:  Maria Victoria Hernandez is a 55 y.o. female with a PMHx of hypothyroidism, prediabetes, anemia, renal failure, and recent COVID-19 infection who presents to the ED from [facility] for possible dialysis line placement and emergent dialysis.  The patient was recently hospitalized here for COVID-19 for approximately 1 month, during that time she was intubated and required dialysis for renal failure. She also had line infection and is still receiving oral diflucan. Her kidney function improved; she was sent to LTAC and her dialysis access was removed. Creatinine levels have increased from several days ago. Per LTAC report patient had worsening renal function, intermittent confusion, and decreasing urine output. The patient adamantly denies any confusion or decrease in urine output. The patient does endorse slight weakness and SOB with exertion which has been unchanged since discharge. The patient also reports diarrhea secondary to c diff infection, but states her diarrhea has improved over the last few days. She is currently receiving oral vancomycin with her last day of treatment being tomorrow. The patient denies any N/V, abdominal pain, confusion, cough, fever/chills, chest pain, lower leg edema, or syncope. Her ED work up is significant for mild hyponatremia, hypokalemia, increased BUN/creatinine, and anemia. The ED physician discussed the case with Dr. Moeller who agreed that dialysis was not necessary at this point. The patient will be placed in observation under hospital medicine for further work up and evaluation.     Overview/Hospital Course:  No notes  on file    Interval History: patient seen and examined. Does not seem confused this morning. Denies any complaints.     Review of Systems   Constitutional: Positive for fatigue. Negative for activity change, appetite change and fever.   HENT: Negative.    Eyes: Negative.    Respiratory: Positive for shortness of breath. Negative for chest tightness and wheezing.    Cardiovascular: Negative for chest pain, palpitations and leg swelling.   Gastrointestinal: Negative for abdominal distention, abdominal pain, blood in stool, diarrhea and vomiting.   Genitourinary: Negative for dysuria and hematuria.   Neurological: Negative for headaches.   Hematological: Negative for adenopathy.   Psychiatric/Behavioral: Negative for confusion.     Objective:     Vital Signs (Most Recent):  Temp: 97.8 °F (36.6 °C) (04/26/20 0811)  Pulse: 95 (04/26/20 0811)  Resp: 16 (04/26/20 0811)  BP: 104/65 (04/26/20 0811)  SpO2: 100 % (04/26/20 0811) Vital Signs (24h Range):  Temp:  [96 °F (35.6 °C)-98.6 °F (37 °C)] 97.8 °F (36.6 °C)  Pulse:  [] 95  Resp:  [16-20] 16  SpO2:  [96 %-100 %] 100 %  BP: (104-144)/(59-89) 104/65     Weight: 95.1 kg (209 lb 10.5 oz)  Body mass index is 40.95 kg/m².  No intake or output data in the 24 hours ending 04/26/20 1120   Physical Exam   Constitutional: She is oriented to person, place, and time. She appears well-developed and well-nourished. No distress.   HENT:   Head: Normocephalic and atraumatic.   Eyes: Pupils are equal, round, and reactive to light.   Neck: Neck supple. No thyromegaly present.   Cardiovascular: Normal rate and regular rhythm. Exam reveals no gallop and no friction rub.   No murmur heard.  Pulmonary/Chest: Effort normal and breath sounds normal. No respiratory distress. She has no wheezes.   Abdominal: Soft. Bowel sounds are normal. She exhibits no distension. There is no tenderness. There is no guarding.   Musculoskeletal: Normal range of motion. She exhibits no edema.   Neurological:  She is alert and oriented to person, place, and time.   Skin: Skin is warm and dry. No erythema.   Psychiatric: She has a normal mood and affect.       Significant Labs:   CBC:   Recent Labs   Lab 04/25/20  1830 04/26/20  0611   WBC 9.38 9.19   HGB 8.1* 8.2*   HCT 27.1* 27.1*   * 557*       Significant Imaging: I have reviewed all pertinent imaging results/findings within the past 24 hours.      Assessment/Plan:      * Acute renal failure  Required HD but has been off since 4/17.   Electrolyte derangements per nephrology   Follow renal panel and electrolytes closely.  Follow renal recommendations.  Renal diet.  Adjust renal dose medications for Estimated Creatinine Clearance: 14.4 mL/min (A) (based on SCr of 4.6 mg/dL (H)).   Avoid NSAIDs, Pace-II inhibitors, ACE-I, Angiotensin Receptor Blockers, or Aminoglycosides.  No indication for emergent HD.    History of 2019 novel coronavirus disease (COVID-19)  Recently discharged from hospital for COVID-19 infection with PNA. Completed abx and plaquenil. Currently COVID-19 negative. On room air.    Hypokalemia  Patient has hypokalemia which is currently uncontrolled. Last electrolytes reviewed-   Recent Labs   Lab 04/25/20  0545 04/25/20  1830 04/26/20  0611   K 3.4* 3.2* 3.3*   . Will replace potassium and monitor electrolytes closely. Continuous telemetry.  Will follow nephrology recommendations given patient's declining renal status.    Hyponatremia  Mild, appears to be ongoing for the past week.  Urine na, urine osm  TSH.   Trend levels    Chronic combined systolic and diastolic heart failure  Last echo 04/13/2020  EF 35% grade 1 diastolic dysfunction  Resume home meds but hold diuretics given worsening renal failure.   Will continue to monitor volume status    Clostridium difficile colitis  - PCR positive on 4/12   - Continue po vanc until 4/26   - Loose stools but no diarrhea. Ok to d/c isolation per ID note on 4/22.    Central line infection, subsequent  encounter  Consult ID.  Staph epi and C. Albicans      Blood cultures negative as of 4/6      Vancomycin completed.     Fluconazole 300 mg po qday.        - plan to continue until 5/5 per ID.   New blood cx obtained. Patient currently has L IJ central line in place.    Post viral debility  Improving. Continue PT/OT.    Iron deficiency anemia, unspecified  Patient's anemia is currently controlled. S/p 0 units of PRBCs.   Current CBC reviewed-   Lab Results   Component Value Date    HGB 8.2 (L) 04/26/2020    HCT 27.1 (L) 04/26/2020     Monitor serial CBC and transfuse if patient becomes hemodynamically unstable, symptomatic or H/H drops below 7/21.     Hypothyroid  Patient has chronic hypothyroidism. TFTs reviewed-   Lab Results   Component Value Date    TSH 3.072 04/26/2020   . Will continue chronic levothyroxine and adjust for and clinical changes.    Morbid obesity  Body mass index is 40.95 kg/m². Morbid obesity complicates all aspects of disease management from diagnostic modalities to treatment. Weight loss encouraged and health benefits explained to patient.      VTE Risk Mitigation (From admission, onward)         Ordered     heparin (porcine) injection 5,000 Units  Every 12 hours      04/25/20 2242     IP VTE HIGH RISK PATIENT  Once      04/25/20 2242     Place sequential compression device  Until discontinued      04/25/20 2242                      Alverto Alexander MD  Department of Hospital Medicine   [facility]

## 2020-04-26 NOTE — PT/OT/SLP EVAL
Physical Therapy Evaluation    Patient Name:  Maria Victoria Hernandez   MRN:  [id number]    Recommendations:     Discharge Recommendations:  LTACH (long-term acute care hospital)   Discharge Equipment Recommendations: walker, rolling   Barriers to discharge: Decreased caregiver support    Assessment:     Maria Victoria Hernandez is a 55 y.o. female admitted with a medical diagnosis of Acute renal failure.  She presents with the following impairments/functional limitations:  weakness, impaired endurance, impaired self care skills, impaired functional mobilty, gait instability, impaired balance, decreased lower extremity function, decreased safety awareness, impaired cardiopulmonary response to activity . Pt tolerated standing and few steps to chair limited by nausea/weakness. Pt admit from LTAC and will benefit from continued therapies.    Rehab Prognosis: Good; patient would benefit from acute skilled PT services to address these deficits and reach maximum level of function.    Recent Surgery: * No surgery found *      Plan:     During this hospitalization, patient to be seen 6 x/week to address the identified rehab impairments via gait training, therapeutic activities, therapeutic exercises and progress toward the following goals:    · Plan of Care Expires:  05/29/20    Subjective   Pt up in commode with nursing for BM  Pt known to PT from recent discharge to LTAC    Chief Complaint: nauseated/tired/c/o being hot  Patient/Family Comments/goals: get home  Pain/Comfort:  · Pain Rating 1: 0/10    Patients cultural, spiritual, Buddhist conflicts given the current situation:      Living Environment:  Home with family  Prior to admission, patients level of function was independent prior to recent admission and d/c with covid.  Equipment used at home: none.  DME owned (not currently used): none.  Upon discharge, patient will have assistance from family.    Objective:     Communicated with nurse Sol prior to  session.  Patient found commode with telemetry, pulse ox (continuous)  upon PT entry to room.    General Precautions: Standard, fall   Orthopedic Precautions:N/A   Braces: N/A     Exams:  · RLE ROM: WFL  · RLE Strength: Deficits: 3+/5  · LLE ROM: WFL  · LLE Strength: Deficits: 3+/5    Functional Mobility:  · Transfers:     · Sit to Stand:  minimum assistance with rolling walker  · Toilet Transfer: minimum assistance with  bedside commode  using  Stand Pivot  · Gait: 5ft with RW min assist with chair close      Therapeutic Activities and Exercises:   standing with assist for donning of diaper  Gait few steps with RW  OOB to chair  Socks off as pt c/o being hot    AM-PAC 6 CLICK MOBILITY  Total Score:16     Patient left up in chair with all lines intact, call button in reach, chair alarm on and nurse Sweta notified.    GOALS:   Multidisciplinary Problems     Physical Therapy Goals        Problem: Physical Therapy Goal    Goal Priority Disciplines Outcome Goal Variances Interventions   Physical Therapy Goal     PT, PT/OT Ongoing, Progressing     Description:  Goals to be met by: 2020     Patient will increase functional independence with mobility by performin. Supine to sit with Contact Guard Assistance  2. Sit to stand transfer with Contact Guard Assistance  3. Bed to chair transfer with Contact Guard Assistance using Rolling Walker  4. Gait  x 250 feet with Minimal Assistance using Rolling Walker.   5. Lower extremity exercise program x20 reps                    History:     Past Medical History:   Diagnosis Date    Colon polyp     Diverticulosis large intestine w/o perforation or abscess w/bleeding     Iron deficiency anemia     Prediabetes     Thyroid disease     Vitamin B 12 deficiency     Vitamin D deficiency        Past Surgical History:   Procedure Laterality Date    TUBAL LIGATION         Time Tracking:     PT Received On: 20  PT Start Time: 916     PT Stop Time: 933  PT Total Time  (min): 17 min     Billable Minutes: Evaluation 8 and Gait Training 9      Leah Ribeiro, PT  04/26/2020

## 2020-04-26 NOTE — NURSING TRANSFER
Nursing Transfer Note      4/25/2020     Transfer To: 404    Transfer via stretcher    Transfer with cardiac monitoring and personal belongings    Transported by ED Tech    Medicines sent: n/a    Chart send with patient: Yes    Notified: daughter    Patient reassessed at: 4/25, 2222    Upon arrival to floor: cardiac monitor applied, patient oriented to room, call bell in reach and bed in lowest position

## 2020-04-26 NOTE — CONSULTS
Consult Note  Nephrology    Consult Requested By: Ferny Porter MD    Reason for Consult: MAIKOL requiring initiation of RRT    SUBJECTIVE:     History of Present Illness:  54 y/o female patient s/p hospitalization for COVID 19.  She required RRT during that admit, was taken off dialysis on 4/17 d/t renal recovery.  She was transferred to LT once she was stable for discharge from hospital.    Seen yesterday at LTAC.  She had n/v for 2 days, uremia.  Scr had been trending up, was 4.0 yesterday. Transferred to hospital for line placement and re-initiation of RRT.  Today, Scr is 4.6.    Discussed w/pt the need for line placement and re-initiation of dialysis.  She is agreeable.    Assessment/plan:    1.  MAIKOL requiring initiation of RRT--consulted cardiothoracic surgeon for a line.  Notified Levi Santiago of pending HD for tomorrow.  Daily renal panel  2.  Anemia of chronic dz/Fe+ def--continue epo and Fe+ supplements  3.  Hypokalemia--was given repletion per primary team this am.  Will further correct with HD.  4.  SHPT--cont calcitriol.  Daily PO4 levels, may add binder. Renal diet.  5.  Metabolic acidosis--on bicarb, continue.  Will correct with HD    Past Medical History:   Diagnosis Date    Colon polyp     Diverticulosis large intestine w/o perforation or abscess w/bleeding     Iron deficiency anemia     Prediabetes     Thyroid disease     Vitamin B 12 deficiency     Vitamin D deficiency      Past Surgical History:   Procedure Laterality Date    TUBAL LIGATION       Family History   Problem Relation Age of Onset    Heart disease Mother     Heart disease Maternal Grandmother     Heart disease Maternal Grandfather     Mental illness Paternal Grandmother      Social History     Tobacco Use    Smoking status: Never Smoker    Smokeless tobacco: Never Used   Substance Use Topics    Alcohol use: No    Drug use: Not on file       Review of patient's allergies indicates:  No Known Allergies     Review  of Systems:  General ROS: negative for - fever or night sweats  Psychological ROS: negative for - behavioral disorder or depression  ENT ROS: negative for - headaches or visual changes  Hematological and Lymphatic ROS: negative for - bleeding problems or bruising  Endocrine ROS: negative for - temperature intolerance or unexpected weight changes  Respiratory ROS: no cough, shortness of breath, or wheezing  Cardiovascular ROS: no chest pain, SOB  Gastrointestinal ROS: no abdominal pain, no n/v/c/d  Genito-Urinary ROS: no dysuria or trouble voiding  Musculoskeletal ROS: negative for - joint pain or joint swelling  Neurological ROS: no TIA or stroke symptoms  Dermatological ROS: negative for rash and skin lesion changes    OBJECTIVE:     Vital Signs Range (Last 24H):  Temp:  [96 °F (35.6 °C)-98.6 °F (37 °C)]   Pulse:  []   Resp:  [16-20]   BP: (104-144)/(59-89)   SpO2:  [96 %-100 %]     Physical Exam:  General- NAD noted  HEENT- WNL  Neck- supple  CV- Regular rate and rhythm  Resp- Lungs CTA Bilaterally, No increased WOB  GI- Non tender/non-distended, BS normoactive x4 quads  Extrem- No cyanosis, clubbing, edema.  Derm- skin w/d  Neuro-  Mild uremia     Body mass index is 40.95 kg/m².    Laboratory:  CBC:   Recent Labs   Lab 04/26/20  0611   WBC 9.19   RBC 3.04*   HGB 8.2*   HCT 27.1*   *   MCV 89   MCH 27.0   MCHC 30.3*     CMP:   Recent Labs   Lab 04/26/20  0611      CALCIUM 10.3   ALBUMIN 4.2   PROT 9.6*   *   K 3.3*   CO2 19*   CL 95   BUN 68*   CREATININE 4.6*   ALKPHOS 131   ALT 28   AST 22   BILITOT 0.6       Diagnostic Results:  Labs: Reviewed      ASSESSMENT/PLAN:     Active Hospital Problems    Diagnosis  POA    *Acute renal failure [N17.9]  Yes    Hyponatremia [E87.1]  Yes    Hypokalemia [E87.6]  Yes    History of 2019 novel coronavirus disease (COVID-19) [Z86.19]  Yes    Chronic combined systolic and diastolic heart failure [I50.42]  Yes    Clostridium difficile colitis  [A04.72]  Yes    Central line infection, subsequent encounter [T80.219D]  Not Applicable    Post viral debility [R53.81]  Yes    Morbid obesity [E66.01]  Yes    Hypothyroid [E03.9]  Yes    Iron deficiency anemia, unspecified [D50.9]  Yes      Resolved Hospital Problems   No resolved problems to display.         Thank you for allowing us to participate in the care of your patient. We will follow the patient and provide recommendations as needed.      Time spent seeing patient( greater than 1/2 spent in direct contact) :

## 2020-04-26 NOTE — ASSESSMENT & PLAN NOTE
Patient's anemia is currently controlled. S/p 0 units of PRBCs.   Current CBC reviewed-   Lab Results   Component Value Date    HGB 8.1 (L) 04/25/2020    HCT 27.1 (L) 04/25/2020     Monitor serial CBC and transfuse if patient becomes hemodynamically unstable, symptomatic or H/H drops below 7/21.

## 2020-04-26 NOTE — ASSESSMENT & PLAN NOTE
- PCR positive on 4/12   - Continue po vanc until 4/26   - Loose stools but no diarrhea. Ok to d/c isolation per ID note on 4/22.

## 2020-04-26 NOTE — ASSESSMENT & PLAN NOTE
Patient has hypokalemia which is currently uncontrolled. Last electrolytes reviewed-   Recent Labs   Lab 04/25/20  0545 04/25/20  1830 04/26/20  0611   K 3.4* 3.2* 3.3*   . Will replace potassium and monitor electrolytes closely. Continuous telemetry.  Will follow nephrology recommendations given patient's declining renal status.

## 2020-04-26 NOTE — ASSESSMENT & PLAN NOTE
Body mass index is 39.06 kg/m². Morbid obesity complicates all aspects of disease management from diagnostic modalities to treatment. Weight loss encouraged and health benefits explained to patient.

## 2020-04-26 NOTE — PLAN OF CARE
Problem: Physical Therapy Goal  Goal: Physical Therapy Goal  Description  Goals to be met by: 2020     Patient will increase functional independence with mobility by performin. Supine to sit with Contact Guard Assistance  2. Sit to stand transfer with Contact Guard Assistance  3. Bed to chair transfer with Contact Guard Assistance using Rolling Walker  4. Gait  x 250 feet with Minimal Assistance using Rolling Walker.   5. Lower extremity exercise program x20 reps   Outcome: Ongoing, Progressing   PT eval and treat completed. Commode use with min assist. Gait few steps to chair only with RW limited by nausea- pt vomitted. OOB to chair. Pt to benefit from continued therapies

## 2020-04-26 NOTE — HPI
Maria Victoria Hernandez is a 55 y.o. female with a PMHx of hypothyroidism, prediabetes, anemia, renal failure, and recent COVID-19 infection who presents to the ED from [facility] for possible dialysis line placement and emergent dialysis.  The patient was recently hospitalized here for COVID-19 for approximately 1 month, during that time she was intubated and required dialysis for renal failure. She also had line infection and is still receiving oral diflucan. Her kidney function improved; she was sent to LTAC and her dialysis access was removed. Creatinine levels have increased from several days ago. Per LTAC report patient had worsening renal function, intermittent confusion, and decreasing urine output. The patient adamantly denies any confusion or decrease in urine output. The patient does endorse slight weakness and SOB with exertion which has been unchanged since discharge. The patient also reports diarrhea secondary to c diff infection, but states her diarrhea has improved over the last few days. She is currently receiving oral vancomycin with her last day of treatment being tomorrow. The patient denies any N/V, abdominal pain, confusion, cough, fever/chills, chest pain, lower leg edema, or syncope. Her ED work up is significant for mild hyponatremia, hypokalemia, increased BUN/creatinine, and anemia. The ED physician discussed the case with Dr. Moeller who agreed that dialysis was not necessary at this point. The patient will be placed in observation under hospital medicine for further work up and evaluation.

## 2020-04-26 NOTE — ASSESSMENT & PLAN NOTE
Last echo 04/13/2020  EF 35% grade 1 diastolic dysfunction  Resume home meds but hold diuretics given worsening renal failure.   Will continue to monitor volume status

## 2020-04-26 NOTE — SUBJECTIVE & OBJECTIVE
Interval History: patient seen and examined. Does not seem confused this morning. Denies any complaints.     Review of Systems   Constitutional: Positive for fatigue. Negative for activity change, appetite change and fever.   HENT: Negative.    Eyes: Negative.    Respiratory: Positive for shortness of breath. Negative for chest tightness and wheezing.    Cardiovascular: Negative for chest pain, palpitations and leg swelling.   Gastrointestinal: Negative for abdominal distention, abdominal pain, blood in stool, diarrhea and vomiting.   Genitourinary: Negative for dysuria and hematuria.   Neurological: Negative for headaches.   Hematological: Negative for adenopathy.   Psychiatric/Behavioral: Negative for confusion.     Objective:     Vital Signs (Most Recent):  Temp: 97.8 °F (36.6 °C) (04/26/20 0811)  Pulse: 95 (04/26/20 0811)  Resp: 16 (04/26/20 0811)  BP: 104/65 (04/26/20 0811)  SpO2: 100 % (04/26/20 0811) Vital Signs (24h Range):  Temp:  [96 °F (35.6 °C)-98.6 °F (37 °C)] 97.8 °F (36.6 °C)  Pulse:  [] 95  Resp:  [16-20] 16  SpO2:  [96 %-100 %] 100 %  BP: (104-144)/(59-89) 104/65     Weight: 95.1 kg (209 lb 10.5 oz)  Body mass index is 40.95 kg/m².  No intake or output data in the 24 hours ending 04/26/20 1120   Physical Exam   Constitutional: She is oriented to person, place, and time. She appears well-developed and well-nourished. No distress.   HENT:   Head: Normocephalic and atraumatic.   Eyes: Pupils are equal, round, and reactive to light.   Neck: Neck supple. No thyromegaly present.   Cardiovascular: Normal rate and regular rhythm. Exam reveals no gallop and no friction rub.   No murmur heard.  Pulmonary/Chest: Effort normal and breath sounds normal. No respiratory distress. She has no wheezes.   Abdominal: Soft. Bowel sounds are normal. She exhibits no distension. There is no tenderness. There is no guarding.   Musculoskeletal: Normal range of motion. She exhibits no edema.   Neurological: She is alert  and oriented to person, place, and time.   Skin: Skin is warm and dry. No erythema.   Psychiatric: She has a normal mood and affect.       Significant Labs:   CBC:   Recent Labs   Lab 04/25/20  1830 04/26/20  0611   WBC 9.38 9.19   HGB 8.1* 8.2*   HCT 27.1* 27.1*   * 557*       Significant Imaging: I have reviewed all pertinent imaging results/findings within the past 24 hours.

## 2020-04-26 NOTE — ASSESSMENT & PLAN NOTE
Recently discharged from hospital for COVID-19 infection with PNA. Completed abx and plaquenil. Currently COVID-19 negative. On room air.

## 2020-04-26 NOTE — PLAN OF CARE
Patient AAOx4.  VSS.  Has remained afebrile this shift.  POC reviewed with patient.  Open discussion was facilitated.  Questions encouraged and answered.  Patient verbalized understanding.  Bed in lowest position, side rails up x2, call light within reach, and safety measures maintained throughout shift.  Patient rested majority of shift.  Has remained free of falls, trauma, and injury this shift.  Patient denies any needs at this time.  Will continue to monitor.

## 2020-04-26 NOTE — ASSESSMENT & PLAN NOTE
Consult ID.  Staph epi and C. Albicans      Blood cultures negative as of 4/6      Vancomycin completed.     Fluconazole 300 mg po qday.        - plan to continue until 5/5 per ID.   New blood cx obtained. Patient currently has L IJ central line in place.

## 2020-04-26 NOTE — NURSING
I attempted to contact Dr. Gauthier x2 for placement of dialysis catheter. Voicemail was left. Awaiting call back.

## 2020-04-27 PROBLEM — E44.0 MODERATE MALNUTRITION: Status: ACTIVE | Noted: 2020-04-27

## 2020-04-27 LAB
ALBUMIN SERPL BCP-MCNC: 3.9 G/DL (ref 3.5–5.2)
ALBUMIN SERPL BCP-MCNC: 4.2 G/DL (ref 3.5–5.2)
ALBUMIN SERPL BCP-MCNC: 4.2 G/DL (ref 3.5–5.2)
ALP SERPL-CCNC: 130 U/L (ref 55–135)
ALP SERPL-CCNC: 137 U/L (ref 55–135)
ALT SERPL W/O P-5'-P-CCNC: 21 U/L (ref 10–44)
ALT SERPL W/O P-5'-P-CCNC: 23 U/L (ref 10–44)
ANION GAP SERPL CALC-SCNC: 16 MMOL/L (ref 8–16)
ANION GAP SERPL CALC-SCNC: 21 MMOL/L (ref 8–16)
ANION GAP SERPL CALC-SCNC: 21 MMOL/L (ref 8–16)
AST SERPL-CCNC: 19 U/L (ref 10–40)
AST SERPL-CCNC: 21 U/L (ref 10–40)
BASOPHILS # BLD AUTO: 0.18 K/UL (ref 0–0.2)
BASOPHILS NFR BLD: 2.4 % (ref 0–1.9)
BILIRUB SERPL-MCNC: 0.6 MG/DL (ref 0.1–1)
BILIRUB SERPL-MCNC: 0.6 MG/DL (ref 0.1–1)
BUN SERPL-MCNC: 38 MG/DL (ref 6–20)
BUN SERPL-MCNC: 72 MG/DL (ref 6–20)
BUN SERPL-MCNC: 72 MG/DL (ref 6–20)
CALCIUM SERPL-MCNC: 10.3 MG/DL (ref 8.7–10.5)
CALCIUM SERPL-MCNC: 10.4 MG/DL (ref 8.7–10.5)
CALCIUM SERPL-MCNC: 9.6 MG/DL (ref 8.7–10.5)
CHLORIDE SERPL-SCNC: 95 MMOL/L (ref 95–110)
CHLORIDE SERPL-SCNC: 95 MMOL/L (ref 95–110)
CHLORIDE SERPL-SCNC: 99 MMOL/L (ref 95–110)
CO2 SERPL-SCNC: 20 MMOL/L (ref 23–29)
CREAT SERPL-MCNC: 3.1 MG/DL (ref 0.5–1.4)
CREAT SERPL-MCNC: 4.7 MG/DL (ref 0.5–1.4)
CREAT SERPL-MCNC: 4.8 MG/DL (ref 0.5–1.4)
CRP SERPL-MCNC: 23 MG/L (ref 0–8.2)
DIFFERENTIAL METHOD: ABNORMAL
EOSINOPHIL # BLD AUTO: 0.3 K/UL (ref 0–0.5)
EOSINOPHIL NFR BLD: 3.7 % (ref 0–8)
ERYTHROCYTE [DISTWIDTH] IN BLOOD BY AUTOMATED COUNT: 20.8 % (ref 11.5–14.5)
EST. GFR  (AFRICAN AMERICAN): 11 ML/MIN/1.73 M^2
EST. GFR  (AFRICAN AMERICAN): 11 ML/MIN/1.73 M^2
EST. GFR  (AFRICAN AMERICAN): 19 ML/MIN/1.73 M^2
EST. GFR  (NON AFRICAN AMERICAN): 10 ML/MIN/1.73 M^2
EST. GFR  (NON AFRICAN AMERICAN): 10 ML/MIN/1.73 M^2
EST. GFR  (NON AFRICAN AMERICAN): 16 ML/MIN/1.73 M^2
GLUCOSE SERPL-MCNC: 100 MG/DL (ref 70–110)
GLUCOSE SERPL-MCNC: 101 MG/DL (ref 70–110)
GLUCOSE SERPL-MCNC: 103 MG/DL (ref 70–110)
HCT VFR BLD AUTO: 25.4 % (ref 37–48.5)
HGB BLD-MCNC: 7.8 G/DL (ref 12–16)
IMM GRANULOCYTES # BLD AUTO: 0.04 K/UL (ref 0–0.04)
IMM GRANULOCYTES NFR BLD AUTO: 0.5 % (ref 0–0.5)
LYMPHOCYTES # BLD AUTO: 1.2 K/UL (ref 1–4.8)
LYMPHOCYTES NFR BLD: 16.5 % (ref 18–48)
MAGNESIUM SERPL-MCNC: 1.8 MG/DL (ref 1.6–2.6)
MCH RBC QN AUTO: 27.5 PG (ref 27–31)
MCHC RBC AUTO-ENTMCNC: 30.7 G/DL (ref 32–36)
MCV RBC AUTO: 89 FL (ref 82–98)
MONOCYTES # BLD AUTO: 0.7 K/UL (ref 0.3–1)
MONOCYTES NFR BLD: 8.9 % (ref 4–15)
NEUTROPHILS # BLD AUTO: 5 K/UL (ref 1.8–7.7)
NEUTROPHILS NFR BLD: 68 % (ref 38–73)
NRBC BLD-RTO: 0 /100 WBC
PHOSPHATE SERPL-MCNC: 3.6 MG/DL (ref 2.7–4.5)
PHOSPHATE SERPL-MCNC: 6.3 MG/DL (ref 2.7–4.5)
PHOSPHATE SERPL-MCNC: 6.3 MG/DL (ref 2.7–4.5)
PLATELET # BLD AUTO: 543 K/UL (ref 150–350)
PMV BLD AUTO: 11.4 FL (ref 9.2–12.9)
POCT GLUCOSE: 139 MG/DL (ref 70–110)
POTASSIUM SERPL-SCNC: 3.3 MMOL/L (ref 3.5–5.1)
POTASSIUM SERPL-SCNC: 3.4 MMOL/L (ref 3.5–5.1)
POTASSIUM SERPL-SCNC: 3.6 MMOL/L (ref 3.5–5.1)
PROCALCITONIN SERPL IA-MCNC: 0.3 NG/ML
PROT SERPL-MCNC: 9 G/DL (ref 6–8.4)
PROT SERPL-MCNC: 9.5 G/DL (ref 6–8.4)
RBC # BLD AUTO: 2.84 M/UL (ref 4–5.4)
SODIUM SERPL-SCNC: 135 MMOL/L (ref 136–145)
SODIUM SERPL-SCNC: 136 MMOL/L (ref 136–145)
SODIUM SERPL-SCNC: 136 MMOL/L (ref 136–145)
WBC # BLD AUTO: 7.38 K/UL (ref 3.9–12.7)

## 2020-04-27 PROCEDURE — 36415 COLL VENOUS BLD VENIPUNCTURE: CPT

## 2020-04-27 PROCEDURE — 80053 COMPREHEN METABOLIC PANEL: CPT

## 2020-04-27 PROCEDURE — 25000003 PHARM REV CODE 250: Performed by: THORACIC SURGERY (CARDIOTHORACIC VASCULAR SURGERY)

## 2020-04-27 PROCEDURE — 97116 GAIT TRAINING THERAPY: CPT | Mod: CQ

## 2020-04-27 PROCEDURE — 63600175 PHARM REV CODE 636 W HCPCS: Performed by: INTERNAL MEDICINE

## 2020-04-27 PROCEDURE — 99900035 HC TECH TIME PER 15 MIN (STAT)

## 2020-04-27 PROCEDURE — 25000003 PHARM REV CODE 250: Performed by: INTERNAL MEDICINE

## 2020-04-27 PROCEDURE — 12000002 HC ACUTE/MED SURGE SEMI-PRIVATE ROOM

## 2020-04-27 PROCEDURE — 83735 ASSAY OF MAGNESIUM: CPT

## 2020-04-27 PROCEDURE — 63700000 PHARM REV CODE 250 ALT 637 W/O HCPCS: Performed by: NURSE PRACTITIONER

## 2020-04-27 PROCEDURE — 63700000 PHARM REV CODE 250 ALT 637 W/O HCPCS: Performed by: THORACIC SURGERY (CARDIOTHORACIC VASCULAR SURGERY)

## 2020-04-27 PROCEDURE — 85025 COMPLETE CBC W/AUTO DIFF WBC: CPT

## 2020-04-27 PROCEDURE — 25000003 PHARM REV CODE 250: Performed by: NURSE PRACTITIONER

## 2020-04-27 PROCEDURE — 97165 OT EVAL LOW COMPLEX 30 MIN: CPT

## 2020-04-27 PROCEDURE — 63600175 PHARM REV CODE 636 W HCPCS: Performed by: THORACIC SURGERY (CARDIOTHORACIC VASCULAR SURGERY)

## 2020-04-27 PROCEDURE — 80100014 HC HEMODIALYSIS 1:1

## 2020-04-27 PROCEDURE — 84145 PROCALCITONIN (PCT): CPT

## 2020-04-27 PROCEDURE — 99231 SBSQ HOSP IP/OBS SF/LOW 25: CPT | Mod: S$GLB,,, | Performed by: INTERNAL MEDICINE

## 2020-04-27 PROCEDURE — 63600175 PHARM REV CODE 636 W HCPCS: Performed by: NURSE PRACTITIONER

## 2020-04-27 PROCEDURE — 86140 C-REACTIVE PROTEIN: CPT

## 2020-04-27 PROCEDURE — 84100 ASSAY OF PHOSPHORUS: CPT | Mod: 91

## 2020-04-27 PROCEDURE — 99231 PR SUBSEQUENT HOSPITAL CARE,LEVL I: ICD-10-PCS | Mod: S$GLB,,, | Performed by: INTERNAL MEDICINE

## 2020-04-27 PROCEDURE — 87040 BLOOD CULTURE FOR BACTERIA: CPT

## 2020-04-27 PROCEDURE — 80069 RENAL FUNCTION PANEL: CPT

## 2020-04-27 RX ORDER — HEPARIN SODIUM 1000 [USP'U]/ML
10000 INJECTION INTRAVENOUS; SUBCUTANEOUS ONCE
Status: DISCONTINUED | OUTPATIENT
Start: 2020-04-27 | End: 2020-04-27

## 2020-04-27 RX ORDER — SODIUM CHLORIDE 9 MG/ML
INJECTION, SOLUTION INTRAVENOUS ONCE
Status: CANCELLED | OUTPATIENT
Start: 2020-04-27 | End: 2020-04-27

## 2020-04-27 RX ORDER — SODIUM CHLORIDE 9 MG/ML
INJECTION, SOLUTION INTRAVENOUS
Status: CANCELLED | OUTPATIENT
Start: 2020-04-27

## 2020-04-27 RX ORDER — LIDOCAINE HYDROCHLORIDE 10 MG/ML
10 INJECTION, SOLUTION EPIDURAL; INFILTRATION; INTRACAUDAL; PERINEURAL ONCE
Status: COMPLETED | OUTPATIENT
Start: 2020-04-27 | End: 2020-04-27

## 2020-04-27 RX ORDER — HEPARIN SODIUM 1000 [USP'U]/ML
5000 INJECTION, SOLUTION INTRAVENOUS; SUBCUTANEOUS
Status: DISCONTINUED | OUTPATIENT
Start: 2020-04-27 | End: 2020-05-02 | Stop reason: HOSPADM

## 2020-04-27 RX ORDER — LIDOCAINE HYDROCHLORIDE 10 MG/ML
10 INJECTION INFILTRATION; PERINEURAL ONCE
Status: DISCONTINUED | OUTPATIENT
Start: 2020-04-27 | End: 2020-04-27

## 2020-04-27 RX ORDER — HEPARIN SODIUM 5000 [USP'U]/ML
5000 INJECTION, SOLUTION INTRAVENOUS; SUBCUTANEOUS
Status: CANCELLED | OUTPATIENT
Start: 2020-04-27

## 2020-04-27 RX ADMIN — ACETAMINOPHEN 650 MG: 325 TABLET ORAL at 01:04

## 2020-04-27 RX ADMIN — HEPARIN SODIUM 5000 UNITS: 5000 INJECTION INTRAVENOUS; SUBCUTANEOUS at 09:04

## 2020-04-27 RX ADMIN — HYDRALAZINE HYDROCHLORIDE 25 MG: 25 TABLET, FILM COATED ORAL at 09:04

## 2020-04-27 RX ADMIN — HYDRALAZINE HYDROCHLORIDE 25 MG: 25 TABLET, FILM COATED ORAL at 06:04

## 2020-04-27 RX ADMIN — PANTOPRAZOLE SODIUM 40 MG: 40 TABLET, DELAYED RELEASE ORAL at 09:04

## 2020-04-27 RX ADMIN — ASPIRIN 81 MG: 81 TABLET, COATED ORAL at 09:04

## 2020-04-27 RX ADMIN — SODIUM BICARBONATE 650 MG TABLET 650 MG: at 09:04

## 2020-04-27 RX ADMIN — HEPARIN SODIUM 5000 UNITS: 1000 INJECTION, SOLUTION INTRAVENOUS; SUBCUTANEOUS at 03:04

## 2020-04-27 RX ADMIN — FERROUS SULFATE TAB EC 325 MG (65 MG FE EQUIVALENT) 325 MG: 325 (65 FE) TABLET DELAYED RESPONSE at 09:04

## 2020-04-27 RX ADMIN — CALCITRIOL CAPSULES 0.25 MCG 0.25 MCG: 0.25 CAPSULE ORAL at 09:04

## 2020-04-27 RX ADMIN — METOPROLOL TARTRATE 25 MG: 25 TABLET, FILM COATED ORAL at 09:04

## 2020-04-27 RX ADMIN — HEPARIN SODIUM 10000 UNITS: 1000 INJECTION, SOLUTION INTRAVENOUS; SUBCUTANEOUS at 01:04

## 2020-04-27 RX ADMIN — LEVOTHYROXINE SODIUM 150 MCG: 150 TABLET ORAL at 06:04

## 2020-04-27 RX ADMIN — EPOETIN ALFA-EPBX 10000 UNITS: 10000 INJECTION, SOLUTION INTRAVENOUS; SUBCUTANEOUS at 03:04

## 2020-04-27 RX ADMIN — SODIUM BICARBONATE 650 MG TABLET 650 MG: at 03:04

## 2020-04-27 RX ADMIN — FLUCONAZOLE 300 MG: 100 TABLET ORAL at 09:04

## 2020-04-27 NOTE — NURSING
"Pt was having dialysis with about an hour left. The dialysis nurse called for help and a RRT was called by nurses on staff. The dialysis nurse stated the pt had begun to eat some pudding and her eyes started to bulge and her arms got stiff suddenly. The dialysis nurse called for help and staff came. VS were taken and BG was checked. Pt was able to state her name and . Pt stated she could not recall exactly what had just happened. She "just got dizzy." Seizure precautions will be initiated. Will continue to monitor closely.  "

## 2020-04-27 NOTE — PROGRESS NOTES
Consult Note  Infectious Disease    Reason for Consult:      HPI: Maria Victoria Hernandez is a 55 y.o. female known to me from prior admission, with past medical history of hypothyroidism, diabetes, diet controlled, vitamin-D deficiency, B12 deficiency, obesity, BMI of 40 (48 prior to covid)mildly diagnosed and treated for Coronavirus infection had then discharged to LT[facility].    Prior admission was complicated by MAIKOL, needing HD, Staphylococcus epidermidis bacteremia/line infection  (+ Bcx 04/05, + cath Cx on 04/06) and Candida albicans candidemia ( 04/04 - 04/06  neg 04/8, and C diff diarrhea on 04/12, CHF diastolic and systolic.    Patient has completed treatment with vancomycin for Staphylococcus epidermidis  She is still on Diflucan for candidemia till 05/05/2020  She is still on vancomycin by mouth for C diff to complete 10 days of treatment, which is till the end of April.    Patient was brought back because of decreased urine output, worsening kidney function, confusion, need for HD access placement  She continues to have generalized weakness, shortness of breath, worse with exertion  She was seen by nephrologist would like to observe and not start dialysis at this time.    04/27/2020.  No new complaints.  She just had right IJ Hardy placed.  Chest x-ray is about to be done.  Patient is proud that she walked with PT, more than 50 ft.  Discussed with cardiovascular surgeon.  If patient goes back to LTAC Hardy will be great option.  If patient ends up going home he will need a Trialysis catheter instead.  Patient has dialysis orders    Antibiotics (From admission, onward)    None        Antifungals (From admission, onward)    Start     Stop Route Frequency Ordered    04/26/20 0900  fluconazole tablet 300 mg      05/06 0859 Oral Daily 04/25/20 2242        Antivirals (From admission, onward)    None          EXAM & DIAGNOSTICS REVIEWED:   Vitals:     Temp:  [96.8 °F (36 °C)-99.1 °F (37.3 °C)]    Temp: 99.1 °F  (37.3 °C) (04/27/20 0821)  Pulse: 99 (04/27/20 0821)  Resp: 18 (04/27/20 0821)  BP: 128/63 (04/27/20 0821)  SpO2: 100 % (04/27/20 0821)    Intake/Output Summary (Last 24 hours) at 4/27/2020 1409  Last data filed at 4/26/2020 1800  Gross per 24 hour   Intake 360 ml   Output 360 ml   Net 0 ml       General:  In NAD. Alert and attentive, cooperative, comfortable  Eyes:  Anicteric, PERRL, EOMI  ENT:  No ulcers, exudates, thrush, nares patent, dentition is fair  Neck:  supple, no masses or adenopathy appreciated  Lungs: Clear, no consolidation, rales, wheezes, rub  Heart:  RRR, no gallop/murmur/rub noted  Abd:  Soft, NT, ND, normal BS, no masses or organomegaly appreciated.  :  Voids/Cuellar, urine clear, no flank tenderness  Musc:  Joints without effusion, swelling, erythema, synovitis, muscle wasting.   Skin:  No rashes. No palmar or plantar lesions. No subungual petechiae  Wound:   Neuro: Alert, attentive, speech fluent, face symmetric, moves all extremities, no focal weakness. Ambulatory  Psych:  Calm, cooperative  Lymphatic:     No cervical, supraclavicular, axillary, or inguinal nodes  Extrem: No edema, erythema, phlebitis, cellulitis, warm and well perfused  VAD:       Isolation:      Lines/Tubes/Drains:  Right IJ, Hardy placed by Dr. Gauthier    General Labs reviewed:  Recent Labs   Lab 04/25/20  1830 04/26/20  0611 04/27/20  0438   WBC 9.38 9.19 7.38   HGB 8.1* 8.2* 7.8*   HCT 27.1* 27.1* 25.4*   * 557* 543*       Recent Labs   Lab 04/26/20  0611 04/26/20 2009 04/27/20  0438   * 134* 136  136   K 3.3* 3.7 3.4*  3.3*   CL 95 94* 95  95   CO2 19* 19* 20*  20*   BUN 68* 70* 72*  72*   CREATININE 4.6* 4.7* 4.8*  4.7*   CALCIUM 10.3 10.2 10.4  10.3   PROT 9.6* 9.3* 9.5*   BILITOT 0.6 0.6 0.6   ALKPHOS 131 133 137*   ALT 28 23 23   AST 22 19 19           Micro:  Microbiology Results (last 7 days)     Procedure Component Value Units Date/Time    Blood culture [[id number]] Collected:  04/26/20 0611     Order Status:  Completed Specimen:  Blood Updated:  04/27/20 1212     Blood Culture, Routine No Growth to date      No Growth to date    Blood culture [[id number]] Collected:  04/26/20 0550    Order Status:  Completed Specimen:  Blood Updated:  04/27/20 1212     Blood Culture, Routine No Growth to date      No Growth to date    Blood culture [[id number]] Collected:  04/27/20 0439    Order Status:  Sent Specimen:  Blood Updated:  04/27/20 1012    IV catheter culture [[id number]] Collected:  04/26/20 1436    Order Status:  Sent Specimen:  Catheter Tip, Subclavian Updated:  04/26/20 2223        Imaging Reviewed:     Cardiology:    · Echocardiogram 04/13/2020  · Mild concentric left ventricular hypertrophy.  · Global hypokinetic wall motion.  · Moderately decreased left ventricular systolic function. The estimated ejection fraction is 35%.  · Grade I (mild) left ventricular diastolic dysfunction consistent with impaired relaxation.  · Normal right ventricular systolic function.  · Mild left atrial enlargement.  · Moderate mitral regurgitation.  · Normal central venous pressure (3 mmHg).       IMPRESSION & PLAN     Staphylococcus epidermidis bacteremia/line infection on prior admission (+ Bcx 04/05, + cath Cx on 04/06)-has completed treatment  Candida albicans candidemia, line infection on prior admission ( 04/04 - 04/06  neg 04/8) - she will complete treatment on 05/05.  C diff diarrhea on 04/12, completed vancomycin p.o.  Reason for this admission, transfer from LTAC to the hospital, questionable need for HD at this time.     PMHx CHF diastolic and systolic, hypothyroidism, diabetes, diet controlled, vitamin-D deficiency, B12 deficiency, obesity, BMI of 40 (48 prior to covid),  recent Coronavirus infection       Recommendations:  Continue Diflucan p.o. or IV for treatment of candidemia.  Aiming end date 05/05/2020.  Please see ophthalmologic/retina specialist before discontinuing Diflucan.  Specialist is supposed to  see if there is Candida involvement of retina, if that is the case:  patient will need months of Diflucan and ID follow-up.  I discussed it with patient.  Stools have thickened.  Patient has completed vancomycin by mouth for C diff.    Will sign off.

## 2020-04-27 NOTE — PLAN OF CARE
Problem: Physical Therapy Goal  Goal: Physical Therapy Goal  Description  Goals to be met by: 2020     Patient will increase functional independence with mobility by performin. Supine to sit with Contact Guard Assistance  2. Sit to stand transfer with Contact Guard Assistance  3. Bed to chair transfer with Contact Guard Assistance using Rolling Walker  4. Gait  x 250 feet with Minimal Assistance using Rolling Walker.   5. Lower extremity exercise program x20 reps   Outcome: Ongoing, Progressing   Ambulated with rw and Min A for safety.

## 2020-04-27 NOTE — ASSESSMENT & PLAN NOTE
Patient's anemia is currently controlled. S/p 0 units of PRBCs.   Current CBC reviewed-   Lab Results   Component Value Date    HGB 7.8 (L) 04/27/2020    HCT 25.4 (L) 04/27/2020     Monitor serial CBC and transfuse if patient becomes hemodynamically unstable, symptomatic or H/H drops below 7/21.

## 2020-04-27 NOTE — SUBJECTIVE & OBJECTIVE
Interval History: Patient seen and examined. Patient is without subjective complaints, no acute distress, awaiting Dr. Gauthier to place HD catheter. No confusion noted.     Review of Systems   Constitutional: Positive for fatigue. Negative for activity change, appetite change and fever.   HENT: Negative.    Eyes: Negative.    Respiratory: Positive for shortness of breath. Negative for chest tightness and wheezing.    Cardiovascular: Negative for chest pain, palpitations and leg swelling.   Gastrointestinal: Negative for abdominal distention, abdominal pain, blood in stool, diarrhea and vomiting.   Genitourinary: Negative for dysuria and hematuria.   Neurological: Negative for headaches.   Hematological: Negative for adenopathy.   Psychiatric/Behavioral: Negative for confusion.     Objective:     Vital Signs (Most Recent):  Temp: 99.1 °F (37.3 °C) (04/27/20 0821)  Pulse: 99 (04/27/20 0821)  Resp: 18 (04/27/20 0821)  BP: 128/63 (04/27/20 0821)  SpO2: 100 % (04/27/20 0821) Vital Signs (24h Range):  Temp:  [96.8 °F (36 °C)-99.1 °F (37.3 °C)] 99.1 °F (37.3 °C)  Pulse:  [] 99  Resp:  [16-18] 18  SpO2:  [97 %-100 %] 100 %  BP: (108-128)/(58-65) 128/63     Weight: 95.1 kg (209 lb 10.5 oz)  Body mass index is 40.95 kg/m².    Intake/Output Summary (Last 24 hours) at 4/27/2020 0847  Last data filed at 4/26/2020 1800  Gross per 24 hour   Intake 360 ml   Output 360 ml   Net 0 ml      Physical Exam   Constitutional: She is oriented to person, place, and time. She appears well-developed and well-nourished. No distress.   HENT:   Head: Normocephalic and atraumatic.   Eyes: Pupils are equal, round, and reactive to light.   Neck: Neck supple. No thyromegaly present.   Cardiovascular: Normal rate and regular rhythm. Exam reveals no gallop and no friction rub.   No murmur heard.  Pulmonary/Chest: Effort normal and breath sounds normal. No respiratory distress. She has no wheezes.   Abdominal: Soft. Bowel sounds are normal. She  exhibits no distension. There is no tenderness. There is no guarding.   Musculoskeletal: Normal range of motion. She exhibits no edema.   Neurological: She is alert and oriented to person, place, and time.   Skin: Skin is warm and dry. No erythema.   Psychiatric: She has a normal mood and affect.       Significant Labs:   CBC:   Recent Labs   Lab 04/25/20  1830 04/26/20  0611 04/27/20  0438   WBC 9.38 9.19 7.38   HGB 8.1* 8.2* 7.8*   HCT 27.1* 27.1* 25.4*   * 557* 543*     CMP  Sodium   Date Value Ref Range Status   04/27/2020 136 136 - 145 mmol/L Final   04/27/2020 136 136 - 145 mmol/L Final     Potassium   Date Value Ref Range Status   04/27/2020 3.3 (L) 3.5 - 5.1 mmol/L Final   04/27/2020 3.4 (L) 3.5 - 5.1 mmol/L Final     Chloride   Date Value Ref Range Status   04/27/2020 95 95 - 110 mmol/L Final   04/27/2020 95 95 - 110 mmol/L Final     CO2   Date Value Ref Range Status   04/27/2020 20 (L) 23 - 29 mmol/L Final   04/27/2020 20 (L) 23 - 29 mmol/L Final     Glucose   Date Value Ref Range Status   04/27/2020 101 70 - 110 mg/dL Final   04/27/2020 100 70 - 110 mg/dL Final     BUN, Bld   Date Value Ref Range Status   04/27/2020 72 (H) 6 - 20 mg/dL Final   04/27/2020 72 (H) 6 - 20 mg/dL Final     Creatinine   Date Value Ref Range Status   04/27/2020 4.7 (H) 0.5 - 1.4 mg/dL Final   04/27/2020 4.8 (H) 0.5 - 1.4 mg/dL Final     Calcium   Date Value Ref Range Status   04/27/2020 10.3 8.7 - 10.5 mg/dL Final   04/27/2020 10.4 8.7 - 10.5 mg/dL Final     Total Protein   Date Value Ref Range Status   04/27/2020 9.5 (H) 6.0 - 8.4 g/dL Final     Albumin   Date Value Ref Range Status   04/27/2020 4.2 3.5 - 5.2 g/dL Final   04/27/2020 4.2 3.5 - 5.2 g/dL Final     Total Bilirubin   Date Value Ref Range Status   04/27/2020 0.6 0.1 - 1.0 mg/dL Final     Comment:     For infants and newborns, interpretation of results should be based  on gestational age, weight and in agreement with clinical  observations.  Premature Infant  recommended reference ranges:  Up to 24 hours.............<8.0 mg/dL  Up to 48 hours............<12.0 mg/dL  3-5 days..................<15.0 mg/dL  6-29 days.................<15.0 mg/dL       Alkaline Phosphatase   Date Value Ref Range Status   04/27/2020 137 (H) 55 - 135 U/L Final     AST   Date Value Ref Range Status   04/27/2020 19 10 - 40 U/L Final     ALT   Date Value Ref Range Status   04/27/2020 23 10 - 44 U/L Final     Anion Gap   Date Value Ref Range Status   04/27/2020 21 (H) 8 - 16 mmol/L Final   04/27/2020 21 (H) 8 - 16 mmol/L Final     eGFR if    Date Value Ref Range Status   04/27/2020 11 (A) >60 mL/min/1.73 m^2 Final   04/27/2020 11 (A) >60 mL/min/1.73 m^2 Final     eGFR if non    Date Value Ref Range Status   04/27/2020 10 (A) >60 mL/min/1.73 m^2 Final     Comment:     Calculation used to obtain the estimated glomerular filtration  rate (eGFR) is the CKD-EPI equation.      04/27/2020 10 (A) >60 mL/min/1.73 m^2 Final     Comment:     Calculation used to obtain the estimated glomerular filtration  rate (eGFR) is the CKD-EPI equation.        Microbiology Results (last 7 days)     Procedure Component Value Units Date/Time    Blood culture [[id number]] Collected:  04/27/20 0439    Order Status:  Sent Specimen:  Blood Updated:  04/27/20 0439    IV catheter culture [[id number]] Collected:  04/26/20 1436    Order Status:  Sent Specimen:  Catheter Tip, Subclavian Updated:  04/26/20 2223    Blood culture [[id number]] Collected:  04/26/20 0611    Order Status:  Completed Specimen:  Blood Updated:  04/26/20 1745     Blood Culture, Routine No Growth to date    Blood culture [[id number]] Collected:  04/26/20 0550    Order Status:  Completed Specimen:  Blood Updated:  04/26/20 4964     Blood Culture, Routine No Growth to date        Significant Imaging: None

## 2020-04-27 NOTE — PLAN OF CARE
Intervention:  Commercial beverage/sodium/potassium/phosphorus modified diet      Recommendations  Recommendation/Intervention: 1.) Recommend Renal diet + Novasource renal daily   Goals: 1.) PO intake >50% within 72 hrs.   Nutrition Goal Status: new  Communication of RD Recs: other (comment)(second sign to MD)

## 2020-04-27 NOTE — PROGRESS NOTES
Rapid response note:  I supervised rapid response call by patient's nurse.  Earlier Dr. Gauthier has placed hemodialysis catheter.  Hemodialysis was initiated and after 2 hr, patient told hemodialysis tech, patient was not feeling well and was feeling dizzy.  According to the tech patient became stiff for 45 sec with unresponsive glare in space.  No jerking movement, loss of bowel or bladder function reported.  Throughout the day patient has been complaining of nonspecific diffuse headache.  No postictal state noted.  Patient denies any prior history of seizures.  Vital signs are stable.  Accu-Chek 139 mg percent.     Plan:  CT scan of the head without contrast  Neuro checks.  Fall and seizure precaution.  EEG-stat.  Consult Dr. Dominguez from Neurology.    Will continue to monitor patient closely.  Hemodialysis terminated.  Patient to have 2nd round of hemodialysis tomorrow.  I called patient's daughter miss Hagan and updated her regarding patient's present medical condition, answered all questions. Total time spent > 35 minutes.

## 2020-04-27 NOTE — ASSESSMENT & PLAN NOTE
Contributing Nutrition Diagnosis  inadequate energy intake    Related to (etiology):   Decreased appetite, nausea    Signs and Symptoms (as evidenced by):   Greater than 5% weight loss in 1 month  EEN <75% for x1 month  +1 edema     Interventions/Recommendations (treatment strategy):  Send novasource renal with meals     Nutrition Diagnosis Status:   New

## 2020-04-27 NOTE — CONSULTS
[facility]  Adult Nutrition  Consult Note    SUMMARY   Intervention:  Commercial beverage/sodium/potassium/phosphorus modified diet     Recommendations  Recommendation/Intervention: 1.) Recommend Renal diet + Novasource renal daily   Goals: 1.) PO intake >50% within 72 hrs.   Nutrition Goal Status: new  Communication of RD Recs: other (comment)(second sign to MD)     1. Episodic confusion    2. MAIKOL (acute kidney injury)    3. Chest pain    4. Acute renal failure      Past Medical History:   Diagnosis Date    Colon polyp     Diverticulosis large intestine w/o perforation or abscess w/bleeding     Iron deficiency anemia     Prediabetes     Thyroid disease     Vitamin B 12 deficiency     Vitamin D deficiency          Reason for Assessment  Reason For Assessment: consult  General Information Comments: admits with renal failure, line placement for dialysis. S/p covid-19. Pt awake during RD visit. Late lunch tray due to diet just advancing. Pt reports decreased appetite x1 month or more , ~25% at meals yesterday. Pt stated she does not like ensure/boost, likes milk. Educated on high phos foods. Pt willing to try novasource renal x1/day.  Weight loss noted.  Due to recent hospital wide restrictions to limit the transfer of (COVID-19),  we are not performing any physical exams at this time. All S/S will be observational; NFPE to be performed at a future date  Nutrition Discharge Planning: to be determined.     Nutrition Risk Screen  Nutrition Risk Screen: no indicators present      Anthropometrics  Temp: 99.1 °F (37.3 °C)  Height Method: Stated  Height: 5'  Height (inches): 60 in  Weight Method: Bed Scale  Weight: 95.1 kg (209 lb 10.5 oz)  Weight (lb): 209.66 lb  Ideal Body Weight (IBW), Female: 100 lb  % Ideal Body Weight, Female (lb): 209.66 %  BMI (Calculated): 40.9  BMI Grade: greater than 40 - morbid obesity  Usual Body Weight (UBW), k.55 kg(x1 month ago. )  % Usual Body  Weight: 91.15  % Weight Change From Usual Weight: -9.04 %       Lab/Procedures/Meds  Pertinent Labs Reviewed: reviewed  BMP  Lab Results   Component Value Date     04/27/2020     04/27/2020    K 3.3 (L) 04/27/2020    K 3.4 (L) 04/27/2020    CL 95 04/27/2020    CL 95 04/27/2020    CO2 20 (L) 04/27/2020    CO2 20 (L) 04/27/2020    BUN 72 (H) 04/27/2020    BUN 72 (H) 04/27/2020    CREATININE 4.7 (H) 04/27/2020    CREATININE 4.8 (H) 04/27/2020    CALCIUM 10.3 04/27/2020    CALCIUM 10.4 04/27/2020    ANIONGAP 21 (H) 04/27/2020    ANIONGAP 21 (H) 04/27/2020    ESTGFRAFRICA 11 (A) 04/27/2020    ESTGFRAFRICA 11 (A) 04/27/2020    EGFRNONAA 10 (A) 04/27/2020    EGFRNONAA 10 (A) 04/27/2020     Lab Results   Component Value Date    ALBUMIN 4.2 04/27/2020    ALBUMIN 4.2 04/27/2020     Lab Results   Component Value Date    CALCIUM 10.3 04/27/2020    CALCIUM 10.4 04/27/2020    PHOS 6.3 (H) 04/27/2020    PHOS 6.3 (H) 04/27/2020     No results for input(s): POCTGLUCOSE in the last 24 hours.    Pertinent Medications Reviewed: reviewed      Estimated/Assessed Needs  Weight Used For Calorie Calculations: 95.1 kg (209 lb 10.5 oz)  Energy Calorie Requirements (kcal): 1633-7873 kcals/day  Protein Requirements: 75 g protein  Weight Used For Protein Calculations: 95.1 kg (209 lb 10.5 oz)   RDA Method (mL): 1900  CHO Requirement: 250 g      Nutrition Prescription Ordered  Current Diet Order: NPO    Evaluation of Received Nutrient/Fluid Intake  I/O: +120  % Intake of Estimated Energy Needs: 0 - 25 %  % Meal Intake: 0 - 25 %    Nutrition Risk    Level of Risk/Frequency of Follow-up: moderate(x2 weekly)     Assessment and Plan    Moderate malnutrition  Contributing Nutrition Diagnosis  inadequate energy intake    Related to (etiology):   Decreased appetite, nausea    Signs and Symptoms (as evidenced by):   Greater than 5% weight loss in 1 month  EEN <75% for x1 month  +1 edema     Interventions/Recommendations (treatment  strategy):  Send novasource renal with meals     Nutrition Diagnosis Status:   New           Monitor and Evaluation  Food and Nutrient Intake: energy intake, food and beverage intake  Anthropometric Measurements: weight, weight change, body mass index  Biochemical Data, Medical Tests and Procedures: electrolyte and renal panel, glucose/endocrine profile, lipid profile  Nutrition-Focused Physical Findings: overall appearance     Malnutrition Assessment  Malnutrition Type: chronic illness  Energy Intake: moderate energy intake  Skin (Micronutrient): edema   Weight Loss (Malnutrition): greater than 5% in 1 month  Energy Intake (Malnutrition): less than 75% for greater than or equal to 1 month  Fluid Accumulation (Malnutrition): mild   Edema (Fluid Accumulation): 1-->trace     Nutrition Follow-Up  Yes

## 2020-04-27 NOTE — ASSESSMENT & PLAN NOTE
Consult ID.  Staph epi and C. Albicans      Blood cultures negative as of 4/6      Vancomycin completed.     Fluconazole 300 mg po qday.        - plan to continue until 5/5 per ID.   New blood cx obtained. Patient currently has L IJ central line in place. Microbiology results negative to date.

## 2020-04-27 NOTE — NURSING
Consent and Hep B status verified, treatment stopped at 2 hours due to patient feeling dizzy, nauseated and then having a seizure like episode, no change in HR or drop in BP, floor staff notified, patient rinsed back, Dr Collins called. Rapid response initiated. Patient back to baseline, vvs, pt responding appropriately. 0 net UF removed per orders. Will attempt HD again tomorrow, per Dr Collins

## 2020-04-27 NOTE — PROGRESS NOTES
[facility] Hospital Medicine  Progress Note    Patient Name: Maria Victoria Hernandez  MRN: [id number]  Patient Class: IP- Inpatient   Admission Date: 4/25/2020  Length of Stay: 2 days  Attending Physician: Ferny Porter MD  Primary Care Provider: Candice Deluna MD        Subjective:     Principal Problem:Acute renal failure        HPI:  Maria Victoria Hernandez is a 55 y.o. female with a PMHx of hypothyroidism, prediabetes, anemia, renal failure, and recent COVID-19 infection who presents to the ED from [facility] for possible dialysis line placement and emergent dialysis.  The patient was recently hospitalized here for COVID-19 for approximately 1 month, during that time she was intubated and required dialysis for renal failure. She also had line infection and is still receiving oral diflucan. Her kidney function improved; she was sent to LTAC and her dialysis access was removed. Creatinine levels have increased from several days ago. Per LTAC report patient had worsening renal function, intermittent confusion, and decreasing urine output. The patient adamantly denies any confusion or decrease in urine output. The patient does endorse slight weakness and SOB with exertion which has been unchanged since discharge. The patient also reports diarrhea secondary to c diff infection, but states her diarrhea has improved over the last few days. She is currently receiving oral vancomycin with her last day of treatment being tomorrow. The patient denies any N/V, abdominal pain, confusion, cough, fever/chills, chest pain, lower leg edema, or syncope. Her ED work up is significant for mild hyponatremia, hypokalemia, increased BUN/creatinine, and anemia. The ED physician discussed the case with Dr. Moeller who agreed that dialysis was not necessary at this point. The patient will be placed in observation under hospital medicine for further work up and evaluation.     Overview/Hospital Course:  No notes  on file    Interval History: Patient seen and examined. Patient is without subjective complaints, no acute distress, awaiting Dr. Gauthier to place HD catheter. No confusion noted.     Review of Systems   Constitutional: Positive for fatigue. Negative for activity change, appetite change and fever.   HENT: Negative.    Eyes: Negative.    Respiratory: Positive for shortness of breath. Negative for chest tightness and wheezing.    Cardiovascular: Negative for chest pain, palpitations and leg swelling.   Gastrointestinal: Negative for abdominal distention, abdominal pain, blood in stool, diarrhea and vomiting.   Genitourinary: Negative for dysuria and hematuria.   Neurological: Negative for headaches.   Hematological: Negative for adenopathy.   Psychiatric/Behavioral: Negative for confusion.     Objective:     Vital Signs (Most Recent):  Temp: 99.1 °F (37.3 °C) (04/27/20 0821)  Pulse: 99 (04/27/20 0821)  Resp: 18 (04/27/20 0821)  BP: 128/63 (04/27/20 0821)  SpO2: 100 % (04/27/20 0821) Vital Signs (24h Range):  Temp:  [96.8 °F (36 °C)-99.1 °F (37.3 °C)] 99.1 °F (37.3 °C)  Pulse:  [] 99  Resp:  [16-18] 18  SpO2:  [97 %-100 %] 100 %  BP: (108-128)/(58-65) 128/63     Weight: 95.1 kg (209 lb 10.5 oz)  Body mass index is 40.95 kg/m².    Intake/Output Summary (Last 24 hours) at 4/27/2020 0847  Last data filed at 4/26/2020 1800  Gross per 24 hour   Intake 360 ml   Output 360 ml   Net 0 ml      Physical Exam   Constitutional: She is oriented to person, place, and time. She appears well-developed and well-nourished. No distress.   HENT:   Head: Normocephalic and atraumatic.   Eyes: Pupils are equal, round, and reactive to light.   Neck: Neck supple. No thyromegaly present.   Cardiovascular: Normal rate and regular rhythm. Exam reveals no gallop and no friction rub.   No murmur heard.  Pulmonary/Chest: Effort normal and breath sounds normal. No respiratory distress. She has no wheezes.   Abdominal: Soft. Bowel sounds are  normal. She exhibits no distension. There is no tenderness. There is no guarding.   Musculoskeletal: Normal range of motion. She exhibits no edema.   Neurological: She is alert and oriented to person, place, and time.   Skin: Skin is warm and dry. No erythema.   Psychiatric: She has a normal mood and affect.       Significant Labs:   CBC:   Recent Labs   Lab 04/25/20  1830 04/26/20  0611 04/27/20  0438   WBC 9.38 9.19 7.38   HGB 8.1* 8.2* 7.8*   HCT 27.1* 27.1* 25.4*   * 557* 543*     CMP  Sodium   Date Value Ref Range Status   04/27/2020 136 136 - 145 mmol/L Final   04/27/2020 136 136 - 145 mmol/L Final     Potassium   Date Value Ref Range Status   04/27/2020 3.3 (L) 3.5 - 5.1 mmol/L Final   04/27/2020 3.4 (L) 3.5 - 5.1 mmol/L Final     Chloride   Date Value Ref Range Status   04/27/2020 95 95 - 110 mmol/L Final   04/27/2020 95 95 - 110 mmol/L Final     CO2   Date Value Ref Range Status   04/27/2020 20 (L) 23 - 29 mmol/L Final   04/27/2020 20 (L) 23 - 29 mmol/L Final     Glucose   Date Value Ref Range Status   04/27/2020 101 70 - 110 mg/dL Final   04/27/2020 100 70 - 110 mg/dL Final     BUN, Bld   Date Value Ref Range Status   04/27/2020 72 (H) 6 - 20 mg/dL Final   04/27/2020 72 (H) 6 - 20 mg/dL Final     Creatinine   Date Value Ref Range Status   04/27/2020 4.7 (H) 0.5 - 1.4 mg/dL Final   04/27/2020 4.8 (H) 0.5 - 1.4 mg/dL Final     Calcium   Date Value Ref Range Status   04/27/2020 10.3 8.7 - 10.5 mg/dL Final   04/27/2020 10.4 8.7 - 10.5 mg/dL Final     Total Protein   Date Value Ref Range Status   04/27/2020 9.5 (H) 6.0 - 8.4 g/dL Final     Albumin   Date Value Ref Range Status   04/27/2020 4.2 3.5 - 5.2 g/dL Final   04/27/2020 4.2 3.5 - 5.2 g/dL Final     Total Bilirubin   Date Value Ref Range Status   04/27/2020 0.6 0.1 - 1.0 mg/dL Final     Comment:     For infants and newborns, interpretation of results should be based  on gestational age, weight and in agreement with  clinical  observations.  Premature Infant recommended reference ranges:  Up to 24 hours.............<8.0 mg/dL  Up to 48 hours............<12.0 mg/dL  3-5 days..................<15.0 mg/dL  6-29 days.................<15.0 mg/dL       Alkaline Phosphatase   Date Value Ref Range Status   04/27/2020 137 (H) 55 - 135 U/L Final     AST   Date Value Ref Range Status   04/27/2020 19 10 - 40 U/L Final     ALT   Date Value Ref Range Status   04/27/2020 23 10 - 44 U/L Final     Anion Gap   Date Value Ref Range Status   04/27/2020 21 (H) 8 - 16 mmol/L Final   04/27/2020 21 (H) 8 - 16 mmol/L Final     eGFR if    Date Value Ref Range Status   04/27/2020 11 (A) >60 mL/min/1.73 m^2 Final   04/27/2020 11 (A) >60 mL/min/1.73 m^2 Final     eGFR if non    Date Value Ref Range Status   04/27/2020 10 (A) >60 mL/min/1.73 m^2 Final     Comment:     Calculation used to obtain the estimated glomerular filtration  rate (eGFR) is the CKD-EPI equation.      04/27/2020 10 (A) >60 mL/min/1.73 m^2 Final     Comment:     Calculation used to obtain the estimated glomerular filtration  rate (eGFR) is the CKD-EPI equation.        Microbiology Results (last 7 days)     Procedure Component Value Units Date/Time    Blood culture [[id number]] Collected:  04/27/20 0439    Order Status:  Sent Specimen:  Blood Updated:  04/27/20 0439    IV catheter culture [[id number]] Collected:  04/26/20 1436    Order Status:  Sent Specimen:  Catheter Tip, Subclavian Updated:  04/26/20 2223    Blood culture [[id number]] Collected:  04/26/20 0611    Order Status:  Completed Specimen:  Blood Updated:  04/26/20 1745     Blood Culture, Routine No Growth to date    Blood culture [[id number]] Collected:  04/26/20 0550    Order Status:  Completed Specimen:  Blood Updated:  04/26/20 8327     Blood Culture, Routine No Growth to date        Significant Imaging: None        Assessment/Plan:      * Acute renal failure  Required HD but has been off since  4/17. Electrolyte derangements per nephrology.  Follow renal panel and electrolytes closely.  Follow renal recommendations.  Renal diet.  Adjust renal dose medications for Estimated Creatinine Clearance: 14.1 mL/min (A) (based on SCr of 4.7 mg/dL (H)).   Avoid NSAIDs, Pace-II inhibitors, ACE-I, Angiotensin Receptor Blockers, or Aminoglycosides.  Awaiting placement of HD catheter by Dr. Gauthier.     History of 2019 novel coronavirus disease (COVID-19)  Recently discharged from hospital for COVID-19 infection with PNA. Completed abx and plaquenil. Currently COVID-19 negative. On room air.    Hypokalemia  Patient has hypokalemia which is currently uncontrolled. Last electrolytes reviewed-   Recent Labs   Lab 04/26/20  0611 04/26/20 2009 04/27/20  0438   K 3.3* 3.7 3.4*  3.3*   . Will replace potassium and monitor electrolytes closely. Continuous telemetry.  Will follow nephrology recommendations given patient's declining renal status.    Hyponatremia  Mild, appears to be ongoing for the past week.  Urine na, urine osm.    Chronic combined systolic and diastolic heart failure  Last echo 04/13/2020  EF 35% grade 1 diastolic dysfunction  Off diuretics, continue cardiac meds, hydralazine dose reduced.  Will continue to monitor volume status    Clostridium difficile colitis  - PCR positive on 4/12   - Completed po vanc until 4/26 .  - Loose stools but no diarrhea. Ok to d/c isolation per ID note on 4/22.    Central line infection, subsequent encounter  Consult ID.  Staph epi and C. Albicans      Blood cultures negative as of 4/6      Vancomycin completed.     Fluconazole 300 mg po qday.        - plan to continue until 5/5 per ID.   New blood cx obtained. Patient currently has L IJ central line in place. Microbiology results negative to date.    Post viral debility  Improving. Continue PT/OT.    Iron deficiency anemia, unspecified  Patient's anemia is currently controlled. S/p 0 units of PRBCs.   Current CBC reviewed-   Lab  Results   Component Value Date    HGB 7.8 (L) 04/27/2020    HCT 25.4 (L) 04/27/2020     Monitor serial CBC and transfuse if patient becomes hemodynamically unstable, symptomatic or H/H drops below 7/21.     Hypothyroid  Patient has chronic hypothyroidism. TFTs reviewed-   Lab Results   Component Value Date    TSH 3.072 04/26/2020   . Will continue chronic levothyroxine and adjust for and clinical changes.    Morbid obesity  Body mass index is 40.95 kg/m². Morbid obesity complicates all aspects of disease management from diagnostic modalities to treatment. Weight loss encouraged and health benefits explained to patient.     Discussed with patient's daughter, answered all questions.    VTE Risk Mitigation (From admission, onward)         Ordered     heparin (porcine) injection 5,000 Units  Every 12 hours      04/25/20 2242     IP VTE HIGH RISK PATIENT  Once      04/25/20 2242     Place sequential compression device  Until discontinued      04/25/20 2242                      Ferny Porter MD  Department of Hospital Medicine   [facility]

## 2020-04-27 NOTE — PT/OT/SLP PROGRESS
Physical Therapy Treatment    Patient Name:  Maria Victoria Hernandez   MRN:  [id number]    Recommendations:     Discharge Recommendations:  LTACH (long-term acute care hospital)   Discharge Equipment Recommendations: walker, rolling   Barriers to discharge: None    Assessment:     Maria Victoria Hernandez is a 55 y.o. female admitted with a medical diagnosis of Acute renal failure.  She presents with the following impairments/functional limitations:  weakness, impaired endurance, impaired self care skills, impaired functional mobilty, gait instability, impaired cardiopulmonary response to activity, impaired balance . Tolerated treatment. Ambulated with rw and assistance, slowly with chair follow for safety due to decreased endurance.    Rehab Prognosis: Good; patient would benefit from acute skilled PT services to address these deficits and reach maximum level of function.    Recent Surgery: * No surgery found *      Plan:     During this hospitalization, patient to be seen 6 x/week to address the identified rehab impairments via gait training, therapeutic activities, therapeutic exercises and progress toward the following goals:    · Plan of Care Expires:  05/29/20    Subjective     Chief Complaint: weakness and SOB with activity  Patient/Family Comments/goals: to be able to walk  Pain/Comfort:  · Pain Rating 1: 0/10      Objective:     Communicated with nurse Jon prior to session.  Patient found supine with bed alarm, telemetry upon PT entry to room.     General Precautions: Standard, fall   Orthopedic Precautions:N/A   Braces: N/A     Functional Mobility:  · Bed Mobility:     · Rolling Left:  supervision  · Rolling Right: supervision  · Supine to Sit: contact guard assistance  · Sit to Supine: contact guard assistance  · Transfers:     · Sit to Stand:  minimum assistance with rolling walker  · Gait: 50' x 2 with rw and Min A with seated rest between each.      AM-PAC 6 CLICK MOBILITY          Therapeutic  Activities and Exercises:   Able to bridge in bed to have diaper applied.    Transferred to sitting EOB, able to thread legs into pajama pants.   Stood with Min A, able to pull up pajama bottoms.   Ambulated with rw and Min A with seated rest between each.   Returned to bed with CGA.    Patient left supine with all lines intact, call button in reach, bed alarm on and nurse Danay notified..    GOALS:   Multidisciplinary Problems     Physical Therapy Goals        Problem: Physical Therapy Goal    Goal Priority Disciplines Outcome Goal Variances Interventions   Physical Therapy Goal     PT, PT/OT Ongoing, Progressing     Description:  Goals to be met by: 2020     Patient will increase functional independence with mobility by performin. Supine to sit with Contact Guard Assistance  2. Sit to stand transfer with Contact Guard Assistance  3. Bed to chair transfer with Contact Guard Assistance using Rolling Walker  4. Gait  x 250 feet with Minimal Assistance using Rolling Walker.   5. Lower extremity exercise program x20 reps                    Time Tracking:     PT Received On: 20  PT Start Time: 1010     PT Stop Time: 1025  PT Total Time (min): 15 min     Billable Minutes: Gait Training 15min    Treatment Type: Treatment  PT/PTA: PTA     PTA Visit Number: 1     Marquita Marshall PTA  2020

## 2020-04-27 NOTE — PT/OT/SLP EVAL
"Occupational Therapy   Evaluation    Name: Maria Victoria Hernandez  MRN: [id number]  Admitting Diagnosis:  Acute renal failure      Recommendations:     Discharge Recommendations: LTACH (long-term acute care hospital)  Discharge Equipment Recommendations:  (TBD)  Barriers to discharge:  None    Assessment:     Maria Victoria Hernandez is a 55 y.o. female with a medical diagnosis of Acute renal failure.  Good activity tolerance with ADL performance. Patient is SBA with all self care tasks. Performance deficits affecting function: weakness, impaired endurance, impaired functional mobilty, gait instability, impaired self care skills, impaired cardiopulmonary response to activity, decreased coordination.      Rehab Prognosis: Good; patient would benefit from acute skilled OT services to address these deficits and reach maximum level of function.       Plan:     Patient to be seen 3 x/week to address the above listed problems via self-care/home management, therapeutic activities, therapeutic exercises  · Plan of Care Expires: 05/11/20  · Plan of Care Reviewed with: patient    Subjective     Chief Complaint: none  Patient/Family Comments/goals: "I don't need any help with my self care."    Occupational Profile:  Living Environment: Patient came from LTAC.  Previous level of function: Patient stated she was SBA with ADLs and ambulatory with AD. Prior to hospitalization and COVID diagnosis, pt was independent with   Equipment Used at Home:  none  Assistance upon Discharge: Patient will return to LTAC.    Pain/Comfort:  · Pain Rating 1: 0/10  · Pain Rating Post-Intervention 1: 0/10    Patients cultural, spiritual, Voodoo conflicts given the current situation:      Objective:     Communicated with: nurse Jon/Mariaa prior to session.  Patient found HOB elevated with telemetry upon OT entry to room.    General Precautions: Standard, fall   Orthopedic Precautions:N/A   Braces: N/A     Occupational Performance:    Bed " Mobility:    · Patient completed Scooting/Bridging with stand by assistance  · Patient completed Supine to Sit with stand by assistance  · Patient completed Sit to Supine with stand by assistance    Functional Mobility/Transfers:  · Patient completed Sit <> Stand Transfer with stand by assistance  with  rolling walker   · Patient completed Toilet Transfer Stand Pivot technique with stand by assistance with  rolling walker    Activities of Daily Living:  · Grooming: stand by assistance with hand hygiene while standing at sink.  · Lower Body Dressing: stand by assistance to don/doff socks while seated EOB.     Cognitive/Visual Perceptual:  Cognitive/Psychosocial Skills:     -       Oriented to: x4   -       Follows Commands/attention:Follows multistep  commands  -       Communication: clear/fluent  -       Safety awareness/insight to disability: intact   -       Mood/Affect/Coping skills/emotional control: Appropriate to situation and Cooperative  Visual/Perceptual:      -Intact     Physical Exam:  Postural examination/scapula alignment:    -       Rounded shoulders  -       Forward head  Upper Extremity Range of Motion:     -       Right Upper Extremity: WFL  -       Left Upper Extremity: WFL  Upper Extremity Strength:    -       Right Upper Extremity: WFL  -       Left Upper Extremity: WFL   Strength:    -       Right Upper Extremity: WFL  -       Left Upper Extremity: WFL  Fine Motor Coordination:    -       Intact  Gross motor coordination:   WFL    AMPAC 6 Click ADL:  AMPAC Total Score: 20    Treatment & Education:  OT ed pt on OT role & POC as well as discharge recommendations.  Patient demonstrated good safety with hand placement and body mechanics with transfers using RW and overall functional mobility.   Education:    Patient left HOB elevated with all lines intact, call button in reach and nurse notified    GOALS:   Multidisciplinary Problems     Occupational Therapy Goals        Problem: Occupational  Therapy Goal    Goal Priority Disciplines Outcome Interventions   Occupational Therapy Goal     OT, PT/OT     Description:  Goals to be met by: 5/11/2020     Patient will increase functional independence with ADLs by performing:    LE Dressing with Modified [place].  Grooming while standing at sink with Modified [place].  Toileting from toilet with Modified [place] for hygiene and clothing management.   Supine to sit with Modified [place].  Toilet transfer to toilet with Modified [place].                      History:     Past Medical History:   Diagnosis Date    Colon polyp     Diverticulosis large intestine w/o perforation or abscess w/bleeding     Iron deficiency anemia     Prediabetes     Thyroid disease     Vitamin B 12 deficiency     Vitamin D deficiency        Past Surgical History:   Procedure Laterality Date    TUBAL LIGATION         Time Tracking:     OT Date of Treatment: 04/27/20  OT Start Time: 1057  OT Stop Time: 1111  OT Total Time (min): 14 min    Billable Minutes:Evaluation 14    Obey Mackenzie OT  4/27/2020

## 2020-04-27 NOTE — ASSESSMENT & PLAN NOTE
Required HD but has been off since 4/17. Electrolyte derangements per nephrology.  Follow renal panel and electrolytes closely.  Follow renal recommendations.  Renal diet.  Adjust renal dose medications for Estimated Creatinine Clearance: 14.1 mL/min (A) (based on SCr of 4.7 mg/dL (H)).   Avoid NSAIDs, Pace-II inhibitors, ACE-I, Angiotensin Receptor Blockers, or Aminoglycosides.  Awaiting placement of HD catheter by Dr. Gauthier.

## 2020-04-27 NOTE — ASSESSMENT & PLAN NOTE
- PCR positive on 4/12   - Completed po vanc until 4/26 .  - Loose stools but no diarrhea. Ok to d/c isolation per ID note on 4/22.

## 2020-04-27 NOTE — PROGRESS NOTES
After informed consent obtained a trialysis catheter was inserted via the R internal jugular vein in the usual sterile fashion without complication  Tolerated well  Follow up CXR : catheter appeared to be in good position and no pneumothorax seen    OK to use catheter for hemodialysis

## 2020-04-27 NOTE — ASSESSMENT & PLAN NOTE
Last echo 04/13/2020  EF 35% grade 1 diastolic dysfunction  Off diuretics, continue cardiac meds, hydralazine dose reduced.  Will continue to monitor volume status

## 2020-04-27 NOTE — PLAN OF CARE
CM completed discharge assessment by chart review. Pt discharged from this facility on 4/21 to [facility] (LT[facility]). Pt transferred back to our facility for dialysis need. Pt's daughter, Danya [phone], active in her care. DC plan is for pt to return to [facility]. CM following to assist in any DC needs.        04/27/20 1243   Discharge Assessment   Assessment Type Discharge Planning Assessment   Confirmed/corrected address and phone number on facesheet? Yes   Assessment information obtained from? Medical Record   Communicated expected length of stay with patient/caregiver no   Prior to hospitilization cognitive status: Alert/Oriented   Prior to hospitalization functional status: Needs Assistance   Current cognitive status: Alert/Oriented   Current Functional Status: Needs Assistance   Facility Arrived From: [facility]   Lives With spouse;child(mauri), adult   Able to Return to Prior Arrangements yes   Is patient able to care for self after discharge? Unable to determine at this time (comments)   Patient's perception of discharge disposition long-term acute care facility (LTAC)   Readmission Within the Last 30 Days current reason for admission unrelated to previous admission   If yes, most recent facility name: [facility]   Patient currently being followed by outpatient case management? No   Patient currently receives any other outside agency services? No   Equipment Currently Used at Home none   Do you have any problems affording any of your prescribed medications? No   Is the patient taking medications as prescribed? yes   Does the patient have transportation home? Yes   Transportation Anticipated family or friend will provide   Does the patient receive services at the [facility]? No   Discharge Plan A Long-term acute care facility (LTAC)   Discharge Plan B Skilled Nursing Facility   DME Needed Upon Discharge  none   Patient/Family in Agreement with Plan yes   Readmission Questionnaire   At the time  of your discharge, did someone talk to you about what your health problems were? Yes   At the time of discharge, did someone talk to you about what to watch out for regarding worsening of your health problem? Yes   At the time of discharge, did someone talk to you about what to do if you experienced worsening of your health problem? Yes   At the time of discharge, did someone talk to you about which medication to take when you left the hospital and which ones to stop taking? Yes   At the time of discharge, did someone talk to you about when and where to follow up with a doctor after you left the hospital? Yes   What do you believe caused you to be sick enough to be re-admitted? LTAC SENT FOR POSSIBLE DIALYSIS   How often do you need to have someone help you when you read instructions, pamphlets, or other written material from your doctor or pharmacy? Rarely   Do you have problems taking your medications as prescribed? No   Do you have any problems affording any of  your prescribed medications? No   Do you have problems obtaining/receiving your medications? No   Does the patient have transportation to healthcare appointments? No   Does your caregiver provide all the help you need? No

## 2020-04-27 NOTE — PROGRESS NOTES
Consult Note  Nephrology    Consult Requested By: Ferny Porter MD    Reason for Consult: MAIKOL requiring initiation of RRT    SUBJECTIVE:     History of Present Illness:  56 y/o female patient s/p hospitalization for COVID 19.  She required RRT during that admit, was taken off dialysis on 4/17 d/t renal recovery.  She was transferred to LT once she was stable for discharge from hospital.    Seen yesterday at LTAC.  She had n/v for 2 days, uremia.  Scr had been trending up, was 4.0 yesterday. Transferred to hospital for line placement and re-initiation of RRT.  Today, Scr is 4.6.    Discussed w/pt the need for line placement and re-initiation of dialysis.  She is agreeable.      4/27  Had some nausea earlier.  No confusion.  No sob.        Assessment/plan:    1.  MAIKOL requiring initiation of RRT--consulted cardiothoracic surgeon for a line.  Notified Levi Santiago of pending HD for tomorrow.  Daily renal panel  2.  Anemia of chronic dz/Fe+ def--continue epo and Fe+ supplements  3.  Hypokalemia--was given repletion per primary team this am.  Will further correct with HD.  4.  SHPT--cont calcitriol.  Daily PO4 levels, may add binder. Renal diet.  5.  Metabolic acidosis--on bicarb, continue.  Will correct with HD    Past Medical History:   Diagnosis Date    Colon polyp     Diverticulosis large intestine w/o perforation or abscess w/bleeding     Iron deficiency anemia     Prediabetes     Thyroid disease     Vitamin B 12 deficiency     Vitamin D deficiency      Past Surgical History:   Procedure Laterality Date    TUBAL LIGATION       Family History   Problem Relation Age of Onset    Heart disease Mother     Heart disease Maternal Grandmother     Heart disease Maternal Grandfather     Mental illness Paternal Grandmother      Social History     Tobacco Use    Smoking status: Never Smoker    Smokeless tobacco: Never Used   Substance Use Topics    Alcohol use: No    Drug use: Not on file       Review of  patient's allergies indicates:  No Known Allergies     Review of Systems: 4/26)  General ROS: negative for - fever or night sweats  Psychological ROS: negative for - behavioral disorder or depression  ENT ROS: negative for - headaches or visual changes  Hematological and Lymphatic ROS: negative for - bleeding problems or bruising  Endocrine ROS: negative for - temperature intolerance or unexpected weight changes  Respiratory ROS: no cough, shortness of breath, or wheezing  Cardiovascular ROS: no chest pain, SOB  Gastrointestinal ROS: no abdominal pain, no n/v/c/d  Genito-Urinary ROS: no dysuria or trouble voiding  Musculoskeletal ROS: negative for - joint pain or joint swelling  Neurological ROS: no TIA or stroke symptoms  Dermatological ROS: negative for rash and skin lesion changes    OBJECTIVE:     Vital Signs Range (Last 24H):  Temp:  [96.8 °F (36 °C)-99.1 °F (37.3 °C)]   Pulse:  []   Resp:  [16-18]   BP: (108-128)/(58-65)   SpO2:  [97 %-100 %]     Physical Exam:  General- NAD noted  HEENT- WNL  Neck- supple  CV- Regular rate and rhythm  Resp- Lungs CTA Bilaterally, No increased WOB  GI- Non tender/non-distended, BS normoactive x4 quads  Extrem- No cyanosis, clubbing, edema.  Derm- skin w/d  Neuro-  Asterixis is present    Body mass index is 40.95 kg/m².    Laboratory:  CBC:   Recent Labs   Lab 04/27/20  0438   WBC 7.38   RBC 2.84*   HGB 7.8*   HCT 25.4*   *   MCV 89   MCH 27.5   MCHC 30.7*     CMP:   Recent Labs   Lab 04/27/20  0438     101   CALCIUM 10.4  10.3   ALBUMIN 4.2  4.2   PROT 9.5*     136   K 3.4*  3.3*   CO2 20*  20*   CL 95  95   BUN 72*  72*   CREATININE 4.8*  4.7*   ALKPHOS 137*   ALT 23   AST 19   BILITOT 0.6       Diagnostic Results:  Labs: Reviewed      ASSESSMENT/PLAN:     Active Hospital Problems    Diagnosis  POA    *Acute renal failure [N17.9]  Yes    Hyponatremia [E87.1]  Yes    Hypokalemia [E87.6]  Yes    History of 2019 novel coronavirus disease  (COVID-19) [Z86.19]  Yes    Chronic combined systolic and diastolic heart failure [I50.42]  Yes    Clostridium difficile colitis [A04.72]  Yes    Central line infection, subsequent encounter [T80.219D]  Not Applicable    Post viral debility [R53.81]  Yes    Morbid obesity [E66.01]  Yes    Hypothyroid [E03.9]  Yes    Iron deficiency anemia, unspecified [D50.9]  Yes      Resolved Hospital Problems   No resolved problems to display.         Thank you for allowing us to participate in the care of your patient. We will follow the patient and provide recommendations as needed.      Time spent seeing patient( greater than 1/2 spent in direct contact) :

## 2020-04-27 NOTE — RESPIRATORY THERAPY
04/26/20 2020   Patient Assessment/Suction   Level of Consciousness (AVPU) alert   Respiratory Effort Normal;Unlabored   Expansion/Accessory Muscles/Retractions expansion symmetric;no retractions;no use of accessory muscles   All Lung Fields Breath Sounds diminished;clear   PRE-TX-O2   O2 Device (Oxygen Therapy) room air   SpO2 99 %   Aerosol Therapy   $ Aerosol Therapy Charges PRN treatment not required   Respiratory Treatment Status (SVN) PRN treatment not required

## 2020-04-27 NOTE — RESPIRATORY THERAPY
04/27/20 0818   Patient Assessment/Suction   Level of Consciousness (AVPU) alert   All Lung Fields Breath Sounds diminished;clear   PRE-TX-O2   O2 Device (Oxygen Therapy) room air   SpO2 100 %   Pulse 102   Resp 18   Aerosol Therapy   $ Aerosol Therapy Charges PRN treatment not required

## 2020-04-27 NOTE — ASSESSMENT & PLAN NOTE
Patient has hypokalemia which is currently uncontrolled. Last electrolytes reviewed-   Recent Labs   Lab 04/26/20  0611 04/26/20 2009 04/27/20  0438   K 3.3* 3.7 3.4*  3.3*   . Will replace potassium and monitor electrolytes closely. Continuous telemetry.  Will follow nephrology recommendations given patient's declining renal status.

## 2020-04-28 PROBLEM — E78.2 MIXED HYPERLIPIDEMIA: Status: ACTIVE | Noted: 2020-04-28

## 2020-04-28 PROBLEM — D64.9 SYMPTOMATIC ANEMIA: Status: ACTIVE | Noted: 2020-04-28

## 2020-04-28 LAB
ABO + RH BLD: NORMAL
ALBUMIN SERPL BCP-MCNC: 3.7 G/DL (ref 3.5–5.2)
ALBUMIN SERPL BCP-MCNC: 3.7 G/DL (ref 3.5–5.2)
ALBUMIN SERPL BCP-MCNC: 3.8 G/DL (ref 3.5–5.2)
ALP SERPL-CCNC: 130 U/L (ref 55–135)
ALP SERPL-CCNC: 133 U/L (ref 55–135)
ALT SERPL W/O P-5'-P-CCNC: 20 U/L (ref 10–44)
ALT SERPL W/O P-5'-P-CCNC: 21 U/L (ref 10–44)
ANION GAP SERPL CALC-SCNC: 13 MMOL/L (ref 8–16)
ANION GAP SERPL CALC-SCNC: 14 MMOL/L (ref 8–16)
ANION GAP SERPL CALC-SCNC: 15 MMOL/L (ref 8–16)
AST SERPL-CCNC: 21 U/L (ref 10–40)
AST SERPL-CCNC: 22 U/L (ref 10–40)
BASOPHILS # BLD AUTO: 0.16 K/UL (ref 0–0.2)
BASOPHILS NFR BLD: 1.6 % (ref 0–1.9)
BILIRUB SERPL-MCNC: 0.5 MG/DL (ref 0.1–1)
BILIRUB SERPL-MCNC: 1.1 MG/DL (ref 0.1–1)
BLD GP AB SCN CELLS X3 SERPL QL: NORMAL
BLD PROD TYP BPU: NORMAL
BLD PROD TYP BPU: NORMAL
BLOOD UNIT EXPIRATION DATE: NORMAL
BLOOD UNIT EXPIRATION DATE: NORMAL
BLOOD UNIT TYPE CODE: 8400
BLOOD UNIT TYPE CODE: 8400
BLOOD UNIT TYPE: NORMAL
BLOOD UNIT TYPE: NORMAL
BUN SERPL-MCNC: 22 MG/DL (ref 6–20)
BUN SERPL-MCNC: 43 MG/DL (ref 6–20)
BUN SERPL-MCNC: 43 MG/DL (ref 6–20)
CALCIUM SERPL-MCNC: 9.7 MG/DL (ref 8.7–10.5)
CALCIUM SERPL-MCNC: 9.8 MG/DL (ref 8.7–10.5)
CALCIUM SERPL-MCNC: 9.8 MG/DL (ref 8.7–10.5)
CHLORIDE SERPL-SCNC: 100 MMOL/L (ref 95–110)
CHLORIDE SERPL-SCNC: 99 MMOL/L (ref 95–110)
CHLORIDE SERPL-SCNC: 99 MMOL/L (ref 95–110)
CHOLEST SERPL-MCNC: 255 MG/DL (ref 120–199)
CHOLEST/HDLC SERPL: 7.1 {RATIO} (ref 2–5)
CO2 SERPL-SCNC: 20 MMOL/L (ref 23–29)
CO2 SERPL-SCNC: 21 MMOL/L (ref 23–29)
CO2 SERPL-SCNC: 25 MMOL/L (ref 23–29)
CODING SYSTEM: NORMAL
CODING SYSTEM: NORMAL
CREAT SERPL-MCNC: 2.2 MG/DL (ref 0.5–1.4)
CREAT SERPL-MCNC: 3.3 MG/DL (ref 0.5–1.4)
CREAT SERPL-MCNC: 3.4 MG/DL (ref 0.5–1.4)
DIFFERENTIAL METHOD: ABNORMAL
DISPENSE STATUS: NORMAL
DISPENSE STATUS: NORMAL
EOSINOPHIL # BLD AUTO: 0.1 K/UL (ref 0–0.5)
EOSINOPHIL NFR BLD: 1.3 % (ref 0–8)
ERYTHROCYTE [DISTWIDTH] IN BLOOD BY AUTOMATED COUNT: 20.8 % (ref 11.5–14.5)
EST. GFR  (AFRICAN AMERICAN): 17 ML/MIN/1.73 M^2
EST. GFR  (AFRICAN AMERICAN): 17 ML/MIN/1.73 M^2
EST. GFR  (AFRICAN AMERICAN): 28 ML/MIN/1.73 M^2
EST. GFR  (NON AFRICAN AMERICAN): 15 ML/MIN/1.73 M^2
EST. GFR  (NON AFRICAN AMERICAN): 15 ML/MIN/1.73 M^2
EST. GFR  (NON AFRICAN AMERICAN): 25 ML/MIN/1.73 M^2
ESTIMATED AVG GLUCOSE: 103 MG/DL (ref 68–131)
GLUCOSE SERPL-MCNC: 112 MG/DL (ref 70–110)
GLUCOSE SERPL-MCNC: 98 MG/DL (ref 70–110)
GLUCOSE SERPL-MCNC: 98 MG/DL (ref 70–110)
HBA1C MFR BLD HPLC: 5.2 % (ref 4–5.6)
HCT VFR BLD AUTO: 22.7 % (ref 37–48.5)
HDLC SERPL-MCNC: 36 MG/DL (ref 40–75)
HDLC SERPL: 14.1 % (ref 20–50)
HGB BLD-MCNC: 6.8 G/DL (ref 12–16)
IMM GRANULOCYTES # BLD AUTO: 0.1 K/UL (ref 0–0.04)
IMM GRANULOCYTES NFR BLD AUTO: 1 % (ref 0–0.5)
LDLC SERPL CALC-MCNC: 152.4 MG/DL (ref 63–159)
LYMPHOCYTES # BLD AUTO: 1.1 K/UL (ref 1–4.8)
LYMPHOCYTES NFR BLD: 10.9 % (ref 18–48)
MAGNESIUM SERPL-MCNC: 1.8 MG/DL (ref 1.6–2.6)
MCH RBC QN AUTO: 27.8 PG (ref 27–31)
MCHC RBC AUTO-ENTMCNC: 30 G/DL (ref 32–36)
MCV RBC AUTO: 93 FL (ref 82–98)
MONOCYTES # BLD AUTO: 0.8 K/UL (ref 0.3–1)
MONOCYTES NFR BLD: 7.7 % (ref 4–15)
NEUTROPHILS # BLD AUTO: 7.9 K/UL (ref 1.8–7.7)
NEUTROPHILS NFR BLD: 77.5 % (ref 38–73)
NONHDLC SERPL-MCNC: 219 MG/DL
NRBC BLD-RTO: 0 /100 WBC
NUM UNITS TRANS PACKED RBC: NORMAL
NUM UNITS TRANS PACKED RBC: NORMAL
PHOSPHATE SERPL-MCNC: 2.5 MG/DL (ref 2.7–4.5)
PHOSPHATE SERPL-MCNC: 4.2 MG/DL (ref 2.7–4.5)
PLATELET # BLD AUTO: 489 K/UL (ref 150–350)
PMV BLD AUTO: 11.7 FL (ref 9.2–12.9)
POTASSIUM SERPL-SCNC: 3.4 MMOL/L (ref 3.5–5.1)
POTASSIUM SERPL-SCNC: 3.8 MMOL/L (ref 3.5–5.1)
POTASSIUM SERPL-SCNC: 3.8 MMOL/L (ref 3.5–5.1)
PROT SERPL-MCNC: 8.6 G/DL (ref 6–8.4)
PROT SERPL-MCNC: 8.8 G/DL (ref 6–8.4)
RBC # BLD AUTO: 2.45 M/UL (ref 4–5.4)
SODIUM SERPL-SCNC: 134 MMOL/L (ref 136–145)
SODIUM SERPL-SCNC: 135 MMOL/L (ref 136–145)
SODIUM SERPL-SCNC: 137 MMOL/L (ref 136–145)
TRIGL SERPL-MCNC: 333 MG/DL (ref 30–150)
WBC # BLD AUTO: 10.17 K/UL (ref 3.9–12.7)

## 2020-04-28 PROCEDURE — 95819 EEG AWAKE AND ASLEEP: CPT | Mod: 26,,, | Performed by: PSYCHIATRY & NEUROLOGY

## 2020-04-28 PROCEDURE — 86920 COMPATIBILITY TEST SPIN: CPT

## 2020-04-28 PROCEDURE — 25000003 PHARM REV CODE 250: Performed by: INTERNAL MEDICINE

## 2020-04-28 PROCEDURE — 83036 HEMOGLOBIN GLYCOSYLATED A1C: CPT

## 2020-04-28 PROCEDURE — 63600175 PHARM REV CODE 636 W HCPCS: Performed by: THORACIC SURGERY (CARDIOTHORACIC VASCULAR SURGERY)

## 2020-04-28 PROCEDURE — 80100014 HC HEMODIALYSIS 1:1

## 2020-04-28 PROCEDURE — 63700000 PHARM REV CODE 250 ALT 637 W/O HCPCS: Performed by: THORACIC SURGERY (CARDIOTHORACIC VASCULAR SURGERY)

## 2020-04-28 PROCEDURE — 12000002 HC ACUTE/MED SURGE SEMI-PRIVATE ROOM

## 2020-04-28 PROCEDURE — P9016 RBC LEUKOCYTES REDUCED: HCPCS

## 2020-04-28 PROCEDURE — 25000003 PHARM REV CODE 250: Performed by: THORACIC SURGERY (CARDIOTHORACIC VASCULAR SURGERY)

## 2020-04-28 PROCEDURE — 80053 COMPREHEN METABOLIC PANEL: CPT

## 2020-04-28 PROCEDURE — 97535 SELF CARE MNGMENT TRAINING: CPT

## 2020-04-28 PROCEDURE — 83735 ASSAY OF MAGNESIUM: CPT

## 2020-04-28 PROCEDURE — 99231 PR SUBSEQUENT HOSPITAL CARE,LEVL I: ICD-10-PCS | Mod: S$GLB,,, | Performed by: INTERNAL MEDICINE

## 2020-04-28 PROCEDURE — 97116 GAIT TRAINING THERAPY: CPT | Mod: CQ

## 2020-04-28 PROCEDURE — 95819 PR EEG,W/AWAKE & ASLEEP RECORD: ICD-10-PCS | Mod: 26,,, | Performed by: PSYCHIATRY & NEUROLOGY

## 2020-04-28 PROCEDURE — 95819 EEG AWAKE AND ASLEEP: CPT

## 2020-04-28 PROCEDURE — 99231 SBSQ HOSP IP/OBS SF/LOW 25: CPT | Mod: S$GLB,,, | Performed by: INTERNAL MEDICINE

## 2020-04-28 PROCEDURE — 63600175 PHARM REV CODE 636 W HCPCS: Performed by: NURSE PRACTITIONER

## 2020-04-28 PROCEDURE — 80061 LIPID PANEL: CPT

## 2020-04-28 PROCEDURE — 36430 TRANSFUSION BLD/BLD COMPNT: CPT

## 2020-04-28 PROCEDURE — 97110 THERAPEUTIC EXERCISES: CPT

## 2020-04-28 PROCEDURE — 25000003 PHARM REV CODE 250: Performed by: NURSE PRACTITIONER

## 2020-04-28 PROCEDURE — 86704 HEP B CORE ANTIBODY TOTAL: CPT

## 2020-04-28 PROCEDURE — 63700000 PHARM REV CODE 250 ALT 637 W/O HCPCS: Performed by: NURSE PRACTITIONER

## 2020-04-28 PROCEDURE — 63600175 PHARM REV CODE 636 W HCPCS: Performed by: INTERNAL MEDICINE

## 2020-04-28 PROCEDURE — 84100 ASSAY OF PHOSPHORUS: CPT

## 2020-04-28 PROCEDURE — 85025 COMPLETE CBC W/AUTO DIFF WBC: CPT

## 2020-04-28 PROCEDURE — 99900035 HC TECH TIME PER 15 MIN (STAT)

## 2020-04-28 PROCEDURE — 86901 BLOOD TYPING SEROLOGIC RH(D): CPT

## 2020-04-28 PROCEDURE — 36415 COLL VENOUS BLD VENIPUNCTURE: CPT

## 2020-04-28 RX ORDER — ATORVASTATIN CALCIUM 40 MG/1
40 TABLET, FILM COATED ORAL DAILY
Status: DISCONTINUED | OUTPATIENT
Start: 2020-04-28 | End: 2020-05-02 | Stop reason: HOSPADM

## 2020-04-28 RX ORDER — HYDROCODONE BITARTRATE AND ACETAMINOPHEN 500; 5 MG/1; MG/1
TABLET ORAL
Status: DISCONTINUED | OUTPATIENT
Start: 2020-04-28 | End: 2020-05-02 | Stop reason: HOSPADM

## 2020-04-28 RX ADMIN — LEVOTHYROXINE SODIUM 150 MCG: 150 TABLET ORAL at 05:04

## 2020-04-28 RX ADMIN — CALCITRIOL CAPSULES 0.25 MCG 0.25 MCG: 0.25 CAPSULE ORAL at 09:04

## 2020-04-28 RX ADMIN — HEPARIN SODIUM 5000 UNITS: 5000 INJECTION INTRAVENOUS; SUBCUTANEOUS at 09:04

## 2020-04-28 RX ADMIN — METOPROLOL TARTRATE 25 MG: 25 TABLET, FILM COATED ORAL at 09:04

## 2020-04-28 RX ADMIN — FERROUS SULFATE TAB EC 325 MG (65 MG FE EQUIVALENT) 325 MG: 325 (65 FE) TABLET DELAYED RESPONSE at 09:04

## 2020-04-28 RX ADMIN — ATORVASTATIN CALCIUM 40 MG: 40 TABLET, FILM COATED ORAL at 09:04

## 2020-04-28 RX ADMIN — HEPARIN SODIUM 5000 UNITS: 1000 INJECTION, SOLUTION INTRAVENOUS; SUBCUTANEOUS at 01:04

## 2020-04-28 RX ADMIN — SODIUM BICARBONATE 650 MG TABLET 650 MG: at 03:04

## 2020-04-28 RX ADMIN — HYDRALAZINE HYDROCHLORIDE 25 MG: 25 TABLET, FILM COATED ORAL at 05:04

## 2020-04-28 RX ADMIN — PANTOPRAZOLE SODIUM 40 MG: 40 TABLET, DELAYED RELEASE ORAL at 09:04

## 2020-04-28 RX ADMIN — ASPIRIN 81 MG: 81 TABLET, COATED ORAL at 09:04

## 2020-04-28 RX ADMIN — SODIUM BICARBONATE 650 MG TABLET 650 MG: at 09:04

## 2020-04-28 RX ADMIN — FLUCONAZOLE 300 MG: 100 TABLET ORAL at 09:04

## 2020-04-28 RX ADMIN — HYDRALAZINE HYDROCHLORIDE 25 MG: 25 TABLET, FILM COATED ORAL at 09:04

## 2020-04-28 RX ADMIN — HYDRALAZINE HYDROCHLORIDE 25 MG: 25 TABLET, FILM COATED ORAL at 03:04

## 2020-04-28 NOTE — RESPIRATORY THERAPY
04/28/20 0934   Patient Assessment/Suction   Level of Consciousness (AVPU) alert   Respiratory Effort Normal;Unlabored   Expansion/Accessory Muscles/Retractions expansion symmetric   All Lung Fields Breath Sounds diminished;clear   PRE-TX-O2   O2 Device (Oxygen Therapy) room air   SpO2 100 %   Pulse Oximetry Type Intermittent   Pulse 101   Resp 16   Aerosol Therapy   $ Aerosol Therapy Charges PRN treatment not required   Respiratory Treatment Status (SVN) PRN treatment not required

## 2020-04-28 NOTE — NURSING
Tx ended early, system clotted  MD notified, no new orders given  2 units PRBC administered  Pt +155mL

## 2020-04-28 NOTE — PT/OT/SLP PROGRESS
Physical Therapy Treatment    Patient Name:  Maria Victoria Hernandez   MRN:  [id number]    Recommendations:     Discharge Recommendations:  LTACH (long-term acute care hospital)   Discharge Equipment Recommendations: walker, rolling   Barriers to discharge: None    Assessment:     Maria Victoria Hernandez is a 55 y.o. female admitted with a medical diagnosis of Acute renal failure.  She presents with the following impairments/functional limitations:  weakness, impaired endurance, impaired self care skills, impaired functional mobilty, gait instability . Tolerated treatment. Mobility improved , noted with increased ambulation distance. Transferred on / off [facility] with daughter prior to ambulation.     Rehab Prognosis: Good; patient would benefit from acute skilled PT services to address these deficits and reach maximum level of function.    Recent Surgery: * No surgery found *      Plan:     During this hospitalization, patient to be seen 6 x/week to address the identified rehab impairments via gait training, therapeutic activities, therapeutic exercises and progress toward the following goals:    · Plan of Care Expires:  05/29/20    Subjective     Chief Complaint: none stated  Patient/Family Comments/goals: to be able to walk without SOB  Pain/Comfort:  ·        Objective:     Communicated with nurse Esteban prior to session.  Patient found seated EOB with telemetry upon PT entry to room.     General Precautions: Standard, fall   Orthopedic Precautions:N/A   Braces:       Functional Mobility:  · Bed Mobility:     · Rolling Right: stand by assistance  · Sit to Supine: contact guard assistance  · Transfers:     · Sit to Stand:  contact guard assistance with rolling walker  · Gait: 120' with rw and Min A with chair follow for safety.      AM-PAC 6 CLICK MOBILITY          Therapeutic Activities and Exercises:   Ambulated in hallway. Returned to room top bed for EEG. Daughter present.    Patient left supine with all lines  intact, call button in reach, bed alarm on, nurse Carlito notified and EEG tech, and daughter present..    GOALS:   Multidisciplinary Problems     Physical Therapy Goals        Problem: Physical Therapy Goal    Goal Priority Disciplines Outcome Goal Variances Interventions   Physical Therapy Goal     PT, PT/OT Ongoing, Progressing     Description:  Goals to be met by: 2020     Patient will increase functional independence with mobility by performin. Supine to sit with Contact Guard Assistance  2. Sit to stand transfer with Contact Guard Assistance  3. Bed to chair transfer with Contact Guard Assistance using Rolling Walker  4. Gait  x 250 feet with Minimal Assistance using Rolling Walker.   5. Lower extremity exercise program x20 reps                    Time Tracking:     PT Received On: 20  PT Start Time: 1424     PT Stop Time: 1434  PT Total Time (min): 10 min     Billable Minutes: Gait Training 10min    Treatment Type: Treatment  PT/PTA: PTA     PTA Visit Number: 2     Marquita Marshall, PTA  2020

## 2020-04-28 NOTE — PROGRESS NOTES
[facility] Hospital Medicine  Progress Note    Patient Name: Maria Victoria Hernandez  MRN: [id number]  Patient Class: IP- Inpatient   Admission Date: 4/25/2020  Length of Stay: 3 days  Attending Physician: Ferny Porter MD  Primary Care Provider: Candice Deluna MD        Subjective:     Principal Problem:Acute renal failure        HPI:  Maria Victoria Hernandez is a 55 y.o. female with a PMHx of hypothyroidism, prediabetes, anemia, renal failure, and recent COVID-19 infection who presents to the ED from [facility] for possible dialysis line placement and emergent dialysis.  The patient was recently hospitalized here for COVID-19 for approximately 1 month, during that time she was intubated and required dialysis for renal failure. She also had line infection and is still receiving oral diflucan. Her kidney function improved; she was sent to LTAC and her dialysis access was removed. Creatinine levels have increased from several days ago. Per LTAC report patient had worsening renal function, intermittent confusion, and decreasing urine output. The patient adamantly denies any confusion or decrease in urine output. The patient does endorse slight weakness and SOB with exertion which has been unchanged since discharge. The patient also reports diarrhea secondary to c diff infection, but states her diarrhea has improved over the last few days. She is currently receiving oral vancomycin with her last day of treatment being tomorrow. The patient denies any N/V, abdominal pain, confusion, cough, fever/chills, chest pain, lower leg edema, or syncope. Her ED work up is significant for mild hyponatremia, hypokalemia, increased BUN/creatinine, and anemia. The ED physician discussed the case with Dr. Moeller who agreed that dialysis was not necessary at this point. The patient will be placed in observation under hospital medicine for further work up and evaluation.     Overview/Hospital Course:  No notes  on file    Interval History: Patient seen and examined. Patient is without subjective complaints, no acute distress. Yesterday's HD treatment was aborted after 2 hrs due to development of seizure like activity. No new focal neurological complaints reported. Scheduled for 2nd round HD treatment.    Review of Systems   Constitutional: Positive for fatigue. Negative for activity change, appetite change and fever.   HENT: Negative.    Eyes: Negative.    Respiratory: Positive for shortness of breath. Negative for chest tightness and wheezing.    Cardiovascular: Negative for chest pain, palpitations and leg swelling.   Gastrointestinal: Negative for abdominal distention, abdominal pain, blood in stool, diarrhea and vomiting.   Genitourinary: Negative for dysuria and hematuria.   Neurological: Negative for headaches.   Hematological: Negative for adenopathy.   Psychiatric/Behavioral: Negative for confusion.     Objective:     Vital Signs (Most Recent):  Temp: 97.8 °F (36.6 °C) (04/28/20 0826)  Pulse: 107 (04/28/20 0826)  Resp: 18 (04/28/20 0826)  BP: (!) 114/59 (04/28/20 0826)  SpO2: 100 % (04/28/20 0826) Vital Signs (24h Range):  Temp:  [97.6 °F (36.4 °C)-99.1 °F (37.3 °C)] 97.8 °F (36.6 °C)  Pulse:  [] 107  Resp:  [14-18] 18  SpO2:  [100 %] 100 %  BP: (104-135)/(57-95) 114/59     Weight: 95.1 kg (209 lb 10.5 oz)  Body mass index is 40.95 kg/m².    Intake/Output Summary (Last 24 hours) at 4/28/2020 0856  Last data filed at 4/28/2020 0600  Gross per 24 hour   Intake 1220 ml   Output 350 ml   Net 870 ml      Physical Exam   Constitutional: She is oriented to person, place, and time. She appears well-developed and well-nourished. No distress.   HENT:   Head: Normocephalic and atraumatic.   Eyes: Pupils are equal, round, and reactive to light.   Neck: Neck supple. No thyromegaly present.   Cardiovascular: Normal rate and regular rhythm. Exam reveals no gallop and no friction rub.   No murmur heard.  Pulmonary/Chest:  Effort normal and breath sounds normal. No respiratory distress. She has no wheezes.   Abdominal: Soft. Bowel sounds are normal. She exhibits no distension. There is no tenderness. There is no guarding.   Musculoskeletal: Normal range of motion. She exhibits no edema.   Neurological: She is alert and oriented to person, place, and time.   Skin: Skin is warm and dry. No erythema.   Psychiatric: She has a normal mood and affect.       Significant Labs:   CBC:   Recent Labs   Lab 04/27/20  0438 04/28/20  0448   WBC 7.38 10.17   HGB 7.8* 6.8*   HCT 25.4* 22.7*   * 489*     CMP  Sodium   Date Value Ref Range Status   04/28/2020 135 (L) 136 - 145 mmol/L Final   04/28/2020 134 (L) 136 - 145 mmol/L Final     Potassium   Date Value Ref Range Status   04/28/2020 3.8 3.5 - 5.1 mmol/L Final   04/28/2020 3.8 3.5 - 5.1 mmol/L Final     Chloride   Date Value Ref Range Status   04/28/2020 99 95 - 110 mmol/L Final   04/28/2020 100 95 - 110 mmol/L Final     CO2   Date Value Ref Range Status   04/28/2020 21 (L) 23 - 29 mmol/L Final   04/28/2020 20 (L) 23 - 29 mmol/L Final     Glucose   Date Value Ref Range Status   04/28/2020 98 70 - 110 mg/dL Final   04/28/2020 98 70 - 110 mg/dL Final     BUN, Bld   Date Value Ref Range Status   04/28/2020 43 (H) 6 - 20 mg/dL Final   04/28/2020 43 (H) 6 - 20 mg/dL Final     Creatinine   Date Value Ref Range Status   04/28/2020 3.4 (H) 0.5 - 1.4 mg/dL Final   04/28/2020 3.3 (H) 0.5 - 1.4 mg/dL Final     Calcium   Date Value Ref Range Status   04/28/2020 9.8 8.7 - 10.5 mg/dL Final   04/28/2020 9.7 8.7 - 10.5 mg/dL Final     Total Protein   Date Value Ref Range Status   04/28/2020 8.8 (H) 6.0 - 8.4 g/dL Final     Albumin   Date Value Ref Range Status   04/28/2020 3.7 3.5 - 5.2 g/dL Final   04/28/2020 3.8 3.5 - 5.2 g/dL Final     Total Bilirubin   Date Value Ref Range Status   04/28/2020 0.5 0.1 - 1.0 mg/dL Final     Comment:     For infants and newborns, interpretation of results should be  based  on gestational age, weight and in agreement with clinical  observations.  Premature Infant recommended reference ranges:  Up to 24 hours.............<8.0 mg/dL  Up to 48 hours............<12.0 mg/dL  3-5 days..................<15.0 mg/dL  6-29 days.................<15.0 mg/dL       Alkaline Phosphatase   Date Value Ref Range Status   04/28/2020 133 55 - 135 U/L Final     AST   Date Value Ref Range Status   04/28/2020 22 10 - 40 U/L Final     ALT   Date Value Ref Range Status   04/28/2020 20 10 - 44 U/L Final     Anion Gap   Date Value Ref Range Status   04/28/2020 15 8 - 16 mmol/L Final   04/28/2020 14 8 - 16 mmol/L Final     eGFR if    Date Value Ref Range Status   04/28/2020 17 (A) >60 mL/min/1.73 m^2 Final   04/28/2020 17 (A) >60 mL/min/1.73 m^2 Final     eGFR if non    Date Value Ref Range Status   04/28/2020 15 (A) >60 mL/min/1.73 m^2 Final     Comment:     Calculation used to obtain the estimated glomerular filtration  rate (eGFR) is the CKD-EPI equation.      04/28/2020 15 (A) >60 mL/min/1.73 m^2 Final     Comment:     Calculation used to obtain the estimated glomerular filtration  rate (eGFR) is the CKD-EPI equation.        Microbiology Results (last 7 days)     Procedure Component Value Units Date/Time    IV catheter culture [[id number]] Collected:  04/26/20 1436    Order Status:  Completed Specimen:  Catheter Tip, Subclavian Updated:  04/28/20 0730     Aerobic Culture - Cath tip No growth    Blood culture [[id number]] Collected:  04/27/20 0439    Order Status:  Completed Specimen:  Blood Updated:  04/27/20 1915     Blood Culture, Routine No Growth to date    Blood culture [[id number]] Collected:  04/26/20 0611    Order Status:  Completed Specimen:  Blood Updated:  04/27/20 1212     Blood Culture, Routine No Growth to date      No Growth to date    Blood culture [[id number]] Collected:  04/26/20 0550    Order Status:  Completed Specimen:  Blood Updated:  04/27/20 1212      Blood Culture, Routine No Growth to date      No Growth to date        Significant Imaging:   B/L Carotid US:  Limited examination without gross occlusion or stenosis in the left carotid system.  The right carotid system was unable to be visualized.    CT head without contrast:  1. There is no acute abnormality.  There is no hemorrhage, mass, mass effect or obvious acute edema or ischemia.  2. Small bilateral mastoid effusions.  3. Left sphenoid sinus disease.    CXR:  Appropriately positioned right IJ catheter.  Minimal residual right lung infiltrate.    EEG: Pending    MRA brain: Pending        Assessment/Plan:      * Acute renal failure  Required HD but has been off since 4/17. Electrolyte derangements per nephrology.  Follow renal panel and electrolytes closely.  Follow renal recommendations.  Renal diet.  Adjust renal dose medications for Estimated Creatinine Clearance: 19.5 mL/min (A) (based on SCr of 3.4 mg/dL (H)).   Avoid NSAIDs, Pace-II inhibitors, ACE-I, Angiotensin Receptor Blockers, or Aminoglycosides.  Patient is scheduled for 2nd round of HD today.    Mixed hyperlipidemia  Start Lipitor 40 mg daily. Liver and muscle side effects reviewed with the patient.       Symptomatic anemia  Likely secondaty to anemia of chronic disease related to nephropathy.  Transfuse 2 units of PRBC with HD today. Follow CBC.    Moderate malnutrition        History of 2019 novel coronavirus disease (COVID-19)  Recently discharged from hospital for COVID-19 infection with PNA. Completed abx and plaquenil. Currently COVID-19 negative. On room air.    Hypokalemia  Patient has hypokalemia which is currently uncontrolled. Last electrolytes reviewed-   Recent Labs   Lab 04/26/20  0611 04/26/20 2009 04/27/20  0438   K 3.3* 3.7 3.4*  3.3*   . Will replace potassium and monitor electrolytes closely. Continuous telemetry.  Will follow nephrology recommendations given patient's declining renal status.    Hyponatremia  Mild, appears to  be ongoing for the past week.  Urine na, urine osm.    Chronic combined systolic and diastolic heart failure  Last echo 04/13/2020  EF 35% grade 1 diastolic dysfunction  Off diuretics, continue cardiac meds, hydralazine dose reduced.  Will continue to monitor volume status    Clostridium difficile colitis  - PCR positive on 4/12   - Completed po vanc until 4/26 .  - Loose stools but no diarrhea. Ok to d/c isolation per ID note on 4/22.    Central line infection, subsequent encounter  Consult ID.  Staph epi and C. Albicans      Blood cultures negative as of 4/6      Vancomycin completed.     Fluconazole 300 mg po qday.        - plan to continue until 5/5 per ID.   New blood cx obtained. Patient currently has L IJ central line in place. Microbiology results negative to date.    Post viral debility  Improving. Continue PT/OT.    Iron deficiency anemia, unspecified  Patient's anemia is currently controlled. S/p 0 units of PRBCs.   Current CBC reviewed-   Lab Results   Component Value Date    HGB 6.8 (L) 04/28/2020    HCT 22.7 (L) 04/28/2020     Monitor serial CBC and transfuse if patient becomes hemodynamically unstable, symptomatic or H/H drops below 7/21.   See symptomatic anemia management.    Hypothyroid  Patient has chronic hypothyroidism. TFTs reviewed-   Lab Results   Component Value Date    TSH 3.072 04/26/2020   . Will continue chronic levothyroxine and adjust for and clinical changes.    Morbid obesity  Body mass index is 40.95 kg/m². Morbid obesity complicates all aspects of disease management from diagnostic modalities to treatment. Weight loss encouraged and health benefits explained to patient.     Discussed with patient's daughter, answered all questions.  I updated her regarding patient's medical condition and future plan of care.  VTE Risk Mitigation (From admission, onward)         Ordered     heparin (porcine) injection 5,000 Units  As needed (PRN)      04/27/20 1500     heparin (porcine) injection  5,000 Units  Every 12 hours      04/25/20 2242     IP VTE HIGH RISK PATIENT  Once      04/25/20 2242     Place sequential compression device  Until discontinued      04/25/20 2242                      Ferny Porter MD  Department of Hospital Medicine   [facility]

## 2020-04-28 NOTE — PT/OT/SLP PROGRESS
Occupational Therapy   Treatment    Name: Maria Victoria Hernandez  MRN: [id number]  Admitting Diagnosis:  Acute renal failure       Recommendations:     Discharge Recommendations: LTACH (long-term acute care hospital)  Discharge Equipment Recommendations:  (TBD)  Barriers to discharge:  None    Assessment:     Maria Victoria Hernandez is a 55 y.o. female with a medical diagnosis of Acute renal failure. Patient is still SBA with funtional mobility and ADL participation. However, patient seems a bit slower with overall mobility 2/2 recent RRT event yesterday. Patient limited with upper body exercises 2/2 feeling nauseous after completing 1 set of 7 reps with theraband. Performance deficits affecting function are weakness, impaired endurance, impaired functional mobilty, gait instability, impaired self care skills, impaired cardiopulmonary response to activity, decreased coordination.     Rehab Prognosis:  Good; patient would benefit from acute skilled OT services to address these deficits and reach maximum level of function.       Plan:     Patient to be seen 3 x/week to address the above listed problems via self-care/home management, therapeutic activities, therapeutic exercises  · Plan of Care Expires: 05/11/20  · Plan of Care Reviewed with: patient    Subjective     Pain/Comfort:  · Pain Rating 1: 0/10  · Pain Rating Post-Intervention 1: 0/10    Objective:     Communicated with: nurse Esteban prior to session.  Patient found HOB elevated with telemetry upon OT entry to room.    General Precautions: Standard, fall   Orthopedic Precautions:N/A   Braces:       Occupational Performance:     Bed Mobility:    · Patient completed Scooting/Bridging with stand by assistance  · Patient completed Supine to Sit with stand by assistance  · Patient completed Sit to Supine with stand by assistance     Functional Mobility/Transfers:  · Patient completed Sit <> Stand Transfer with stand by assistance  with  rolling walker    · Patient completed Toilet Transfer Stand Pivot technique with stand by assistance with  rolling walker  · Functional Mobility: Slower with ambulation using RW; still SBA with mobility. Patient did not c/o dizziness or nausea during activity. However, post activity patient became nauseous.     Activities of Daily Living:  · Lower Body Dressing: stand by assistance to don/doff socks in long sitting on bed.      [facility] 6 Click ADL:      Treatment & Education:  Patient performed only 1 set of 7 reps of horizontal shoulder abduction using yellow theraband while supine at HOB. Patient could not complete anymore exercises 2/2 nausea.     Patient left HOB elevated with all lines intact, call button in reach and nurse notifiedEducation:      GOALS:   Multidisciplinary Problems     Occupational Therapy Goals        Problem: Occupational Therapy Goal    Goal Priority Disciplines Outcome Interventions   Occupational Therapy Goal     OT, PT/OT Ongoing, Progressing    Description:  Goals to be met by: 5/11/2020     Patient will increase functional independence with ADLs by performing:    LE Dressing with Modified [place].  Grooming while standing at sink with Modified [place].  Toileting from toilet with Modified [place] for hygiene and clothing management.   Supine to sit with Modified [place].  Toilet transfer to toilet with Modified [place].                      Time Tracking:     OT Date of Treatment: 04/28/20  OT Start Time: 0903  OT Stop Time: 0919  OT Total Time (min): 16 min    Billable Minutes:Self Care/Home Management 8  Therapeutic Exercise 8    Obey Mackenzie, JOSE  4/28/2020

## 2020-04-28 NOTE — PLAN OF CARE
Plan of care reviewed, patient verbalized understanding. AAOx4. Up ab rodney to BSC w 1 assist. PIV CDI. Trialysis cath, CDI. Cornell piggy draws back blood w/o difficulty. HD done today, 2 units infused and completed with HD. VS addressed. Tele monitored, ST/SR. Opthalmology consulted. EEG and MRA done today. PT/OT. Consulted Dr. Gauthier permanent dialysis placement. for Remain afebrile throughout shift. Bed in lowest position, call light within reach. Patient remains free from fall and injury. Will continue to monitor.

## 2020-04-28 NOTE — ASSESSMENT & PLAN NOTE
Likely secondaty to anemia of chronic disease related to nephropathy.  Transfuse 2 units of PRBC with HD today. Follow CBC.

## 2020-04-28 NOTE — SUBJECTIVE & OBJECTIVE
Interval History: Patient seen and examined. Patient is without subjective complaints, no acute distress. Yesterday's HD treatment was aborted after 2 hrs due to development of seizure like activity. No new focal neurological complaints reported. Scheduled for 2nd round HD treatment.    Review of Systems   Constitutional: Positive for fatigue. Negative for activity change, appetite change and fever.   HENT: Negative.    Eyes: Negative.    Respiratory: Positive for shortness of breath. Negative for chest tightness and wheezing.    Cardiovascular: Negative for chest pain, palpitations and leg swelling.   Gastrointestinal: Negative for abdominal distention, abdominal pain, blood in stool, diarrhea and vomiting.   Genitourinary: Negative for dysuria and hematuria.   Neurological: Negative for headaches.   Hematological: Negative for adenopathy.   Psychiatric/Behavioral: Negative for confusion.     Objective:     Vital Signs (Most Recent):  Temp: 97.8 °F (36.6 °C) (04/28/20 0826)  Pulse: 107 (04/28/20 0826)  Resp: 18 (04/28/20 0826)  BP: (!) 114/59 (04/28/20 0826)  SpO2: 100 % (04/28/20 0826) Vital Signs (24h Range):  Temp:  [97.6 °F (36.4 °C)-99.1 °F (37.3 °C)] 97.8 °F (36.6 °C)  Pulse:  [] 107  Resp:  [14-18] 18  SpO2:  [100 %] 100 %  BP: (104-135)/(57-95) 114/59     Weight: 95.1 kg (209 lb 10.5 oz)  Body mass index is 40.95 kg/m².    Intake/Output Summary (Last 24 hours) at 4/28/2020 0856  Last data filed at 4/28/2020 0600  Gross per 24 hour   Intake 1220 ml   Output 350 ml   Net 870 ml      Physical Exam   Constitutional: She is oriented to person, place, and time. She appears well-developed and well-nourished. No distress.   HENT:   Head: Normocephalic and atraumatic.   Eyes: Pupils are equal, round, and reactive to light.   Neck: Neck supple. No thyromegaly present.   Cardiovascular: Normal rate and regular rhythm. Exam reveals no gallop and no friction rub.   No murmur heard.  Pulmonary/Chest: Effort normal  and breath sounds normal. No respiratory distress. She has no wheezes.   Abdominal: Soft. Bowel sounds are normal. She exhibits no distension. There is no tenderness. There is no guarding.   Musculoskeletal: Normal range of motion. She exhibits no edema.   Neurological: She is alert and oriented to person, place, and time.   Skin: Skin is warm and dry. No erythema.   Psychiatric: She has a normal mood and affect.       Significant Labs:   CBC:   Recent Labs   Lab 04/27/20  0438 04/28/20  0448   WBC 7.38 10.17   HGB 7.8* 6.8*   HCT 25.4* 22.7*   * 489*     CMP  Sodium   Date Value Ref Range Status   04/28/2020 135 (L) 136 - 145 mmol/L Final   04/28/2020 134 (L) 136 - 145 mmol/L Final     Potassium   Date Value Ref Range Status   04/28/2020 3.8 3.5 - 5.1 mmol/L Final   04/28/2020 3.8 3.5 - 5.1 mmol/L Final     Chloride   Date Value Ref Range Status   04/28/2020 99 95 - 110 mmol/L Final   04/28/2020 100 95 - 110 mmol/L Final     CO2   Date Value Ref Range Status   04/28/2020 21 (L) 23 - 29 mmol/L Final   04/28/2020 20 (L) 23 - 29 mmol/L Final     Glucose   Date Value Ref Range Status   04/28/2020 98 70 - 110 mg/dL Final   04/28/2020 98 70 - 110 mg/dL Final     BUN, Bld   Date Value Ref Range Status   04/28/2020 43 (H) 6 - 20 mg/dL Final   04/28/2020 43 (H) 6 - 20 mg/dL Final     Creatinine   Date Value Ref Range Status   04/28/2020 3.4 (H) 0.5 - 1.4 mg/dL Final   04/28/2020 3.3 (H) 0.5 - 1.4 mg/dL Final     Calcium   Date Value Ref Range Status   04/28/2020 9.8 8.7 - 10.5 mg/dL Final   04/28/2020 9.7 8.7 - 10.5 mg/dL Final     Total Protein   Date Value Ref Range Status   04/28/2020 8.8 (H) 6.0 - 8.4 g/dL Final     Albumin   Date Value Ref Range Status   04/28/2020 3.7 3.5 - 5.2 g/dL Final   04/28/2020 3.8 3.5 - 5.2 g/dL Final     Total Bilirubin   Date Value Ref Range Status   04/28/2020 0.5 0.1 - 1.0 mg/dL Final     Comment:     For infants and newborns, interpretation of results should be based  on  gestational age, weight and in agreement with clinical  observations.  Premature Infant recommended reference ranges:  Up to 24 hours.............<8.0 mg/dL  Up to 48 hours............<12.0 mg/dL  3-5 days..................<15.0 mg/dL  6-29 days.................<15.0 mg/dL       Alkaline Phosphatase   Date Value Ref Range Status   04/28/2020 133 55 - 135 U/L Final     AST   Date Value Ref Range Status   04/28/2020 22 10 - 40 U/L Final     ALT   Date Value Ref Range Status   04/28/2020 20 10 - 44 U/L Final     Anion Gap   Date Value Ref Range Status   04/28/2020 15 8 - 16 mmol/L Final   04/28/2020 14 8 - 16 mmol/L Final     eGFR if    Date Value Ref Range Status   04/28/2020 17 (A) >60 mL/min/1.73 m^2 Final   04/28/2020 17 (A) >60 mL/min/1.73 m^2 Final     eGFR if non    Date Value Ref Range Status   04/28/2020 15 (A) >60 mL/min/1.73 m^2 Final     Comment:     Calculation used to obtain the estimated glomerular filtration  rate (eGFR) is the CKD-EPI equation.      04/28/2020 15 (A) >60 mL/min/1.73 m^2 Final     Comment:     Calculation used to obtain the estimated glomerular filtration  rate (eGFR) is the CKD-EPI equation.        Microbiology Results (last 7 days)     Procedure Component Value Units Date/Time    IV catheter culture [[id number]] Collected:  04/26/20 1436    Order Status:  Completed Specimen:  Catheter Tip, Subclavian Updated:  04/28/20 0730     Aerobic Culture - Cath tip No growth    Blood culture [[id number]] Collected:  04/27/20 0439    Order Status:  Completed Specimen:  Blood Updated:  04/27/20 1915     Blood Culture, Routine No Growth to date    Blood culture [[id number]] Collected:  04/26/20 0611    Order Status:  Completed Specimen:  Blood Updated:  04/27/20 1212     Blood Culture, Routine No Growth to date      No Growth to date    Blood culture [[id number]] Collected:  04/26/20 0550    Order Status:  Completed Specimen:  Blood Updated:  04/27/20 1212     Blood  Culture, Routine No Growth to date      No Growth to date        Significant Imaging:   B/L Carotid US:  Limited examination without gross occlusion or stenosis in the left carotid system.  The right carotid system was unable to be visualized.    CT head without contrast:  1. There is no acute abnormality.  There is no hemorrhage, mass, mass effect or obvious acute edema or ischemia.  2. Small bilateral mastoid effusions.  3. Left sphenoid sinus disease.    CXR:  Appropriately positioned right IJ catheter.  Minimal residual right lung infiltrate.    EEG: Pending    MRA brain: Pending

## 2020-04-28 NOTE — CONSULTS
[facility]  Neurology  Consult Note    Patient Name: Maria Victoria Hernandez  MRN: [id number]  Admission Date: 4/25/2020  Hospital Length of Stay: 2 days  Code Status: Full Code   Attending Provider: Dr Porter  Consulting Provider: Dr Justice Monge  Primary Care Physician: Candice Deluna MD  Principal Problem:Acute renal failure      Subjective:     Chief Complaint:  Dialysis access    HPI from EMR:  Maria Victoria Hernandez is a 55 y.o. female with a PMHx of hypothyroidism, prediabetes, anemia, renal failure, and recent COVID-19 infection who presents to the ED from [facility] for possible dialysis line placement and emergent dialysis.  The patient was recently hospitalized here for COVID-19 for approximately 1 month, during that time she was intubated and required dialysis for renal failure. She also had line infection and is still receiving oral diflucan. Her kidney function improved; she was sent to LTAC and her dialysis access was removed. Creatinine levels have increased from several days ago. Per LTAC report patient had worsening renal function, intermittent confusion, and decreasing urine output. The patient adamantly denies any confusion or decrease in urine output. The patient does endorse slight weakness and SOB with exertion which has been unchanged since discharge. The patient also reports diarrhea secondary to c diff infection, but states her diarrhea has improved over the last few days. She is currently receiving oral vancomycin with her last day of treatment being tomorrow. The patient denies any N/V, abdominal pain, confusion, cough, fever/chills, chest pain, lower leg edema, or syncope. Her ED work up is significant for mild hyponatremia, hypokalemia, increased BUN/creatinine, and anemia. The ED physician discussed the case with Dr. Moeller who agreed that dialysis was not necessary at this point. The patient will be placed in observation under hospital medicine for further  work up and evaluation.     Neurological Consult: Pt had an episode during dialysis in which she c/o dizziness. Pt then went stiff and had a brief decreased LOC and blank stare.  A rapid response was called and seizure work up began.  Upon assessment patient is alert, oriented to all, responds appropriately. She says that she does not remember the episode but remembers being dizzy.  She denies having seizures in the past.  CT head showed nothing acute. EEG pending.      Past Medical History:   Diagnosis Date    Colon polyp     Diverticulosis large intestine w/o perforation or abscess w/bleeding     Iron deficiency anemia     Prediabetes     Thyroid disease     Vitamin B 12 deficiency     Vitamin D deficiency        Past Surgical History:   Procedure Laterality Date    TUBAL LIGATION         Review of patient's allergies indicates:  No Known Allergies    Current Neurological Medications:     No current facility-administered medications on file prior to encounter.      Current Outpatient Medications on File Prior to Encounter   Medication Sig    acetaminophen (TYLENOL) 325 MG tablet Take 2 tablets (650 mg total) by mouth every 6 (six) hours as needed.    ascorbic acid, vitamin C, (VITAMIN C) 1000 MG tablet Take 1 tablet (1,000 mg total) by mouth 4 (four) times daily.    aspirin (ECOTRIN) 81 MG EC tablet Take 1 tablet (81 mg total) by mouth once daily.    calcitRIOL (ROCALTROL) 0.25 MCG Cap Take 1 capsule (0.25 mcg total) by mouth once daily.    dextrose 5 % SolP 50 mL with promethazine 25 mg/mL Soln 12.5 mg Inject 12.5 mg into the vein every 6 (six) hours as needed.    epoetin raquel-epbx (RETACRIT) 10,000 unit/mL imjection Inject 0.5 mLs (5,000 Units total) into the skin every Mon, Wed, Fri.    [START ON 5/6/2020] escitalopram oxalate (LEXAPRO) 5 MG Tab 1 tablet (5 mg total) by Per NG tube route once daily.    ferrous sulfate 325 mg (65 mg iron) Tab tablet Take 1 tablet (325 mg total) by mouth daily  with breakfast. (Patient taking differently: Take 325 mg by mouth daily with breakfast. Take 2 tablets daily)    fluconazole (DIFLUCAN) 150 MG Tab Take 2 tablets (300 mg total) by mouth once daily. for 13 days    furosemide (LASIX) 40 MG tablet Take 1 tablet (40 mg total) by mouth 2 (two) times daily.    heparin sodium,porcine (HEPARIN, PORCINE,) 1,000 unit/mL injection Inject 4 mLs (4,000 Units total) into the vein as needed (Lock ports after every HD).    heparin sodium,porcine (HEPARIN, PORCINE,) 5,000 unit/mL injection Inject 1 mL (5,000 Units total) into the skin every 12 (twelve) hours.    hydrALAZINE (APRESOLINE) 20 mg/mL injection Inject 0.5 mLs (10 mg total) into the vein every 8 (eight) hours as needed (165).    hydrALAZINE (APRESOLINE) 50 MG tablet Take 1 tablet (50 mg total) by mouth every 8 (eight) hours.    ipratropium-albuteroL (COMBIVENT)  mcg/actuation inhaler Inhale 1 puff into the lungs every 6 (six) hours as needed for Wheezing or Shortness of Breath. Rescue    levothyroxine (SYNTHROID) 100 MCG tablet Take 1 tablet (100 mcg total) by mouth once daily. (Patient taking differently: Take 150 mcg by mouth once daily. )    metoprolol tartrate (LOPRESSOR) 25 MG tablet Take 1 tablet (25 mg total) by mouth 2 (two) times daily.    ondansetron 4 mg/2 mL Soln Inject 4 mg into the vein every 8 (eight) hours as needed.    sodium bicarbonate 650 MG tablet Take 1 tablet (650 mg total) by mouth 3 (three) times daily.    white petrolatum-mineral oiL 83-15 % Oint Place into the right eye every evening.    zinc sulfate (ZINCATE) 220 (50) mg capsule Take 1 capsule (220 mg total) by mouth once daily.      Family History     Problem Relation (Age of Onset)    Heart disease Mother, Maternal Grandmother, Maternal Grandfather    Mental illness Paternal Grandmother        Tobacco Use    Smoking status: Never Smoker    Smokeless tobacco: Never Used   Substance and Sexual Activity    Alcohol use: No     Drug use: Not on file    Sexual activity: Not on file     Review of Systems   Constitutional: Negative.    HENT: Negative.    Eyes: Negative.    Respiratory: Negative.    Cardiovascular: Negative.    Gastrointestinal: Negative.    Endocrine: Negative.    Genitourinary: Negative.    Musculoskeletal: Negative.    Allergic/Immunologic: Negative.    Neurological: Positive for dizziness.   Hematological: Negative.    Psychiatric/Behavioral: Negative.      Objective:     Vital Signs (Most Recent):  Temp: 97.9 °F (36.6 °C) (04/27/20 1942)  Pulse: (!) 169 (04/27/20 1942)  Resp: 18 (04/27/20 1942)  BP: 130/62 (04/27/20 1942)  SpO2: 100 % (04/27/20 1942) Vital Signs (24h Range):  Temp:  [97.6 °F (36.4 °C)-99.1 °F (37.3 °C)] 97.9 °F (36.6 °C)  Pulse:  [] 169  Resp:  [16-18] 18  SpO2:  [97 %-100 %] 100 %  BP: (104-130)/(57-83) 130/62     Weight: 95.1 kg (209 lb 10.5 oz)  Body mass index is 40.95 kg/m².    Physical Exam   Constitutional: She is oriented to person, place, and time. She appears well-developed and well-nourished.   Eyes: Pupils are equal, round, and reactive to light. EOM are normal.   Neck: Normal range of motion.   Cardiovascular: Normal rate and normal heart sounds.   Pulmonary/Chest: Effort normal.   Abdominal: Soft.   Musculoskeletal: Normal range of motion.   Neurological: She is alert and oriented to person, place, and time. She has a normal Finger-Nose-Finger Test.   Skin: Skin is warm and dry.   Psychiatric: Her speech is normal.       NEUROLOGICAL EXAMINATION:     MENTAL STATUS   Oriented to person, place, and time.   Attention: normal. Concentration: normal.   Speech: speech is normal   Level of consciousness: drowsy  Able to name object. Able to repeat.     CRANIAL NERVES   Cranial nerves II through XII intact.     CN III, IV, VI   Pupils are equal, round, and reactive to light.  Extraocular motions are normal.     MOTOR EXAM   Muscle bulk: normal  Overall muscle tone: normal    Strength    Right biceps: 4/5  Left biceps: 4/5  Right triceps: 4/5  Left triceps: 4/5  Right quadriceps: 4/5  Left quadriceps: 4/5  Right glutei: 4/5  Left glutei: 4/5    SENSORY EXAM   Light touch normal.     GAIT AND COORDINATION      Coordination   Finger to nose coordination: normal    Tremor   Resting tremor: absent  Intention tremor: absent      Significant Labs:  Lab Results   Component Value Date    WBC 7.38 04/27/2020    HGB 7.8 (L) 04/27/2020    HCT 25.4 (L) 04/27/2020    MCV 89 04/27/2020     (H) 04/27/2020       CMP  Sodium   Date Value Ref Range Status   04/27/2020 135 (L) 136 - 145 mmol/L Final     Potassium   Date Value Ref Range Status   04/27/2020 3.6 3.5 - 5.1 mmol/L Final     Chloride   Date Value Ref Range Status   04/27/2020 99 95 - 110 mmol/L Final     CO2   Date Value Ref Range Status   04/27/2020 20 (L) 23 - 29 mmol/L Final     Glucose   Date Value Ref Range Status   04/27/2020 103 70 - 110 mg/dL Final     BUN, Bld   Date Value Ref Range Status   04/27/2020 38 (H) 6 - 20 mg/dL Final     Creatinine   Date Value Ref Range Status   04/27/2020 3.1 (H) 0.5 - 1.4 mg/dL Final     Calcium   Date Value Ref Range Status   04/27/2020 9.6 8.7 - 10.5 mg/dL Final     Total Protein   Date Value Ref Range Status   04/27/2020 9.0 (H) 6.0 - 8.4 g/dL Final     Albumin   Date Value Ref Range Status   04/27/2020 3.9 3.5 - 5.2 g/dL Final     Total Bilirubin   Date Value Ref Range Status   04/27/2020 0.6 0.1 - 1.0 mg/dL Final     Comment:     For infants and newborns, interpretation of results should be based  on gestational age, weight and in agreement with clinical  observations.  Premature Infant recommended reference ranges:  Up to 24 hours.............<8.0 mg/dL  Up to 48 hours............<12.0 mg/dL  3-5 days..................<15.0 mg/dL  6-29 days.................<15.0 mg/dL       Alkaline Phosphatase   Date Value Ref Range Status   04/27/2020 130 55 - 135 U/L Final     AST   Date Value Ref Range Status    04/27/2020 21 10 - 40 U/L Final     ALT   Date Value Ref Range Status   04/27/2020 21 10 - 44 U/L Final     Anion Gap   Date Value Ref Range Status   04/27/2020 16 8 - 16 mmol/L Final     eGFR if    Date Value Ref Range Status   04/27/2020 19 (A) >60 mL/min/1.73 m^2 Final     eGFR if non    Date Value Ref Range Status   04/27/2020 16 (A) >60 mL/min/1.73 m^2 Final     Comment:     Calculation used to obtain the estimated glomerular filtration  rate (eGFR) is the CKD-EPI equation.          Significant Imaging:   CT Head Without Contrast  Narrative: EXAMINATION:  CT HEAD WITHOUT CONTRAST    CLINICAL HISTORY:  seizure;    TECHNIQUE:  Routine unenhanced axial images were obtained through the head.  Sagittal and coronal reformatted images were created.  The study is reviewed in bone and soft tissue windows.    COMPARISON:  Brain MRI dated 04/14/2020, head CT dated 03/31/2020    FINDINGS:  Intracranial contents: There is no acute abnormality or definite change in the appearance of the brain compared to the prior studies.  There is no intracranial hemorrhage.  There is no mass or mass effect.  There is no hydrocephalus or midline shift.  There is no significant white matter disease.  The gray-white interface is preserved without obvious acute infarction.  There is no abnormal extra-axial fluid collection.  The basilar cisterns are open.  The cerebellar tonsils remain in normal position.  The sellar structures are normal.    Extracranial contents, calvarium, soft tissues: The calvarium is normal.  There is partial opacification of the left sphenoid sinus with fluid level.  There are small bilateral mastoid effusions  Impression: 1. There is no acute abnormality.  There is no hemorrhage, mass, mass effect or obvious acute edema or ischemia.  2. Small bilateral mastoid effusions.  3. Left sphenoid sinus disease.    Electronically signed by: Kendrick Reyes  MD  Date:    04/27/2020  Time:    16:30  X-Ray Chest 1 View  Narrative: EXAMINATION:  XR CHEST 1 VIEW    CLINICAL HISTORY:  line placement;    TECHNIQUE:  Single frontal view of the chest was performed.    COMPARISON:  04/13/2020    FINDINGS:  A right IJ catheter has its tip at the cavoatrial junction.  Previously present endotracheal a nasogastric has been removed well as a prior left IJ central line.  The cardiomediastinal silhouette is with normal limits.  Mild infiltrate is present within the right lung, with otherwise resolution of bilateral pulmonary infiltrates.  Impression: Appropriately positioned right IJ catheter.  Minimal residual right lung infiltrate.    Electronically signed by: Ayaan Terrazas MD  Date:    04/27/2020  Time:    13:23      Assessment and Plan:    Dizziness  -Seizure vs Syncope  -CT: no acute intracranial process  -EEG pending  -CUS ordered  -MRA ordered  -Na/K: 136/3.4    Acute Renal Failure  -New HD    COVID 19  -Recovered    Hypothyroid  -3.072 TSH  -levothyroxine    Seizure vs Syncope work up in progress.  No documentation of hypotension. Pt had MRI Brain 4/14, which showed nothing acute, and ECHO without Bubble 4/13 which showed mild left atrial enlargement.  Will continue to monitor.     Active Diagnoses:    Diagnosis Date Noted POA    PRINCIPAL PROBLEM:  Acute renal failure [N17.9] 03/27/2020 Yes    Moderate malnutrition [E44.0] 04/27/2020 Unknown    Hyponatremia [E87.1] 04/26/2020 Yes    Hypokalemia [E87.6] 04/26/2020 Yes    History of 2019 novel coronavirus disease (COVID-19) [Z86.19] 04/26/2020 Yes    Chronic combined systolic and diastolic heart failure [I50.42] 04/23/2020 Yes    Clostridium difficile colitis [A04.72] 04/22/2020 Yes    Central line infection, subsequent encounter [T80.219D] 04/22/2020 Not Applicable    Post viral debility [R53.81] 04/12/2020 Yes    Morbid obesity [E66.01] 03/25/2020 Yes    Hypothyroid [E03.9] 03/25/2020 Yes    Iron deficiency  anemia, unspecified [D50.9] 12/30/2015 Yes      Problems Resolved During this Admission:       VTE Risk Mitigation (From admission, onward)         Ordered     heparin (porcine) injection 5,000 Units  As needed (PRN)      04/27/20 1500     heparin (porcine) injection 5,000 Units  Every 12 hours      04/25/20 2242     IP VTE HIGH RISK PATIENT  Once      04/25/20 2242     Place sequential compression device  Until discontinued      04/25/20 2242                Thank you for your consult. I will follow-up with patient. Please contact us if you have any additional questions.    Jeanne Bailey, DIEGO  Neurology  [facility]    I, Dr. Justice Mnoge, have personally seen and examined the patient with my advanced provider and agree with above. I personally did a focused exam, and reviewed all necessary clinical information. I discussed my management plan with my NP and agree with above.

## 2020-04-28 NOTE — ASSESSMENT & PLAN NOTE
Required HD but has been off since 4/17. Electrolyte derangements per nephrology.  Follow renal panel and electrolytes closely.  Follow renal recommendations.  Renal diet.  Adjust renal dose medications for Estimated Creatinine Clearance: 19.5 mL/min (A) (based on SCr of 3.4 mg/dL (H)).   Avoid NSAIDs, Pace-II inhibitors, ACE-I, Angiotensin Receptor Blockers, or Aminoglycosides.  Patient is scheduled for 2nd round of HD today.

## 2020-04-28 NOTE — PLAN OF CARE
Pt AAO x 4. Pt ambulates with standby assistance. Room air. Tele in use - NSR. Pain controlled with PRN medication. Pt afebrile. PIV cdi. Trialysis cath inserted today and HD performed. RRT called today; nursing note put in. Call bell in reach, bed locked, bed alarm on, and fall band and non skid socks on. Will continue to monitor.

## 2020-04-28 NOTE — ASSESSMENT & PLAN NOTE
Patient's anemia is currently controlled. S/p 0 units of PRBCs.   Current CBC reviewed-   Lab Results   Component Value Date    HGB 6.8 (L) 04/28/2020    HCT 22.7 (L) 04/28/2020     Monitor serial CBC and transfuse if patient becomes hemodynamically unstable, symptomatic or H/H drops below 7/21.   See symptomatic anemia management.

## 2020-04-28 NOTE — PROGRESS NOTES
Consult Note  Infectious Disease    Reason for Consult:      HPI: Maria Victoria Hernandez is a 55 y.o. female known to me from prior admission, with past medical history of hypothyroidism, diabetes, diet controlled, vitamin-D deficiency, B12 deficiency, obesity, BMI of 40 (48 prior to covid)mildly diagnosed and treated for Coronavirus infection had then discharged to LTAC.    Prior admission was complicated by MAIKOL, needing HD, Staphylococcus epidermidis bacteremia/line infection  (+ Bcx 04/05, + cath Cx on 04/06) and Candida albicans candidemia ( 04/04 - 04/06  neg 04/8, and C diff diarrhea on 04/12, CHF diastolic and systolic.    Patient has completed treatment with vancomycin for Staphylococcus epidermidis  She is still on Diflucan for candidemia till 05/05/2020  She is still on vancomycin by mouth for C diff to complete 10 days of treatment, which is till the end of April.    Patient was brought back because of decreased urine output, worsening kidney function, confusion, need for HD access placement  She continues to have generalized weakness, shortness of breath, worse with exertion  She was seen by nephrologist would like to observe and not start dialysis at this time.    04/27/2020.  No new complaints.  She just had right IJ Hardy placed.  Chest x-ray is about to be done.  Patient is proud that she walked with PT, more than 50 ft.  Discussed with cardiovascular surgeon.  If patient goes back to LTAC Hardy will be great option.  If patient ends up going home he will need a Trialysis catheter instead.  Patient has dialysis orders    04/28/2020 I discussed with ophthalmologist , Dr. Raines is on-call today.  Discussed with nurse.  Hopefully we can get Dr. Raines to see the patient while she is in house.  If there is Candida eye involvement, patient will need long duration of Diflucan, Ophthalmology and ID follow ups.  If there is no eye involvement than Diflucan can be discontinued on  05/05/2020.    Antibiotics (From admission, onward)    None        Antifungals (From admission, onward)    Start     Stop Route Frequency Ordered    04/26/20 0900  fluconazole tablet 300 mg      05/06 0859 Oral Daily 04/25/20 2242        Antivirals (From admission, onward)    None          EXAM & DIAGNOSTICS REVIEWED:   Vitals:     Temp:  [97.5 °F (36.4 °C)-99.1 °F (37.3 °C)]   Temp: 98.5 °F (36.9 °C) (04/28/20 1145)  Pulse: 85 (04/28/20 1210)  Resp: 18 (04/28/20 1210)  BP: 127/60 (04/28/20 1210)  SpO2: 100 % (04/28/20 0934)    Intake/Output Summary (Last 24 hours) at 4/28/2020 1228  Last data filed at 4/28/2020 0600  Gross per 24 hour   Intake 980 ml   Output 350 ml   Net 630 ml       General:  In NAD.   Eyes:    ENT:    Neck:    Lungs:   Heart:   Abd:    :    Musc:    Skin:    Wound:   Neuro:   Psych:    Lymphatic:      Extrem:   VAD:       Isolation:    Lines/Tubes/Drains:  Right IJ, Hardy placed by Dr. Gauthier  General Labs reviewed:  Recent Labs   Lab 04/26/20  0611 04/27/20  0438 04/28/20  0448   WBC 9.19 7.38 10.17   HGB 8.2* 7.8* 6.8*   HCT 27.1* 25.4* 22.7*   * 543* 489*       Recent Labs   Lab 04/27/20 0438 04/27/20  1931 04/28/20  0448     136 135* 134*  135*   K 3.4*  3.3* 3.6 3.8  3.8   CL 95  95 99 100  99   CO2 20*  20* 20* 20*  21*   BUN 72*  72* 38* 43*  43*   CREATININE 4.8*  4.7* 3.1* 3.3*  3.4*   CALCIUM 10.4  10.3 9.6 9.7  9.8   PROT 9.5* 9.0* 8.8*   BILITOT 0.6 0.6 0.5   ALKPHOS 137* 130 133   ALT 23 21 20   AST 19 21 22     Micro:  Microbiology Results (last 7 days)     Procedure Component Value Units Date/Time    Blood culture [[id number]] Collected:  04/27/20 0439    Order Status:  Completed Specimen:  Blood Updated:  04/28/20 1212     Blood Culture, Routine No Growth to date      No Growth to date    Blood culture [[id number]] Collected:  04/26/20 0611    Order Status:  Completed Specimen:  Blood Updated:  04/28/20 1212     Blood Culture, Routine No Growth to  date      No Growth to date      No Growth to date    Blood culture [[id number]] Collected:  04/26/20 0550    Order Status:  Completed Specimen:  Blood Updated:  04/28/20 1212     Blood Culture, Routine No Growth to date      No Growth to date      No Growth to date    IV catheter culture [[id number]] Collected:  04/26/20 1436    Order Status:  Completed Specimen:  Catheter Tip, Subclavian Updated:  04/28/20 0730     Aerobic Culture - Cath tip No growth        Imaging Reviewed:     Cardiology:    · Echocardiogram 04/13/2020  · Mild concentric left ventricular hypertrophy.  · Global hypokinetic wall motion.  · Moderately decreased left ventricular systolic function. The estimated ejection fraction is 35%.  · Grade I (mild) left ventricular diastolic dysfunction consistent with impaired relaxation.  · Normal right ventricular systolic function.  · Mild left atrial enlargement.  · Moderate mitral regurgitation.  · Normal central venous pressure (3 mmHg).       IMPRESSION & PLAN     Staphylococcus epidermidis bacteremia/line infection on prior admission (+ Bcx 04/05, + cath Cx on 04/06)-has completed treatment  Candida albicans candidemia, line infection on prior admission ( 04/04 - 04/06  neg 04/8) - she will complete treatment on 05/05.  C diff diarrhea on 04/12, completed vancomycin p.o.  Reason for this admission, transfer from LTAC to the hospital, questionable need for HD at this time.     PMHx CHF diastolic and systolic, hypothyroidism, diabetes, diet controlled, vitamin-D deficiency, B12 deficiency, obesity, BMI of 40 (48 prior to covid),  recent Coronavirus infection       Recommendations:  Continue Diflucan p.o. or IV for treatment of candidemia.  Aiming end date 05/05/2020.  I spoke with Dr. Jayro Mancia with ophthalmologic/retina specialist .  He is not on service, Dr. Raines is.  Discussed with nurse, hopefully we can get Dr. Raines to see patient.  Specialist is supposed to see if there is Candida  involvement of retina, if that is the case:  patient will need long duration of Diflucan and ID follow-up.    If no eye involvement can discontinue Diflucan as above, on 05/05/2020.      Stools have thickened.  Patient has completed vancomycin by mouth for C diff.    Will sign off.

## 2020-04-28 NOTE — PROGRESS NOTES
Consult Note  Nephrology    Consult Requested By: Ferny Porter MD    Reason for Consult: MAIKOL requiring initiation of RRT    SUBJECTIVE:     History of Present Illness:  54 y/o female patient s/p hospitalization for COVID 19.  She required RRT during that admit, was taken off dialysis on 4/17 d/t renal recovery.  She was transferred to [facility] once she was stable for discharge from hospital.    Seen yesterday at LT.  She had n/v for 2 days, uremia.  Scr had been trending up, was 4.0 yesterday. Transferred to hospital for line placement and re-initiation of RRT.  Today, Scr is 4.6.    Discussed w/pt the need for line placement and re-initiation of dialysis.  She is agreeable.      4/27  Had some nausea earlier.  No confusion.  No sob.  4/28  Some nausea earlier.  Seen on dialysis        Assessment/plan:    1.  MAIKOL requiring initiation of RRT--seen on dialysis.  Needs tunneled catheter  2.  Anemia of chronic dz/Fe+ def--continue epo and Fe+ supplements  3.  Hypokalemia--was given repletion per primary team this am.  Will further correct with HD.  4.  SHPT--cont calcitriol.  Daily PO4 levels, may add binder. Renal diet.  5.  Metabolic acidosis--on bicarb, continue.  Will correct with HD    Past Medical History:   Diagnosis Date    Colon polyp     Diverticulosis large intestine w/o perforation or abscess w/bleeding     Iron deficiency anemia     Prediabetes     Thyroid disease     Vitamin B 12 deficiency     Vitamin D deficiency      Past Surgical History:   Procedure Laterality Date    TUBAL LIGATION       Family History   Problem Relation Age of Onset    Heart disease Mother     Heart disease Maternal Grandmother     Heart disease Maternal Grandfather     Mental illness Paternal Grandmother      Social History     Tobacco Use    Smoking status: Never Smoker    Smokeless tobacco: Never Used   Substance Use Topics    Alcohol use: No    Drug use: Not on file       Review of patient's allergies  indicates:  No Known Allergies     Review of Systems: 4/26)  General ROS: negative for - fever or night sweats  Psychological ROS: negative for - behavioral disorder or depression  ENT ROS: negative for - headaches or visual changes  Hematological and Lymphatic ROS: negative for - bleeding problems or bruising  Endocrine ROS: negative for - temperature intolerance or unexpected weight changes  Respiratory ROS: no cough, shortness of breath, or wheezing  Cardiovascular ROS: no chest pain, SOB  Gastrointestinal ROS: no abdominal pain, no n/v/c/d  Genito-Urinary ROS: no dysuria or trouble voiding  Musculoskeletal ROS: negative for - joint pain or joint swelling  Neurological ROS: no TIA or stroke symptoms  Dermatological ROS: negative for rash and skin lesion changes    OBJECTIVE:     Vital Signs Range (Last 24H):  Temp:  [97.6 °F (36.4 °C)-99.1 °F (37.3 °C)]   Pulse:  []   Resp:  [14-18]   BP: (104-135)/(57-95)   SpO2:  [100 %]     Physical Exam:  General- NAD noted  HEENT- WNL  Neck- supple  CV- Regular rate and rhythm  Resp- Lungs CTA Bilaterally, No increased WOB  GI- Non tender/non-distended, BS normoactive x4 quads  Extrem- No cyanosis, clubbing, edema.  Derm- skin w/d  Neuro-  Asterixis is present    Body mass index is 40.95 kg/m².    Laboratory:  CBC:   Recent Labs   Lab 04/28/20  0448   WBC 10.17   RBC 2.45*   HGB 6.8*   HCT 22.7*   *   MCV 93   MCH 27.8   MCHC 30.0*     CMP:   Recent Labs   Lab 04/28/20  0448   GLU 98  98   CALCIUM 9.7  9.8   ALBUMIN 3.8  3.7   PROT 8.8*   *  135*   K 3.8  3.8   CO2 20*  21*     99   BUN 43*  43*   CREATININE 3.3*  3.4*   ALKPHOS 133   ALT 20   AST 22   BILITOT 0.5       Diagnostic Results:  Labs: Reviewed      ASSESSMENT/PLAN:     Active Hospital Problems    Diagnosis  POA    *Acute renal failure [N17.9]  Yes    Symptomatic anemia [D64.9]  Yes    Mixed hyperlipidemia [E78.2]  Yes    Moderate malnutrition [E44.0]  Unknown    Hyponatremia  [E87.1]  Yes    Hypokalemia [E87.6]  Yes    History of 2019 novel coronavirus disease (COVID-19) [Z86.19]  Yes    Chronic combined systolic and diastolic heart failure [I50.42]  Yes    Clostridium difficile colitis [A04.72]  Yes    Central line infection, subsequent encounter [T80.219D]  Not Applicable    Post viral debility [R53.81]  Yes    Morbid obesity [E66.01]  Yes    Hypothyroid [E03.9]  Yes    Iron deficiency anemia, unspecified [D50.9]  Yes      Resolved Hospital Problems   No resolved problems to display.         Thank you for allowing us to participate in the care of your patient. We will follow the patient and provide recommendations as needed.      Time spent seeing patient( greater than 1/2 spent in direct contact) :

## 2020-04-28 NOTE — SUBJECTIVE & OBJECTIVE
Subjective:     Interval History: ***    Current Neurological Medications: ***    Current Facility-Administered Medications   Medication Dose Route Frequency Provider Last Rate Last Dose    0.9%  NaCl infusion (for blood administration)   Intravenous Q24H PRN Ferny Porter MD        acetaminophen tablet 650 mg  650 mg Oral Q6H PRN Sylvia Feldman NP   650 mg at 04/27/20 1329    albuterol-ipratropium 2.5 mg-0.5 mg/3 mL nebulizer solution 3 mL  3 mL Nebulization Q6H PRN Alverto Alexander MD        aspirin EC tablet 81 mg  81 mg Oral Daily Sylvia Feldman NP   81 mg at 04/28/20 0931    atorvastatin tablet 40 mg  40 mg Oral Daily Ferny Porter MD   40 mg at 04/28/20 0931    calcitRIOL capsule 0.25 mcg  0.25 mcg Oral Daily Sylvia Feldman NP   0.25 mcg at 04/28/20 0931    dextrose 50% injection 12.5 g  12.5 g Intravenous PRN Sylvia Feldman NP        dextrose 50% injection 25 g  25 g Intravenous PRN Sylvia Feldman NP        ferrous sulfate EC tablet 325 mg  325 mg Oral Daily Sylvia Feldman NP   325 mg at 04/28/20 0931    fluconazole tablet 300 mg  300 mg Oral Daily Sylvia Feldman NP   300 mg at 04/28/20 0931    glucagon (human recombinant) injection 1 mg  1 mg Intramuscular PRN Sylvia Feldman NP        glucose chewable tablet 16 g  16 g Oral PRN Sylvia Feldman NP        glucose chewable tablet 24 g  24 g Oral PRN Sylvia Feldman NP        heparin (porcine) injection 5,000 Units  5,000 Units Subcutaneous Q12H Sylvia Feldman NP   5,000 Units at 04/28/20 0931    heparin (porcine) injection 5,000 Units  5,000 Units Intravenous PRN Don Collins MD   5,000 Units at 04/28/20 1339    hydrALAZINE tablet 25 mg  25 mg Oral Q8H Carolyn Moeller MD   25 mg at 04/28/20 1506    levothyroxine tablet 150 mcg  150 mcg Oral Before breakfast Sylvia Feldman NP   150 mcg at 04/28/20 0506    melatonin tablet 6 mg  6 mg Oral Nightly PRN Sylvia Feldman, NP        metoprolol  tartrate (LOPRESSOR) tablet 25 mg  25 mg Oral BID Sylvia Feldman NP   Stopped at 04/28/20 0900    ondansetron injection 4 mg  4 mg Intravenous Q8H PRN Sylvia Feldman NP   4 mg at 04/26/20 0938    pantoprazole EC tablet 40 mg  40 mg Oral Daily Sylvia Feldman NP   40 mg at 04/28/20 0931    sodium bicarbonate tablet 650 mg  650 mg Oral TID Sylvia Feldman NP   650 mg at 04/28/20 1506    sodium chloride 0.9% flush 10 mL  10 mL Intravenous PRN Sylvia Feldman NP           Review of Systems  Objective:     Vital Signs (Most Recent):  Temp: 98.1 °F (36.7 °C) (04/28/20 1551)  Pulse: (!) 112 (04/28/20 1551)  Resp: 18 (04/28/20 1551)  BP: 135/60 (04/28/20 1551)  SpO2: 98 % (04/28/20 1551) Vital Signs (24h Range):  Temp:  [97.5 °F (36.4 °C)-99.1 °F (37.3 °C)] 98.1 °F (36.7 °C)  Pulse:  [] 112  Resp:  [14-19] 18  SpO2:  [98 %-100 %] 98 %  BP: (114-173)/(59-95) 135/60     Weight: 95.1 kg (209 lb 10.5 oz)  Body mass index is 40.95 kg/m².    Physical Exam         Significant Labs: {Results:90186}    Significant Imaging: {Imaging Review:83179}

## 2020-04-28 NOTE — PROGRESS NOTES
[facility]  Neurology  Consult Note    Patient Name: Maria Victoria Hernandez  MRN: [id number]  Admission Date: 4/25/2020  Hospital Length of Stay: 3 days  Code Status: Full Code   Attending Provider: Dr Porter  Consulting Provider: Dr Justice Monge  Primary Care Physician: Candice Deluna MD  Principal Problem:Acute renal failure      Subjective:     Chief Complaint:  Dialysis access    HPI from EMR:  Maria Victoria Hernandez is a 55 y.o. female with a PMHx of hypothyroidism, prediabetes, anemia, renal failure, and recent COVID-19 infection who presents to the ED from [facility] for possible dialysis line placement and emergent dialysis.  The patient was recently hospitalized here for COVID-19 for approximately 1 month, during that time she was intubated and required dialysis for renal failure. She also had line infection and is still receiving oral diflucan. Her kidney function improved; she was sent to LTAC and her dialysis access was removed. Creatinine levels have increased from several days ago. Per LTAC report patient had worsening renal function, intermittent confusion, and decreasing urine output. The patient adamantly denies any confusion or decrease in urine output. The patient does endorse slight weakness and SOB with exertion which has been unchanged since discharge. The patient also reports diarrhea secondary to c diff infection, but states her diarrhea has improved over the last few days. She is currently receiving oral vancomycin with her last day of treatment being tomorrow. The patient denies any N/V, abdominal pain, confusion, cough, fever/chills, chest pain, lower leg edema, or syncope. Her ED work up is significant for mild hyponatremia, hypokalemia, increased BUN/creatinine, and anemia. The ED physician discussed the case with Dr. Moeller who agreed that dialysis was not necessary at this point. The patient will be placed in observation under hospital medicine for further  work up and evaluation.     Neurological Consult: Pt had an episode during dialysis in which she c/o dizziness. Pt then went stiff and had a brief decreased LOC and blank stare.  A rapid response was called and seizure work up began.  Upon assessment patient is alert, oriented to all, responds appropriately. She says that she does not remember the episode but remembers being dizzy.  She denies having seizures in the past.  CT head showed nothing acute. EEG pending.  4/28 Pt denies weakness, dizziness.  She reports ambulating without any problems.     Past Medical History:   Diagnosis Date    Colon polyp     Diverticulosis large intestine w/o perforation or abscess w/bleeding     Iron deficiency anemia     Prediabetes     Thyroid disease     Vitamin B 12 deficiency     Vitamin D deficiency        Past Surgical History:   Procedure Laterality Date    TUBAL LIGATION         Review of patient's allergies indicates:  No Known Allergies    Current Neurological Medications:     No current facility-administered medications on file prior to encounter.      Current Outpatient Medications on File Prior to Encounter   Medication Sig    acetaminophen (TYLENOL) 325 MG tablet Take 2 tablets (650 mg total) by mouth every 6 (six) hours as needed.    ascorbic acid, vitamin C, (VITAMIN C) 1000 MG tablet Take 1 tablet (1,000 mg total) by mouth 4 (four) times daily.    aspirin (ECOTRIN) 81 MG EC tablet Take 1 tablet (81 mg total) by mouth once daily.    calcitRIOL (ROCALTROL) 0.25 MCG Cap Take 1 capsule (0.25 mcg total) by mouth once daily.    dextrose 5 % SolP 50 mL with promethazine 25 mg/mL Soln 12.5 mg Inject 12.5 mg into the vein every 6 (six) hours as needed.    epoetin raquel-epbx (RETACRIT) 10,000 unit/mL imjection Inject 0.5 mLs (5,000 Units total) into the skin every Mon, Wed, Fri.    [START ON 5/6/2020] escitalopram oxalate (LEXAPRO) 5 MG Tab 1 tablet (5 mg total) by Per NG tube route once daily.    ferrous  sulfate 325 mg (65 mg iron) Tab tablet Take 1 tablet (325 mg total) by mouth daily with breakfast. (Patient taking differently: Take 325 mg by mouth daily with breakfast. Take 2 tablets daily)    fluconazole (DIFLUCAN) 150 MG Tab Take 2 tablets (300 mg total) by mouth once daily. for 13 days    furosemide (LASIX) 40 MG tablet Take 1 tablet (40 mg total) by mouth 2 (two) times daily.    heparin sodium,porcine (HEPARIN, PORCINE,) 1,000 unit/mL injection Inject 4 mLs (4,000 Units total) into the vein as needed (Lock ports after every HD).    heparin sodium,porcine (HEPARIN, PORCINE,) 5,000 unit/mL injection Inject 1 mL (5,000 Units total) into the skin every 12 (twelve) hours.    hydrALAZINE (APRESOLINE) 20 mg/mL injection Inject 0.5 mLs (10 mg total) into the vein every 8 (eight) hours as needed (165).    hydrALAZINE (APRESOLINE) 50 MG tablet Take 1 tablet (50 mg total) by mouth every 8 (eight) hours.    ipratropium-albuteroL (COMBIVENT)  mcg/actuation inhaler Inhale 1 puff into the lungs every 6 (six) hours as needed for Wheezing or Shortness of Breath. Rescue    levothyroxine (SYNTHROID) 100 MCG tablet Take 1 tablet (100 mcg total) by mouth once daily. (Patient taking differently: Take 150 mcg by mouth once daily. )    metoprolol tartrate (LOPRESSOR) 25 MG tablet Take 1 tablet (25 mg total) by mouth 2 (two) times daily.    ondansetron 4 mg/2 mL Soln Inject 4 mg into the vein every 8 (eight) hours as needed.    sodium bicarbonate 650 MG tablet Take 1 tablet (650 mg total) by mouth 3 (three) times daily.    white petrolatum-mineral oiL 83-15 % Oint Place into the right eye every evening.    zinc sulfate (ZINCATE) 220 (50) mg capsule Take 1 capsule (220 mg total) by mouth once daily.      Family History     Problem Relation (Age of Onset)    Heart disease Mother, Maternal Grandmother, Maternal Grandfather    Mental illness Paternal Grandmother        Tobacco Use    Smoking status: Never Smoker     Smokeless tobacco: Never Used   Substance and Sexual Activity    Alcohol use: No    Drug use: Not on file    Sexual activity: Not on file     Review of Systems   Constitutional: Negative.    HENT: Negative.    Eyes: Negative.    Respiratory: Negative.    Cardiovascular: Negative.    Gastrointestinal: Negative.    Endocrine: Negative.    Genitourinary: Negative.    Musculoskeletal: Negative.    Allergic/Immunologic: Negative.    Neurological: Negative.    Hematological: Negative.    Psychiatric/Behavioral: Negative.      Objective:     Vital Signs (Most Recent):  Temp: 98.5 °F (36.9 °C) (04/28/20 1300)  Pulse: 104 (04/28/20 1300)  Resp: 18 (04/28/20 1300)  BP: 129/63 (04/28/20 1300)  SpO2: 100 % (04/28/20 0934) Vital Signs (24h Range):  Temp:  [97.5 °F (36.4 °C)-99.1 °F (37.3 °C)] 98.5 °F (36.9 °C)  Pulse:  [] 104  Resp:  [14-19] 18  SpO2:  [100 %] 100 %  BP: (110-173)/(57-95) 129/63     Weight: 95.1 kg (209 lb 10.5 oz)  Body mass index is 40.95 kg/m².    Physical Exam   Constitutional: She is oriented to person, place, and time. She appears well-developed and well-nourished.   Eyes: Pupils are equal, round, and reactive to light. EOM are normal.   Neck: Normal range of motion.   Cardiovascular: Normal rate and normal heart sounds.   Pulmonary/Chest: Effort normal.   Abdominal: Soft.   Musculoskeletal: Normal range of motion.   Neurological: She is alert and oriented to person, place, and time. She has a normal Finger-Nose-Finger Test.   Skin: Skin is warm and dry.   Psychiatric: Her speech is normal.       NEUROLOGICAL EXAMINATION:     MENTAL STATUS   Oriented to person, place, and time.   Attention: normal. Concentration: normal.   Speech: speech is normal   Level of consciousness: drowsy  Able to name object. Able to repeat.     CRANIAL NERVES   Cranial nerves II through XII intact.     CN III, IV, VI   Pupils are equal, round, and reactive to light.  Extraocular motions are normal.     MOTOR EXAM    Muscle bulk: normal  Overall muscle tone: normal    Strength   Right biceps: 4/5  Left biceps: 4/5  Right triceps: 4/5  Left triceps: 4/5  Right quadriceps: 4/5  Left quadriceps: 4/5  Right glutei: 4/5  Left glutei: 4/5    SENSORY EXAM   Light touch normal.     GAIT AND COORDINATION      Coordination   Finger to nose coordination: normal    Tremor   Resting tremor: absent  Intention tremor: absent      Significant Labs:  Lab Results   Component Value Date    WBC 7.38 04/27/2020    HGB 7.8 (L) 04/27/2020    HCT 25.4 (L) 04/27/2020    MCV 89 04/27/2020     (H) 04/27/2020       CMP  Sodium   Date Value Ref Range Status   04/28/2020 135 (L) 136 - 145 mmol/L Final   04/28/2020 134 (L) 136 - 145 mmol/L Final     Potassium   Date Value Ref Range Status   04/28/2020 3.8 3.5 - 5.1 mmol/L Final   04/28/2020 3.8 3.5 - 5.1 mmol/L Final     Chloride   Date Value Ref Range Status   04/28/2020 99 95 - 110 mmol/L Final   04/28/2020 100 95 - 110 mmol/L Final     CO2   Date Value Ref Range Status   04/28/2020 21 (L) 23 - 29 mmol/L Final   04/28/2020 20 (L) 23 - 29 mmol/L Final     Glucose   Date Value Ref Range Status   04/28/2020 98 70 - 110 mg/dL Final   04/28/2020 98 70 - 110 mg/dL Final     BUN, Bld   Date Value Ref Range Status   04/28/2020 43 (H) 6 - 20 mg/dL Final   04/28/2020 43 (H) 6 - 20 mg/dL Final     Creatinine   Date Value Ref Range Status   04/28/2020 3.4 (H) 0.5 - 1.4 mg/dL Final   04/28/2020 3.3 (H) 0.5 - 1.4 mg/dL Final     Calcium   Date Value Ref Range Status   04/28/2020 9.8 8.7 - 10.5 mg/dL Final   04/28/2020 9.7 8.7 - 10.5 mg/dL Final     Total Protein   Date Value Ref Range Status   04/28/2020 8.8 (H) 6.0 - 8.4 g/dL Final     Albumin   Date Value Ref Range Status   04/28/2020 3.7 3.5 - 5.2 g/dL Final   04/28/2020 3.8 3.5 - 5.2 g/dL Final     Total Bilirubin   Date Value Ref Range Status   04/28/2020 0.5 0.1 - 1.0 mg/dL Final     Comment:     For infants and newborns, interpretation of results should  be based  on gestational age, weight and in agreement with clinical  observations.  Premature Infant recommended reference ranges:  Up to 24 hours.............<8.0 mg/dL  Up to 48 hours............<12.0 mg/dL  3-5 days..................<15.0 mg/dL  6-29 days.................<15.0 mg/dL       Alkaline Phosphatase   Date Value Ref Range Status   04/28/2020 133 55 - 135 U/L Final     AST   Date Value Ref Range Status   04/28/2020 22 10 - 40 U/L Final     ALT   Date Value Ref Range Status   04/28/2020 20 10 - 44 U/L Final     Anion Gap   Date Value Ref Range Status   04/28/2020 15 8 - 16 mmol/L Final   04/28/2020 14 8 - 16 mmol/L Final     eGFR if    Date Value Ref Range Status   04/28/2020 17 (A) >60 mL/min/1.73 m^2 Final   04/28/2020 17 (A) >60 mL/min/1.73 m^2 Final     eGFR if non    Date Value Ref Range Status   04/28/2020 15 (A) >60 mL/min/1.73 m^2 Final     Comment:     Calculation used to obtain the estimated glomerular filtration  rate (eGFR) is the CKD-EPI equation.      04/28/2020 15 (A) >60 mL/min/1.73 m^2 Final     Comment:     Calculation used to obtain the estimated glomerular filtration  rate (eGFR) is the CKD-EPI equation.          Significant Imaging:   MRA Brain without contrast  Narrative: EXAMINATION:  MRA BRAIN WITHOUT CONTRAST    CLINICAL HISTORY:  Dizziness; .    TECHNIQUE:  Non-contrast 3-D time-of-flight intracranial MR angiography was performed through the [place] [facility] with MIP reformatting.    COMPARISON:  CT head 04/27/2020, MRI brain 04/14/2020    FINDINGS:  The internal carotid arteries are of normal caliber.  There are no areas of atherosclerotic narrowing. The anterior and middle cerebral arteries are normal.  The vertebral arteries are patent. The basilar artery is normal.  The posterior cerebral arteries are normal.  There is no aneurysm or vascular malformation identified.  Although MRA is a screening examination, catheter angiography remains the  definitive study for small aneurysms, vasculitis, and other vascular abnormalities.  Incidental note is made of a left maxillary sinus retention cysts and chronic opacification of the left sphenoid sinus.  Impression: No high-grade stenosis, large vessel occlusion or aneurysm.    Electronically signed by: Reza John  Date:    04/28/2020  Time:    09:06  US Carotid Bilateral  Narrative: EXAMINATION:  US CAROTID BILATERAL    CLINICAL HISTORY:  dizziness;    TECHNIQUE:  Grayscale and color Doppler ultrasound examination of the carotid and vertebral artery systems bilaterally.  Stenosis estimates are per the NASCET measurement criteria.    COMPARISON:  None.    FINDINGS:  The right carotid bifurcation 1 was unable to be visualized due to bandaging from recent catheter placement.  The right vertebral artery is visualized and demonstrates antegrade flow.  The peak systolic velocity in the left internal carotid artery is 93 centimeters/second with an IC CC ratio of 0.2.  The findings suggest a 0-50% luminal diameter narrowing according to the an AS CT criteria.  Antegrade flow seen in the left vertebral artery.  Examination is limited due to swelling in the patient's inability to turn there head.  Impression: Limited examination without gross occlusion or stenosis in the left carotid system.  The right carotid system was unable to be visualized.    Electronically signed by: Sami Omer MD  Date:    04/28/2020  Time:    07:17      Assessment and Plan:    Dizziness  -Seizure vs Syncope  R/O TIA  -CT: no acute intracranial process  -EEG pending  -CUS- limited exam.  No gross occlusion or stenosis of left carotids. Right carotid unable to be visualized.  -MRA- no high grade stenosos  -ECHO no bubble 4/13 mild left atrial enlargement/EF 35%  -MRI 4/14 no evidence of any acute abnormalities  -Na/K: 136/3.4  - aspirin 81 mg    HLD  -lipitor 40 mg   CHOL 225, TRIG 333, HDL 36, .4    Acute Renal Failure  -New HD    COVID  19  -Recovered    Hypothyroid  -3.072 TSH  -levothyroxine    Discussed importance of tighter cholesterol control. Seizure vs Syncope work up in progress.  No documentation of hypotension. Pt had MRI Brain 4/14, which showed nothing acute, and ECHO without Bubble 4/13 which showed mild left atrial enlargement.  CUS limited study. Will continue to monitor. EEG pending.     Active Diagnoses:    Diagnosis Date Noted POA    PRINCIPAL PROBLEM:  Acute renal failure [N17.9] 03/27/2020 Yes    Symptomatic anemia [D64.9] 04/28/2020 Yes    Mixed hyperlipidemia [E78.2] 04/28/2020 Yes    Moderate malnutrition [E44.0] 04/27/2020 Unknown    Hyponatremia [E87.1] 04/26/2020 Yes    Hypokalemia [E87.6] 04/26/2020 Yes    History of 2019 novel coronavirus disease (COVID-19) [Z86.19] 04/26/2020 Yes    Chronic combined systolic and diastolic heart failure [I50.42] 04/23/2020 Yes    Clostridium difficile colitis [A04.72] 04/22/2020 Yes    Central line infection, subsequent encounter [T80.219D] 04/22/2020 Not Applicable    Post viral debility [R53.81] 04/12/2020 Yes    Morbid obesity [E66.01] 03/25/2020 Yes    Hypothyroid [E03.9] 03/25/2020 Yes    Iron deficiency anemia, unspecified [D50.9] 12/30/2015 Yes      Problems Resolved During this Admission:       VTE Risk Mitigation (From admission, onward)         Ordered     heparin (porcine) injection 5,000 Units  As needed (PRN)      04/27/20 1500     heparin (porcine) injection 5,000 Units  Every 12 hours      04/25/20 2242     IP VTE HIGH RISK PATIENT  Once      04/25/20 2242     Place sequential compression device  Until discontinued      04/25/20 2242                Thank you for your consult. I will follow-up with patient. Please contact us if you have any additional questions.    Jeanne Bailey NP  Neurology  [facility] I, Dr. Justice Monge, have personally seen and examined the patient with my advanced provider and agree with above. I personally did a  focused exam, and reviewed all necessary clinical information. I discussed my management plan with my NP and agree with above. F/u EEG. No driving for now.

## 2020-04-28 NOTE — PLAN OF CARE
Patient AAO. VSS, afebrile. Tele # [id number] monitored. All meds administered as prescribed. Brief in place, 1-person assist provided. R IJ trialysis cath site CDI. Will re-attempt HD 4/28. No seizure/syncopal-like activity noted this shift. Episode of brief nausea with small emesis. Patient resting quietly with no complaints noted at this time. Hourly/q2 rounds done for safety. Bed locked and low w/ call light in reach. Continuing to monitor.

## 2020-04-28 NOTE — PLAN OF CARE
Problem: Physical Therapy Goal  Goal: Physical Therapy Goal  Description  Goals to be met by: 2020     Patient will increase functional independence with mobility by performin. Supine to sit with Contact Guard Assistance  2. Sit to stand transfer with Contact Guard Assistance  3. Bed to chair transfer with Contact Guard Assistance using Rolling Walker  4. Gait  x 250 feet with Minimal Assistance using Rolling Walker.   5. Lower extremity exercise program x20 reps   Outcome: Ongoing, Progressing     Problem: Physical Therapy Goal  Goal: Physical Therapy Goal  Description  Goals to be met by: 2020     Patient will increase functional independence with mobility by performin. Supine to sit with Contact Guard Assistance  2. Sit to stand transfer with Contact Guard Assistance  3. Bed to chair transfer with Contact Guard Assistance using Rolling Walker  4. Gait  x 250 feet with Minimal Assistance using Rolling Walker.   5. Lower extremity exercise program x20 reps   Outcome: Ongoing, Progressing   Ambulate with rw and assistance for safety.

## 2020-04-28 NOTE — PLAN OF CARE
OT goals remain appropriate. Continue with POC.    Problem: Occupational Therapy Goal  Goal: Occupational Therapy Goal  Description  Goals to be met by: 5/11/2020     Patient will increase functional independence with ADLs by performing:    LE Dressing with Modified [place].  Grooming while standing at sink with Modified [place].  Toileting from toilet with Modified [place] for hygiene and clothing management.   Supine to sit with Modified [place].  Toilet transfer to toilet with Modified [place].     Outcome: Ongoing, Progressing      DEXTER (acute kidney injury)

## 2020-04-28 NOTE — CONSULTS
CC: Hyperemia right eye    HPI: Maria Victoria Hernandez is a 55 y.o. female      POH: None    Gtts: None      Past Medical History:   Diagnosis Date    Colon polyp     Diverticulosis large intestine w/o perforation or abscess w/bleeding     Iron deficiency anemia     Prediabetes     Thyroid disease     Vitamin B 12 deficiency     Vitamin D deficiency          Family History   Problem Relation Age of Onset    Heart disease Mother     Heart disease Maternal Grandmother     Heart disease Maternal Grandfather     Mental illness Paternal Grandmother            Current Facility-Administered Medications:     0.9%  NaCl infusion (for blood administration), , Intravenous, Q24H PRN, Ferny Porter MD    acetaminophen tablet 650 mg, 650 mg, Oral, Q6H PRN, Sylvia Feldman NP, 650 mg at 04/27/20 1329    albuterol-ipratropium 2.5 mg-0.5 mg/3 mL nebulizer solution 3 mL, 3 mL, Nebulization, Q6H PRN, Alverto Alexander MD    aspirin EC tablet 81 mg, 81 mg, Oral, Daily, Sylvia Feldman NP, 81 mg at 04/28/20 0931    atorvastatin tablet 40 mg, 40 mg, Oral, Daily, Ferny Porter MD, 40 mg at 04/28/20 0931    calcitRIOL capsule 0.25 mcg, 0.25 mcg, Oral, Daily, Sylvia Feldman NP, 0.25 mcg at 04/28/20 0931    dextrose 50% injection 12.5 g, 12.5 g, Intravenous, PRN, Sylvia Feldman NP    dextrose 50% injection 25 g, 25 g, Intravenous, PRN, Sylvia Feldman NP    ferrous sulfate EC tablet 325 mg, 325 mg, Oral, Daily, Sylvia Feldman NP, 325 mg at 04/28/20 0931    fluconazole tablet 300 mg, 300 mg, Oral, Daily, Sylvia Feldman NP, 300 mg at 04/28/20 0931    glucagon (human recombinant) injection 1 mg, 1 mg, Intramuscular, PRN, Sylvia Feldman NP    glucose chewable tablet 16 g, 16 g, Oral, PRN, Sylvia Feldman NP    glucose chewable tablet 24 g, 24 g, Oral, PRN, Sylvia Feldman NP    heparin (porcine) injection 5,000 Units, 5,000 Units, Subcutaneous, Q12H, Sylvia Feldman, NP, 5,000  Units at 04/28/20 0931    heparin (porcine) injection 5,000 Units, 5,000 Units, Intravenous, PRN, Don Collins MD, 5,000 Units at 04/28/20 1339    hydrALAZINE tablet 25 mg, 25 mg, Oral, Q8H, Carolyn Moeller MD, 25 mg at 04/28/20 1506    levothyroxine tablet 150 mcg, 150 mcg, Oral, Before breakfast, Sylvia Feldman NP, 150 mcg at 04/28/20 0506    melatonin tablet 6 mg, 6 mg, Oral, Nightly PRN, Sylvia Feldman NP    metoprolol tartrate (LOPRESSOR) tablet 25 mg, 25 mg, Oral, BID, Sylvia Feldman NP, Stopped at 04/28/20 0900    ondansetron injection 4 mg, 4 mg, Intravenous, Q8H PRN, Sylvia Feldman NP, 4 mg at 04/26/20 0938    pantoprazole EC tablet 40 mg, 40 mg, Oral, Daily, Sylvia Feldman NP, 40 mg at 04/28/20 0931    sodium bicarbonate tablet 650 mg, 650 mg, Oral, TID, Sylvia Feldman NP, 650 mg at 04/28/20 1506    sodium chloride 0.9% flush 10 mL, 10 mL, Intravenous, PRN, Sylvia Feldman NP      Review of patient's allergies indicates:  No Known Allergies      Social History     Tobacco Use    Smoking status: Never Smoker    Smokeless tobacco: Never Used   Substance Use Topics    Alcohol use: No    Drug use: Not on file          Not recorded        VA: OD 20/40 OS 20/40  IOP: OD soft OS soft  EOM: Full OU  CVF: Full OU  Pupils: No RAPD  Anterior segment OD: conjunc mild hyperemia OS: WNL  Cornea, iris, lens OU: WNL; no hypopyon  Posterior segment: No vitritis OU WNL OU    Plan   A/P: Maria Victoria Hernandez is a 55 y.o. female  1. Candidemia- no sign of endophthalmitis  2. Hyperemia- most likely due to dry ocular surface AT's prn  F/u 1-2 weeks in ophtho clinic once discharged

## 2020-04-29 ENCOUNTER — ANESTHESIA EVENT (OUTPATIENT)
Dept: SURGERY | Facility: HOSPITAL | Age: 55
DRG: 673 | End: 2020-04-29
Payer: OTHER MISCELLANEOUS

## 2020-04-29 ENCOUNTER — ANESTHESIA (OUTPATIENT)
Dept: SURGERY | Facility: HOSPITAL | Age: 55
DRG: 673 | End: 2020-04-29
Payer: COMMERCIAL

## 2020-04-29 LAB
ALBUMIN SERPL BCP-MCNC: 3.6 G/DL (ref 3.5–5.2)
ALBUMIN SERPL BCP-MCNC: 3.7 G/DL (ref 3.5–5.2)
ALP SERPL-CCNC: 130 U/L (ref 55–135)
ALT SERPL W/O P-5'-P-CCNC: 23 U/L (ref 10–44)
ANION GAP SERPL CALC-SCNC: 14 MMOL/L (ref 8–16)
ANION GAP SERPL CALC-SCNC: 14 MMOL/L (ref 8–16)
AST SERPL-CCNC: 22 U/L (ref 10–40)
BACTERIA CATH TIP CULT: NO GROWTH
BASOPHILS # BLD AUTO: 0.18 K/UL (ref 0–0.2)
BASOPHILS NFR BLD: 1.9 % (ref 0–1.9)
BILIRUB SERPL-MCNC: 0.7 MG/DL (ref 0.1–1)
BUN SERPL-MCNC: 25 MG/DL (ref 6–20)
BUN SERPL-MCNC: 26 MG/DL (ref 6–20)
CALCIUM SERPL-MCNC: 10.1 MG/DL (ref 8.7–10.5)
CALCIUM SERPL-MCNC: 10.1 MG/DL (ref 8.7–10.5)
CHLORIDE SERPL-SCNC: 98 MMOL/L (ref 95–110)
CHLORIDE SERPL-SCNC: 99 MMOL/L (ref 95–110)
CO2 SERPL-SCNC: 23 MMOL/L (ref 23–29)
CO2 SERPL-SCNC: 25 MMOL/L (ref 23–29)
CREAT SERPL-MCNC: 2.3 MG/DL (ref 0.5–1.4)
CREAT SERPL-MCNC: 2.4 MG/DL (ref 0.5–1.4)
DIFFERENTIAL METHOD: ABNORMAL
EOSINOPHIL # BLD AUTO: 0.2 K/UL (ref 0–0.5)
EOSINOPHIL NFR BLD: 2.6 % (ref 0–8)
ERYTHROCYTE [DISTWIDTH] IN BLOOD BY AUTOMATED COUNT: 19.3 % (ref 11.5–14.5)
EST. GFR  (AFRICAN AMERICAN): 26 ML/MIN/1.73 M^2
EST. GFR  (AFRICAN AMERICAN): 27 ML/MIN/1.73 M^2
EST. GFR  (NON AFRICAN AMERICAN): 22 ML/MIN/1.73 M^2
EST. GFR  (NON AFRICAN AMERICAN): 23 ML/MIN/1.73 M^2
GLUCOSE SERPL-MCNC: 102 MG/DL (ref 70–110)
GLUCOSE SERPL-MCNC: 109 MG/DL (ref 70–110)
HCT VFR BLD AUTO: 30.2 % (ref 37–48.5)
HGB BLD-MCNC: 9.4 G/DL (ref 12–16)
IMM GRANULOCYTES # BLD AUTO: 0.12 K/UL (ref 0–0.04)
IMM GRANULOCYTES NFR BLD AUTO: 1.3 % (ref 0–0.5)
LYMPHOCYTES # BLD AUTO: 1 K/UL (ref 1–4.8)
LYMPHOCYTES NFR BLD: 11.2 % (ref 18–48)
MAGNESIUM SERPL-MCNC: 1.8 MG/DL (ref 1.6–2.6)
MCH RBC QN AUTO: 27.5 PG (ref 27–31)
MCHC RBC AUTO-ENTMCNC: 31.1 G/DL (ref 32–36)
MCV RBC AUTO: 88 FL (ref 82–98)
MONOCYTES # BLD AUTO: 0.8 K/UL (ref 0.3–1)
MONOCYTES NFR BLD: 8.8 % (ref 4–15)
NEUTROPHILS # BLD AUTO: 6.9 K/UL (ref 1.8–7.7)
NEUTROPHILS NFR BLD: 74.2 % (ref 38–73)
NRBC BLD-RTO: 0 /100 WBC
PHOSPHATE SERPL-MCNC: 3 MG/DL (ref 2.7–4.5)
PHOSPHATE SERPL-MCNC: 3.1 MG/DL (ref 2.7–4.5)
PHOSPHATE SERPL-MCNC: 3.2 MG/DL (ref 2.7–4.5)
PLATELET # BLD AUTO: 407 K/UL (ref 150–350)
PMV BLD AUTO: 11.6 FL (ref 9.2–12.9)
POTASSIUM SERPL-SCNC: 3.5 MMOL/L (ref 3.5–5.1)
POTASSIUM SERPL-SCNC: 3.5 MMOL/L (ref 3.5–5.1)
PROT SERPL-MCNC: 8.6 G/DL (ref 6–8.4)
RBC # BLD AUTO: 3.42 M/UL (ref 4–5.4)
SARS-COV-2 RDRP RESP QL NAA+PROBE: NEGATIVE
SODIUM SERPL-SCNC: 135 MMOL/L (ref 136–145)
SODIUM SERPL-SCNC: 138 MMOL/L (ref 136–145)
WBC # BLD AUTO: 9.29 K/UL (ref 3.9–12.7)

## 2020-04-29 PROCEDURE — 85025 COMPLETE CBC W/AUTO DIFF WBC: CPT

## 2020-04-29 PROCEDURE — 63600175 PHARM REV CODE 636 W HCPCS: Performed by: ANESTHESIOLOGY

## 2020-04-29 PROCEDURE — 99900035 HC TECH TIME PER 15 MIN (STAT)

## 2020-04-29 PROCEDURE — 37000008 HC ANESTHESIA 1ST 15 MINUTES: Performed by: THORACIC SURGERY (CARDIOTHORACIC VASCULAR SURGERY)

## 2020-04-29 PROCEDURE — 83735 ASSAY OF MAGNESIUM: CPT

## 2020-04-29 PROCEDURE — 36000706: Performed by: THORACIC SURGERY (CARDIOTHORACIC VASCULAR SURGERY)

## 2020-04-29 PROCEDURE — 80069 RENAL FUNCTION PANEL: CPT

## 2020-04-29 PROCEDURE — 25000003 PHARM REV CODE 250: Performed by: INTERNAL MEDICINE

## 2020-04-29 PROCEDURE — 80053 COMPREHEN METABOLIC PANEL: CPT

## 2020-04-29 PROCEDURE — D9220A PRA ANESTHESIA: Mod: CRNA,,, | Performed by: NURSE ANESTHETIST, CERTIFIED REGISTERED

## 2020-04-29 PROCEDURE — 25000003 PHARM REV CODE 250: Performed by: THORACIC SURGERY (CARDIOTHORACIC VASCULAR SURGERY)

## 2020-04-29 PROCEDURE — 71000039 HC RECOVERY, EACH ADD'L HOUR: Performed by: THORACIC SURGERY (CARDIOTHORACIC VASCULAR SURGERY)

## 2020-04-29 PROCEDURE — 36415 COLL VENOUS BLD VENIPUNCTURE: CPT

## 2020-04-29 PROCEDURE — 63700000 PHARM REV CODE 250 ALT 637 W/O HCPCS: Performed by: NURSE PRACTITIONER

## 2020-04-29 PROCEDURE — 99900103 DSU ONLY-NO CHARGE-INITIAL HR (STAT): Performed by: THORACIC SURGERY (CARDIOTHORACIC VASCULAR SURGERY)

## 2020-04-29 PROCEDURE — 63600175 PHARM REV CODE 636 W HCPCS: Performed by: NURSE PRACTITIONER

## 2020-04-29 PROCEDURE — 37000009 HC ANESTHESIA EA ADD 15 MINS: Performed by: THORACIC SURGERY (CARDIOTHORACIC VASCULAR SURGERY)

## 2020-04-29 PROCEDURE — 63600175 PHARM REV CODE 636 W HCPCS: Performed by: THORACIC SURGERY (CARDIOTHORACIC VASCULAR SURGERY)

## 2020-04-29 PROCEDURE — D9220A PRA ANESTHESIA: ICD-10-PCS | Mod: ANES,,, | Performed by: ANESTHESIOLOGY

## 2020-04-29 PROCEDURE — 99900104 DSU ONLY-NO CHARGE-EA ADD'L HR (STAT): Performed by: THORACIC SURGERY (CARDIOTHORACIC VASCULAR SURGERY)

## 2020-04-29 PROCEDURE — 63700000 PHARM REV CODE 250 ALT 637 W/O HCPCS: Performed by: THORACIC SURGERY (CARDIOTHORACIC VASCULAR SURGERY)

## 2020-04-29 PROCEDURE — U0002 COVID-19 LAB TEST NON-CDC: HCPCS

## 2020-04-29 PROCEDURE — D9220A PRA ANESTHESIA: Mod: ANES,,, | Performed by: ANESTHESIOLOGY

## 2020-04-29 PROCEDURE — 84100 ASSAY OF PHOSPHORUS: CPT

## 2020-04-29 PROCEDURE — 12000002 HC ACUTE/MED SURGE SEMI-PRIVATE ROOM

## 2020-04-29 PROCEDURE — 25000003 PHARM REV CODE 250: Performed by: NURSE PRACTITIONER

## 2020-04-29 PROCEDURE — D9220A PRA ANESTHESIA: ICD-10-PCS | Mod: CRNA,,, | Performed by: NURSE ANESTHETIST, CERTIFIED REGISTERED

## 2020-04-29 PROCEDURE — 97116 GAIT TRAINING THERAPY: CPT | Mod: CQ

## 2020-04-29 PROCEDURE — 71000033 HC RECOVERY, INTIAL HOUR: Performed by: THORACIC SURGERY (CARDIOTHORACIC VASCULAR SURGERY)

## 2020-04-29 PROCEDURE — 36000707: Performed by: THORACIC SURGERY (CARDIOTHORACIC VASCULAR SURGERY)

## 2020-04-29 PROCEDURE — 94761 N-INVAS EAR/PLS OXIMETRY MLT: CPT

## 2020-04-29 PROCEDURE — C1750 CATH, HEMODIALYSIS,LONG-TERM: HCPCS | Performed by: THORACIC SURGERY (CARDIOTHORACIC VASCULAR SURGERY)

## 2020-04-29 PROCEDURE — 63600175 PHARM REV CODE 636 W HCPCS: Performed by: NURSE ANESTHETIST, CERTIFIED REGISTERED

## 2020-04-29 PROCEDURE — 25000003 PHARM REV CODE 250: Performed by: NURSE ANESTHETIST, CERTIFIED REGISTERED

## 2020-04-29 DEVICE — KIT CATH HEMOSPLIT 14FR 23CM: Type: IMPLANTABLE DEVICE | Site: CHEST | Status: FUNCTIONAL

## 2020-04-29 RX ORDER — PROPOFOL 10 MG/ML
VIAL (ML) INTRAVENOUS
Status: DISCONTINUED | OUTPATIENT
Start: 2020-04-29 | End: 2020-04-29

## 2020-04-29 RX ORDER — FENTANYL CITRATE 50 UG/ML
25 INJECTION, SOLUTION INTRAMUSCULAR; INTRAVENOUS EVERY 5 MIN PRN
Status: DISCONTINUED | OUTPATIENT
Start: 2020-04-29 | End: 2020-04-29 | Stop reason: HOSPADM

## 2020-04-29 RX ORDER — CEFAZOLIN SODIUM 1 G/3ML
1 INJECTION, POWDER, FOR SOLUTION INTRAMUSCULAR; INTRAVENOUS
Status: COMPLETED | OUTPATIENT
Start: 2020-04-29 | End: 2020-04-29

## 2020-04-29 RX ORDER — FENTANYL CITRATE 50 UG/ML
INJECTION, SOLUTION INTRAMUSCULAR; INTRAVENOUS
Status: DISCONTINUED | OUTPATIENT
Start: 2020-04-29 | End: 2020-04-29

## 2020-04-29 RX ORDER — HEPARIN SODIUM 1000 [USP'U]/ML
INJECTION INTRAVENOUS; SUBCUTANEOUS
Status: DISCONTINUED | OUTPATIENT
Start: 2020-04-29 | End: 2020-04-29 | Stop reason: HOSPADM

## 2020-04-29 RX ORDER — LIDOCAINE HYDROCHLORIDE 10 MG/ML
INJECTION, SOLUTION EPIDURAL; INFILTRATION; INTRACAUDAL; PERINEURAL
Status: DISCONTINUED | OUTPATIENT
Start: 2020-04-29 | End: 2020-04-29 | Stop reason: HOSPADM

## 2020-04-29 RX ORDER — SODIUM CHLORIDE 9 MG/ML
INJECTION, SOLUTION INTRAVENOUS CONTINUOUS PRN
Status: DISCONTINUED | OUTPATIENT
Start: 2020-04-29 | End: 2020-04-29

## 2020-04-29 RX ORDER — MIDAZOLAM HYDROCHLORIDE 1 MG/ML
INJECTION, SOLUTION INTRAMUSCULAR; INTRAVENOUS
Status: DISCONTINUED | OUTPATIENT
Start: 2020-04-29 | End: 2020-04-29

## 2020-04-29 RX ADMIN — METOPROLOL TARTRATE 25 MG: 25 TABLET, FILM COATED ORAL at 09:04

## 2020-04-29 RX ADMIN — SODIUM CHLORIDE: 0.9 INJECTION, SOLUTION INTRAVENOUS at 04:04

## 2020-04-29 RX ADMIN — SODIUM BICARBONATE 650 MG TABLET 650 MG: at 09:04

## 2020-04-29 RX ADMIN — HEPARIN SODIUM 5000 UNITS: 5000 INJECTION INTRAVENOUS; SUBCUTANEOUS at 08:04

## 2020-04-29 RX ADMIN — PANTOPRAZOLE SODIUM 40 MG: 40 TABLET, DELAYED RELEASE ORAL at 08:04

## 2020-04-29 RX ADMIN — ASPIRIN 81 MG: 81 TABLET, COATED ORAL at 08:04

## 2020-04-29 RX ADMIN — HYDRALAZINE HYDROCHLORIDE 25 MG: 25 TABLET, FILM COATED ORAL at 05:04

## 2020-04-29 RX ADMIN — CEFAZOLIN 1 G: 1 INJECTION, POWDER, FOR SOLUTION INTRAVENOUS at 04:04

## 2020-04-29 RX ADMIN — HEPARIN SODIUM 5000 UNITS: 5000 INJECTION INTRAVENOUS; SUBCUTANEOUS at 09:04

## 2020-04-29 RX ADMIN — SODIUM BICARBONATE 650 MG TABLET 650 MG: at 08:04

## 2020-04-29 RX ADMIN — FERROUS SULFATE TAB EC 325 MG (65 MG FE EQUIVALENT) 325 MG: 325 (65 FE) TABLET DELAYED RESPONSE at 08:04

## 2020-04-29 RX ADMIN — FLUCONAZOLE 300 MG: 100 TABLET ORAL at 08:04

## 2020-04-29 RX ADMIN — LEVOTHYROXINE SODIUM 150 MCG: 150 TABLET ORAL at 05:04

## 2020-04-29 RX ADMIN — PROPOFOL 20 MG: 10 INJECTION, EMULSION INTRAVENOUS at 04:04

## 2020-04-29 RX ADMIN — HYDRALAZINE HYDROCHLORIDE 25 MG: 25 TABLET, FILM COATED ORAL at 10:04

## 2020-04-29 RX ADMIN — FENTANYL CITRATE 25 MCG: 50 INJECTION, SOLUTION INTRAMUSCULAR; INTRAVENOUS at 04:04

## 2020-04-29 RX ADMIN — MIDAZOLAM 2 MG: 1 INJECTION INTRAMUSCULAR; INTRAVENOUS at 04:04

## 2020-04-29 RX ADMIN — FENTANYL CITRATE 25 MCG: 0.05 INJECTION, SOLUTION INTRAMUSCULAR; INTRAVENOUS at 05:04

## 2020-04-29 RX ADMIN — CALCITRIOL CAPSULES 0.25 MCG 0.25 MCG: 0.25 CAPSULE ORAL at 08:04

## 2020-04-29 RX ADMIN — METOPROLOL TARTRATE 25 MG: 25 TABLET, FILM COATED ORAL at 08:04

## 2020-04-29 RX ADMIN — ATORVASTATIN CALCIUM 40 MG: 40 TABLET, FILM COATED ORAL at 08:04

## 2020-04-29 RX ADMIN — PROPOFOL 40 MG: 10 INJECTION, EMULSION INTRAVENOUS at 04:04

## 2020-04-29 NOTE — PT/OT/SLP PROGRESS
Physical Therapy Treatment    Patient Name:  Maria Victoria Hernandez   MRN:  [id number]    Recommendations:     Discharge Recommendations:  LTACH (long-term acute care hospital)   Discharge Equipment Recommendations: walker, rolling   Barriers to discharge: None    Assessment:     Maria Victoria Hernandez is a 55 y.o. female admitted with a medical diagnosis of Acute renal failure.  She presents with the following impairments/functional limitations:  weakness, impaired endurance, impaired functional mobilty, gait instability . Tolerated treatment well. Ambulated with rw and CGA for safety. No LOB or SOB noted with activity.    Rehab Prognosis: Good; patient would benefit from acute skilled PT services to address these deficits and reach maximum level of function.    Recent Surgery: Procedure(s) (LRB):  Insertion,catheter,tunneled (N/A) Day of Surgery    Plan:     During this hospitalization, patient to be seen 6 x/week to address the identified rehab impairments via gait training, therapeutic activities, therapeutic exercises and progress toward the following goals:    · Plan of Care Expires:  05/29/20    Subjective     Chief Complaint: none stated  Patient/Family Comments/goals: to return home after discharge  Pain/Comfort:  · Pain Rating 1: 0/10      Objective:     Communicated with nurse Esteban prior to session.  Patient found supine with bed alarm, telemetry upon PT entry to room.     General Precautions: Standard, fall   Orthopedic Precautions:N/A   Braces:       Functional Mobility:  · Bed Mobility:     · Rolling Right: stand by assistance  · Supine to Sit: stand by assistance  · Transfers:     · Sit to Stand:  contact guard assistance with rolling walker  · Gait: 120' with rw and CGA for safety.      AM-PAC 6 CLICK MOBILITY          Therapeutic Activities and Exercises:   Transferred EOB with CGA.   Donned gown without difficulty.   Amb'd in hallway with rw and CGA.   Sat up in chair at bedside.    Patient  left up in chair with all lines intact, call button in reach, chair alarm on and nurse Carlito notified..    GOALS:   Multidisciplinary Problems     Physical Therapy Goals        Problem: Physical Therapy Goal    Goal Priority Disciplines Outcome Goal Variances Interventions   Physical Therapy Goal     PT, PT/OT Ongoing, Progressing     Description:  Goals to be met by: 2020     Patient will increase functional independence with mobility by performin. Supine to sit with Contact Guard Assistance  2. Sit to stand transfer with Contact Guard Assistance  3. Bed to chair transfer with Contact Guard Assistance using Rolling Walker  4. Gait  x 250 feet with Minimal Assistance using Rolling Walker.   5. Lower extremity exercise program x20 reps                    Time Tracking:     PT Received On: 20  PT Start Time: 1045     PT Stop Time: 1055  PT Total Time (min): 10 min     Billable Minutes: Gait Training 10min    Treatment Type: Treatment  PT/PTA: PTA     PTA Visit Number: 3     Marquita Marshall, ADONAY  2020

## 2020-04-29 NOTE — SUBJECTIVE & OBJECTIVE
Interval History: Patient seen and examined. Patient is without subjective complaints, no acute distress.No further seizure activity. Patient tolerated 2nd round of HD well. Received 2 units of PRBC. No new focal neurological complaints reported. Patient working with PT.     Review of Systems   Constitutional: Positive for fatigue. Negative for activity change, appetite change and fever.   HENT: Negative.    Eyes: Negative.    Respiratory: Positive for shortness of breath. Negative for chest tightness and wheezing.    Cardiovascular: Negative for chest pain, palpitations and leg swelling.   Gastrointestinal: Negative for abdominal distention, abdominal pain, blood in stool, diarrhea and vomiting.   Genitourinary: Negative for dysuria and hematuria.   Neurological: Negative for headaches.   Hematological: Negative for adenopathy.   Psychiatric/Behavioral: Negative for confusion.     Objective:     Vital Signs (Most Recent):  Temp: 98 °F (36.7 °C) (04/29/20 0804)  Pulse: (!) 120 (04/29/20 0804)  Resp: 18 (04/29/20 0804)  BP: (!) 105/57 (04/29/20 0804)  SpO2: 97 % (04/29/20 0804) Vital Signs (24h Range):  Temp:  [97.2 °F (36.2 °C)-98.8 °F (37.1 °C)] 98 °F (36.7 °C)  Pulse:  [] 120  Resp:  [12-19] 18  SpO2:  [96 %-100 %] 97 %  BP: (105-173)/(57-70) 105/57     Weight: 95.1 kg (209 lb 10.5 oz)  Body mass index is 40.95 kg/m².    Intake/Output Summary (Last 24 hours) at 4/29/2020 0914  Last data filed at 4/29/2020 0600  Gross per 24 hour   Intake 1740 ml   Output 1395 ml   Net 345 ml      Physical Exam   Constitutional: She is oriented to person, place, and time. She appears well-developed and well-nourished. No distress.   HENT:   Head: Normocephalic and atraumatic.   Eyes: Pupils are equal, round, and reactive to light.   Neck: Neck supple. No thyromegaly present.   Cardiovascular: Normal rate and regular rhythm. Exam reveals no gallop and no friction rub.   No murmur heard.  Pulmonary/Chest: Effort normal and  breath sounds normal. No respiratory distress. She has no wheezes.   Abdominal: Soft. Bowel sounds are normal. She exhibits no distension. There is no tenderness. There is no guarding.   Musculoskeletal: Normal range of motion. She exhibits no edema.   Neurological: She is alert and oriented to person, place, and time.   Skin: Skin is warm and dry. No erythema.   Psychiatric: She has a normal mood and affect.       Significant Labs:   CBC:   Recent Labs   Lab 04/28/20  0448 04/29/20  0259   WBC 10.17 9.29   HGB 6.8* 9.4*   HCT 22.7* 30.2*   * 407*     CMP  Sodium   Date Value Ref Range Status   04/29/2020 135 (L) 136 - 145 mmol/L Final     Potassium   Date Value Ref Range Status   04/29/2020 3.5 3.5 - 5.1 mmol/L Final     Chloride   Date Value Ref Range Status   04/29/2020 98 95 - 110 mmol/L Final     CO2   Date Value Ref Range Status   04/29/2020 23 23 - 29 mmol/L Final     Glucose   Date Value Ref Range Status   04/29/2020 109 70 - 110 mg/dL Final     BUN, Bld   Date Value Ref Range Status   04/29/2020 26 (H) 6 - 20 mg/dL Final     Creatinine   Date Value Ref Range Status   04/29/2020 2.4 (H) 0.5 - 1.4 mg/dL Final     Calcium   Date Value Ref Range Status   04/29/2020 10.1 8.7 - 10.5 mg/dL Final     Total Protein   Date Value Ref Range Status   04/29/2020 8.6 (H) 6.0 - 8.4 g/dL Final     Albumin   Date Value Ref Range Status   04/29/2020 3.7 3.5 - 5.2 g/dL Final     Total Bilirubin   Date Value Ref Range Status   04/29/2020 0.7 0.1 - 1.0 mg/dL Final     Comment:     For infants and newborns, interpretation of results should be based  on gestational age, weight and in agreement with clinical  observations.  Premature Infant recommended reference ranges:  Up to 24 hours.............<8.0 mg/dL  Up to 48 hours............<12.0 mg/dL  3-5 days..................<15.0 mg/dL  6-29 days.................<15.0 mg/dL       Alkaline Phosphatase   Date Value Ref Range Status   04/29/2020 130 55 - 135 U/L Final     AST    Date Value Ref Range Status   04/29/2020 22 10 - 40 U/L Final     ALT   Date Value Ref Range Status   04/29/2020 23 10 - 44 U/L Final     Anion Gap   Date Value Ref Range Status   04/29/2020 14 8 - 16 mmol/L Final     eGFR if    Date Value Ref Range Status   04/29/2020 26 (A) >60 mL/min/1.73 m^2 Final     eGFR if non    Date Value Ref Range Status   04/29/2020 22 (A) >60 mL/min/1.73 m^2 Final     Comment:     Calculation used to obtain the estimated glomerular filtration  rate (eGFR) is the CKD-EPI equation.        Microbiology Results (last 7 days)     Procedure Component Value Units Date/Time    Blood culture [[id number]] Collected:  04/27/20 0439    Order Status:  Completed Specimen:  Blood Updated:  04/28/20 1212     Blood Culture, Routine No Growth to date      No Growth to date    Blood culture [[id number]] Collected:  04/26/20 0611    Order Status:  Completed Specimen:  Blood Updated:  04/28/20 1212     Blood Culture, Routine No Growth to date      No Growth to date      No Growth to date    Blood culture [[id number]] Collected:  04/26/20 0550    Order Status:  Completed Specimen:  Blood Updated:  04/28/20 1212     Blood Culture, Routine No Growth to date      No Growth to date      No Growth to date    IV catheter culture [[id number]] Collected:  04/26/20 1436    Order Status:  Completed Specimen:  Catheter Tip, Subclavian Updated:  04/28/20 0730     Aerobic Culture - Cath tip No growth        Significant Imaging:   B/L Carotid US:  Limited examination without gross occlusion or stenosis in the left carotid system.  The right carotid system was unable to be visualized.    CT head without contrast:  1. There is no acute abnormality.  There is no hemorrhage, mass, mass effect or obvious acute edema or ischemia.  2. Small bilateral mastoid effusions.  3. Left sphenoid sinus disease.    CXR:  Appropriately positioned right IJ catheter.  Minimal residual right lung  infiltrate.    EEG:   This is a normal awake and asleep routine EEG.  There are no focal findings, no epileptiform discharges, and no electrographic seizures.   Of note, a normal EEG does not rule out the diagnosis of epilepsy.  Clinical correlation is advised.    MRA brain: No high-grade stenosis, large vessel occlusion or aneurysm.

## 2020-04-29 NOTE — OP NOTE
[facility]  Surgery Department  Operative Note    SUMMARY     Date of Procedure: 4/29/2020     Procedure: Procedure(s) (LRB):  Insertion,catheter,tunneled (Right)     Surgeon(s) and Role:     * Jacques Gauthier MD - Primary    Assisting Surgeon: None    Pre-Operative Diagnosis: MAIKOL (acute kidney injury) [N17.9]    Post-Operative Diagnosis: Post-Op Diagnosis Codes:     * MAIKOL (acute kidney injury) [N17.9]    Anesthesia: Local MAC    Description of the Procedure:  Patient taken operating room placed in supine position and prepped and draped in usual sterile fashion.  Access was achieved to the vein followed by the guidewire and wire position confirmed by fluoroscopy.  Stab incision was made in the upper chest using the tunneler the catheter was pulled from the stab incision out through the vein access site.  Graduated dilators were placed over the guidewire to finally the dilator sheath assembly was inserted the guidewire dilator were then removed and the catheter was inserted through the sheath in its entirety.  After the catheter position was confirmed on fluoro the sheath was peeled away and then using fluoroscopic guidance the catheter was pulled back until the tip was in optimal position.  The catheter was assessed and both ports were noted to flow and flush quite easily this with this the catheter lumens were each flushed with heparin per protocol and then the caps were placed and the clamps were applied.  The catheter was secured in usual fashion using nylon suture and the access site was closed using a single 4 0 Vicryl subcuticular stitch.    Complications: No    Estimated Blood Loss (EBL):  2cc    Implants:  23 catheter R internal jugular      Implant Name Type Inv. Item Serial No.  Lot No. LRB No. Used   KIT CATH HEMOSPLIT 14FR 23CM - [id number] Dialysis Catheter KIT CATH HEMOSPLIT 14FR 23CM  [employer] [place] [id number] 1       Specimens:   Specimen (12h ago, onward)    None                   Condition: Good    Disposition: PACU - hemodynamically stable.

## 2020-04-29 NOTE — PROGRESS NOTES
Consult Note  Nephrology    Consult Requested By: Ferny Porter MD    Reason for Consult: MAIKOL requiring initiation of RRT    SUBJECTIVE:     History of Present Illness:  54 y/o female patient s/p hospitalization for COVID 19.  She required RRT during that admit, was taken off dialysis on 4/17 d/t renal recovery.  She was transferred to [facility] once she was stable for discharge from hospital.    Seen yesterday at LT.  She had n/v for 2 days, uremia.  Scr had been trending up, was 4.0 yesterday. Transferred to hospital for line placement and re-initiation of RRT.  Today, Scr is 4.6.    Discussed w/pt the need for line placement and re-initiation of dialysis.  She is agreeable.      4/27  Had some nausea earlier.  No confusion.  No sob.  4/28  Some nausea earlier.  Seen on dialysis    4/29  No nausea, chest pain, sob, fever, urinary or bowel complaint, new neurologic symptoms, new joint pain,                Assessment/plan:    1.  MAIKOL requiring initiation of RRT--seen on dialysis.  Getting tunneled catheter today  2.  Anemia of chronic dz/Fe+ def--continue epo and Fe+ supplements  3.  Hypokalemia--was given repletion per primary team this am.  Will further correct with HD.  4.  SHPT--cont calcitriol.  Daily PO4 levels, may add binder. Renal diet.  5.  Metabolic acidosis--on bicarb, continue.  Will correct with HD    Past Medical History:   Diagnosis Date    Colon polyp     Diverticulosis large intestine w/o perforation or abscess w/bleeding     Iron deficiency anemia     Prediabetes     Thyroid disease     Vitamin B 12 deficiency     Vitamin D deficiency      Past Surgical History:   Procedure Laterality Date    TUBAL LIGATION       Family History   Problem Relation Age of Onset    Heart disease Mother     Heart disease Maternal Grandmother     Heart disease Maternal Grandfather     Mental illness Paternal Grandmother      Social History     Tobacco Use    Smoking status: Never Smoker    Smokeless tobacco:  Never Used   Substance Use Topics    Alcohol use: No    Drug use: Not on file       Review of patient's allergies indicates:  No Known Allergies     Review of Systems: 4/26)  General ROS: negative for - fever or night sweats  Psychological ROS: negative for - behavioral disorder or depression  ENT ROS: negative for - headaches or visual changes  Hematological and Lymphatic ROS: negative for - bleeding problems or bruising  Endocrine ROS: negative for - temperature intolerance or unexpected weight changes  Respiratory ROS: no cough, shortness of breath, or wheezing  Cardiovascular ROS: no chest pain, SOB  Gastrointestinal ROS: no abdominal pain, no n/v/c/d  Genito-Urinary ROS: no dysuria or trouble voiding  Musculoskeletal ROS: negative for - joint pain or joint swelling  Neurological ROS: no TIA or stroke symptoms  Dermatological ROS: negative for rash and skin lesion changes    OBJECTIVE:     Vital Signs Range (Last 24H):  Temp:  [97.2 °F (36.2 °C)-98.8 °F (37.1 °C)]   Pulse:  []   Resp:  [12-18]   BP: (105-135)/(55-63)   SpO2:  [96 %-99 %]     Physical Exam:  General- NAD noted  HEENT- WNL  Neck- supple  CV- Regular rate and rhythm  Resp- Lungs CTA Bilaterally, No increased WOB  GI- Non tender/non-distended, BS normoactive x4 quads  Extrem- No cyanosis, clubbing, edema.  Derm- skin w/d  Neuro-  Asterixis is present    Body mass index is 40.95 kg/m².    Laboratory:  CBC:   Recent Labs   Lab 04/29/20  0259   WBC 9.29   RBC 3.42*   HGB 9.4*   HCT 30.2*   *   MCV 88   MCH 27.5   MCHC 31.1*     CMP:   Recent Labs   Lab 04/29/20  0749      CALCIUM 10.1   ALBUMIN 3.7   PROT 8.6*   *   K 3.5   CO2 23   CL 98   BUN 26*   CREATININE 2.4*   ALKPHOS 130   ALT 23   AST 22   BILITOT 0.7       Diagnostic Results:  Labs: Reviewed      ASSESSMENT/PLAN:     Active Hospital Problems    Diagnosis  POA    *Acute renal failure [N17.9]  Yes    Symptomatic anemia [D64.9]  Yes    Mixed hyperlipidemia [E78.2]   Yes    Episodic confusion [R41.0]  No    Moderate malnutrition [E44.0]  Unknown    Hyponatremia [E87.1]  Yes    Hypokalemia [E87.6]  Yes    History of 2019 novel coronavirus disease (COVID-19) [Z86.19]  Yes    Chronic combined systolic and diastolic heart failure [I50.42]  Yes    Clostridium difficile colitis [A04.72]  Yes    Central line infection, subsequent encounter [T80.219D]  Not Applicable    Post viral debility [R53.81]  Yes    Morbid obesity [E66.01]  Yes    Hypothyroid [E03.9]  Yes    Iron deficiency anemia, unspecified [D50.9]  Yes      Resolved Hospital Problems   No resolved problems to display.         Thank you for allowing us to participate in the care of your patient. We will follow the patient and provide recommendations as needed.      Time spent seeing patient( greater than 1/2 spent in direct contact) :

## 2020-04-29 NOTE — ASSESSMENT & PLAN NOTE
Likely secondaty to anemia of chronic disease related to nephropathy.  S/p 2 units of PRBC on 04-. Follow CBC.

## 2020-04-29 NOTE — NURSING
"Pt. Transported to back to  404 from PACU. AAOx4. VSS. Dialysis catheter to R chest wall area CDI. Pt. Wants ice pack on incision. Pt. States a 0/10 pain with the ice pack. TEDs/SEDs in place. Tele box in place. Pt. Stating, "she just wants to eat" - dinner at bedside once patient arrived to room. Pt. Seems in much better spirits today.   "

## 2020-04-29 NOTE — PLAN OF CARE
Plan of care reviewed, patient verbalized understanding. AAOx4. Up ab rodney to BSC w 1 assist. PIV CDI. VS addressed. Tele monitored, ST/SR. PT/OT. Dialysis port CDI. Ice pack on incision, to remain in place. Daughter at bedside. Remain afebrile throughout shift. Bed in lowest position, SRx2, brakes locked, call light within reach. Patient remains free from fall and injury. Will continue to monitor.

## 2020-04-29 NOTE — CARE UPDATE
Spoke w/pts daughter, Danya, to provide care update. She actually works at the facility and has been getting regular updates.

## 2020-04-29 NOTE — PLAN OF CARE
Dr padilla released from pacu to room 404 v/s wnl skin w+d dsg on r neck/chest dry intact opsite from surgery  No n+v mike sip and chips pain controlled at 4/10 tolerable ice pk to surgical site  Pt asking to go back to room falls asleep at intervals

## 2020-04-29 NOTE — PLAN OF CARE
Problem: Physical Therapy Goal  Goal: Physical Therapy Goal  Description  Goals to be met by: 2020     Patient will increase functional independence with mobility by performin. Supine to sit with Contact Guard Assistance  2. Sit to stand transfer with Contact Guard Assistance  3. Bed to chair transfer with Contact Guard Assistance using Rolling Walker  4. Gait  x 250 feet with Minimal Assistance using Rolling Walker.   5. Lower extremity exercise program x20 reps   Outcome: Ongoing, Progressing   Ambulate with rw and CGA for safety.

## 2020-04-29 NOTE — PROCEDURES
Routine EEG Report      Maria Victoria Hernandez  [id number]  1965    DATE OF SERVICE: 4/28/2020  REASON FOR CONSULT:  55-year-old woman with renal failure secondary to COVID-19 infection with an episode of loss of consciousness during dialysis.  Evaluate for evidence of epileptiform activity.    METHODOLOGY   Electroencephalographic (EEG) recording is with electrodes placed according to the International 10-20 placement system.  Thirty two (32) channels of digital signal (sampling rate of 512/sec) including T1 and T2 was simultaneously recorded from the scalp and may include  EKG, EMG, and/or eye monitors.  Recording band pass was 0.1 to 512 hz.  Digital video recording of the patient is simultaneously recorded with the EEG.  The patient is instructed report clinical symptoms which may occur during the recording session.  EEG and video recording is stored and archived in digital format. Activation procedures which include photic stimulation, hyperventilation and instructing patients to perform simple task are done in selected patients.    The EEG is displayed on a monitor screen and can be reviewed using different montages.  Computer assisted analysis is employed to detect spike and electrographic seizure activity.   The entire record is submitted for computer analysis.  The entire recording is visually reviewed and the times identified by computer analysis as being spikes or seizures are reviewed again.  Compresses spectral analysis (CSA) is also performed on the activity recorded from each individual channel.  This is displayed as a power display of frequencies from 0 to 30 Hz over time.   The CSA is reviewed looking for asymmetries in power between homologous areas of the scalp and then compared with the original EEG recording.     [employer] software is also utilized in the review of this study.  This software suite analyzes the EEG recording in multiple domains.  Coherence and rhythmicity is computed to  identify EEG sections which may contain organized seizures.  Each channel undergoes analysis to detect presence of spike and sharp waves which have special and morphological characteristic of epileptic activity.  The routine EEG recording is converted from spacial into frequency domain.  This is then displayed comparing homologous areas to identify areas of significant asymmetry.  Algorithm to identify non-cortically generated artifact is used to separate eye movement, EMG and other artifact from the EEG.      EEG FINDINGS  Background activity:   The background is continuous and symmetric predominantly alpha activity with a well-formed 9 hz posterior dominant rhythm seen bilaterally.    Sleep:  The patient transitions from wakefulness to sleep with the appearance of sleep spindles, K complexes, and vertex waves.    Activation procedures:   The patient is able to follow simple commands and answers orientation questions correctly. Photic stimulation is performed with no activation of the record.    Cardiac Monitor:   Heart rate appears generally regular on a single lead EKG.    Impression:   This is a normal awake and asleep routine EEG.  There are no focal findings, no epileptiform discharges, and no electrographic seizures.   Of note, a normal EEG does not rule out the diagnosis of epilepsy.  Clinical correlation is advised.    Sarina Jorgensen MD PhD  Neurology-Epilepsy  [facility].  [facility]

## 2020-04-29 NOTE — PROGRESS NOTES
[facility] Hospital Medicine  Progress Note    Patient Name: Maria Victoria Hernandez  MRN: [id number]  Patient Class: IP- Inpatient   Admission Date: 4/25/2020  Length of Stay: 4 days  Attending Physician: Ferny Porter MD  Primary Care Provider: Candice Deluna MD        Subjective:     Principal Problem:Acute renal failure        HPI:  Maria Victoria Hernandez is a 55 y.o. female with a PMHx of hypothyroidism, prediabetes, anemia, renal failure, and recent COVID-19 infection who presents to the ED from [facility] for possible dialysis line placement and emergent dialysis.  The patient was recently hospitalized here for COVID-19 for approximately 1 month, during that time she was intubated and required dialysis for renal failure. She also had line infection and is still receiving oral diflucan. Her kidney function improved; she was sent to LTAC and her dialysis access was removed. Creatinine levels have increased from several days ago. Per LTAC report patient had worsening renal function, intermittent confusion, and decreasing urine output. The patient adamantly denies any confusion or decrease in urine output. The patient does endorse slight weakness and SOB with exertion which has been unchanged since discharge. The patient also reports diarrhea secondary to c diff infection, but states her diarrhea has improved over the last few days. She is currently receiving oral vancomycin with her last day of treatment being tomorrow. The patient denies any N/V, abdominal pain, confusion, cough, fever/chills, chest pain, lower leg edema, or syncope. Her ED work up is significant for mild hyponatremia, hypokalemia, increased BUN/creatinine, and anemia. The ED physician discussed the case with Dr. Moeller who agreed that dialysis was not necessary at this point. The patient will be placed in observation under hospital medicine for further work up and evaluation.     Overview/Hospital Course:  No notes  on file    Interval History: Patient seen and examined. Patient is without subjective complaints, no acute distress.No further seizure activity. Patient tolerated 2nd round of HD well. Received 2 units of PRBC. No new focal neurological complaints reported. Patient working with PT.     Review of Systems   Constitutional: Positive for fatigue. Negative for activity change, appetite change and fever.   HENT: Negative.    Eyes: Negative.    Respiratory: Positive for shortness of breath. Negative for chest tightness and wheezing.    Cardiovascular: Negative for chest pain, palpitations and leg swelling.   Gastrointestinal: Negative for abdominal distention, abdominal pain, blood in stool, diarrhea and vomiting.   Genitourinary: Negative for dysuria and hematuria.   Neurological: Negative for headaches.   Hematological: Negative for adenopathy.   Psychiatric/Behavioral: Negative for confusion.     Objective:     Vital Signs (Most Recent):  Temp: 98 °F (36.7 °C) (04/29/20 0804)  Pulse: (!) 120 (04/29/20 0804)  Resp: 18 (04/29/20 0804)  BP: (!) 105/57 (04/29/20 0804)  SpO2: 97 % (04/29/20 0804) Vital Signs (24h Range):  Temp:  [97.2 °F (36.2 °C)-98.8 °F (37.1 °C)] 98 °F (36.7 °C)  Pulse:  [] 120  Resp:  [12-19] 18  SpO2:  [96 %-100 %] 97 %  BP: (105-173)/(57-70) 105/57     Weight: 95.1 kg (209 lb 10.5 oz)  Body mass index is 40.95 kg/m².    Intake/Output Summary (Last 24 hours) at 4/29/2020 0914  Last data filed at 4/29/2020 0600  Gross per 24 hour   Intake 1740 ml   Output 1395 ml   Net 345 ml      Physical Exam   Constitutional: She is oriented to person, place, and time. She appears well-developed and well-nourished. No distress.   HENT:   Head: Normocephalic and atraumatic.   Eyes: Pupils are equal, round, and reactive to light.   Neck: Neck supple. No thyromegaly present.   Cardiovascular: Normal rate and regular rhythm. Exam reveals no gallop and no friction rub.   No murmur heard.  Pulmonary/Chest: Effort  normal and breath sounds normal. No respiratory distress. She has no wheezes.   Abdominal: Soft. Bowel sounds are normal. She exhibits no distension. There is no tenderness. There is no guarding.   Musculoskeletal: Normal range of motion. She exhibits no edema.   Neurological: She is alert and oriented to person, place, and time.   Skin: Skin is warm and dry. No erythema.   Psychiatric: She has a normal mood and affect.       Significant Labs:   CBC:   Recent Labs   Lab 04/28/20  0448 04/29/20  0259   WBC 10.17 9.29   HGB 6.8* 9.4*   HCT 22.7* 30.2*   * 407*     CMP  Sodium   Date Value Ref Range Status   04/29/2020 135 (L) 136 - 145 mmol/L Final     Potassium   Date Value Ref Range Status   04/29/2020 3.5 3.5 - 5.1 mmol/L Final     Chloride   Date Value Ref Range Status   04/29/2020 98 95 - 110 mmol/L Final     CO2   Date Value Ref Range Status   04/29/2020 23 23 - 29 mmol/L Final     Glucose   Date Value Ref Range Status   04/29/2020 109 70 - 110 mg/dL Final     BUN, Bld   Date Value Ref Range Status   04/29/2020 26 (H) 6 - 20 mg/dL Final     Creatinine   Date Value Ref Range Status   04/29/2020 2.4 (H) 0.5 - 1.4 mg/dL Final     Calcium   Date Value Ref Range Status   04/29/2020 10.1 8.7 - 10.5 mg/dL Final     Total Protein   Date Value Ref Range Status   04/29/2020 8.6 (H) 6.0 - 8.4 g/dL Final     Albumin   Date Value Ref Range Status   04/29/2020 3.7 3.5 - 5.2 g/dL Final     Total Bilirubin   Date Value Ref Range Status   04/29/2020 0.7 0.1 - 1.0 mg/dL Final     Comment:     For infants and newborns, interpretation of results should be based  on gestational age, weight and in agreement with clinical  observations.  Premature Infant recommended reference ranges:  Up to 24 hours.............<8.0 mg/dL  Up to 48 hours............<12.0 mg/dL  3-5 days..................<15.0 mg/dL  6-29 days.................<15.0 mg/dL       Alkaline Phosphatase   Date Value Ref Range Status   04/29/2020 130 55 - 135 U/L Final      AST   Date Value Ref Range Status   04/29/2020 22 10 - 40 U/L Final     ALT   Date Value Ref Range Status   04/29/2020 23 10 - 44 U/L Final     Anion Gap   Date Value Ref Range Status   04/29/2020 14 8 - 16 mmol/L Final     eGFR if    Date Value Ref Range Status   04/29/2020 26 (A) >60 mL/min/1.73 m^2 Final     eGFR if non    Date Value Ref Range Status   04/29/2020 22 (A) >60 mL/min/1.73 m^2 Final     Comment:     Calculation used to obtain the estimated glomerular filtration  rate (eGFR) is the CKD-EPI equation.        Microbiology Results (last 7 days)     Procedure Component Value Units Date/Time    Blood culture [[id number]] Collected:  04/27/20 0439    Order Status:  Completed Specimen:  Blood Updated:  04/28/20 1212     Blood Culture, Routine No Growth to date      No Growth to date    Blood culture [[id number]] Collected:  04/26/20 0611    Order Status:  Completed Specimen:  Blood Updated:  04/28/20 1212     Blood Culture, Routine No Growth to date      No Growth to date      No Growth to date    Blood culture [[id number]] Collected:  04/26/20 0550    Order Status:  Completed Specimen:  Blood Updated:  04/28/20 1212     Blood Culture, Routine No Growth to date      No Growth to date      No Growth to date    IV catheter culture [[id number]] Collected:  04/26/20 1436    Order Status:  Completed Specimen:  Catheter Tip, Subclavian Updated:  04/28/20 0730     Aerobic Culture - Cath tip No growth        Significant Imaging:   B/L Carotid US:  Limited examination without gross occlusion or stenosis in the left carotid system.  The right carotid system was unable to be visualized.    CT head without contrast:  1. There is no acute abnormality.  There is no hemorrhage, mass, mass effect or obvious acute edema or ischemia.  2. Small bilateral mastoid effusions.  3. Left sphenoid sinus disease.    CXR:  Appropriately positioned right IJ catheter.  Minimal residual right lung  infiltrate.    EEG:   This is a normal awake and asleep routine EEG.  There are no focal findings, no epileptiform discharges, and no electrographic seizures.   Of note, a normal EEG does not rule out the diagnosis of epilepsy.  Clinical correlation is advised.    MRA brain: No high-grade stenosis, large vessel occlusion or aneurysm.        Assessment/Plan:      * Acute renal failure  Required HD but has been off since 4/17. Restarted on HD on 04-. Await Permanent HD catheter placement.  Follow renal panel and electrolytes closely.  Follow renal recommendations.  Renal diet.  Adjust renal dose medications for Estimated Creatinine Clearance: 27.6 mL/min (A) (based on SCr of 2.4 mg/dL (H)).   Avoid NSAIDs, Pace-II inhibitors, ACE-I, Angiotensin Receptor Blockers, or Aminoglycosides.      Mixed hyperlipidemia  Start Lipitor 40 mg daily. Liver and muscle side effects reviewed with the patient.       Symptomatic anemia  Likely secondaty to anemia of chronic disease related to nephropathy.  S/p 2 units of PRBC on 04-. Follow CBC.    Moderate malnutrition        History of 2019 novel coronavirus disease (COVID-19)  Recently discharged from hospital for COVID-19 infection with PNA. Completed abx and plaquenil. Currently COVID-19 negative. On room air.    Hypokalemia  Patient has hypokalemia which is currently uncontrolled. Last electrolytes reviewed-   Recent Labs   Lab 04/26/20  0611 04/26/20 2009 04/27/20  0438   K 3.3* 3.7 3.4*  3.3*   . Will replace potassium and monitor electrolytes closely. Continuous telemetry.  Will follow nephrology recommendations given patient's declining renal status.    Hyponatremia  Mild, appears to be ongoing for the past week.  Urine na, urine osm.    Chronic combined systolic and diastolic heart failure  Last echo 04/13/2020  EF 35% grade 1 diastolic dysfunction  Off diuretics, continue cardiac meds, hydralazine dose reduced.  Will continue to monitor volume  status    Clostridium difficile colitis  - PCR positive on 4/12   - Completed po vanc until 4/26 .  - Loose stools but no diarrhea. Ok to d/c isolation per ID note on 4/22.    Central line infection, subsequent encounter  Consult ID.  Staph epi and C. Albicans      Blood cultures negative as of 4/6      Vancomycin completed.     Fluconazole 300 mg po qday.        - plan to continue until 5/5 per ID.   New blood cx obtained. Patient currently has L IJ central line in place. Microbiology results negative to date. No evidence of endophthalmitis as per ophthalmologist evaluation.    Post viral debility  Improving. Continue PT/OT.    Iron deficiency anemia, unspecified  Patient's anemia is currently controlled. S/p 0 units of PRBCs.   Current CBC reviewed-   Lab Results   Component Value Date    HGB 6.8 (L) 04/28/2020    HCT 22.7 (L) 04/28/2020     Monitor serial CBC and transfuse if patient becomes hemodynamically unstable, symptomatic or H/H drops below 7/21.   See symptomatic anemia management.    Hypothyroid  Patient has chronic hypothyroidism. TFTs reviewed-   Lab Results   Component Value Date    TSH 3.072 04/26/2020   . Will continue chronic levothyroxine and adjust for and clinical changes.    Morbid obesity  Body mass index is 40.95 kg/m². Morbid obesity complicates all aspects of disease management from diagnostic modalities to treatment. Weight loss encouraged and health benefits explained to patient.    Patient wishing to go home upon discharge instead of rehab hospital.     VTE Risk Mitigation (From admission, onward)         Ordered     heparin (porcine) injection 5,000 Units  As needed (PRN)      04/27/20 1500     heparin (porcine) injection 5,000 Units  Every 12 hours      04/25/20 2242     IP VTE HIGH RISK PATIENT  Once      04/25/20 2242     Place sequential compression device  Until discontinued      04/25/20 2242                      Ferny Porter MD  Department of Hospital Medicine   [facility]

## 2020-04-29 NOTE — ASSESSMENT & PLAN NOTE
Required HD but has been off since 4/17. Restarted on HD on 04-. Await Permanent HD catheter placement.  Follow renal panel and electrolytes closely.  Follow renal recommendations.  Renal diet.  Adjust renal dose medications for Estimated Creatinine Clearance: 27.6 mL/min (A) (based on SCr of 2.4 mg/dL (H)).   Avoid NSAIDs, Pace-II inhibitors, ACE-I, Angiotensin Receptor Blockers, or Aminoglycosides.

## 2020-04-29 NOTE — ANESTHESIA PREPROCEDURE EVALUATION
04/29/2020  Maria Victoria Hernandez is a 55 y.o., female.    Anesthesia Evaluation         Review of Systems  Anesthesia Hx:  No problems with previous Anesthesia   Cardiovascular:   Exercise tolerance: poor CHF ECG has been reviewed. · Mild concentric left ventricular hypertrophy.  · Global hypokinetic wall motion.  · Moderately decreased left ventricular systolic function. The estimated ejection fraction is 35%.  · Grade I (mild) left ventricular diastolic dysfunction consistent with impaired relaxation.  · Normal right ventricular systolic function.  · Mild left atrial enlargement.  · Moderate mitral regurgitation.  · Normal central venous pressure (3 mmHg).      Pulmonary:   Pneumonia Recent covid 19 infection requiring intubation- currently on room air   Renal/:   Chronic Renal Disease, ARF, Dialysis    Hepatic/GI:   Recent c. Diff completed vanc   Neurological:  Neurology Normal    Endocrine:   Hypothyroidism        Physical Exam  General:  Obesity    Airway/Jaw/Neck:  Airway Findings: Mouth Opening: Normal General Airway Assessment: Adult  Mallampati: III  Improves to II with phonation.  TM Distance: Normal, at least 6 cm                 Anesthesia Plan  Type of Anesthesia, risks & benefits discussed:  Anesthesia Type:  MAC, general  Patient's Preference:   Intra-op Monitoring Plan: standard ASA monitors  Intra-op Monitoring Plan Comments:   Post Op Pain Control Plan: multimodal analgesia and IV/PO Opioids PRN  Post Op Pain Control Plan Comments:   Induction:   IV  Beta Blocker:  Patient is not currently on a Beta-Blocker (No further documentation required).       Informed Consent: Patient understands risks and agrees with Anesthesia plan.  Questions answered. Anesthesia consent signed with patient.  ASA Score: 3     Day of Surgery Review of History & Physical:            Ready For Surgery From  Anesthesia Perspective.

## 2020-04-29 NOTE — CARE UPDATE
04/29/20 0715   Patient Assessment/Suction   Level of Consciousness (AVPU) alert   PRE-TX-O2   O2 Device (Oxygen Therapy) room air   SpO2 99 %   Pulse Oximetry Type Intermittent   $ Pulse Oximetry - Multiple Charge Pulse Oximetry - Multiple   Pulse (!) 114   Resp 16   Aerosol Therapy   $ Aerosol Therapy Charges PRN treatment not required   Respiratory Treatment Status (SVN) PRN treatment not required

## 2020-04-29 NOTE — PLAN OF CARE
Patient AAO. VSS, afebrile. Tele # [id number] monitored; ST/SR. All meds administered as prescribed. 1-person assist/standby to BSC. R IJ trialysis cath site CDI. Patient rec'd 2 units of blood during HD 4/28, will monitor labs closely. Patient resting quietly with no complaints noted at this time. Hourly/q2 rounds done for safety. Bed locked and low w/ call light in reach. Continuing to monitor.

## 2020-04-29 NOTE — TRANSFER OF CARE
Anesthesia Transfer of Care Note    Patient: Maria Victoria Hernandez    Procedure(s) Performed: Procedure(s) (LRB):  Insertion,catheter,tunneled (Right)    Patient location: PACU    Anesthesia Type: MAC    Transport from OR: Transported from OR on room air with adequate spontaneous ventilation    Post pain: adequate analgesia    Post assessment: no apparent anesthetic complications    Post vital signs: stable    Level of consciousness: awake, alert and oriented    Nausea/Vomiting: no nausea/vomiting    Complications: none    Transfer of care protocol was followed      Last vitals:   Visit Vitals  BP (!) 107/55 (Patient Position: Sitting)   Pulse 84   Temp 36.3 °C (97.3 °F) (Oral)   Resp 16   Ht 5' (1.524 m)   Wt 95.1 kg (209 lb 10.5 oz)   LMP  (Within Months)   SpO2 97%   Breastfeeding? No   BMI 40.95 kg/m²

## 2020-04-29 NOTE — ASSESSMENT & PLAN NOTE
Consult ID.  Staph epi and C. Albicans      Blood cultures negative as of 4/6      Vancomycin completed.     Fluconazole 300 mg po qday.        - plan to continue until 5/5 per ID.   New blood cx obtained. Patient currently has L IJ central line in place. Microbiology results negative to date. No evidence of endophthalmitis as per ophthalmologist evaluation.

## 2020-04-29 NOTE — PLAN OF CARE
"Pt was unable to void to collect a urine sample for pregnancy test. Anesthesiologist notified to see if she wanted a blood sample for a pregnancy test. pt stated "I have not been sexual activity in months I been in the hospital for over a month. I am menopausal- my cycle comes every 3-4 months, I don't remember my last one- maybe January".  Anesthesiologist is okay with not getting a test done.   "

## 2020-04-29 NOTE — PLAN OF CARE
CM spoke with pt's daughter, Danya. Per Danya, pt and her are in agreement for pt to discharge to her address once medically stable. Pt will need outpt dialyis prior to discharge to home. [facility]IS sent referral to [facility] Admissions and [facility] Admissions for review.        04/29/20 1546   Post-Acute Status   Post-Acute Authorization Dialysis   Post-Acute Placement Status Referrals Sent

## 2020-04-30 LAB
ALBUMIN SERPL BCP-MCNC: 3.3 G/DL (ref 3.5–5.2)
ANION GAP SERPL CALC-SCNC: 13 MMOL/L (ref 8–16)
BACTERIA BLD CULT: NORMAL
BACTERIA BLD CULT: NORMAL
BASOPHILS # BLD AUTO: 0.09 K/UL (ref 0–0.2)
BASOPHILS NFR BLD: 1.1 % (ref 0–1.9)
BUN SERPL-MCNC: 31 MG/DL (ref 6–20)
CALCIUM SERPL-MCNC: 9.7 MG/DL (ref 8.7–10.5)
CHLORIDE SERPL-SCNC: 98 MMOL/L (ref 95–110)
CO2 SERPL-SCNC: 25 MMOL/L (ref 23–29)
CREAT SERPL-MCNC: 2.5 MG/DL (ref 0.5–1.4)
DIFFERENTIAL METHOD: ABNORMAL
EOSINOPHIL # BLD AUTO: 0.4 K/UL (ref 0–0.5)
EOSINOPHIL NFR BLD: 4.8 % (ref 0–8)
ERYTHROCYTE [DISTWIDTH] IN BLOOD BY AUTOMATED COUNT: 19.3 % (ref 11.5–14.5)
EST. GFR  (AFRICAN AMERICAN): 24 ML/MIN/1.73 M^2
EST. GFR  (NON AFRICAN AMERICAN): 21 ML/MIN/1.73 M^2
GLUCOSE SERPL-MCNC: 101 MG/DL (ref 70–110)
HBV CORE AB SERPL QL IA: NEGATIVE
HBV SURFACE AB SER-ACNC: POSITIVE M[IU]/ML
HBV SURFACE AG SERPL QL IA: NEGATIVE
HCT VFR BLD AUTO: 29.1 % (ref 37–48.5)
HGB BLD-MCNC: 8.9 G/DL (ref 12–16)
IMM GRANULOCYTES # BLD AUTO: 0.05 K/UL (ref 0–0.04)
IMM GRANULOCYTES NFR BLD AUTO: 0.6 % (ref 0–0.5)
LYMPHOCYTES # BLD AUTO: 1 K/UL (ref 1–4.8)
LYMPHOCYTES NFR BLD: 11.9 % (ref 18–48)
MAGNESIUM SERPL-MCNC: 1.8 MG/DL (ref 1.6–2.6)
MCH RBC QN AUTO: 27.9 PG (ref 27–31)
MCHC RBC AUTO-ENTMCNC: 30.6 G/DL (ref 32–36)
MCV RBC AUTO: 91 FL (ref 82–98)
MONOCYTES # BLD AUTO: 0.6 K/UL (ref 0.3–1)
MONOCYTES NFR BLD: 7.3 % (ref 4–15)
NEUTROPHILS # BLD AUTO: 5.9 K/UL (ref 1.8–7.7)
NEUTROPHILS NFR BLD: 74.3 % (ref 38–73)
NRBC BLD-RTO: 0 /100 WBC
PHOSPHATE SERPL-MCNC: 3.9 MG/DL (ref 2.7–4.5)
PHOSPHATE SERPL-MCNC: 4.1 MG/DL (ref 2.7–4.5)
PLATELET # BLD AUTO: 333 K/UL (ref 150–350)
PMV BLD AUTO: 11.7 FL (ref 9.2–12.9)
POTASSIUM SERPL-SCNC: 3.9 MMOL/L (ref 3.5–5.1)
RBC # BLD AUTO: 3.19 M/UL (ref 4–5.4)
SODIUM SERPL-SCNC: 136 MMOL/L (ref 136–145)
WBC # BLD AUTO: 7.95 K/UL (ref 3.9–12.7)

## 2020-04-30 PROCEDURE — 86706 HEP B SURFACE ANTIBODY: CPT

## 2020-04-30 PROCEDURE — 97535 SELF CARE MNGMENT TRAINING: CPT | Mod: CO

## 2020-04-30 PROCEDURE — 95819 EEG AWAKE AND ASLEEP: CPT

## 2020-04-30 PROCEDURE — 63700000 PHARM REV CODE 250 ALT 637 W/O HCPCS: Performed by: THORACIC SURGERY (CARDIOTHORACIC VASCULAR SURGERY)

## 2020-04-30 PROCEDURE — 25000003 PHARM REV CODE 250: Performed by: THORACIC SURGERY (CARDIOTHORACIC VASCULAR SURGERY)

## 2020-04-30 PROCEDURE — 36415 COLL VENOUS BLD VENIPUNCTURE: CPT

## 2020-04-30 PROCEDURE — 94761 N-INVAS EAR/PLS OXIMETRY MLT: CPT

## 2020-04-30 PROCEDURE — 95819 EEG AWAKE AND ASLEEP: CPT | Mod: 26,,, | Performed by: PSYCHIATRY & NEUROLOGY

## 2020-04-30 PROCEDURE — 85025 COMPLETE CBC W/AUTO DIFF WBC: CPT

## 2020-04-30 PROCEDURE — 12000002 HC ACUTE/MED SURGE SEMI-PRIVATE ROOM

## 2020-04-30 PROCEDURE — 63600175 PHARM REV CODE 636 W HCPCS: Performed by: THORACIC SURGERY (CARDIOTHORACIC VASCULAR SURGERY)

## 2020-04-30 PROCEDURE — 95819 PR EEG,W/AWAKE & ASLEEP RECORD: ICD-10-PCS | Mod: 26,,, | Performed by: PSYCHIATRY & NEUROLOGY

## 2020-04-30 PROCEDURE — 97803 MED NUTRITION INDIV SUBSEQ: CPT

## 2020-04-30 PROCEDURE — 87340 HEPATITIS B SURFACE AG IA: CPT

## 2020-04-30 PROCEDURE — 84100 ASSAY OF PHOSPHORUS: CPT

## 2020-04-30 PROCEDURE — 97116 GAIT TRAINING THERAPY: CPT | Mod: CQ

## 2020-04-30 PROCEDURE — 99900035 HC TECH TIME PER 15 MIN (STAT)

## 2020-04-30 PROCEDURE — 80069 RENAL FUNCTION PANEL: CPT

## 2020-04-30 PROCEDURE — 83735 ASSAY OF MAGNESIUM: CPT

## 2020-04-30 RX ORDER — HEPARIN SODIUM 5000 [USP'U]/ML
5000 INJECTION, SOLUTION INTRAVENOUS; SUBCUTANEOUS
Status: CANCELLED | OUTPATIENT
Start: 2020-04-30

## 2020-04-30 RX ORDER — SODIUM CHLORIDE 9 MG/ML
INJECTION, SOLUTION INTRAVENOUS
Status: CANCELLED | OUTPATIENT
Start: 2020-04-30

## 2020-04-30 RX ORDER — SODIUM CHLORIDE 9 MG/ML
INJECTION, SOLUTION INTRAVENOUS ONCE
Status: CANCELLED | OUTPATIENT
Start: 2020-04-30 | End: 2020-04-30

## 2020-04-30 RX ADMIN — CALCITRIOL CAPSULES 0.25 MCG 0.25 MCG: 0.25 CAPSULE ORAL at 10:04

## 2020-04-30 RX ADMIN — HEPARIN SODIUM 5000 UNITS: 5000 INJECTION INTRAVENOUS; SUBCUTANEOUS at 10:04

## 2020-04-30 RX ADMIN — HEPARIN SODIUM 5000 UNITS: 5000 INJECTION INTRAVENOUS; SUBCUTANEOUS at 09:04

## 2020-04-30 RX ADMIN — SODIUM BICARBONATE 650 MG TABLET 650 MG: at 09:04

## 2020-04-30 RX ADMIN — LEVOTHYROXINE SODIUM 150 MCG: 150 TABLET ORAL at 05:04

## 2020-04-30 RX ADMIN — FLUCONAZOLE 300 MG: 100 TABLET ORAL at 10:04

## 2020-04-30 RX ADMIN — FERROUS SULFATE TAB EC 325 MG (65 MG FE EQUIVALENT) 325 MG: 325 (65 FE) TABLET DELAYED RESPONSE at 10:04

## 2020-04-30 RX ADMIN — SODIUM BICARBONATE 650 MG TABLET 650 MG: at 03:04

## 2020-04-30 RX ADMIN — ASPIRIN 81 MG: 81 TABLET, COATED ORAL at 10:04

## 2020-04-30 RX ADMIN — METOPROLOL TARTRATE 25 MG: 25 TABLET, FILM COATED ORAL at 09:04

## 2020-04-30 RX ADMIN — HYDRALAZINE HYDROCHLORIDE 25 MG: 25 TABLET, FILM COATED ORAL at 09:04

## 2020-04-30 RX ADMIN — PANTOPRAZOLE SODIUM 40 MG: 40 TABLET, DELAYED RELEASE ORAL at 10:04

## 2020-04-30 RX ADMIN — HYDRALAZINE HYDROCHLORIDE 25 MG: 25 TABLET, FILM COATED ORAL at 05:04

## 2020-04-30 RX ADMIN — HYDRALAZINE HYDROCHLORIDE 25 MG: 25 TABLET, FILM COATED ORAL at 03:04

## 2020-04-30 RX ADMIN — SODIUM BICARBONATE 650 MG TABLET 650 MG: at 10:04

## 2020-04-30 RX ADMIN — METOPROLOL TARTRATE 25 MG: 25 TABLET, FILM COATED ORAL at 10:04

## 2020-04-30 RX ADMIN — ATORVASTATIN CALCIUM 40 MG: 40 TABLET, FILM COATED ORAL at 10:04

## 2020-04-30 RX ADMIN — ACETAMINOPHEN 650 MG: 325 TABLET ORAL at 02:04

## 2020-04-30 NOTE — PT/OT/SLP PROGRESS
Occupational Therapy   Treatment    Name: Maria Victoria Hernandez  MRN: [id number]  Admitting Diagnosis:  Acute renal failure  1 Day Post-Op    Recommendations:     Discharge Recommendations: home health PT, home health OT  Discharge Equipment Recommendations:  walker, rolling, 3-in-1 commode, bath bench  Barriers to discharge:  None    Assessment:     Maria Victoria Hernandez is a 55 y.o. female with a medical diagnosis of Acute renal failure.  She presents with increased endurance and ROM in B UE for gathering hair into pony tail, applying all makeup and performing oral hygiene while seated at sink. Pt's FMC is drastically improved and so has her sitting and dynamic standing balance. She is instructed to call for assist for OOB activity and is using a RW safely. Performance deficits affecting function are weakness, impaired endurance.     Rehab Prognosis:  EXCELLENT; patient would benefit from acute skilled OT services to address these deficits and reach maximum level of function.       Plan:     Patient to be seen 3 x/week to address the above listed problems via self-care/home management, therapeutic activities, therapeutic exercises  · Plan of Care Expires: 05/11/20  · Plan of Care Reviewed with: patient    Subjective     Pain/Comfort:  · Pain Rating 1: 0/10    Objective:     Communicated with: Tess COBB prior to session.  Patient found HOB elevated with peripheral IV, telemetry upon OT entry to room.    General Precautions: Standard, fall   Orthopedic Precautions:N/A   Braces: N/A     Occupational Performance:     Bed Mobility:    · Patient completed Scooting/Bridging with modified independence  · Patient completed Supine to Sit with modified independence     Functional Mobility/Transfers:  · Patient completed Sit <> Stand Transfer with modified independence  with  rolling walker   · Functional Mobility: GOOD: pt has no LOB while ambulating and requires sitting at sink for prolonged grooming 2* low activity  tolerance.    Activities of Daily Living:  · Grooming: set up with Mod I while seated at sink to counteract decreased activity tolerance. Pt applied all makeup with accurate FMC and was able to keep B UE in prolonged abduction/ER to brush hair and pull it up in a pony tail.  · Upper Body Dressing: set up/supervision while sitting up right in bed w/o posterior support.  · Lower Body Dressing: set up/supervision to don socks while sitting up in bed without posterior support. pt is able to control bed Independently as well.    [facility] 6 Click ADL: 23    Treatment & Education:  -pt pedaled chair backwards away from the sink with feet and was able to use recliner handle independently.    Patient left up in chair with recliner in use with call button in reach, chair alarm on and RNTess notifiedEducation:      GOALS:   Multidisciplinary Problems     Occupational Therapy Goals        Problem: Occupational Therapy Goal    Goal Priority Disciplines Outcome Interventions   Occupational Therapy Goal     OT, PT/OT Ongoing, Progressing    Description:  Goals to be met by: 5/11/2020     Patient will increase functional independence with ADLs by performing:    LE Dressing with Modified [place].  Grooming while standing at sink with Modified [place].  Toileting from toilet with Modified [place] for hygiene and clothing management.   Supine to sit with Modified [place].  Toilet transfer to toilet with Modified [place].                      Time Tracking:     OT Date of Treatment: 04/30/20  OT Start Time: 1238  OT Stop Time: 1307  OT Total Time (min): 29 min    Billable Minutes:Self Care/Home Management 29 min    ABBY Gregory  4/30/2020

## 2020-04-30 NOTE — PROGRESS NOTES
[facility]  Adult Nutrition  Progress Note    SUMMARY     Intervention:  Commercial beverage/sodium/potassium/phosphorus modified diet      Recommendations  1.) Recommend Renal diet  2) Continue novasource 1 x daily per pt preference  3) weigh pt s/p HD     Goals: 1.) PO intake >50% within 72 hrs. 2) Po intakes > 50% meals and supplements at f/u  Nutrition Goal Status: met/ new  Communication of RD Recs: POC, sticky note     1. Episodic confusion    2. MAIKOL (acute kidney injury)    3. Chest pain    4. Acute renal failure            Past Medical History:   Diagnosis Date    Colon polyp      Diverticulosis large intestine w/o perforation or abscess w/bleeding      Iron deficiency anemia      Prediabetes      Thyroid disease      Vitamin B 12 deficiency      Vitamin D deficiency           Reason for Assessment  Reason For Assessment: follow up  Rounds: did not attend ( remote)    General Information Comments: admits with renal failure, line placement for dialysis. S/p covid-19. Pt awake during RD visit. Late lunch tray due to diet just advancing. Pt reports decreased appetite x1 month or more , ~25% at meals yesterday. Pt stated she does not like ensure/boost, likes milk. Educated on high phos foods. Pt willing to try novasource renal x1/day.  Weight loss noted.  Due to recent hospital wide restrictions to limit the transfer of (COVID-19),  we are not performing any physical exams at this time. All S/S will be observational; NFPE to be performed at a future date  4/30/20B  PO intakes 25-50%. Advance diet again after being NPO yesterday to renal diet. 50% x 1 meal recorded. Novasource ordered 1 x daily. On HD.    Nutrition Discharge Planning: to be determined.      Nutrition Risk Screen  Nutrition Risk Screen: no indicators present        Anthropometrics  Height Method: Stated  Height: 5'  Height (inches): 60 in  Weight Method: Bed Scale  Weight: 95.1 kg (4/27/20)  Weight (lb): 209 lb  Ideal  Body Weight (IBW), Female: 100 lb  % Ideal Body Weight, Female (lb): 209.66 %  BMI (Calculated): 40.9  BMI Grade: greater than 40 - morbid obesity  Usual Body Weight (UBW), k.55 kg(x1 month ago. )     Lab/Procedures/Meds  Pertinent Labs Reviewed: reviewed  BMP  Lab Results   Component Value Date     2020    K 3.9 2020    CL 98 2020    CO2 25 2020    BUN 31 (H) 2020    CREATININE 2.5 (H) 2020    CALCIUM 9.7 2020    ANIONGAP 13 2020    ESTGFRAFRICA 24 (A) 2020    EGFRNONAA 21 (A) 2020     Lab Results   Component Value Date    CALCIUM 9.7 2020    PHOS 4.1 2020     Lab Results   Component Value Date    ALBUMIN 3.3 (L) 2020     POCT Glucose   Date Value Ref Range Status   2020 139 (H) 70 - 110 mg/dL Final       Pertinent Medications Reviewed: reviewed   iron, vitamin D, statin, zofran     Estimated/Assessed Needs  Weight Used For Calorie Calculations: 95.1 kg (209 lb 10.5 oz)  Energy Calorie Requirements (kcal): 1210-2039 kcals/day  Protein Requirements: 99 g protein  Weight Used For Protein Calculations: adjusted BW NHANES, on HD   RDA Method (mL):urine output + 1000 ml  CHO Requirement: 250 g        Nutrition Prescription Ordered  Current Diet Order: renal diet + novasource renal 1 x daily     Evaluation of Received Nutrient/Fluid Intake  Energy needs: not meeting  Protein needs: not meeting  Fluid needs: not meeting  % Intake of Estimated Energy Needs: 25-50%  % Meal Intake: 25-50%     Nutrition Risk     Level of Risk/Frequency of Follow-up: moderate(x2 weekly)      Assessment and Plan     Moderate malnutrition  Contributing Nutrition Diagnosis  inadequate energy intake     Related to (etiology):   Decreased appetite, nausea     Signs and Symptoms (as evidenced by):   Greater than 5% weight loss in 1 month  EEN <75% for x1 month  +1 edema      Interventions/Recommendations (treatment strategy):  Send novasource  renal with meals      Nutrition Diagnosis Status:   continues           Monitor and Evaluation  Food and Nutrient Intake: energy intake, food and beverage intake  Anthropometric Measurements: weight, weight change, body mass index  Biochemical Data, Medical Tests and Procedures: electrolyte and renal panel, glucose/endocrine profile  Nutrition-Focused Physical Findings: overall appearance      Malnutrition Assessment  Malnutrition Type: chronic illness  Energy Intake: moderate energy intake  Skin (Micronutrient): mel = 20  Weight Loss (Malnutrition): greater than 5% in 1 month  Energy Intake (Malnutrition): less than 75% for greater than or equal to 1 month  Fluid Accumulation (Malnutrition): mild   Edema (Fluid Accumulation): 1-->trace      Nutrition Follow-Up  Yes

## 2020-04-30 NOTE — PROGRESS NOTES
[facility] Hospital Medicine  Progress Note    Patient Name: Maria Victoria Hernandez  MRN: [id number]  Patient Class: IP- Inpatient   Admission Date: 4/25/2020  Length of Stay: 5 days  Attending Physician: Ferny Porter MD  Primary Care Provider: Candice Deluna MD        Subjective:     Principal Problem:Acute renal failure        HPI:  Maria Victoria Hernandez is a 55 y.o. female with a PMHx of hypothyroidism, prediabetes, anemia, renal failure, and recent COVID-19 infection who presents to the ED from [facility] for possible dialysis line placement and emergent dialysis.  The patient was recently hospitalized here for COVID-19 for approximately 1 month, during that time she was intubated and required dialysis for renal failure. She also had line infection and is still receiving oral diflucan. Her kidney function improved; she was sent to LTAC and her dialysis access was removed. Creatinine levels have increased from several days ago. Per LTAC report patient had worsening renal function, intermittent confusion, and decreasing urine output. The patient adamantly denies any confusion or decrease in urine output. The patient does endorse slight weakness and SOB with exertion which has been unchanged since discharge. The patient also reports diarrhea secondary to c diff infection, but states her diarrhea has improved over the last few days. She is currently receiving oral vancomycin with her last day of treatment being tomorrow. The patient denies any N/V, abdominal pain, confusion, cough, fever/chills, chest pain, lower leg edema, or syncope. Her ED work up is significant for mild hyponatremia, hypokalemia, increased BUN/creatinine, and anemia. The ED physician discussed the case with Dr. Moeller who agreed that dialysis was not necessary at this point. The patient will be placed in observation under hospital medicine for further work up and evaluation.     Overview/Hospital Course:  No notes  on file    Interval History: Patient seen and examined. Patient is without subjective complaints, no acute distress. No further seizure activity. Patient is tolerating HD treatments. No new focal neurological complaints reported. Patient working with PT. Patient received tunneled HD catheter yesterday.     Review of Systems   Constitutional: Positive for fatigue. Negative for activity change, appetite change and fever.   HENT: Negative.    Eyes: Negative.    Respiratory: Positive for shortness of breath. Negative for chest tightness and wheezing.    Cardiovascular: Negative for chest pain, palpitations and leg swelling.   Gastrointestinal: Negative for abdominal distention, abdominal pain, blood in stool, diarrhea and vomiting.   Genitourinary: Negative for dysuria and hematuria.   Neurological: Negative for headaches.   Hematological: Negative for adenopathy.   Psychiatric/Behavioral: Negative for confusion.     Objective:     Vital Signs (Most Recent):  Temp: 97.9 °F (36.6 °C) (04/30/20 0300)  Pulse: 87 (04/30/20 0300)  Resp: 18 (04/30/20 0300)  BP: (!) 145/67 (04/30/20 0300)  SpO2: 98 % (04/30/20 0300) Vital Signs (24h Range):  Temp:  [97.3 °F (36.3 °C)-98.9 °F (37.2 °C)] 97.9 °F (36.6 °C)  Pulse:  [] 87  Resp:  [16-21] 18  SpO2:  [97 %-100 %] 98 %  BP: (105-145)/(51-74) 145/67     Weight: 95.1 kg (209 lb 10.5 oz)  Body mass index is 40.95 kg/m².    Intake/Output Summary (Last 24 hours) at 4/30/2020 0754  Last data filed at 4/30/2020 0712  Gross per 24 hour   Intake 485 ml   Output 650 ml   Net -165 ml      Physical Exam   Constitutional: She is oriented to person, place, and time. She appears well-developed and well-nourished. No distress.   HENT:   Head: Normocephalic and atraumatic.   Eyes: Pupils are equal, round, and reactive to light.   Neck: Neck supple. No thyromegaly present.   Cardiovascular: Normal rate and regular rhythm. Exam reveals no gallop and no friction rub.   No murmur  heard.  Pulmonary/Chest: Effort normal and breath sounds normal. No respiratory distress. She has no wheezes.   Abdominal: Soft. Bowel sounds are normal. She exhibits no distension. There is no tenderness. There is no guarding.   Musculoskeletal: Normal range of motion. She exhibits no edema.   Neurological: She is alert and oriented to person, place, and time.   Skin: Skin is warm and dry. No erythema. Right subclavian HD is in place.   Psychiatric: She has a normal mood and affect.       Significant Labs:   CBC:   Recent Labs   Lab 04/29/20  0259 04/30/20  0446   WBC 9.29 7.95   HGB 9.4* 8.9*   HCT 30.2* 29.1*   * 333     CMP  Sodium   Date Value Ref Range Status   04/30/2020 136 136 - 145 mmol/L Final     Potassium   Date Value Ref Range Status   04/30/2020 3.9 3.5 - 5.1 mmol/L Final     Chloride   Date Value Ref Range Status   04/30/2020 98 95 - 110 mmol/L Final     CO2   Date Value Ref Range Status   04/30/2020 25 23 - 29 mmol/L Final     Glucose   Date Value Ref Range Status   04/30/2020 101 70 - 110 mg/dL Final     BUN, Bld   Date Value Ref Range Status   04/30/2020 31 (H) 6 - 20 mg/dL Final     Creatinine   Date Value Ref Range Status   04/30/2020 2.5 (H) 0.5 - 1.4 mg/dL Final     Calcium   Date Value Ref Range Status   04/30/2020 9.7 8.7 - 10.5 mg/dL Final     Total Protein   Date Value Ref Range Status   04/29/2020 8.6 (H) 6.0 - 8.4 g/dL Final     Albumin   Date Value Ref Range Status   04/30/2020 3.3 (L) 3.5 - 5.2 g/dL Final     Total Bilirubin   Date Value Ref Range Status   04/29/2020 0.7 0.1 - 1.0 mg/dL Final     Comment:     For infants and newborns, interpretation of results should be based  on gestational age, weight and in agreement with clinical  observations.  Premature Infant recommended reference ranges:  Up to 24 hours.............<8.0 mg/dL  Up to 48 hours............<12.0 mg/dL  3-5 days..................<15.0 mg/dL  6-29 days.................<15.0 mg/dL       Alkaline Phosphatase    Date Value Ref Range Status   04/29/2020 130 55 - 135 U/L Final     AST   Date Value Ref Range Status   04/29/2020 22 10 - 40 U/L Final     ALT   Date Value Ref Range Status   04/29/2020 23 10 - 44 U/L Final     Anion Gap   Date Value Ref Range Status   04/30/2020 13 8 - 16 mmol/L Final     eGFR if    Date Value Ref Range Status   04/30/2020 24 (A) >60 mL/min/1.73 m^2 Final     eGFR if non    Date Value Ref Range Status   04/30/2020 21 (A) >60 mL/min/1.73 m^2 Final     Comment:     Calculation used to obtain the estimated glomerular filtration  rate (eGFR) is the CKD-EPI equation.        Microbiology Results (last 7 days)     Procedure Component Value Units Date/Time    Blood culture [[id number]] Collected:  04/27/20 0439    Order Status:  Completed Specimen:  Blood Updated:  04/29/20 1212     Blood Culture, Routine No Growth to date      No Growth to date      No Growth to date    Blood culture [[id number]] Collected:  04/26/20 0550    Order Status:  Completed Specimen:  Blood Updated:  04/29/20 1212     Blood Culture, Routine No Growth to date      No Growth to date      No Growth to date      No Growth to date    Blood culture [[id number]] Collected:  04/26/20 0611    Order Status:  Completed Specimen:  Blood Updated:  04/29/20 1212     Blood Culture, Routine No Growth to date      No Growth to date      No Growth to date      No Growth to date    IV catheter culture [[id number]] Collected:  04/26/20 1436    Order Status:  Completed Specimen:  Catheter Tip, Subclavian Updated:  04/29/20 1056     Aerobic Culture - Cath tip No growth        Significant Imaging:   B/L Carotid US:  Limited examination without gross occlusion or stenosis in the left carotid system.  The right carotid system was unable to be visualized.    CT head without contrast:  1. There is no acute abnormality.  There is no hemorrhage, mass, mass effect or obvious acute edema or ischemia.  2. Small bilateral mastoid  effusions.  3. Left sphenoid sinus disease.    CXR:  Appropriately positioned right IJ catheter.  Minimal residual right lung infiltrate.    EEG:   This is a normal awake and asleep routine EEG.  There are no focal findings, no epileptiform discharges, and no electrographic seizures.   Of note, a normal EEG does not rule out the diagnosis of epilepsy.  Clinical correlation is advised.    MRA brain: No high-grade stenosis, large vessel occlusion or aneurysm.        Assessment/Plan:      * Acute renal failure  Required HD but has been off since 4/17. Restarted on HD on 04-. Await Permanent HD catheter placement.  Follow renal panel and electrolytes closely.  Follow renal recommendations.  Renal diet.  Adjust renal dose medications for Estimated Creatinine Clearance: 26.5 mL/min (A) (based on SCr of 2.5 mg/dL (H)).   Avoid NSAIDs, Pace-II inhibitors, ACE-I, Angiotensin Receptor Blockers, or Aminoglycosides.      Mixed hyperlipidemia  Start Lipitor 40 mg daily. Liver and muscle side effects reviewed with the patient.       Symptomatic anemia  Likely secondaty to anemia of chronic disease related to nephropathy.  S/p 2 units of PRBC on 04-. Follow CBC.    Moderate malnutrition        History of 2019 novel coronavirus disease (COVID-19)  Recently discharged from hospital for COVID-19 infection with PNA. Completed abx and plaquenil. Currently COVID-19 negative. On room air.    Hypokalemia  Patient has hypokalemia which is currently uncontrolled. Last electrolytes reviewed-   Recent Labs   Lab 04/26/20  0611 04/26/20 2009 04/27/20  0438   K 3.3* 3.7 3.4*  3.3*   . Will replace potassium and monitor electrolytes closely. Continuous telemetry.  Will follow nephrology recommendations given patient's declining renal status.    Hyponatremia  Mild, appears to be ongoing for the past week.  Urine na, urine osm.    Chronic combined systolic and diastolic heart failure  Last echo 04/13/2020  EF 35% grade 1 diastolic  dysfunction  Off diuretics, continue cardiac meds, hydralazine dose reduced.  Will continue to monitor volume status    Clostridium difficile colitis  - PCR positive on 4/12   - Completed po vanc until 4/26 .  - Loose stools but no diarrhea. Ok to d/c isolation per ID note on 4/22.    Central line infection, subsequent encounter  Consult ID.  Staph epi and C. Albicans      Blood cultures negative as of 4/6      Vancomycin completed.     Fluconazole 300 mg po qday.        - plan to continue until 5/5 per ID.   New blood cx obtained. Patient currently has L IJ central line in place. Microbiology results negative to date. No evidence of endophthalmitis as per ophthalmologist evaluation.    Post viral debility  Improving. Continue PT/OT.    Iron deficiency anemia, unspecified  Patient's anemia is currently controlled. S/p 0 units of PRBCs.   Current CBC reviewed-   Lab Results   Component Value Date    HGB 6.8 (L) 04/28/2020    HCT 22.7 (L) 04/28/2020     Monitor serial CBC and transfuse if patient becomes hemodynamically unstable, symptomatic or H/H drops below 7/21.   See symptomatic anemia management.    Hypothyroid  Patient has chronic hypothyroidism. TFTs reviewed-   Lab Results   Component Value Date    TSH 3.072 04/26/2020   . Will continue chronic levothyroxine and adjust for and clinical changes.    Morbid obesity  Body mass index is 40.95 kg/m². Morbid obesity complicates all aspects of disease management from diagnostic modalities to treatment. Weight loss encouraged and health benefits explained to patient.     Discussed with patient's daughter and  are working on outpatient hemodialysis arrangements.  Patient and daughter are requesting home health and home physical therapy arrangements.  VTE Risk Mitigation (From admission, onward)         Ordered     heparin (porcine) injection 5,000 Units  As needed (PRN)      04/27/20 1500     heparin (porcine) injection 5,000 Units  Every 12 hours       04/25/20 2242     IP VTE HIGH RISK PATIENT  Once      04/25/20 2242     Place sequential compression device  Until discontinued      04/25/20 2242                Ferny Porter MD  Department of Hospital Medicine   [facility]

## 2020-04-30 NOTE — ANESTHESIA POSTPROCEDURE EVALUATION
Anesthesia Post Evaluation    Patient: Maria Victoria Hernandez    Procedure(s) Performed: Procedure(s) (LRB):  Insertion,catheter,tunneled (Right)    Final Anesthesia Type: MAC    Patient location during evaluation: PACU  Patient participation: Yes- Able to Participate  Level of consciousness: awake and alert  Post-procedure vital signs: reviewed and stable  Pain management: adequate  Airway patency: patent    PONV status at discharge: No PONV  Anesthetic complications: no      Cardiovascular status: hemodynamically stable  Respiratory status: unassisted and room air  Hydration status: euvolemic  Follow-up not needed.          Vitals Value Taken Time   /74 4/29/2020  5:55 PM   Temp 37.2 °C (98.9 °F) 4/29/2020  5:55 PM   Pulse 87 4/29/2020  5:55 PM   Resp 19 4/29/2020  5:55 PM   SpO2 97 % 4/29/2020  5:55 PM         Event Time     Out of Recovery 04/29/2020 17:55:00          Pain/Maritza Score: Pain Rating Prior to Med Admin: 7 (4/29/2020  5:34 PM)  Pain Rating Post Med Admin: 0 (4/29/2020  6:03 PM)  Maritza Score: 10 (4/29/2020  5:45 PM)

## 2020-04-30 NOTE — PLAN OF CARE
Problem: Occupational Therapy Goal  Goal: Occupational Therapy Goal  Description  Goals to be met by: 5/11/2020     Patient will increase functional independence with ADLs by performing:    LE Dressing with Modified Long.-MET  Grooming while standing at sink with Modified [place].  Toileting from toilet with Modified [place] for hygiene and clothing management. -MET  Supine to sit with Modified Long.-MET  Toilet transfer to toilet with Modified [place].-MET     Outcome: Ongoing, Progressing

## 2020-04-30 NOTE — PROGRESS NOTES
[facility]  Neurology  Consult Note    Patient Name: Maria Victoria Hernandez  MRN: [id number]  Admission Date: 4/25/2020  Hospital Length of Stay: 4 days  Code Status: Full Code   Attending Provider: Dr Porter  Consulting Provider: Dr Justice Monge  Primary Care Physician: Candice Deluna MD  Principal Problem:Acute renal failure      Subjective:     Chief Complaint:  Dialysis access    HPI from EMR:  Maria Victoria Hernandez is a 55 y.o. female with a PMHx of hypothyroidism, prediabetes, anemia, renal failure, and recent COVID-19 infection who presents to the ED from [facility] for possible dialysis line placement and emergent dialysis.  The patient was recently hospitalized here for COVID-19 for approximately 1 month, during that time she was intubated and required dialysis for renal failure. She also had line infection and is still receiving oral diflucan. Her kidney function improved; she was sent to LTAC and her dialysis access was removed. Creatinine levels have increased from several days ago. Per LTAC report patient had worsening renal function, intermittent confusion, and decreasing urine output. The patient adamantly denies any confusion or decrease in urine output. The patient does endorse slight weakness and SOB with exertion which has been unchanged since discharge. The patient also reports diarrhea secondary to c diff infection, but states her diarrhea has improved over the last few days. She is currently receiving oral vancomycin with her last day of treatment being tomorrow. The patient denies any N/V, abdominal pain, confusion, cough, fever/chills, chest pain, lower leg edema, or syncope. Her ED work up is significant for mild hyponatremia, hypokalemia, increased BUN/creatinine, and anemia. The ED physician discussed the case with Dr. Moeller who agreed that dialysis was not necessary at this point. The patient will be placed in observation under hospital medicine for further  work up and evaluation.     Neurological Consult: Pt had an episode during dialysis in which she c/o dizziness. Pt then went stiff and had a brief decreased LOC and blank stare.  A rapid response was called and seizure work up began.  Upon assessment patient is alert, oriented to all, responds appropriately. She says that she does not remember the episode but remembers being dizzy.  She denies having seizures in the past.  CT head showed nothing acute. EEG pending.  4/28 Pt denies weakness, dizziness.  She reports ambulating without any problems. 4/29 Pt had her tunneled catheter placed today. She denies weakness/dizzizess. She is looking forward to going home.     Past Medical History:   Diagnosis Date    Colon polyp     Diverticulosis large intestine w/o perforation or abscess w/bleeding     Iron deficiency anemia     Prediabetes     Thyroid disease     Vitamin B 12 deficiency     Vitamin D deficiency        Past Surgical History:   Procedure Laterality Date    TUBAL LIGATION         Review of patient's allergies indicates:  No Known Allergies    Current Neurological Medications:     No current facility-administered medications on file prior to encounter.      Current Outpatient Medications on File Prior to Encounter   Medication Sig    metoprolol tartrate (LOPRESSOR) 25 MG tablet Take 1 tablet (25 mg total) by mouth 2 (two) times daily.    acetaminophen (TYLENOL) 325 MG tablet Take 2 tablets (650 mg total) by mouth every 6 (six) hours as needed.    ascorbic acid, vitamin C, (VITAMIN C) 1000 MG tablet Take 1 tablet (1,000 mg total) by mouth 4 (four) times daily.    aspirin (ECOTRIN) 81 MG EC tablet Take 1 tablet (81 mg total) by mouth once daily.    calcitRIOL (ROCALTROL) 0.25 MCG Cap Take 1 capsule (0.25 mcg total) by mouth once daily.    dextrose 5 % SolP 50 mL with promethazine 25 mg/mL Soln 12.5 mg Inject 12.5 mg into the vein every 6 (six) hours as needed.    epoetin raquel-epbx (RETACRIT) 10,000  unit/mL imjection Inject 0.5 mLs (5,000 Units total) into the skin every Mon, Wed, Fri.    [START ON 5/6/2020] escitalopram oxalate (LEXAPRO) 5 MG Tab 1 tablet (5 mg total) by Per NG tube route once daily.    ferrous sulfate 325 mg (65 mg iron) Tab tablet Take 1 tablet (325 mg total) by mouth daily with breakfast. (Patient taking differently: Take 325 mg by mouth daily with breakfast. Take 2 tablets daily)    fluconazole (DIFLUCAN) 150 MG Tab Take 2 tablets (300 mg total) by mouth once daily. for 13 days    furosemide (LASIX) 40 MG tablet Take 1 tablet (40 mg total) by mouth 2 (two) times daily.    heparin sodium,porcine (HEPARIN, PORCINE,) 1,000 unit/mL injection Inject 4 mLs (4,000 Units total) into the vein as needed (Lock ports after every HD).    heparin sodium,porcine (HEPARIN, PORCINE,) 5,000 unit/mL injection Inject 1 mL (5,000 Units total) into the skin every 12 (twelve) hours.    hydrALAZINE (APRESOLINE) 20 mg/mL injection Inject 0.5 mLs (10 mg total) into the vein every 8 (eight) hours as needed (165).    hydrALAZINE (APRESOLINE) 50 MG tablet Take 1 tablet (50 mg total) by mouth every 8 (eight) hours.    ipratropium-albuteroL (COMBIVENT)  mcg/actuation inhaler Inhale 1 puff into the lungs every 6 (six) hours as needed for Wheezing or Shortness of Breath. Rescue    levothyroxine (SYNTHROID) 100 MCG tablet Take 1 tablet (100 mcg total) by mouth once daily. (Patient taking differently: Take 150 mcg by mouth once daily. )    ondansetron 4 mg/2 mL Soln Inject 4 mg into the vein every 8 (eight) hours as needed.    sodium bicarbonate 650 MG tablet Take 1 tablet (650 mg total) by mouth 3 (three) times daily.    white petrolatum-mineral oiL 83-15 % Oint Place into the right eye every evening.    zinc sulfate (ZINCATE) 220 (50) mg capsule Take 1 capsule (220 mg total) by mouth once daily.      Family History     Problem Relation (Age of Onset)    Heart disease Mother, Maternal Grandmother,  Maternal Grandfather    Mental illness Paternal Grandmother        Tobacco Use    Smoking status: Never Smoker    Smokeless tobacco: Never Used   Substance and Sexual Activity    Alcohol use: No    Drug use: Not on file    Sexual activity: Not on file     Review of Systems   Constitutional: Negative.    HENT: Negative.    Eyes: Negative.    Respiratory: Negative.    Cardiovascular: Negative.    Gastrointestinal: Negative.    Endocrine: Negative.    Genitourinary: Negative.    Musculoskeletal: Negative.    Allergic/Immunologic: Negative.    Neurological: Negative.    Hematological: Negative.    Psychiatric/Behavioral: Negative.      Objective:     Vital Signs (Most Recent):  Temp: 98.9 °F (37.2 °C) (04/29/20 1755)  Pulse: 87 (04/29/20 1755)  Resp: 19 (04/29/20 1755)  BP: 119/74 (04/29/20 1755)  SpO2: 97 % (04/29/20 1755) Vital Signs (24h Range):  Temp:  [97.2 °F (36.2 °C)-98.9 °F (37.2 °C)] 98.9 °F (37.2 °C)  Pulse:  [] 87  Resp:  [12-21] 19  SpO2:  [96 %-100 %] 97 %  BP: (105-134)/(51-74) 119/74     Weight: 95.1 kg (209 lb 10.5 oz)  Body mass index is 40.95 kg/m².    Physical Exam   Constitutional: She is oriented to person, place, and time. She appears well-developed and well-nourished.   Eyes: Pupils are equal, round, and reactive to light. EOM are normal.   Neck: Normal range of motion.   Cardiovascular: Normal rate and normal heart sounds.   Pulmonary/Chest: Effort normal.   Abdominal: Soft.   Musculoskeletal: Normal range of motion.   Neurological: She is alert and oriented to person, place, and time. She has a normal Finger-Nose-Finger Test.   Skin: Skin is warm and dry.   Psychiatric: Her speech is normal.       NEUROLOGICAL EXAMINATION:     MENTAL STATUS   Oriented to person, place, and time.   Attention: normal. Concentration: normal.   Speech: speech is normal   Level of consciousness: drowsy  Able to name object. Able to repeat.     CRANIAL NERVES   Cranial nerves II through XII intact.     CN  III, IV, VI   Pupils are equal, round, and reactive to light.  Extraocular motions are normal.     MOTOR EXAM   Muscle bulk: normal  Overall muscle tone: normal    Strength   Right biceps: 4/5  Left biceps: 4/5  Right triceps: 4/5  Left triceps: 4/5  Right quadriceps: 4/5  Left quadriceps: 4/5  Right glutei: 4/5  Left glutei: 4/5    SENSORY EXAM   Light touch normal.     GAIT AND COORDINATION      Coordination   Finger to nose coordination: normal    Tremor   Resting tremor: absent  Intention tremor: absent      Significant Labs:  Lab Results   Component Value Date    WBC 7.38 04/27/2020    HGB 7.8 (L) 04/27/2020    HCT 25.4 (L) 04/27/2020    MCV 89 04/27/2020     (H) 04/27/2020       CMP  Sodium   Date Value Ref Range Status   04/29/2020 135 (L) 136 - 145 mmol/L Final     Potassium   Date Value Ref Range Status   04/29/2020 3.5 3.5 - 5.1 mmol/L Final     Chloride   Date Value Ref Range Status   04/29/2020 98 95 - 110 mmol/L Final     CO2   Date Value Ref Range Status   04/29/2020 23 23 - 29 mmol/L Final     Glucose   Date Value Ref Range Status   04/29/2020 109 70 - 110 mg/dL Final     BUN, Bld   Date Value Ref Range Status   04/29/2020 26 (H) 6 - 20 mg/dL Final     Creatinine   Date Value Ref Range Status   04/29/2020 2.4 (H) 0.5 - 1.4 mg/dL Final     Calcium   Date Value Ref Range Status   04/29/2020 10.1 8.7 - 10.5 mg/dL Final     Total Protein   Date Value Ref Range Status   04/29/2020 8.6 (H) 6.0 - 8.4 g/dL Final     Albumin   Date Value Ref Range Status   04/29/2020 3.7 3.5 - 5.2 g/dL Final     Total Bilirubin   Date Value Ref Range Status   04/29/2020 0.7 0.1 - 1.0 mg/dL Final     Comment:     For infants and newborns, interpretation of results should be based  on gestational age, weight and in agreement with clinical  observations.  Premature Infant recommended reference ranges:  Up to 24 hours.............<8.0 mg/dL  Up to 48 hours............<12.0 mg/dL  3-5 days..................<15.0 mg/dL  6-29  days.................<15.0 mg/dL       Alkaline Phosphatase   Date Value Ref Range Status   04/29/2020 130 55 - 135 U/L Final     AST   Date Value Ref Range Status   04/29/2020 22 10 - 40 U/L Final     ALT   Date Value Ref Range Status   04/29/2020 23 10 - 44 U/L Final     Anion Gap   Date Value Ref Range Status   04/29/2020 14 8 - 16 mmol/L Final     eGFR if    Date Value Ref Range Status   04/29/2020 26 (A) >60 mL/min/1.73 m^2 Final     eGFR if non    Date Value Ref Range Status   04/29/2020 22 (A) >60 mL/min/1.73 m^2 Final     Comment:     Calculation used to obtain the estimated glomerular filtration  rate (eGFR) is the CKD-EPI equation.          Significant Imaging:   X-Ray Chest AP Portable  Narrative: EXAMINATION:  XR CHEST AP PORTABLE    CLINICAL HISTORY:  s/p tunnel catheter;    TECHNIQUE:  Single frontal view of the chest was performed.    COMPARISON:  04/27/2020 and 04/13/2020    FINDINGS:  Chronic elevation right hemidiaphragm.  There is some bronchovascular crowding in the right lung due to low lung volume.  Remaining lungs are clear.  Normal size heart.  Right-sided venous access catheter with tip along the distal SVC.  No pleural effusion or pneumothorax.  No acute osseous abnormality.  Impression: Appropriately positioned venous line.    Electronically signed by: Jasiel Martel  Date:    04/29/2020  Time:    17:46  SURG FL Surgery Fluoro Usage  See OP Notes for results.     IMPRESSION: See OP Notes for results.     This procedure was auto-finalized by: Virtual Radiologist      Assessment and Plan:    Dizziness  -Seizure vs Syncope  R/O TIA  -CT: no acute intracranial process  -EEG- normal/no seizures  -CUS- limited exam.  No gross occlusion or stenosis of left carotids. Right carotid unable to be visualized. WILL REPEAT on RIght  -MRA- no high grade stenosos  -ECHO no bubble 4/13 mild left atrial enlargement/EF 35%  -MRI 4/14 no evidence of any acute  abnormalities  -Na/K: 136/3.4  - aspirin 81 mg    HLD  -lipitor 40 mg   CHOL 225, TRIG 333, HDL 36, .4    Acute Renal Failure  -New HD    COVID 19  -Recovered    Hypothyroid  -3.072 TSH  -levothyroxine    Will repeat Right sided CUS to complete the study. Repeat EEG tomorrow.     Patient to follow up with [facility] at [phone] within 2 weeks from discharge.     Seizure precautions:     Patient and/caregiver advised that patients having seizures should not to drive or operate heavy machinery until 6 months seizure free. Also explained that patient is to avoid activities that could be high risk during a seizure, including but not limited to swimming alone, climbing to high levels, and bathing in a tub.     Stroke education was provided including stroke risk factors modification and any acute neurological changes including weakness, confusion, visual changes to come straight to the ER.     All questions were answered.                                Active Diagnoses:    Diagnosis Date Noted POA    PRINCIPAL PROBLEM:  Acute renal failure [N17.9] 03/27/2020 Yes    Symptomatic anemia [D64.9] 04/28/2020 Yes    Mixed hyperlipidemia [E78.2] 04/28/2020 Yes    Episodic confusion [R41.0]  No    Moderate malnutrition [E44.0] 04/27/2020 Unknown    Hyponatremia [E87.1] 04/26/2020 Yes    Hypokalemia [E87.6] 04/26/2020 Yes    History of 2019 novel coronavirus disease (COVID-19) [Z86.19] 04/26/2020 Yes    Chronic combined systolic and diastolic heart failure [I50.42] 04/23/2020 Yes    Clostridium difficile colitis [A04.72] 04/22/2020 Yes    Central line infection, subsequent encounter [T80.219D] 04/22/2020 Not Applicable    Post viral debility [R53.81] 04/12/2020 Yes    Morbid obesity [E66.01] 03/25/2020 Yes    Hypothyroid [E03.9] 03/25/2020 Yes    Iron deficiency anemia, unspecified [D50.9] 12/30/2015 Yes      Problems Resolved During this Admission:       VTE Risk Mitigation (From admission,  onward)         Ordered     heparin (porcine) injection 5,000 Units  As needed (PRN)      04/27/20 1500     heparin (porcine) injection 5,000 Units  Every 12 hours      04/25/20 2242     IP VTE HIGH RISK PATIENT  Once      04/25/20 2242     Place sequential compression device  Until discontinued      04/25/20 2242                Thank you for your consult. I will follow-up with patient. Please contact us if you have any additional questions.    Jeanne Bailey NP  Neurology  [facility] I, Dr. Justice Monge, discussed care with my advanced practitioner and agree with above. I have reviewed patient clinical presentation, work up, impression and plan.

## 2020-04-30 NOTE — ASSESSMENT & PLAN NOTE
Contributing Nutrition Diagnosis  inadequate energy intake    Related to (etiology):   Decreased appetite, nausea    Signs and Symptoms (as evidenced by):   Greater than 5% weight loss in 1 month  EEN <75% for x1 month  +1 edema     Interventions/Recommendations (treatment strategy):  Send novasource renal with meals     Nutrition Diagnosis Status:   continues

## 2020-04-30 NOTE — ASSESSMENT & PLAN NOTE
Required HD but has been off since 4/17. Restarted on HD on 04-. Await Permanent HD catheter placement.  Follow renal panel and electrolytes closely.  Follow renal recommendations.  Renal diet.  Adjust renal dose medications for Estimated Creatinine Clearance: 26.5 mL/min (A) (based on SCr of 2.5 mg/dL (H)).   Avoid NSAIDs, Pace-II inhibitors, ACE-I, Angiotensin Receptor Blockers, or Aminoglycosides.

## 2020-04-30 NOTE — PROGRESS NOTES
Consult Note  Nephrology    Consult Requested By: Ferny Porter MD    Reason for Consult: MAIKOL requiring initiation of RRT    SUBJECTIVE:     History of Present Illness:  54 y/o female patient s/p hospitalization for COVID 19.  She required RRT during that admit, was taken off dialysis on 4/17 d/t renal recovery.  She was transferred to [facility] once she was stable for discharge from hospital.    Seen yesterday at LT.  She had n/v for 2 days, uremia.  Scr had been trending up, was 4.0 yesterday. Transferred to hospital for line placement and re-initiation of RRT.  Today, Scr is 4.6.    Discussed w/pt the need for line placement and re-initiation of dialysis.  She is agreeable.      4/27  Had some nausea earlier.  No confusion.  No sob.  4/28  Some nausea earlier.  Seen on dialysis    4/29  No nausea, chest pain, sob, fever, urinary or bowel complaint, new neurologic symptoms, new joint pain,    4/30  Pt not in her room                Assessment/plan:    1.  MAIKOL requiring initiation of RRT--dialysis tomorrow.     2.  Anemia of chronic dz/Fe+ def--continue epo and Fe+ supplements  3.  Hypokalemia--was given repletion per primary team this am.  Will further correct with HD.  4.  SHPT--cont calcitriol.  Daily PO4 levels, may add binder. Renal diet.  5.  Metabolic acidosis--on bicarb, continue.  Will correct with HD    Past Medical History:   Diagnosis Date    Colon polyp     Diverticulosis large intestine w/o perforation or abscess w/bleeding     Iron deficiency anemia     Prediabetes     Thyroid disease     Vitamin B 12 deficiency     Vitamin D deficiency      Past Surgical History:   Procedure Laterality Date    TUBAL LIGATION       Family History   Problem Relation Age of Onset    Heart disease Mother     Heart disease Maternal Grandmother     Heart disease Maternal Grandfather     Mental illness Paternal Grandmother      Social History     Tobacco Use    Smoking status: Never Smoker    Smokeless tobacco:  Never Used   Substance Use Topics    Alcohol use: No    Drug use: Not on file       Review of patient's allergies indicates:  No Known Allergies     Review of Systems:  4/29  General ROS: negative for - fever or night sweats  Psychological ROS: negative for - behavioral disorder or depression  ENT ROS: negative for - headaches or visual changes  Hematological and Lymphatic ROS: negative for - bleeding problems or bruising  Endocrine ROS: negative for - temperature intolerance or unexpected weight changes  Respiratory ROS: no cough, shortness of breath, or wheezing  Cardiovascular ROS: no chest pain, SOB  Gastrointestinal ROS: no abdominal pain, no n/v/c/d  Genito-Urinary ROS: no dysuria or trouble voiding  Musculoskeletal ROS: negative for - joint pain or joint swelling  Neurological ROS: no TIA or stroke symptoms  Dermatological ROS: negative for rash and skin lesion changes    OBJECTIVE:     Vital Signs Range (Last 24H):  Temp:  [97.3 °F (36.3 °C)-98.9 °F (37.2 °C)]   Pulse:  []   Resp:  [16-21]   BP: (107-145)/(51-74)   SpO2:  [97 %-100 %]     Physical Exam:  4/29  General- NAD noted  HEENT- WNL  Neck- supple  CV- Regular rate and rhythm  Resp- Lungs CTA Bilaterally, No increased WOB  GI- Non tender/non-distended, BS normoactive x4 quads  Extrem- No cyanosis, clubbing, edema.  Derm- skin w/d  Neuro-  Asterixis is present    Body mass index is 40.95 kg/m².    Laboratory:  CBC:   Recent Labs   Lab 04/30/20  0446   WBC 7.95   RBC 3.19*   HGB 8.9*   HCT 29.1*      MCV 91   MCH 27.9   MCHC 30.6*     CMP:   Recent Labs   Lab 04/29/20  0749 04/30/20  0446    101   CALCIUM 10.1 9.7   ALBUMIN 3.7 3.3*   PROT 8.6*  --    * 136   K 3.5 3.9   CO2 23 25   CL 98 98   BUN 26* 31*   CREATININE 2.4* 2.5*   ALKPHOS 130  --    ALT 23  --    AST 22  --    BILITOT 0.7  --        Diagnostic Results:  Labs: Reviewed      ASSESSMENT/PLAN:     Active Hospital Problems    Diagnosis  POA    *Acute renal failure  [N17.9]  Yes    Symptomatic anemia [D64.9]  Yes    Mixed hyperlipidemia [E78.2]  Yes    Episodic confusion [R41.0]  No    Moderate malnutrition [E44.0]  Unknown    Hyponatremia [E87.1]  Yes    Hypokalemia [E87.6]  Yes    History of 2019 novel coronavirus disease (COVID-19) [Z86.19]  Yes    Chronic combined systolic and diastolic heart failure [I50.42]  Yes    Clostridium difficile colitis [A04.72]  Yes    Central line infection, subsequent encounter [T80.219D]  Not Applicable    Post viral debility [R53.81]  Yes    Morbid obesity [E66.01]  Yes    Hypothyroid [E03.9]  Yes    Iron deficiency anemia, unspecified [D50.9]  Yes      Resolved Hospital Problems   No resolved problems to display.         Thank you for allowing us to participate in the care of your patient. We will follow the patient and provide recommendations as needed.      Time spent seeing patient( greater than 1/2 spent in direct contact) :

## 2020-04-30 NOTE — RESPIRATORY THERAPY
04/29/20 2020   Patient Assessment/Suction   Level of Consciousness (AVPU) alert   Respiratory Effort Normal;Unlabored   Expansion/Accessory Muscles/Retractions expansion symmetric;no retractions;no use of accessory muscles   All Lung Fields Breath Sounds diminished;clear   PRE-TX-O2   O2 Device (Oxygen Therapy) room air   SpO2 98 %   Pulse Oximetry Type Intermittent   Pulse 92   Resp 18   Aerosol Therapy   $ Aerosol Therapy Charges PRN treatment not required   Respiratory Treatment Status (SVN) PRN treatment not required

## 2020-04-30 NOTE — PT/OT/SLP PROGRESS
Physical Therapy Treatment    Patient Name:  Maria Victoria Hernandez   MRN:  [id number]    Recommendations:     Discharge Recommendations:  LTACH (long-term acute care hospital)   Discharge Equipment Recommendations: walker, rolling   Barriers to discharge: None    Assessment:     Maria Victoria Hernandez is a 55 y.o. female admitted with a medical diagnosis of Acute renal failure.  She presents with the following impairments/functional limitations:  weakness, impaired endurance, impaired functional mobilty, gait instability . Tolerated treatment well. Mobility and endurance improved. Ambulated 250' with rw and CGA, slowly / steadily.    Rehab Prognosis: Good; patient would benefit from acute skilled PT services to address these deficits and reach maximum level of function.    Recent Surgery: Procedure(s) (LRB):  Insertion,catheter,tunneled (Right) 1 Day Post-Op    Plan:     During this hospitalization, patient to be seen 6 x/week to address the identified rehab impairments via gait training, therapeutic activities, therapeutic exercises and progress toward the following goals:    · Plan of Care Expires:  05/29/20    Subjective     Chief Complaint: none stated  Patient/Family Comments/goals: to return home  Pain/Comfort:  · Pain Rating 1: 0/10      Objective:     Communicated with nurse Pulido prior to session.  Patient found seated BSC with telemetry upon PT entry to room.     General Precautions: Standard, fall   Orthopedic Precautions:N/A   Braces: N/A     Functional Mobility:  · Bed Mobility:     · Rolling Left:  stand by assistance  · Sit to Supine: contact guard assistance  · Transfers:     · Sit to Stand:  contact guard assistance with rolling walker  · Gait: 250' with rw and CGA.      AM-PAC 6 CLICK MOBILITY          Therapeutic Activities and Exercises:   Performed hygiene task post void without difficulty.   Ambulated in hallway with rw and CGA.   Returned to room to bed, EEG tech present.       Patient  left supine with all lines intact, call button in reach, bed alarm on, nurse Tess notified and EEG tech present..    GOALS:   Multidisciplinary Problems     Physical Therapy Goals        Problem: Physical Therapy Goal    Goal Priority Disciplines Outcome Goal Variances Interventions   Physical Therapy Goal     PT, PT/OT Ongoing, Progressing     Description:  Goals to be met by: 2020     Patient will increase functional independence with mobility by performin. Supine to sit with Contact Guard Assistance  2. Sit to stand transfer with Contact Guard Assistance  3. Bed to chair transfer with Contact Guard Assistance using Rolling Walker  4. Gait  x 250 feet with Minimal Assistance using Rolling Walker.   5. Lower extremity exercise program x20 reps                    Time Tracking:     PT Received On: 20  PT Start Time: 0955     PT Stop Time: 1005  PT Total Time (min): 10 min     Billable Minutes: Gait Training 10 min    Treatment Type: Treatment  PT/PTA: PTA     PTA Visit Number: 4     Marquita Marshall PTA  2020

## 2020-04-30 NOTE — PLAN OF CARE
CM received call from [facility] ext [phone] with [facility] [phone]  [facility] requesting hep b panel- stated she can fax tentative schedule letter until that is received. Also requesting COVID - test. Stated pt has tentative chair time at clinic in [facility] TTS schedule.   CM will fax needed hep panel once resulted. CM following .    1400- ERIS received call from [facility] with [facility]. [facility] stated they are needing hep panel and - covid testing prior to placing pt. Stated pt's tentative placement is at [facility] in [facility]. Coordinator assigned to pt's case is [facility] ext [phone] ([phone]) 1415- hep panel resulted. CM faxed to both [facility] and [facility] admissions along with - covid testing.          04/30/20 0900   Post-Acute Status   Post-Acute Authorization Dialysis   Diaylsis Status Additional Clinical Requested

## 2020-04-30 NOTE — PROCEDURES
Routine EEG Report      Maria Victoria Hernandez  [id number]  1965    DATE OF SERVICE: 4/30/2020  REASON FOR CONSULT:  55-year-old woman with renal failure secondary to COVID-19 infection with an episode of loss of consciousness during dialysis.  Evaluate for evidence of epileptiform activity.    METHODOLOGY   Electroencephalographic (EEG) recording is with electrodes placed according to the International 10-20 placement system.  Thirty two (32) channels of digital signal (sampling rate of 512/sec) including T1 and T2 was simultaneously recorded from the scalp and may include  EKG, EMG, and/or eye monitors.  Recording band pass was 0.1 to 512 hz.  Digital video recording of the patient is simultaneously recorded with the EEG.  The patient is instructed report clinical symptoms which may occur during the recording session.  EEG and video recording is stored and archived in digital format. Activation procedures which include photic stimulation, hyperventilation and instructing patients to perform simple task are done in selected patients.    The EEG is displayed on a monitor screen and can be reviewed using different montages.  Computer assisted analysis is employed to detect spike and electrographic seizure activity.   The entire record is submitted for computer analysis.  The entire recording is visually reviewed and the times identified by computer analysis as being spikes or seizures are reviewed again.  Compresses spectral analysis (CSA) is also performed on the activity recorded from each individual channel.  This is displayed as a power display of frequencies from 0 to 30 Hz over time.   The CSA is reviewed looking for asymmetries in power between homologous areas of the scalp and then compared with the original EEG recording.     [employer] software is also utilized in the review of this study.  This software suite analyzes the EEG recording in multiple domains.  Coherence and rhythmicity is computed to  identify EEG sections which may contain organized seizures.  Each channel undergoes analysis to detect presence of spike and sharp waves which have special and morphological characteristic of epileptic activity.  The routine EEG recording is converted from spacial into frequency domain.  This is then displayed comparing homologous areas to identify areas of significant asymmetry.  Algorithm to identify non-cortically generated artifact is used to separate eye movement, EMG and other artifact from the EEG.      EEG FINDINGS  Background activity:   The background is continuous and symmetric predominantly alpha activity with a well-formed 9 hz posterior dominant rhythm seen bilaterally.    Sleep:  The patient transitions from wakefulness to sleep with the appearance of sleep spindles, K complexes, and vertex waves.    Activation procedures:   The patient is able to follow simple commands and answers orientation questions correctly. Photic stimulation is performed with no activation of the record.    Cardiac Monitor:   Heart rate appears generally regular on a single lead EKG.    Impression:   This is a normal awake and asleep routine EEG.  There are no focal findings, no epileptiform discharges, and no electrographic seizures.  Compared to the previous routine EEG performed on 04/28/2020, there are no significant changes.  Of note, a normal EEG does not rule out the diagnosis of epilepsy.  Clinical correlation is advised.    Sarina Jorgensen MD PhD  Neurology-Epilepsy  [facility].  [facility]

## 2020-04-30 NOTE — PLAN OF CARE
CM called Luis ext [phone] ([phone]) with [facility]. Luis verified that she received needed documents . Stated she forwarded them to clinic. Pt has tentative start date for 5/5 at 11 AM for TTS 1130 shift, pending clinic's medical director review and approval. Clinic is [facility]       04/30/20 1537   Post-Acute Status   Post-Acute Authorization Dialysis   Diaylsis Status Pending Clinical Review

## 2020-04-30 NOTE — SUBJECTIVE & OBJECTIVE
Interval History: Patient seen and examined. Patient is without subjective complaints, no acute distress.No further seizure activity. Patient is tolerating HD treatments. No new focal neurological complaints reported. Patient working with PT.     Review of Systems   Constitutional: Positive for fatigue. Negative for activity change, appetite change and fever.   HENT: Negative.    Eyes: Negative.    Respiratory: Positive for shortness of breath. Negative for chest tightness and wheezing.    Cardiovascular: Negative for chest pain, palpitations and leg swelling.   Gastrointestinal: Negative for abdominal distention, abdominal pain, blood in stool, diarrhea and vomiting.   Genitourinary: Negative for dysuria and hematuria.   Neurological: Negative for headaches.   Hematological: Negative for adenopathy.   Psychiatric/Behavioral: Negative for confusion.     Objective:     Vital Signs (Most Recent):  Temp: 97.9 °F (36.6 °C) (04/30/20 0300)  Pulse: 87 (04/30/20 0300)  Resp: 18 (04/30/20 0300)  BP: (!) 145/67 (04/30/20 0300)  SpO2: 98 % (04/30/20 0300) Vital Signs (24h Range):  Temp:  [97.3 °F (36.3 °C)-98.9 °F (37.2 °C)] 97.9 °F (36.6 °C)  Pulse:  [] 87  Resp:  [16-21] 18  SpO2:  [97 %-100 %] 98 %  BP: (105-145)/(51-74) 145/67     Weight: 95.1 kg (209 lb 10.5 oz)  Body mass index is 40.95 kg/m².    Intake/Output Summary (Last 24 hours) at 4/30/2020 0754  Last data filed at 4/30/2020 0712  Gross per 24 hour   Intake 485 ml   Output 650 ml   Net -165 ml      Physical Exam   Constitutional: She is oriented to person, place, and time. She appears well-developed and well-nourished. No distress.   HENT:   Head: Normocephalic and atraumatic.   Eyes: Pupils are equal, round, and reactive to light.   Neck: Neck supple. No thyromegaly present.   Cardiovascular: Normal rate and regular rhythm. Exam reveals no gallop and no friction rub.   No murmur heard.  Pulmonary/Chest: Effort normal and breath sounds normal. No respiratory  distress. She has no wheezes.   Abdominal: Soft. Bowel sounds are normal. She exhibits no distension. There is no tenderness. There is no guarding.   Musculoskeletal: Normal range of motion. She exhibits no edema.   Neurological: She is alert and oriented to person, place, and time.   Skin: Skin is warm and dry. No erythema.   Psychiatric: She has a normal mood and affect.       Significant Labs:   CBC:   Recent Labs   Lab 04/29/20  0259 04/30/20  0446   WBC 9.29 7.95   HGB 9.4* 8.9*   HCT 30.2* 29.1*   * 333     CMP  Sodium   Date Value Ref Range Status   04/30/2020 136 136 - 145 mmol/L Final     Potassium   Date Value Ref Range Status   04/30/2020 3.9 3.5 - 5.1 mmol/L Final     Chloride   Date Value Ref Range Status   04/30/2020 98 95 - 110 mmol/L Final     CO2   Date Value Ref Range Status   04/30/2020 25 23 - 29 mmol/L Final     Glucose   Date Value Ref Range Status   04/30/2020 101 70 - 110 mg/dL Final     BUN, Bld   Date Value Ref Range Status   04/30/2020 31 (H) 6 - 20 mg/dL Final     Creatinine   Date Value Ref Range Status   04/30/2020 2.5 (H) 0.5 - 1.4 mg/dL Final     Calcium   Date Value Ref Range Status   04/30/2020 9.7 8.7 - 10.5 mg/dL Final     Total Protein   Date Value Ref Range Status   04/29/2020 8.6 (H) 6.0 - 8.4 g/dL Final     Albumin   Date Value Ref Range Status   04/30/2020 3.3 (L) 3.5 - 5.2 g/dL Final     Total Bilirubin   Date Value Ref Range Status   04/29/2020 0.7 0.1 - 1.0 mg/dL Final     Comment:     For infants and newborns, interpretation of results should be based  on gestational age, weight and in agreement with clinical  observations.  Premature Infant recommended reference ranges:  Up to 24 hours.............<8.0 mg/dL  Up to 48 hours............<12.0 mg/dL  3-5 days..................<15.0 mg/dL  6-29 days.................<15.0 mg/dL       Alkaline Phosphatase   Date Value Ref Range Status   04/29/2020 130 55 - 135 U/L Final     AST   Date Value Ref Range Status   04/29/2020  22 10 - 40 U/L Final     ALT   Date Value Ref Range Status   04/29/2020 23 10 - 44 U/L Final     Anion Gap   Date Value Ref Range Status   04/30/2020 13 8 - 16 mmol/L Final     eGFR if    Date Value Ref Range Status   04/30/2020 24 (A) >60 mL/min/1.73 m^2 Final     eGFR if non    Date Value Ref Range Status   04/30/2020 21 (A) >60 mL/min/1.73 m^2 Final     Comment:     Calculation used to obtain the estimated glomerular filtration  rate (eGFR) is the CKD-EPI equation.        Microbiology Results (last 7 days)     Procedure Component Value Units Date/Time    Blood culture [[id number]] Collected:  04/27/20 0439    Order Status:  Completed Specimen:  Blood Updated:  04/29/20 1212     Blood Culture, Routine No Growth to date      No Growth to date      No Growth to date    Blood culture [[id number]] Collected:  04/26/20 0550    Order Status:  Completed Specimen:  Blood Updated:  04/29/20 1212     Blood Culture, Routine No Growth to date      No Growth to date      No Growth to date      No Growth to date    Blood culture [[id number]] Collected:  04/26/20 0611    Order Status:  Completed Specimen:  Blood Updated:  04/29/20 1212     Blood Culture, Routine No Growth to date      No Growth to date      No Growth to date      No Growth to date    IV catheter culture [[id number]] Collected:  04/26/20 1436    Order Status:  Completed Specimen:  Catheter Tip, Subclavian Updated:  04/29/20 1056     Aerobic Culture - Cath tip No growth        Significant Imaging:   B/L Carotid US:  Limited examination without gross occlusion or stenosis in the left carotid system.  The right carotid system was unable to be visualized.    CT head without contrast:  1. There is no acute abnormality.  There is no hemorrhage, mass, mass effect or obvious acute edema or ischemia.  2. Small bilateral mastoid effusions.  3. Left sphenoid sinus disease.    CXR:  Appropriately positioned right IJ catheter.  Minimal residual  right lung infiltrate.    EEG:   This is a normal awake and asleep routine EEG.  There are no focal findings, no epileptiform discharges, and no electrographic seizures.   Of note, a normal EEG does not rule out the diagnosis of epilepsy.  Clinical correlation is advised.    MRA brain: No high-grade stenosis, large vessel occlusion or aneurysm.

## 2020-04-30 NOTE — PROGRESS NOTES
[facility]  Neurology  Consult Note    Patient Name: Maria Victoria Hernandez  MRN: [id number]  Admission Date: 4/25/2020  Hospital Length of Stay: 5 days  Code Status: Full Code   Attending Provider: Dr Porter  Consulting Provider: Dr Justice Monge  Primary Care Physician: Candice Deluna MD  Principal Problem:Acute renal failure      Subjective:     Chief Complaint:  Dialysis access    HPI from EMR:  Maria Victoria Hernandez is a 55 y.o. female with a PMHx of hypothyroidism, prediabetes, anemia, renal failure, and recent COVID-19 infection who presents to the ED from [facility] for possible dialysis line placement and emergent dialysis.  The patient was recently hospitalized here for COVID-19 for approximately 1 month, during that time she was intubated and required dialysis for renal failure. She also had line infection and is still receiving oral diflucan. Her kidney function improved; she was sent to LTAC and her dialysis access was removed. Creatinine levels have increased from several days ago. Per LTAC report patient had worsening renal function, intermittent confusion, and decreasing urine output. The patient adamantly denies any confusion or decrease in urine output. The patient does endorse slight weakness and SOB with exertion which has been unchanged since discharge. The patient also reports diarrhea secondary to c diff infection, but states her diarrhea has improved over the last few days. She is currently receiving oral vancomycin with her last day of treatment being tomorrow. The patient denies any N/V, abdominal pain, confusion, cough, fever/chills, chest pain, lower leg edema, or syncope. Her ED work up is significant for mild hyponatremia, hypokalemia, increased BUN/creatinine, and anemia. The ED physician discussed the case with Dr. Moeller who agreed that dialysis was not necessary at this point. The patient will be placed in observation under hospital medicine for further  work up and evaluation.     Neurological Consult: Pt had an episode during dialysis in which she c/o dizziness. Pt then went stiff and had a brief decreased LOC and blank stare.  A rapid response was called and seizure work up began.  Upon assessment patient is alert, oriented to all, responds appropriately. She says that she does not remember the episode but remembers being dizzy.  She denies having seizures in the past.  CT head showed nothing acute. EEG pending.  4/28 Pt denies weakness, dizziness.  She reports ambulating without any problems. 4/29 Pt had her tunneled catheter placed today. She denies weakness/dizzizess. She is looking forward to going home.     Neurological Interval Note: 4/30 Patient seen and examined Alert oriented x4, no family at bedside. Patient talking to sister on phone. Patient back to base, normal neruo-focal exam able to follow directions, no complaint of pain. Patient  EEG exam showed a normal awake and asleep routine EEG.  There are no focal findings, no epileptiform discharges, and no electrographic seizures. Repeat Right sided CUS showed no hemodynamically significant carotid bifurcation stenosis on right.. Patient instructed no driving until she follow up with us in clinic. Patient needs to follow up in 2 week outpatient.   Past Medical History:   Diagnosis Date    Colon polyp     Diverticulosis large intestine w/o perforation or abscess w/bleeding     Iron deficiency anemia     Prediabetes     Thyroid disease     Vitamin B 12 deficiency     Vitamin D deficiency        Past Surgical History:   Procedure Laterality Date    TUBAL LIGATION         Review of patient's allergies indicates:  No Known Allergies    Current Neurological Medications:     No current facility-administered medications on file prior to encounter.      Current Outpatient Medications on File Prior to Encounter   Medication Sig    metoprolol tartrate (LOPRESSOR) 25 MG tablet Take 1 tablet (25 mg total) by  mouth 2 (two) times daily.    acetaminophen (TYLENOL) 325 MG tablet Take 2 tablets (650 mg total) by mouth every 6 (six) hours as needed.    ascorbic acid, vitamin C, (VITAMIN C) 1000 MG tablet Take 1 tablet (1,000 mg total) by mouth 4 (four) times daily.    aspirin (ECOTRIN) 81 MG EC tablet Take 1 tablet (81 mg total) by mouth once daily.    calcitRIOL (ROCALTROL) 0.25 MCG Cap Take 1 capsule (0.25 mcg total) by mouth once daily.    dextrose 5 % SolP 50 mL with promethazine 25 mg/mL Soln 12.5 mg Inject 12.5 mg into the vein every 6 (six) hours as needed.    epoetin raquel-epbx (RETACRIT) 10,000 unit/mL imjection Inject 0.5 mLs (5,000 Units total) into the skin every Mon, Wed, Fri.    [START ON 5/6/2020] escitalopram oxalate (LEXAPRO) 5 MG Tab 1 tablet (5 mg total) by Per NG tube route once daily.    ferrous sulfate 325 mg (65 mg iron) Tab tablet Take 1 tablet (325 mg total) by mouth daily with breakfast. (Patient taking differently: Take 325 mg by mouth daily with breakfast. Take 2 tablets daily)    fluconazole (DIFLUCAN) 150 MG Tab Take 2 tablets (300 mg total) by mouth once daily. for 13 days    furosemide (LASIX) 40 MG tablet Take 1 tablet (40 mg total) by mouth 2 (two) times daily.    heparin sodium,porcine (HEPARIN, PORCINE,) 1,000 unit/mL injection Inject 4 mLs (4,000 Units total) into the vein as needed (Lock ports after every HD).    heparin sodium,porcine (HEPARIN, PORCINE,) 5,000 unit/mL injection Inject 1 mL (5,000 Units total) into the skin every 12 (twelve) hours.    hydrALAZINE (APRESOLINE) 20 mg/mL injection Inject 0.5 mLs (10 mg total) into the vein every 8 (eight) hours as needed (165).    hydrALAZINE (APRESOLINE) 50 MG tablet Take 1 tablet (50 mg total) by mouth every 8 (eight) hours.    ipratropium-albuteroL (COMBIVENT)  mcg/actuation inhaler Inhale 1 puff into the lungs every 6 (six) hours as needed for Wheezing or Shortness of Breath. Rescue    levothyroxine (SYNTHROID) 100  MCG tablet Take 1 tablet (100 mcg total) by mouth once daily. (Patient taking differently: Take 150 mcg by mouth once daily. )    ondansetron 4 mg/2 mL Soln Inject 4 mg into the vein every 8 (eight) hours as needed.    sodium bicarbonate 650 MG tablet Take 1 tablet (650 mg total) by mouth 3 (three) times daily.    white petrolatum-mineral oiL 83-15 % Oint Place into the right eye every evening.    zinc sulfate (ZINCATE) 220 (50) mg capsule Take 1 capsule (220 mg total) by mouth once daily.      Family History     Problem Relation (Age of Onset)    Heart disease Mother, Maternal Grandmother, Maternal Grandfather    Mental illness Paternal Grandmother        Tobacco Use    Smoking status: Never Smoker    Smokeless tobacco: Never Used   Substance and Sexual Activity    Alcohol use: No    Drug use: Not on file    Sexual activity: Not on file     Review of Systems   Constitutional: Negative.    HENT: Negative.    Eyes: Negative.    Respiratory: Negative.    Cardiovascular: Negative.    Gastrointestinal: Negative.    Endocrine: Negative.    Genitourinary: Negative.    Musculoskeletal: Negative.    Allergic/Immunologic: Negative.    Neurological: Negative.    Hematological: Negative.    Psychiatric/Behavioral: Negative.      Objective:     Vital Signs (Most Recent):  Temp: 97.9 °F (36.6 °C) (04/30/20 1153)  Pulse: 98 (04/30/20 1153)  Resp: 18 (04/30/20 1153)  BP: 131/74 (04/30/20 1153)  SpO2: 99 % (04/30/20 1153) Vital Signs (24h Range):  Temp:  [97.7 °F (36.5 °C)-98.9 °F (37.2 °C)] 97.9 °F (36.6 °C)  Pulse:  [] 98  Resp:  [16-21] 18  SpO2:  [97 %-100 %] 99 %  BP: (117-145)/(51-74) 131/74     Weight: 95.1 kg (209 lb 10.5 oz)  Body mass index is 40.95 kg/m².    Physical Exam   Constitutional: She is oriented to person, place, and time. She appears well-developed and well-nourished.   Eyes: Pupils are equal, round, and reactive to light. EOM are normal.   Neck: Normal range of motion.   Cardiovascular: Normal  rate and normal heart sounds.   Pulmonary/Chest: Effort normal.   Abdominal: Soft.   Musculoskeletal: Normal range of motion.   Neurological: She is alert and oriented to person, place, and time. She has a normal Finger-Nose-Finger Test.   Skin: Skin is warm and dry.   Psychiatric: Her speech is normal.       NEUROLOGICAL EXAMINATION:     MENTAL STATUS   Oriented to person, place, and time.   Attention: normal. Concentration: normal.   Speech: speech is normal   Level of consciousness: drowsy  Able to name object. Able to repeat.     CRANIAL NERVES   Cranial nerves II through XII intact.     CN III, IV, VI   Pupils are equal, round, and reactive to light.  Extraocular motions are normal.     MOTOR EXAM   Muscle bulk: normal  Overall muscle tone: normal    Strength   Right biceps: 4/5  Left biceps: 4/5  Right triceps: 4/5  Left triceps: 4/5  Right quadriceps: 4/5  Left quadriceps: 4/5  Right glutei: 4/5  Left glutei: 4/5    SENSORY EXAM   Light touch normal.     GAIT AND COORDINATION      Coordination   Finger to nose coordination: normal    Tremor   Resting tremor: absent  Intention tremor: absent      Significant Labs:  Lab Results   Component Value Date    WBC 7.38 04/27/2020    HGB 7.8 (L) 04/27/2020    HCT 25.4 (L) 04/27/2020    MCV 89 04/27/2020     (H) 04/27/2020       CMP  Sodium   Date Value Ref Range Status   04/30/2020 136 136 - 145 mmol/L Final     Potassium   Date Value Ref Range Status   04/30/2020 3.9 3.5 - 5.1 mmol/L Final     Chloride   Date Value Ref Range Status   04/30/2020 98 95 - 110 mmol/L Final     CO2   Date Value Ref Range Status   04/30/2020 25 23 - 29 mmol/L Final     Glucose   Date Value Ref Range Status   04/30/2020 101 70 - 110 mg/dL Final     BUN, Bld   Date Value Ref Range Status   04/30/2020 31 (H) 6 - 20 mg/dL Final     Creatinine   Date Value Ref Range Status   04/30/2020 2.5 (H) 0.5 - 1.4 mg/dL Final     Calcium   Date Value Ref Range Status   04/30/2020 9.7 8.7 - 10.5  mg/dL Final     Total Protein   Date Value Ref Range Status   04/29/2020 8.6 (H) 6.0 - 8.4 g/dL Final     Albumin   Date Value Ref Range Status   04/30/2020 3.3 (L) 3.5 - 5.2 g/dL Final     Total Bilirubin   Date Value Ref Range Status   04/29/2020 0.7 0.1 - 1.0 mg/dL Final     Comment:     For infants and newborns, interpretation of results should be based  on gestational age, weight and in agreement with clinical  observations.  Premature Infant recommended reference ranges:  Up to 24 hours.............<8.0 mg/dL  Up to 48 hours............<12.0 mg/dL  3-5 days..................<15.0 mg/dL  6-29 days.................<15.0 mg/dL       Alkaline Phosphatase   Date Value Ref Range Status   04/29/2020 130 55 - 135 U/L Final     AST   Date Value Ref Range Status   04/29/2020 22 10 - 40 U/L Final     ALT   Date Value Ref Range Status   04/29/2020 23 10 - 44 U/L Final     Anion Gap   Date Value Ref Range Status   04/30/2020 13 8 - 16 mmol/L Final     eGFR if    Date Value Ref Range Status   04/30/2020 24 (A) >60 mL/min/1.73 m^2 Final     eGFR if non    Date Value Ref Range Status   04/30/2020 21 (A) >60 mL/min/1.73 m^2 Final     Comment:     Calculation used to obtain the estimated glomerular filtration  rate (eGFR) is the CKD-EPI equation.          Significant Imaging:   EEG 4/30/20  This is a normal awake and asleep routine EEG.  There are no   focal findings, no epileptiform discharges, and no electrographic   seizures.  US Carotid Right  Narrative: EXAMINATION:  US CAROTID RIGHT    CLINICAL HISTORY:  dizziness;    TECHNIQUE:  Grayscale and color Doppler ultrasound examination of the carotid and vertebral artery systems on the right.  Stenosis estimates are per the NASCET measurement criteria.    COMPARISON:  04/27/2020    FINDINGS:  Right:    Internal Carotid Artery (ICA):    Peak systolic velocity 56 cm/sec    End diastolic velocity 13 cm/sec    ICA/CCA peak systolic ratio:  0.5    ICA/CCA end diastolic ratio: 0.7    Plaque formation: Heterogeneous    Vertebral artery: Antegrade flow and normal waveform.    Left:    Left carotid arteries were not interrogated.  See recent comparison.  Impression: No hemodynamically significant carotid bifurcation stenosis on right.    Electronically signed by: Jasiel Martel  Date:    04/30/2020  Time:    08:28  Repeat Right CUS    No hemodynamically significant carotid bifurcation stenosis on right.       Assessment and Plan:  55 year old black female with impression  1. Dizziness  2. Ruled out Seizure vs Syncope  3. R/O TIA  -CT: no acute intracranial process  -EEG-4/28 normal/no seizures  -EEG 4/30/20 This is a normal awake and asleep routine EEG.  There are no   focal findings, no epileptiform discharges, and no electrographic   seizures.  -CUS- limited exam.  No gross occlusion or stenosis of left carotids. Right carotid unable to be visualized. WILL REPEAT on RIght  -MRA- no high grade stenosos  -ECHO no bubble 4/13 mild left atrial enlargement/EF 35%  -MRI 4/14 no evidence of any acute abnormalities  -Na/K: 136/3.4  - aspirin 81 mg    HLD  -lipitor 40 mg   CHOL 225, TRIG 333, HDL 36, .4    Acute Renal Failure  -New HD    COVID 19  -Recovered    Hypothyroid  -3.072 TSH  -levothyroxine    Patient  repeat Right sided CUS showed no hemodynamically significant carotid bifurcation stenosis on right. . Repeat EEG normal no seizure.    Patient neurological stable follow up in 2 weeks outpatient in clinic   Patient to follow up with [facility] at [phone] within 2 weeks from discharge.     Seizure precautions:     Patient and/caregiver advised that patients having seizures should not to drive or operate heavy machinery until 6 months seizure free. Also explained that patient is to avoid activities that could be high risk during a seizure, including but not limited to swimming alone, climbing to high levels, and bathing in a tub.      Stroke education was provided including stroke risk factors modification and any acute neurological changes including weakness, confusion, visual changes to come straight to the ER.     All questions were answered.                                Active Diagnoses:    Diagnosis Date Noted POA    PRINCIPAL PROBLEM:  Acute renal failure [N17.9] 03/27/2020 Yes    Symptomatic anemia [D64.9] 04/28/2020 Yes    Mixed hyperlipidemia [E78.2] 04/28/2020 Yes    Episodic confusion [R41.0]  No    Moderate malnutrition [E44.0] 04/27/2020 Unknown    Hyponatremia [E87.1] 04/26/2020 Yes    Hypokalemia [E87.6] 04/26/2020 Yes    History of 2019 novel coronavirus disease (COVID-19) [Z86.19] 04/26/2020 Yes    Chronic combined systolic and diastolic heart failure [I50.42] 04/23/2020 Yes    Clostridium difficile colitis [A04.72] 04/22/2020 Yes    Central line infection, subsequent encounter [T80.219D] 04/22/2020 Not Applicable    Post viral debility [R53.81] 04/12/2020 Yes    Morbid obesity [E66.01] 03/25/2020 Yes    Hypothyroid [E03.9] 03/25/2020 Yes    Iron deficiency anemia, unspecified [D50.9] 12/30/2015 Yes      Problems Resolved During this Admission:       VTE Risk Mitigation (From admission, onward)         Ordered     heparin (porcine) injection 5,000 Units  As needed (PRN)      04/27/20 1500     heparin (porcine) injection 5,000 Units  Every 12 hours      04/25/20 2242     IP VTE HIGH RISK PATIENT  Once      04/25/20 2242     Place sequential compression device  Until discontinued      04/25/20 2242                Thank you for your consult. I will follow-up with patient. Please contact us if you have any additional questions.    Debi Pnik, DIEGO  Neurology  [facility]  I, Dr. Justice Monge, discussed care with my advanced practitioner and agree with above. I have reviewed patient clinical presentation, work up, impression and plan.

## 2020-04-30 NOTE — RESPIRATORY THERAPY
04/30/20 0815   Patient Assessment/Suction   Level of Consciousness (AVPU) alert   Respiratory Effort Normal;Unlabored   Expansion/Accessory Muscles/Retractions no use of accessory muscles;no retractions;expansion symmetric   All Lung Fields Breath Sounds clear;diminished   Rhythm/Pattern, Respiratory unlabored;pattern regular;depth regular   Cough Frequency no cough   PRE-TX-O2   O2 Device (Oxygen Therapy) room air   SpO2 98 %   Pulse Oximetry Type Intermittent   $ Pulse Oximetry - Multiple Charge Pulse Oximetry - Multiple   Pulse 89   Resp 18   Aerosol Therapy   $ Aerosol Therapy Charges PRN treatment not required   Respiratory Treatment Status (SVN) PRN treatment not required

## 2020-04-30 NOTE — PLAN OF CARE
Intervention:  Commercial beverage/sodium/potassium/phosphorus modified diet      Recommendations  1.) Recommend Renal diet  2) Continue novasource 1 x daily per pt preference  3) weigh pt s/p HD     Goals: 1.) PO intake >50% within 72 hrs. 2) Po intakes > 50% meals and supplements at f/u  Nutrition Goal Status: met/ new  Communication of RD Recs: POC, sticky note

## 2020-04-30 NOTE — PLAN OF CARE
"Plan of care reviewed with pt. States "understanding." Pt is AAOx4. IV site is without redness and swelling. dialysis catheter CDI. VSS. PRN pain medication given for pain. ST/SR on tele. Bed in low position, bed alarm set, call light in reach. Instructed to call staff for assistance. Will continue to monitor, observe and note any changes. Safety maintained.   "

## 2020-05-01 VITALS
TEMPERATURE: 98 F | DIASTOLIC BLOOD PRESSURE: 65 MMHG | BODY MASS INDEX: 41.17 KG/M2 | SYSTOLIC BLOOD PRESSURE: 110 MMHG | RESPIRATION RATE: 18 BRPM | OXYGEN SATURATION: 100 % | HEART RATE: 106 BPM | HEIGHT: 60 IN | WEIGHT: 209.69 LBS

## 2020-05-01 LAB
ALBUMIN SERPL BCP-MCNC: 3.4 G/DL (ref 3.5–5.2)
ANION GAP SERPL CALC-SCNC: 13 MMOL/L (ref 8–16)
BACTERIA BLD CULT: NORMAL
BUN SERPL-MCNC: 35 MG/DL (ref 6–20)
CALCIUM SERPL-MCNC: 10.1 MG/DL (ref 8.7–10.5)
CHLORIDE SERPL-SCNC: 100 MMOL/L (ref 95–110)
CO2 SERPL-SCNC: 26 MMOL/L (ref 23–29)
CREAT SERPL-MCNC: 2 MG/DL (ref 0.5–1.4)
EST. GFR  (AFRICAN AMERICAN): 32 ML/MIN/1.73 M^2
EST. GFR  (NON AFRICAN AMERICAN): 28 ML/MIN/1.73 M^2
GLUCOSE SERPL-MCNC: 100 MG/DL (ref 70–110)
MAGNESIUM SERPL-MCNC: 1.5 MG/DL (ref 1.6–2.6)
PHOSPHATE SERPL-MCNC: 3.8 MG/DL (ref 2.7–4.5)
POTASSIUM SERPL-SCNC: 3.6 MMOL/L (ref 3.5–5.1)
SODIUM SERPL-SCNC: 139 MMOL/L (ref 136–145)

## 2020-05-01 PROCEDURE — 25000003 PHARM REV CODE 250: Performed by: THORACIC SURGERY (CARDIOTHORACIC VASCULAR SURGERY)

## 2020-05-01 PROCEDURE — 80100016 HC MAINTENANCE HEMODIALYSIS

## 2020-05-01 PROCEDURE — 99900035 HC TECH TIME PER 15 MIN (STAT)

## 2020-05-01 PROCEDURE — 36415 COLL VENOUS BLD VENIPUNCTURE: CPT

## 2020-05-01 PROCEDURE — 80069 RENAL FUNCTION PANEL: CPT

## 2020-05-01 PROCEDURE — 83735 ASSAY OF MAGNESIUM: CPT

## 2020-05-01 PROCEDURE — 63600175 PHARM REV CODE 636 W HCPCS: Performed by: THORACIC SURGERY (CARDIOTHORACIC VASCULAR SURGERY)

## 2020-05-01 PROCEDURE — 63700000 PHARM REV CODE 250 ALT 637 W/O HCPCS: Performed by: THORACIC SURGERY (CARDIOTHORACIC VASCULAR SURGERY)

## 2020-05-01 RX ORDER — HYDRALAZINE HYDROCHLORIDE 25 MG/1
25 TABLET, FILM COATED ORAL EVERY 8 HOURS
Qty: 90 TABLET | Refills: 0 | Status: SHIPPED | OUTPATIENT
Start: 2020-05-01 | End: 2021-05-01

## 2020-05-01 RX ORDER — LEVOTHYROXINE SODIUM 150 UG/1
150 TABLET ORAL
Qty: 30 TABLET | Refills: 0 | OUTPATIENT
Start: 2020-05-02 | End: 2020-05-01

## 2020-05-01 RX ORDER — FLUCONAZOLE 150 MG/1
300 TABLET ORAL DAILY
Qty: 8 TABLET | Refills: 0 | OUTPATIENT
Start: 2020-05-01 | End: 2020-05-01 | Stop reason: SDUPTHER

## 2020-05-01 RX ORDER — FLUCONAZOLE 150 MG/1
300 TABLET ORAL DAILY
Qty: 8 TABLET | Refills: 0 | Status: SHIPPED | OUTPATIENT
Start: 2020-05-01 | End: 2020-05-05

## 2020-05-01 RX ORDER — ATORVASTATIN CALCIUM 40 MG/1
40 TABLET, FILM COATED ORAL DAILY
Qty: 30 TABLET | Refills: 0 | Status: SHIPPED | OUTPATIENT
Start: 2020-05-02 | End: 2021-05-02

## 2020-05-01 RX ORDER — HYDRALAZINE HYDROCHLORIDE 25 MG/1
25 TABLET, FILM COATED ORAL EVERY 8 HOURS
Qty: 90 TABLET | Refills: 0 | OUTPATIENT
Start: 2020-05-01 | End: 2020-05-01

## 2020-05-01 RX ORDER — LEVOTHYROXINE SODIUM 150 UG/1
150 TABLET ORAL
Qty: 30 TABLET | Refills: 0 | Status: SHIPPED | OUTPATIENT
Start: 2020-05-02 | End: 2021-05-02

## 2020-05-01 RX ORDER — ATORVASTATIN CALCIUM 40 MG/1
40 TABLET, FILM COATED ORAL DAILY
Qty: 30 TABLET | Refills: 0 | OUTPATIENT
Start: 2020-05-02 | End: 2020-05-01

## 2020-05-01 RX ADMIN — METOPROLOL TARTRATE 25 MG: 25 TABLET, FILM COATED ORAL at 08:05

## 2020-05-01 RX ADMIN — SODIUM BICARBONATE 650 MG TABLET 650 MG: at 08:05

## 2020-05-01 RX ADMIN — ATORVASTATIN CALCIUM 40 MG: 40 TABLET, FILM COATED ORAL at 08:05

## 2020-05-01 RX ADMIN — CALCITRIOL CAPSULES 0.25 MCG 0.25 MCG: 0.25 CAPSULE ORAL at 08:05

## 2020-05-01 RX ADMIN — PANTOPRAZOLE SODIUM 40 MG: 40 TABLET, DELAYED RELEASE ORAL at 08:05

## 2020-05-01 RX ADMIN — HYDRALAZINE HYDROCHLORIDE 25 MG: 25 TABLET, FILM COATED ORAL at 01:05

## 2020-05-01 RX ADMIN — HYDRALAZINE HYDROCHLORIDE 25 MG: 25 TABLET, FILM COATED ORAL at 06:05

## 2020-05-01 RX ADMIN — SODIUM BICARBONATE 650 MG TABLET 650 MG: at 03:05

## 2020-05-01 RX ADMIN — ACETAMINOPHEN 650 MG: 325 TABLET ORAL at 01:05

## 2020-05-01 RX ADMIN — FERROUS SULFATE TAB EC 325 MG (65 MG FE EQUIVALENT) 325 MG: 325 (65 FE) TABLET DELAYED RESPONSE at 08:05

## 2020-05-01 RX ADMIN — FLUCONAZOLE 300 MG: 100 TABLET ORAL at 08:05

## 2020-05-01 RX ADMIN — ASPIRIN 81 MG: 81 TABLET, COATED ORAL at 08:05

## 2020-05-01 RX ADMIN — HEPARIN SODIUM 5000 UNITS: 5000 INJECTION INTRAVENOUS; SUBCUTANEOUS at 08:05

## 2020-05-01 RX ADMIN — LEVOTHYROXINE SODIUM 150 MCG: 150 TABLET ORAL at 06:05

## 2020-05-01 NOTE — ASSESSMENT & PLAN NOTE
Required HD but has been off since 4/17. Restarted on HD on 04-.   Follow renal panel and electrolytes closely.  Follow renal recommendations.  Renal diet.  Adjust renal dose medications for Estimated Creatinine Clearance: 33.1 mL/min (A) (based on SCr of 2 mg/dL (H)).   Avoid NSAIDs, Pace-II inhibitors, ACE-I, Angiotensin Receptor Blockers, or Aminoglycosides.  Outpatient HD is being setup.

## 2020-05-01 NOTE — PROGRESS NOTES
Consult Note  Nephrology    Consult Requested By: Ferny Porter MD    Reason for Consult: MAIKOL requiring initiation of RRT    SUBJECTIVE:     History of Present Illness:  54 y/o female patient s/p hospitalization for COVID 19.  She required RRT during that admit, was taken off dialysis on 4/17 d/t renal recovery.  She was transferred to [facility] once she was stable for discharge from hospital.    Seen yesterday at LT.  She had n/v for 2 days, uremia.  Scr had been trending up, was 4.0 yesterday. Transferred to hospital for line placement and re-initiation of RRT.  Today, Scr is 4.6.    Discussed w/pt the need for line placement and re-initiation of dialysis.  She is agreeable.      4/27  Had some nausea earlier.  No confusion.  No sob.  4/28  Some nausea earlier.  Seen on dialysis    4/29  No nausea, chest pain, sob, fever, urinary or bowel complaint, new neurologic symptoms, new joint pain,    4/30  Pt not in her room    5/1  S/p hd.   No distress                    Assessment/plan:    1.  MAIKOL requiring initiation of RRT--dialysis today.  Hold hd over weekend.  She may be recovering.  Accurate I/o would be nice   If she is discharged I recommend that the outpt dialysis unit monitor her intradialytic weight gains and pre-dialysis creatinine levels.  She appears to be improving and will likely be able to come off dialysis in the near future.      2.  Anemia of chronic dz/Fe+ def--continue epo and Fe+ supplements  3.  Hypokalemia-- better  4.  SHPT--cont calcitriol.  Daily PO4 levels, may add binder. Renal diet.  5.  Metabolic acidosis--resolved    Past Medical History:   Diagnosis Date    Colon polyp     Diverticulosis large intestine w/o perforation or abscess w/bleeding     Iron deficiency anemia     Prediabetes     Thyroid disease     Vitamin B 12 deficiency     Vitamin D deficiency      Past Surgical History:   Procedure Laterality Date    TUBAL LIGATION       Family History   Problem Relation Age of Onset     Heart disease Mother     Heart disease Maternal Grandmother     Heart disease Maternal Grandfather     Mental illness Paternal Grandmother      Social History     Tobacco Use    Smoking status: Never Smoker    Smokeless tobacco: Never Used   Substance Use Topics    Alcohol use: No    Drug use: Not on file       Review of patient's allergies indicates:  No Known Allergies     Review of Systems:  4/29  General ROS: negative for - fever or night sweats  Psychological ROS: negative for - behavioral disorder or depression  ENT ROS: negative for - headaches or visual changes  Hematological and Lymphatic ROS: negative for - bleeding problems or bruising  Endocrine ROS: negative for - temperature intolerance or unexpected weight changes  Respiratory ROS: no cough, shortness of breath, or wheezing  Cardiovascular ROS: no chest pain, SOB  Gastrointestinal ROS: no abdominal pain, no n/v/c/d  Genito-Urinary ROS: no dysuria or trouble voiding  Musculoskeletal ROS: negative for - joint pain or joint swelling  Neurological ROS: no TIA or stroke symptoms  Dermatological ROS: negative for rash and skin lesion changes    OBJECTIVE:     Vital Signs Range (Last 24H):  Temp:  [97.2 °F (36.2 °C)-98.3 °F (36.8 °C)]   Pulse:  []   Resp:  [16-20]   BP: (114-139)/(64-87)   SpO2:  [97 %-99 %]     Physical Exam:  4/29  General- NAD noted  HEENT- WNL  Neck- supple  CV- Regular rate and rhythm  Resp- Lungs CTA Bilaterally, No increased WOB  GI- Non tender/non-distended, BS normoactive x4 quads  Extrem- No cyanosis, clubbing, edema.  Derm- skin w/d  Neuro-  Asterixis is present    Body mass index is 40.95 kg/m².    Laboratory:  CBC:   Recent Labs   Lab 04/30/20  0446   WBC 7.95   RBC 3.19*   HGB 8.9*   HCT 29.1*      MCV 91   MCH 27.9   MCHC 30.6*     CMP:   Recent Labs   Lab 04/29/20  0749  05/01/20  0501      < > 100   CALCIUM 10.1   < > 10.1   ALBUMIN 3.7   < > 3.4*   PROT 8.6*  --   --    *   < > 139   K  3.5   < > 3.6   CO2 23   < > 26   CL 98   < > 100   BUN 26*   < > 35*   CREATININE 2.4*   < > 2.0*   ALKPHOS 130  --   --    ALT 23  --   --    AST 22  --   --    BILITOT 0.7  --   --     < > = values in this interval not displayed.       Diagnostic Results:  Labs: Reviewed      ASSESSMENT/PLAN:     Active Hospital Problems    Diagnosis  POA    *Acute renal failure [N17.9]  Yes    Symptomatic anemia [D64.9]  Yes    Mixed hyperlipidemia [E78.2]  Yes    Episodic confusion [R41.0]  No    Moderate malnutrition [E44.0]  Yes    History of 2019 novel coronavirus disease (COVID-19) [Z86.19]  Yes    Chronic combined systolic and diastolic heart failure [I50.42]  Yes    Clostridium difficile colitis [A04.72]  Yes    Central line infection, subsequent encounter [T80.219D]  Not Applicable    Post viral debility [R53.81]  Yes    Morbid obesity [E66.01]  Yes    Hypothyroid [E03.9]  Yes    Iron deficiency anemia, unspecified [D50.9]  Yes      Resolved Hospital Problems   No resolved problems to display.         Thank you for allowing us to participate in the care of your patient. We will follow the patient and provide recommendations as needed.      Time spent seeing patient( greater than 1/2 spent in direct contact) :

## 2020-05-01 NOTE — NURSING
D/C instructions provided and explained to pt.  Printed prescriptions provided.  Understanding of D/C instructions verbalized.  IV removed, catheter intact, bleeding controlled, covered with Coban.  Tolerated well.  Telemetry monitor removed and returned to monitor room.  VSS.  Pt denies complaints.  Patient waiting on unit for family to arrive.

## 2020-05-01 NOTE — PROGRESS NOTES
Net uf 1000 mls  Had to change lines mid treatment related to clotting.      05/01/20 1255   Post-Hemodialysis Assessment   Rinseback Volume (mL) 250 mL   Blood Volume Processed (Liters) 49.6 L   Dialyzer Clearance Heavily streaked   Duration of Treatment (minutes) 180 minutes   Hemodialysis Intake (mL) 850 mL   Total UF (mL) 1850 mL   Net Fluid Removal 1000   Patient Response to Treatment tolerated well   Post-Treatment Weight 94.1 kg (207 lb 7.3 oz)   Treatment Weight Change -1   Post-Hemodialysis Comments treatment complete, pt stable.  lines reinfused, catheter capped and clamped. dsg c/d/i changed today.

## 2020-05-01 NOTE — PLAN OF CARE
I attempted to make a follow up visit with the pts PCP listed as Dr. Deluna [phone] however the pt has not seen him in 5 years and he is not accepting new patients. Trista Ceballos, BECK     05/01/20 1501   Post-Acute Status   Post-Acute Authorization Other

## 2020-05-01 NOTE — PLAN OF CARE
05/01/20 1528   Final Note   Assessment Type Final Discharge Note   Anticipated Discharge Disposition Home

## 2020-05-01 NOTE — DISCHARGE SUMMARY
[facility] Hospital Medicine  Discharge Summary      Patient Name: Maria Victoria Hernandez  MRN: [id number]  Admission Date: 4/25/2020  Hospital Length of Stay: 6 days  Discharge Date and Time:  05/01/2020 2:13 PM  Attending Physician: Ferny Porter MD   Discharging Provider: Ferny Porter MD  Primary Care Provider: Candice Deluna MD      HPI:   Maria Victoria Hernandez is a 55 y.o. female with a PMHx of hypothyroidism, prediabetes, anemia, renal failure, and recent COVID-19 infection who presents to the ED from [facility] for possible dialysis line placement and emergent dialysis.  The patient was recently hospitalized here for COVID-19 for approximately 1 month, during that time she was intubated and required dialysis for renal failure. She also had line infection and is still receiving oral diflucan. Her kidney function improved; she was sent to LTAC and her dialysis access was removed. Creatinine levels have increased from several days ago. Per LTAC report patient had worsening renal function, intermittent confusion, and decreasing urine output. The patient adamantly denies any confusion or decrease in urine output. The patient does endorse slight weakness and SOB with exertion which has been unchanged since discharge. The patient also reports diarrhea secondary to c diff infection, but states her diarrhea has improved over the last few days. She is currently receiving oral vancomycin with her last day of treatment being tomorrow. The patient denies any N/V, abdominal pain, confusion, cough, fever/chills, chest pain, lower leg edema, or syncope. Her ED work up is significant for mild hyponatremia, hypokalemia, increased BUN/creatinine, and anemia. The ED physician discussed the case with Dr. Moeller who agreed that dialysis was not necessary at this point. The patient will be placed in observation under hospital medicine for further work up and evaluation.     Procedure(s)  (LRB):  Insertion,catheter,tunneled (Right)      Hospital Course:   Patient was admitted to medicine telemetry service.  Patient was evaluated by Nephrology team.  Vascular surgeon Dr. Gauthier was consulted to place temporary hemodialysis catheter.  Patient underwent multiple hemodialysis treatments with improved symptoms.  Dr. Gauthier later placed permanent hemodialysis catheter.  During hospital stay infectious disease specialist follow the patient.  Patient's antibiotic and antifungal therapy continued.   have set up outpatient hemodialysis treatment.  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  During hospital stay patient had episode of seizure-like activity during 1st round of hemodialysis for which patient underwent neurological workup by Neurology team.  No antiepileptic agent recommended.  Patient's appetite is improving and oral intake is improving day by day.  Patient is anxious to go home.  Patient has been cleared by Dr. Collins for discharge.  Home health services and home physical therapy is being arranged for close monitoring.  Patient will continue close follow-up with her doctors upon discharge.  Patient will see Dr. Dominguez in 2 weeks.    Consults:   Consults (From admission, onward)        Status Ordering Provider     Inpatient consult to Cardiothoracic Surgery  Once     Provider:  Jacques Gauthier MD    Acknowledged JACQUES GAUTHIER     Inpatient consult to Infectious Diseases  Once     Provider:  Daija Fontaine MD    Completed PANCHITO MAYER     Inpatient consult to Nephrology  Once     Provider:  Don Collins MD    Completed PANCHITO MAYER     Inpatient consult to Neurology  Once     Provider:  Seth Monge MD    Acknowledged JACQUES GAUTHIER     Inpatient consult to Ophthalmology  Once     Provider:  Jayro Rivers Jr., MD    Completed DAIJA FONTAINE     Inpatient consult to Registered  Dietitian/Nutritionist  Once     Provider:  (Not yet assigned)    Completed PANCHITO MAYER     Inpatient consult to Vascular Surgery  Once     Provider:  Jacques Gauthier MD    Acknowledged JACQUES GAUTHIER        B/L Carotid US:  Limited examination without gross occlusion or stenosis in the left carotid system.  The right carotid system was unable to be visualized.     CT head without contrast:  1. There is no acute abnormality.  There is no hemorrhage, mass, mass effect or obvious acute edema or ischemia.  2. Small bilateral mastoid effusions.  3. Left sphenoid sinus disease.     CXR:  Appropriately positioned right IJ catheter.  Minimal residual right lung infiltrate.     EEG:   This is a normal awake and asleep routine EEG.  There are no focal findings, no epileptiform discharges, and no electrographic seizures.   Of note, a normal EEG does not rule out the diagnosis of epilepsy.  Clinical correlation is advised.     MRA brain: No high-grade stenosis, large vessel occlusion or aneurysm.     Right Carotid US: No hemodynamically significant carotid bifurcation stenosis on right.     EEG:  This is a normal awake and asleep routine EEG.  There are no   focal findings, no epileptiform discharges, and no electrographic   seizures.  Compared to the previous routine EEG performed on   04/28/2020, there are no significant changes.  Of note, a normal   EEG does not rule out the diagnosis of epilepsy.  Clinical   correlation is advised.    Final Active Diagnoses:    Diagnosis Date Noted POA    PRINCIPAL PROBLEM:  Acute renal failure [N17.9] 03/27/2020 Yes    Symptomatic anemia [D64.9] 04/28/2020 Yes    Mixed hyperlipidemia [E78.2] 04/28/2020 Yes    Episodic confusion [R41.0]  No    Moderate malnutrition [E44.0] 04/27/2020 Yes    History of 2019 novel coronavirus disease (COVID-19) [Z86.19] 04/26/2020 Yes    Chronic combined systolic and diastolic heart failure [I50.42] 04/23/2020 Yes    Clostridium difficile  "colitis [A04.72] 04/22/2020 Yes    Central line infection, subsequent encounter [T80.219D] 04/22/2020 Not Applicable    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    Specialty:  Family Medicine  Contact information:  [address]  [address]  [place] [facility] [address]  [phone]             Please follow up.    Contact information:  Continue routien HD as planned. Next HD is on Tuesday 05-.           Seth Monge MD In 2 weeks.    Specialties:  Vascular Neurology, Neurology  Contact information:  [address] [facility] [address]  [phone]                 Patient Instructions:      WALKER FOR HOME USE     Order Specific Question Answer Comments   Type of Walker: Adult (5'4"-6'6")    With wheels? Yes    Height: 5' (1.524 m)    Weight: 95.1 kg (209 lb 10.5 oz)    Length of need (1-99 months): 99    Does patient have medical equipment at home? none    Please check all that apply: Patient's condition impairs ambulation.      Referral to Home health   Referral Priority: Routine Referral Type: Home Health   Referral Reason: Specialty Services Required   Requested Specialty: Home Health Services   Number of Visits Requested: 1     Diet renal   Order Comments: Nepro can with meals     Diet Cardiac     Other restrictions (specify):   Order Comments: PLEASE OBSERVE FALL PRECAUTIONS     Call MD for:   Order Comments: For worsening symptoms, chest pain, shortness of breath, increased abdominal pain, high grade fever, stroke or stroke like symptoms, immediately go to the nearest Emergency Room or call 911 as soon as possible.       Significant Diagnostic Studies: Labs: "   CMP   Recent Labs   Lab 04/30/20  0446 05/01/20  0501    139   K 3.9 3.6   CL 98 100   CO2 25 26    100   BUN 31* 35*   CREATININE 2.5* 2.0*   CALCIUM 9.7 10.1   ALBUMIN 3.3* 3.4*   ANIONGAP 13 13   ESTGFRAFRICA 24* 32*   EGFRNONAA 21* 28*    and CBC   Recent Labs   Lab 04/30/20  0446   WBC 7.95   HGB 8.9*   HCT 29.1*          Pending Diagnostic Studies:     Procedure Component Value Units Date/Time    Osmolality, urine [[id number]]     Order Status:  Sent Lab Status:  No result     Specimen:  Urine          Medications:  Reconciled Home Medications:      Medication List      START taking these medications    atorvastatin 40 MG tablet  Commonly known as:  LIPITOR  Take 1 tablet (40 mg total) by mouth once daily.  Start taking on:  May 2, 2020        CHANGE how you take these medications    ferrous sulfate 325 mg (65 mg iron) Tab tablet  Commonly known as:  FEOSOL  Take 1 tablet (325 mg total) by mouth daily with breakfast.  What changed:  additional instructions     heparin (porcine) 1,000 unit/mL injection  Inject 4 mLs (4,000 Units total) into the vein as needed (Lock ports after every HD).  What changed:  Another medication with the same name was removed. Continue taking this medication, and follow the directions you see here.     hydrALAZINE 25 MG tablet  Commonly known as:  APRESOLINE  Take 1 tablet (25 mg total) by mouth every 8 (eight) hours.  What changed:    · medication strength  · how much to take  · Another medication with the same name was removed. Continue taking this medication, and follow the directions you see here.     levothyroxine 150 MCG tablet  Commonly known as:  SYNTHROID  Take 1 tablet (150 mcg total) by mouth before breakfast.  Start taking on:  May 2, 2020  What changed:    · medication strength  · how much to take  · when to take this        CONTINUE taking these medications    acetaminophen 325 MG tablet  Commonly known as:  TYLENOL  Take 2 tablets (650 mg total) by  mouth every 6 (six) hours as needed.     ascorbic acid (vitamin C) 1000 MG tablet  Commonly known as:  VITAMIN C  Take 1 tablet (1,000 mg total) by mouth 4 (four) times daily.     aspirin 81 MG EC tablet  Commonly known as:  ECOTRIN  Take 1 tablet (81 mg total) by mouth once daily.     calcitRIOL 0.25 MCG Cap  Commonly known as:  ROCALTROL  Take 1 capsule (0.25 mcg total) by mouth once daily.     dextrose 5 % SolP 50 mL with promethazine 25 mg/mL Soln 12.5 mg  Inject 12.5 mg into the vein every 6 (six) hours as needed.     epoetin raquel-epbx 10,000 unit/mL imjection  Commonly known as:  RETACRIT  Inject 0.5 mLs (5,000 Units total) into the skin every Mon, Wed, Fri.     escitalopram oxalate 5 MG Tab  Commonly known as:  LEXAPRO  1 tablet (5 mg total) by Per NG tube route once daily.  Start taking on:  May 6, 2020     fluconazole 150 MG Tab  Commonly known as:  DIFLUCAN  Take 2 tablets (300 mg total) by mouth once daily. for 4 days     ipratropium-albuteroL  mcg/actuation inhaler  Commonly known as:  COMBIVENT  Inhale 1 puff into the lungs every 6 (six) hours as needed for Wheezing or Shortness of Breath. Rescue     metoprolol tartrate 25 MG tablet  Commonly known as:  LOPRESSOR  Take 1 tablet (25 mg total) by mouth 2 (two) times daily.     ondansetron 4 mg/2 mL Soln  Inject 4 mg into the vein every 8 (eight) hours as needed.     sodium bicarbonate 650 MG tablet  Take 1 tablet (650 mg total) by mouth 3 (three) times daily.     white petrolatum-mineral oiL 83-15 % Oint  Place into the right eye every evening.     zinc sulfate 220 (50) mg capsule  Commonly known as:  ZINCATE  Take 1 capsule (220 mg total) by mouth once daily.        STOP taking these medications    furosemide 40 MG tablet  Commonly known as:  LASIX     vancomycin 250mg / 10ml Susp            Indwelling Lines/Drains at time of discharge:   Lines/Drains/Airways     Central Venous Catheter Line            Tunneled Central Line Insertion/Assessment -  Double Lumen  04/29/20 right atrial;right subclavian 2 days                Time spent on the discharge of patient: 35 minutes  Patient was seen and examined on the date of discharge and determined to be suitable for discharge.         Ferny Porter MD  Department of Hospital Medicine  [facility]

## 2020-05-01 NOTE — CHAPLAIN
"The  visited at bedside, offering emotional support and spiritual care as the patient waited for her discharge. 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 The  will continue to care for this patient and her family.  "

## 2020-05-01 NOTE — PLAN OF CARE
Patient AAOx4 throughout shift, VSS.  Patient verbalized understanding of POC.  Pt on RA.  Continuous telemetry monitor maintained.  Safety maintained.  Will continue to monitor.

## 2020-05-01 NOTE — PT/OT/SLP PROGRESS
Physical Therapy      Patient Name:  Maria Victoria Hernandez   MRN:  [id number]    Patient not seen today secondary to Dialysis in a.m. and fatigue after dialysis in p.m. Will follow-up 05/02/20.    Pippa Luna, PT

## 2020-05-01 NOTE — PLAN OF CARE
Spoke with Luis at [facility]. \[facility] Outlook.\"" ([phone], [phone]) to verify pt's OP HD setup; she is not received confirmation from the unit's Medical Director but states we can discharge the pt and she is scheduled to start OP on May 5th, Tuesday.   Dr Brand cleared the pt for discharge and is okay with her OP HD start date....REZA Figueroa       05/01/20 1404   Post-Acute Status   Post-Acute Authorization Dialysis   Diaylsis Status Pending Medical Review

## 2020-05-01 NOTE — SUBJECTIVE & OBJECTIVE
Interval History: Patient seen and examined. Patient is without subjective complaints, no acute distress.No further seizure activity. Patient is tolerating HD treatments. No new focal neurological complaints reported. Patient working with PT.     Review of Systems   Constitutional: Positive for fatigue. Negative for activity change, appetite change and fever.   HENT: Negative.    Eyes: Negative.    Respiratory: Positive for shortness of breath. Negative for chest tightness and wheezing.    Cardiovascular: Negative for chest pain, palpitations and leg swelling.   Gastrointestinal: Negative for abdominal distention, abdominal pain, blood in stool, diarrhea and vomiting.   Genitourinary: Negative for dysuria and hematuria.   Neurological: Negative for headaches.   Hematological: Negative for adenopathy.   Psychiatric/Behavioral: Negative for confusion.     Objective:     Vital Signs (Most Recent):  Temp: 98.1 °F (36.7 °C) (05/01/20 0753)  Pulse: 98 (05/01/20 0753)  Resp: 18 (05/01/20 0753)  BP: 127/76 (05/01/20 0753)  SpO2: 98 % (05/01/20 0753) Vital Signs (24h Range):  Temp:  [97.2 °F (36.2 °C)-98.3 °F (36.8 °C)] 98.1 °F (36.7 °C)  Pulse:  [93-99] 98  Resp:  [16-20] 18  SpO2:  [97 %-99 %] 98 %  BP: (125-139)/(64-80) 127/76     Weight: 95.1 kg (209 lb 10.5 oz)  Body mass index is 40.95 kg/m².    Intake/Output Summary (Last 24 hours) at 5/1/2020 0838  Last data filed at 4/30/2020 2237  Gross per 24 hour   Intake 740 ml   Output 450 ml   Net 290 ml      Physical Exam   Constitutional: She is oriented to person, place, and time. She appears well-developed and well-nourished. No distress.   HENT:   Head: Normocephalic and atraumatic.   Eyes: Pupils are equal, round, and reactive to light.   Neck: Neck supple. No thyromegaly present.   Cardiovascular: Normal rate and regular rhythm. Exam reveals no gallop and no friction rub.   No murmur heard.  Pulmonary/Chest: Effort normal and breath sounds normal. No respiratory  distress. She has no wheezes.   Abdominal: Soft. Bowel sounds are normal. She exhibits no distension. There is no tenderness. There is no guarding.   Musculoskeletal: Normal range of motion. She exhibits no edema.   Neurological: She is alert and oriented to person, place, and time.   Skin: Skin is warm and dry. No erythema.   Psychiatric: She has a normal mood and affect.       Significant Labs:   CBC:   Recent Labs   Lab 04/30/20  0446   WBC 7.95   HGB 8.9*   HCT 29.1*        CMP  Sodium   Date Value Ref Range Status   05/01/2020 139 136 - 145 mmol/L Final     Potassium   Date Value Ref Range Status   05/01/2020 3.6 3.5 - 5.1 mmol/L Final     Chloride   Date Value Ref Range Status   05/01/2020 100 95 - 110 mmol/L Final     CO2   Date Value Ref Range Status   05/01/2020 26 23 - 29 mmol/L Final     Glucose   Date Value Ref Range Status   05/01/2020 100 70 - 110 mg/dL Final     BUN, Bld   Date Value Ref Range Status   05/01/2020 35 (H) 6 - 20 mg/dL Final     Creatinine   Date Value Ref Range Status   05/01/2020 2.0 (H) 0.5 - 1.4 mg/dL Final     Calcium   Date Value Ref Range Status   05/01/2020 10.1 8.7 - 10.5 mg/dL Final     Total Protein   Date Value Ref Range Status   04/29/2020 8.6 (H) 6.0 - 8.4 g/dL Final     Albumin   Date Value Ref Range Status   05/01/2020 3.4 (L) 3.5 - 5.2 g/dL Final     Total Bilirubin   Date Value Ref Range Status   04/29/2020 0.7 0.1 - 1.0 mg/dL Final     Comment:     For infants and newborns, interpretation of results should be based  on gestational age, weight and in agreement with clinical  observations.  Premature Infant recommended reference ranges:  Up to 24 hours.............<8.0 mg/dL  Up to 48 hours............<12.0 mg/dL  3-5 days..................<15.0 mg/dL  6-29 days.................<15.0 mg/dL       Alkaline Phosphatase   Date Value Ref Range Status   04/29/2020 130 55 - 135 U/L Final     AST   Date Value Ref Range Status   04/29/2020 22 10 - 40 U/L Final     ALT    Date Value Ref Range Status   04/29/2020 23 10 - 44 U/L Final     Anion Gap   Date Value Ref Range Status   05/01/2020 13 8 - 16 mmol/L Final     eGFR if    Date Value Ref Range Status   05/01/2020 32 (A) >60 mL/min/1.73 m^2 Final     eGFR if non    Date Value Ref Range Status   05/01/2020 28 (A) >60 mL/min/1.73 m^2 Final     Comment:     Calculation used to obtain the estimated glomerular filtration  rate (eGFR) is the CKD-EPI equation.        Microbiology Results (last 7 days)     Procedure Component Value Units Date/Time    Blood culture [[id number]] Collected:  04/27/20 0439    Order Status:  Completed Specimen:  Blood Updated:  04/30/20 1212     Blood Culture, Routine No Growth to date      No Growth to date      No Growth to date      No Growth to date    Blood culture [[id number]] Collected:  04/26/20 0611    Order Status:  Completed Specimen:  Blood Updated:  04/30/20 1212     Blood Culture, Routine No Growth after 4 days.     Blood culture [[id number]] Collected:  04/26/20 0550    Order Status:  Completed Specimen:  Blood Updated:  04/30/20 1212     Blood Culture, Routine No Growth after 4 days.     IV catheter culture [[id number]] Collected:  04/26/20 1436    Order Status:  Completed Specimen:  Catheter Tip, Subclavian Updated:  04/29/20 1056     Aerobic Culture - Cath tip No growth        Significant Imaging:   B/L Carotid US:  Limited examination without gross occlusion or stenosis in the left carotid system.  The right carotid system was unable to be visualized.    CT head without contrast:  1. There is no acute abnormality.  There is no hemorrhage, mass, mass effect or obvious acute edema or ischemia.  2. Small bilateral mastoid effusions.  3. Left sphenoid sinus disease.    CXR:  Appropriately positioned right IJ catheter.  Minimal residual right lung infiltrate.    EEG:   This is a normal awake and asleep routine EEG.  There are no focal findings, no epileptiform  discharges, and no electrographic seizures.   Of note, a normal EEG does not rule out the diagnosis of epilepsy.  Clinical correlation is advised.    MRA brain: No high-grade stenosis, large vessel occlusion or aneurysm.    Right Carotid US: No hemodynamically significant carotid bifurcation stenosis on right.    EEG:  This is a normal awake and asleep routine EEG.  There are no   focal findings, no epileptiform discharges, and no electrographic   seizures.  Compared to the previous routine EEG performed on   04/28/2020, there are no significant changes.  Of note, a normal   EEG does not rule out the diagnosis of epilepsy.  Clinical   correlation is advised.

## 2020-05-01 NOTE — PLAN OF CARE
Date Status/Notes Created By   · 5/1/2020 2:51:12 PM Declined: Other: Pt's deductible is $5000.00 . Insurance is not waiving the deductible for Home Health. We cannot accept the patient at this time due to the high deductible. Thank you though for thinking of us  Trista [email or web address]  · 5/1/2020 2:45:37 PM Completed  Trista Ceballos  · 5/1/2020 2:43:51 PM Accepted: Thanks so much for the referral!  Bronwyn [email or web address]  · 5/1/2020 2:40:47 PM New: Please review for acceptance. Thanks !  Trista Cbeallos    Per Trista with [facility]- [employer] is waiving deductibles but not for HH services and the pt has a very high deductible of $5000 so they are not able to accept the pt. [facility]     I updated the pts daughter Danya and she stated that the family will assist the pt with PT services. I updated her on the tentative chair time for HD and told her that the initial appt letter is on 4th floor in the pts blue folder.     CM still awaiting approval for home Rolling walker. Trista Ceballos, [facility]          05/01/20 1441   Post-Acute Status   Home Health Status Set-up Complete

## 2020-05-01 NOTE — PLAN OF CARE
I sent the pts HH orders and HH packet to [facility] via [facility] to review for admit. Awaiting approval venessa Kelley to pull pts home walker. Trista Ceballos, BECK     05/01/20 7446   Post-Acute Status   Post-Acute Authorization Home Health   Home Health Status Referrals Sent

## 2020-05-01 NOTE — PLAN OF CARE
Pt AAO x 4. Pt ambulates with standby assistance. Pt voids on bedside commode w/o difficulty. Room air. Tele in use - ST. No reports of pain. Pt afebrile. PIV cdi; tunnel catheter intact. Dressing changed and monitored for signs of bleeding. Call bell in reach, bed locked, bed alarm on, and fall band and non skid socks on. Will continue to monitor.

## 2020-05-01 NOTE — RESPIRATORY THERAPY
04/30/20 2754   Patient Assessment/Suction   Level of Consciousness (AVPU) alert   Respiratory Effort Normal;Unlabored   Expansion/Accessory Muscles/Retractions no use of accessory muscles;expansion symmetric;no retractions   All Lung Fields Breath Sounds Anterior:;clear;diminished   Rhythm/Pattern, Respiratory unlabored;depth regular;pattern regular   Cough Frequency no cough   PRE-TX-O2   O2 Device (Oxygen Therapy) room air   SpO2 98 %   Pulse Oximetry Type Intermittent   $ Pulse Oximetry - Multiple Charge Pulse Oximetry - Multiple   Pulse 96   Resp 18   Aerosol Therapy   $ Aerosol Therapy Charges PRN treatment not required   Respiratory Treatment Status (SVN) PRN treatment not required

## 2020-05-01 NOTE — PROGRESS NOTES
[facility] Hospital Medicine  Progress Note    Patient Name: Maria Victoria Hernandez  MRN: [id number]  Patient Class: IP- Inpatient   Admission Date: 4/25/2020  Length of Stay: 6 days  Attending Physician: Ferny Porter MD  Primary Care Provider: Candice Deluna MD        Subjective:     Principal Problem:Acute renal failure        HPI:  Maria Victoria Hernandez is a 55 y.o. female with a PMHx of hypothyroidism, prediabetes, anemia, renal failure, and recent COVID-19 infection who presents to the ED from [facility] for possible dialysis line placement and emergent dialysis.  The patient was recently hospitalized here for COVID-19 for approximately 1 month, during that time she was intubated and required dialysis for renal failure. She also had line infection and is still receiving oral diflucan. Her kidney function improved; she was sent to LTAC and her dialysis access was removed. Creatinine levels have increased from several days ago. Per LTAC report patient had worsening renal function, intermittent confusion, and decreasing urine output. The patient adamantly denies any confusion or decrease in urine output. The patient does endorse slight weakness and SOB with exertion which has been unchanged since discharge. The patient also reports diarrhea secondary to c diff infection, but states her diarrhea has improved over the last few days. She is currently receiving oral vancomycin with her last day of treatment being tomorrow. The patient denies any N/V, abdominal pain, confusion, cough, fever/chills, chest pain, lower leg edema, or syncope. Her ED work up is significant for mild hyponatremia, hypokalemia, increased BUN/creatinine, and anemia. The ED physician discussed the case with Dr. Moeller who agreed that dialysis was not necessary at this point. The patient will be placed in observation under hospital medicine for further work up and evaluation.     Overview/Hospital Course:  No notes  on file    Interval History: Patient seen and examined. Patient is without subjective complaints, no acute distress.No further seizure activity. Patient is tolerating HD treatments. No new focal neurological complaints reported. Patient working with PT.     Review of Systems   Constitutional: Positive for fatigue. Negative for activity change, appetite change and fever.   HENT: Negative.    Eyes: Negative.    Respiratory: Positive for shortness of breath. Negative for chest tightness and wheezing.    Cardiovascular: Negative for chest pain, palpitations and leg swelling.   Gastrointestinal: Negative for abdominal distention, abdominal pain, blood in stool, diarrhea and vomiting.   Genitourinary: Negative for dysuria and hematuria.   Neurological: Negative for headaches.   Hematological: Negative for adenopathy.   Psychiatric/Behavioral: Negative for confusion.     Objective:     Vital Signs (Most Recent):  Temp: 98.1 °F (36.7 °C) (05/01/20 0753)  Pulse: 98 (05/01/20 0753)  Resp: 18 (05/01/20 0753)  BP: 127/76 (05/01/20 0753)  SpO2: 98 % (05/01/20 0753) Vital Signs (24h Range):  Temp:  [97.2 °F (36.2 °C)-98.3 °F (36.8 °C)] 98.1 °F (36.7 °C)  Pulse:  [93-99] 98  Resp:  [16-20] 18  SpO2:  [97 %-99 %] 98 %  BP: (125-139)/(64-80) 127/76     Weight: 95.1 kg (209 lb 10.5 oz)  Body mass index is 40.95 kg/m².    Intake/Output Summary (Last 24 hours) at 5/1/2020 0838  Last data filed at 4/30/2020 2237  Gross per 24 hour   Intake 740 ml   Output 450 ml   Net 290 ml      Physical Exam   Constitutional: She is oriented to person, place, and time. She appears well-developed and well-nourished. No distress.   HENT:   Head: Normocephalic and atraumatic.   Eyes: Pupils are equal, round, and reactive to light.   Neck: Neck supple. No thyromegaly present.   Cardiovascular: Normal rate and regular rhythm. Exam reveals no gallop and no friction rub.   No murmur heard.  Pulmonary/Chest: Effort normal and breath sounds normal. No  respiratory distress. She has no wheezes.   Abdominal: Soft. Bowel sounds are normal. She exhibits no distension. There is no tenderness. There is no guarding.   Musculoskeletal: Normal range of motion. She exhibits no edema.   Neurological: She is alert and oriented to person, place, and time.   Skin: Skin is warm and dry. No erythema.   Psychiatric: She has a normal mood and affect.       Significant Labs:   CBC:   Recent Labs   Lab 04/30/20  0446   WBC 7.95   HGB 8.9*   HCT 29.1*        CMP  Sodium   Date Value Ref Range Status   05/01/2020 139 136 - 145 mmol/L Final     Potassium   Date Value Ref Range Status   05/01/2020 3.6 3.5 - 5.1 mmol/L Final     Chloride   Date Value Ref Range Status   05/01/2020 100 95 - 110 mmol/L Final     CO2   Date Value Ref Range Status   05/01/2020 26 23 - 29 mmol/L Final     Glucose   Date Value Ref Range Status   05/01/2020 100 70 - 110 mg/dL Final     BUN, Bld   Date Value Ref Range Status   05/01/2020 35 (H) 6 - 20 mg/dL Final     Creatinine   Date Value Ref Range Status   05/01/2020 2.0 (H) 0.5 - 1.4 mg/dL Final     Calcium   Date Value Ref Range Status   05/01/2020 10.1 8.7 - 10.5 mg/dL Final     Total Protein   Date Value Ref Range Status   04/29/2020 8.6 (H) 6.0 - 8.4 g/dL Final     Albumin   Date Value Ref Range Status   05/01/2020 3.4 (L) 3.5 - 5.2 g/dL Final     Total Bilirubin   Date Value Ref Range Status   04/29/2020 0.7 0.1 - 1.0 mg/dL Final     Comment:     For infants and newborns, interpretation of results should be based  on gestational age, weight and in agreement with clinical  observations.  Premature Infant recommended reference ranges:  Up to 24 hours.............<8.0 mg/dL  Up to 48 hours............<12.0 mg/dL  3-5 days..................<15.0 mg/dL  6-29 days.................<15.0 mg/dL       Alkaline Phosphatase   Date Value Ref Range Status   04/29/2020 130 55 - 135 U/L Final     AST   Date Value Ref Range Status   04/29/2020 22 10 - 40 U/L Final      ALT   Date Value Ref Range Status   04/29/2020 23 10 - 44 U/L Final     Anion Gap   Date Value Ref Range Status   05/01/2020 13 8 - 16 mmol/L Final     eGFR if    Date Value Ref Range Status   05/01/2020 32 (A) >60 mL/min/1.73 m^2 Final     eGFR if non    Date Value Ref Range Status   05/01/2020 28 (A) >60 mL/min/1.73 m^2 Final     Comment:     Calculation used to obtain the estimated glomerular filtration  rate (eGFR) is the CKD-EPI equation.        Microbiology Results (last 7 days)     Procedure Component Value Units Date/Time    Blood culture [[id number]] Collected:  04/27/20 0439    Order Status:  Completed Specimen:  Blood Updated:  04/30/20 1212     Blood Culture, Routine No Growth to date      No Growth to date      No Growth to date      No Growth to date    Blood culture [[id number]] Collected:  04/26/20 0611    Order Status:  Completed Specimen:  Blood Updated:  04/30/20 1212     Blood Culture, Routine No Growth after 4 days.     Blood culture [[id number]] Collected:  04/26/20 0550    Order Status:  Completed Specimen:  Blood Updated:  04/30/20 1212     Blood Culture, Routine No Growth after 4 days.     IV catheter culture [[id number]] Collected:  04/26/20 1436    Order Status:  Completed Specimen:  Catheter Tip, Subclavian Updated:  04/29/20 1056     Aerobic Culture - Cath tip No growth        Significant Imaging:   B/L Carotid US:  Limited examination without gross occlusion or stenosis in the left carotid system.  The right carotid system was unable to be visualized.    CT head without contrast:  1. There is no acute abnormality.  There is no hemorrhage, mass, mass effect or obvious acute edema or ischemia.  2. Small bilateral mastoid effusions.  3. Left sphenoid sinus disease.    CXR:  Appropriately positioned right IJ catheter.  Minimal residual right lung infiltrate.    EEG:   This is a normal awake and asleep routine EEG.  There are no focal findings, no epileptiform  discharges, and no electrographic seizures.   Of note, a normal EEG does not rule out the diagnosis of epilepsy.  Clinical correlation is advised.    MRA brain: No high-grade stenosis, large vessel occlusion or aneurysm.    Right Carotid US: No hemodynamically significant carotid bifurcation stenosis on right.    EEG:  This is a normal awake and asleep routine EEG.  There are no   focal findings, no epileptiform discharges, and no electrographic   seizures.  Compared to the previous routine EEG performed on   04/28/2020, there are no significant changes.  Of note, a normal   EEG does not rule out the diagnosis of epilepsy.  Clinical   correlation is advised.        Assessment/Plan:      * Acute renal failure  Required HD but has been off since 4/17. Restarted on HD on 04-.   Follow renal panel and electrolytes closely.  Follow renal recommendations.  Renal diet.  Adjust renal dose medications for Estimated Creatinine Clearance: 33.1 mL/min (A) (based on SCr of 2 mg/dL (H)).   Avoid NSAIDs, Pace-II inhibitors, ACE-I, Angiotensin Receptor Blockers, or Aminoglycosides.  Outpatient HD is being setup.    Mixed hyperlipidemia  On Lipitor 40 mg daily.       Symptomatic anemia  Likely secondaty to anemia of chronic disease related to nephropathy.  S/p 2 units of PRBC on 04-. Follow CBC.    Moderate malnutrition        History of 2019 novel coronavirus disease (COVID-19)  Recently discharged from hospital for COVID-19 infection with PNA. Completed abx and plaquenil. Currently COVID-19 negative. On room air.    Hypokalemia  Patient has hypokalemia which is currently uncontrolled. Last electrolytes reviewed-   Recent Labs   Lab 04/26/20  0611 04/26/20 2009 04/27/20  0438   K 3.3* 3.7 3.4*  3.3*   . Will replace potassium and monitor electrolytes closely. Continuous telemetry.  Will follow nephrology recommendations given patient's declining renal status.    Hyponatremia  Mild, appears to be ongoing for the past  week.  Urine na, urine osm.    Chronic combined systolic and diastolic heart failure  Last echo 04/13/2020  EF 35% grade 1 diastolic dysfunction  Off diuretics, continue cardiac meds, hydralazine dose reduced.  Will continue to monitor volume status    Clostridium difficile colitis  - PCR positive on 4/12   - Completed po vanc until 4/26 .  - Loose stools but no diarrhea. Ok to d/c isolation per ID note on 4/22.    Central line infection, subsequent encounter  Consult ID.  Staph epi and C. Albicans      Blood cultures negative as of 4/6      Vancomycin completed.     Fluconazole 300 mg po qday.        - plan to continue until 5/5 per ID.   New blood cx obtained. Patient currently has L IJ central line in place. Microbiology results negative to date. No evidence of endophthalmitis as per ophthalmologist evaluation.    Post viral debility  Improving. Continue PT/OT.    Iron deficiency anemia, unspecified  Patient's anemia is currently controlled. S/p 0 units of PRBCs.   Current CBC reviewed-   Lab Results   Component Value Date    HGB 6.8 (L) 04/28/2020    HCT 22.7 (L) 04/28/2020     Monitor serial CBC and transfuse if patient becomes hemodynamically unstable, symptomatic or H/H drops below 7/21.   See symptomatic anemia management.    Hypothyroid  Patient has chronic hypothyroidism. TFTs reviewed-   Lab Results   Component Value Date    TSH 3.072 04/26/2020   . Will continue chronic levothyroxine and adjust for and clinical changes.    Morbid obesity  Body mass index is 40.95 kg/m². Morbid obesity complicates all aspects of disease management from diagnostic modalities to treatment. Weight loss encouraged and health benefits explained to patient.      Discussed with SW and CM who are working on outpatient HD setup.  Disposition: DC home once okay with Dr. Collins.    VTE Risk Mitigation (From admission, onward)         Ordered     heparin (porcine) injection 5,000 Units  As needed (PRN)      04/27/20 1500     heparin  (porcine) injection 5,000 Units  Every 12 hours      04/25/20 2242     IP VTE HIGH RISK PATIENT  Once      04/25/20 2242     Place sequential compression device  Until discontinued      04/25/20 2242                Ferny Porter MD  Department of Hospital Medicine   [facility]

## 2020-05-01 NOTE — PLAN OF CARE
"Plan of care reviewed with pt. States "understanding." Pt is AAOx4. IV site is without redness and swelling. dialysis catheter CDI. VSS. ST/SR on tele. Bed in low position, bed alarm set, call light in reach. Instructed to call staff for assistance. Will continue to monitor, observe and note any changes. Safety maintained.  "

## 2020-05-01 NOTE — RESPIRATORY THERAPY
05/01/20 0753   Patient Assessment/Suction   Level of Consciousness (AVPU) alert   MICHAEL Breath Sounds clear;diminished   PRE-TX-O2   O2 Device (Oxygen Therapy) room air   SpO2 98 %   Pulse Oximetry Type Intermittent   Pulse 98   Resp 18   Temp 98.1 °F (36.7 °C)   /76   Aerosol Therapy   $ Aerosol Therapy Charges PRN treatment not required

## 2020-05-01 NOTE — PLAN OF CARE
Approval received from Ibis Kelley with [facility] to pull the pts home RW. I pulled and delivered to 4th floor. Justo pts nurse signed the delivery ticket and completed paperwork returned to the DME closet. PTs AVS updated. Trista Ceballos, BECK     05/01/20 1517   Post-Acute Status   Post-Acute Authorization [facility] Status Set-up Complete

## 2020-05-04 ENCOUNTER — PATIENT OUTREACH (OUTPATIENT)
Dept: ADMINISTRATIVE | Facility: CLINIC | Age: 55
End: 2020-05-04

## 2020-05-04 DIAGNOSIS — N17.9 ACUTE RENAL FAILURE, UNSPECIFIED ACUTE RENAL FAILURE TYPE: Primary | ICD-10-CM

## 2020-05-04 NOTE — PROGRESS NOTES
Please forward this important TCC information to your provider in order to maximize the post discharge care delivery of this patient.    C3 nurse spoke with Maria Victoria Hernandez or a TCC post hospital discharge follow up call. The patient does not have a scheduled HOSFU appointment with non [facility] PCP within 7-14 days post hospital discharge date 05/01/2020 C3 nurse was unable to schedule HOSFU appointment in [facility].  Please contact pcp  and schedule follow up appointment using HOSFU visit type on or before 05/15/2020    Respectfully,  Ada Sarabia LPN    [facility]    [email or web address]       Please do not reply to this message, as this inbox is not routinely monitored.

## 2020-05-04 NOTE — PATIENT INSTRUCTIONS
Discharge Instructions for Chronic Kidney Disease (CKD)  Chronic kidney disease can (CKD) happen because of many things. These include infections, diabetes, high blood pressure, kidney stones, circulation problems, and reactions to medicine. Having kidney disease means making many changes in your life. Learn as much as you can about it so that you can better adjust to these changes. It is important to remember that the main goal of treatment is to stop CKD from progressing to complete kidney failure. Treatments may vary based on the progression of CKD. Always follow your healthcare provider's instructions on how to manage your condition.  Here are some things you can do to help your condition.  Diet changes  Always discuss your diet with your healthcare provider before making any changes.  Salt (sodium) in your diet  · Based on your condition, you may be told to eat 1,500 mg or less of sodium daily  · Limit processed foods such as:  ¨ Frozen dinners and packaged meals  ¨ Canned fish and meats  ¨ Pickled foods  ¨ Salted snacks  ¨ Lunch meats  ¨ Sauces  ¨ Most cheeses  ¨ Fast foods  · Don't add salt to your food while cooking or before eating at the table.  · Eat unprocessed foods to lower the sodium, such as:  ¨ Fresh turkey and chicken  ¨ Lean beef  ¨ Unsalted tuna  ¨ Fresh fish  ¨ Fresh vegetables and fruits  · Season foods with fresh herbs, garlic, onions, citrus, flavored vinegar, and sodium-free spice blends instead of salt when cooking.  · Don't use salt substitutes that are high in potassium. Ask your healthcare provider or a registered dietitian which salt substitutes to use.  · Avoiding drinking softened water, because of the sodium content. Make sure to read the label on bottled water for sodium content.  · Avoid over-the-counter medicines that contain sodium bicarbonate or sodium carbonate. Read labels carefully.  Potassium in your diet   · Based on your condition, you may be told to eat less than 1,500  mg to 2,700 mg of potassium daily.  · Always drain canned foods such as vegetables, fruits, and meats before serving.  · Avoid whole-grain breads, wheat bran, and granolas.  · Avoid milk, buttermilk, and yogurt.  · Avoid nuts, seeds, peanut butter, dried beans, and peas.  · Avoid fig cookies, chocolate, and molasses.  · Don't use salt substitutes that are high in potassium. Ask your healthcare provider or a registered dietitian which salt substitutes to use.  Protein in your diet  · Based on your condition, your healthcare provider will talk with you about why you should limit protein in your diet.  · Cut back on protein. Eat less meat, milk products, yogurt, eggs, and cheese.  Phosphorus in your diet  · Avoid beer, cocoa, dark rose, douglas, chocolate drinks, and canned ice teas.  · Avoid cheese, milk, ice cream, pudding, and yogurt.  · Avoid liver (beef, chicken), organ meats, oysters, crayfish, and sardines.  · Avoid beans (soy, kidney, black, garbanzo, and northern), peas (chick and split), bran cereals, nuts, and caramels.  Eat small meals often that are high in fiber and calories. You may be told to limit how much fluid you drink.  Other home care  · Avoid wearing yourself out or becoming overly fatigued.  · Get plenty of rest and get more sleep at night.  · Move around and bend your legs to avoid getting blood clots when you rest for a long period of time.  · Weigh yourself every day. Do this at the same time of day and in the same kind of clothes. Keep a record of your daily weights.  · Take your medicines exactly as directed.  · Keep all medical appointments.  · Take steps to control high blood pressure or diabetes. Talk with your healthcare provider for advice.  · Talk with your healthcare provider about dialysis. This procedure may help if your chronic kidney disease is progressing to end stage renal disease.  Follow-up care  Follow up with your healthcare provider, or as advised.     When to seek medical  care  Call your healthcare provider right away if you have any of the following:  · Trouble eating or drinking  · Weight loss of more than 2 pounds in 24 hours or more than 5 pounds in 7 days  · Little or no urine output  · Trouble breathing  · Muscle aches  · Fever of 100.4°F (38°C) or higher, or as advised by your provider  · Blood in your urine or stool  · Bloody discharge from your nose, mouth, or ears  · Severe headache or a seizure  · Vomiting  · Swelling of legs or ankles  Call 911  Call 911 if you have chest pain   Date Last Reviewed: 2/1/2017  © 7925-9565 [employer]. [address] [place] [address], [place] [address]. All rights reserved. This information is not intended as a substitute for professional medical care. Always follow your healthcare professional's instructions.

## 2020-05-05 LAB
BACTERIA BLD CULT: ABNORMAL

## 2020-05-12 ENCOUNTER — NURSE TRIAGE (OUTPATIENT)
Dept: ADMINISTRATIVE | Facility: CLINIC | Age: 55
End: 2020-05-12

## 2020-05-12 NOTE — TELEPHONE ENCOUNTER
Spoke to patient though post procedure symptom monitoring calls. Patient denies any cough, fever, or difficulty breathing since procedure.    Reason for Disposition   Health Information question, no triage required and triager able to answer question    Additional Information   Negative: [1] Caller is not with the adult (patient) AND [2] reporting urgent symptoms   Negative: Lab result questions   Negative: Medication questions   Negative: Caller can't be reached by phone   Negative: Caller has already spoken to PCP or another triager   Negative: RN needs further essential information from caller in order to complete triage   Negative: Requesting regular office appointment   Negative: [1] Caller requesting NON-URGENT health information AND [2] PCP's office is the best resource    Protocols used: INFORMATION ONLY CALL-A-

## 2020-05-15 ENCOUNTER — SSC ENCOUNTER (OUTPATIENT)
Dept: ADMINISTRATIVE | Facility: OTHER | Age: 55
End: 2020-05-15

## 2020-05-15 NOTE — PROGRESS NOTES
Please note that patient was called on 5/15/2020 regarding the OPCM referral. Patient's daughter Megan Hernandez(Power of ) was advised to call insurance and request case management if interested. [facility] is unable to follow patient at this time.      Please contact [facility] with any question at Ext [phone].      Thank you,  Eve Cosby, [facility]  Outpatient Case Mgmnt  [phone]

## 2020-06-29 DIAGNOSIS — R29.898 WEAKNESS OF BOTH ARMS: Primary | ICD-10-CM

## 2020-07-02 ENCOUNTER — HOSPITAL ENCOUNTER (OUTPATIENT)
Dept: RADIOLOGY | Facility: HOSPITAL | Age: 55
Discharge: HOME | End: 2020-07-02
Attending: NURSE PRACTITIONER
Payer: OTHER MISCELLANEOUS

## 2020-07-02 DIAGNOSIS — R29.898 WEAKNESS OF BOTH ARMS: ICD-10-CM

## 2020-07-02 PROCEDURE — 72141 MRI CERVICAL SPINE WITHOUT CONTRAST: ICD-10-PCS | Mod: 26,,, | Performed by: RADIOLOGY

## 2020-07-02 PROCEDURE — 72141 MRI NECK SPINE W/O DYE: CPT | Mod: TC

## 2020-07-02 PROCEDURE — 72141 MRI NECK SPINE W/O DYE: CPT | Mod: 26,,, | Performed by: RADIOLOGY

## 2021-05-10 ENCOUNTER — PATIENT MESSAGE (OUTPATIENT)
Dept: RESEARCH | Facility: HOSPITAL | Age: 56
End: 2021-05-10

## 2022-01-17 NOTE — ASSESSMENT & PLAN NOTE
Ml post viral  Appears worse  Will cover for secondary bacterial infection  Will start ciprofloxacin ointmet  Will continue eye lubricant        Procedure Scheduling Information    Procedure:  Colonoscopy  Last Procedure:  none  Procedure Date:  04/08/2022  Procedure Time:  1230  Reason:  Colon cancer screening  Referring provider:  Matt Sahni DO  Location: [facility]   Prep:  Nulytely  Sedation:  IV Sedation     If MAC, why:  N/A  Pacemaker/Defibrillator:  no     Device Interrogation Form Faxed:  n/a  Anticoagulation:  no    Prescribing Provider:  n/a

## 2022-06-24 NOTE — ASSESSMENT & PLAN NOTE
Will monitor of any blood in stools  Hn trending down     Detail Level: Detailed Detail Level: Generalized

## 2024-10-23 NOTE — PROGRESS NOTES
Pharmacokinetic Assessment Follow Up: IV Vancomycin    Vancomycin serum concentration assessment(s):    The random level was drawn correctly and can be used to guide therapy at this time. The measurement is above the desired definitive target range of 15 to 20 mcg/mL.    Vancomycin Regimen Plan:    Re-dose when patient is scheduled for HD and random level is less than 25 mcg/mL, next level to be drawn with AM labs on 04/21/2020     Drug levels (last 3 results):  Recent Labs   Lab Result Units 04/18/20  0525 04/19/20  0441 04/20/20  0405   Vancomycin, Random ug/mL 18.9  --  20.8   Vancomycin-Trough ug/mL  --  15.0  --        Pharmacy will continue to follow and monitor vancomycin.    Please contact pharmacy at extension [phone] for questions regarding this assessment.    Thank you for the consult,   Sami Baugh       Patient brief summary:  Maria Victoria Hernandez is a 55 y.o. female initiated on antimicrobial therapy with IV Vancomycin for treatment of lower respiratory infection    The patient's current regimen is pulse dosing    Drug Allergies:   Review of patient's allergies indicates:  No Known Allergies    Actual Body Weight:   95kg    Renal Function:   Estimated Creatinine Clearance: 22.6 mL/min (A) (based on SCr of 3 mg/dL (H)).,     Dialysis Method (if applicable):  intermittent HD PRN    CBC (last 72 hours):  Recent Labs   Lab Result Units 04/17/20  2042 04/18/20  0525 04/18/20  1147 04/18/20  2352 04/19/20  0441 04/20/20  0405   WBC K/uL 10.92 10.04  --  11.77 10.36 9.22   Hemoglobin g/dL 8.6* 8.7* 8.6* 8.2* 8.0* 7.5*   Hematocrit % 28.2* 28.6* 28.8* 27.4* 27.3* 25.4*   Platelets K/uL 279 289  --  337 317 362*   Gran% % 62.9 63.6  --  66.8 69.7 68.7   Lymph% % 18.3 16.7*  --  15.0* 14.4* 14.2*   Mono% % 8.5 9.3  --  9.8 8.5 9.5   Eosinophil% % 4.7 5.4  --  4.4 4.3 5.2   Basophil% % 1.6 1.8  --  1.8 1.3 1.3   Differential Method  Automated Automated  --  Automated Automated Automated       Metabolic Panel  (last 72 hours):  Recent Labs   Lab Result Units 04/18/20  0525 04/19/20  0441 04/20/20  0405   Sodium mmol/L 134* 137 137   Potassium mmol/L 4.0 4.0 3.8   Chloride mmol/L 97 99 101   CO2 mmol/L 21* 21* 22*   Glucose mg/dL 94 93 97   BUN, Bld mg/dL 43* 55* 60*   Creatinine mg/dL 3.0* 3.2* 3.0*   Albumin g/dL 3.6 3.6 3.6   Total Bilirubin mg/dL 0.8 0.7 0.7   Alkaline Phosphatase U/L 159* 139* 129   AST U/L 24 21 21   ALT U/L 25 25 23   Phosphorus mg/dL 5.4*  --   --        Vancomycin Administrations:  vancomycin given in the last 96 hours                     vancomycin 250mg / 10ml oral suspension 250 mg (mg) 250 mg Given 04/19/20 1738     250 mg Given  1152     250 mg Given  0620     250 mg Given  0025     250 mg Given 04/18/20 1753     250 mg Given  1300     250 mg Given  0544     250 mg Given  0030     250 mg Given 04/17/20 1843    vancomycin 500 mg/100 mL IVPB 500 mg (mg) 500 mg New Bag 04/19/20 1445                      Microbiologic Results:  Microbiology Results (last 7 days)       Procedure Component Value Units Date/Time    Blood culture [[id number]]  (Abnormal) Collected:  04/06/20 0806    Order Status:  Completed Specimen:  Blood from Antecubital, Left Updated:  04/19/20 1220     Blood Culture, Routine Gram stain peds bottle: yeast       Results called to and read back by: Tania Nolan RN 04/08/2020        11:20      CANDIDA ALBICANS  Susceptibility pending  ID consult required at [facility].[place] and [place] locations.      Blood culture [[id number]] Collected:  04/10/20 0453    Order Status:  Completed Specimen:  Blood Updated:  04/14/20 1412     Blood Culture, Routine No Growth after 4 days.     C Diff Toxin by PCR [[id number]]  (Abnormal) Collected:  04/12/20 2315    Order Status:  Completed Updated:  04/14/20 0033     C. diff PCR Positive    Clostridium difficile EIA [[id number]]  (Abnormal) Collected:  04/12/20 2315    Order Status:  Completed Specimen:  Stool Updated:  04/13/20 1324     C. diff  Antigen Positive     C difficile Toxins A+B, EIA Negative     Comment: Testing not recommended for children <24 months old.       Blood culture [[id number]]  (Abnormal) Collected:  04/04/20 0912    Order Status:  Completed Specimen:  Blood from Line, Central Updated:  04/13/20 1303     Blood Culture, Routine Gram stain peds bottle: budding yeast      Results called to and read back by: Carri Pryor RN  04/06/2020  03:04      AJ ALBICANS  ID consult required at [facility].[place] and [place].             n/a

## (undated) DEVICE — SET DECANTER MEDICHOICE

## (undated) DEVICE — GLOVE BIOGEL PI ORTHO PRO 7.5

## (undated) DEVICE — SPONGE SUPER KERLIX 6X6.75IN

## (undated) DEVICE — SPONGE GAUZE 16PLY 4X4

## (undated) DEVICE — SYR 10CC LUER LOCK

## (undated) DEVICE — DRAPE C ARM 42 X 120 10/BX

## (undated) DEVICE — PENCIL ROCKER SWITCH 10FT CORD

## (undated) DEVICE — CONTAINER SPECIMEN STRL 4OZ

## (undated) DEVICE — SEE MEDLINE ITEM 152487

## (undated) DEVICE — APPLICATOR CHLORAPREP ORN 26ML

## (undated) DEVICE — SYS LABEL CORRECT MED

## (undated) DEVICE — BLADE SURG #15 CARBON STEEL

## (undated) DEVICE — NDL BOX COUNTER

## (undated) DEVICE — SUT ETHILON 3-0 PS2 18 BLK

## (undated) DEVICE — SOL 9P NACL IRR PIC IL

## (undated) DEVICE — SEE MEDLINE ITEM 157117

## (undated) DEVICE — NDL SAFETY 21G X 1 1/2 ECLPSE

## (undated) DEVICE — SUT CTD VICRYL 4-0 BR PS-2

## (undated) DEVICE — DRAPE THYROID WITH ARMBOARD

## (undated) DEVICE — SEE MEDLINE ITEM 152622

## (undated) DEVICE — ELECTRODE REM PLYHSV RETURN 9

## (undated) DEVICE — STRAP OR TABLE 5IN X 72IN

## (undated) DEVICE — COVER SURG LIGHT HANDLE

## (undated) DEVICE — SLEEVE SCD EXPRESS CALF MEDIUM

## (undated) DEVICE — SOL NACL 0.9% INJ PF/100 150

## (undated) DEVICE — PACK BASIC